# Patient Record
Sex: FEMALE | Race: BLACK OR AFRICAN AMERICAN | NOT HISPANIC OR LATINO | Employment: UNEMPLOYED | URBAN - METROPOLITAN AREA
[De-identification: names, ages, dates, MRNs, and addresses within clinical notes are randomized per-mention and may not be internally consistent; named-entity substitution may affect disease eponyms.]

---

## 2017-01-12 ENCOUNTER — TELEPHONE (OUTPATIENT)
Dept: ORTHOPEDICS | Facility: CLINIC | Age: 62
End: 2017-01-12

## 2017-01-12 NOTE — TELEPHONE ENCOUNTER
Phoned patient, moved appointment from [facility] to [facility] on 11/13/17 due to schedule change by provider. Patient vocalized understanding.

## 2017-01-13 ENCOUNTER — OFFICE VISIT (OUTPATIENT)
Dept: ORTHOPEDICS | Facility: CLINIC | Age: 62
End: 2017-01-13
Payer: COMMERCIAL

## 2017-01-13 ENCOUNTER — HOSPITAL ENCOUNTER (OUTPATIENT)
Dept: RADIOLOGY | Facility: HOSPITAL | Age: 62
Discharge: HOME | End: 2017-01-13
Attending: ORTHOPAEDIC SURGERY
Payer: COMMERCIAL

## 2017-01-13 VITALS
SYSTOLIC BLOOD PRESSURE: 142 MMHG | HEIGHT: 64 IN | HEART RATE: 97 BPM | WEIGHT: 271.19 LBS | DIASTOLIC BLOOD PRESSURE: 87 MMHG | BODY MASS INDEX: 46.3 KG/M2

## 2017-01-13 DIAGNOSIS — M79.605 LEG PAIN, LEFT: ICD-10-CM

## 2017-01-13 DIAGNOSIS — M25.512 ACUTE PAIN OF LEFT SHOULDER: ICD-10-CM

## 2017-01-13 DIAGNOSIS — M79.662 PAIN OF LEFT CALF: ICD-10-CM

## 2017-01-13 DIAGNOSIS — M25.512 ACUTE PAIN OF LEFT SHOULDER: Primary | ICD-10-CM

## 2017-01-13 DIAGNOSIS — M79.662 PAIN OF LEFT CALF: Primary | ICD-10-CM

## 2017-01-13 DIAGNOSIS — M79.605 LEG PAIN, LEFT: Primary | ICD-10-CM

## 2017-01-13 PROCEDURE — 93971 EXTREMITY STUDY: CPT | Mod: TC

## 2017-01-13 PROCEDURE — 99999 PR PBB SHADOW E&M-EST. PATIENT-LVL III: CPT | Mod: PBBFAC,,, | Performed by: ORTHOPAEDIC SURGERY

## 2017-01-13 PROCEDURE — 73590 X-RAY EXAM OF LOWER LEG: CPT | Mod: TC,PO,LT

## 2017-01-13 PROCEDURE — 73590 X-RAY EXAM OF LOWER LEG: CPT | Mod: 26,LT,, | Performed by: RADIOLOGY

## 2017-01-13 PROCEDURE — 99244 OFF/OP CNSLTJ NEW/EST MOD 40: CPT | Mod: S$GLB,,, | Performed by: ORTHOPAEDIC SURGERY

## 2017-01-13 RX ORDER — CYCLOBENZAPRINE HCL 10 MG
TABLET ORAL
Refills: 5 | COMMUNITY
Start: 2017-01-04 | End: 2018-11-15

## 2017-01-13 RX ORDER — DICLOFENAC SODIUM 20 MG/G
SOLUTION TOPICAL
COMMUNITY
Start: 2016-11-28 | End: 2018-11-15

## 2017-01-13 RX ORDER — DIGOXIN 250 MCG
TABLET ORAL
COMMUNITY
Start: 2016-12-10 | End: 2018-08-20

## 2017-01-13 RX ORDER — MULTIVIT WITH MINERALS/HERBS
1 TABLET ORAL DAILY
COMMUNITY
End: 2018-09-14

## 2017-01-13 RX ORDER — OMEPRAZOLE 20 MG/1
20 CAPSULE, DELAYED RELEASE ORAL DAILY
COMMUNITY
End: 2018-08-20

## 2017-01-13 RX ORDER — BLACK COHOSH ROOT 200 MG
500 CAPSULE ORAL DAILY
COMMUNITY
End: 2018-09-14

## 2017-01-13 RX ORDER — AMLODIPINE BESYLATE 10 MG/1
TABLET ORAL
COMMUNITY
Start: 2016-11-19 | End: 2018-11-26 | Stop reason: SDUPTHER

## 2017-01-13 RX ORDER — TRAMADOL HYDROCHLORIDE 50 MG/1
50 TABLET ORAL 2 TIMES DAILY PRN
Refills: 5 | COMMUNITY
Start: 2016-12-30 | End: 2019-05-01 | Stop reason: SDUPTHER

## 2017-01-13 RX ORDER — NAPROXEN SODIUM 220 MG
220 TABLET ORAL
COMMUNITY
End: 2018-09-11

## 2017-01-13 NOTE — PROGRESS NOTES
This consultation has been requested my Dr. Sada Aj.  Please make certain that  she gets a copy of this consultation report.      CC:This is a 61-year-old female that complains of left leg pain as well sweating.     HPI:The patient states that she does not have any shortness of breath nor chest pain.  She rates the Pain at a 7 out of 10.    PMH:  History reviewed. No pertinent past medical history.    PSH:    Past Surgical History   Procedure Laterality Date    Hysterectomy      Bladder suspension         Family Hx:    Family History   Problem Relation Age of Onset    Heart attack Father        Allergy:    Review of patient's allergies indicates:   Allergen Reactions    Penicillins Rash       Medication:    Current Outpatient Prescriptions:     amlodipine (NORVASC) 10 MG tablet, , Disp: , Rfl:     ascorbic acid, vitamin C, (VITAMIN C) 500 MG tablet, Take 500 mg by mouth once daily., Disp: , Rfl:     aspirin-sod bicarb-citric acid (LEAH-SELTZER) 324 mg TbEF, Take 325 mg by mouth nightly., Disp: , Rfl:     b complex vitamins tablet, Take 1 tablet by mouth once daily., Disp: , Rfl:     cyclobenzaprine (FLEXERIL) 10 MG tablet, , Disp: , Rfl: 5    digoxin (LANOXIN) 250 mcg tablet, , Disp: , Rfl:     naproxen sodium (ANAPROX) 220 MG tablet, Take 220 mg by mouth every 12 (twelve) hours., Disp: , Rfl:     omeprazole (PRILOSEC) 20 MG capsule, Take 20 mg by mouth once daily., Disp: , Rfl:     PENNSAID 20 mg/gram /actuation(2 %) sopm, , Disp: , Rfl:     tramadol (ULTRAM) 50 mg tablet, , Disp: , Rfl: 5    Social History:    Social History     Social History    Marital status:      Spouse name: N/A    Number of children: N/A    Years of education: N/A     Occupational History    Not on file.     Social History Main Topics    Smoking status: Never Smoker    Smokeless tobacco: Never Used    Alcohol use No    Drug use: No    Sexual activity: Yes     Partners: Male     Other Topics Concern  "   Not on file     Social History Narrative    No narrative on file       Vitals:     Visit Vitals    BP (!) 142/87    Pulse 97    Ht 5' 3.5" (1.613 m)    Wt 123 kg (271 lb 2.7 oz)    BMI 47.28 kg/m2        ROS:  GENERAL: No fever, chills, fatigability or weight loss.  SKIN: No rashes, itching or changes in color or texture of skin.  HEAD: No headaches or recent head trauma.  EYES: Visual acuity fine. No photophobia, ocular pain or diplopia.  EARS: Denies ear pain, discharge or vertigo.  NOSE: No loss of smell, no epistaxis or postnasal drip.  MOUTH & THROAT: No hoarseness or change in voice. No excessive gum bleeding.  NODES: Denies swollen glands.  CHEST: Denies AREVALO, cyanosis, wheezing, cough and sputum production.  CARDIOVASCULAR: Denies chest pain, PND, orthopnea or reduced exercise tolerance.  ABDOMEN: Appetite fine. No weight loss. Denies diarrhea, abdominal pain, hematemesis or blood in stool.  URINARY: No flank pain, dysuria or hematuria.  PERIPHERAL VASCULAR: No claudication or cyanosis.  NEUROLOGIC: No history of seizures, paralysis, alteration of gait or coordination.  MUSCULOSKELETAL: See HPI    PE:  APPEARANCE: Well nourished, well developed, in no acute distress.   HEAD: Normocephalic, atraumatic.  EYES: PERRL. EOMI.   EARS: TM's intact. Light reflex normal. No retraction or perforation.   NOSE: Mucosa pink. Airway clear.  MOUTH & THROAT: No tonsillar enlargement. No pharyngeal erythema or exudate. No stridor.  NECK: Supple.   NODES: No cervical, axillary or inguinal lymph node enlargement.  CHEST: Lungs clear to auscultation.  CARDIOVASCULAR: Normal S1, S2. No rubs, murmurs or gallops.  ABDOMEN: Bowel sounds normal. Not distended. Soft. No tenderness or masses.  NEUROLOGIC: Cranial Nerves: II-XII grossly intact, also see MUSCULOSKELETAL  MUSCULOSKELETAL:           Left Calf -  2 plus dorsalis pedis and posterior tibial artery pulses, light touch intact Left lower extremity.  All digits are " warm. No erythema, no warmth, no drainage, no swelling, mild tenderness  Over the medial calf and the achilles tendon.  Less than 2 seconds capillary refill all digits.      Assessment:  The patient is to undergo an ultrasound of the left calf.  She needs to be evaluated by her PCP ASAP.  The patient is to go to the emergency room immediately if she develops shortness of breath, lightheadedness, or dizziness.           Diagnosis:              1.Left calf pain                   Diagnostic Studies  MRI-No  X-Ray-yes  EMG/NCV-no  Arthrogram-No  Bone Scan-No  CT Scan-No  Doppler-yes, Left calf.  The patient is to remain in the clinic until she gets the result of her ultrasound.  ESR-No  CRP-No  CBC with Diff-No   Rheumatoid/Arthritis Panel-No      Plan:                                                 1. PT-no                                                 2.OT-no                                          3.NSAID-no                                        4. Narcotics-no                                     5. Wound care-No                                 6. Rest-yes                                           7. Surgery-no                                         8. BERYL Hose-no                                    9. Anticoagulation therapy-no               10. Elevation-no                                     11. Crutches-no                                    12. Walker-no             13. Cane no                        14. Referral-yes , Please see the PCP today.                                    15.Injection-no                            16. Splint   /    Cast   /   Cast Shoe-No              17. RICE-none            18. Follow up- 3 weeks

## 2017-01-13 NOTE — PATIENT INSTRUCTIONS
Deep Vein Thrombosis (DVT)    Deep vein thrombosis (DVT) occurs when a blood clot (thrombus) forms in a deep vein. This happens most often in the leg. It can also happen in the arms or other parts of the body. A part of the clot called an embolus can break off and travel to the lungs. When this happens, its called a pulmonary embolism (PE). PE is a medical emergency. It can cut off blood flow and lead to death. Both DVT and PE are closely related. Together, they are often referred to by the term venous thromboembolism (VTE).   Risk Factors for DVT  Anything that slows blood flow, injures the lining of a vein, or increases blood clotting can make you more prone to having DVT. This includes the following:  · Long periods without movement (such as when sitting for many hours at a time or when recovering from major surgery or illness)  · Estrogen (female hormone) therapy, such as hormone replacement therapy (HRT) or oral contraceptives  · Fractured hip or leg  · Major surgery or joint replacement  · Major trauma or spinal cord injury  · Cancer  · Family history  · Excess weight or obesity  · Smoking  · Older age  Symptoms  DVT does not always cause symptoms. When symptoms do occur, they may appear around the site of the DVT, such as in the leg. Possible symptoms include:  · Swelling  · Pain  · Warmth  · Redness  · Tenderness  Home care  · You were likely prescribed medicines called anticoagulants (also called blood thinners.) They may be given as pills (oral) or shots (injections). Follow all instructions when using these medicines. Note: Do not take blood thinners with other medicines, herbal remedies, or supplements without talking to your provider first. Certain medicines or products can affect how blood thinners work.  · Follow your providers instructions about activity and rest.  · If support or compression stockings are prescribed, wear them as directed. These may help improve blood flow in the legs.  · When  sitting or lying down, move your ankles, toes and knees often. This may also help improve blood flow in the legs.  Follow-up care  Follow up with your healthcare provider, or as advised. If imaging tests were done, they may need further review by a doctor. You will be told of any new findings that may affect your care.  When to seek medical advice  Call your healthcare provider right away if any of these occur:  · New or increased swelling, pain, tenderness, warmth, or redness, in the leg, arm, or other area  · Blood in the urine  · Bleeding with bowel movements  Call 911  Call 911 right away if any of these occur:  · Bleeding from the nose, gums, a cut, or vagina  · Heavy or uncontrolled bleeding  · Trouble breathing  · Chest pain or discomfort that worsens with deep breathing or coughing  · Coughing (may cough up blood)  · Fast heartbeat  · Sweating  · Anxiety  · Lightheadedness, dizziness, or fainting  © 7932-3184 [employer]. [address] [place] [address], [place] [address]. All rights reserved. This information is not intended as a substitute for professional medical care. Always follow your healthcare professional's instructions.

## 2017-01-16 ENCOUNTER — TELEPHONE (OUTPATIENT)
Dept: ORTHOPEDICS | Facility: CLINIC | Age: 62
End: 2017-01-16

## 2017-01-16 NOTE — TELEPHONE ENCOUNTER
----- Message from Flores Plaza sent at 1/16/2017  9:51 AM CST -----  Contact: Patient  Patient is requesting her test results, please call her back at [phone]. Thank you

## 2017-01-17 ENCOUNTER — TELEPHONE (OUTPATIENT)
Dept: ORTHOPEDICS | Facility: CLINIC | Age: 62
End: 2017-01-17

## 2017-01-17 NOTE — TELEPHONE ENCOUNTER
----- Message from Gustavo Treviño PA-C sent at 1/16/2017  4:28 PM CST -----  Discuss with patient, to follow up with PCP. US negative for DVT

## 2018-08-17 DIAGNOSIS — M25.562 LEFT KNEE PAIN, UNSPECIFIED CHRONICITY: Primary | ICD-10-CM

## 2018-08-20 ENCOUNTER — OFFICE VISIT (OUTPATIENT)
Dept: ORTHOPEDICS | Facility: CLINIC | Age: 63
End: 2018-08-20
Payer: COMMERCIAL

## 2018-08-20 ENCOUNTER — HOSPITAL ENCOUNTER (OUTPATIENT)
Dept: RADIOLOGY | Facility: HOSPITAL | Age: 63
Discharge: HOME | End: 2018-08-20
Attending: ORTHOPAEDIC SURGERY
Payer: COMMERCIAL

## 2018-08-20 ENCOUNTER — OFFICE VISIT (OUTPATIENT)
Dept: CARDIOLOGY | Facility: CLINIC | Age: 63
End: 2018-08-20
Payer: COMMERCIAL

## 2018-08-20 VITALS
DIASTOLIC BLOOD PRESSURE: 70 MMHG | WEIGHT: 257.94 LBS | BODY MASS INDEX: 45.7 KG/M2 | SYSTOLIC BLOOD PRESSURE: 129 MMHG | HEIGHT: 63 IN | HEART RATE: 106 BPM

## 2018-08-20 VITALS
SYSTOLIC BLOOD PRESSURE: 132 MMHG | HEART RATE: 92 BPM | BODY MASS INDEX: 46.21 KG/M2 | WEIGHT: 260.81 LBS | DIASTOLIC BLOOD PRESSURE: 80 MMHG | HEIGHT: 63 IN

## 2018-08-20 DIAGNOSIS — R07.89 CHEST PRESSURE: Primary | ICD-10-CM

## 2018-08-20 DIAGNOSIS — G47.33 OSA (OBSTRUCTIVE SLEEP APNEA): ICD-10-CM

## 2018-08-20 DIAGNOSIS — M21.162 GENU VARUM OF LEFT LOWER EXTREMITY: ICD-10-CM

## 2018-08-20 DIAGNOSIS — R06.09 DYSPNEA ON EXERTION: ICD-10-CM

## 2018-08-20 DIAGNOSIS — R07.89 OTHER CHEST PAIN: ICD-10-CM

## 2018-08-20 DIAGNOSIS — E66.9 OBESITY, UNSPECIFIED CLASSIFICATION, UNSPECIFIED OBESITY TYPE, UNSPECIFIED WHETHER SERIOUS COMORBIDITY PRESENT: ICD-10-CM

## 2018-08-20 DIAGNOSIS — M25.562 LEFT KNEE PAIN, UNSPECIFIED CHRONICITY: ICD-10-CM

## 2018-08-20 DIAGNOSIS — R06.09 DOE (DYSPNEA ON EXERTION): ICD-10-CM

## 2018-08-20 DIAGNOSIS — I10 ESSENTIAL HYPERTENSION: ICD-10-CM

## 2018-08-20 DIAGNOSIS — M17.0 PRIMARY OSTEOARTHRITIS OF BOTH KNEES: Primary | ICD-10-CM

## 2018-08-20 PROCEDURE — 99999 PR PBB SHADOW E&M-EST. PATIENT-LVL IV: CPT | Mod: PBBFAC,,, | Performed by: INTERNAL MEDICINE

## 2018-08-20 PROCEDURE — 3008F BODY MASS INDEX DOCD: CPT | Mod: CPTII,S$GLB,, | Performed by: ORTHOPAEDIC SURGERY

## 2018-08-20 PROCEDURE — 73562 X-RAY EXAM OF KNEE 3: CPT | Mod: 26,XS,RT, | Performed by: RADIOLOGY

## 2018-08-20 PROCEDURE — 99214 OFFICE O/P EST MOD 30 MIN: CPT | Mod: S$GLB,,, | Performed by: ORTHOPAEDIC SURGERY

## 2018-08-20 PROCEDURE — 73562 X-RAY EXAM OF KNEE 3: CPT | Mod: TC,FY,PO,RT

## 2018-08-20 PROCEDURE — 93000 ELECTROCARDIOGRAM COMPLETE: CPT | Mod: S$GLB,,, | Performed by: INTERNAL MEDICINE

## 2018-08-20 PROCEDURE — 73564 X-RAY EXAM KNEE 4 OR MORE: CPT | Mod: 26,LT,, | Performed by: RADIOLOGY

## 2018-08-20 PROCEDURE — 99999 PR PBB SHADOW E&M-EST. PATIENT-LVL IV: CPT | Mod: PBBFAC,,, | Performed by: ORTHOPAEDIC SURGERY

## 2018-08-20 PROCEDURE — 99205 OFFICE O/P NEW HI 60 MIN: CPT | Mod: S$GLB,,, | Performed by: INTERNAL MEDICINE

## 2018-08-20 PROCEDURE — 3008F BODY MASS INDEX DOCD: CPT | Mod: CPTII,S$GLB,, | Performed by: INTERNAL MEDICINE

## 2018-08-20 RX ORDER — HYDROCODONE BITARTRATE AND ACETAMINOPHEN 7.5; 325 MG/1; MG/1
1 TABLET ORAL EVERY 6 HOURS PRN
COMMUNITY
Start: 2018-08-07 | End: 2018-11-15

## 2018-08-20 RX ORDER — MELOXICAM 7.5 MG/1
7.5 TABLET ORAL
COMMUNITY
Start: 2018-08-14 | End: 2018-09-14

## 2018-08-20 RX ORDER — FUROSEMIDE 20 MG/1
20 TABLET ORAL DAILY PRN
Qty: 90 TABLET | Refills: 3 | Status: SHIPPED | OUTPATIENT
Start: 2018-08-20 | End: 2018-09-27 | Stop reason: SDUPTHER

## 2018-08-20 RX ORDER — TOPIRAMATE 50 MG/1
50 TABLET, FILM COATED ORAL 2 TIMES DAILY
COMMUNITY
Start: 2018-03-07 | End: 2018-09-14 | Stop reason: SDUPTHER

## 2018-08-20 RX ORDER — METHOCARBAMOL 500 MG/1
3 TABLET, FILM COATED ORAL DAILY PRN
COMMUNITY
Start: 2018-08-05 | End: 2018-11-08 | Stop reason: SDUPTHER

## 2018-08-20 RX ORDER — TOPIRAMATE 50 MG/1
1 TABLET, FILM COATED ORAL DAILY
COMMUNITY
Start: 2018-06-18 | End: 2018-11-26 | Stop reason: SDUPTHER

## 2018-08-20 RX ORDER — OMEPRAZOLE 40 MG/1
1 CAPSULE, DELAYED RELEASE ORAL DAILY
COMMUNITY
Start: 2018-06-27 | End: 2018-11-26 | Stop reason: SDUPTHER

## 2018-08-20 NOTE — PROGRESS NOTES
Subjective:     Patient ID: Mariel Becerril is a 62 y.o. female.    Chief Complaint: Pain of the Left Knee    Patient is here for left knee pain. Reports it has been bothering her for 1 year. Reports no LUCA. Reports no previous injury. Ambulating on a crutch today.      Knee Pain    The pain is present in the left knee. This is a chronic problem. The current episode started more than 1 year ago. The problem occurs intermittently. The problem has been gradually improving. The quality of the pain is described as aching. The pain is at a severity of 5/10. Associated symptoms include joint swelling. Pertinent negatives include no fever or numbness. The symptoms are aggravated by lying down, walking, standing and sitting. She has tried NSAIDs, oral narcotics, rest and OTC ointments for the symptoms. The treatment provided moderate relief. Physical therapy was not tried.      Past Medical History:   Diagnosis Date    Hypertension      Past Surgical History:   Procedure Laterality Date    BLADDER SUSPENSION      HYSTERECTOMY      tonsilectomy       Family History   Problem Relation Age of Onset    Heart attack Father      Social History     Socioeconomic History    Marital status:      Spouse name: Not on file    Number of children: Not on file    Years of education: Not on file    Highest education level: Not on file   Social Needs    Financial resource strain: Not on file    Food insecurity - worry: Not on file    Food insecurity - inability: Not on file    Transportation needs - medical: Not on file    Transportation needs - non-medical: Not on file   Occupational History    Not on file   Tobacco Use    Smoking status: Never Smoker    Smokeless tobacco: Never Used   Substance and Sexual Activity    Alcohol use: No    Drug use: No    Sexual activity: Yes     Partners: Male   Other Topics Concern    Not on file   Social History Narrative    Not on file     Medication List with Changes/Refills    Current Medications    AMLODIPINE (NORVASC) 10 MG TABLET        ASCORBIC ACID, VITAMIN C, (VITAMIN C) 500 MG TABLET    Take 500 mg by mouth once daily.    ASPIRIN-SOD BICARB-CITRIC ACID (LEAH-SELTZER) 324 MG TBEF    Take 325 mg by mouth nightly.    B COMPLEX VITAMINS TABLET    Take 1 tablet by mouth once daily.    CYCLOBENZAPRINE (FLEXERIL) 10 MG TABLET        DIGOXIN (LANOXIN) 250 MCG TABLET        HYDROCODONE-ACETAMINOPHEN (NORCO) 7.5-325 MG PER TABLET    Take 1 tablet by mouth every 6 (six) hours as needed.    MELOXICAM (MOBIC) 7.5 MG TABLET    Take 1 tablet by mouth as needed.    METHOCARBAMOL (ROBAXIN) 500 MG TAB    Take 3 tablets by mouth daily as needed.    NAPROXEN SODIUM (ANAPROX) 220 MG TABLET    Take 220 mg by mouth every 12 (twelve) hours.    OMEPRAZOLE (PRILOSEC) 20 MG CAPSULE    Take 20 mg by mouth once daily.    OMEPRAZOLE (PRILOSEC) 40 MG CAPSULE    Take 1 capsule by mouth once daily.    PENNSAID 20 MG/GRAM /ACTUATION(2 %) SOPM        TOPIRAMATE (TOPAMAX) 50 MG TABLET    Take 1 tablet by mouth once daily.    TOPIRAMATE (TOPAMAX) 50 MG TABLET    Take 50 mg by mouth 2 (two) times daily.    TRAMADOL (ULTRAM) 50 MG TABLET         Review of patient's allergies indicates:   Allergen Reactions    Penicillins Rash     Review of Systems   Constitution: Negative for fever.   HENT: Negative for hearing loss.    Eyes: Negative for blurred vision and visual disturbance.   Cardiovascular: Positive for leg swelling. Negative for chest pain.   Respiratory: Positive for shortness of breath.    Endocrine: Negative for polyuria.   Hematologic/Lymphatic: Negative for bleeding problem.   Skin: Negative for rash.   Musculoskeletal: Positive for back pain, joint pain, joint swelling and muscle cramps. Negative for muscle weakness.   Gastrointestinal: Negative for melena.   Genitourinary: Negative for hematuria.   Neurological: Negative for loss of balance, numbness and paresthesias.   Psychiatric/Behavioral: Negative  for altered mental status.       Objective:   Body mass index is 45.69 kg/m².  Vitals:    08/20/18 0933   BP: 129/70   Pulse: 106       General: Mariel is well-developed, well-nourished, appears stated age, in no acute distress, alert and oriented to time, place and person.       General    Vitals reviewed.  Constitutional: She is oriented to person, place, and time. She appears well-developed and well-nourished. No distress.   HENT:   Mouth/Throat: No oropharyngeal exudate.   Eyes: Right eye exhibits no discharge. Left eye exhibits no discharge.   Neck: Normal range of motion.   Pulmonary/Chest: Effort normal. No respiratory distress.   Neurological: She is alert and oriented to person, place, and time. She has normal reflexes. No cranial nerve deficit. Coordination normal.   Psychiatric: She has a normal mood and affect. Her behavior is normal. Judgment and thought content normal.     General Musculoskeletal Exam   Gait: normal       Right Knee Exam     Inspection   Erythema: absent  Scars: absent  Swelling: absent  Effusion: effusion  Deformity: deformity  Bruising: absent    Tenderness   The patient is tender to palpation of the medial joint line.    Crepitus   The patient has crepitus of the patella.    Range of Motion   Extension: 0   Flexion: 120     Tests   Meniscus   Celio:  Medial - negative Lateral - negative  Ligament Examination Lachman: normal (-1 to 2mm) PCL-Posterior Drawer: normal (0 to 2mm)     MCL - Valgus: normal (0 to 2mm)  LCL - Varus: normal  Patella   Patellar apprehension: negative  Passive Patellar Tilt: neutral  Patellar Tracking: normal  Patellar Glide (quadrants): Lateral - 1   Medial - 2  Q-Angle at 90 degrees: normal  Patellar Grind: negative    Other   Meniscal Cyst: absent  Popliteal (Baker's) Cyst: absent  Sensation: normal    Left Knee Exam     Inspection   Erythema: absent  Scars: absent  Swelling: absent  Effusion: absent  Deformity: deformity  Bruising: absent    Tenderness    The patient tender to palpation of the medial joint line.    Crepitus   The patient has crepitus of the patella.    Range of Motion   Extension: 0   Flexion: 120     Tests   Meniscus   Celio:  Medial - negative Lateral - negative  Stability Lachman: normal (-1 to 2mm) PCL-Posterior Drawer: normal (0 to 2mm)  MCL - Valgus: normal (0 to 2mm)  LCL - Varus: normal (0 to 2mm)  Patella   Patellar apprehension: negative  Passive Patellar Tilt: neutral  Patellar Tracking: normal  Patellar Glide (Quadrants): Lateral - 1 Medial - 2  Q-Angle at 90 degrees: normal  Patellar Grind: negative    Other   Meniscal Cyst: absent  Popliteal (Baker's) Cyst: absent  Sensation: normal    Right Hip Exam     Tests   Timoteo: negative  Left Hip Exam     Tests   Timoteo: negative          Muscle Strength   Right Lower Extremity   Hip Abduction: 5/5   Quadriceps:  5/5   Hamstrin/5   Left Lower Extremity   Hip Abduction: 5/5   Quadriceps:  4/5   Hamstrin/5     Reflexes     Left Side  Quadriceps:  2+  Achilles:  2+    Right Side   Quadriceps:  2+  Achilles:  2+    Vascular Exam     Right Pulses  Dorsalis Pedis:      2+  Posterior Tibial:      2+        Left Pulses  Dorsalis Pedis:      2+  Posterior Tibial:      2+        X-rays including standing, weight bearing AP and flexion bilateral knees, lateral and merchant views ordered and images reviewed by me show:    No fracture, dislocation or other pathology   Medial compartment: moderate degenerative changes   Lateral compartment: mild degenerative changes   Patellofemoral compartment: mild degenerative changes      Assessment:     Encounter Diagnoses   Name Primary?    Primary osteoarthritis of both knees Yes    Obesity, unspecified classification, unspecified obesity type, unspecified whether serious comorbidity present     Genu varum of left lower extremity         Plan:     1. PT for quad strengthening/Home exercise program    2.  Medial  Brace -left    3.  Continue pain  management    4. Synvisc authorization    5. Weight loss-bariatric referral    6. Dr. Mi referral for genicular nerve block, lumbar injections-current pain mgmt has not offered any interventions

## 2018-08-20 NOTE — PROGRESS NOTES
Subjective:   Patient ID:  Mariel Becerril is a 62 y.o. female who presents for cardiac consult of Shortness of Breath and Chest Pain      HPI  The patient came in today for cardiac consult of Shortness of Breath and Chest Pain    18  This is a 62 year old female pt with current medical conditions HTN, obesity, GERD, OA, CPAP of knees presents for initial CV evaluation of SOB/AREVALO.     Pt has been having SOB, AREVALO. Is always in pain, is exhausted on pain meds. She feels like her heart if affected and is tired after walking a few steps.  Pt also feels chest pressure. Pt feels chest pressure daily, just tries to rest and goes away. Chest pain is substernal without radiation. 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 Uses CPAP every night, but last eval was over 5 years.   Feels palpitations, rarely, was on digoxin then.    ECG - NSR, poor RWP    Patient feels no PND, no dizziness, no syncope, no CNS symptoms.    Patient is compliant with medications.    Family history includes Arthritis in her mother; Depression in her sister; Heart disease in her father -  at age 81; Hypertension in her mother and sister.      Past Medical History:   Diagnosis Date    Hypertension        Past Surgical History:   Procedure Laterality Date    BLADDER SUSPENSION      HYSTERECTOMY      tonsilectomy         Social History     Tobacco Use    Smoking status: Never Smoker    Smokeless tobacco: Never Used   Substance Use Topics    Alcohol use: No    Drug use: No       Family History   Problem Relation Age of Onset    Heart attack Father        Patient's Medications   New Prescriptions    FUROSEMIDE (LASIX) 20 MG TABLET    Take 1 tablet (20 mg total) by mouth daily as needed.   Previous Medications    AMLODIPINE (NORVASC) 10 MG TABLET        ASCORBIC ACID, VITAMIN C, (VITAMIN C) 500 MG TABLET    Take 500 mg by mouth once daily.    ASPIRIN-SOD BICARB-CITRIC ACID (LEAH-SELTZER) 324 MG TBEF    Take 325 mg by mouth nightly.    B COMPLEX  "VITAMINS TABLET    Take 1 tablet by mouth once daily.    CYCLOBENZAPRINE (FLEXERIL) 10 MG TABLET        HYDROCODONE-ACETAMINOPHEN (NORCO) 7.5-325 MG PER TABLET    Take 1 tablet by mouth every 6 (six) hours as needed.    MELOXICAM (MOBIC) 7.5 MG TABLET    Take 1 tablet by mouth as needed.    METHOCARBAMOL (ROBAXIN) 500 MG TAB    Take 3 tablets by mouth daily as needed.    NAPROXEN SODIUM (ANAPROX) 220 MG TABLET    Take 220 mg by mouth every 12 (twelve) hours.    OMEPRAZOLE (PRILOSEC) 40 MG CAPSULE    Take 1 capsule by mouth once daily.    PENNSAID 20 MG/GRAM /ACTUATION(2 %) SOPM        TOPIRAMATE (TOPAMAX) 50 MG TABLET    Take 1 tablet by mouth once daily.    TOPIRAMATE (TOPAMAX) 50 MG TABLET    Take 50 mg by mouth 2 (two) times daily.    TRAMADOL (ULTRAM) 50 MG TABLET       Modified Medications    No medications on file   Discontinued Medications    DIGOXIN (LANOXIN) 250 MCG TABLET        OMEPRAZOLE (PRILOSEC) 20 MG CAPSULE    Take 20 mg by mouth once daily.       Review of Systems   Constitutional: Negative.    HENT: Negative.    Eyes: Negative.    Respiratory: Positive for shortness of breath.    Cardiovascular: Positive for chest pain, palpitations and leg swelling.   Gastrointestinal: Positive for heartburn.   Genitourinary: Negative.    Musculoskeletal: Negative.    Skin: Negative.    Neurological: Negative.    Endo/Heme/Allergies: Negative.    Psychiatric/Behavioral: Negative.    All 12 systems otherwise negative.      Wt Readings from Last 3 Encounters:   08/20/18 118.3 kg (260 lb 12.9 oz)   08/20/18 117 kg (257 lb 15 oz)   01/13/17 123 kg (271 lb 2.7 oz)     Temp Readings from Last 3 Encounters:   No data found for Temp     BP Readings from Last 3 Encounters:   08/20/18 132/80   08/20/18 129/70   01/13/17 (!) 142/87     Pulse Readings from Last 3 Encounters:   08/20/18 92   08/20/18 106   01/13/17 97       /80 (BP Method: Large (Manual))   Pulse 92   Ht 5' 3" (1.6 m)   Wt 118.3 kg (260 lb 12.9 oz)  "  BMI 46.20 kg/m²     Objective:   Physical Exam   Constitutional: She is oriented to person, place, and time. She appears well-developed and well-nourished. No distress.   HENT:   Head: Normocephalic and atraumatic.   Nose: Nose normal.   Mouth/Throat: Oropharynx is clear and moist.   Eyes: Conjunctivae and EOM are normal. No scleral icterus.   Neck: Normal range of motion. Neck supple. No JVD present. No thyromegaly present.   Cardiovascular: Normal rate, regular rhythm, S1 normal and S2 normal. Exam reveals no gallop, no S3, no S4 and no friction rub.   Murmur heard.  Pulmonary/Chest: Effort normal and breath sounds normal. No stridor. No respiratory distress. She has no wheezes. She has no rales. She exhibits no tenderness.   Abdominal: Soft. Bowel sounds are normal. She exhibits no distension and no mass. There is no tenderness. There is no rebound.   Genitourinary:   Genitourinary Comments: Deferred   Musculoskeletal: Normal range of motion. She exhibits no edema, tenderness or deformity.   Lymphadenopathy:     She has no cervical adenopathy.   Neurological: She is alert and oriented to person, place, and time. She exhibits normal muscle tone. Coordination normal.   Skin: Skin is warm and dry. No rash noted. She is not diaphoretic. No erythema. No pallor.   Psychiatric: She has a normal mood and affect. Her behavior is normal. Judgment and thought content normal.   Nursing note and vitals reviewed.      No results found for: NA, K, CL, CO2, BUN, CREATININE, GLU, HGBA1C, MG, AST, ALT, ALBUMIN, PROT, BILITOT, WBC, HGB, HCT, MCV, PLT, INR, TSH, CHOL, HDL, LDLCALC, TRIG, BNP  Assessment:      1. Chest pressure    2. Essential hypertension    3. GIL (obstructive sleep apnea)    4. AREVALO (dyspnea on exertion)    5. Other chest pain    6. Dyspnea on exertion        Plan:   1. Chest pressure/AREVALO  - pharm nuclear stress test to r/o ischemia - pt cannot walk due to leg pain  - 2D ECHO, CXR, BNP  - discussed non-cardiac  causes of CP as well  - lasix 20mg PRN daily    2. AREVALO  - as above and pulm referral    3. HTN  - cont meds    4. Obesity  - rec weight loss with diet/exercise    5. GIL  - cont CPAP  - referral to pulm for AREVALO and may need titration of CPAP    Thank you for allowing me to participate in this patient's care. Please do not hesitate to contact me with any questions or concerns. Consult note has been forwarded to the referral physician.

## 2018-08-27 ENCOUNTER — TELEPHONE (OUTPATIENT)
Dept: PULMONOLOGY | Facility: CLINIC | Age: 63
End: 2018-08-27

## 2018-08-27 NOTE — TELEPHONE ENCOUNTER
----- Message from Julia Grider sent at 8/27/2018 11:34 AM CDT -----  Contact: pt  She's returning a call in regards to appointment time, she stated that she can come for 1pm, please advise [phone] (home)

## 2018-08-29 ENCOUNTER — OFFICE VISIT (OUTPATIENT)
Dept: PULMONOLOGY | Facility: CLINIC | Age: 63
End: 2018-08-29
Payer: COMMERCIAL

## 2018-08-29 VITALS
WEIGHT: 265.88 LBS | SYSTOLIC BLOOD PRESSURE: 120 MMHG | BODY MASS INDEX: 47.11 KG/M2 | RESPIRATION RATE: 18 BRPM | DIASTOLIC BLOOD PRESSURE: 80 MMHG | OXYGEN SATURATION: 99 % | HEIGHT: 63 IN | HEART RATE: 94 BPM

## 2018-08-29 DIAGNOSIS — G47.33 OSA ON CPAP: Primary | ICD-10-CM

## 2018-08-29 DIAGNOSIS — R06.02 SOBOE (SHORTNESS OF BREATH ON EXERTION): ICD-10-CM

## 2018-08-29 DIAGNOSIS — R09.02 HYPOXEMIA: ICD-10-CM

## 2018-08-29 PROCEDURE — 3008F BODY MASS INDEX DOCD: CPT | Mod: CPTII,S$GLB,, | Performed by: INTERNAL MEDICINE

## 2018-08-29 PROCEDURE — 99999 PR PBB SHADOW E&M-EST. PATIENT-LVL III: CPT | Mod: PBBFAC,,, | Performed by: INTERNAL MEDICINE

## 2018-08-29 PROCEDURE — 99204 OFFICE O/P NEW MOD 45 MIN: CPT | Mod: S$GLB,,, | Performed by: INTERNAL MEDICINE

## 2018-08-29 NOTE — PROGRESS NOTES
Initial Outpatient Pulmonary Evaluation       SUBJECTIVE:     History of Present Illness:  Patient is a 62 y.o. female presenting for evaluation and treatment of obstructive sleep apnea.  Patient known with obstructive sleep apnea, has had a sleep study done more than 5 years ago.  She has recently complained of excessive fatigue, shortness of breath at rest and on exertion, chest discomfort.    Compliant with her CPAP.    Patient recently node with the physical therapy, and supposed to see a nutritionist in order to try to lose weight.  She complains of bilateral knee pain.  Uses a cane for ambulation.    Never smoker.    Works was [employer].    Chief Complaint   Patient presents with    Sleep Apnea    Shortness of Breath       Review of patient's allergies indicates:   Allergen Reactions    Penicillins Rash       Current Outpatient Medications   Medication Sig Dispense Refill    amlodipine (NORVASC) 10 MG tablet       furosemide (LASIX) 20 MG tablet Take 1 tablet (20 mg total) by mouth daily as needed. 90 tablet 3    HYDROcodone-acetaminophen (NORCO) 7.5-325 mg per tablet Take 1 tablet by mouth every 6 (six) hours as needed.      meloxicam (MOBIC) 7.5 MG tablet Take 1 tablet by mouth as needed.      methocarbamol (ROBAXIN) 500 MG Tab Take 3 tablets by mouth daily as needed.      naproxen sodium (ANAPROX) 220 MG tablet Take 220 mg by mouth every 12 (twelve) hours.      omeprazole (PRILOSEC) 40 MG capsule Take 1 capsule by mouth once daily.      topiramate (TOPAMAX) 50 MG tablet Take 50 mg by mouth 2 (two) times daily.      tramadol (ULTRAM) 50 mg tablet   5    ascorbic acid, vitamin C, (VITAMIN C) 500 MG tablet Take 500 mg by mouth once daily.      aspirin-sod bicarb-citric acid (LEAH-SELTZER) 324 mg TbEF Take 325 mg by mouth nightly.      b complex vitamins tablet Take 1 tablet by mouth once daily.      cyclobenzaprine (FLEXERIL) 10 MG  "tablet   5    PENNSAID 20 mg/gram /actuation(2 %) sopm       topiramate (TOPAMAX) 50 MG tablet Take 1 tablet by mouth once daily.       No current facility-administered medications for this visit.        Past Medical History:   Diagnosis Date    Hypertension      Past Surgical History:   Procedure Laterality Date    BLADDER SUSPENSION      HYSTERECTOMY      tonsilectomy       Family History   Problem Relation Age of Onset    Heart attack Father      Social History     Tobacco Use    Smoking status: Never Smoker    Smokeless tobacco: Never Used   Substance Use Topics    Alcohol use: No    Drug use: No        Review of Systems:  Review of Systems   Constitutional: Positive for fatigue.   HENT: Negative for hearing loss and nosebleeds.         Snoring   Eyes: Negative for redness.   Respiratory: Positive for apnea and shortness of breath.    Cardiovascular: Positive for leg swelling.   Gastrointestinal: Negative for abdominal pain.   Endocrine: Negative for cold intolerance.   Genitourinary: Negative for hematuria.   Musculoskeletal: Positive for arthralgias and gait problem.   Skin: Negative for wound.   Allergic/Immunologic: Negative for immunocompromised state.   Neurological: Negative for seizures.   Hematological: Negative for adenopathy.   Psychiatric/Behavioral: Positive for sleep disturbance. Negative for behavioral problems.       OBJECTIVE:     Vital Signs (Most Recent)  Pulse: 94 (08/29/18 1314)  Resp: 18 (08/29/18 1314)  BP: 120/80 (08/29/18 1314)  SpO2: 99 % (08/29/18 1314)  5' 3" (1.6 m)  120.6 kg (265 lb 14 oz)     Physical Exam:  Physical Exam   Constitutional: She is oriented to person, place, and time. She appears well-developed and well-nourished.   HENT:   Head: Normocephalic and atraumatic.   Mallampati 4    Eyes: EOM are normal.   Neck: Neck supple.   Neck circumference 16 inches      Cardiovascular: Normal rate and regular rhythm.   Pulmonary/Chest: Effort normal and breath sounds " normal.   Abdominal: Soft. There is no tenderness.   Musculoskeletal: She exhibits edema.   Neurological: She is alert and oriented to person, place, and time.   Skin: Skin is warm.   Psychiatric: She has a normal mood and affect.       Laboratory  BNP August 20, 2018 less than 10    [facility] a bicarb level of 30    Diagnostic Results:  EKG 08/20/2018 sinus rhythm with nonspecific ST-T changes.  Patient supposed to have with the echo and stress test.      ASSESSMENT/PLAN:     1. GIL on CPAP    2. SOBOE (shortness of breath on exertion)    3. Hypoxemia    4. BMI 45.0-49.9, adult      Will do in-lab polysomnography split night with titration likely.    Chest x-ray (ordered by Cardiology) and PFT.    Check patient ABG.  She might have obesity hypoventilation.    Patient will be seeing physical therapy and nutritionist .    General weight loss/lifestyle modification strategies discussed (elicit support from others; identify saboteurs; non-food rewards).  Diet interventions: low calorie (1000 kCal/d) deficit diet        Follow-up in about 2 months (around 10/29/2018).    This note was prepared using voice recognition system and is likely to have sound alike errors that may have been overlooked even after proof reading.  Please call me with any questions    Discussed diagnosis, its evaluation, treatment and usual course. All questions answered.    Thank you for the courtesy of participating in the care of this patient    Ann Woods MD

## 2018-08-29 NOTE — LETTER
August 29, 2018      Juan Alberto Luis MD  [address] [facility] [address]  [place] [address]           [facility]  [address] [place] [address]  Phone: [phone]  Fax: [phone]          Patient: Mariel Becerril   MR Number: [id number]   YOB: 1955   Date of Visit: 8/29/2018       Dear Dr. Juan Alberto Luis:    Thank you for referring Mariel Becerril to me for evaluation. Attached you will find relevant portions of my assessment and plan of care.    If you have questions, please do not hesitate to call me. I look forward to following Mariel Becerril along with you.    Sincerely,    Ann Woods MD    Enclosure  CC:  No Recipients    If you would like to receive this communication electronically, please contact [email or web address] or [phone] to request more information on [employer] Link access.    For providers and/or their staff who would like to refer a patient to [facility], please contact us through our one-stop-shop provider referral line, [facility], at [phone].    If you feel you have received this communication in error or would no longer like to receive these types of communications, please e-mail [email or web address]

## 2018-09-05 ENCOUNTER — OFFICE VISIT (OUTPATIENT)
Dept: SURGERY | Facility: CLINIC | Age: 63
End: 2018-09-05
Payer: COMMERCIAL

## 2018-09-05 ENCOUNTER — HOSPITAL ENCOUNTER (OUTPATIENT)
Dept: RADIOLOGY | Facility: HOSPITAL | Age: 63
Discharge: HOME | End: 2018-09-05
Attending: INTERNAL MEDICINE
Payer: COMMERCIAL

## 2018-09-05 ENCOUNTER — CLINICAL SUPPORT (OUTPATIENT)
Dept: CARDIOLOGY | Facility: CLINIC | Age: 63
End: 2018-09-05
Attending: INTERNAL MEDICINE
Payer: COMMERCIAL

## 2018-09-05 VITALS
SYSTOLIC BLOOD PRESSURE: 120 MMHG | HEART RATE: 89 BPM | HEIGHT: 63 IN | TEMPERATURE: 98 F | DIASTOLIC BLOOD PRESSURE: 77 MMHG | WEIGHT: 259.69 LBS | BODY MASS INDEX: 46.01 KG/M2

## 2018-09-05 DIAGNOSIS — R06.09 DYSPNEA ON EXERTION: ICD-10-CM

## 2018-09-05 DIAGNOSIS — R07.89 OTHER CHEST PAIN: ICD-10-CM

## 2018-09-05 DIAGNOSIS — G47.33 OSA (OBSTRUCTIVE SLEEP APNEA): ICD-10-CM

## 2018-09-05 DIAGNOSIS — M17.0 PRIMARY OSTEOARTHRITIS OF BOTH KNEES: ICD-10-CM

## 2018-09-05 DIAGNOSIS — I10 ESSENTIAL HYPERTENSION: Primary | ICD-10-CM

## 2018-09-05 LAB
DIASTOLIC DYSFUNCTION: NO
DIASTOLIC DYSFUNCTION: NO
ESTIMATED PA SYSTOLIC PRESSURE: 27.21
RETIRED EF AND QEF - SEE NOTES: 60 (ref 55–65)

## 2018-09-05 PROCEDURE — 71046 X-RAY EXAM CHEST 2 VIEWS: CPT | Mod: TC,FY,PO

## 2018-09-05 PROCEDURE — 99204 OFFICE O/P NEW MOD 45 MIN: CPT | Mod: S$GLB,,, | Performed by: SURGERY

## 2018-09-05 PROCEDURE — 99999 PR PBB SHADOW E&M-EST. PATIENT-LVL III: CPT | Mod: PBBFAC,,, | Performed by: SURGERY

## 2018-09-05 PROCEDURE — 93306 TTE W/DOPPLER COMPLETE: CPT | Mod: S$GLB,,, | Performed by: INTERNAL MEDICINE

## 2018-09-05 PROCEDURE — 71046 X-RAY EXAM CHEST 2 VIEWS: CPT | Mod: 26,,, | Performed by: RADIOLOGY

## 2018-09-05 PROCEDURE — 3008F BODY MASS INDEX DOCD: CPT | Mod: CPTII,S$GLB,, | Performed by: SURGERY

## 2018-09-05 PROCEDURE — 78452 HT MUSCLE IMAGE SPECT MULT: CPT | Mod: TC,PO

## 2018-09-05 PROCEDURE — 93015 CV STRESS TEST SUPVJ I&R: CPT | Mod: S$GLB,,, | Performed by: INTERNAL MEDICINE

## 2018-09-05 PROCEDURE — 78452 HT MUSCLE IMAGE SPECT MULT: CPT | Mod: 26,,, | Performed by: INTERNAL MEDICINE

## 2018-09-05 NOTE — LETTER
September 5, 2018      Angel Mancilla MD  [address] [place] [address]           [facility]  [address] [place] [address]  Phone: [phone]  Fax: [phone]          Patient: Mariel Becerril   MR Number: [id number]   YOB: 1955   Date of Visit: 9/5/2018       Dear Dr. Angel Mancilla:    Thank you for referring Mariel Becerril to me for evaluation. Attached you will find relevant portions of my assessment and plan of care.    If you have questions, please do not hesitate to call me. I look forward to following Mariel Becerril along with you.    Sincerely,    Anthony Rodríguez MD    Enclosure  CC:  No Recipients    If you would like to receive this communication electronically, please contact [email or web address] or [phone] to request more information on [employer] Link access.    For providers and/or their staff who would like to refer a patient to [facility], please contact us through our one-stop-shop provider referral line, [facility], at [phone].    If you feel you have received this communication in error or would no longer like to receive these types of communications, please e-mail [email or web address]

## 2018-09-05 NOTE — PROGRESS NOTES
BARIATRIC NEW PATIENT EVALUATION    CHIEF COMPLAINT:     Morbid obesity with a BMI of  45.45 and inability to lose weight.    HISTORY OF PRESENT ILLNESS:  Mariel Becerril is a 62 y.o.-year-old female presenting for  morbid obesity with a BMI of  45.45 and inability to lose weight. The patient has tried  Diet and previously exercise well however due to pain in her left knee she is no longer able to exercise as her mobility is limited.  She was previously able to lose about 40 lb dropping from 297 down to current weight of 259.    HPI    CO-MORBIDITIES:  hypertension, obstructive sleep apnea and osteoarthritis    PAST MEDICAL HISTORY:  Past Medical History:   Diagnosis Date    Hypertension         PAST SURGICAL HISTORY:  Past Surgical History:   Procedure Laterality Date    BLADDER SUSPENSION      HYSTERECTOMY      tonsilectomy         FAMILY HISTORY:  Family History   Problem Relation Age of Onset    Heart attack Father         SOCIAL HISTORY:   reports that  has never smoked. she has never used smokeless tobacco. She reports that she does not drink alcohol or use drugs.     MEDICATIONS:  Current Outpatient Medications on File Prior to Visit   Medication Sig Dispense Refill    amlodipine (NORVASC) 10 MG tablet       ascorbic acid, vitamin C, (VITAMIN C) 500 MG tablet Take 500 mg by mouth once daily.      aspirin-sod bicarb-citric acid (LEAH-SELTZER) 324 mg TbEF Take 325 mg by mouth nightly.      b complex vitamins tablet Take 1 tablet by mouth once daily.      cyclobenzaprine (FLEXERIL) 10 MG tablet   5    furosemide (LASIX) 20 MG tablet Take 1 tablet (20 mg total) by mouth daily as needed. 90 tablet 3    HYDROcodone-acetaminophen (NORCO) 7.5-325 mg per tablet Take 1 tablet by mouth every 6 (six) hours as needed.      meloxicam (MOBIC) 7.5 MG tablet Take 1 tablet by mouth as needed.      methocarbamol (ROBAXIN) 500 MG Tab Take 3 tablets by mouth daily as needed.      naproxen sodium (ANAPROX) 220 MG  tablet Take 220 mg by mouth every 12 (twelve) hours.      omeprazole (PRILOSEC) 40 MG capsule Take 1 capsule by mouth once daily.      PENNSAID 20 mg/gram /actuation(2 %) sopm       topiramate (TOPAMAX) 50 MG tablet Take 1 tablet by mouth once daily.      topiramate (TOPAMAX) 50 MG tablet Take 50 mg by mouth 2 (two) times daily.      tramadol (ULTRAM) 50 mg tablet   5     No current facility-administered medications on file prior to visit.      Medications have been reviewed.    ALLERGIES:  Review of patient's allergies indicates:   Allergen Reactions    Penicillins Rash     Allergies have been reviewed.    ROS:  Review of Systems   Constitutional: Negative for activity change, appetite change, chills, diaphoresis, fatigue, fever and unexpected weight change.   HENT: Negative for congestion, dental problem, rhinorrhea and sore throat.    Eyes: Negative for visual disturbance.   Respiratory: Negative for cough, chest tightness, shortness of breath and wheezing.    Cardiovascular: Negative for chest pain, palpitations and leg swelling.   Gastrointestinal: Negative for abdominal distention, abdominal pain, constipation, diarrhea, nausea and vomiting.   Endocrine: Negative for cold intolerance, heat intolerance, polydipsia, polyphagia and polyuria.   Genitourinary: Negative for difficulty urinating, dysuria, frequency, hematuria and urgency.   Musculoskeletal: Positive for arthralgias, back pain and gait problem. Negative for myalgias and neck pain.   Skin: Negative for color change, pallor, rash and wound.   Neurological: Negative for dizziness, syncope, weakness, light-headedness, numbness and headaches.   Hematological: Negative for adenopathy. Does not bruise/bleed easily.   Psychiatric/Behavioral: Negative for confusion, decreased concentration and sleep disturbance. The patient is not nervous/anxious.        PE:  Physical Exam   Constitutional: She is oriented to person, place, and time. She appears  well-developed and well-nourished. No distress.   HENT:   Head: Normocephalic and atraumatic.   Right Ear: External ear normal.   Left Ear: External ear normal.   Eyes: Conjunctivae and EOM are normal. Pupils are equal, round, and reactive to light. No scleral icterus.   Neck: Normal range of motion. Neck supple. No tracheal deviation present. No thyromegaly present.   Cardiovascular: Normal rate, regular rhythm, normal heart sounds and intact distal pulses. Exam reveals no gallop and no friction rub.   No murmur heard.  Pulmonary/Chest: Effort normal and breath sounds normal. No respiratory distress. She has no wheezes. She has no rales. She exhibits no tenderness.   Abdominal: Soft. Bowel sounds are normal. She exhibits no distension. There is no tenderness. No hernia.   Musculoskeletal: Normal range of motion. She exhibits no edema, tenderness or deformity.   Lymphadenopathy:     She has no cervical adenopathy.   Neurological: She is alert and oriented to person, place, and time.   Skin: Skin is warm and dry. No rash noted. She is not diaphoretic. No erythema. No pallor.   Psychiatric: She has a normal mood and affect. Her behavior is normal. Judgment and thought content normal.   Vitals reviewed.      DIAGNOSIS:  1.  Morbid obesity with a BMI of  45.45 and inability to lose weight.  2. Co-morbidities: hypertension, obstructive sleep apnea and osteoarthritis    PLAN:  The patient is a potential candidate for Bariatric Surgery.  We discussed the different types of surgery, risks and benefits each.  Additional information was provided for the patient.  She will read or on weight loss surgery and call if she would like to pursue this option to assist with her weight loss.    Referring Physician: Angel Mancilla, *  RTC: As scheduled.

## 2018-09-05 NOTE — PATIENT INSTRUCTIONS
Choosing a Bariatric Surgery Procedure  Bariatric surgery is a type of surgery to help you lose weight when diet and exercise alone has not been successful. It is a choice for some people who are obese and have health problems, such as diabetes, heart disease, arthritis, and sleep apnea. Diabetes and some other health problems may get better with weight loss.  Comparing surgery with medical treatment  People who have bariatric surgery tend to lose much more weight than people who get medical treatment for their weight loss. This means that surgery is more likely to help with health conditions linked to obesity. These may include diabetes, heart disease, arthritis, or sleep apnea. But the results vary. Some people can have large weight loss with medical treatment alone. And some people dont lose as much weight as they want after surgery.  Types of bariatric surgery  Surgeons do bariatric surgery using a number of different methods. The type of bariatric surgery that works best for you will depend on several factors. These include your general health, your medical needs, and your own preference. The types of surgery include:  Lap banding. This surgery is also known as laparoscopic adjustable gastric banding or LAGB. During lap banding your surgeon places an adjustable band around the top of your stomach. Your surgeon also places a small device called a port under the skin of your stomach. A thin tube leads from the band to the port. Fluid is injected into the port and flows to the band to make it squeeze tighter around the top of the stomach. Or fluid can be removed through the port to loosen the band. The band around your stomach reduces the amount of food that you can eat at one time.  Gastric bypass. This is also called a Xuan-en-Y gastric bypass. This surgery also reduces the amount of food you can eat at one time. And it reduces the number of calories and nutrients you can absorb from the foods you eat. During  gastric bypass, your surgeon separates part of the stomach to create a small pouch. The pouch is then attached to a part of your small intestine. This small pouch holds less food, making you feel full faster. As food bypasses the rest of the stomach and upper part of your small intestine, you absorb fewer calories and nutrients.  Sleeve gastrectomy. This is a type of surgery that removes up to 85% of the stomach. Its also known as a gastric sleeve. The surgery turns the stomach into a narrow tube that looks like a sleeve. The sleeve holds much less food, and you feel full faster. Your stomach also makes less of the main hormone that causes hunger.  Biliopancreatic diversion with duodenal switch (BPD/DS). This is a less common type of weight-loss surgery. In this procedure, your surgeon removes part of the stomach to create a gastric sleeve, as with the sleeve gastrectomy. The sleeve is then attached to a part of the lower small intestine. The sleeve holds much less food, and your body absorbs far fewer calories and nutrients from food.  Advantages and disadvantages of each type of surgery  Type of surgery Advantages Disadvantages   Lap banding Lap banding is a simpler surgery  After lap band surgery, it is fairly easy to loosen or tighten the band  A tighter band might help you feel jeong sooner and help you lose weight  Your risk for serious complications right after your surgery is low  It can lead to loss of about half of a persons excess body weight after 2 years You may be more likely to need a follow-up surgery  Not right for you if you think youll have a hard time following a nutritional program  You may need to see your healthcare provider more often after this surgery  You may need to have band removed or replaced because of leaking or other problems  Band may slip out of position  May cause nausea, vomiting, acid reflux, and trouble swallowing   Gastric bypass Tends to be a very successful surgery  Can  lead to loss of about two-thirds of a persons excess body weight after 2 years Increased risk for complications  You are more likely to have nutritional problems with vitamin B12, folate, and iron  Can cause dumping syndrome   Sleeve gastrectomy Less complex than a gastric bypass  Fewer complications than gastric bypass  Better at controlling hunger than lap banding  Can lead to loss of about two-thirds of a persons excess weight You may have difficulty absorbing certain nutrients  You may develop narrowing (strictures) in your intestines  Increased risk for abdominal hernias   BPD/DS Most helpful for a person who is extremely obese  A choice for people who havent had much success with other weight-loss surgery  Leads to the highest amount of weight loss Has a higher risk for complications than other weight-loss surgeries  Reduces the absorption of essential vitamins and minerals  High risk for deficiencies of calcium, iron, and fat soluble vitamin (A, D, E, and K)  High risk for developing protein-energy malnutrition      General risks of bariatric surgery  All surgery has risks. Your risks may vary according to your general health, your age, the type of surgery you choose, and the amount of weight you need to lose. Talk with your healthcare provider about the risks that most apply to you. Risks of bariatric surgery include:  Bleeding  Infection  Blockage of your bowels (intestinal blockage)  Blood clots in your legs that may travel to your lungs or heart   Heart attack  Need for follow-up surgery  Gallstones (a later complication)  Nutritional problems (a later complication)  Mental health problems after the procedure  Weight regain  The post-surgery diet  You will get instructions about how to adapt to your new diet after your surgery. You will likely be on liquid nutrition for a few weeks after surgery. Over time, youll start to eat soft foods and then solid foods. If you eat too much or too fast, you will  likely have stomach pain or vomiting. Youll learn how to know when your new stomach is full.  Your healthcare provider or nutritionist will give you more instructions about your diet. These may vary depending on the type of surgery you had. Youll need to learn good habits like choosing healthy foods and not skipping meals. Your healthcare provider or nutritionist may also need to screen you for low levels of certain nutrients. This is more of a problem with gastrectomy, gastric bypass, and BPD/DS surgery.  Managing your health after surgery  You may need to work with your healthcare providers ongoing to stay healthy. This depends on what type of surgery you have. Your medical team will keep track of your health, especially as you lose weight quickly in the first 6 months or so after your surgery. Weight loss tends to be at its peak around a year after surgery.  Talk with your healthcare provider about your goals  Work with your healthcare provider to see which surgery may work for you. Its important to have sensible goals about what bariatric surgery might achieve for you. Some people may still be somewhat overweight a year or two after their surgery. Even if you dont lose all of your excess weight, health issues such as high blood pressure should get better. You may be able to reduce the amount of medicines that you need to take.  Talk with your healthcare provider. Ask questions and express your concerns. Together you can decide the right treatment for your needs.  Date Last Reviewed: 3/24/2016  © 5879-9205 [employer]. [address] [place] [address]. All rights reserved. This information is not intended as a substitute for professional medical care. Always follow your healthcare professional's instructions.        Deciding on Bariatric Surgery    Is excess weight affecting your life and your health? Bariatric surgery (also called obesity surgery) may help you reach a healthier weight.  This surgery alters your digestive system. For the surgery to work, you must change your diet and lifestyle. In most cases, the surgery is not reversible. So if youre considering surgery, learn all you can about it before you decide. Bariatric surgery also has a number of potential risks and complications that you need to discuss with your surgeon.  Qualifying for surgery  Surgery is not for everyone. To qualify:  · You must have a BMI of 40 or more, or a BMI of 35 or more (see box below) plus a serious obesity-related health problem, such as type 2 diabetes, high blood pressure, or sleep apnea.  · You must be healthy enough to have surgery.  · You may be required to have a psychological evaluation.  · You must have tried to lose weight by other means, such as dieting.  Setting realistic expectations  The goal of bariatric surgery is to help you lose over half of your excess weight. This can improve or prevent health problems. This surgery is not done for cosmetic reasons. Keep in mind that:  · Other weight-loss methods should be tried first. Lifestyle changes, behavioral modifications, and prescription medicines are initial choices. Surgery is only a choice if other methods dont work.  · Surgery is meant to be permanent. You will need to change how you eat for the rest of your life.  · You must commit to eating less and being more active after surgery. If you dont, you will not lose or keep off the weight.  · You wont reach a healthy weight right away. Most weight is lost steadily during the first year or two after surgery.  · Most likely, you wont lose all your excess weight. But you can reach a much healthier weight.  Obesity is measured by a formula called body mass index (BMI), which is based on your height and weight. A healthy BMI is about 18 to 25. A BMI of 30 or more signals obesity. A BMI of 40 or more reflects severe (morbid) obesity.   Resources  · American Society for Metabolic and Bariatric  Surgerywww.asmbs.org  · National Heart, Lung, and Blood [place] Obesity Education Initiativewww.nhlbi.nih.gov/health/public/heart/obesity/lose_wt  Date Last Reviewed: 2/17/2016  © 7913-3734 [employer]. [address] [place] [id number]. All rights reserved. This information is not intended as a substitute for professional medical care. Always follow your healthcare professional's instructions.

## 2018-09-05 NOTE — PROGRESS NOTES
PHYSICAL THERAPY INITIAL OUTPATIENT EVALUATION    Referring Provider:  Dr. Angel Mancilla    Diagnosis:       ICD-10-CM ICD-9-CM    1. Pain in lateral portion of left knee M25.562 719.46      Orders:  Evaluate and Treat    Date of Initial Evaluation: 9/6/18 (Re-eval needed for 10/6/18)    Visit # 1 of 6    SUBJECTIVE:  Patient reports that she has had B knee pain since 2017, but in the last several months her L knee pain has gotten much worse and she has to use a crutch for ambulation. Reports decreased stability to her L knee and increased pain with straightening it while ambulating. Patient says she has medial knee pain where it started first, but the excruciating pain is in the back of her knee. Reports this pain has really limited her function and has a lot of difficulty walking and rarely walks without using the crutch. Reports that sometimes the pain will go all the way down her L leg and her foot sometimes goes numb.     Main complaint: pain and difficulty with walking  Pain: 7 /10, located posterior and medial L knee, described as sharp    Goal: be able to walk without the crutch    OBJECTIVE:          AT EVAL       TODAY  Gait: very antalgic with use of crutch needed on right side, without the crutch very little weight through her L side and decreased knee flexion noted    Sensation: impaired to light touch     Knee AROM:  Flexion      80%      Extension    95%  Knee PROM  Flexion      90%      Extension    WNL        Strength:  Quadriceps    3+/5      Hamstrings    4/5     Gluteus Medius   3-/5      Gluteus Babatunde   3-/5     Hip Flexors    3-/5            Function:  LEFS      25% disability on initial evaluation .    Tenderness to palpation:  Along L medial knee joint line and along L tibial nerve- some distal sciatic nerve just proximal to the crease of her knee- tenderness goes all the way down to her L foot    Other: unable to stand SL Stance on her L leg without UE support    Special Test:  positive on L side for tibial nerve irritation    ASSESSMENT:  The patient is a 62 y.o. year old female who presents to physical therapy with complaints of increased knee pain and great difficulty with ambulation.  Patient's impairments include decreased knee ROM, very decreased knee/hip strength, tibial nerve irritation/entrapment, L knee OA, and abnormality of gait. The posterior knee pain patient is experiencing is consistent with tibial nerve pain, while the front medial knee pain seems to be more knee OA related. These impairments are limiting patient's ability to perform work, ADLs, and wanted activities without severe increase in pain or risk of falling.  Patient's prognosis is good.  Patient will benefit from skilled physical therapy intervention to improve knee ROM, improve hip/knee strength, decrease pain, improve neural mobility, improve quality of gait without AD, and improve tolerance to all activity in order to improve quality of life.    Co-morbidities which may impact the plan of care and potentially impede the patient's progress in therapy include: HTN, GIL    Patients CLINICAL PRESENTATION is STABLE.     Short Term Goals:  1-4 weeks  1. I with HEP  2. Increase A/PROM to 100%  3. Increase hip/knee strength 1/2 grade  4. Pt demo improved neural mobility by 25%    Long Term Goals: 5-12 weeks  1. Ambulate with normalized gait pattern without AD  2. Hip/knee strength 4/5 to 4+/5  3. Knee ROM WNL painfree  4. Decrease Pain to mild with all activities    TREATMENT PROVIDED:  -Manual Therapy:  5 min  Tibial nerve mobilizations  Knee FL/EX mobs  -Therapeutic Exercise:  5 min  Sciatic nerve glides  LAQ  Isometric hamstring  -Modalities: none provided today  -Evaluation  -Education: 5 min:  on condition and HEP    PLAN:  Patient will benefit from physical therapy (2) x/week for (6-8) weeks including manual therapy, therapeutic exercise, functional activities, modalities, and patient education.    Thank you  for this referral.    These services are reasonable and necessary for the conditions set forth above while under my care.    De David, PT, DPT

## 2018-09-06 ENCOUNTER — CLINICAL SUPPORT (OUTPATIENT)
Dept: REHABILITATION | Facility: HOSPITAL | Age: 63
End: 2018-09-06
Attending: ORTHOPAEDIC SURGERY
Payer: COMMERCIAL

## 2018-09-06 DIAGNOSIS — G89.29 CHRONIC PAIN OF LEFT KNEE: Primary | ICD-10-CM

## 2018-09-06 DIAGNOSIS — M25.562 CHRONIC PAIN OF LEFT KNEE: Primary | ICD-10-CM

## 2018-09-06 PROCEDURE — 97161 PT EVAL LOW COMPLEX 20 MIN: CPT

## 2018-09-10 NOTE — PROGRESS NOTES
PHYSICAL THERAPY DAILY NOTE    Referring Provider:  Dr. Angel Mancilla    Diagnosis:       ICD-10-CM ICD-9-CM    1. Chronic pain of left knee M25.562 719.46     G89.29 338.29      Orders:  Evaluate and Treat    Date of Initial Evaluation: 9/6/18 (Re-eval needed for 10/6/18)    Visit # 2 of 6    BACKGROUND:  Patient reports that she has had B knee pain since 2017, but in the last several months her L knee pain has gotten much worse and she has to use a crutch for ambulation. Reports decreased stability to her L knee and increased pain with straightening it while ambulating. Patient says she has medial knee pain where it started first, but the excruciating pain is in the back of her knee. Reports this pain has really limited her function and has a lot of difficulty walking and rarely walks without using the crutch. Reports that sometimes the pain will go all the way down her L leg and her foot sometimes goes numb.     SUBJECTIVE: Patient reports that her posterior knee pain has improved and is less excruciating and reports she is able to even walk a little better.     Goal: be able to walk without the crutch    OBJECTIVE:          AT EVAL       TODAY  Gait: very antalgic with use of crutch needed on right side, without the crutch very little weight through her L side and decreased knee flexion noted    Sensation: impaired to light touch     Knee AROM:  Flexion      80%      Extension    95%  Knee PROM  Flexion      90%      Extension    WNL     Ankle ROM  Dorsiflexion    100%     Plantarflexion    100%     Eversion    100%     Inversion    100%       Strength:  Quadriceps    3+/5      Hamstrings    4/5     Gluteus Medius   3-/5      Gluteus Babatunde   3-/5     Hip Flexors    3-/5     Dorsiflexion    4/5 (R) and 3+/5 (L)     Plantarflexion    3-/5     Eversion    4+/5 (R) and 3+/5 (L)     Inversion    4+/5 (R) and 3+/5 (L)    SL stance: unable to perform without UE support, unable to perform full heel raise on  either side            Function:  LEFS      25% disability on initial evaluation .    Tenderness to palpation:  Along L medial knee joint line and along L tibial nerve- some distal sciatic nerve just proximal to the crease of her knee- tenderness goes all the way down to her L foot    Other: unable to stand SL Stance on her L leg without UE support    Special Test: positive on L side for tibial nerve irritation    ASSESSMENT:  Patient demonstrated good tolerance to all there ex this session without any increase in pain and noted improvement in quality of gait since last session. Patient recently went to the podiatrist and was given a referral for her feet as well, so this was evaluated today and will be incorporated into her POC and will also assist with her L knee pain.    TREATMENT PROVIDED:  -Manual Therapy:  25 min  Tibial nerve mobilizations  Knee FL/EX mobs   Assessment of B feet/ankles  -Therapeutic Exercise:  25 min  Total gym 5 band squats  Total gym 3 band SL squat  Nustep x 8 min  Gastroc dynamic stretch on wedge  Standing TKE against TB  Supine B ER against TB  Supine DKTC  Resisted ankle EV/INV  SL stance  -Modalities: 15 min moist heat to L posterior knee  -Education: 5 min:  on condition and HEP    PLAN:  Patient will benefit from physical therapy (2) x/week for (6-8) weeks including manual therapy, therapeutic exercise, functional activities, modalities, and patient education.    Thank you for this referral.    These services are reasonable and necessary for the conditions set forth above while under my care.    Manny-Sofia David, PT, DPT

## 2018-09-11 ENCOUNTER — HOSPITAL ENCOUNTER (OUTPATIENT)
Dept: RADIOLOGY | Facility: HOSPITAL | Age: 63
Discharge: HOME | End: 2018-09-11
Attending: PODIATRIST
Payer: COMMERCIAL

## 2018-09-11 ENCOUNTER — OFFICE VISIT (OUTPATIENT)
Dept: PODIATRY | Facility: CLINIC | Age: 63
End: 2018-09-11
Payer: COMMERCIAL

## 2018-09-11 ENCOUNTER — CLINICAL SUPPORT (OUTPATIENT)
Dept: REHABILITATION | Facility: HOSPITAL | Age: 63
End: 2018-09-11
Attending: ORTHOPAEDIC SURGERY
Payer: COMMERCIAL

## 2018-09-11 VITALS
SYSTOLIC BLOOD PRESSURE: 126 MMHG | WEIGHT: 261.94 LBS | DIASTOLIC BLOOD PRESSURE: 82 MMHG | BODY MASS INDEX: 46.41 KG/M2 | HEART RATE: 92 BPM | HEIGHT: 63 IN | RESPIRATION RATE: 16 BRPM

## 2018-09-11 DIAGNOSIS — M25.571 CHRONIC PAIN OF BOTH ANKLES: ICD-10-CM

## 2018-09-11 DIAGNOSIS — M21.42 PES PLANUS OF BOTH FEET: ICD-10-CM

## 2018-09-11 DIAGNOSIS — M21.41 PES PLANUS OF BOTH FEET: Primary | ICD-10-CM

## 2018-09-11 DIAGNOSIS — M25.562 CHRONIC PAIN OF LEFT KNEE: Primary | ICD-10-CM

## 2018-09-11 DIAGNOSIS — M76.822 POSTERIOR TIBIAL TENDON DYSFUNCTION, LEFT: ICD-10-CM

## 2018-09-11 DIAGNOSIS — M21.42 PES PLANUS OF BOTH FEET: Primary | ICD-10-CM

## 2018-09-11 DIAGNOSIS — M24.572 CONTRACTURE, LEFT ANKLE: ICD-10-CM

## 2018-09-11 DIAGNOSIS — M72.2 PLANTAR FASCIITIS: ICD-10-CM

## 2018-09-11 DIAGNOSIS — M76.821 POSTERIOR TIBIAL TENDON DYSFUNCTION, RIGHT: ICD-10-CM

## 2018-09-11 DIAGNOSIS — M24.571 CONTRACTURE, RIGHT ANKLE: ICD-10-CM

## 2018-09-11 DIAGNOSIS — G89.29 CHRONIC PAIN OF LEFT KNEE: Primary | ICD-10-CM

## 2018-09-11 DIAGNOSIS — G89.29 CHRONIC PAIN OF BOTH ANKLES: ICD-10-CM

## 2018-09-11 DIAGNOSIS — M21.41 PES PLANUS OF BOTH FEET: ICD-10-CM

## 2018-09-11 DIAGNOSIS — M25.572 CHRONIC PAIN OF BOTH ANKLES: ICD-10-CM

## 2018-09-11 PROCEDURE — 97140 MANUAL THERAPY 1/> REGIONS: CPT

## 2018-09-11 PROCEDURE — 99203 OFFICE O/P NEW LOW 30 MIN: CPT | Mod: S$GLB,,, | Performed by: PODIATRIST

## 2018-09-11 PROCEDURE — 3008F BODY MASS INDEX DOCD: CPT | Mod: CPTII,S$GLB,, | Performed by: PODIATRIST

## 2018-09-11 PROCEDURE — 73630 X-RAY EXAM OF FOOT: CPT | Mod: 50,TC

## 2018-09-11 PROCEDURE — 99999 PR PBB SHADOW E&M-EST. PATIENT-LVL III: CPT | Mod: PBBFAC,,, | Performed by: PODIATRIST

## 2018-09-11 PROCEDURE — 73630 X-RAY EXAM OF FOOT: CPT | Mod: 26,50,, | Performed by: RADIOLOGY

## 2018-09-11 PROCEDURE — 97110 THERAPEUTIC EXERCISES: CPT

## 2018-09-11 RX ORDER — CELECOXIB 200 MG/1
200 CAPSULE ORAL DAILY
Qty: 30 CAPSULE | Refills: 1 | Status: SHIPPED | OUTPATIENT
Start: 2018-09-11 | End: 2018-09-14

## 2018-09-11 NOTE — PROGRESS NOTES
Subjective:       Patient ID: Mariel Becerril is a 62 y.o. female.    Chief Complaint: Ankle Pain (c/o foot and ankle pain, pain level 8/10, wears casual shoes w/ stockings, non-diabetic pt, PCP Dr. Hood.)      HPI: Mariel Becerril presents to the office with the chief complaint of pains to the left and right foot and ankle at the medial aspect. The pains are rated as 8/10 and are described as sharp in nature. The pains have been on going now for the past several weeks to a month or so, and are worsening. The patient denies trauma. The patient states NSAIDs (Moibic) for management.  She takes the Meloxicam for her lower back pathology, but states the medication is not too helpful to her foot. No PCP evaluation of this condition as of yet. The patient states that prolonged walking and standing exacerbates and causes the symptoms. The patient does state mild associated swelling as well. Patient's Primary Care Provider is Sada Hood MD.  No x-rays were taken. She does state bilateral symptoms.  States severe left knee arthropathy.  Does have bilateral knee arthropathy however.    Review of patient's allergies indicates:   Allergen Reactions    Penicillins Rash       Past Medical History:   Diagnosis Date    Hypertension        Family History   Problem Relation Age of Onset    Heart attack Father        Social History     Socioeconomic History    Marital status:      Spouse name: Not on file    Number of children: Not on file    Years of education: Not on file    Highest education level: Not on file   Social Needs    Financial resource strain: Not on file    Food insecurity - worry: Not on file    Food insecurity - inability: Not on file    Transportation needs - medical: Not on file    Transportation needs - non-medical: Not on file   Occupational History    Not on file   Tobacco Use    Smoking status: Never Smoker    Smokeless tobacco: Never Used   Substance and Sexual Activity     "Alcohol use: No    Drug use: No    Sexual activity: Yes     Partners: Male   Other Topics Concern    Not on file   Social History Narrative    Not on file       Past Surgical History:   Procedure Laterality Date    BLADDER SUSPENSION      HYSTERECTOMY      tonsilectomy         Review of Systems   Constitutional: Negative for chills, fatigue and fever.   HENT: Negative for hearing loss.    Eyes: Negative for photophobia and visual disturbance.   Respiratory: Negative for cough, chest tightness, shortness of breath and wheezing.    Cardiovascular: Negative for chest pain and palpitations.   Gastrointestinal: Negative for constipation, diarrhea, nausea and vomiting.   Endocrine: Negative for cold intolerance and heat intolerance.   Genitourinary: Negative for flank pain.   Musculoskeletal: Positive for arthralgias, back pain, gait problem, joint swelling and myalgias. Negative for neck pain and neck stiffness.        Knee pain   Skin: Negative for wound.   Neurological: Negative for light-headedness, numbness and headaches.   Psychiatric/Behavioral: Negative for sleep disturbance.         Objective:   /82 (BP Location: Left arm, Patient Position: Sitting, BP Method: Large (Automatic))   Pulse 92   Resp 16   Ht 5' 3" (1.6 m)   Wt 118.8 kg (261 lb 14.5 oz)   BMI 46.39 kg/m²       LOWER EXTREMITY PHYSICAL EXAMINATION    VASCULAR: Hair growth is sparse on the left and right dorsal foot and at the digits. The left dorsalis pedis pulse is 1/4 and on the right is 1/4, and the left posterior tibial pulse is  1/4 on the left and is  1/4 on the right. Varicosities noted. Spider veins are noted to the bilateral lower extremity. Warm to warm, proximal to distal. Capillary refill time is within normal limits and less  than 3 seconds.     DERMATOLOGY: Skin is supple, dry and intact. No ecchymosis is noted.  Hyperkeratosis noted. No calluses.  No erythema or cellulitis is noted. Substantial xerosis " noted.    ORTHOPEDIC: There is severe collapsing pes planovalgus on the left and right foot. There is moderate tenderness to palpation of the navicular tuberosity on the left and right foot. There is mild edema noted along the course of the PT tendon on the left and right foot. There is no retro-malleolar edema (medial malleolus). There is mild pain to palpation at the spring ligament and the deltoid ligaments. There is no apparent pains to palpation of the medial malleolus.  Gastroc soleal contracture is noted.  No pain with palpation and/or range of motion of the ankle joint.  There is no crepitus noted with range of motion of the ankle joint. The ankle is not in valgus.  There is slightly limited ROM of the STJ and the MTJ. The hindfoot is in valgus. Hypermobility and/or forefoot supinatus is noted. Upon standing, there is moderate zach-talar subluxation noted on the left and right foot. There is mild to moderate forefoot/midfoot abduction on the hindfoot.  RCSP is slightly valgus. The deformity is mostly flexible and not rigid. The patient is able to double heel rise, but has difficulties with single heel rise on the left and right foot. Mild pain to palpation of plantar fascia at the instep.  Moderate discomfort palpation of plantar fascia at its origin.  Gait pattern is antalgic at this time.     NEUROLOGY: Protective sensation is intact via 5.07 [place] monofilament. Proprioception is intact. Sensation to light touch is intact. Upon palpation of the interspaces, there are no neurological sensations stated that radiate proximal or distal. Upon compression of the metatarsal heads from medial to lateral, no neurological sensations or symptoms are stated.    Physical Exam    Assessment:     1. Pes planus of both feet    2. Contracture, left ankle    3. Contracture, right ankle    4. Posterior tibial tendon dysfunction, left    5. Posterior tibial tendon dysfunction, right    6. Plantar fasciitis         Plan:     Pes planus of both feet  -     X-Ray Foot AP Bilateral; Future; Expected date: 09/11/2018    Contracture, left ankle  -     X-Ray Foot AP Bilateral; Future; Expected date: 09/11/2018    Contracture, right ankle  -     X-Ray Foot AP Bilateral; Future; Expected date: 09/11/2018    Posterior tibial tendon dysfunction, left  -     X-Ray Foot AP Bilateral; Future; Expected date: 09/11/2018    Posterior tibial tendon dysfunction, right  -     X-Ray Foot AP Bilateral; Future; Expected date: 09/11/2018    Plantar fasciitis  -     X-Ray Foot AP Bilateral; Future; Expected date: 09/11/2018        Bilateral pes planus foot type with concomitant plantar fasciitis and posterior tibial tendon dysfunction.  Thorough discussion is had with the patient today, concerning the diagnosis, its etiology, and the treatment algorithm at present.  XRAYS are reviewed in detail with the patient. All questions and concerns regarding findings and its/their implications are outlined and discussed.  Prescriptions written for custom-molded orthotics.  Please discontinue the Meloxicam and start Celebrex.  Patient will follow up in approximately 1 month.        Future Appointments   Date Time Provider Department Center   9/11/2018 10:30 AM ALCIRA Lowery OP [facility]   9/14/2018  8:00 [facility]   9/18/2018 10:30 AM ALCIRA Lowery [facility]   9/24/2018  7:30 PM SLEEP STUDY, [facility] SLEEPO [place]   9/27/2018  1:00 PM Juan Alberto Luis MD [facility] CARDIO [place]   10/22/2018 10:20 AM Angel Mancilla MD [facility] ORTHO [place]   10/24/2018 10:00 AM PULMONARY LAB, [facility] PULMLAB [place]   10/24/2018 10:40 AM PULMONARY LAB, [facility] PULMLAB [place]   10/24/2018 11:20 AM Ann Woods MD [facility] PULMSVC [place]   11/20/2018  8:00 AM Galen Mi MD [facility] INT KARTIK [place]

## 2018-09-14 ENCOUNTER — OFFICE VISIT (OUTPATIENT)
Dept: INTERNAL MEDICINE | Facility: CLINIC | Age: 63
End: 2018-09-14
Payer: COMMERCIAL

## 2018-09-14 ENCOUNTER — TELEPHONE (OUTPATIENT)
Dept: PODIATRY | Facility: CLINIC | Age: 63
End: 2018-09-14

## 2018-09-14 ENCOUNTER — LAB VISIT (OUTPATIENT)
Dept: LAB | Facility: HOSPITAL | Age: 63
End: 2018-09-14
Attending: FAMILY MEDICINE
Payer: COMMERCIAL

## 2018-09-14 VITALS
OXYGEN SATURATION: 99 % | BODY MASS INDEX: 46.6 KG/M2 | WEIGHT: 263 LBS | SYSTOLIC BLOOD PRESSURE: 110 MMHG | HEIGHT: 63 IN | TEMPERATURE: 98 F | DIASTOLIC BLOOD PRESSURE: 62 MMHG | HEART RATE: 88 BPM

## 2018-09-14 DIAGNOSIS — Z12.11 ENCOUNTER FOR SCREENING COLONOSCOPY: ICD-10-CM

## 2018-09-14 DIAGNOSIS — R05.9 COUGH: ICD-10-CM

## 2018-09-14 DIAGNOSIS — I10 ESSENTIAL HYPERTENSION: ICD-10-CM

## 2018-09-14 DIAGNOSIS — Z12.31 SCREENING MAMMOGRAM, ENCOUNTER FOR: ICD-10-CM

## 2018-09-14 DIAGNOSIS — G44.211 INTRACTABLE EPISODIC TENSION-TYPE HEADACHE: ICD-10-CM

## 2018-09-14 DIAGNOSIS — Z11.59 ENCOUNTER FOR HEPATITIS C SCREENING TEST FOR LOW RISK PATIENT: ICD-10-CM

## 2018-09-14 DIAGNOSIS — R60.9 MUCOSAL EDEMA: ICD-10-CM

## 2018-09-14 DIAGNOSIS — Z76.89 ESTABLISHING CARE WITH NEW DOCTOR, ENCOUNTER FOR: ICD-10-CM

## 2018-09-14 DIAGNOSIS — I10 ESSENTIAL HYPERTENSION: Primary | ICD-10-CM

## 2018-09-14 DIAGNOSIS — J06.9 URI, ACUTE: ICD-10-CM

## 2018-09-14 DIAGNOSIS — E66.01 CLASS 3 SEVERE OBESITY DUE TO EXCESS CALORIES WITHOUT SERIOUS COMORBIDITY WITH BODY MASS INDEX (BMI) OF 45.0 TO 49.9 IN ADULT: ICD-10-CM

## 2018-09-14 PROBLEM — M47.26 OSTEOARTHRITIS OF SPINE WITH RADICULOPATHY, LUMBAR REGION: Status: ACTIVE | Noted: 2017-07-18

## 2018-09-14 PROBLEM — M25.561 CHRONIC PAIN OF BOTH KNEES: Status: ACTIVE | Noted: 2017-10-24

## 2018-09-14 PROBLEM — R00.2 PALPITATIONS: Status: ACTIVE | Noted: 2018-09-14

## 2018-09-14 PROBLEM — G89.29 CHRONIC PAIN OF BOTH KNEES: Status: ACTIVE | Noted: 2017-10-24

## 2018-09-14 PROBLEM — M25.562 CHRONIC PAIN OF BOTH KNEES: Status: ACTIVE | Noted: 2017-10-24

## 2018-09-14 PROBLEM — M51.26 HNP (HERNIATED NUCLEUS PULPOSUS), LUMBAR: Status: ACTIVE | Noted: 2017-12-08

## 2018-09-14 LAB
ALBUMIN SERPL BCP-MCNC: 3.3 G/DL
ALP SERPL-CCNC: 126 U/L
ALT SERPL W/O P-5'-P-CCNC: 30 U/L
ANION GAP SERPL CALC-SCNC: 8 MMOL/L
AST SERPL-CCNC: 29 U/L
BILIRUB SERPL-MCNC: 0.3 MG/DL
BUN SERPL-MCNC: 10 MG/DL
CALCIUM SERPL-MCNC: 9.3 MG/DL
CHLORIDE SERPL-SCNC: 108 MMOL/L
CHOLEST SERPL-MCNC: 124 MG/DL
CHOLEST/HDLC SERPL: 2.6 {RATIO}
CO2 SERPL-SCNC: 25 MMOL/L
CREAT SERPL-MCNC: 0.8 MG/DL
EST. GFR  (AFRICAN AMERICAN): >60 ML/MIN/1.73 M^2
EST. GFR  (NON AFRICAN AMERICAN): >60 ML/MIN/1.73 M^2
GLUCOSE SERPL-MCNC: 96 MG/DL
HDLC SERPL-MCNC: 48 MG/DL
HDLC SERPL: 38.7 %
LDLC SERPL CALC-MCNC: 64 MG/DL
NONHDLC SERPL-MCNC: 76 MG/DL
POTASSIUM SERPL-SCNC: 3.5 MMOL/L
PROT SERPL-MCNC: 6.7 G/DL
SODIUM SERPL-SCNC: 141 MMOL/L
TRIGL SERPL-MCNC: 60 MG/DL

## 2018-09-14 PROCEDURE — 99204 OFFICE O/P NEW MOD 45 MIN: CPT | Mod: 25,S$GLB,, | Performed by: FAMILY MEDICINE

## 2018-09-14 PROCEDURE — 80061 LIPID PANEL: CPT

## 2018-09-14 PROCEDURE — 80053 COMPREHEN METABOLIC PANEL: CPT

## 2018-09-14 PROCEDURE — 3008F BODY MASS INDEX DOCD: CPT | Mod: CPTII,S$GLB,, | Performed by: FAMILY MEDICINE

## 2018-09-14 PROCEDURE — 36415 COLL VENOUS BLD VENIPUNCTURE: CPT

## 2018-09-14 PROCEDURE — 96372 THER/PROPH/DIAG INJ SC/IM: CPT | Mod: S$GLB,,, | Performed by: FAMILY MEDICINE

## 2018-09-14 PROCEDURE — 99999 PR PBB SHADOW E&M-EST. PATIENT-LVL V: CPT | Mod: PBBFAC,,, | Performed by: FAMILY MEDICINE

## 2018-09-14 PROCEDURE — 86803 HEPATITIS C AB TEST: CPT

## 2018-09-14 RX ORDER — PROMETHAZINE HYDROCHLORIDE AND DEXTROMETHORPHAN HYDROBROMIDE 6.25; 15 MG/5ML; MG/5ML
5 SYRUP ORAL NIGHTLY PRN
Qty: 118 ML | Refills: 0 | Status: SHIPPED | OUTPATIENT
Start: 2018-09-14 | End: 2018-10-07

## 2018-09-14 RX ORDER — BENZONATATE 200 MG/1
200 CAPSULE ORAL 3 TIMES DAILY PRN
Qty: 30 CAPSULE | Refills: 0 | Status: SHIPPED | OUTPATIENT
Start: 2018-09-14 | End: 2018-09-24

## 2018-09-14 RX ORDER — MELOXICAM 15 MG/1
15 TABLET ORAL DAILY
Qty: 30 TABLET | Refills: 2 | Status: SHIPPED | OUTPATIENT
Start: 2018-09-14 | End: 2019-05-01 | Stop reason: SDUPTHER

## 2018-09-14 RX ORDER — SODIUM, POTASSIUM,MAG SULFATES 17.5-3.13G
SOLUTION, RECONSTITUTED, ORAL ORAL
Qty: 354 ML | Refills: 0 | Status: ON HOLD | OUTPATIENT
Start: 2018-09-14 | End: 2018-12-03 | Stop reason: ALTCHOICE

## 2018-09-14 RX ORDER — DICLOFENAC SODIUM 30 MG/G
GEL TOPICAL
Refills: 11 | COMMUNITY
Start: 2018-08-31 | End: 2018-12-05 | Stop reason: SDUPTHER

## 2018-09-14 RX ORDER — METHYLPREDNISOLONE ACETATE 80 MG/ML
80 INJECTION, SUSPENSION INTRA-ARTICULAR; INTRALESIONAL; INTRAMUSCULAR; SOFT TISSUE ONCE
Status: COMPLETED | OUTPATIENT
Start: 2018-09-14 | End: 2018-09-14

## 2018-09-14 RX ORDER — KETOROLAC TROMETHAMINE 30 MG/ML
60 INJECTION, SOLUTION INTRAMUSCULAR; INTRAVENOUS
Status: COMPLETED | OUTPATIENT
Start: 2018-09-14 | End: 2018-09-14

## 2018-09-14 RX ADMIN — KETOROLAC TROMETHAMINE 60 MG: 30 INJECTION, SOLUTION INTRAMUSCULAR; INTRAVENOUS at 08:09

## 2018-09-14 RX ADMIN — METHYLPREDNISOLONE ACETATE 80 MG: 80 INJECTION, SUSPENSION INTRA-ARTICULAR; INTRALESIONAL; INTRAMUSCULAR; SOFT TISSUE at 08:09

## 2018-09-14 NOTE — PROGRESS NOTES
Subjective:       Patient ID: Mariel Becerril is a 62 y.o. female.    Chief Complaint: Establish Care (pt is fasting today) and URI (headache/sore throat)    HPI Ms. Becerril presents to establish care and URI   Grandkids are at her house.   4-5 days symptoms started. OTC sinus medication has not been helping.   Headache, sinus pressure, runny nose, coughing, sore throat and sneezing.       Review of Systems   Constitutional: Negative for activity change, appetite change, fatigue and fever.   HENT: Positive for congestion, rhinorrhea and sinus pressure. Negative for ear pain.    Eyes: Negative for pain and visual disturbance.   Respiratory: Positive for cough. Negative for chest tightness and wheezing.    Cardiovascular: Negative for chest pain, palpitations and leg swelling.   Gastrointestinal: Negative for abdominal distention, abdominal pain, constipation, diarrhea, nausea and vomiting.   Endocrine: Negative for polydipsia and polyuria.   Genitourinary: Negative for difficulty urinating, dyspareunia, dysuria, flank pain and hematuria.   Musculoskeletal: Negative for arthralgias and back pain.   Skin: Negative for rash.   Neurological: Negative for dizziness, tremors, syncope, weakness, numbness and headaches.   Psychiatric/Behavioral: Negative for agitation and confusion.           Past Medical History:   Diagnosis Date    Hypertension      Past Surgical History:   Procedure Laterality Date    BLADDER SUSPENSION      HYSTERECTOMY      tonsilectomy       Family History   Problem Relation Age of Onset    Heart attack Father      Social History     Socioeconomic History    Marital status:      Spouse name: None    Number of children: None    Years of education: None    Highest education level: None   Social Needs    Financial resource strain: None    Food insecurity - worry: None    Food insecurity - inability: None    Transportation needs - medical: None    Transportation needs - non-medical: None    Occupational History    None   Tobacco Use    Smoking status: Never Smoker    Smokeless tobacco: Never Used   Substance and Sexual Activity    Alcohol use: No    Drug use: No    Sexual activity: No     Partners: Male     Birth control/protection: See Surgical Hx   Other Topics Concern    None   Social History Narrative    None       Objective:        Physical Exam   Constitutional: She is oriented to person, place, and time. She appears well-nourished. No distress.   HENT:   Head: Normocephalic and atraumatic.   Right Ear: External ear normal.   Left Ear: External ear normal.   Nose: Mucosal edema present. Right sinus exhibits maxillary sinus tenderness. Left sinus exhibits maxillary sinus tenderness.   Mouth/Throat: Posterior oropharyngeal erythema present.   Postnasal drip   Eyes: EOM are normal. Pupils are equal, round, and reactive to light. Right eye exhibits no discharge. Left eye exhibits no discharge.   Neck: Normal range of motion. Neck supple. No thyromegaly present.   Cardiovascular: Normal rate, regular rhythm and normal heart sounds.   No murmur heard.  Pulmonary/Chest: Effort normal and breath sounds normal. No respiratory distress. She has no wheezes.   Abdominal: Soft. Bowel sounds are normal. She exhibits no distension. There is no tenderness.   Musculoskeletal: Normal range of motion. She exhibits no edema.   Neurological: She is alert and oriented to person, place, and time. Coordination normal.   Skin: Skin is warm and dry. No rash noted.   Psychiatric: She has a normal mood and affect. Her behavior is normal.   Nursing note and vitals reviewed.        Assessment/Plan:     Essential hypertension-controlled  -     Lipid panel; Future; Expected date: 09/14/2018  -     Comprehensive metabolic panel; Future; Expected date: 09/14/2018  -     Ambulatory Referral to Nutrition Services    Encounter for hepatitis C screening test for low risk patient  -     Hepatitis C antibody; Future; Expected  date: 09/14/2018    Screening mammogram, encounter for  -     Mammo Digital Screening Bilateral With CAD; Future; Expected date: 09/14/2018    Encounter for screening colonoscopy  -     sodium,potassium,mag sulfates (SUPREP BOWEL PREP KIT) 17.5-3.13-1.6 gram SolR; Take as directed  Dispense: 354 mL; Refill: 0  -     Case request GI: COLONOSCOPY    Intractable episodic tension-type headache  -     ketorolac injection 60 mg; Inject 2 mLs (60 mg total) into the muscle one time.    Cough  -     benzonatate (TESSALON) 200 MG capsule; Take 1 capsule (200 mg total) by mouth 3 (three) times daily as needed for Cough.  Dispense: 30 capsule; Refill: 0  -     promethazine-dextromethorphan (PROMETHAZINE-DM) 6.25-15 mg/5 mL Syrp; Take 5 mLs by mouth nightly as needed (cough).  Dispense: 118 mL; Refill: 0    Mucosal edema  -     methylPREDNISolone acetate injection 80 mg; Inject 1 mL (80 mg total) into the muscle once.    URI, acute  -     methylPREDNISolone acetate injection 80 mg; Inject 1 mL (80 mg total) into the muscle once.    Class 3 severe obesity due to excess calories without serious comorbidity with body mass index (BMI) of 45.0 to 49.9 in adult  -     Ambulatory Referral to Nutrition Services    Establishing care with new doctor, encounter for  Reviewed medical, social, surgical and family history. Reviewed health maintenance.         Follow-up in about 3 months (around 12/14/2018).    Jaclyn Becerril MD  [facility]   Family Medicine

## 2018-09-17 LAB — HCV AB SERPL QL IA: NEGATIVE

## 2018-09-20 NOTE — PROGRESS NOTES
PHYSICAL THERAPY DAILY NOTE    Referring Provider:  Dr. Marcus Narvaez    Diagnosis:       ICD-10-CM ICD-9-CM    1. Chronic pain of left knee M25.562 719.46     G89.29 338.29    2. Chronic pain of both ankles M25.571 719.47     G89.29 338.29     M25.572       Orders:  Evaluate and Treat    Date of Initial Evaluation: 9/6/18 (Re-eval needed for 10/6/18)    Visit # 3 of 6    BACKGROUND:  Patient reports that she has had B knee pain since 2017, but in the last several months her L knee pain has gotten much worse and she has to use a crutch for ambulation. Reports decreased stability to her L knee and increased pain with straightening it while ambulating. Patient says she has medial knee pain where it started first, but the excruciating pain is in the back of her knee. Reports this pain has really limited her function and has a lot of difficulty walking and rarely walks without using the crutch. Reports that sometimes the pain will go all the way down her L leg and her foot sometimes goes numb.     SUBJECTIVE: Patient reports that the back of her knee has been feeling, but is feeling more of it on her anterior knee. Reports feeling a little more stable with her walking.    Goal: be able to walk without the crutch    OBJECTIVE:          AT EVAL       TODAY  Gait: very antalgic with use of crutch needed on right side, without the crutch very little weight through her L side and decreased knee flexion noted    Sensation: impaired to light touch     Knee AROM:  Flexion      80%      Extension    95%  Knee PROM  Flexion      90%      Extension    WNL     Ankle ROM  Dorsiflexion    100%     Plantarflexion    100%     Eversion    100%     Inversion    100%       Strength:  Quadriceps    3+/5      Hamstrings    4/5     Gluteus Medius   3-/5      Gluteus Babatunde   3-/5     Hip Flexors    3-/5     Dorsiflexion    4/5 (R) and 3+/5 (L)     Plantarflexion    3-/5     Eversion    4+/5 (R) and 3+/5 (L)     Inversion    4+/5 (R) and  3+/5 (L)    SL stance: unable to perform without UE support, unable to perform full heel raise on either side            Function:  LEFS      25% disability on initial evaluation .    Tenderness to palpation:  Along L medial knee joint line and along L tibial nerve- some distal sciatic nerve just proximal to the crease of her knee- tenderness goes all the way down to her L foot    Other: unable to stand SL Stance on her L leg without UE support    Special Test: positive on L side for tibial nerve irritation    ASSESSMENT:  Patient was running late for her appointment, so not able to go through her full exercise program. Did demonstrate good tolerance to all there ex without any increase in adverse symptoms.    TREATMENT PROVIDED:  -Manual Therapy:  None provided this session  Tibial nerve mobilizations  Knee FL/EX mobs   -Therapeutic Exercise:  30 min  Total gym 5 band squats  Total gym 3 band SL squat  Nustep x 8 min  Gastroc dynamic stretch on wedge  Standing TKE against TB  Supine B ER against TB  Supine DKTC  Resisted ankle EV/INV  SL stance  -Modalities: 15 min moist heat to L posterior knee  -Education: 5 min:  on condition and HEP    PLAN:  Patient will benefit from physical therapy (2) x/week for (6-8) weeks including manual therapy, therapeutic exercise, functional activities, modalities, and patient education.    Thank you for this referral.    These services are reasonable and necessary for the conditions set forth above while under my care.    De David, PT, DPT

## 2018-09-21 ENCOUNTER — CLINICAL SUPPORT (OUTPATIENT)
Dept: REHABILITATION | Facility: HOSPITAL | Age: 63
End: 2018-09-21
Attending: PODIATRIST
Payer: COMMERCIAL

## 2018-09-21 DIAGNOSIS — G89.29 CHRONIC PAIN OF LEFT KNEE: Primary | ICD-10-CM

## 2018-09-21 DIAGNOSIS — M25.562 CHRONIC PAIN OF LEFT KNEE: Primary | ICD-10-CM

## 2018-09-21 DIAGNOSIS — G89.29 CHRONIC PAIN OF BOTH ANKLES: ICD-10-CM

## 2018-09-21 DIAGNOSIS — M25.571 CHRONIC PAIN OF BOTH ANKLES: ICD-10-CM

## 2018-09-21 DIAGNOSIS — M25.572 CHRONIC PAIN OF BOTH ANKLES: ICD-10-CM

## 2018-09-21 PROCEDURE — 97110 THERAPEUTIC EXERCISES: CPT

## 2018-09-24 ENCOUNTER — HOSPITAL ENCOUNTER (OUTPATIENT)
Dept: SLEEP MEDICINE | Facility: HOSPITAL | Age: 63
Discharge: HOME | End: 2018-09-24
Attending: INTERNAL MEDICINE
Payer: COMMERCIAL

## 2018-09-24 ENCOUNTER — OFFICE VISIT (OUTPATIENT)
Dept: OTOLARYNGOLOGY | Facility: CLINIC | Age: 63
End: 2018-09-24
Payer: COMMERCIAL

## 2018-09-24 VITALS
DIASTOLIC BLOOD PRESSURE: 85 MMHG | BODY MASS INDEX: 47.07 KG/M2 | SYSTOLIC BLOOD PRESSURE: 135 MMHG | TEMPERATURE: 98 F | WEIGHT: 265.63 LBS | HEIGHT: 63 IN | HEART RATE: 98 BPM

## 2018-09-24 DIAGNOSIS — J32.9 SINUSITIS, UNSPECIFIED CHRONICITY, UNSPECIFIED LOCATION: Primary | ICD-10-CM

## 2018-09-24 DIAGNOSIS — F51.12 BEHAVIORALLY INDUCED INSUFFICIENT SLEEP SYNDROME: ICD-10-CM

## 2018-09-24 DIAGNOSIS — Z72.821 INADEQUATE SLEEP HYGIENE: ICD-10-CM

## 2018-09-24 DIAGNOSIS — G47.33 OSA ON CPAP: Primary | ICD-10-CM

## 2018-09-24 DIAGNOSIS — R44.8 FACIAL PRESSURE: ICD-10-CM

## 2018-09-24 PROCEDURE — 99203 OFFICE O/P NEW LOW 30 MIN: CPT | Mod: S$GLB,,, | Performed by: PHYSICIAN ASSISTANT

## 2018-09-24 PROCEDURE — 95810 POLYSOM 6/> YRS 4/> PARAM: CPT | Mod: 26,,, | Performed by: PSYCHOLOGIST

## 2018-09-24 PROCEDURE — 99999 PR PBB SHADOW E&M-EST. PATIENT-LVL IV: CPT | Mod: PBBFAC,,, | Performed by: PHYSICIAN ASSISTANT

## 2018-09-24 PROCEDURE — 3008F BODY MASS INDEX DOCD: CPT | Mod: CPTII,S$GLB,, | Performed by: PHYSICIAN ASSISTANT

## 2018-09-24 PROCEDURE — 95810 POLYSOM 6/> YRS 4/> PARAM: CPT

## 2018-09-24 RX ORDER — PREDNISONE 20 MG/1
TABLET ORAL
Qty: 21 TABLET | Refills: 0 | Status: SHIPPED | OUTPATIENT
Start: 2018-09-24 | End: 2018-11-15

## 2018-09-24 RX ORDER — LEVOFLOXACIN 500 MG/1
500 TABLET, FILM COATED ORAL DAILY
Qty: 14 TABLET | Refills: 0 | Status: SHIPPED | OUTPATIENT
Start: 2018-09-24 | End: 2018-10-08

## 2018-09-24 NOTE — PROGRESS NOTES
Subjective:       Patient ID: Mariel Becerril is a 62 y.o. female.    Chief Complaint: Sinus Problem    Ms. Becerril is a pleasant 61 yo female here to see me today for the first time with complaints of dizziness, sore throat, productive cough, runny nose, bilateral ear pressure, chills but denies any fever. She saw pcp last Friday and got ketorolac injection, steroid injection, tessalon perls and cough syrup. She continues to be symptomatic.  She also has h/o sleep apnea and uses CPAP at night.     MAJOR SYMPTOMS:  No  Purulent anterior nasal discharge  Yes  Purulent or discolored posterior nasal discharge  Yes  Nasal congestion or obstruction  Yes  Facial Congestion or fullness  Yes  Hyposmia or anosmia  No  Fever    MINOR SYMPTOMS:  Yes  Headache  Yes  Ear pain, pressure, or fullness  No  Halitosis  No  Dental pain  Yes  Fatigue    The diagnosis of acute sinusitis is based on the presence of at least 2 major or 1 major and at least 2 minor symptoms.  Mariel does meet this criteria.  Clinical Practice Guideline for Acute Bacterial Rhinosinusitis in Children and Adults. Clin Infect Dis 2012; 54:e72    Onset:  3 weeks ago  Exacerbating factors: No  Relieving factors: No  Timing:  unchanged      Review of Systems   Constitutional: Positive for activity change and fatigue. Negative for chills, fever and unexpected weight change.   HENT: Positive for congestion, ear pain, postnasal drip, rhinorrhea, sinus pressure, sinus pain and sore throat. Negative for dental problem, ear discharge, facial swelling, hearing loss, nosebleeds, sneezing, tinnitus, trouble swallowing and voice change.    Eyes: Negative for redness, itching and visual disturbance.   Respiratory: Positive for cough. Negative for choking, shortness of breath and wheezing.    Cardiovascular: Negative for chest pain and palpitations.   Gastrointestinal: Negative for abdominal pain.        No reflux.   Musculoskeletal: Negative for gait problem.   Skin: Negative  for rash.   Neurological: Negative for dizziness, light-headedness and headaches.       Objective:      Physical Exam   Constitutional: She is oriented to person, place, and time. She appears well-developed. No distress.   Ill appearing   HENT:   Head: Normocephalic and atraumatic.   Right Ear: External ear and ear canal normal. Tympanic membrane is retracted. No middle ear effusion.   Left Ear: External ear and ear canal normal. Tympanic membrane is retracted.  No middle ear effusion.   Nose: Mucosal edema and rhinorrhea present. No nasal deformity or septal deviation. No epistaxis. Right sinus exhibits frontal sinus tenderness. Right sinus exhibits no maxillary sinus tenderness. Left sinus exhibits frontal sinus tenderness. Left sinus exhibits no maxillary sinus tenderness.   Mouth/Throat: Uvula is midline and mucous membranes are normal. Mucous membranes are not pale and not dry. No dental caries. Posterior oropharyngeal erythema present. No oropharyngeal exudate.   Eyes: Conjunctivae, EOM and lids are normal. Pupils are equal, round, and reactive to light. Right eye exhibits no chemosis. Left eye exhibits no chemosis. Right conjunctiva is not injected. Left conjunctiva is not injected. No scleral icterus. Right eye exhibits normal extraocular motion and no nystagmus. Left eye exhibits normal extraocular motion and no nystagmus.   Neck: Trachea normal and phonation normal. No JVD present. No tracheal tenderness present. No tracheal deviation present. No thyroid mass and no thyromegaly present.   Cardiovascular: Intact distal pulses.   Pulmonary/Chest: Effort normal. No stridor. No respiratory distress.   Abdominal: She exhibits no distension.   Lymphadenopathy:        Head (right side): No submental, no submandibular, no preauricular, no posterior auricular and no occipital adenopathy present.        Head (left side): No submental, no submandibular, no preauricular, no posterior auricular and no occipital  adenopathy present.     She has no cervical adenopathy.   Neurological: She is alert and oriented to person, place, and time. No cranial nerve deficit.   Skin: Skin is warm and dry. No rash noted. No erythema.   Psychiatric: She has a normal mood and affect. Her behavior is normal.       Assessment:       1. Sinusitis, unspecified chronicity, unspecified location    2. Facial pressure        Plan:       Sinusitis: will plan to treat with course of antibiotics and oral steroids. I would like her to RTC in 2 weeks for recheck or sooner if needed.  If no improvement, will plan on obtaining CT sinus.

## 2018-09-27 ENCOUNTER — HOSPITAL ENCOUNTER (OUTPATIENT)
Dept: RADIOLOGY | Facility: HOSPITAL | Age: 63
Discharge: HOME | End: 2018-09-27
Attending: FAMILY MEDICINE
Payer: COMMERCIAL

## 2018-09-27 ENCOUNTER — OFFICE VISIT (OUTPATIENT)
Dept: CARDIOLOGY | Facility: CLINIC | Age: 63
End: 2018-09-27
Payer: COMMERCIAL

## 2018-09-27 VITALS — WEIGHT: 255 LBS | BODY MASS INDEX: 45.18 KG/M2 | HEIGHT: 63 IN

## 2018-09-27 VITALS
SYSTOLIC BLOOD PRESSURE: 124 MMHG | BODY MASS INDEX: 45.19 KG/M2 | HEIGHT: 63 IN | WEIGHT: 255.06 LBS | HEART RATE: 92 BPM | DIASTOLIC BLOOD PRESSURE: 64 MMHG

## 2018-09-27 DIAGNOSIS — R06.09 DOE (DYSPNEA ON EXERTION): ICD-10-CM

## 2018-09-27 DIAGNOSIS — R07.89 CHEST PRESSURE: ICD-10-CM

## 2018-09-27 DIAGNOSIS — G47.33 OSA ON CPAP: ICD-10-CM

## 2018-09-27 DIAGNOSIS — E66.01 MORBID OBESITY DUE TO EXCESS CALORIES: ICD-10-CM

## 2018-09-27 DIAGNOSIS — Z12.31 SCREENING MAMMOGRAM, ENCOUNTER FOR: ICD-10-CM

## 2018-09-27 DIAGNOSIS — R07.89 OTHER CHEST PAIN: ICD-10-CM

## 2018-09-27 DIAGNOSIS — R00.2 PALPITATIONS: ICD-10-CM

## 2018-09-27 DIAGNOSIS — I10 ESSENTIAL HYPERTENSION: Primary | ICD-10-CM

## 2018-09-27 DIAGNOSIS — R06.09 DYSPNEA ON EXERTION: ICD-10-CM

## 2018-09-27 PROCEDURE — 77067 SCR MAMMO BI INCL CAD: CPT | Mod: 26,,, | Performed by: RADIOLOGY

## 2018-09-27 PROCEDURE — 77067 SCR MAMMO BI INCL CAD: CPT | Mod: TC,PO

## 2018-09-27 PROCEDURE — 99999 PR PBB SHADOW E&M-EST. PATIENT-LVL III: CPT | Mod: PBBFAC,,, | Performed by: INTERNAL MEDICINE

## 2018-09-27 PROCEDURE — 3008F BODY MASS INDEX DOCD: CPT | Mod: CPTII,S$GLB,, | Performed by: INTERNAL MEDICINE

## 2018-09-27 PROCEDURE — 77063 BREAST TOMOSYNTHESIS BI: CPT | Mod: 26,,, | Performed by: RADIOLOGY

## 2018-09-27 PROCEDURE — 99214 OFFICE O/P EST MOD 30 MIN: CPT | Mod: S$GLB,,, | Performed by: INTERNAL MEDICINE

## 2018-09-27 RX ORDER — FUROSEMIDE 20 MG/1
20 TABLET ORAL DAILY PRN
Qty: 90 TABLET | Refills: 3 | Status: SHIPPED | OUTPATIENT
Start: 2018-09-27 | End: 2018-11-26 | Stop reason: SDUPTHER

## 2018-09-27 NOTE — PROGRESS NOTES
Subjective:   Patient ID:  Mariel Becerril is a 62 y.o. female who presents for cardiac consult of Hypertension and Palpitations      Hypertension   Associated symptoms include shortness of breath (improved). Pertinent negatives include no chest pain or palpitations.   Palpitations    Associated symptoms include shortness of breath (improved). Pertinent negatives include no chest pain.     The patient came in today for cardiac consult of Hypertension and Palpitations    This is a 62 year old female pt with current medical conditions HTN, obesity, GERD, OA, CPAP of knees presents for follow CV evaluation.      18  Pt has been having SOB, AREVALO. Is always in pain, is exhausted on pain meds. She feels like her heart if affected and is tired after walking a few steps.  Pt also feels chest pressure. Pt feels chest pressure daily, just tries to rest and goes away. Chest pain is substernal without radiation. 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 Uses CPAP every night, but last eval was over 5 years.   Feels palpitations, rarely, was on digoxin then.    ECG - NSR, poor RWP    18  Pt had negative nuclear stress and 2D ECHO with normal Bi V function. GIL workup underway, recent visit with pulmonary. Has bad sinus infection, has been using Tessalon perles, saw ENT received steroid dose pack which has been improved. Still coughing a lot, using cough syrup and on Levaquin x 14 days. No chest pain but more congestion. Has been doing well with Lasix - taking it almost daily.     Patient feels no PND, no dizziness, no syncope, no CNS symptoms.    Patient is compliant with medications.    Family history includes Arthritis in her mother; Depression in her sister; Heart disease in her father -  at age 81; Hypertension in her mother and sister.    Nuclear Stress 18  Nuclear Quantitative Functional Analysis:   LVEF: >= 70 %    Impression: NORMAL MYOCARDIAL PERFUSION  1. The perfusion scan is free of evidence for myocardial  ischemia or injury.   2. Resting wall motion is physiologic.   3. Resting LV function is normal.   4. The ventricular volumes are normal at rest and stress.   5. The extracardiac distribution of radioactivity is normal.     2D ECHO 9/5/18  CONCLUSIONS     1 - Concentric hypertrophy.     2 - No wall motion abnormalities.     3 - Normal left ventricular systolic function (EF 60-65%).     4 - Normal left ventricular diastolic function.     5 - Normal right ventricular systolic function .     6 - The estimated PA systolic pressure is 27 mmHg.     Past Medical History:   Diagnosis Date    Hypertension     Sleep apnea        Past Surgical History:   Procedure Laterality Date    BLADDER SUSPENSION      HYSTERECTOMY      tonsilectomy         Social History     Tobacco Use    Smoking status: Never Smoker    Smokeless tobacco: Never Used   Substance Use Topics    Alcohol use: No    Drug use: No       Family History   Problem Relation Age of Onset    Heart attack Father           Medication List           Accurate as of 9/27/18  1:26 PM. If you have any questions, ask your nurse or doctor.               CONTINUE taking these medications    amLODIPine 10 MG tablet  Commonly known as:  NORVASC     cyclobenzaprine 10 MG tablet  Commonly known as:  FLEXERIL     furosemide 20 MG tablet  Commonly known as:  LASIX  Take 1 tablet (20 mg total) by mouth daily as needed.     HYDROcodone-acetaminophen 7.5-325 mg per tablet  Commonly known as:  NORCO     levoFLOXacin 500 MG tablet  Commonly known as:  LEVAQUIN  Take 1 tablet (500 mg total) by mouth once daily. for 14 days     meloxicam 15 MG tablet  Commonly known as:  MOBIC  Take 1 tablet (15 mg total) by mouth once daily.     methocarbamol 500 MG Tab  Commonly known as:  ROBAXIN     omeprazole 40 MG capsule  Commonly known as:  PRILOSEC     * PENNSAID 20 mg/gram /actuation(2 %) Sopm  Generic drug:  diclofenac sodium     * diclofenac sodium 3 % gel  Commonly known as:   "SOLARAZE     predniSONE 20 MG tablet  Commonly known as:  DELTASONE  Take 3 tab in am x 3 days , then 2 tab in am x 3 days , then 1 tab in am x 3 days, then 1/2 tab in am x 3 days     promethazine-dextromethorphan 6.25-15 mg/5 mL Syrp  Commonly known as:  PROMETHAZINE-DM  Take 5 mLs by mouth nightly as needed (cough).     SUPREP BOWEL PREP KIT 17.5-3.13-1.6 gram Solr  Generic drug:  sodium,potassium,mag sulfates  Take as directed     topiramate 50 MG tablet  Commonly known as:  TOPAMAX     traMADol 50 mg tablet  Commonly known as:  ULTRAM         * This list has 2 medication(s) that are the same as other medications prescribed for you. Read the directions carefully, and ask your doctor or other care provider to review them with you.                Review of Systems   Constitutional: Negative.    HENT: Negative.    Eyes: Negative.    Respiratory: Positive for shortness of breath (improved).    Cardiovascular: Negative for chest pain, palpitations and leg swelling.   Gastrointestinal: Positive for heartburn.   Genitourinary: Negative.    Musculoskeletal: Negative.    Skin: Negative.    Neurological: Negative.    Endo/Heme/Allergies: Negative.    Psychiatric/Behavioral: Negative.    All 12 systems otherwise negative.      Wt Readings from Last 3 Encounters:   09/27/18 115.7 kg (255 lb 1.2 oz)   09/24/18 120.5 kg (265 lb 10.5 oz)   09/14/18 119.3 kg (263 lb 0.1 oz)     Temp Readings from Last 3 Encounters:   09/24/18 98.2 °F (36.8 °C) (Tympanic)   09/14/18 97.5 °F (36.4 °C) (Tympanic)   09/05/18 98.4 °F (36.9 °C) (Oral)     BP Readings from Last 3 Encounters:   09/27/18 124/64   09/24/18 135/85   09/14/18 110/62     Pulse Readings from Last 3 Encounters:   09/27/18 92   09/24/18 98   09/14/18 88       /64 (BP Location: Left arm, Patient Position: Sitting, BP Method: Large (Manual))   Pulse 92   Ht 5' 3" (1.6 m)   Wt 115.7 kg (255 lb 1.2 oz)   BMI 45.18 kg/m²     Objective:   Physical Exam   Constitutional: She " is oriented to person, place, and time. She appears well-developed and well-nourished. No distress.   HENT:   Head: Normocephalic and atraumatic.   Nose: Nose normal.   Mouth/Throat: Oropharynx is clear and moist.   Eyes: Conjunctivae and EOM are normal. No scleral icterus.   Neck: Normal range of motion. Neck supple. No JVD present. No thyromegaly present.   Cardiovascular: Normal rate, regular rhythm, S1 normal and S2 normal. Exam reveals no gallop, no S3, no S4 and no friction rub.   Murmur heard.  Pulmonary/Chest: Effort normal and breath sounds normal. No stridor. No respiratory distress. She has no wheezes. She has no rales. She exhibits no tenderness.   Abdominal: Soft. Bowel sounds are normal. She exhibits no distension and no mass. There is no tenderness. There is no rebound.   Genitourinary:   Genitourinary Comments: Deferred   Musculoskeletal: Normal range of motion. She exhibits no edema, tenderness or deformity.   Lymphadenopathy:     She has no cervical adenopathy.   Neurological: She is alert and oriented to person, place, and time. She exhibits normal muscle tone. Coordination normal.   Skin: Skin is warm and dry. No rash noted. She is not diaphoretic. No erythema. No pallor.   Psychiatric: She has a normal mood and affect. Her behavior is normal. Judgment and thought content normal.   Nursing note and vitals reviewed.      Lab Results   Component Value Date     09/14/2018    K 3.5 09/14/2018     09/14/2018    CO2 25 09/14/2018    BUN 10 09/14/2018    CREATININE 0.8 09/14/2018    GLU 96 09/14/2018    AST 29 09/14/2018    ALT 30 09/14/2018    ALBUMIN 3.3 (L) 09/14/2018    PROT 6.7 09/14/2018    BILITOT 0.3 09/14/2018    CHOL 124 09/14/2018    HDL 48 09/14/2018    LDLCALC 64.0 09/14/2018    TRIG 60 09/14/2018    BNP <10 08/20/2018     Assessment:      1. Essential hypertension    2. Palpitations    3. Morbid obesity due to excess calories    4. GIL on CPAP    5. AREVALO (dyspnea on exertion)     6. Chest pressure        Plan:   1. Chest pressure/AREVALO - improved/resolved  - pharm nuclear stress test - neg for ischemia   - 2D ECHO - normal Bi V function  - Continue lasix 20mg PRN daily    2. AREVALO  - improved with lasix    3. HTN  - cont meds  - low salt diet    4. Obesity  - rec weight loss with diet/exercise    5. GIL  - cont CPAP  - appreciate pulm recs    Thank you for allowing me to participate in this patient's care. Please do not hesitate to contact me with any questions or concerns. Consult note has been forwarded to the referral physician.

## 2018-09-27 NOTE — PATIENT INSTRUCTIONS

## 2018-09-28 DIAGNOSIS — G47.33 OSA ON CPAP: Primary | ICD-10-CM

## 2018-09-28 NOTE — PROCEDURES
Patient Name: Mariel Becerril  Date of Report: 18  Date of PS2018   [facility] No.: [id number]  : 1955                  Time of PS:33:08 PM - 5:29:33 AM  Sex:  Female   Age:  62   Weight:  265.0 lbs Height:  5  3          Type of PSG:  Dx re-evaluation, re-titration     REASONS FOR REFERRAL: Ms Becerril is a 62 year old female referred to Dr. Ann Woods and the SD by Dr. Juan Alberto Luis for diagnostic re-evaluation / re-titration polysomnography.  Dr. Woods ordered PSG, based on the patients reported excessive fatigue (sleepiness ?) even while using CPAP reliably, as she has for more than 5 years.  Dr. Fabienne Becerril is the patients primary care physician.     STUDY PARAMETERS: This diagnostic study involved analysis of the patient's sleep pattern while breathing unassisted. The study was performed with a sleep technologist in attendance for the entire test period, with video monitoring throughout the study, and routine laboratory clinical parameters recorded:  NOTE: The polysomnography electrophysiological record for the patient has been reviewed in its entirety by Dr. Diaz.    SUMMARY STATEMENTS  DIAGNOSTIC IMPRESSIONS  G47.33  /  327.23  Mild Obstructive Sleep Apnea, Adult (OSAHS)  Z72.821 /  V69.4  Inadequate Sleep Hygiene  F51.12   /  307.44  Insufficient Sleep Syndrome     PRIMARY TREATMENT RECOMMENDATIONS  Treat, or refer to Sleep Disorders Center.  1. The diagnostic polysomnography revealed a mild obstructive sleep apnea / hypopnea syndrome (A + H Index = 12.8 events / hr asleep with 4.9 respiratory event - related arousals / hr asleep for the study, plus an additional 1.0  RERAs / hr asleep (respiratory effort -  related arousals).  The mean SpO2 value was 96.0 %, mild, minimum oxygen saturation during sleep was 88.0 %, and waking baseline SpO2 was 98 %.  Sporadic, loud snoring was noted.  A  CPAP titration polysomnography is recommended.      2. Weight loss to the normal range  is recommended as it can decrease respiratory events and snoring in overweight patients.  3. The following changes in sleep hygiene / sleep - related behavior are recommended after medical treatments are successful   Set time for sleep to number of hours of sleep needed for adequate daytime functioning (7.5 to 9.0 hrs / night).   Regular bedtimes and wake times, including weekends: Total sleep time / night should not be more than one hour more             than usual, and bedtime or wake time should not be more than one hour earlier or later than usual.     Do not attempt to make up lost sleep by extending sleep periods.     Avoid naps; none longer than 20 min or later than mid - afternoon.    SECONDARY TREATMENT RECOMMENDATIONS  Treat, or refer to SD[facility] if problems are not satisfactorily resolved by the above.  1. Follow - up inquiry regarding frequency, duration and nature of reported sleep loss (voluntary sleep restriction / insufficient sleep syndrome).  Please see SDI.    See below for a complete interpretation of data from the polysomnography and Sleep Disorders Inventory.     Thank you for referring this patient to the [facility].      Sukumar Diaz, Ph.D., ABPP; Diplomate, American Board of Sleep Medicine

## 2018-10-01 ENCOUNTER — TELEPHONE (OUTPATIENT)
Dept: ORTHOPEDICS | Facility: CLINIC | Age: 63
End: 2018-10-01

## 2018-10-01 ENCOUNTER — TELEPHONE (OUTPATIENT)
Dept: PULMONOLOGY | Facility: CLINIC | Age: 63
End: 2018-10-01

## 2018-10-01 ENCOUNTER — NUTRITION (OUTPATIENT)
Dept: SURGERY | Facility: CLINIC | Age: 63
End: 2018-10-01
Payer: COMMERCIAL

## 2018-10-01 VITALS — HEIGHT: 63 IN | WEIGHT: 261.94 LBS | BODY MASS INDEX: 46.41 KG/M2

## 2018-10-01 DIAGNOSIS — E66.01 OBESITY, CLASS III, BMI 40-49.9 (MORBID OBESITY): ICD-10-CM

## 2018-10-01 DIAGNOSIS — I10 BENIGN ESSENTIAL HTN: Primary | ICD-10-CM

## 2018-10-01 PROCEDURE — 97802 MEDICAL NUTRITION INDIV IN: CPT | Mod: S$GLB,,, | Performed by: DIETITIAN, REGISTERED

## 2018-10-01 PROCEDURE — 99999 PR PBB SHADOW E&M-EST. PATIENT-LVL III: CPT | Mod: PBBFAC,,, | Performed by: DIETITIAN, REGISTERED

## 2018-10-01 NOTE — PROGRESS NOTES
NUTRITIONAL CONSULT    Referring Physician: {Physician:[id number]}  Reason for MNT Referral: Initial assessment for {Surgery:40605} work-up    PAST MEDICAL HISTORY:   62 y.o. female  There is no height or weight on file to calculate BMI..  Weight history includes ***.  Dieting attempts include ***.    Past Medical History:   Diagnosis Date    Hypertension     Sleep apnea        CLINICAL DATA:  62 y.o.-year-old Black or  female.  Height: ***  Weight: *** lbs  IBW: *** lbs  BMI: ***  The patient's goal weight (*** % EBW): *** lbs  Personal goal weight: *** lbs    Goal for Bariatric Surgery: {BAR DIETITIAN GOALS:70809}    NUTRITIONAL NEEDS:  *** Calories  *** Protein    NUTRITION & HEALTH HISTORY:  Greatest challenge: {Greatest challenge:82148}    {Current/Past:06050} diet recall: ***    Current Diet:  Meal pattern: ***  Protein supplements: ***  Snacking: *** / day  Vegetables: Likes {Variety / Few / None:27873}. Eats {Frequency:91310}.  Fruits: Likes {Variety / Few / None:19640}. Eats {Frequency:04139}.  Beverages: {Beverages:52462}  Dining out: {Frequency:66760} Mostly {Dining out:51407}.  Cooking at home: {Frequency:71645} Mostly {Cookin} {Foods:59760}.    Exercise:  Past exercise: {Adequate / Fair / None:83579}    Current exercise: {Adequate / Fair / None:17446}  Restrictions to exercise: ***    Vitamins / Minerals / Herbs:   ***      Food Allergies:   ***    Social:  {Work :34959}  Lives with ***.  Grocery shopping and food prep ***.  Patient believes the household {will/will not:55513} be supportive after surgery.  Alcohol: {Frequency:00220}.  Smoking: {Smokin}    ASSESSMENT:  · Patient reports attempts at weight loss, only to regain lost weight.  · Patient demonstrated knowledge of healthy eating behaviors and exercise patterns; admits to not eating healthy and not exercising at this point.  · Patient {Willingness:88655} to change lifestyle and make behavior modifications as evidenced  by {Evidence of change:85929}.    Insurance requires medically supervised diet prior to consideration for bariatric surgery.    Barriers to Education: {BarriersToEducation:70327}    Stage of change: {StageOfChange:62326}    NUTRITION DIAGNOSIS:    Obesity related to {NutritionDiagnosis:29234} as evidence by BMI.    BARIATRIC DIET DISCUSSION/PLAN:  Discussed diet after surgery and related to patient's food record.  Reviewed nutrition guidelines for before and after surgery.  Answered all questions.  {Diet recommendations:18621}    RECOMMENDATIONS:  Patient is {Good, Not a Good, Potential:26100} candidate for bariatric surgery.    Needs additional visit(s) with RD.    {PersonUnderstandin} verbalized understanding.    Expect {Desc; good/fair/poor:21559} compliance after surgery at this time.    Communicated nutrition plan with bariatric team.    SESSION TIME:  {BAR DIETITIAN TIME:81370} minutes;t

## 2018-10-01 NOTE — TELEPHONE ENCOUNTER
Called patient and informed her HSAT results. Informed her a order is in for a CPAP machine. Patient had no further questions.   ----- Message from Ann Woods MD sent at 9/28/2018 10:29 PM CDT -----  Please inform patient that she does quit breathung 13 times an hour   A new automatic CPAP order is is   . Need to wear CPAP continuously during sleep with a minimum of 4 hours per night , 7 nights out of 10 .   Thank you

## 2018-10-01 NOTE — TELEPHONE ENCOUNTER
Called patient to move up her appointment for this week on wednesday because her Synvisc injection has been approved through her insurance. Patient verbalized understanding her new appointment date and time. Patient was very happy about this matter.. SJ          ----- Message from Jeff Mendoza sent at 10/1/2018 10:30 AM CDT -----  Contact: pt  She's calling in regards to a missed call, [phone] (home)

## 2018-10-01 NOTE — PROGRESS NOTES
PCP: Jaclyn Becerril MD  REFERRING PROVIDER:  Jaclyn Becerril MD      HISTORY OF PRESENT ILLNESS:  62 y.o. female patient is in clinic today for Optifast Patient is planning to have gastric sleeve procedure.    Weight difficulties for 1 year.  Weight loss strategies include decreased portions, water, walking with most lost 36 lbs.   There were no encounter diagnoses.    VITAL SIGNS:  There were no vitals filed for this visit.  IBW: *** lbs +/-10%, AdjIBW: *** lbs/***kg and ***    ALLERGIES & MEDICATIONS: Reviewed and Reconciled  MEDICAL/SURGICAL & FAMILY HISTORY: Reviewed and Reconciled    LABORATORY:  A1C: No results found for: HGBA1C  Chol:   Cholesterol   Date Value Ref Range Status   09/14/2018 124 120 - 199 mg/dL Final     Comment:     The National Cholesterol Education Program (NCEP) has set the  following guidelines (reference ranges) for Cholesterol:  Optimal.....................<200 mg/dL  Borderline High.............200-239 mg/dL  High........................> or = 240 mg/dL       Trig:   Triglycerides   Date Value Ref Range Status   09/14/2018 60 30 - 150 mg/dL Final     Comment:     The National Cholesterol Education Program (NCEP) has set the  following guidelines (reference values) for triglycerides:  Normal......................<150 mg/dL  Borderline High.............150-199 mg/dL  High........................200-499 mg/dL       HDL:   HDL   Date Value Ref Range Status   09/14/2018 48 40 - 75 mg/dL Final     Comment:     The National Cholesterol Education Program (NCEP) has set the  following guidelines (reference values) for HDL Cholesterol:  Low...............<40 mg/dL  Optimal...........>60 mg/dL       LDL: No components found for: LDL   BUN:      BUN, Bld   Date Value Ref Range Status   09/14/2018 10 8 - 23 mg/dL Final     AST:    AST   Date Value Ref Range Status   09/14/2018 29 10 - 40 U/L Final         ALT:    ALT   Date Value Ref Range Status   09/14/2018 30 10 - 44 U/L Final     Micro/Cr  Ratio:    Creatinine   Date Value Ref Range Status   09/14/2018 0.8 0.5 - 1.4 mg/dL Final       SELF-MONITORING: Glucometer - ***; Food - {SELF-MONITORING DIABETES:13405} are avail    ACTIVITY LEVEL: Aerobic *** min *** d/wk. Resistance {ACTIVITY LEVEL MNT:40596}.    NUTRITION INTAKE: Meal patterns include 3 meals, 1-2 snacks daily with intake *** cals/d. Patient is not limiting carbohydrates, saturated fat or sodium. Inadequate intakes of fruits, vegetables, whole grains.  B - fruit, school breakfast  S - ***  L - fish and shrimp- fried and slice of bread and water  S - na  D - cereal or sandwich   S - na  Beverages - water, sweet tea  Dining out - wendys- 4 pc nugget, or 2 pc fried chicken 1/wk or mcdonalds burger and sweet tea    PSYCHOSOCIAL: Stage of change - {STAGE OF CHANGE:66024}  Barriers to change - {BARRIERS TO CHANGE:51399}  Motivation to change (10high): {Numbers; 1-10:88516}  Predicted compliance (10high): {Numbers; 1-10:71143}  Realistic expectation for wt loss (10high): {Numbers; 1-10:29575}  Verbalizes understanding of dietary changes post procedure and willingness to participate in physical activity (10high): {Numbers; 1-10:40452}    Nutrition Diagnosis     Nutrition Diagnosis Related to (Etiology) As Evidenced By (Signs/Symptoms)   {AMB ONC NUTRITION DIAGNOSIS STATEMENTS:54138} *** ***   {AMB ONC NUTRITION DIAGNOSIS STATEMENTS:83178} *** ***   {AMB ONC NUTRITION DIAGNOSIS STATEMENTS:74637} *** ***       Nutritional Intervention and Prescription        Nutrition Intervention {AMB ONC NUTRITION INTERVENTIONS:47673}   Goals/Expected Outcomes ***   Progress {AMB ONC NUTRITION INTERVENTION PROGRESS:29024}     Nutrition Intervention {AMB ONC NUTRITION INTERVENTIONS:17694}   Goals/Expected Outcomes ***   Progress {AMB ONC NUTRITION INTERVENTION PROGRESS:30030}     Nutrition Intervention {AMB ONC NUTRITION INTERVENTIONS:07885}   Goals/Expected Outcomes ***   Progress {AMB ONC NUTRITION INTERVENTION  PROGRESS:25313}   MNT ASSESSMENT:   *** calories, *** {OUNCE GRAM:73902} protein daily  *** grams carb/meal, *** grams carb/snack  increase fruit 2 serv/d, vegetables 2+ cups/d, whole grains 3+serv/d, lowfat dairy 3+serv/d  low-fat, low-sodium  150 min physical activity per week, moderate intensity, as tolerated    PLAN:    Reviewed MNT guidelines for obesity and weight management.   Encouraged daily self-monitoring of food & activity patterns.    Provided pre & post surgery meal-planning instruction via bariatric diet manual, foodlists, plate method, food models, food labels and/or ADA booklet. Reviewed micro/macronutrient effect on weight management. Discussed carbohydrate counting and spacing techniques with emphasis on supplementation necessary for altered metabolism. Reviewed principles of energy metabolism to assist weight and health management.                                                          Discussed physical activity with review of benefits, methods, precautions.    Discussed bx strategies for improving, strategies for improving social & environmental support of lifestyle changes.     GOALS: Self monitoring: daily food & activity journal. Meal plan-90% accuracy, Physical activity-150 minutes per week. 1. Protein shake in place of breakfast daily, 2. Meal plan guide as provided, 3. Review manual and return with questions/concerns.  FU: No Follow-up on file.    Visit Time Spent:  {[facility] VISIT MINUTES:67612}    Thank you for the opportunity to work with your patient.

## 2018-10-01 NOTE — PROGRESS NOTES
"NUTRITIONAL CONSULT    Referring Physician: Dr. Rodríguez  Reason for MNT Referral: Initial assessment for wt mgt work-up. Obesity    PAST MEDICAL HISTORY:   62 y.o. female  Body mass index is 46.39 kg/m²..  Weight history includes obese for years but needs to have knee surgery so wants to lose wt.  Dieting attempts include decreasing portions, wt loss medications, walking.    Past Medical History:   Diagnosis Date    Hypertension     Sleep apnea        CLINICAL DATA:  62 y.o.-year-old Black or  female.  Height: 63"  Weight: 261 lbs  IBW: 135 lbs  BMI: 46.39  Personal goal weight: 180 lbs    Goal for Bariatric Surgery: to improve health, to improve quality of life and to lose weight    NUTRITIONAL NEEDS:  1800 Calories  80g Protein (1g/kg ABW)    NUTRITION & HEALTH HISTORY:  Greatest challenge: snacking at night and irregular meal patterns    Current diet recall:    B: fruit and school breakfast  L: fried fish and shrimp with a slice of bread  D: cereal or sandwich  Beverages: water and sweet tea  Dining out: eats out a few times a week and get chicken strips     Current Diet:  Meal pattern: 3 meals/day  Protein supplements: na  Snacking: NA / day  Vegetables: Likes a variety. Eats almost daily.  Fruits: Likes a variety. Eats daily.  Beverages: water and sweet tea  Dining out: Weekly. Mostly fast food.  Cooking at home: Weekly. Mostly baked and grilled meat, fish, starchy CHO and vegetables.    Exercise:  Past exercise: None    Current exercise: None  Restrictions to exercise: knee pain    Vitamins / Minerals / Herbs:   NA      Food Allergies:   NA    Social:  Works regular daytime shifts.  Lives with self.  Grocery shopping and food prep self.  Patient believes the household will be supportive of wt loss plan.  Alcohol: None.  Smoking: None.    ASSESSMENT:  · Patient reports attempts at weight loss, only to regain lost weight.  · Patient demonstrated knowledge of healthy eating behaviors and exercise " patterns; admits to not eating healthy and not exercising at this point.  Patient states willingness to change lifestyle and make behavior modifications as evidenced by dietary changes, increased vegetables, better choices when dining out and more food preparation at home.      Barriers to Education: none    Stage of change: determination    NUTRITION DIAGNOSIS:    Obesity related to Food and nutrition related knowledge deficit and Excessive calorie intake as evidence by BMI.    BARIATRIC DIET DISCUSSION/PLAN:  Discussed diet after surgery and related to patient's food record.  Reviewed nutrition guidelines for before and after surgery.  Answered all questions.  Work on expanding variety of vegetables.  Work on gradually cutting back on starchy CHO in the diet.  Reduce frequency of dining out.  More grocery shopping and meal preparation at home.  Increase exercise.    RECOMMENDATIONS:  Needs additional visit(s) with RD.    Patient verbalized understanding.    Expect good  compliance at this time.    Communicated nutrition plan with bariatric team.    SESSION TIME:  60 minutes

## 2018-10-01 NOTE — TELEPHONE ENCOUNTER
Left a message for the patient to give the office a call back.FP    ----- Message from Renetta Fabian sent at 10/1/2018  9:11 AM CDT -----  Contact: pt  The pt wants to come in today to get a Gel Injection, the pt request a call at [phone]///[id number]

## 2018-10-02 ENCOUNTER — TELEPHONE (OUTPATIENT)
Dept: INTERNAL MEDICINE | Facility: CLINIC | Age: 63
End: 2018-10-02

## 2018-10-02 ENCOUNTER — PATIENT MESSAGE (OUTPATIENT)
Dept: DIABETES | Facility: CLINIC | Age: 63
End: 2018-10-02

## 2018-10-02 NOTE — TELEPHONE ENCOUNTER
----- Message from Jaclyn Becerril MD sent at 10/1/2018  2:38 PM CDT -----  Mammogram negative repeat in 1 year

## 2018-10-03 ENCOUNTER — TELEPHONE (OUTPATIENT)
Dept: OTOLARYNGOLOGY | Facility: CLINIC | Age: 63
End: 2018-10-03

## 2018-10-03 ENCOUNTER — OFFICE VISIT (OUTPATIENT)
Dept: ORTHOPEDICS | Facility: CLINIC | Age: 63
End: 2018-10-03
Payer: COMMERCIAL

## 2018-10-03 VITALS
WEIGHT: 261 LBS | SYSTOLIC BLOOD PRESSURE: 132 MMHG | BODY MASS INDEX: 46.25 KG/M2 | HEIGHT: 63 IN | HEART RATE: 100 BPM | DIASTOLIC BLOOD PRESSURE: 86 MMHG

## 2018-10-03 DIAGNOSIS — M17.0 PRIMARY OSTEOARTHRITIS OF BOTH KNEES: Primary | ICD-10-CM

## 2018-10-03 PROCEDURE — 20610 DRAIN/INJ JOINT/BURSA W/O US: CPT | Mod: LT,S$GLB,, | Performed by: ORTHOPAEDIC SURGERY

## 2018-10-03 PROCEDURE — 99999 PR PBB SHADOW E&M-EST. PATIENT-LVL III: CPT | Mod: PBBFAC,,, | Performed by: ORTHOPAEDIC SURGERY

## 2018-10-03 PROCEDURE — 99499 UNLISTED E&M SERVICE: CPT | Mod: S$GLB,,, | Performed by: ORTHOPAEDIC SURGERY

## 2018-10-03 NOTE — PROGRESS NOTES
Subjective:     Patient ID: Mariel Becerril is a 62 y.o. female.    Chief Complaint: No chief complaint on file.    Patient is here for left knee pain. Reports it has been bothering her for 1 year. Reports no LUCA. Reports no previous injury. Ambulating on a crutch today.      Knee Pain    The pain is present in the left knee. This is a chronic problem. The current episode started more than 1 year ago. The problem occurs intermittently. The problem has been gradually improving. The quality of the pain is described as aching. The pain is at a severity of 0/10. Associated symptoms include joint swelling. Pertinent negatives include no fever or numbness. The symptoms are aggravated by lying down, walking, standing and sitting. She has tried NSAIDs, oral narcotics, rest and OTC ointments for the symptoms. The treatment provided moderate relief. Physical therapy was not tried.      Past Medical History:   Diagnosis Date    Hypertension     Sleep apnea      Past Surgical History:   Procedure Laterality Date    BLADDER SUSPENSION      HYSTERECTOMY      partial 2000    tonsilectomy       Family History   Problem Relation Age of Onset    Heart attack Father      Social History     Socioeconomic History    Marital status:      Spouse name: Not on file    Number of children: Not on file    Years of education: Not on file    Highest education level: Not on file   Social Needs    Financial resource strain: Not on file    Food insecurity - worry: Not on file    Food insecurity - inability: Not on file    Transportation needs - medical: Not on file    Transportation needs - non-medical: Not on file   Occupational History    Not on file   Tobacco Use    Smoking status: Never Smoker    Smokeless tobacco: Never Used   Substance and Sexual Activity    Alcohol use: No    Drug use: No    Sexual activity: No     Partners: Male     Birth control/protection: See Surgical Hx   Other Topics Concern    Not on file    Social History Narrative    Not on file        Medication List           Accurate as of 10/3/18  7:27 AM. If you have any questions, ask your nurse or doctor.               CONTINUE taking these medications    amLODIPine 10 MG tablet  Commonly known as:  NORVASC     cyclobenzaprine 10 MG tablet  Commonly known as:  FLEXERIL     furosemide 20 MG tablet  Commonly known as:  LASIX  Take 1 tablet (20 mg total) by mouth daily as needed.     HYDROcodone-acetaminophen 7.5-325 mg per tablet  Commonly known as:  NORCO     levoFLOXacin 500 MG tablet  Commonly known as:  LEVAQUIN  Take 1 tablet (500 mg total) by mouth once daily. for 14 days     meloxicam 15 MG tablet  Commonly known as:  MOBIC  Take 1 tablet (15 mg total) by mouth once daily.     methocarbamol 500 MG Tab  Commonly known as:  ROBAXIN     omeprazole 40 MG capsule  Commonly known as:  PRILOSEC     * PENNSAID 20 mg/gram /actuation(2 %) Sopm  Generic drug:  diclofenac sodium     * diclofenac sodium 3 % gel  Commonly known as:  SOLARAZE     predniSONE 20 MG tablet  Commonly known as:  DELTASONE  Take 3 tab in am x 3 days , then 2 tab in am x 3 days , then 1 tab in am x 3 days, then 1/2 tab in am x 3 days     promethazine-dextromethorphan 6.25-15 mg/5 mL Syrp  Commonly known as:  PROMETHAZINE-DM  Take 5 mLs by mouth nightly as needed (cough).     SUPREP BOWEL PREP KIT 17.5-3.13-1.6 gram Solr  Generic drug:  sodium,potassium,mag sulfates  Take as directed     topiramate 50 MG tablet  Commonly known as:  TOPAMAX     traMADol 50 mg tablet  Commonly known as:  ULTRAM         * This list has 2 medication(s) that are the same as other medications prescribed for you. Read the directions carefully, and ask your doctor or other care provider to review them with you.              Review of patient's allergies indicates:   Allergen Reactions    Penicillins Rash     Review of Systems   Constitution: Negative for fever.   HENT: Negative for hearing loss.    Eyes: Negative  for blurred vision and visual disturbance.   Cardiovascular: Positive for leg swelling. Negative for chest pain.   Respiratory: Positive for shortness of breath.    Endocrine: Negative for polyuria.   Hematologic/Lymphatic: Negative for bleeding problem.   Skin: Negative for rash.   Musculoskeletal: Positive for back pain, joint pain, joint swelling and muscle cramps. Negative for muscle weakness.   Gastrointestinal: Negative for melena.   Genitourinary: Negative for hematuria.   Neurological: Negative for loss of balance, numbness and paresthesias.   Psychiatric/Behavioral: Negative for altered mental status.       Objective:   There is no height or weight on file to calculate BMI.  There were no vitals filed for this visit.    General: Mariel is well-developed, well-nourished, appears stated age, in no acute distress, alert and oriented to time, place and person.       General    Vitals reviewed.  Constitutional: She is oriented to person, place, and time. She appears well-developed and well-nourished. No distress.   HENT:   Mouth/Throat: No oropharyngeal exudate.   Eyes: Right eye exhibits no discharge. Left eye exhibits no discharge.   Neck: Normal range of motion.   Pulmonary/Chest: Effort normal. No respiratory distress.   Neurological: She is alert and oriented to person, place, and time. She has normal reflexes. No cranial nerve deficit. Coordination normal.   Psychiatric: She has a normal mood and affect. Her behavior is normal. Judgment and thought content normal.     General Musculoskeletal Exam   Gait: normal       Right Knee Exam     Inspection   Erythema: absent  Scars: absent  Swelling: absent  Effusion: absent  Deformity: absent  Bruising: absent    Tenderness   The patient is tender to palpation of the medial joint line.    Crepitus   The patient has crepitus of the patella.    Range of Motion   Extension: 0   Flexion: 120     Tests   Meniscus   Celio:  Medial - negative Lateral -  negative  Ligament Examination Lachman: normal (-1 to 2mm) PCL-Posterior Drawer: normal (0 to 2mm)     MCL - Valgus: normal (0 to 2mm)  LCL - Varus: normal  Patella   Patellar apprehension: negative  Passive Patellar Tilt: neutral  Patellar Tracking: normal  Patellar Glide (quadrants): Lateral - 1   Medial - 2  Q-Angle at 90 degrees: normal  Patellar Grind: negative    Other   Meniscal Cyst: absent  Popliteal (Baker's) Cyst: absent  Sensation: normal    Left Knee Exam     Inspection   Erythema: absent  Scars: absent  Swelling: absent  Effusion: absent  Deformity: absent  Bruising: absent    Tenderness   The patient tender to palpation of the medial joint line.    Crepitus   The patient has crepitus of the patella.    Range of Motion   Extension: 0   Flexion: 120     Tests   Meniscus   Celio:  Medial - negative Lateral - negative  Stability Lachman: normal (-1 to 2mm) PCL-Posterior Drawer: normal (0 to 2mm)  MCL - Valgus: normal (0 to 2mm)  LCL - Varus: normal (0 to 2mm)  Patella   Patellar apprehension: negative  Passive Patellar Tilt: neutral  Patellar Tracking: normal  Patellar Glide (Quadrants): Lateral - 1 Medial - 2  Q-Angle at 90 degrees: normal  Patellar Grind: negative    Other   Meniscal Cyst: absent  Popliteal (Baker's) Cyst: absent  Sensation: normal    Right Hip Exam     Tests   Timoteo: negative  Left Hip Exam     Tests   Timoteo: negative          Muscle Strength   Right Lower Extremity   Hip Abduction: 5/5   Quadriceps:  5/5   Hamstrin/5   Left Lower Extremity   Hip Abduction: 5/5   Quadriceps:  4/5   Hamstrin/5     Reflexes     Left Side  Quadriceps:  2+  Achilles:  2+    Right Side   Quadriceps:  2+  Achilles:  2+    Vascular Exam     Right Pulses  Dorsalis Pedis:      2+  Posterior Tibial:      2+        Left Pulses  Dorsalis Pedis:      2+  Posterior Tibial:      2+        X-rays including standing, weight bearing AP and flexion bilateral knees, lateral and merchant views ordered and images  reviewed by me show:    No fracture, dislocation or other pathology   Medial compartment: moderate degenerative changes   Lateral compartment: mild degenerative changes   Patellofemoral compartment: mild degenerative changes      Assessment:     No diagnosis found.     Plan:     1. PT for quad strengthening/Home exercise program    2.  Medial  Brace -left    3.  Continue pain management    4. Synvisc authorization    5. Weight loss-bariatric referral    6. Dr. iM referral for genicular nerve block, lumbar injections-current pain mgmt has not offered any interventions

## 2018-10-03 NOTE — TELEPHONE ENCOUNTER
I left a message letting the patient know that her appointment with ELFEGO Dee on Monday, 10/8/18, needs to be rescheduled.  The provider will not be in the clinic this day.  I asked that she please call us back to reschedule.

## 2018-10-03 NOTE — PROGRESS NOTES
Patient is here for follow up of her knee arthritis. She  is requesting synvisc injection #1.  [facility] reviewed per encounter record. She has failed other conservative modalities including NSAIDS, activity modification, weight loss        After viscosupplementation info and post-injection info was given, the left knee was prepped with betadine and alcohol and injected with 16 mg synvisc from a inferolateral approach. Patient tolerated the injection well.

## 2018-10-08 ENCOUNTER — TELEPHONE (OUTPATIENT)
Dept: ORTHOPEDICS | Facility: CLINIC | Age: 63
End: 2018-10-08

## 2018-10-08 NOTE — TELEPHONE ENCOUNTER
Contacted patient in regards to her follow up appointments for the synvisc injections. Informed the patient that we had to push her appointment for the second synvisc injection to Friday, 10/12/18 at 7:20am due to Dr. Mancilla being out of the office Wednesday, 10/10/18. Informed her the third injection would be Wednesday, 10/17/18 at 7:20am. Patient verbalized her understanding and was thankful for the return call.    ----- Message from Sheila Cross sent at 10/8/2018  7:09 AM CDT -----  Contact: self   Requesting a call back regarding why injection was canceled. Pt states she will need 2nd infection before 3rd on 10/12.Please call back at [phone] and [phone].        Thanks,  Sheila Cross

## 2018-10-12 ENCOUNTER — TELEPHONE (OUTPATIENT)
Dept: ORTHOPEDICS | Facility: CLINIC | Age: 63
End: 2018-10-12

## 2018-10-12 ENCOUNTER — OFFICE VISIT (OUTPATIENT)
Dept: ORTHOPEDICS | Facility: CLINIC | Age: 63
End: 2018-10-12
Payer: COMMERCIAL

## 2018-10-12 VITALS
BODY MASS INDEX: 45.18 KG/M2 | HEIGHT: 63 IN | DIASTOLIC BLOOD PRESSURE: 88 MMHG | WEIGHT: 255 LBS | SYSTOLIC BLOOD PRESSURE: 140 MMHG | HEART RATE: 88 BPM

## 2018-10-12 DIAGNOSIS — M17.0 PRIMARY OSTEOARTHRITIS OF BOTH KNEES: Primary | ICD-10-CM

## 2018-10-12 PROCEDURE — 99499 UNLISTED E&M SERVICE: CPT | Mod: S$GLB,,, | Performed by: ORTHOPAEDIC SURGERY

## 2018-10-12 PROCEDURE — 99999 PR PBB SHADOW E&M-EST. PATIENT-LVL III: CPT | Mod: PBBFAC,,, | Performed by: ORTHOPAEDIC SURGERY

## 2018-10-12 PROCEDURE — 20610 DRAIN/INJ JOINT/BURSA W/O US: CPT | Mod: LT,S$GLB,, | Performed by: ORTHOPAEDIC SURGERY

## 2018-10-12 NOTE — TELEPHONE ENCOUNTER
Spoke with patient states they canceled her appointment on 10/17 for her third Synvisc injection and wanted it rescheduled. Rescheduled for 10/19 at 7:20am. Patient verbalized her understanding and agreed.    ----- Message from Sheila Cross sent at 10/11/2018  3:48 PM CDT -----  Contact: self   Pt made a mistake and canceled appointment on 10/17.cancling was an error.Please call back at [phone].    Thanks,  Sheila Cross

## 2018-10-12 NOTE — PROGRESS NOTES
Patient is here for follow up of her knee arthritis. She  is requesting synvisc injection #2.  [facility] reviewed per encounter record. She has failed other conservative modalities including NSAIDS, activity modification, weight loss        After viscosupplementation info and post-injection info was given, the left knee was prepped with betadine and alcohol and injected with 16 mg synvisc from a inferolateral approach. Patient tolerated the injection well.

## 2018-10-19 ENCOUNTER — OFFICE VISIT (OUTPATIENT)
Dept: ORTHOPEDICS | Facility: CLINIC | Age: 63
End: 2018-10-19
Payer: COMMERCIAL

## 2018-10-19 VITALS
WEIGHT: 255 LBS | BODY MASS INDEX: 45.18 KG/M2 | HEART RATE: 102 BPM | DIASTOLIC BLOOD PRESSURE: 75 MMHG | SYSTOLIC BLOOD PRESSURE: 136 MMHG | HEIGHT: 63 IN

## 2018-10-19 DIAGNOSIS — Z53.20 PROCEDURE NOT CARRIED OUT BECAUSE OF PATIENT'S DECISION: Primary | ICD-10-CM

## 2018-10-19 DIAGNOSIS — M17.0 PRIMARY OSTEOARTHRITIS OF BOTH KNEES: Primary | ICD-10-CM

## 2018-10-19 PROCEDURE — 99999 PR PBB SHADOW E&M-EST. PATIENT-LVL II: CPT | Mod: PBBFAC,,, | Performed by: ORTHOPAEDIC SURGERY

## 2018-10-19 PROCEDURE — 99499 UNLISTED E&M SERVICE: CPT | Mod: S$GLB,,, | Performed by: ORTHOPAEDIC SURGERY

## 2018-10-19 RX ORDER — METHOCARBAMOL 500 MG/1
500 TABLET, FILM COATED ORAL 3 TIMES DAILY
Qty: 21 TABLET | Refills: 0 | Status: SHIPPED | OUTPATIENT
Start: 2018-10-19 | End: 2018-10-24 | Stop reason: SDUPTHER

## 2018-10-19 RX ORDER — MELOXICAM 15 MG/1
15 TABLET ORAL DAILY
Qty: 7 TABLET | Refills: 0 | Status: SHIPPED | OUTPATIENT
Start: 2018-10-19 | End: 2018-10-19 | Stop reason: CLARIF

## 2018-10-19 NOTE — PROGRESS NOTES
Subjective:     Patient ID: Mariel Becerril is a 62 y.o. female.    Chief Complaint: No chief complaint on file.    HPI    Past Medical History:   Diagnosis Date    Hypertension     Sleep apnea      Past Surgical History:   Procedure Laterality Date    BLADDER SUSPENSION      HYSTERECTOMY      partial 2000    tonsilectomy       Family History   Problem Relation Age of Onset    Heart attack Father      Social History     Socioeconomic History    Marital status:      Spouse name: Not on file    Number of children: Not on file    Years of education: Not on file    Highest education level: Not on file   Social Needs    Financial resource strain: Not on file    Food insecurity - worry: Not on file    Food insecurity - inability: Not on file    Transportation needs - medical: Not on file    Transportation needs - non-medical: Not on file   Occupational History    Not on file   Tobacco Use    Smoking status: Never Smoker    Smokeless tobacco: Never Used   Substance and Sexual Activity    Alcohol use: No    Drug use: No    Sexual activity: No     Partners: Male     Birth control/protection: See Surgical Hx   Other Topics Concern    Not on file   Social History Narrative    Not on file        Medication List           Accurate as of 10/19/18  7:42 AM. If you have any questions, ask your nurse or doctor.               CONTINUE taking these medications    amLODIPine 10 MG tablet  Commonly known as:  NORVASC     cyclobenzaprine 10 MG tablet  Commonly known as:  FLEXERIL     furosemide 20 MG tablet  Commonly known as:  LASIX  Take 1 tablet (20 mg total) by mouth daily as needed.     HYDROcodone-acetaminophen 7.5-325 mg per tablet  Commonly known as:  NORCO     meloxicam 15 MG tablet  Commonly known as:  MOBIC  Take 1 tablet (15 mg total) by mouth once daily.     methocarbamol 500 MG Tab  Commonly known as:  ROBAXIN     omeprazole 40 MG capsule  Commonly known as:  PRILOSEC     * PENNSAID 20 mg/gram  /actuation(2 %) Sopm  Generic drug:  diclofenac sodium     * diclofenac sodium 3 % gel  Commonly known as:  SOLARAZE     predniSONE 20 MG tablet  Commonly known as:  DELTASONE  Take 3 tab in am x 3 days , then 2 tab in am x 3 days , then 1 tab in am x 3 days, then 1/2 tab in am x 3 days     SUPREP BOWEL PREP KIT 17.5-3.13-1.6 gram Solr  Generic drug:  sodium,potassium,mag sulfates  Take as directed     topiramate 50 MG tablet  Commonly known as:  TOPAMAX     traMADol 50 mg tablet  Commonly known as:  ULTRAM         * This list has 2 medication(s) that are the same as other medications prescribed for you. Read the directions carefully, and ask your doctor or other care provider to review them with you.              Review of patient's allergies indicates:   Allergen Reactions    Penicillins Rash     ROS    Objective:   There is no height or weight on file to calculate BMI.  There were no vitals filed for this visit.    General: Mariel is well-developed, well-nourished, appears stated age, in no acute distress, alert and oriented to time, place and person.       Ortho/SPM Exam    Assessment:     No diagnosis found.     Plan:

## 2018-10-24 ENCOUNTER — TELEPHONE (OUTPATIENT)
Dept: ORTHOPEDICS | Facility: CLINIC | Age: 63
End: 2018-10-24

## 2018-10-24 NOTE — TELEPHONE ENCOUNTER
Spoke with patient. States she would like to reschedule her appointment for Friday because her knee is still sore from the previous injection. Rescheduled for Friday 11/2/18 at 8:20am. Patient verbalized her understanding and agreed.    ----- Message from Suzan Lobato sent at 10/24/2018 11:01 AM CDT -----  Contact: [phone]   Pt cannot take third injection , pt state that her leg is still in pain from second injection.  Please call back to consult with at [phone].  [facility]-

## 2018-10-31 ENCOUNTER — PATIENT MESSAGE (OUTPATIENT)
Dept: ORTHOPEDICS | Facility: CLINIC | Age: 63
End: 2018-10-31

## 2018-11-07 ENCOUNTER — NUTRITION (OUTPATIENT)
Dept: SURGERY | Facility: CLINIC | Age: 63
End: 2018-11-07
Payer: COMMERCIAL

## 2018-11-07 VITALS — BODY MASS INDEX: 44.84 KG/M2 | WEIGHT: 253.06 LBS | HEIGHT: 63 IN

## 2018-11-07 DIAGNOSIS — I10 BENIGN ESSENTIAL HTN: Primary | ICD-10-CM

## 2018-11-07 DIAGNOSIS — E66.01 OBESITY, CLASS III, BMI 40-49.9 (MORBID OBESITY): ICD-10-CM

## 2018-11-07 PROCEDURE — 99999 PR PBB SHADOW E&M-EST. PATIENT-LVL III: CPT | Mod: PBBFAC,,, | Performed by: DIETITIAN, REGISTERED

## 2018-11-07 PROCEDURE — 97803 MED NUTRITION INDIV SUBSEQ: CPT | Mod: S$GLB,,, | Performed by: DIETITIAN, REGISTERED

## 2018-11-07 NOTE — PROGRESS NOTES
NUTRITION NOTE    Referring Physician: Dr Rodríguez  Reason for MNT Referral: wt mgt    Patient presents for 2nd visit for MSD with weight loss over the past month; total weight loss by making numerous dietary and lifestyle changes.    CLINICAL DATA:  63 y.o. female.    Current Weight: 253 lbs  Weight Change Since Initial Visit: 9 lbs  Ideal Body Weight: 135 lbs  Body mass index is 44.83 kg/m².    NUTRITIONAL NEEDS:  1800Calories (using [place] [address]. Jeor Equation)  80 Grams Protein    CURRENT DIET:  Reduced-calorie diet.  Diet Recall: Food records are not present.    Diet Includes:   B: [employer] brand protein shake/ cereal (honey nut or bran)   L: grilled chicken salad  D: grilled shrimp  S: mixed nuts with dried fruits/ whole grain crackers/ fresh fruit  Beverages: 1 sweet tea, water  Fast food: plain hamburger, 4 pc chicken nuggets    Meal Pattern: Improved.  Protein Supplements: 1 per day.  Snacking: Adequate. Snacks include healthy choices.  Vegetables: Likes a variety. Eats daily.  Fruits: Likes a variety. Eats daily.  Beverages: water  Dining Out:  Weekly. Mostly fast food.  Cooking at home: Daily. Mostly baked and grilled meat, fish, starchy CHO and vegetables.    CURRENT EXERCISE:  None    Vitamins / Minerals / Herbs:   NA    Food Allergies:   NKFA    Social:  Works regular daytime shifts.  Alcohol: None.  Smoking: None.    ASSESSMENT:  Patient demonstrates some willingness to change lifestyle habits as evidenced by weight loss, dietary changes, increased vegetables, better choices when dining out, more food preparation at [employer] and healthier cooking at home.    Doing well with working on greatest challenges (none).    Barriers to Education:  none  Stage of Change:  action    NUTRITION DIAGNOSIS:  Obesity related to Food and nutrition related knowledge deficit and Excessive calorie intake as evidence by BMI.  Status: Improved    PLAN:  Pt is good candidate for surgery.    Diet: Maintain diet plan.  Work on expanding  variety of vegetables.  Work on gradually cutting back on starchy CHO in the diet.  5-6 meals per day.    Exercise: Increase.    Behavior Modification: Begin to document food & activity logs daily.      Return to clinic in 2 months.  Needs additional visit(s) with RD.    Communicated nutrition plan with bariatric team.    SESSION TIME:  15 minutes

## 2018-11-09 ENCOUNTER — OFFICE VISIT (OUTPATIENT)
Dept: PULMONOLOGY | Facility: CLINIC | Age: 63
End: 2018-11-09
Payer: COMMERCIAL

## 2018-11-09 ENCOUNTER — TELEPHONE (OUTPATIENT)
Dept: ORTHOPEDICS | Facility: CLINIC | Age: 63
End: 2018-11-09

## 2018-11-09 ENCOUNTER — CLINICAL SUPPORT (OUTPATIENT)
Dept: PULMONOLOGY | Facility: CLINIC | Age: 63
End: 2018-11-09
Payer: COMMERCIAL

## 2018-11-09 VITALS
DIASTOLIC BLOOD PRESSURE: 78 MMHG | SYSTOLIC BLOOD PRESSURE: 126 MMHG | HEART RATE: 103 BPM | WEIGHT: 265 LBS | OXYGEN SATURATION: 98 % | RESPIRATION RATE: 17 BRPM | HEIGHT: 63 IN | BODY MASS INDEX: 46.95 KG/M2

## 2018-11-09 DIAGNOSIS — R09.02 HYPOXEMIA: ICD-10-CM

## 2018-11-09 DIAGNOSIS — G47.33 OSA ON CPAP: Primary | ICD-10-CM

## 2018-11-09 DIAGNOSIS — K11.7 DROOLING: ICD-10-CM

## 2018-11-09 DIAGNOSIS — R06.02 SOBOE (SHORTNESS OF BREATH ON EXERTION): ICD-10-CM

## 2018-11-09 DIAGNOSIS — G47.19 EXCESSIVE DAYTIME SLEEPINESS: ICD-10-CM

## 2018-11-09 LAB
ALLENS TEST: ABNORMAL
BRPFT: ABNORMAL
DELSYS: ABNORMAL
DLCO ADJ PRE: 16.46 ML/(MIN*MMHG) (ref 16.09–27.56)
DLCO SINGLE BREATH LLN: 16.09
DLCO SINGLE BREATH PRE REF: 75.4 %
DLCO SINGLE BREATH REF: 21.83
DLCOC SBVA LLN: 3.04
DLCOC SBVA PRE REF: 112.7 %
DLCOC SBVA REF: 4.58
DLCOC SINGLE BREATH LLN: 16.09
DLCOC SINGLE BREATH PRE REF: 75.4 %
DLCOC SINGLE BREATH REF: 21.83
DLCOVA LLN: 3.04
DLCOVA PRE REF: 112.7 %
DLCOVA PRE: 5.16 ML/(MIN*MMHG*L) (ref 3.04–6.11)
DLCOVA REF: 4.58
DLVAADJ PRE: 5.16 ML/(MIN*MMHG*L) (ref 3.04–6.11)
ERV LLN: 0.76
ERV PRE REF: 32.1 %
ERV REF: 0.76
ERVN2 LLN: 0.76
ERVN2 REF: 0.76
FEF 25 75 CHG: 32.3 %
FEF 25 75 LLN: 0.8
FEF 25 75 POST REF: 175.5 %
FEF 25 75 PRE REF: 132.7 %
FEF 25 75 REF: 1.89
FET100 CHG: -3.4 %
FEV1 CHG: 6.8 %
FEV1 FVC CHG: 5 %
FEV1 FVC LLN: 68
FEV1 FVC POST REF: 109.3 %
FEV1 FVC PRE REF: 104.1 %
FEV1 FVC REF: 80
FEV1 LLN: 1.47
FEV1 POST REF: 102.1 %
FEV1 PRE REF: 95.6 %
FEV1 REF: 2.03
FEV6 CHG: 2.7 %
FEV6 LLN: 1.72
FEV6 POST REF: 99.2 %
FEV6 POST: 2.38 L (ref 1.72–3.08)
FEV6 PRE REF: 96.7 %
FEV6 PRE: 2.32 L (ref 1.72–3.08)
FEV6 REF: 2.4
FIO2: 21
FRCN2 LLN: 1.82
FRCN2 REF: 2.65
FRCPLETH LLN: 1.82
FRCPLETH PREREF: 61.6 %
FRCPLETH REF: 2.65
FVC CHG: 1.7 %
FVC LLN: 1.88
FVC POST REF: 93 %
FVC PRE REF: 91.4 %
FVC REF: 2.56
HCO3 UR-SCNC: 25.4 MMOL/L (ref 24–28)
IVC PRE: 2.13 L (ref 1.88–3.25)
IVC SINGLE BREATH LLN: 1.88
IVC SINGLE BREATH PRE REF: 82.9 %
IVC SINGLE BREATH REF: 2.56
MODE: ABNORMAL
MVV LLN: 74
MVV PRE REF: 91 %
MVV REF: 87
PCO2 BLDA: 43.9 MMHG (ref 35–45)
PEF CHG: 2.9 %
PEF LLN: 3.36
PEF POST REF: 110.3 %
PEF PRE REF: 107.1 %
PEF REF: 5.3
PH SMN: 7.37 [PH] (ref 7.35–7.45)
PO2 BLDA: 78 MMHG (ref 80–100)
POC BE: 0 MMOL/L
POC SATURATED O2: 95 % (ref 95–100)
POST FEF 25 75: 3.31 L/S (ref 0.8–2.98)
POST FET 100: 7.14 SEC
POST FEV1 FVC: 87.12 % (ref 67.75–91.61)
POST FEV1: 2.08 L (ref 1.47–2.6)
POST FVC: 2.38 L (ref 1.88–3.25)
POST PEF: 5.85 L/S (ref 3.36–7.24)
PRE DLCO: 16.46 ML/(MIN*MMHG) (ref 16.09–27.56)
PRE ERV: 0.24 L (ref 0.76–0.76)
PRE FEF 25 75: 2.5 L/S (ref 0.8–2.98)
PRE FET 100: 7.38 SEC
PRE FEV1 FVC: 82.94 % (ref 67.75–91.61)
PRE FEV1: 1.94 L (ref 1.47–2.6)
PRE FRC PL: 1.63 L
PRE FVC: 2.34 L (ref 1.88–3.25)
PRE MVV: 79 L/MIN (ref 73.78–99.82)
PRE PEF: 5.68 L/S (ref 3.36–7.24)
PRE RV: 1.36 L (ref 1.31–2.46)
PRE TLC: 3.77 L (ref 3.78–5.76)
RAW LLN: 3.06
RAW PRE REF: 159.7 %
RAW PRE: 4.89 CMH2O*S/L (ref 3.06–3.06)
RAW REF: 3.06
RV LLN: 1.31
RV PRE REF: 72.1 %
RV REF: 1.89
RVN2 LLN: 1.31
RVN2 REF: 1.89
RVN2TLCN2 LLN: 30
RVN2TLCN2 REF: 40
RVTLC LLN: 30
RVTLC PRE REF: 90.2 %
RVTLC PRE: 36.11 % (ref 30.45–49.63)
RVTLC REF: 40
SAMPLE: ABNORMAL
SITE: ABNORMAL
TLC LLN: 3.78
TLC PRE REF: 79 %
TLC REF: 4.77
TLCN2 LLN: 3.78
TLCN2 REF: 4.77
VA PRE: 3.19 L (ref 4.62–4.62)
VA SINGLE BREATH LLN: 4.62
VA SINGLE BREATH PRE REF: 69.1 %
VA SINGLE BREATH REF: 4.62
VC LLN: 1.88
VC PRE REF: 93.9 %
VC PRE: 2.41 L (ref 1.88–3.25)
VC REF: 2.56
VCMAXN2 LLN: 1.88
VCMAXN2 REF: 2.56
VTGRAWPRE: 1.83 L

## 2018-11-09 PROCEDURE — 94060 EVALUATION OF WHEEZING: CPT | Mod: S$GLB,,, | Performed by: INTERNAL MEDICINE

## 2018-11-09 PROCEDURE — 82803 BLOOD GASES ANY COMBINATION: CPT | Mod: S$GLB,,, | Performed by: INTERNAL MEDICINE

## 2018-11-09 PROCEDURE — 94729 DIFFUSING CAPACITY: CPT | Mod: S$GLB,,, | Performed by: INTERNAL MEDICINE

## 2018-11-09 PROCEDURE — 99214 OFFICE O/P EST MOD 30 MIN: CPT | Mod: 25,S$GLB,, | Performed by: INTERNAL MEDICINE

## 2018-11-09 PROCEDURE — 36600 WITHDRAWAL OF ARTERIAL BLOOD: CPT | Mod: S$GLB,,, | Performed by: INTERNAL MEDICINE

## 2018-11-09 PROCEDURE — 94726 PLETHYSMOGRAPHY LUNG VOLUMES: CPT | Mod: S$GLB,,, | Performed by: INTERNAL MEDICINE

## 2018-11-09 PROCEDURE — 99999 PR PBB SHADOW E&M-EST. PATIENT-LVL III: CPT | Mod: PBBFAC,,, | Performed by: INTERNAL MEDICINE

## 2018-11-09 PROCEDURE — 3008F BODY MASS INDEX DOCD: CPT | Mod: CPTII,S$GLB,, | Performed by: INTERNAL MEDICINE

## 2018-11-09 NOTE — PROGRESS NOTES
Pulmonary Outpatient Follow Up Visit     Subjective:       Patient ID: Mariel Becerril is a 63 y.o. female.    Chief Complaint: Sleep Apnea      HPI    63-year-old female patient presenting for follow-up.  Patient was initially evaluated for obstructive sleep apnea, in-lab polysomnography repeated showed AHI of 12.8 per hour.  Patient prescribed auto CPAP.  Compliant with the machine.  Still complaining of sedate daytime sleepiness she did make progress with weight loss and she is following with nutritionist, she has lost 8 lb over the last month.  Complains of drooling while on CPAP.  The new machine has more noise but she is trying to adapt to the new machine.    She complains of bilateral knee pain.  Uses a cane for ambulation.     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    Review of Systems   Constitutional: Negative for fever and chills.   HENT: Negative for hearing loss.    Eyes: Negative for redness.   Respiratory: Positive for apnea and snoring.    Cardiovascular: Negative for chest pain.   Genitourinary: Negative for hematuria.   Endocrine: Negative for cold intolerance.    Musculoskeletal: Positive for arthralgias and back pain.   Skin: Negative for rash.   Gastrointestinal: Negative for abdominal pain.   Neurological: Positive for headaches. Negative for syncope.   Hematological: Negative for adenopathy. Does not bruise/bleed easily.   Psychiatric/Behavioral: Positive for sleep disturbance.         Outpatient Encounter Medications as of 11/9/2018   Medication Sig Dispense Refill    amlodipine (NORVASC) 10 MG tablet       cyclobenzaprine (FLEXERIL) 10 MG tablet   5    diclofenac sodium (SOLARAZE) 3 % gel APPLY 2 GRAMS TO AFFECTED AREA 2 TIMES DAILY  11    furosemide (LASIX) 20 MG tablet Take 1 tablet (20 mg total) by mouth daily as needed. 90 tablet 3    HYDROcodone-acetaminophen (NORCO) 7.5-325 mg per tablet Take 1 tablet by mouth every 6 (six) hours as needed.    "   meloxicam (MOBIC) 15 MG tablet Take 1 tablet (15 mg total) by mouth once daily. 30 tablet 2    methocarbamol (ROBAXIN) 500 MG Tab Take 1 tablet by mouth twice daily as needed 60 tablet 2    omeprazole (PRILOSEC) 40 MG capsule Take 1 capsule by mouth once daily.      PENNSAID 20 mg/gram /actuation(2 %) sopm       predniSONE (DELTASONE) 20 MG tablet Take 3 tab in am x 3 days , then 2 tab in am x 3 days , then 1 tab in am x 3 days, then 1/2 tab in am x 3 days 21 tablet 0    sodium,potassium,mag sulfates (SUPREP BOWEL PREP KIT) 17.5-3.13-1.6 gram SolR Take as directed 354 mL 0    topiramate (TOPAMAX) 50 MG tablet Take 1 tablet by mouth once daily.      tramadol (ULTRAM) 50 mg tablet Take 50 mg by mouth 2 (two) times daily as needed.   5     No facility-administered encounter medications on file as of 11/9/2018.        Objective:     Vital Signs (Most Recent)  Vital Signs  Pulse: 103  Resp: 17  SpO2: 98 %  BP: 126/78  Height and Weight  Height: 5' 3" (160 cm)  Weight: 120.2 kg (265 lb)  BSA (Calculated - sq m): 2.31 sq meters  BMI (Calculated): 47  Weight in (lb) to have BMI = 25: 140.8]  Wt Readings from Last 3 Encounters:   11/09/18 120.2 kg (265 lb)   11/07/18 114.8 kg (253 lb 1.4 oz)   10/19/18 115.7 kg (255 lb)     Temp Readings from Last 3 Encounters:   09/24/18 98.2 °F (36.8 °C) (Tympanic)   09/14/18 97.5 °F (36.4 °C) (Tympanic)   09/05/18 98.4 °F (36.9 °C) (Oral)     Height: 5' 3" (160 cm)          Physical Exam   Constitutional: She is oriented to person, place, and time. She appears well-developed and well-nourished.   HENT:   Head: Normocephalic.   Mouth/Throat: Mallampati Score: IV.   Neck: Neck supple.   Cardiovascular: Normal rate and regular rhythm.   Pulmonary/Chest: Normal expansion and effort normal. She has decreased breath sounds.   Abdominal: Soft.   Lymphadenopathy:     She has no cervical adenopathy.   Neurological: She is alert and oriented to person, place, and time.   Skin: Skin is " warm.   Psychiatric: She has a normal mood and affect. Her behavior is normal. Judgment and thought content normal.       Laboratory  BMP  Lab Results   Component Value Date     09/14/2018    K 3.5 09/14/2018     09/14/2018    CO2 25 09/14/2018    BUN 10 09/14/2018    CREATININE 0.8 09/14/2018    CALCIUM 9.3 09/14/2018    ANIONGAP 8 09/14/2018    ESTGFRAFRICA >60.0 09/14/2018    EGFRNONAA >60.0 09/14/2018     Results for FLY SMITH (MRN [id number]) as of 11/9/2018 10:24   Ref. Range 11/9/2018 10:05   POC PH Latest Ref Range: 7.35 - 7.45  7.370   POC PCO2 Latest Ref Range: 35 - 45 mmHg 43.9   POC PO2 Latest Ref Range: 80 - 100 mmHg 78 (L)   POC BE Latest Ref Range: -2 to 2 mmol/L 0   POC HCO3 Latest Ref Range: 24 - 28 mmol/L 25.4   POC SATURATED O2 Latest Ref Range: 95 - 100 % 95   FiO2 Unknown 21   Sample Unknown ARTERIAL   DelSys Unknown Room Air   Allens Test Unknown Pass   Site Unknown LR   Mode Unknown SPONT     Normal pH no respiratory acidosis.  Mild hypoxia PaO2 78 mm Hg.      Diagnostic Results:  Chest x-ray September 5, 2018    Heart size upper limits normal without failure.  Aorta is tortuous.  Atherosclerotic calcification noted.  There is calcified mediastinal and hilar nodes suggesting prior granulomatous disease.  No focal consolidation or effusion.  Degenerative change throughout the spine with multilevel bridging syndesmophytes.    Compliance Summary  10/9/2018 - 11/7/2018 (30 days)  Days with Device Usage 27 days  Days without Device Usage 3 days  Percent Days with Device Usage 90.0%  Cumulative Usage 7 days 23 hrs. 17 mins. 55 secs.  Maximum Usage (1 Day) 10 hrs. 56 mins. 28 secs.  Average Usage (All Days) 6 hrs. 22 mins. 35 secs.  Average Usage (Days Used) 7 hrs. 5 mins. 6 secs.  Minimum Usage (1 Day) 1 mins. 22 secs.  Percent of Days with Usage >= 4 Hours 83.3%  Percent of Days with Usage < 4 Hours 16.7%  Date Range  Total Blower Time 7 days 23 hrs. 18 mins. 42 secs.  Average AHI  1.6  Auto-CPAP Summary  Auto-CPAP Mean Pressure 9.6 cmH2O  Auto-CPAP Peak Average Pressure 11.0 cmH2O  Average Device Pressure <= 90% of Time 11.9 cmH2O  Average Time in Large Leak Per Day 0 secs.      Echo 9/18:      1 - Concentric hypertrophy.     2 - No wall motion abnormalities.     3 - Normal left ventricular systolic function (EF 60-65%).     4 - Normal left ventricular diastolic function.     5 - Normal right ventricular systolic function .     6 - The estimated PA systolic pressure is 27 mmHg.       PFT showed mild DLCO reduction but no significant restriction or obstruction noted.    Assessment/Plan:   GIL on CPAP    Excessive daytime sleepiness    Drooling    BMI 45.0-49.9, adult     Continue auto CPAP.    Encouraged patient to continue compliance.    Might need to re-evaluate her with a MSLT following a CPAP titration.    Advised the patient to sleep on her back, use a chinstrap, I also advised her to use contour pillow to decrease drooling.    Continue effort to lose weight.        Follow-up in about 3 months (around 2/9/2019).    This note was prepared using voice recognition system and is likely to have sound alike errors that may have been overlooked even after proof reading.  Please call me with any questions    Discussed diagnosis, its evaluation, treatment and usual course. All questions answered.    Thank you for the courtesy of participating in the care of this patient    Ann Woods MD

## 2018-11-09 NOTE — TELEPHONE ENCOUNTER
Called the patient to get her scheduled to see Dr. Cano for a surgery date. I couldn't leave a message due to the fact that her voice mail box was not set up. Will try to call again.FP        ----- Message from Fady London sent at 11/9/2018  3:49 PM CST -----  Contact: pt   Pt would like cb to setup date and time for surgery            ..[phone] (home)

## 2018-11-13 ENCOUNTER — TELEPHONE (OUTPATIENT)
Dept: ORTHOPEDICS | Facility: CLINIC | Age: 63
End: 2018-11-13

## 2018-11-13 NOTE — TELEPHONE ENCOUNTER
Spoke with the patient about getting a appointment with Dr. Cano to discuss surgery. I got the patient scheduled with Dr. Cano next available date 12/4/18 at 9:00. Patient verbalized understanding. Patient was thankful for the call. FP        ----- Message from Adina Vasquez sent at 11/13/2018  8:12 AM CST -----  Contact: pt  Pt stated she called over a week ago and no one has returned her call, she can be reached at [phone] Thanks

## 2018-11-15 ENCOUNTER — LAB VISIT (OUTPATIENT)
Dept: LAB | Facility: HOSPITAL | Age: 63
End: 2018-11-15
Attending: FAMILY MEDICINE
Payer: COMMERCIAL

## 2018-11-15 ENCOUNTER — TELEPHONE (OUTPATIENT)
Dept: PHYSICAL MEDICINE AND REHAB | Facility: CLINIC | Age: 63
End: 2018-11-15

## 2018-11-15 ENCOUNTER — OFFICE VISIT (OUTPATIENT)
Dept: INTERNAL MEDICINE | Facility: CLINIC | Age: 63
End: 2018-11-15
Payer: COMMERCIAL

## 2018-11-15 VITALS
DIASTOLIC BLOOD PRESSURE: 66 MMHG | RESPIRATION RATE: 18 BRPM | HEIGHT: 63 IN | BODY MASS INDEX: 46.14 KG/M2 | WEIGHT: 260.38 LBS | HEART RATE: 90 BPM | SYSTOLIC BLOOD PRESSURE: 118 MMHG | TEMPERATURE: 98 F | OXYGEN SATURATION: 98 %

## 2018-11-15 DIAGNOSIS — R20.0 NUMBNESS IN BOTH HANDS: ICD-10-CM

## 2018-11-15 DIAGNOSIS — R05.9 COUGH: ICD-10-CM

## 2018-11-15 DIAGNOSIS — R20.0 NUMBNESS IN BOTH HANDS: Primary | ICD-10-CM

## 2018-11-15 LAB
BASOPHILS # BLD AUTO: 0.08 K/UL
BASOPHILS NFR BLD: 0.9 %
DIFFERENTIAL METHOD: ABNORMAL
EOSINOPHIL # BLD AUTO: 0.1 K/UL
EOSINOPHIL NFR BLD: 1 %
ERYTHROCYTE [DISTWIDTH] IN BLOOD BY AUTOMATED COUNT: 13.9 %
HCT VFR BLD AUTO: 40.3 %
HGB BLD-MCNC: 11.6 G/DL
IMM GRANULOCYTES # BLD AUTO: 0.03 K/UL
IMM GRANULOCYTES NFR BLD AUTO: 0.3 %
LYMPHOCYTES # BLD AUTO: 2.5 K/UL
LYMPHOCYTES NFR BLD: 28.3 %
MCH RBC QN AUTO: 27 PG
MCHC RBC AUTO-ENTMCNC: 28.8 G/DL
MCV RBC AUTO: 94 FL
MONOCYTES # BLD AUTO: 0.5 K/UL
MONOCYTES NFR BLD: 5.8 %
NEUTROPHILS # BLD AUTO: 5.5 K/UL
NEUTROPHILS NFR BLD: 63.7 %
NRBC BLD-RTO: 0 /100 WBC
PLATELET # BLD AUTO: 350 K/UL
PMV BLD AUTO: 9.5 FL
RBC # BLD AUTO: 4.3 M/UL
VIT B12 SERPL-MCNC: 757 PG/ML
WBC # BLD AUTO: 8.72 K/UL

## 2018-11-15 PROCEDURE — 3008F BODY MASS INDEX DOCD: CPT | Mod: CPTII,S$GLB,, | Performed by: FAMILY MEDICINE

## 2018-11-15 PROCEDURE — 90471 IMMUNIZATION ADMIN: CPT | Mod: S$GLB,,, | Performed by: FAMILY MEDICINE

## 2018-11-15 PROCEDURE — 36415 COLL VENOUS BLD VENIPUNCTURE: CPT

## 2018-11-15 PROCEDURE — 99999 PR PBB SHADOW E&M-EST. PATIENT-LVL III: CPT | Mod: PBBFAC,,, | Performed by: FAMILY MEDICINE

## 2018-11-15 PROCEDURE — 90686 IIV4 VACC NO PRSV 0.5 ML IM: CPT | Mod: S$GLB,,, | Performed by: FAMILY MEDICINE

## 2018-11-15 PROCEDURE — 82607 VITAMIN B-12: CPT

## 2018-11-15 PROCEDURE — 99214 OFFICE O/P EST MOD 30 MIN: CPT | Mod: 25,S$GLB,, | Performed by: FAMILY MEDICINE

## 2018-11-15 PROCEDURE — 85025 COMPLETE CBC W/AUTO DIFF WBC: CPT

## 2018-11-15 RX ORDER — PROMETHAZINE HYDROCHLORIDE AND DEXTROMETHORPHAN HYDROBROMIDE 6.25; 15 MG/5ML; MG/5ML
5 SYRUP ORAL NIGHTLY PRN
Qty: 118 ML | Refills: 0 | Status: SHIPPED | OUTPATIENT
Start: 2018-11-15 | End: 2018-12-08

## 2018-11-15 NOTE — PROGRESS NOTES
Subjective:       Patient ID: Mariel Becerril is a 63 y.o. female.    Chief Complaint: Numbness    HPI Ms Becerril presents today with numbness in both hands. Every night while laying down both hands become numb. She has to shake hands to get resolution and is fine through the day.   She notes when she is still they go numb.   She has had this problem for many months.     Review of Systems   Constitutional: Negative.    Eyes: Negative.    Respiratory: Negative.    Cardiovascular: Negative.    Neurological: Positive for numbness. Negative for dizziness and light-headedness.         Objective:        Physical Exam   Constitutional: She is oriented to person, place, and time. She appears well-nourished. No distress.   HENT:   Head: Normocephalic and atraumatic.   Right Ear: External ear normal.   Left Ear: External ear normal.   Nose: Nose normal.   Mouth/Throat: Oropharynx is clear and moist.   Eyes: EOM are normal. Pupils are equal, round, and reactive to light. Right eye exhibits no discharge. Left eye exhibits no discharge.   Neck: Normal range of motion. Neck supple. No thyromegaly present.   Cardiovascular: Normal rate, regular rhythm and normal heart sounds.   No murmur heard.  Pulmonary/Chest: Effort normal and breath sounds normal. No respiratory distress. She has no wheezes.   cough   Abdominal: Soft. Bowel sounds are normal. She exhibits no distension. There is no tenderness.   Musculoskeletal: Normal range of motion. She exhibits no edema.   Neurological: She is alert and oriented to person, place, and time. Coordination normal.   Psychiatric: She has a normal mood and affect. Her behavior is normal.   Nursing note and vitals reviewed.      Assessment/Plan:     Numbness in both hands  -     Vitamin B12; Future; Expected date: 11/15/2018  -     CBC auto differential; Future; Expected date: 11/15/2018  -     EMG W/ ULTRASOUND AND NERVE CONDUCTION TEST 2 Extremities; Future; Expected date: 11/16/2018    Cough  -      promethazine-dextromethorphan (PROMETHAZINE-DM) 6.25-15 mg/5 mL Syrp; Take 5 mLs by mouth nightly as needed (cough).  Dispense: 118 mL; Refill: 0    Other orders  -     Influenza - Quadrivalent (3 years & older) (PF)      Sent message to Dr. Faustin in physiatry agrees to schedule EMG.   Phalen's was negative.   Will follow up after studies    Follow-up if symptoms worsen or fail to improve.    Jaclyn Becerril MD  ON   Family Medicine

## 2018-11-19 ENCOUNTER — OFFICE VISIT (OUTPATIENT)
Dept: ORTHOPEDICS | Facility: CLINIC | Age: 63
End: 2018-11-19
Payer: COMMERCIAL

## 2018-11-19 VITALS
SYSTOLIC BLOOD PRESSURE: 133 MMHG | HEART RATE: 108 BPM | WEIGHT: 260 LBS | HEIGHT: 63 IN | BODY MASS INDEX: 46.07 KG/M2 | RESPIRATION RATE: 19 BRPM | DIASTOLIC BLOOD PRESSURE: 73 MMHG

## 2018-11-19 DIAGNOSIS — M25.562 CHRONIC PAIN OF BOTH KNEES: Primary | ICD-10-CM

## 2018-11-19 DIAGNOSIS — G89.29 CHRONIC PAIN OF BOTH KNEES: Primary | ICD-10-CM

## 2018-11-19 DIAGNOSIS — M25.561 CHRONIC PAIN OF BOTH KNEES: Primary | ICD-10-CM

## 2018-11-19 DIAGNOSIS — M17.0 PRIMARY OSTEOARTHRITIS OF BOTH KNEES: ICD-10-CM

## 2018-11-19 PROCEDURE — 99214 OFFICE O/P EST MOD 30 MIN: CPT | Mod: S$GLB,,, | Performed by: ORTHOPAEDIC SURGERY

## 2018-11-19 PROCEDURE — 3008F BODY MASS INDEX DOCD: CPT | Mod: CPTII,S$GLB,, | Performed by: ORTHOPAEDIC SURGERY

## 2018-11-19 PROCEDURE — 99999 PR PBB SHADOW E&M-EST. PATIENT-LVL III: CPT | Mod: PBBFAC,,, | Performed by: ORTHOPAEDIC SURGERY

## 2018-11-19 NOTE — PROGRESS NOTES
Patient ID: Mariel Becerril is a 63 y.o. female.    Chief Complaint: Pain of the Left Knee and Pain of the Right Knee    F/up for b/l knee pain. Had injections. Here today for f/up, has appt with Dr. Shmuel deal for genicular.      Knee Pain    The pain is present in the right knee and left knee. The pain radiates to the right foot and left foot. This is a recurrent problem. The current episode started more than 1 month ago. The problem occurs constantly. The problem has been rapidly worsening. The quality of the pain is described as aching, burning, dull, generalized, sharp and shooting. The pain is at a severity of 9/10. Associated symptoms include joint locking, joint swelling, a limited range of motion and stiffness. Pertinent negatives include no fever or numbness. The symptoms are aggravated by activity, bearing weight, bending, cold, contact, touching, twisting, walking, lifting, rotation and standing. She has tried injection treatment for the symptoms. The treatment provided no relief. Physical therapy was not tried.      Past Medical History:   Diagnosis Date    Frequent headaches     High blood pressure     Hypertension     Sleep apnea      Past Surgical History:   Procedure Laterality Date    BLADDER SUSPENSION      HYSTERECTOMY      partial 2000    tonsilectomy      tonsils       Family History   Problem Relation Age of Onset    Heart attack Father      Social History     Socioeconomic History    Marital status:      Spouse name: Not on file    Number of children: Not on file    Years of education: Not on file    Highest education level: Not on file   Social Needs    Financial resource strain: Not on file    Food insecurity - worry: Not on file    Food insecurity - inability: Not on file    Transportation needs - medical: Not on file    Transportation needs - non-medical: Not on file   Occupational History    Not on file   Tobacco Use    Smoking status: Never Smoker    Smokeless  tobacco: Never Used   Substance and Sexual Activity    Alcohol use: No    Drug use: No    Sexual activity: No     Partners: Male     Birth control/protection: See Surgical Hx   Other Topics Concern    Not on file   Social History Narrative    Not on file        Medication List           Accurate as of 11/19/18  3:56 PM. If you have any questions, ask your nurse or doctor.               CONTINUE taking these medications    amLODIPine 10 MG tablet  Commonly known as:  NORVASC     diclofenac sodium 3 % gel  Commonly known as:  SOLARAZE     furosemide 20 MG tablet  Commonly known as:  LASIX  Take 1 tablet (20 mg total) by mouth daily as needed.     meloxicam 15 MG tablet  Commonly known as:  MOBIC  Take 1 tablet (15 mg total) by mouth once daily.     methocarbamol 500 MG Tab  Commonly known as:  ROBAXIN  Take 1 tablet by mouth twice daily as needed     omeprazole 40 MG capsule  Commonly known as:  PRILOSEC     promethazine-dextromethorphan 6.25-15 mg/5 mL Syrp  Commonly known as:  PROMETHAZINE-DM  Take 5 mLs by mouth nightly as needed (cough).     SUPREP BOWEL PREP KIT 17.5-3.13-1.6 gram Solr  Generic drug:  sodium,potassium,mag sulfates  Take as directed     topiramate 50 MG tablet  Commonly known as:  TOPAMAX     traMADol 50 mg tablet  Commonly known as:  ULTRAM          Review of patient's allergies indicates:   Allergen Reactions    Penicillins Rash     Review of Systems   Constitution: Negative for fever and night sweats.   HENT: Negative for hearing loss.    Eyes: Negative for blurred vision and visual disturbance.   Cardiovascular: Negative for chest pain and leg swelling.   Respiratory: Negative for shortness of breath.    Endocrine: Negative for polyuria.   Hematologic/Lymphatic: Negative for bleeding problem.   Skin: Negative for rash.   Musculoskeletal: Positive for joint pain, joint swelling, muscle cramps, muscle weakness and stiffness. Negative for back pain.   Gastrointestinal: Negative for melena.    Genitourinary: Negative for hematuria.   Neurological: Negative for loss of balance, numbness and paresthesias.   Psychiatric/Behavioral: Negative for altered mental status.       Objective:   Body mass index is 46.06 kg/m².  Vitals:    11/19/18 1401   BP: 133/73   Pulse: 108   Resp: 19       General: Mariel is well-developed, well-nourished, appears stated age, in no acute distress, alert and oriented to time, place and person.       General    Vitals reviewed.  Constitutional: She is oriented to person, place, and time. She appears well-developed and well-nourished. No distress.   HENT:   Mouth/Throat: No oropharyngeal exudate.   Eyes: Right eye exhibits no discharge. Left eye exhibits no discharge.   Neck: Normal range of motion.   Pulmonary/Chest: Effort normal. No respiratory distress.   Neurological: She is alert and oriented to person, place, and time. She has normal reflexes. No cranial nerve deficit. Coordination normal.   Psychiatric: She has a normal mood and affect. Her behavior is normal. Judgment and thought content normal.     General Musculoskeletal Exam   Gait: normal       Right Knee Exam     Inspection   Erythema: absent  Scars: absent  Swelling: absent  Effusion: absent  Deformity: absent  Bruising: absent    Tenderness   The patient is tender to palpation of the medial joint line.    Crepitus   The patient has crepitus of the patella.    Range of Motion   Extension: 0   Flexion: 120     Tests   Meniscus   Celio:  Medial - negative Lateral - negative  Ligament Examination Lachman: normal (-1 to 2mm) PCL-Posterior Drawer: normal (0 to 2mm)     MCL - Valgus: normal (0 to 2mm)  LCL - Varus: normal  Patella   Patellar apprehension: negative  Passive Patellar Tilt: neutral  Patellar Tracking: normal  Patellar Glide (quadrants): Lateral - 1   Medial - 2  Q-Angle at 90 degrees: normal  Patellar Grind: negative    Other   Meniscal Cyst: absent  Popliteal (Baker's) Cyst: absent  Sensation:  normal    Left Knee Exam     Inspection   Erythema: absent  Scars: absent  Swelling: absent  Effusion: absent  Deformity: absent  Bruising: absent    Tenderness   The patient tender to palpation of the medial joint line.    Crepitus   The patient has crepitus of the patella.    Range of Motion   Extension: 0   Flexion: 120     Tests   Meniscus   Celio:  Medial - negative Lateral - negative  Stability Lachman: normal (-1 to 2mm) PCL-Posterior Drawer: normal (0 to 2mm)  MCL - Valgus: normal (0 to 2mm)  LCL - Varus: normal (0 to 2mm)  Patella   Patellar apprehension: negative  Passive Patellar Tilt: neutral  Patellar Tracking: normal  Patellar Glide (Quadrants): Lateral - 1 Medial - 2  Q-Angle at 90 degrees: normal  Patellar Grind: negative    Other   Meniscal Cyst: absent  Popliteal (Baker's) Cyst: absent  Sensation: normal    Right Hip Exam     Tests   Timoteo: negative  Left Hip Exam     Tests   Timoteo: negative          Muscle Strength   Right Lower Extremity   Hip Abduction: 5/5   Quadriceps:  4/5   Hamstrin/5   Left Lower Extremity   Hip Abduction: 5/5   Quadriceps:  4/5   Hamstrin/5     Reflexes     Left Side  Quadriceps:  2+  Achilles:  2+    Right Side   Quadriceps:  2+  Achilles:  2+    Vascular Exam     Right Pulses  Dorsalis Pedis:      2+  Posterior Tibial:      2+        Left Pulses  Dorsalis Pedis:      2+  Posterior Tibial:      2+          Foreign Carey MD 2018       Narrative     EXAMINATION:  XR KNEE ORTHO LEFT WITH FLEXION    CLINICAL HISTORY:  . Pain in left knee    TECHNIQUE:  AP standing of both knees, PA flexion standing views of both knees, and Merchant views of both knees were performed. A lateral of the left knee was also performed.    COMPARISON:  None    FINDINGS:  There is a small suprapatellar joint effusion present on the left.  No acute fractures or dislocations visualized.  Tricompartmental osteoarthritis noted on the left with moderate to severe joint space loss in  the medial tibiofemoral compartment.  Similar but less severe degenerative findings noted involving the right knee.      Impression       As above.      Electronically signed by: Foreign Carey MD  Date: 08/20/2018  Time: 09:43         Assessment:     Encounter Diagnoses   Name Primary?    Chronic pain of both knees Yes    Primary osteoarthritis of both knees         Plan:     1. PT for quad strengthening/Home exercise program      2.  Continue voltaren gel    3. Weight loss-BMI too whitley for TKA. Goal BMI <40    4. Dr. Mi referral for genicular nerve block and/or RFA in the future    5. F/up 6mo

## 2018-11-20 ENCOUNTER — OFFICE VISIT (OUTPATIENT)
Dept: PAIN MEDICINE | Facility: CLINIC | Age: 63
End: 2018-11-20
Payer: COMMERCIAL

## 2018-11-20 VITALS
WEIGHT: 257.94 LBS | HEART RATE: 77 BPM | SYSTOLIC BLOOD PRESSURE: 141 MMHG | HEIGHT: 63 IN | DIASTOLIC BLOOD PRESSURE: 75 MMHG | BODY MASS INDEX: 45.7 KG/M2

## 2018-11-20 DIAGNOSIS — M25.562 CHRONIC PAIN OF BOTH KNEES: Primary | ICD-10-CM

## 2018-11-20 DIAGNOSIS — M25.561 CHRONIC PAIN OF BOTH KNEES: Primary | ICD-10-CM

## 2018-11-20 DIAGNOSIS — M17.0 PRIMARY OSTEOARTHRITIS OF BOTH KNEES: ICD-10-CM

## 2018-11-20 DIAGNOSIS — G89.29 CHRONIC PAIN OF BOTH KNEES: Primary | ICD-10-CM

## 2018-11-20 PROCEDURE — 99204 OFFICE O/P NEW MOD 45 MIN: CPT | Mod: S$GLB,,, | Performed by: ANESTHESIOLOGY

## 2018-11-20 PROCEDURE — 99999 PR PBB SHADOW E&M-EST. PATIENT-LVL III: CPT | Mod: PBBFAC,,, | Performed by: ANESTHESIOLOGY

## 2018-11-20 PROCEDURE — 3008F BODY MASS INDEX DOCD: CPT | Mod: CPTII,S$GLB,, | Performed by: ANESTHESIOLOGY

## 2018-11-20 NOTE — LETTER
November 20, 2018      Angel Mancilla MD  [address] [place] [address]           [facility]  [address] [place] [address]  Phone: [phone]  Fax: [phone]          Patient: Mariel Becerril   MR Number: [id number]   YOB: 1955   Date of Visit: 11/20/2018       Dear Dr. Angel Mancilla:    Thank you for referring Mariel Becerril to me for evaluation. Attached you will find relevant portions of my assessment and plan of care.    If you have questions, please do not hesitate to call me. I look forward to following Mariel Becerril along with you.    Sincerely,    Galen Mi MD    Enclosure  CC:  No Recipients    If you would like to receive this communication electronically, please contact [email or web address] or [phone] to request more information on [employer] Link access.    For providers and/or their staff who would like to refer a patient to [facility], please contact us through our one-stop-shop provider referral line, [facility], at [phone].    If you feel you have received this communication in error or would no longer like to receive these types of communications, please e-mail [email or web address]

## 2018-11-20 NOTE — H&P (VIEW-ONLY)
Chief Pain Complaint:  Knee Pain        History of Present Illness:   Mariel Becerril is a 63 y.o. female  who is presenting with a chief complaint of Knee Pain  . The patient began experiencing this problem insidiously, and the pain has been gradually worsening over the past 12 month(s). The pain is described as throbbing, cramping, aching and heavy and is located in the left knee. Pain is intermittent and lasts hours. The pain radiates to pain is nonradiating. The patient rates her pain a 8 out of ten and interferes with activities of daily living a 8 out of ten. Pain is exacerbated by prolonged standing, ambulation, and is improved by rest. Patient reports no prior trauma, no prior spinal surgery     - pertinent negatives: No fever, No chills, No weight loss, No bladder dysfunction, No bowel dysfunction, No extremity weakness, No saddle anesthesia  - pertinent positives: generalized nonspecific Lower Extremity weakness bilaterally    - medications, other therapies tried (physical therapy, injections):     >> NSAIDs, Tylenol, Tramadol, Norco, Percocet, gabapentin and robaxin    >> Has previously undergone Physical Therapy    >> Has NOT previously undergone spinal injection/s   Multiple IA injections     Imaging / Labs / Studies (reviewed on 11/20/2018):        Review of Systems:  CONSTITUTIONAL: patient denies any fever, chills, or weight loss  SKIN: patient denies any rash or itching  RESPIRATORY: patient denies having any shortness of breath  GASTROINTESTINAL: patient denies having any diarrhea, constipation, or bowel incontinence  GENITOURINARY: patient denies having any abnormal bladder function    MUSCULOSKELETAL:  - patient complains of the above noted pain/s (see chief pain complaint)    NEUROLOGICAL:   - pain as above  - strength in Lower extremities is intact, BILATERALLY  - sensation in Lower extremities is intact, BILATERALLY  - patient denies any loss of bowel or bladder control      PSYCHIATRIC: patient  "denies any change in mood    Other:  All other systems reviewed and are negative    TTP over left medial knee joint  No effusion   ROM preserved       Physical Exam:  BP (!) 141/75 (BP Location: Right arm, Patient Position: Sitting)   Pulse 77   Ht 5' 3" (1.6 m)   Wt 117 kg (257 lb 15 oz)   BMI 45.69 kg/m²  (reviewed on 11/20/2018)  General: Alert and oriented, in no apparent distress.  Gait: normal gait.  Skin: No rashes, No discoloration, No obvious lesions  HEENT: Normocephalic, atraumatic. Pupils equal and round.  Cardiovascular: Regular rate and rhythm , no significant peripheral edema present  Respiratory: Without audible wheezing, without use of accessory muscles of respiration.    Musculoskeletal:  Lumbar Spine    - Pain on flexion of lumbar spine Absent  - Straight Leg Raise:  Absent    - Pain on extension of lumbar spine Absent  - TTP over the lumbar facet joints Absent  - Lumbar facet loading Absent    -Pain on palpation over the SI joint  Absent  - JEFF: Absent      Neuro:    Strength:  LE R/L: HF: 5/5, HE: 5/5, KF: 5/5; KE: 5/5; FE: 5/5; FF: 5/5    Extremity Reflexes: Brisk and symmetric throughout.      Extremity Sensory: Sensation to pinprick and temperature symmetric. Proprioception intact.      Psych:  Mood and affect is appropriate      Assessment:    Mariel Becerril is a 63 y.o. year old female who is presenting with     Encounter Diagnoses   Name Primary?    Primary osteoarthritis of both knees     Chronic pain of both knees Yes       Plan:    1. Interventional: Schedule patient for left genicular nerve block.     2. Pharmacologic: Tylenol, NSAID's PRN. Voltaren gel.     3. Rehabilitative: PT post injection.    4. Diagnostic: None for now.    5. Follow up: Follow-up for After Injection.      20 minutes were spent in this encounter with more than 50% of the time used for counseling and review of the plan.  Imaging / studies reviewed, detailed above.  I discussed in detail the risks, benefits, " and alternatives to any and all potential treatment options.  All questions and concerns were fully addressed today in clinic. Medical decision making moderate.    Thank you for the opportunity to assist in the care of this patient.    Best wishes,    Signed:    Galen Mi MD          Disclaimer:  This note may have been prepared using voice recognition software, it may have not been extensively proofed, as such there could be errors within the text such as sound alike errors.

## 2018-11-21 ENCOUNTER — PATIENT MESSAGE (OUTPATIENT)
Dept: INTERNAL MEDICINE | Facility: CLINIC | Age: 63
End: 2018-11-21

## 2018-11-21 ENCOUNTER — TELEPHONE (OUTPATIENT)
Dept: INTERNAL MEDICINE | Facility: CLINIC | Age: 63
End: 2018-11-21

## 2018-11-21 NOTE — TELEPHONE ENCOUNTER
----- Message from Jaclyn Becerril MD sent at 11/20/2018  7:50 AM CST -----  B12 normal  Borderline anemia/low hemoglobin.   Will follow up after EMG evaluation

## 2018-11-26 ENCOUNTER — PATIENT MESSAGE (OUTPATIENT)
Dept: INTERNAL MEDICINE | Facility: CLINIC | Age: 63
End: 2018-11-26

## 2018-11-26 ENCOUNTER — PATIENT MESSAGE (OUTPATIENT)
Dept: CARDIOLOGY | Facility: HOSPITAL | Age: 63
End: 2018-11-26

## 2018-11-26 DIAGNOSIS — R06.09 DYSPNEA ON EXERTION: ICD-10-CM

## 2018-11-26 DIAGNOSIS — R07.89 OTHER CHEST PAIN: ICD-10-CM

## 2018-11-26 DIAGNOSIS — R06.09 DOE (DYSPNEA ON EXERTION): ICD-10-CM

## 2018-11-28 ENCOUNTER — PATIENT MESSAGE (OUTPATIENT)
Dept: ORTHOPEDICS | Facility: CLINIC | Age: 63
End: 2018-11-28

## 2018-11-28 RX ORDER — AMLODIPINE BESYLATE 10 MG/1
10 TABLET ORAL DAILY
Qty: 90 TABLET | Refills: 3 | Status: SHIPPED | OUTPATIENT
Start: 2018-11-28 | End: 2019-12-12 | Stop reason: SDUPTHER

## 2018-11-28 RX ORDER — FUROSEMIDE 20 MG/1
20 TABLET ORAL DAILY PRN
Qty: 90 TABLET | Refills: 3 | Status: SHIPPED | OUTPATIENT
Start: 2018-11-28 | End: 2019-05-16 | Stop reason: SDUPTHER

## 2018-11-28 RX ORDER — TOPIRAMATE 50 MG/1
50 TABLET, FILM COATED ORAL DAILY
Qty: 30 TABLET | Refills: 3 | Status: SHIPPED | OUTPATIENT
Start: 2018-11-28 | End: 2019-02-01 | Stop reason: SDUPTHER

## 2018-11-28 RX ORDER — OMEPRAZOLE 40 MG/1
40 CAPSULE, DELAYED RELEASE ORAL DAILY
Qty: 30 CAPSULE | Refills: 3 | Status: SHIPPED | OUTPATIENT
Start: 2018-11-28 | End: 2019-05-01 | Stop reason: SDUPTHER

## 2018-11-28 RX ORDER — DICLOFENAC SODIUM 10 MG/G
4 GEL TOPICAL 3 TIMES DAILY PRN
Qty: 2 TUBE | Refills: 0 | Status: SHIPPED | OUTPATIENT
Start: 2018-11-28 | End: 2019-12-05 | Stop reason: SDUPTHER

## 2018-12-02 ENCOUNTER — PATIENT MESSAGE (OUTPATIENT)
Dept: CARDIOLOGY | Facility: HOSPITAL | Age: 63
End: 2018-12-02

## 2018-12-03 ENCOUNTER — HOSPITAL ENCOUNTER (OUTPATIENT)
Facility: HOSPITAL | Age: 63
Discharge: HOME | End: 2018-12-03
Attending: INTERNAL MEDICINE | Admitting: INTERNAL MEDICINE
Payer: COMMERCIAL

## 2018-12-03 ENCOUNTER — ANESTHESIA EVENT (OUTPATIENT)
Dept: ENDOSCOPY | Facility: HOSPITAL | Age: 63
End: 2018-12-03
Payer: COMMERCIAL

## 2018-12-03 ENCOUNTER — ANESTHESIA (OUTPATIENT)
Dept: ENDOSCOPY | Facility: HOSPITAL | Age: 63
End: 2018-12-03
Payer: COMMERCIAL

## 2018-12-03 DIAGNOSIS — Z12.11 COLON CANCER SCREENING: Primary | ICD-10-CM

## 2018-12-03 PROCEDURE — 27201012 HC FORCEPS, HOT/COLD, DISP: Performed by: INTERNAL MEDICINE

## 2018-12-03 PROCEDURE — 88305 TISSUE EXAM BY PATHOLOGIST: CPT | Mod: 26,,, | Performed by: PATHOLOGY

## 2018-12-03 PROCEDURE — 37000008 HC ANESTHESIA 1ST 15 MINUTES: Performed by: INTERNAL MEDICINE

## 2018-12-03 PROCEDURE — 25000003 PHARM REV CODE 250: Performed by: NURSE ANESTHETIST, CERTIFIED REGISTERED

## 2018-12-03 PROCEDURE — 45380 COLONOSCOPY AND BIOPSY: CPT | Performed by: INTERNAL MEDICINE

## 2018-12-03 PROCEDURE — 88305 TISSUE EXAM BY PATHOLOGIST: CPT | Performed by: PATHOLOGY

## 2018-12-03 PROCEDURE — 37000009 HC ANESTHESIA EA ADD 15 MINS: Performed by: INTERNAL MEDICINE

## 2018-12-03 PROCEDURE — 45380 COLONOSCOPY AND BIOPSY: CPT | Mod: 33,,, | Performed by: INTERNAL MEDICINE

## 2018-12-03 PROCEDURE — 25000003 PHARM REV CODE 250: Performed by: INTERNAL MEDICINE

## 2018-12-03 PROCEDURE — 63600175 PHARM REV CODE 636 W HCPCS: Performed by: NURSE ANESTHETIST, CERTIFIED REGISTERED

## 2018-12-03 RX ORDER — SODIUM CHLORIDE 0.9 % (FLUSH) 0.9 %
3 SYRINGE (ML) INJECTION
Status: DISCONTINUED | OUTPATIENT
Start: 2018-12-03 | End: 2018-12-03 | Stop reason: HOSPADM

## 2018-12-03 RX ORDER — PROPOFOL 10 MG/ML
INJECTION, EMULSION INTRAVENOUS
Status: DISCONTINUED | OUTPATIENT
Start: 2018-12-03 | End: 2018-12-03

## 2018-12-03 RX ORDER — SODIUM CHLORIDE, SODIUM LACTATE, POTASSIUM CHLORIDE, CALCIUM CHLORIDE 600; 310; 30; 20 MG/100ML; MG/100ML; MG/100ML; MG/100ML
INJECTION, SOLUTION INTRAVENOUS CONTINUOUS
Status: DISCONTINUED | OUTPATIENT
Start: 2018-12-03 | End: 2018-12-03 | Stop reason: HOSPADM

## 2018-12-03 RX ORDER — SODIUM CHLORIDE, SODIUM LACTATE, POTASSIUM CHLORIDE, CALCIUM CHLORIDE 600; 310; 30; 20 MG/100ML; MG/100ML; MG/100ML; MG/100ML
INJECTION, SOLUTION INTRAVENOUS CONTINUOUS PRN
Status: DISCONTINUED | OUTPATIENT
Start: 2018-12-03 | End: 2018-12-03

## 2018-12-03 RX ORDER — LIDOCAINE HCL/PF 100 MG/5ML
SYRINGE (ML) INTRAVENOUS
Status: DISCONTINUED | OUTPATIENT
Start: 2018-12-03 | End: 2018-12-03

## 2018-12-03 RX ADMIN — PROPOFOL 30 MG: 10 INJECTION, EMULSION INTRAVENOUS at 09:12

## 2018-12-03 RX ADMIN — LIDOCAINE HYDROCHLORIDE 50 MG: 20 INJECTION, SOLUTION INTRAVENOUS at 09:12

## 2018-12-03 RX ADMIN — PROPOFOL 80 MG: 10 INJECTION, EMULSION INTRAVENOUS at 09:12

## 2018-12-03 RX ADMIN — PROPOFOL 30 MG: 10 INJECTION, EMULSION INTRAVENOUS at 10:12

## 2018-12-03 RX ADMIN — SODIUM CHLORIDE, SODIUM LACTATE, POTASSIUM CHLORIDE, AND CALCIUM CHLORIDE: .6; .31; .03; .02 INJECTION, SOLUTION INTRAVENOUS at 09:12

## 2018-12-03 RX ADMIN — SODIUM CHLORIDE, SODIUM LACTATE, POTASSIUM CHLORIDE, AND CALCIUM CHLORIDE: 600; 310; 30; 20 INJECTION, SOLUTION INTRAVENOUS at 09:12

## 2018-12-03 NOTE — [employer] PATIENT INSTRUCTIONS
Discharge Summary/Instructions after an Endoscopic Procedure  Patient Name: Mariel Becerril  Patient MRN: [id number]  Patient YOB: 1955 Monday, December 03, 2018 Mari Rader MD  RESTRICTIONS:  During your procedure today, you received medications for sedation.  These   medications may affect your judgment, balance and coordination.  Therefore,   for 24 hours, you have the following restrictions:   - DO NOT drive a car, operate machinery, make legal/financial decisions,   sign important papers or drink alcohol.    ACTIVITY:  Today: no heavy lifting, straining or running due to procedural   sedation/anesthesia.  The following day: return to full activity including work.  DIET:  Eat and drink normally unless instructed otherwise.     TREATMENT FOR COMMON SIDE EFFECTS:  - Mild abdominal pain, nausea, belching, bloating or excessive gas:  rest,   eat lightly and use a heating pad.  - Sore Throat: treat with throat lozenges and/or gargle with warm salt   water.  - Because air was used during the procedure, expelling large amounts of air   from your rectum or belching is normal.  - If a bowel prep was taken, you may not have a bowel movement for 1-3 days.    This is normal.  SYMPTOMS TO WATCH FOR AND REPORT TO YOUR PHYSICIAN:  1. Abdominal pain or bloating, other than gas cramps.  2. Chest pain.  3. Back pain.  4. Signs of infection such as: chills or fever occurring within 24 hours   after the procedure.  5. Rectal bleeding, which would show as bright red, maroon, or black stools.   (A tablespoon of blood from the rectum is not serious, especially if   hemorrhoids are present.)  6. Vomiting.  7. Weakness or dizziness.  GO DIRECTLY TO THE NEAREST EMERGENCY ROOM IF YOU HAVE ANY OF THE FOLLOWING:      Difficulty breathing              Chills and/or fever over 101 F   Persistent vomiting and/or vomiting blood   Severe abdominal pain   Severe chest pain   Black, tarry stools   Bleeding- more than one tablespoon   Any  other symptom or condition that you feel may need urgent attention  Your doctor recommends these additional instructions:  If any biopsies were taken, your doctors clinic will contact you in 1 to 2   weeks with any results.  - Patient has a contact number available for emergencies.  The signs and   symptoms of potential delayed complications were discussed with the   patient.  Return to normal activities tomorrow.  Written discharge   instructions were provided to the patient.   - Resume previous diet.   - Continue present medications.   - Await pathology results.   - Repeat colonoscopy in 5 years for surveillance.   - Return to referring physician PRN.   - No aspirin, ibuprofen, naproxen, or other non-steroidal anti-inflammatory   drugs for 5 days after biopsy.   - Discharge patient to home.  For questions, problems or results please call your physician Mari Rader MD at Work:  [phone]  If you have any questions about the above instructions, call the GI   department at [phone] or call the endoscopy unit at [phone]   from 7am until 3 pm.  [facility] [place], EMERGENCY ROOM PHONE NUMBER:   [phone]  IF A COMPLICATION OR EMERGENCY SITUATION ARISES AND YOU ARE UNABLE TO REACH   YOUR PHYSICIAN - GO DIRECTLY TO THE EMERGENCY ROOM.  I have read or have had read to me these discharge instructions for my   procedure and have received a written copy.  I understand these   instructions and will follow-up with my physician if I have any questions.     __________________________________       _____________________________________  Nurse Signature                                          Patient/Designated   Responsible Party Signature  Mari Rader MD  12/3/2018 10:19:22 AM  This report has been verified and signed electronically.  [employer]

## 2018-12-03 NOTE — H&P
Short Stay Endoscopy History and Physical    PCP - Jaclyn Becerril MD    Procedure -Colonoscopy    Patient cannot remember her last colonoscopy, but says it was a long time tony. She denies a FH of colon cancer. No acute issues.    ROS:  Constitutional: No fevers, chills, No weight loss  ENT: No allergies  CV: No chest pain  Pulm: No cough, No shortness of breath  Ophtho: No vision changes  GI: see HPI  Derm: No rash  Heme: No lymphadenopathy, No bruising  MSK: No arthritis  : No dysuria, No hematuria  Endo: No hot or cold intolerance  Neuro: No syncope, No seizure  Psych: No anxiety, No depression    Medical History:  has a past medical history of Frequent headaches, High blood pressure, Hypertension, and Sleep apnea.    Surgical History:  has a past surgical history that includes Bladder suspension; tonsilectomy; Hysterectomy; and tonsils.    Family History: family history includes Heart attack in her father.. Otherwise no colon cancer, inflammatory bowel disease, or GI malignancies.    Social History:  reports that  has never smoked. she has never used smokeless tobacco. She reports that she does not drink alcohol or use drugs.    Review of patient's allergies indicates:   Allergen Reactions    Penicillins Rash       Medications:   Medications Prior to Admission   Medication Sig Dispense Refill Last Dose    amLODIPine (NORVASC) 10 MG tablet Take 1 tablet (10 mg total) by mouth once daily. 90 tablet 3 12/2/2018 at Unknown time    diclofenac sodium (SOLARAZE) 3 % gel APPLY 2 GRAMS TO AFFECTED AREA 2 TIMES DAILY  11 12/3/2018 at Unknown time    diclofenac sodium (VOLTAREN) 1 % Gel Apply 4 grams topically 3 (three) times daily as needed. 2 Tube 0 Past Week at Unknown time    furosemide (LASIX) 20 MG tablet Take 1 tablet (20 mg total) by mouth daily as needed. 90 tablet 3 12/2/2018 at Unknown time    meloxicam (MOBIC) 15 MG tablet Take 1 tablet (15 mg total) by mouth once daily. 30 tablet 2 12/2/2018 at Unknown  time    methocarbamol (ROBAXIN) 500 MG Tab Take 1 tablet by mouth twice daily as needed 60 tablet 2 12/2/2018 at Unknown time    omeprazole (PRILOSEC) 40 MG capsule Take 1 capsule (40 mg total) by mouth once daily. 30 capsule 3 12/2/2018 at Unknown time    promethazine-dextromethorphan (PROMETHAZINE-DM) 6.25-15 mg/5 mL Syrp Take 5 mLs by mouth nightly as needed (cough). 118 mL 0 Past Month at Unknown time    topiramate (TOPAMAX) 50 MG tablet Take 1 tablet (50 mg total) by mouth once daily. 30 tablet 3 12/2/2018 at Unknown time    tramadol (ULTRAM) 50 mg tablet Take 50 mg by mouth 2 (two) times daily as needed.   5 12/2/2018 at Unknown time       Objective Findings:    Vital Signs: There were no vitals filed for this visit.      Physical Exam:  General Appearance: Well appearing in no acute distress  Eyes:    No scleral icterus  ENT: Neck supple, Lips, mucosa, and tongue normal; teeth and gums normal  Lungs: CTA bilaterally in anterior and posterior fields, no wheezes, no crackles.  Heart:  Regular rate, S1, S2 normal, no murmurs heard.  Abdomen: Soft, non tender, non distended with normal bowel sounds. No hepatosplenomegaly, ascites, or mass.  Extremities: No clubbing, cyanosis or edema  Skin: No rash    Labs:  Lab Results   Component Value Date    WBC 8.72 11/15/2018    HGB 11.6 (L) 11/15/2018    HCT 40.3 11/15/2018     11/15/2018    CHOL 124 09/14/2018    TRIG 60 09/14/2018    HDL 48 09/14/2018    ALT 30 09/14/2018    AST 29 09/14/2018     09/14/2018    K 3.5 09/14/2018     09/14/2018    CREATININE 0.8 09/14/2018    BUN 10 09/14/2018    CO2 25 09/14/2018       I have explained the risks and benefits of endoscopy procedures to the patient including but not limited to bleeding, perforation, infection, and death.      Proceed with colon cancer screening.

## 2018-12-03 NOTE — PLAN OF CARE
Dr Rader came to bedside and discussed findings. NO N/V,  no abdominal pain, no GI bleeding, and vitals stable.  Pt discharged from unit.

## 2018-12-03 NOTE — ANESTHESIA POSTPROCEDURE EVALUATION
"Anesthesia Post Evaluation    Patient: Mariel Becerril    Procedure(s) Performed: Procedure(s) (LRB):  COLONOSCOPY (N/A)    Final Anesthesia Type: MAC  Patient location during evaluation: PACU  Patient participation: Yes- Able to Participate  Level of consciousness: awake, awake and alert and oriented  Post-procedure vital signs: reviewed and stable  Pain management: adequate  Airway patency: patent  PONV status at discharge: No PONV  Anesthetic complications: no      Cardiovascular status: blood pressure returned to baseline  Respiratory status: spontaneous ventilation, unassisted and room air  Hydration status: euvolemic  Follow-up not needed.        Visit Vitals  /87 (BP Location: Left arm, Patient Position: Lying)   Pulse 95   Temp 37.3 °C (99.1 °F) (Oral)   Resp 18   Ht 5' 3" (1.6 m)   Wt 114.3 kg (252 lb)   SpO2 100%   Breastfeeding? No   BMI 44.64 kg/m²       Pain/Loren Score: Pain Assessment Performed: Yes (12/3/2018  8:45 AM)  Presence of Pain: complains of pain/discomfort (12/3/2018  8:45 AM)        "

## 2018-12-03 NOTE — OR NURSING
Pt adequately sedated.  Final time out done and agreed by all staff.  See ANESTHESIA records for all medications and vital signs.

## 2018-12-03 NOTE — ANESTHESIA PREPROCEDURE EVALUATION
12/03/2018  Mariel Becerril is a 63 y.o., female.    Pre-op Assessment    I have reviewed the Patient Summary Reports.     I have reviewed the Nursing Notes.   I have reviewed the Medications.     Review of Systems  Anesthesia Hx:  Denies Family Hx of Anesthesia complications.   Denies Personal Hx of Anesthesia complications.   Cardiovascular:   Hypertension AREVALO   CONCLUSIONS     1 - Concentric hypertrophy.     2 - No wall motion abnormalities.     3 - Normal left ventricular systolic function (EF 60-65%).     4 - Normal left ventricular diastolic function.     5 - Normal right ventricular systolic function .     6 - The estimated PA systolic pressure is 27 mmHg.             This document has been electronically    SIGNED BY: Adriana Matson MD On: 09/05/2018 09:29     Ref Range      Pulmonary:   Shortness of breath Sleep Apnea    Musculoskeletal:   Arthritis     Neurological:   Neuromuscular Disease, Headaches           Anesthesia Plan  Type of Anesthesia, risks & benefits discussed:  Anesthesia Type:  MAC  Patient's Preference:   Intra-op Monitoring Plan:   Intra-op Monitoring Plan Comments:   Post Op Pain Control Plan:   Post Op Pain Control Plan Comments:   Induction:   IV  Beta Blocker:  Patient is not currently on a Beta-Blocker (No further documentation required).       Informed Consent: Patient understands risks and agrees with Anesthesia plan.  Questions answered.   ASA Score: 2     Day of Surgery Review of History & Physical: I have interviewed and examined the patient. I have reviewed the patient's H&P dated:  There are no significant changes.

## 2018-12-03 NOTE — TRANSFER OF CARE
"Anesthesia Transfer of Care Note    Patient: Mariel Becerril    Procedure(s) Performed: Procedure(s) (LRB):  COLONOSCOPY (N/A)    Patient location: PACU    Anesthesia Type: MAC    Transport from OR: Transported from OR on room air with adequate spontaneous ventilation    Post pain: adequate analgesia    Post assessment: no apparent anesthetic complications    Post vital signs: stable    Level of consciousness: responds to stimulation    Nausea/Vomiting: no nausea/vomiting    Complications: none    Transfer of care protocol was followed      Last vitals:   Visit Vitals  /87 (BP Location: Left arm, Patient Position: Lying)   Pulse 95   Temp 37.3 °C (99.1 °F) (Oral)   Resp 18   Ht 5' 3" (1.6 m)   Wt 114.3 kg (252 lb)   SpO2 100%   Breastfeeding? No   BMI 44.64 kg/m²     "

## 2018-12-03 NOTE — DISCHARGE INSTRUCTIONS

## 2018-12-03 NOTE — ANESTHESIA RELEASE NOTE
"Anesthesia Release from PACU Note    Patient: Mariel Becerril    Procedure(s) Performed: Procedure(s) (LRB):  COLONOSCOPY (N/A)    Anesthesia type: MAC    Post pain: Adequate analgesia    Post assessment: no apparent anesthetic complications, tolerated procedure well and no evidence of recall    Last Vitals:   Visit Vitals  /87 (BP Location: Left arm, Patient Position: Lying)   Pulse 95   Temp 37.3 °C (99.1 °F) (Oral)   Resp 18   Ht 5' 3" (1.6 m)   Wt 114.3 kg (252 lb)   SpO2 100%   Breastfeeding? No   BMI 44.64 kg/m²       Post vital signs: stable    Level of consciousness: responds to stimulation    Nausea/Vomiting: no nausea/no vomiting    Complications: none    Airway Patency: patent    Respiratory: unassisted    Cardiovascular: stable and blood pressure at baseline    Hydration: euvolemic       "

## 2018-12-04 ENCOUNTER — PATIENT MESSAGE (OUTPATIENT)
Dept: PAIN MEDICINE | Facility: CLINIC | Age: 63
End: 2018-12-04

## 2018-12-04 VITALS
WEIGHT: 252 LBS | TEMPERATURE: 99 F | HEIGHT: 63 IN | RESPIRATION RATE: 17 BRPM | SYSTOLIC BLOOD PRESSURE: 117 MMHG | HEART RATE: 76 BPM | DIASTOLIC BLOOD PRESSURE: 75 MMHG | BODY MASS INDEX: 44.65 KG/M2 | OXYGEN SATURATION: 99 %

## 2018-12-05 ENCOUNTER — LAB VISIT (OUTPATIENT)
Dept: LAB | Facility: HOSPITAL | Age: 63
End: 2018-12-05
Attending: NURSE PRACTITIONER
Payer: COMMERCIAL

## 2018-12-05 ENCOUNTER — OFFICE VISIT (OUTPATIENT)
Dept: INTERNAL MEDICINE | Facility: CLINIC | Age: 63
End: 2018-12-05
Payer: COMMERCIAL

## 2018-12-05 VITALS
DIASTOLIC BLOOD PRESSURE: 80 MMHG | WEIGHT: 255.5 LBS | HEIGHT: 63 IN | SYSTOLIC BLOOD PRESSURE: 150 MMHG | HEART RATE: 94 BPM | OXYGEN SATURATION: 98 % | TEMPERATURE: 98 F | BODY MASS INDEX: 45.27 KG/M2

## 2018-12-05 DIAGNOSIS — B37.9 YEAST INFECTION: Primary | ICD-10-CM

## 2018-12-05 DIAGNOSIS — J06.9 UPPER RESPIRATORY TRACT INFECTION, UNSPECIFIED TYPE: ICD-10-CM

## 2018-12-05 DIAGNOSIS — R53.83 FATIGUE, UNSPECIFIED TYPE: ICD-10-CM

## 2018-12-05 DIAGNOSIS — R11.0 NAUSEA: ICD-10-CM

## 2018-12-05 LAB — 25(OH)D3+25(OH)D2 SERPL-MCNC: 23 NG/ML

## 2018-12-05 PROCEDURE — 99999 PR PBB SHADOW E&M-EST. PATIENT-LVL III: CPT | Mod: PBBFAC,,, | Performed by: NURSE PRACTITIONER

## 2018-12-05 PROCEDURE — 3008F BODY MASS INDEX DOCD: CPT | Mod: CPTII,S$GLB,, | Performed by: NURSE PRACTITIONER

## 2018-12-05 PROCEDURE — 82306 VITAMIN D 25 HYDROXY: CPT

## 2018-12-05 PROCEDURE — 36415 COLL VENOUS BLD VENIPUNCTURE: CPT

## 2018-12-05 PROCEDURE — 99213 OFFICE O/P EST LOW 20 MIN: CPT | Mod: S$GLB,,, | Performed by: NURSE PRACTITIONER

## 2018-12-05 RX ORDER — FLUCONAZOLE 150 MG/1
150 TABLET ORAL DAILY
Qty: 1 TABLET | Refills: 0 | Status: SHIPPED | OUTPATIENT
Start: 2018-12-05 | End: 2018-12-06

## 2018-12-05 RX ORDER — ONDANSETRON 4 MG/1
4 TABLET, ORALLY DISINTEGRATING ORAL EVERY 8 HOURS PRN
Qty: 10 TABLET | Refills: 0 | Status: SHIPPED | OUTPATIENT
Start: 2018-12-05 | End: 2021-10-27

## 2018-12-05 NOTE — PROGRESS NOTES
Subjective:       Patient ID: Mariel Becerril is a 63 y.o. female.    Chief Complaint: possible yeast infection; Generalized Body Aches; and Fatigue    Patient presents with several concerns. She suspects she has a yeast infection. She has had white vaginal d/c x1 day with some itching. Denies recent abx use.  She also reports some fatigue with URI symptoms.       Review of Systems   Constitutional: Positive for fatigue. Negative for chills, diaphoresis, fever and unexpected weight change.   HENT: Positive for rhinorrhea. Negative for congestion, ear pain, postnasal drip, sinus pressure, sinus pain, sneezing and sore throat.    Eyes: Negative for discharge and itching.   Respiratory: Positive for shortness of breath (occasional). Negative for cough.    Cardiovascular: Negative for chest pain, palpitations and leg swelling.   Gastrointestinal: Positive for nausea. Negative for diarrhea and vomiting.   Musculoskeletal: Positive for arthralgias, gait problem (uses crutch due to leg pain, followed by Dr. Mi) and myalgias.   Skin: Negative for wound.       Objective:      Physical Exam   Constitutional: She appears well-developed and well-nourished. No distress.   HENT:   Head: Normocephalic and atraumatic.   Eyes: Conjunctivae are normal. Pupils are equal, round, and reactive to light.   Cardiovascular: Normal rate and regular rhythm.   Pulmonary/Chest: Effort normal and breath sounds normal. No stridor. No respiratory distress. She has no wheezes. She has no rales. She exhibits no tenderness.   Neurological: She is alert.   Skin: She is not diaphoretic.   Psychiatric: She exhibits a depressed mood.   Vitals reviewed.      Assessment:       1. Yeast infection    2. Nausea    3. Upper respiratory tract infection, unspecified type    4. Fatigue, unspecified type        Plan:   Yeast infection  -     fluconazole (DIFLUCAN) 150 MG Tab; Take 1 tablet (150 mg total) by mouth once daily. for 1 day  Dispense: 1 tablet;  Refill: 0    Nausea  -     ondansetron (ZOFRAN-ODT) 4 MG TbDL; Take 1 tablet (4 mg total) by mouth every 8 (eight) hours as needed.  Dispense: 10 tablet; Refill: 0    Upper respiratory tract infection, unspecified type    Fatigue, unspecified type  -     Vitamin D; Future; Expected date: 12/05/2018      Symptom management for URI symptoms.   Follow-up if symptoms worsen or fail to improve, for keep routine follow up.

## 2018-12-06 ENCOUNTER — TELEPHONE (OUTPATIENT)
Dept: INTERNAL MEDICINE | Facility: CLINIC | Age: 63
End: 2018-12-06

## 2018-12-06 NOTE — TELEPHONE ENCOUNTER
----- Message from Jaclyn Becerril MD sent at 12/5/2018  9:45 PM CST -----  Vitamin D is still low at 23 and low normal is 30.   OTC 2000 IU should be fine

## 2018-12-07 ENCOUNTER — OFFICE VISIT (OUTPATIENT)
Dept: PHYSICAL MEDICINE AND REHAB | Facility: CLINIC | Age: 63
End: 2018-12-07
Payer: COMMERCIAL

## 2018-12-07 VITALS
SYSTOLIC BLOOD PRESSURE: 134 MMHG | BODY MASS INDEX: 45.18 KG/M2 | HEIGHT: 63 IN | HEART RATE: 108 BPM | RESPIRATION RATE: 12 BRPM | DIASTOLIC BLOOD PRESSURE: 76 MMHG | WEIGHT: 255 LBS

## 2018-12-07 DIAGNOSIS — G56.03 BILATERAL CARPAL TUNNEL SYNDROME: ICD-10-CM

## 2018-12-07 PROCEDURE — 95911 NRV CNDJ TEST 9-10 STUDIES: CPT | Mod: S$GLB,,, | Performed by: PHYSICAL MEDICINE & REHABILITATION

## 2018-12-07 PROCEDURE — 3008F BODY MASS INDEX DOCD: CPT | Mod: CPTII,S$GLB,, | Performed by: PHYSICAL MEDICINE & REHABILITATION

## 2018-12-07 PROCEDURE — 99204 OFFICE O/P NEW MOD 45 MIN: CPT | Mod: 25,S$GLB,, | Performed by: PHYSICAL MEDICINE & REHABILITATION

## 2018-12-07 PROCEDURE — 99999 PR PBB SHADOW E&M-EST. PATIENT-LVL III: CPT | Mod: PBBFAC,,, | Performed by: PHYSICAL MEDICINE & REHABILITATION

## 2018-12-07 NOTE — LETTER
December 7, 2018      Jaclyn Becerril MD  [address] [place] [address] [address] [place] [address]           [facility] - Physiatry  [address] [facility] [place] [address]  Phone: [phone]  Fax: [phone]          Patient: Mariel Becerril   MR Number: [id number]   YOB: 1955   Date of Visit: 12/7/2018       Dear Dr. Jaclyn Becerril:    Thank you for referring Mariel Becerril to me for evaluation. Attached you will find relevant portions of my assessment and plan of care.    If you have questions, please do not hesitate to call me. I look forward to following Mariel Becerril along with you.    Sincerely,    Kerry Faustin MD    Enclosure  CC:  No Recipients    If you would like to receive this communication electronically, please contact [email or web address] or [phone] to request more information on [employer] access.    For providers and/or their staff who would like to refer a patient to [facility], please contact us through our one-stop-shop provider referral line, [facility], at [phone].    If you feel you have received this communication in error or would no longer like to receive these types of communications, please e-mail [email or web address]

## 2018-12-07 NOTE — PATIENT INSTRUCTIONS
Carpal Tunnel Syndrome    Carpal tunnel syndrome is a painful condition of the wrist and arm. It is caused by pressure on the median nerve.  The median nerve is one of the nerves that give feeling and movement to the hand. It passes through a tunnel in the wrist called the carpal tunnel. This tunnel is made up of bones and ligaments. Narrowing of this tunnel or swelling of the tissues inside the tunnel puts pressure on the median nerve. This causes numbness, pins and needles, or electric shooting pains in your hand and forearm. Often the pain is worse at night and may wake you when you are asleep.  Carpal tunnel syndrome may occur during pregnancy and with use of birth control pills. It is more common in workers who must often bend their wrists. It is also common in people who work with power tools that cause strong vibrations.  Home care  · Rest the painful wrist. Avoid repeated bending of the wrist back and forth. This puts pressure on the median nerve. Avoid using power tools with strong vibrations.  · If you were given a splint, wear it at night while you sleep. You may also wear it during the day for comfort.  · Move your fingers and wrists often to avoid stiffness.  · Elevate your arms on pillows when you lie down.  · Try using the unaffected hand more.  · Try not to hold your wrists in a bent, downward position.  · Sometimes changes in the work place may ease symptoms. If you type most of the day, it may help to change the position of your keyboard or add a wrist support. Your wrist should be in a neutral position and not bent back when typing.  · You may use over-the-counter pain medicine to treat pain and inflammation, unless another medicine was prescribed. Anti-inflammatory pain medicines, such as ibuprofen or naproxen may be more effective than acetaminophen, which treats pain, but not inflammation. If you have chronic liver or kidney disease or ever had a stomach ulcer or GI bleeding, talk with your  doctor before using these medicines.  · Opioid pain medicine will only give temporary relief and does not treat the problem. If pain continues, you may need a shot of a steroid drug into your wrist.  · If the above methods fail, you may need surgery. This will open the carpal tunnel and release the pressure on the trapped nerve.  Follow-up care  Follow up with your healthcare provider, or as advised, if the pain doesnt begin to improve within the next week.  If X-rays were taken, you will be notified of any new findings that may affect your care.  When to seek medical advice  Call your healthcare provider right away if any of these occur:  · Pain not improving with the above treatment  · Fingers or hand become cold, blue, numb, or tingly  · Your whole arm becomes swollen or weak  Date Last Reviewed: 11/23/2015  © 7614-4976 [employer]. [address] [place] [address]. All rights reserved. This information is not intended as a substitute for professional medical care. Always follow your healthcare professional's instructions.        Carpal Tunnel Syndrome Prevention Tips  Some repetitive hand activities put you at higher risk for carpal tunnel syndrome (CTS). But you can reduce your risk. Learn how to change the way you use your hands. Below are tips for at home and on the job. Be sure to also follow the hand and wrist safety policies at your workplace.      Keep your wrist in a neutral (straight) position when exercising.      Keep your wrist in neutral  Keep a neutral (straight) wrist position as often as you can. Dont use your wrist in a bent (flexed) position for long periods of time. This includes extended or twisted positions.  Watch your   Dont just use your thumb and index finger to grasp or lift. This can put stress on your wrist. When you can, use your whole hand and all its fingers to grasp an object.  Minimize repetition  Dont move your arms or hands or hold an object in  the same way for long periods of time. Even simple, light tasks can cause injury this way. Instead, alternate tasks or switch hands.  Rest your hands  Give your hands a break from time to time with a rest. Even a few minutes once an hour can help.  Reduce speed and force  Slow down the speed in which you do a forceful, repetitive motion. This gives your wrist time to recover from the effort. Use power tools to help reduce the force.  Strengthen the muscles  Weak muscles may lead to a poor wrist or arm position. Exercises will make your hand and arm muscles stronger. This can help you keep a better position.  Date Last Reviewed: 9/11/2015 © 2000-2017 [employer]. [address] [place] [address], [place] [address]. All rights reserved. This information is not intended as a substitute for professional medical care. Always follow your healthcare professional's instructions.        Understanding Carpal Tunnel Syndrome    The carpal tunnel is a narrow space inside the wrist. It is ringed by bone and a band of tough tissue called the transverse carpal ligament. A major nerve called the median nerve runs from the forearm into the hand through the carpal tunnel. Tendons also run through the carpal tunnel.  With carpal tunnel syndrome, the tendons or nearby tissues within the carpal tunnel may swell or thicken. Or the transverse carpal ligament may harden and shorten. This narrows the space in the carpal tunnel and puts pressure on the median nerve. This pressure leads to tingling and numbness of the hand and wrist. In time, the condition can make even simple tasks hard to do.  What causes carpal tunnel syndrome?  Doctors arent entirely clear why the condition occurs. Certain things may make a person more likely to have it. These include:  · Being female  · Being pregnant  · Being overweight  · Having diabetes or rheumatoid arthritis  Symptoms of carpal tunnel syndrome  Symptoms often come and go. At first, symptoms  may occur mainly at night. Later, they may be noticed during the day as well. They may get worse with activities such as driving, reading, typing, or holding a phone. Symptoms can include:  · Tingling and numbness in the hand or wrist  · Sharp pain that shoots up the arm or down to the fingers  · Hand stiffness or cramping, especially in the morning  · Trouble making a fist  · Hand weakness and clumsiness  Treatment for carpal tunnel syndrome  Certain treatments help reduce the pressure on the median nerve and relieve symptoms. Choices for treatment may include one or more of the following:  · Wrist splint. This involves wearing a special brace on the wrist and hand. The splint holds the wrist straight, in a neutral position. This helps keep the carpal tunnel as open as possible.  · Cortisone shots. Cortisone is a medicine that helps reduce swelling. It is injected directly into the wrist. It helps shrink tissues inside the carpal tunnel. This relieves symptoms for a time.  · Pain medicines. You may take over-the-counter or prescription medicines to help reduce swelling and relieve symptoms.  · Surgery. If the condition doesnt respond to other treatments and doesnt go away on its own, you may need surgery. During surgery, the surgeon cuts the transverse carpal ligament to relieve pressure on the median nerve.     When to call your healthcare provider  Call your healthcare provider right away if you have any of these:  · Fever of 100.4°F (38°C) or higher, or as directed  · Symptoms that dont get better, or get worse  · New symptoms   Date Last Reviewed: 3/10/2016  © 0441-3326 [employer]. [address] [place] [address], [place] [address]. All rights reserved. This information is not intended as a substitute for professional medical care. Always follow your healthcare professional's instructions.        Carpal Tunnel Release Surgery  Surgery may be done if your carpal tunnel syndrome (CTS) symptoms become  severe. Or, you may have surgery if no other treatment brings relief. There are 2 types of CTS procedures. You will be told about the one you will have. Youll also be instructed how to prepare for it.      The goals of surgery  Two types of surgery--open and endoscopic--are used to treat CTS.  · With open surgery, your surgeon makes one incision in your palm. Standard surgical tools are used.  · With endoscopic surgery, one or two small incisions may be made in your hand. A scope (with a very small camera attached) and tools are inserted under the carpal ligament. The surgeon then operates while watching images on a video screen. No matter which one you have, the goal remains the same: Your surgeon will relieve pressure on the median nerve. To do this, the transverse carpal ligament is cut (released).  After surgery  If youve had carpal tunnel surgery, you will spend a few hours resting before you go home. The nerve sensation and circulation in your hand will be checked at this time. For the safest healing, keep the following in mind.  · Keep your hand raised above heart level. This will help reduce swelling.  · Limit hand and wrist use as instructed. A wrist brace may be required.  · Take any pain medication as directed.  · Do hand exercises as directed by your surgeon or therapist.  When to call the surgeon  Call your surgeon if you notice any of the following:  · White or pale-blue hand or nails (If you pinch your skin or nail and the color doesnt return)  · Pain that is not relieved by prescribed medicine  · Loss of sensation or excess swelling in hand or fingers  · Fever over 100.4°F (38°C)   Date Last Reviewed: 9/11/2015  © 2800-7594 [facility]. [address] [place] [address]. All rights reserved. This information is not intended as a substitute for professional medical care. Always follow your healthcare professional's instructions.

## 2018-12-07 NOTE — PROGRESS NOTES
[facility] [address] [place] [address]  Phone: [phone]          Full Name: Mariel Becerril YOB: 1955  Patient ID: [id number]      Visit Date: 12/7/2018 12:49  Age: 63 Years 1 Months Old  Examining Physician: Kerry Faustin M.D.  Referring Physician:   Reason for Referral: ue pain        Chief Complaint   Patient presents with    Numbness     hands     HPI: This is a 63 y.o.  female being seen in clinic today for evaluation of chronic numbness/tingling in her hands at night or during the day with inactivity. Shaking and rubbing her hands provide some relief. She feels some weakness of  and near-dropping of items. Her right hand is worse than the left.     History obtained from patient    Past family, medical, social, and surgical history reviewed in chart    Review of Systems:     General- denies lethargy, weight change, fever, chills  Head/neck- denies swallowing difficulties  ENT- denies hearing changes  Cardiovascular-denies chest pain  Pulmonary- denies shortness of breath  GI- denies constipation or bowel incontinence  - denies bladder incontinence  Skin- denies wounds or rashes  Musculoskeletal- +weakness, +pain  Neurologic- +numbness and tingling  Psychiatric- denies depressive or psychotic features, denies anxiety  Lymphatic-denies swelling  Endocrine- denies hypoglycemic symptoms/DM history  All other pertinent systems negative     Physical Examination:  General: Well developed, well nourished female, NAD  HEENT:NCAT EOMI bilaterally   Pulmonary:Normal respirations    Spinal Examination: CERVICAL  Active ROM is within normal limits.  Inspection: No deformity of spinal alignment.    Spinal Examination: LUMBAR or THORACIC  Active ROM is within normal limits.  Inspection: No deformity of spinal alignment.      Musculoskeletal Tests:  Phalen:   Elbow compression (ulnar):   Tinels at wrist: + on right    Bilateral Upper and Lower Extremities:  Pulses are  2+ at radial bilaterally.  Shoulder/Elbow/Wrist/Hand ROM wnl  Hip/Knee/Ankle ROM   Bilateral Extremities show normal capillary refill.  No signs of cyanosis, rubor, edema, skin changes, or dysvascular changes of appendages.  Nails appear intact.    Neurological Exam:  Cranial Nerves:  II-XII grossly intact    Manual Muscle Testing: (Motor 5=normal)  4+/5 strength bilateral upper extremities    No focal atrophy is noted of either upper extremity.    Bilateral Reflexes:hypo at bic tric br  Horton's response is absent bilaterally.    Sensation: tested to light touch  - intact in arms    Gait: Narrow base and good arm swing.      Entire procedure explained to patient prior to proceeding.  Verbal consent obtained      SNC      Nerve / Sites Rec. Site Onset Lat Peak Lat Amp Segments Distance Velocity     ms ms µV  mm m/s   R Median - Digit II (Antidromic)      Wrist Dig II 3.9 4.7 13.6 Wrist - Dig  36   L Median - Digit II (Antidromic)      Wrist Dig II 3.5 4.4 18.3 Wrist - Dig  40   R Ulnar - Digit V (Antidromic)      Wrist Dig V 2.8 3.6 20.6 Wrist - Dig V 140 51   L Ulnar - Digit V (Antidromic)      Wrist Dig V 3.0 3.8 21.6 Wrist - Dig V 140 47   R Radial - Anatomical snuff box (Forearm)      Forearm Wrist 2.1 2.7 10.0 Forearm - Wrist 100 47   L Radial - Anatomical snuff box (Forearm)      Forearm Wrist 1.9 2.6 11.8 Forearm - Wrist 100 52       MNC      Nerve / Sites Muscle Latency Amplitude Duration Rel Amp Segments Distance Lat Diff Velocity     ms mV ms %  mm ms m/s   R Median - APB      Wrist APB 4.5 9.9 8.4 100 Wrist - APB 80        Elbow APB 8.9 7.9 8.9 80.3 Elbow - Wrist 220 4.4 50   L Median - APB      Wrist APB 3.6 9.0 6.7 100 Wrist - APB 80        Elbow APB 8.3 6.5 7.0 72.6 Elbow - Wrist 225 4.7 47   R Ulnar - ADM      Wrist ADM 3.0 10.0 7.1 100 Wrist - ADM 80        B.Elbow ADM 6.8 9.9 7.5 98.7 B.Elbow - Wrist 240 3.8 63      A.Elbow ADM 8.5 9.5 7.7 96.9 A.Elbow - B.Elbow 120 1.8 68          A.Elbow - Wrist  5.6    L Ulnar - ADM      Wrist ADM 3.1 11.5 8.1 100 Wrist - ADM 80        B.Elbow ADM 6.8 10.4 8.5 90.5 B.Elbow - Wrist 220 3.8 59      A.Elbow ADM 9.0 7.5 8.3 72.3 A.Elbow - B.Elbow 120 2.1 56         A.Elbow - Wrist  5.9                                   INTERPRETATION  -Bilateral median motor nerve conduction study showed prolonged latency on the right , normal amplitude, and dec conduction velocity on the left  -Bilateral median sensory nerve conduction study showed prolonged peak latency and normal amplitude  -Bilateral ulnar motor nerve conduction study showed normal latency, amplitude, and conduction velocity  -Bilateral ulnar sensory nerve conduction study showed normal peak latency and amplitude  -Bilateral radial sensory nerve conduction study showed normal peak latency and amplitude      IMPRESSION  1. ABNORMAL study  2. There is electrodiagnostic evidence of a MODERATE demyelinating median neuropathy (Carpal tunnel syndrome) across BILATERAL wrists-worse on the RIGHT    PLAN  1. Follow up with referring provider: Dr. Jaclyn Becerril  2. Handouts on CTS provided. Rec orthopedic eval for CTS release  3. This study is good for one year. If symptoms worsen or do not improve, please re-consult.    Kerry Faustin M.D.  Physical Medicine and Rehab

## 2018-12-11 ENCOUNTER — OFFICE VISIT (OUTPATIENT)
Dept: INTERNAL MEDICINE | Facility: CLINIC | Age: 63
End: 2018-12-11
Payer: COMMERCIAL

## 2018-12-11 VITALS
BODY MASS INDEX: 46.29 KG/M2 | SYSTOLIC BLOOD PRESSURE: 130 MMHG | WEIGHT: 261.25 LBS | HEART RATE: 86 BPM | OXYGEN SATURATION: 99 % | DIASTOLIC BLOOD PRESSURE: 74 MMHG | HEIGHT: 63 IN | TEMPERATURE: 98 F

## 2018-12-11 DIAGNOSIS — J02.9 SORE THROAT: Primary | ICD-10-CM

## 2018-12-11 DIAGNOSIS — J30.9 ALLERGIC RHINITIS, UNSPECIFIED SEASONALITY, UNSPECIFIED TRIGGER: ICD-10-CM

## 2018-12-11 PROCEDURE — 99214 OFFICE O/P EST MOD 30 MIN: CPT | Mod: S$GLB,,, | Performed by: NURSE PRACTITIONER

## 2018-12-11 PROCEDURE — 3008F BODY MASS INDEX DOCD: CPT | Mod: CPTII,S$GLB,, | Performed by: NURSE PRACTITIONER

## 2018-12-11 PROCEDURE — 99999 PR PBB SHADOW E&M-EST. PATIENT-LVL III: CPT | Mod: PBBFAC,,, | Performed by: NURSE PRACTITIONER

## 2018-12-11 RX ORDER — MONTELUKAST SODIUM 10 MG/1
10 TABLET ORAL NIGHTLY
Qty: 30 TABLET | Refills: 0 | Status: SHIPPED | OUTPATIENT
Start: 2018-12-11 | End: 2019-01-08 | Stop reason: SDUPTHER

## 2018-12-11 RX ORDER — FLUTICASONE PROPIONATE 50 MCG
1 SPRAY, SUSPENSION (ML) NASAL DAILY
Qty: 16 G | Refills: 0 | Status: SHIPPED | OUTPATIENT
Start: 2018-12-11 | End: 2019-02-01 | Stop reason: SDUPTHER

## 2018-12-11 NOTE — PATIENT INSTRUCTIONS

## 2018-12-11 NOTE — PROGRESS NOTES
"Subjective:       Patient ID: Mariel Becerril is a 63 y.o. female.    Chief Complaint: Sore Throat    Patient presents for sore throat.       Sore Throat    This is a new problem. The current episode started in the past 7 days. The problem has been unchanged. Neither side of throat is experiencing more pain than the other. There has been no fever ("at night"). The pain is at a severity of 8/10. Associated symptoms include congestion, headaches and shortness of breath (chronic, recurrent). Pertinent negatives include no abdominal pain, coughing, diarrhea, ear pain or vomiting. Treatments tried: nasal spray. The treatment provided no relief.     Review of Systems   Constitutional: Negative for chills and fever.   HENT: Positive for congestion, postnasal drip, rhinorrhea, sneezing and sore throat. Negative for ear pain and hearing loss.    Respiratory: Positive for shortness of breath (chronic, recurrent). Negative for cough.    Gastrointestinal: Negative for abdominal pain, diarrhea and vomiting.   Neurological: Positive for headaches.       Objective:      Physical Exam   Constitutional: She is oriented to person, place, and time. She appears well-developed and well-nourished.   HENT:   Head: Normocephalic and atraumatic.   Right Ear: Tympanic membrane and ear canal normal.   Left Ear: Tympanic membrane and ear canal normal.   Nose: Mucosal edema and rhinorrhea present. Right sinus exhibits no maxillary sinus tenderness and no frontal sinus tenderness. Left sinus exhibits no maxillary sinus tenderness and no frontal sinus tenderness.   Mouth/Throat: Uvula is midline and mucous membranes are normal. Posterior oropharyngeal erythema (mild) present.   Pale boggy nasal turbinates with clear mucosa.   Eyes: Conjunctivae and EOM are normal. Right eye exhibits no discharge. Left eye exhibits no discharge.   Neck: Normal range of motion. Neck supple.   Cardiovascular: Normal rate and regular rhythm. Exam reveals no " friction rub.   No murmur heard.  Pulmonary/Chest: Effort normal and breath sounds normal. No respiratory distress. She has no wheezes.   Neurological: She is alert and oriented to person, place, and time.   Skin: Skin is warm and dry. No rash noted.   Vitals reviewed.      Assessment:       1. Sore throat    2. Allergic rhinitis, unspecified seasonality, unspecified trigger        Plan:   Sore throat    Allergic rhinitis, unspecified seasonality, unspecified trigger  -     fluticasone (FLONASE) 50 mcg/actuation nasal spray; 1 spray (50 mcg total) by Each Nare route once daily.  Dispense: 16 g; Refill: 0  -     montelukast (SINGULAIR) 10 mg tablet; Take 1 tablet (10 mg total) by mouth every evening.  Dispense: 30 tablet; Refill: 0      H/o tonsillectomy.   Symptom management discussed.  Follow-up if symptoms worsen or fail to improve, for keep routine follow up.

## 2018-12-12 ENCOUNTER — PATIENT MESSAGE (OUTPATIENT)
Dept: INTERNAL MEDICINE | Facility: CLINIC | Age: 63
End: 2018-12-12

## 2018-12-13 ENCOUNTER — PATIENT MESSAGE (OUTPATIENT)
Dept: GASTROENTEROLOGY | Facility: CLINIC | Age: 63
End: 2018-12-13

## 2018-12-13 ENCOUNTER — TELEPHONE (OUTPATIENT)
Dept: INTERNAL MEDICINE | Facility: CLINIC | Age: 63
End: 2018-12-13

## 2018-12-13 NOTE — TELEPHONE ENCOUNTER
----- Message from Yolanda Marks sent at 12/13/2018 11:58 AM CST -----  Contact: self  Pt is calling to speak with nurse in regards to picking up certificate for illness. Please call pt back at [phone].      Thanks,   Yolanda Marks

## 2018-12-19 ENCOUNTER — TELEPHONE (OUTPATIENT)
Dept: PAIN MEDICINE | Facility: CLINIC | Age: 63
End: 2018-12-19

## 2018-12-19 NOTE — TELEPHONE ENCOUNTER
----- Message from Julia Grider sent at 12/19/2018  9:42 AM CST -----  Contact: pt  She's calling to get directions on procedure scheduled for tomorrow, please advise [phone]

## 2018-12-20 ENCOUNTER — HOSPITAL ENCOUNTER (OUTPATIENT)
Facility: HOSPITAL | Age: 63
Discharge: HOME | End: 2018-12-20
Attending: ANESTHESIOLOGY | Admitting: ANESTHESIOLOGY
Payer: COMMERCIAL

## 2018-12-20 VITALS
OXYGEN SATURATION: 100 % | RESPIRATION RATE: 18 BRPM | HEART RATE: 75 BPM | SYSTOLIC BLOOD PRESSURE: 139 MMHG | DIASTOLIC BLOOD PRESSURE: 93 MMHG

## 2018-12-20 DIAGNOSIS — M25.561 CHRONIC PAIN OF BOTH KNEES: ICD-10-CM

## 2018-12-20 DIAGNOSIS — M17.0 PRIMARY OSTEOARTHRITIS OF BOTH KNEES: ICD-10-CM

## 2018-12-20 DIAGNOSIS — G89.29 CHRONIC PAIN OF BOTH KNEES: ICD-10-CM

## 2018-12-20 DIAGNOSIS — M25.562 CHRONIC PAIN OF BOTH KNEES: ICD-10-CM

## 2018-12-20 PROCEDURE — 25000003 PHARM REV CODE 250

## 2018-12-20 PROCEDURE — 25000003 PHARM REV CODE 250: Performed by: ANESTHESIOLOGY

## 2018-12-20 PROCEDURE — 64450 NJX AA&/STRD OTHER PN/BRANCH: CPT | Mod: LT,,, | Performed by: ANESTHESIOLOGY

## 2018-12-20 PROCEDURE — 63600175 PHARM REV CODE 636 W HCPCS: Performed by: ANESTHESIOLOGY

## 2018-12-20 PROCEDURE — 63600175 PHARM REV CODE 636 W HCPCS

## 2018-12-20 RX ORDER — METHYLPREDNISOLONE ACETATE 40 MG/ML
INJECTION, SUSPENSION INTRA-ARTICULAR; INTRALESIONAL; INTRAMUSCULAR; SOFT TISSUE
Status: DISCONTINUED | OUTPATIENT
Start: 2018-12-20 | End: 2018-12-20 | Stop reason: HOSPADM

## 2018-12-20 RX ORDER — LIDOCAINE HYDROCHLORIDE 20 MG/ML
INJECTION, SOLUTION EPIDURAL; INFILTRATION; INTRACAUDAL; PERINEURAL
Status: DISCONTINUED | OUTPATIENT
Start: 2018-12-20 | End: 2018-12-20 | Stop reason: HOSPADM

## 2018-12-20 NOTE — OP NOTE
PROCEDURE: Genicular Nerve Block (Superior Lateral, Superior Medial, and Inferior Medial Genicular Nerve Block) under fluoroscopic guidance    SIDE: left     PROCEDURE DATE: 12/20/2018    PREOPERATIVE DIAGNOSIS: Mononeuropathies of Lower Limb, Chronic Knee Pain  POSTOPERATIVE DIAGNOSIS: Mononeuropathies of Lower Limb, Chronic Knee Pain    PROVIDER: Galen Mi MD  Assistant(s): None    ANESTHESIA: Local, No Sedation    >> 0 mg of VERSED    >> 0 mcg of FENTANYL     INDICATION: The patient has knee pain unresponsive to conservative treatments. Fluoroscopy was used to optimize visualization of needle placement and to maximize safety.        PROCEDURE DESCRIPTION / TECHNIQUE:   The patient was seen and identified in the preoperative area. Risks, benefits, complications, and alternatives were discussed with the patient. The patient agreed to proceed with the procedure and signed the consent. The site and side of the procedure was identified and marked. No iv was started.    The patient was taken to the procedural suite. The patient was positioned in supine orientation on procedure table.  The procedural knee/s was/were exposed. A pillow was placed under the procedural knee to offset lateral alignment in order to optimize lateral fluoroscopic visualization. A time out was performed prior to any intervention. The procedure, site, side, and allergies were stated and agreed to by all present. The anterior, lateral, and medial aspects of the procedural knee/s was/were widely prepped with ChloraPrep. The procedural site/s was/were draped in usual sterile fashion. Vital signs were closely monitored throughout this procedure. Conscious sedation was NOT used for this procedure.    AP fluoroscopy was used to identify the interface of the femoral shaft and the superior medial and superior lateral femoral epicondyle, as well as the interface of the tibial shaft and the medial tibial epicondyle. These targeted sites were marked and  localized with 1% Lidocaine. The target sites were approached under fluoroscopic guidance using 3 seperate 25 gauge 3.5 inch spinal needles. The needles were advanced until osseus contact was made. The fluoroscope was moved into a lateral orientation and the needles were advanced further until each needle tip reached the median aspect of the femoral or tibial shaft. Osseus contact was maintained. After negative aspiration, 1.5 to 2 mL of an intectate solution comprised of 4 mL of 1% PF Lidocaine and 2 mL of Methylprednisolone (40 mg/mL) was injected at each targeted site. After injection, the stylets were replaced and all needles were removed intact. This injection technique was performed for all of the above noted sites. Following injection, the injection sites were cleaned and bandages were applied. The patient tolerated the procedure well.      Description of Findings: Not applicable    Prosthetic devices, grafts, tissues, or devices implanted: None    Specimen Removed: No    Estimated Blood Loss: minimal    COMPLICATIONS: None    DISPOSITION / PLANS: The patient was transferred to the recovery area in a stable condition for observation. The patient was reexamined prior to discharge. There was no evidence of acute neurologic injury following the procedure.  Patient was discharged from the recovery room after meeting discharge criteria. Home discharge instructions were given to the patient by the staff.

## 2018-12-20 NOTE — DISCHARGE SUMMARY
[facility]  Discharge Note       Description of Procedure: Left Genicular Nerve Block (Superior Lateral, Superior Medial, Inferior Medial Genicular Nerves) under Fluoroscopic Guidance    Procedure Date: 12/20/2018    Admit Date: 12/20/2018  Discharge Date: 12/20/2018     Attending Physician: Shmuel Aaron   Discharge Provider: Shmuel Aaron    Preoperative Diagnosis: Left Knee Pain (chronic) and Left Knee Osteoarthritis     Postoperative Diagnosis: as above, same as preoperative diagnosis    Discharged Condition: Stable    Hospital Course: Patient was admitted for an outpatient procedure. The procedure was tolerated well with no complications.    Final Diagnoses: Same as principal problem.    Disposition: Home, self-care.    Follow up/Patient Instructions:  Follow-up in clinic in 2-3 weeks.    Medications: No medications were prescribed today. The patient was advised to resume normal medication regimen without change.  Specific information was provided regarding restarting any anticoagulant/s.    Discharge Procedure Orders (must include Diet, Follow-up, Activity):  Light activity for the remainder of the day, resume normal activity tomorrow. Resume normal diet. Follow-up in clinic in 2-3 weeks.

## 2018-12-20 NOTE — PLAN OF CARE
Problem: Adult Inpatient Plan of Care  Goal: Plan of Care Review  Outcome: Outcome(s) achieved Date Met: 12/20/18  Patient d/c home in stable condition via wheelchair with ride. Verbalized understanding of d/c instructions. Patient voiced no complaints at this time. Patient stood at side of bed, walked steps with no new motor deficits. Neurologically intact.

## 2019-01-08 ENCOUNTER — OFFICE VISIT (OUTPATIENT)
Dept: INTERNAL MEDICINE | Facility: CLINIC | Age: 64
End: 2019-01-08
Payer: COMMERCIAL

## 2019-01-08 VITALS
TEMPERATURE: 98 F | SYSTOLIC BLOOD PRESSURE: 132 MMHG | OXYGEN SATURATION: 97 % | DIASTOLIC BLOOD PRESSURE: 76 MMHG | HEART RATE: 100 BPM | WEIGHT: 252 LBS | BODY MASS INDEX: 43.02 KG/M2 | HEIGHT: 64 IN

## 2019-01-08 DIAGNOSIS — R53.83 FATIGUE, UNSPECIFIED TYPE: Primary | ICD-10-CM

## 2019-01-08 DIAGNOSIS — J30.9 ALLERGIC RHINITIS, UNSPECIFIED SEASONALITY, UNSPECIFIED TRIGGER: ICD-10-CM

## 2019-01-08 DIAGNOSIS — E66.01 MORBID OBESITY DUE TO EXCESS CALORIES: ICD-10-CM

## 2019-01-08 DIAGNOSIS — R06.09 DOE (DYSPNEA ON EXERTION): ICD-10-CM

## 2019-01-08 PROCEDURE — 99213 OFFICE O/P EST LOW 20 MIN: CPT | Mod: S$GLB,,, | Performed by: NURSE PRACTITIONER

## 2019-01-08 PROCEDURE — 3008F PR BODY MASS INDEX (BMI) DOCUMENTED: ICD-10-PCS | Mod: CPTII,S$GLB,, | Performed by: NURSE PRACTITIONER

## 2019-01-08 PROCEDURE — 3078F DIAST BP <80 MM HG: CPT | Mod: CPTII,S$GLB,, | Performed by: NURSE PRACTITIONER

## 2019-01-08 PROCEDURE — 99999 PR PBB SHADOW E&M-EST. PATIENT-LVL III: ICD-10-PCS | Mod: PBBFAC,,, | Performed by: NURSE PRACTITIONER

## 2019-01-08 PROCEDURE — 99213 PR OFFICE/OUTPT VISIT, EST, LEVL III, 20-29 MIN: ICD-10-PCS | Mod: S$GLB,,, | Performed by: NURSE PRACTITIONER

## 2019-01-08 PROCEDURE — 3078F PR MOST RECENT DIASTOLIC BLOOD PRESSURE < 80 MM HG: ICD-10-PCS | Mod: CPTII,S$GLB,, | Performed by: NURSE PRACTITIONER

## 2019-01-08 PROCEDURE — 99999 PR PBB SHADOW E&M-EST. PATIENT-LVL III: CPT | Mod: PBBFAC,,, | Performed by: NURSE PRACTITIONER

## 2019-01-08 PROCEDURE — 3075F SYST BP GE 130 - 139MM HG: CPT | Mod: CPTII,S$GLB,, | Performed by: NURSE PRACTITIONER

## 2019-01-08 PROCEDURE — 3008F BODY MASS INDEX DOCD: CPT | Mod: CPTII,S$GLB,, | Performed by: NURSE PRACTITIONER

## 2019-01-08 PROCEDURE — 3075F PR MOST RECENT SYSTOLIC BLOOD PRESS GE 130-139MM HG: ICD-10-PCS | Mod: CPTII,S$GLB,, | Performed by: NURSE PRACTITIONER

## 2019-01-08 RX ORDER — FLUCONAZOLE 150 MG/1
150 TABLET ORAL DAILY
Qty: 1 TABLET | Refills: 0 | Status: SHIPPED | OUTPATIENT
Start: 2019-01-08 | End: 2019-01-09

## 2019-01-08 RX ORDER — MONTELUKAST SODIUM 10 MG/1
10 TABLET ORAL NIGHTLY
Qty: 30 TABLET | Refills: 0 | Status: SHIPPED | OUTPATIENT
Start: 2019-01-08 | End: 2019-02-01 | Stop reason: SDUPTHER

## 2019-01-08 RX ORDER — HYDROCODONE BITARTRATE AND ACETAMINOPHEN 7.5; 325 MG/1; MG/1
TABLET ORAL
Status: ON HOLD | COMMUNITY
Start: 2018-12-13 | End: 2020-03-06 | Stop reason: HOSPADM

## 2019-01-08 NOTE — PROGRESS NOTES
Subjective:       Patient ID: Mariel Becerril is a 63 y.o. female.    Chief Complaint: Fever; Cough; and Fatigue    Patient presents for fever, fatigue, cough x1 week  She also reports AREVALO.  She states she is compliant with CPAP.        Fever    This is a new problem. The current episode started in the past 7 days. The problem occurs daily. The problem has been unchanged. Maximum temperature: 98.4-98.6. The temperature was taken using an oral thermometer. Associated symptoms include coughing, headaches and muscle aches. Pertinent negatives include no chest pain, congestion, diarrhea, nausea, rash, sore throat or vomiting. She has tried NSAIDs for the symptoms. The treatment provided moderate relief.     Review of Systems   Constitutional: Positive for fever. Negative for activity change, appetite change, chills and fatigue.   HENT: Negative for congestion and sore throat.    Respiratory: Positive for cough and shortness of breath.    Cardiovascular: Negative for chest pain, palpitations and leg swelling.   Gastrointestinal: Negative for diarrhea, nausea and vomiting.   Musculoskeletal: Positive for arthralgias.   Skin: Negative for rash.   Neurological: Positive for headaches.       Objective:      Physical Exam   Constitutional: She is oriented to person, place, and time. She appears well-developed and well-nourished. No distress.   HENT:   Head: Normocephalic and atraumatic.   Right Ear: Tympanic membrane and ear canal normal.   Left Ear: Tympanic membrane and ear canal normal.   Nose: Nose normal.   Mouth/Throat: Uvula is midline, oropharynx is clear and moist and mucous membranes are normal.   Eyes: Conjunctivae are normal.   Cardiovascular: Normal rate and regular rhythm.   Pulmonary/Chest: Effort normal and breath sounds normal.   Neurological: She is alert and oriented to person, place, and time.   Skin: Skin is warm and dry.   Psychiatric: She has a normal mood and affect.   Vitals reviewed.     "  Assessment:       1. Fatigue, unspecified type    2. Allergic rhinitis, unspecified seasonality, unspecified trigger    3. AREVALO (dyspnea on exertion)    4. Morbid obesity due to excess calories        Plan:   Fatigue, unspecified type    Allergic rhinitis, unspecified seasonality, unspecified trigger  -     montelukast (SINGULAIR) 10 mg tablet; Take 1 tablet (10 mg total) by mouth every evening.  Dispense: 30 tablet; Refill: 0    AREVALO (dyspnea on exertion)    Morbid obesity due to excess calories    Other orders  -     fluconazole (DIFLUCAN) 150 MG Tab; Take 1 tablet (150 mg total) by mouth once daily. for 1 day  Dispense: 1 tablet; Refill: 0      Chart review indicates fatigue is not an acute issue. Vitamin d was low less than a month ago and she reports she did begin taking OTC supplement.  AREVALO is also not a new finding and she is followed by pulm with upcoming appt.  Discussed her weight as a factor and she is working to lose weight. Chart shows she has lost 10 pounds in past month.  She requested a refill on promethazine DM cough syrup stating she "takes it at night to help sleep". Informed her I would not refill this and she can take OTC cough medication and singualair.       Follow-up in about 2 weeks (around 1/22/2019), or if symptoms worsen or fail to improve, for fatigue with PCP.      "

## 2019-01-09 ENCOUNTER — NURSE TRIAGE (OUTPATIENT)
Dept: ADMINISTRATIVE | Facility: CLINIC | Age: 64
End: 2019-01-09

## 2019-01-10 NOTE — TELEPHONE ENCOUNTER
"    Reason for Disposition   MILD-MODERATE diarrhea (e.g., 1-6 times / day more than normal) (all triage questions negative)    Answer Assessment - Initial Assessment Questions  1. DIARRHEA SEVERITY: "How bad is the diarrhea?" "How many extra stools have you had in the past 24 hours than normal?"     - MILD: Few loose or mushy BMs; increase of 1-3 stools over normal daily number of stools; mild increase in ostomy output.    - MODERATE: Increase of 4-6 stools daily over normal; moderate increase in ostomy output.    - SEVERE (or Worst Possible): Increase of 7 or more stools daily over normal; moderate increase in ostomy output; incontinence.      3 to 4 times   2. ONSET: "When did the diarrhea begin?"       Today   3. BM CONSISTENCY: "How loose or watery is the diarrhea?"       watery  4. VOMITING: "Are you also vomiting?" If so, ask: "How many times in the past 24 hours?"       no  5. ABDOMINAL PAIN: "Are you having any abdominal pain?" If yes: "What does it feel like?" (e.g., crampy, dull, intermittent, constant)       no  6. ABDOMINAL PAIN SEVERITY: If present, ask: "How bad is the pain?"  (e.g., Scale 1-10; mild, moderate, or severe)     - MILD (1-3): doesn't interfere with normal activities, abdomen soft and not tender to touch      - MODERATE (4-7): interferes with normal activities or awakens from sleep, tender to touch      - SEVERE (8-10): excruciating pain, doubled over, unable to do any normal activities        n/a  7. ORAL INTAKE: If vomiting, "Have you been able to drink liquids?" "How much fluids have you had in the past 24 hours?"        8. HYDRATION: "Any signs of dehydration?" (e.g., dry mouth [not just dry lips], too weak to stand, dizziness, new weight loss) "When did you last urinate?"      Drinking water- cranberry juice  Wants to know what she can take otc for diarrhea  9. EXPOSURE: "Have you traveled to a foreign country recently?" "Have you been exposed to anyone with diarrhea?" "Could you " "have eaten any food that was spoiled?"      n/a  10. OTHER SYMPTOMS: "Do you have any other symptoms?" (e.g., fever, blood in stool)        Seen yesterday for fatigue/cough  11. PREGNANCY: "Is there any chance you are pregnant?" "When was your last menstrual period?"        n/a    Protocols used: ST DIARRHEA-A-      "

## 2019-01-14 ENCOUNTER — TELEPHONE (OUTPATIENT)
Dept: INTERNAL MEDICINE | Facility: CLINIC | Age: 64
End: 2019-01-14

## 2019-01-14 ENCOUNTER — OFFICE VISIT (OUTPATIENT)
Dept: INTERNAL MEDICINE | Facility: CLINIC | Age: 64
End: 2019-01-14
Payer: COMMERCIAL

## 2019-01-14 VITALS
HEART RATE: 100 BPM | WEIGHT: 250.69 LBS | TEMPERATURE: 97 F | HEIGHT: 64 IN | OXYGEN SATURATION: 95 % | DIASTOLIC BLOOD PRESSURE: 86 MMHG | BODY MASS INDEX: 42.8 KG/M2 | SYSTOLIC BLOOD PRESSURE: 138 MMHG

## 2019-01-14 DIAGNOSIS — G56.03 CARPAL TUNNEL SYNDROME, BILATERAL: Primary | ICD-10-CM

## 2019-01-14 DIAGNOSIS — M17.12 PRIMARY OSTEOARTHRITIS OF LEFT KNEE: ICD-10-CM

## 2019-01-14 PROCEDURE — 3075F SYST BP GE 130 - 139MM HG: CPT | Mod: CPTII,S$GLB,, | Performed by: FAMILY MEDICINE

## 2019-01-14 PROCEDURE — 3079F DIAST BP 80-89 MM HG: CPT | Mod: CPTII,S$GLB,, | Performed by: FAMILY MEDICINE

## 2019-01-14 PROCEDURE — 3008F BODY MASS INDEX DOCD: CPT | Mod: CPTII,S$GLB,, | Performed by: FAMILY MEDICINE

## 2019-01-14 PROCEDURE — 99214 OFFICE O/P EST MOD 30 MIN: CPT | Mod: S$GLB,,, | Performed by: FAMILY MEDICINE

## 2019-01-14 PROCEDURE — 99999 PR PBB SHADOW E&M-EST. PATIENT-LVL III: CPT | Mod: PBBFAC,,, | Performed by: FAMILY MEDICINE

## 2019-01-14 PROCEDURE — 99214 PR OFFICE/OUTPT VISIT, EST, LEVL IV, 30-39 MIN: ICD-10-PCS | Mod: S$GLB,,, | Performed by: FAMILY MEDICINE

## 2019-01-14 PROCEDURE — 99999 PR PBB SHADOW E&M-EST. PATIENT-LVL III: ICD-10-PCS | Mod: PBBFAC,,, | Performed by: FAMILY MEDICINE

## 2019-01-14 PROCEDURE — 3075F PR MOST RECENT SYSTOLIC BLOOD PRESS GE 130-139MM HG: ICD-10-PCS | Mod: CPTII,S$GLB,, | Performed by: FAMILY MEDICINE

## 2019-01-14 PROCEDURE — 3008F PR BODY MASS INDEX (BMI) DOCUMENTED: ICD-10-PCS | Mod: CPTII,S$GLB,, | Performed by: FAMILY MEDICINE

## 2019-01-14 PROCEDURE — 3079F PR MOST RECENT DIASTOLIC BLOOD PRESSURE 80-89 MM HG: ICD-10-PCS | Mod: CPTII,S$GLB,, | Performed by: FAMILY MEDICINE

## 2019-01-14 NOTE — MEDICAL/APP STUDENT
[facility]  Medical [facility] Progress Note    Patient Name: Mariel Becerril  MRN: [id number]   Attending Physician: Fabienne Becerril MD      Subjective:     Principal Problem: Knee pain    HPI: Patient states she has had severe right knee pain for 1 year. Pain is bilateral but most severe in the left knee. She is unable to stand or walk without pain and has limited range in her knee. 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 She is tearful during the visit. Patient has had several cortisone injections in both knees and believes exacerbation of left knee pain is related to the injections. Patient believes there may be a segment of the needle which remains in her knee. She states that physical therapy has not been beneficial. Patient is currently taking hydrocodone, tramadol, meloxicam, and diclofenac gel without relief of pain.     Patient continues to have numbness and tingling in both hands bilaterally. Dr. Faustin's notes consistent with carpal tunnel syndrome.    Review of Systems   Constitutional: Positive for activity change and fatigue. Negative for fever.   HENT: Negative.    Eyes: Negative.    Respiratory: Positive for shortness of breath. Negative for apnea, cough and chest tightness.    Cardiovascular: Positive for leg swelling. Negative for chest pain and palpitations.   Gastrointestinal: Negative.    Endocrine: Negative.    Genitourinary: Negative for dysuria, flank pain, frequency and urgency.   Musculoskeletal: Positive for arthralgias, gait problem and joint swelling. Negative for back pain, myalgias, neck pain and neck stiffness.   Skin: Negative.    Allergic/Immunologic: Negative.    Neurological: Positive for light-headedness and numbness. Negative for syncope and weakness.   Hematological: Negative.    Psychiatric/Behavioral: Negative.           Objective:     Vitals:   Temperature: 97.6   Blood pressure: 138/86   Heart rate:  100   Oxygen saturation: 95%     Physical Exam   Constitutional: She appears well-developed. She appears distressed.   Patient was tearful throughout office visit   Cardiovascular: Normal rate, regular rhythm, normal heart sounds and normal pulses. Exam reveals no gallop and no friction rub.   No murmur heard.  Pulmonary/Chest: Effort normal and breath sounds normal. No stridor. No respiratory distress. She has no wheezes. She has no rales. She exhibits no tenderness.   Abdominal: Soft. Bowel sounds are normal.   Musculoskeletal: She exhibits edema and tenderness.        Left knee: She exhibits decreased range of motion and swelling. Tenderness found. Medial joint line tenderness noted.   Neurological: She is alert.   Skin: Skin is warm and dry. No rash noted. She is not diaphoretic. No erythema. No pallor.       Significant Labs:   Nil new labs    Significant Imaging:  Nil new imaging, reviewed previous radiograph of knee.    Assessment/Plan:   Mariel Becerril is a 63 y.o. female presenting to clinic for knee pain.    Left knee osteoarthritis  1. Review previous imaging from OSH  2. Patient is amenable to repeat MRI  3. Continue current pain medications  4. Recommend physical therapy    To be discussed with team and staff, Fabienne Becerril MD.  Zoran Art, MS4

## 2019-01-14 NOTE — TELEPHONE ENCOUNTER
----- Message from Mignon Mace sent at 1/14/2019 12:13 PM CST -----  Contact: self [phone]  States that the medical records to be requested is for 2016 to present. States that she gave the wrong date this morning. Please call back at [phone]//thank you acc

## 2019-01-14 NOTE — TELEPHONE ENCOUNTER
Mrs. Mariel Becerril came into clinic today for an appt.   Below is a transcribed letter that she typed and could not figure out how to send over the portal.     To [facility] [place]    Suffering with chronic severe sharp aching pains in my left knee since September 2017 (from another clinic)    I came to [facility] for help and was told I needed a knee replacement; but the object in my knee was totally ignored.    Believe the tip of the needle from a cortisone injection from 9/17 is still lodged within my knee bone and must be removed ASAP.    Unable to bend my left knee and walk normally.     Can not stand on my left leg alone    Very chronic painful swelling and stiffness in my left knee Daily on a 8-9 scale.     From all of this over a year I have symptoms:   Daily Severe Sharp Left knee pain  Daily Sever Muscle pain  Left leg pain  Lightheaded  Low Vitamin D  Tingling in the left foot  Feeling tired easily  Shortness of breath at times/heart problems  Low pulse rate at times  Weakness  Feeling of discomfort  Fatigue  Chills  Sometimes abdominal pain  Some diarrhea at times  Indigestion  Headache  Cough  Rash  Difficulty getting up out chairs  Difficulty getting into vehicle (has to lift up left leg in order to get it into the car)  Restlessness/Sleeplessness nights due to pain from my left knee    ALVIN Becerril is tired of taking pain pills/pain cream/gel and do not want a knee replacement. The injection from Dr. Ellison did wonderful.   I am off the crutch since March 2018 although yet walking with a limp. But I can not get full results until they fix what is wrong from September 2017.    This has taken a tremendous toll on my total health. On January 8, 2019 I resigned from my job at [employer] because of Medical reasons. I just couldn't do  It anymore due to quickly tiring out and daily working in sever pain.     Therefore, I would like to know what is the plan to removing this object from my left  knee or fixing the problems that is present and stop ignoring it as if it will go away and stop saying a knee replacement to avoid fixing it. Or, do I need to seek help elsewhere.     Mariel Becerril  [id number] [id number]    I have retyped this from her phone with her permission to place it into her chart.

## 2019-01-15 NOTE — PROGRESS NOTES
Subjective:      Principal Problem: Knee pain     HPI: Patient states she has had severe right knee pain for 1 year. Pain is bilateral but most severe in the left knee. She is unable to stand or walk without pain and has limited range in her knee. 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 She is tearful during the visit because she feels no one is helping her with her problem. Patient has had several cortisone injections in both knees and believes exacerbation of left knee pain is related to the injections. Patient believes there may be a segment of the needle which remains in her knee from a previous injection at another clinic. She reports she had more pain in the right knee and after injections in both she had worse pain in the left. She reports she continued to complain of pain and the other provider gave her a crutch which she walked on for 6 months before she decided to seek care elsewhere. She states that physical therapy has not been beneficial. Patient is currently taking hydrocodone, tramadol, meloxicam, and diclofenac gel without relief of pain. She had an appointment with orthopedics who said she needed a knee replacement but recommended nerve block to push this option back. She had this completed by pain management and got relieve and no longer needs her crutch. She still has a problem with bending her knee. She is very frustrated because she does not want a knee replacement and feels that the previous provider made her knee worse.     Patient continues to have numbness and tingling in both hands bilaterally. Dr. Faustin's notes consistent with carpal tunnel syndrome. She would like to deal with the knee first but is willing to do surgery.   Answers for HPI/ROS submitted by the patient on 1/12/2019   activity change: Yes  unexpected weight change: Yes  neck pain: No  hearing loss: No  rhinorrhea: No  trouble swallowing: No  eye discharge: No  visual  disturbance: Yes  chest tightness: Yes  wheezing: No  chest pain: Yes  palpitations: Yes  blood in stool: No  constipation: No  vomiting: No  diarrhea: Yes  polydipsia: No  polyuria: Yes  difficulty urinating: No  hematuria: No  menstrual problem: No  dysuria: No  joint swelling: Yes  arthralgias: Yes  headaches: Yes  weakness: Yes  confusion: Yes  dysphoric mood: No       Review of Systems   Constitutional: Positive for activity change and fatigue. Negative for fever.   HENT: Negative.    Eyes: Negative.    Respiratory: Positive for shortness of breath. Negative for apnea, cough and chest tightness.    Cardiovascular: Positive for leg swelling. Negative for chest pain and palpitations.   Gastrointestinal: Negative.    Endocrine: Negative.    Genitourinary: Negative for dysuria, flank pain, frequency and urgency.   Musculoskeletal: Positive for arthralgias, gait problem and joint swelling. Negative for back pain, myalgias, neck pain and neck stiffness.   Skin: Negative.    Allergic/Immunologic: Negative.    Neurological: Positive for light-headedness and numbness. Negative for syncope and weakness.   Hematological: Negative.    Psychiatric/Behavioral: Negative.                        Objective:      Vitals:              Temperature: 97.6              Blood pressure: 138/86              Heart rate: 100              Oxygen saturation: 95%                Physical Exam   Constitutional: She appears well-developed. She appears distressed.   Patient was tearful throughout office visit   Cardiovascular: Normal rate, regular rhythm, normal heart sounds and normal pulses. Exam reveals no gallop and no friction rub.   No murmur heard.  Pulmonary/Chest: Effort normal and breath sounds normal. No stridor. No respiratory distress. She has no wheezes. She has no rales. She exhibits no tenderness.   Abdominal: Soft. Bowel sounds are normal.   Musculoskeletal: She exhibits edema and tenderness.        Left knee: She exhibits  decreased range of motion and swelling. Tenderness found. Medial joint line tenderness noted.   Neurological: She is alert.   Skin: Skin is warm and dry. No rash noted. She is not diaphoretic. No erythema. No pallor.           Assessment/Plan:     Carpal tunnel syndrome, bilateral    Primary osteoarthritis of left knee    Discussed previous imaging (all we have in the system is Xrays) with patient. Showed her a normal knee xray and what hers looks like. Explained if there was a needle tip after a year she would have likely had infection and or this would have been noted with MRI she had previously.     She finally got the previous provider to get an MRI. We discussed repeating this but would like to have copies of the first one as well as other imaging to look compare and further discuss.     She does appear to have osteoarthritis but feels that the time she spent walking with the crutch made her symptoms much worse.     The problem she has is feeling as if she has been ignored and no one is addressing the problem with the knee.   Explained that she may ultimately need a knee replacement to fix her problem but this was not previously explained to the patient after her MRI. Why she needs a replacement had not been explained and made the patient uncomfortable.         Will continue current regimen of pain management and get previous imaging.     Will follow up with carpal tunnel syndrome      Follow up in 2 weeks

## 2019-01-16 ENCOUNTER — PATIENT MESSAGE (OUTPATIENT)
Dept: INTERNAL MEDICINE | Facility: CLINIC | Age: 64
End: 2019-01-16

## 2019-01-23 ENCOUNTER — PATIENT MESSAGE (OUTPATIENT)
Dept: INTERNAL MEDICINE | Facility: CLINIC | Age: 64
End: 2019-01-23

## 2019-01-28 ENCOUNTER — PATIENT MESSAGE (OUTPATIENT)
Dept: INTERNAL MEDICINE | Facility: CLINIC | Age: 64
End: 2019-01-28

## 2019-02-01 ENCOUNTER — OFFICE VISIT (OUTPATIENT)
Dept: INTERNAL MEDICINE | Facility: CLINIC | Age: 64
End: 2019-02-01
Payer: COMMERCIAL

## 2019-02-01 VITALS
WEIGHT: 255.06 LBS | HEIGHT: 64 IN | HEART RATE: 103 BPM | SYSTOLIC BLOOD PRESSURE: 110 MMHG | DIASTOLIC BLOOD PRESSURE: 72 MMHG | TEMPERATURE: 99 F | BODY MASS INDEX: 43.54 KG/M2 | OXYGEN SATURATION: 98 %

## 2019-02-01 DIAGNOSIS — J32.9 SINUSITIS, UNSPECIFIED CHRONICITY, UNSPECIFIED LOCATION: ICD-10-CM

## 2019-02-01 DIAGNOSIS — J30.9 ALLERGIC RHINITIS, UNSPECIFIED SEASONALITY, UNSPECIFIED TRIGGER: ICD-10-CM

## 2019-02-01 DIAGNOSIS — J40 BRONCHITIS: Primary | ICD-10-CM

## 2019-02-01 PROCEDURE — 3008F BODY MASS INDEX DOCD: CPT | Mod: CPTII,S$GLB,, | Performed by: PHYSICIAN ASSISTANT

## 2019-02-01 PROCEDURE — 99214 PR OFFICE/OUTPT VISIT, EST, LEVL IV, 30-39 MIN: ICD-10-PCS | Mod: S$GLB,,, | Performed by: PHYSICIAN ASSISTANT

## 2019-02-01 PROCEDURE — 3078F PR MOST RECENT DIASTOLIC BLOOD PRESSURE < 80 MM HG: ICD-10-PCS | Mod: CPTII,S$GLB,, | Performed by: PHYSICIAN ASSISTANT

## 2019-02-01 PROCEDURE — 3008F PR BODY MASS INDEX (BMI) DOCUMENTED: ICD-10-PCS | Mod: CPTII,S$GLB,, | Performed by: PHYSICIAN ASSISTANT

## 2019-02-01 PROCEDURE — 3074F PR MOST RECENT SYSTOLIC BLOOD PRESSURE < 130 MM HG: ICD-10-PCS | Mod: CPTII,S$GLB,, | Performed by: PHYSICIAN ASSISTANT

## 2019-02-01 PROCEDURE — 3078F DIAST BP <80 MM HG: CPT | Mod: CPTII,S$GLB,, | Performed by: PHYSICIAN ASSISTANT

## 2019-02-01 PROCEDURE — 99999 PR PBB SHADOW E&M-EST. PATIENT-LVL IV: ICD-10-PCS | Mod: PBBFAC,,, | Performed by: PHYSICIAN ASSISTANT

## 2019-02-01 PROCEDURE — 3074F SYST BP LT 130 MM HG: CPT | Mod: CPTII,S$GLB,, | Performed by: PHYSICIAN ASSISTANT

## 2019-02-01 PROCEDURE — 99214 OFFICE O/P EST MOD 30 MIN: CPT | Mod: S$GLB,,, | Performed by: PHYSICIAN ASSISTANT

## 2019-02-01 PROCEDURE — 99999 PR PBB SHADOW E&M-EST. PATIENT-LVL IV: CPT | Mod: PBBFAC,,, | Performed by: PHYSICIAN ASSISTANT

## 2019-02-01 RX ORDER — PREDNISONE 10 MG/1
TABLET ORAL
Qty: 18 TABLET | Refills: 0 | Status: SHIPPED | OUTPATIENT
Start: 2019-02-01 | End: 2019-05-01

## 2019-02-01 RX ORDER — MONTELUKAST SODIUM 10 MG/1
10 TABLET ORAL NIGHTLY
Qty: 90 TABLET | Refills: 1 | Status: SHIPPED | OUTPATIENT
Start: 2019-02-01 | End: 2019-03-15 | Stop reason: SDUPTHER

## 2019-02-01 RX ORDER — DOXYCYCLINE HYCLATE 100 MG
TABLET ORAL
COMMUNITY
Start: 2019-01-27 | End: 2019-05-01

## 2019-02-01 RX ORDER — ALBUTEROL SULFATE 90 UG/1
2 AEROSOL, METERED RESPIRATORY (INHALATION) EVERY 6 HOURS PRN
Qty: 18 G | Refills: 0 | Status: SHIPPED | OUTPATIENT
Start: 2019-02-01 | End: 2020-12-07 | Stop reason: SDUPTHER

## 2019-02-01 RX ORDER — TOPIRAMATE 50 MG/1
50 TABLET, FILM COATED ORAL DAILY
Qty: 90 TABLET | Refills: 1 | Status: SHIPPED | OUTPATIENT
Start: 2019-02-01 | End: 2019-12-30

## 2019-02-01 RX ORDER — LEVOFLOXACIN 500 MG/1
500 TABLET, FILM COATED ORAL DAILY
Qty: 10 TABLET | Refills: 0 | Status: SHIPPED | OUTPATIENT
Start: 2019-02-01 | End: 2019-02-11

## 2019-02-01 RX ORDER — FLUTICASONE PROPIONATE 50 MCG
1 SPRAY, SUSPENSION (ML) NASAL DAILY
Qty: 16 G | Refills: 0 | Status: SHIPPED | OUTPATIENT
Start: 2019-02-01 | End: 2019-03-15 | Stop reason: SDUPTHER

## 2019-02-01 RX ORDER — PROMETHAZINE HYDROCHLORIDE AND DEXTROMETHORPHAN HYDROBROMIDE 6.25; 15 MG/5ML; MG/5ML
5 SYRUP ORAL 3 TIMES DAILY PRN
Qty: 240 ML | Refills: 0 | Status: SHIPPED | OUTPATIENT
Start: 2019-02-01 | End: 2019-02-17

## 2019-02-01 RX ORDER — BENZONATATE 100 MG/1
CAPSULE ORAL
COMMUNITY
Start: 2019-01-27 | End: 2019-04-12 | Stop reason: SDUPTHER

## 2019-02-01 NOTE — PROGRESS NOTES
Subjective:       Patient ID: Mariel Becerril is a 63 y.o. female.    Chief Complaint: URI ( PCP - Becerril    after hrs Sunday steroid inj doxy and tessalon perles)    Cough   This is a new problem. The current episode started 1 to 4 weeks ago. The problem has been gradually worsening. The problem occurs every few minutes. The cough is productive of sputum. Associated symptoms include chills, nasal congestion, postnasal drip, rhinorrhea, a sore throat, shortness of breath and wheezing. Pertinent negatives include no chest pain or ear pain. Nothing aggravates the symptoms. Treatments tried: was seen at an after hours clinic and given doxycycline, benzoate,  The treatment provided no relief.     Past Medical History:   Diagnosis Date    Frequent headaches     High blood pressure     Hypertension     Sleep apnea        Review of Systems   Constitutional: Positive for activity change and chills.   HENT: Positive for congestion, postnasal drip, rhinorrhea and sore throat. Negative for ear discharge, ear pain and sinus pressure.    Respiratory: Positive for cough, shortness of breath and wheezing. Negative for chest tightness.    Cardiovascular: Negative for chest pain.   Gastrointestinal: Negative for abdominal pain.       Objective:      Physical Exam   Constitutional: She appears well-developed and well-nourished. No distress.   HENT:   Head: Normocephalic and atraumatic.   Right Ear: Tympanic membrane and ear canal normal.   Left Ear: Tympanic membrane and ear canal normal.   Nose: Mucosal edema and rhinorrhea present. Right sinus exhibits maxillary sinus tenderness. Left sinus exhibits maxillary sinus tenderness.   Mouth/Throat: Uvula is midline and mucous membranes are normal. Posterior oropharyngeal edema and posterior oropharyngeal erythema present. No tonsillar exudate.   Neck: Neck supple.   Cardiovascular: Normal rate and regular rhythm. Exam reveals no gallop and no friction rub.   No murmur  heard.  Pulmonary/Chest: Effort normal. No stridor. No respiratory distress. She has wheezes. She has no rales. She exhibits no tenderness.   Abdominal: Soft. There is no tenderness.   Lymphadenopathy:     She has no cervical adenopathy.   Skin: She is not diaphoretic.   Nursing note and vitals reviewed.      Assessment:       1. Bronchitis    2. Sinusitis, unspecified chronicity, unspecified location    3. Allergic rhinitis, unspecified seasonality, unspecified trigger        Plan:       Bronchitis  -     promethazine-dextromethorphan (PROMETHAZINE-DM) 6.25-15 mg/5 mL Syrp; Take 5 mLs by mouth 3 (three) times daily as needed.  Dispense: 240 mL; Refill: 0  -     predniSONE (DELTASONE) 10 MG tablet; Take 3 daily for 3 days, then 2 daily for three days, then 1 daily for three days.  Dispense: 18 tablet; Refill: 0  -     albuterol (PROAIR HFA) 90 mcg/actuation inhaler; Inhale 2 puffs into the lungs every 6 (six) hours as needed for Wheezing. Rescue  Dispense: 18 g; Refill: 0    Sinusitis, unspecified chronicity, unspecified location  -     predniSONE (DELTASONE) 10 MG tablet; Take 3 daily for 3 days, then 2 daily for three days, then 1 daily for three days.  Dispense: 18 tablet; Refill: 0  -     levoFLOXacin (LEVAQUIN) 500 MG tablet; Take 1 tablet (500 mg total) by mouth once daily. for 10 days  Dispense: 10 tablet; Refill: 0    Allergic rhinitis, unspecified seasonality, unspecified trigger  -     fluticasone (FLONASE) 50 mcg/actuation nasal spray; 1 spray (50 mcg total) by Each Nare route once daily.  Dispense: 16 g; Refill: 0  -     montelukast (SINGULAIR) 10 mg tablet; Take 1 tablet (10 mg total) by mouth every evening.  Dispense: 90 tablet; Refill: 1    Other orders  -     topiramate (TOPAMAX) 50 MG tablet; Take 1 tablet (50 mg total) by mouth once daily.  Dispense: 90 tablet; Refill: 1

## 2019-02-08 ENCOUNTER — PATIENT MESSAGE (OUTPATIENT)
Dept: DIABETES | Facility: CLINIC | Age: 64
End: 2019-02-08

## 2019-02-12 ENCOUNTER — PATIENT MESSAGE (OUTPATIENT)
Dept: INTERNAL MEDICINE | Facility: CLINIC | Age: 64
End: 2019-02-12

## 2019-03-05 ENCOUNTER — PATIENT MESSAGE (OUTPATIENT)
Dept: INTERNAL MEDICINE | Facility: CLINIC | Age: 64
End: 2019-03-05

## 2019-03-05 RX ORDER — METHOCARBAMOL 500 MG/1
TABLET, FILM COATED ORAL
Qty: 60 TABLET | Refills: 2 | Status: SHIPPED | OUTPATIENT
Start: 2019-03-05 | End: 2019-06-06 | Stop reason: SDUPTHER

## 2019-03-06 ENCOUNTER — PATIENT MESSAGE (OUTPATIENT)
Dept: INTERNAL MEDICINE | Facility: CLINIC | Age: 64
End: 2019-03-06

## 2019-03-07 ENCOUNTER — PATIENT MESSAGE (OUTPATIENT)
Dept: INTERNAL MEDICINE | Facility: CLINIC | Age: 64
End: 2019-03-07

## 2019-03-07 ENCOUNTER — LAB VISIT (OUTPATIENT)
Dept: LAB | Facility: HOSPITAL | Age: 64
End: 2019-03-07
Attending: FAMILY MEDICINE
Payer: COMMERCIAL

## 2019-03-07 DIAGNOSIS — R30.0 BURNING WITH URINATION: Primary | ICD-10-CM

## 2019-03-07 DIAGNOSIS — R30.0 BURNING WITH URINATION: ICD-10-CM

## 2019-03-07 LAB
BILIRUB UR QL STRIP: NEGATIVE
CLARITY UR: CLEAR
COLOR UR: YELLOW
GLUCOSE UR QL STRIP: NEGATIVE
HGB UR QL STRIP: NEGATIVE
KETONES UR QL STRIP: NEGATIVE
LEUKOCYTE ESTERASE UR QL STRIP: NEGATIVE
NITRITE UR QL STRIP: NEGATIVE
PH UR STRIP: 7.5 [PH] (ref 5–8)
PROT UR QL STRIP: NEGATIVE
SP GR UR STRIP: 1.01 (ref 1–1.03)
URN SPEC COLLECT METH UR: NORMAL

## 2019-03-07 PROCEDURE — 87088 URINE BACTERIA CULTURE: CPT

## 2019-03-07 PROCEDURE — 87086 URINE CULTURE/COLONY COUNT: CPT

## 2019-03-07 PROCEDURE — 87147 CULTURE TYPE IMMUNOLOGIC: CPT

## 2019-03-07 PROCEDURE — 81002 URINALYSIS NONAUTO W/O SCOPE: CPT

## 2019-03-08 LAB — BACTERIA UR CULT: NORMAL

## 2019-03-09 ENCOUNTER — PATIENT MESSAGE (OUTPATIENT)
Dept: INTERNAL MEDICINE | Facility: CLINIC | Age: 64
End: 2019-03-09

## 2019-03-09 DIAGNOSIS — R82.71 GBS BACTERIURIA: Primary | ICD-10-CM

## 2019-03-09 DIAGNOSIS — Z88.0 PENICILLIN ALLERGY: ICD-10-CM

## 2019-03-09 RX ORDER — CLINDAMYCIN HYDROCHLORIDE 300 MG/1
600 CAPSULE ORAL 2 TIMES DAILY
Qty: 28 CAPSULE | Refills: 0 | Status: SHIPPED | OUTPATIENT
Start: 2019-03-09 | End: 2019-05-01

## 2019-03-12 ENCOUNTER — TELEPHONE (OUTPATIENT)
Dept: INTERNAL MEDICINE | Facility: CLINIC | Age: 64
End: 2019-03-12

## 2019-03-12 NOTE — TELEPHONE ENCOUNTER
Notified pt of results and recommendations. She verbalized understanding. She reports she is feeling better.

## 2019-03-12 NOTE — TELEPHONE ENCOUNTER
----- Message from Jaclyn Becerril MD sent at 3/7/2019 11:12 AM CST -----  Urine was negative. Will follow up on Culture  What were the symptoms again? Burning on urination or low abdominal pain?

## 2019-03-12 NOTE — TELEPHONE ENCOUNTER
----- Message from Jaclyn Becerril MD sent at 3/9/2019  7:24 AM CST -----  Check to see if patient picked up antibiotics over the weekend for UTI. Culture grew Group b strep

## 2019-03-15 ENCOUNTER — OFFICE VISIT (OUTPATIENT)
Dept: INTERNAL MEDICINE | Facility: CLINIC | Age: 64
End: 2019-03-15
Payer: COMMERCIAL

## 2019-03-15 VITALS
HEART RATE: 94 BPM | OXYGEN SATURATION: 98 % | TEMPERATURE: 97 F | BODY MASS INDEX: 44.97 KG/M2 | WEIGHT: 263.44 LBS | SYSTOLIC BLOOD PRESSURE: 124 MMHG | HEIGHT: 64 IN | DIASTOLIC BLOOD PRESSURE: 80 MMHG

## 2019-03-15 DIAGNOSIS — R42 DIZZINESS: ICD-10-CM

## 2019-03-15 DIAGNOSIS — I10 ESSENTIAL HYPERTENSION: ICD-10-CM

## 2019-03-15 DIAGNOSIS — J31.0 RHINITIS, UNSPECIFIED TYPE: ICD-10-CM

## 2019-03-15 DIAGNOSIS — M17.0 PRIMARY OSTEOARTHRITIS OF BOTH KNEES: Primary | ICD-10-CM

## 2019-03-15 DIAGNOSIS — J30.9 ALLERGIC RHINITIS, UNSPECIFIED SEASONALITY, UNSPECIFIED TRIGGER: ICD-10-CM

## 2019-03-15 PROCEDURE — 3074F SYST BP LT 130 MM HG: CPT | Mod: CPTII,S$GLB,, | Performed by: FAMILY MEDICINE

## 2019-03-15 PROCEDURE — 3008F PR BODY MASS INDEX (BMI) DOCUMENTED: ICD-10-PCS | Mod: CPTII,S$GLB,, | Performed by: FAMILY MEDICINE

## 2019-03-15 PROCEDURE — 3079F DIAST BP 80-89 MM HG: CPT | Mod: CPTII,S$GLB,, | Performed by: FAMILY MEDICINE

## 2019-03-15 PROCEDURE — 3079F PR MOST RECENT DIASTOLIC BLOOD PRESSURE 80-89 MM HG: ICD-10-PCS | Mod: CPTII,S$GLB,, | Performed by: FAMILY MEDICINE

## 2019-03-15 PROCEDURE — 3074F PR MOST RECENT SYSTOLIC BLOOD PRESSURE < 130 MM HG: ICD-10-PCS | Mod: CPTII,S$GLB,, | Performed by: FAMILY MEDICINE

## 2019-03-15 PROCEDURE — 99999 PR PBB SHADOW E&M-EST. PATIENT-LVL III: CPT | Mod: PBBFAC,,, | Performed by: FAMILY MEDICINE

## 2019-03-15 PROCEDURE — 99214 OFFICE O/P EST MOD 30 MIN: CPT | Mod: S$GLB,,, | Performed by: FAMILY MEDICINE

## 2019-03-15 PROCEDURE — 3008F BODY MASS INDEX DOCD: CPT | Mod: CPTII,S$GLB,, | Performed by: FAMILY MEDICINE

## 2019-03-15 PROCEDURE — 99214 PR OFFICE/OUTPT VISIT, EST, LEVL IV, 30-39 MIN: ICD-10-PCS | Mod: S$GLB,,, | Performed by: FAMILY MEDICINE

## 2019-03-15 PROCEDURE — 99999 PR PBB SHADOW E&M-EST. PATIENT-LVL III: ICD-10-PCS | Mod: PBBFAC,,, | Performed by: FAMILY MEDICINE

## 2019-03-15 RX ORDER — MONTELUKAST SODIUM 10 MG/1
10 TABLET ORAL NIGHTLY
Qty: 90 TABLET | Refills: 1 | Status: SHIPPED | OUTPATIENT
Start: 2019-03-15 | End: 2019-10-05 | Stop reason: SDUPTHER

## 2019-03-15 RX ORDER — PROMETHAZINE HYDROCHLORIDE AND DEXTROMETHORPHAN HYDROBROMIDE 6.25; 15 MG/5ML; MG/5ML
5 SYRUP ORAL NIGHTLY PRN
Qty: 118 ML | Refills: 0 | Status: SHIPPED | OUTPATIENT
Start: 2019-03-15 | End: 2019-03-25

## 2019-03-15 RX ORDER — FLUTICASONE PROPIONATE 50 MCG
1 SPRAY, SUSPENSION (ML) NASAL DAILY
Qty: 16 G | Refills: 0 | Status: SHIPPED | OUTPATIENT
Start: 2019-03-15 | End: 2019-05-22 | Stop reason: SDUPTHER

## 2019-03-15 NOTE — PROGRESS NOTES
Mariel Barajas DANILO Becerril  03/15/2019  [id number]    Jaclyn Becerril MD  Patient Care Team:  Jaclyn Becerril MD as PCP - General (Internal Medicine)  Nataly Palm LPN as Care Coordinator (Internal Medicine)  Has the patient seen any provider outside of the [facility] network since the last visit? (no). If yes, HIPPA forms completed and records requested.      Chief Complaint:  Chief Complaint   Patient presents with    Knee Pain    Dizziness       History of Present Illness:  Patient is a 63-year-old female presents clinic today complaining of left knee pain and dizziness.      Knee pain:  Patient states that her knee pain started on 09/31/2016.  States that she had received a cortisone injection for her knees, but states that her knees never heard prior to the cortisone injection.  Patient states that after the cortisone injection in her knees or painful and swollen.  States that she has had multiple x-rays.  Patient states that on some of the x-rays they noted a effusion of her knee.  Patient states that she continued to have pain so was given viscosupplementation by Dr. Mancilla.  States she was not getting any improvement in her pain so he recommended total knee replacement, however, due to her weight she was a high risk candidate for surgery so he referred her to Pain Management, Dr. Mi, for geniculate nerve block.  Patient states that she was previously managed by [facility] (Dr. Hendrickson).  States that she you requested multiple times to have an MRI done of her knees and once it was done showed knee arthritis with some possible chronic ligament damage.  Patient states that she would have liked to have done physical therapy (on review of her notes by her PCP, it indicated that she had done physical therapy and that she said it was not beneficial) to prevent her knee arthritis.  Patient states that she feels like no one has listened to her or helped her with her knee pain.  States that nothing has been done for  her pain. Patient states that she can barely walk and is required to use a crutch to ambulate.    Dizziness:  Patient states is been going on for the past couple days.  Patient endorses a history of allergies.  States she has been out of her allergy medications.  States she needs a refill on her Flonase, Singulair, and cough syrup.              History:  Past Medical History:   Diagnosis Date    Frequent headaches     High blood pressure     Hypertension     Sleep apnea      Past Surgical History:   Procedure Laterality Date    BLADDER SUSPENSION      COLONOSCOPY N/A 12/3/2018    [facility]O    HYSTERECTOMY      partial 2000    tonsilectomy      tonsils       Family History   Problem Relation Age of Onset    Heart attack Father      Social History     Socioeconomic History    Marital status:      Spouse name: Not on file    Number of children: Not on file    Years of education: Not on file    Highest education level: Not on file   Social Needs    Financial resource strain: Not on file    Food insecurity - worry: Not on file    Food insecurity - inability: Not on file    Transportation needs - medical: Not on file    Transportation needs - non-medical: Not on file   Occupational History    Not on file   Tobacco Use    Smoking status: Never Smoker    Smokeless tobacco: Never Used   Substance and Sexual Activity    Alcohol use: No    Drug use: No    Sexual activity: No     Partners: Male     Birth control/protection: See Surgical Hx   Other Topics Concern    Not on file   Social History Narrative    Not on file     Patient Active Problem List   Diagnosis    Pain of left calf    Primary osteoarthritis of both knees    Genu varum of left lower extremity    Morbid obesity due to excess calories    Essential hypertension    GIL on CPAP    AREVALO (dyspnea on exertion)    Chest pressure    Chronic pain of both knees    History of migraine headaches     HNP (herniated nucleus pulposus), lumbar    Osteoarthritis of spine with radiculopathy, lumbar region    Osteoarthritis of thumb    Palpitations    Primary osteoarthritis of both feet    Radicular low back pain    Rhinitis    Behaviorally induced insufficient sleep syndrome    Inadequate sleep hygiene    Colon cancer screening    Bilateral carpal tunnel syndrome     Review of patient's allergies indicates:   Allergen Reactions    Penicillins Rash       The following were reviewed at this visit: active problem list, medication list, allergies, family history, social history, and health maintenance.    Medications:  Current Outpatient Medications on File Prior to Visit   Medication Sig Dispense Refill    albuterol (PROAIR HFA) 90 mcg/actuation inhaler Inhale 2 puffs into the lungs every 6 (six) hours as needed for Wheezing. Rescue 18 g 0    amLODIPine (NORVASC) 10 MG tablet Take 1 tablet (10 mg total) by mouth once daily. 90 tablet 3    benzonatate (TESSALON) 100 MG capsule       clindamycin (CLEOCIN) 300 MG capsule Take 2 capsules (600 mg total) by mouth 2 (two) times daily. 28 capsule 0    ergocalciferol, vitamin D2, (VITAMIN D ORAL) Take 2,000 Units by mouth once daily.      furosemide (LASIX) 20 MG tablet Take 1 tablet (20 mg total) by mouth daily as needed. 90 tablet 3    meloxicam (MOBIC) 15 MG tablet Take 1 tablet (15 mg total) by mouth once daily. 30 tablet 2    methocarbamol (ROBAXIN) 500 MG Tab Take 1 tablet by mouth twice daily as needed 60 tablet 2    omeprazole (PRILOSEC) 40 MG capsule Take 1 capsule (40 mg total) by mouth once daily. 30 capsule 3    topiramate (TOPAMAX) 50 MG tablet Take 1 tablet (50 mg total) by mouth once daily. 90 tablet 1    [DISCONTINUED] fluticasone (FLONASE) 50 mcg/actuation nasal spray 1 spray (50 mcg total) by Each Nare route once daily. 16 g 0    [DISCONTINUED] montelukast (SINGULAIR) 10 mg tablet Take 1 tablet (10 mg total) by mouth every evening. 90  tablet 1    diclofenac sodium (VOLTAREN) 1 % Gel Apply 4 grams topically 3 (three) times daily as needed. 2 Tube 0    doxycycline (VIBRA-TABS) 100 MG tablet       HYDROcodone-acetaminophen (NORCO) 7.5-325 mg per tablet       ondansetron (ZOFRAN-ODT) 4 MG TbDL Take 1 tablet (4 mg total) by mouth every 8 (eight) hours as needed. 10 tablet 0    predniSONE (DELTASONE) 10 MG tablet Take 3 daily for 3 days, then 2 daily for three days, then 1 daily for three days. 18 tablet 0    tramadol (ULTRAM) 50 mg tablet Take 50 mg by mouth 2 (two) times daily as needed.   5     No current facility-administered medications on file prior to visit.        Medications have been reviewed and reconciled with patient at this visit.  Barriers to medications present (no)    Adverse reactions to current medications (no)    Over the counter medications reviewed (Yes ), and if needed added to active Medication list at this visit.     Exam:  Wt Readings from Last 3 Encounters:   03/15/19 119.5 kg (263 lb 7.2 oz)   02/01/19 115.7 kg (255 lb 1.2 oz)   01/14/19 113.7 kg (250 lb 10.6 oz)     Temp Readings from Last 3 Encounters:   03/15/19 96.6 °F (35.9 °C) (Tympanic)   02/01/19 98.7 °F (37.1 °C) (Tympanic)   01/14/19 97.4 °F (36.3 °C) (Tympanic)     BP Readings from Last 3 Encounters:   03/15/19 124/80   02/01/19 110/72   01/14/19 138/86     Pulse Readings from Last 3 Encounters:   03/15/19 94   02/01/19 103   01/14/19 100     Body mass index is 45.94 kg/m².      Review of Systems   Constitutional: Negative for chills and fever.   HENT: Positive for congestion. Negative for hearing loss.    Respiratory: Positive for cough.    Cardiovascular: Negative for chest pain.   Gastrointestinal: Negative for nausea and vomiting.   Musculoskeletal: Positive for joint pain. Negative for back pain, falls and myalgias.   Skin: Negative for rash.   Neurological: Positive for dizziness. Negative for tingling, sensory change, focal weakness and weakness.      Physical Exam   Constitutional: She is oriented to person, place, and time. She appears well-developed and well-nourished. No distress.   HENT:   Head: Normocephalic and atraumatic.   Eyes: Conjunctivae are normal. No scleral icterus.   Cardiovascular: Normal rate, regular rhythm and normal heart sounds. Exam reveals no gallop and no friction rub.   No murmur heard.  Pulmonary/Chest: Effort normal and breath sounds normal. No respiratory distress. She has no wheezes. She has no rales.   Musculoskeletal:        Left knee: She exhibits decreased range of motion. Tenderness found. Medial joint line and lateral joint line tenderness noted.   Neurological: She is alert and oriented to person, place, and time.   Skin: Skin is warm and dry. She is not diaphoretic.   Nursing note and vitals reviewed.      Laboratory Reviewed ({Yes)  Lab Results   Component Value Date    WBC 8.72 11/15/2018    HGB 11.6 (L) 11/15/2018    HCT 40.3 11/15/2018     11/15/2018    CHOL 124 09/14/2018    TRIG 60 09/14/2018    HDL 48 09/14/2018    ALT 30 09/14/2018    AST 29 09/14/2018     09/14/2018    K 3.5 09/14/2018     09/14/2018    CREATININE 0.8 09/14/2018    BUN 10 09/14/2018    CO2 25 09/14/2018     EXAMINATION:  XR KNEE ORTHO LEFT WITH FLEXION    CLINICAL HISTORY:  . Pain in left knee    TECHNIQUE:  AP standing of both knees, PA flexion standing views of both knees, and Merchant views of both knees were performed. A lateral of the left knee was also performed.    COMPARISON:  None    FINDINGS:  There is a small suprapatellar joint effusion present on the left.  No acute fractures or dislocations visualized.  Tricompartmental osteoarthritis noted on the left with moderate to severe joint space loss in the medial tibiofemoral compartment.  Similar but less severe degenerative findings noted involving the right knee.      Impression       As above.      Electronically signed by: Foreign Carey MD  Date:  08/20/2018  Time: 09:43       Mariel was seen today for knee pain and dizziness.    Diagnoses and all orders for this visit:    Primary osteoarthritis of both knees    Essential hypertension    Palpitations    Rhinitis, unspecified type  -     promethazine-dextromethorphan (PROMETHAZINE-DM) 6.25-15 mg/5 mL Syrp; Take 5 mLs by mouth nightly as needed.    Allergic rhinitis, unspecified seasonality, unspecified trigger  -     montelukast (SINGULAIR) 10 mg tablet; Take 1 tablet (10 mg total) by mouth every evening.  -     fluticasone (FLONASE) 50 mcg/actuation nasal spray; 1 spray (50 mcg total) by Each Nare route once daily.    Left knee pain and bilateral osteoarthritis:  -Discussed at length the clinical course and nature of osteoarthritis with patient.  While trying to explain to the patient the nature of osteoarthritis, she became very upset and teary eyed during the exam.  Advised the patient that while I cannot definitively say if anything was done wrong, from her history, imaging, and treatment regiment, I advised her that her management for knee osteoarthritis appears to be appropriate.  Advised her that as far as I know, there was no breach of standard of care.  Discussed treatment options with the patient and offered repeat cortisone injection, viscosupplementation, topical compounding creams, or referral to pain management.  At this time, patient deferred any further injections and would like to try the prescription topical compounding creams.  Will fax an order today.  Discussed with patient that the definitive treatment for knee osteoarthritis is total knee replacement.  Will have the patient follow up in 2 weeks with her PCP, Dr. Becerril.     Dizziness:  -likely suspect the patient's dizziness is due to seasonal allergies  -refill patient's medications today  -follow-up with her PCP in 2 weeks    -Chronic hypertension.  Managed by patient's PCP.    -Chronic allergic rhinitis.  Sent in refills for  medications.  Usually managed by patient's PCP.    -Patient's lab results were reviewed and discussed with patient   -Treatment options and alternatives were discussed with the patient. Patient expressed understanding. Patient was given the opportunity to ask questions and be an active participant in their medical care. Patient had no further questions or concerns at this time.   -Patient is an overall moderate risk for health complications from their medical conditions.     Follow up: Follow-up if symptoms worsen or fail to improve.    After visit summary was printed and given to patient upon discharge today.  Patient goals and care plan are included in After Visit Summary.

## 2019-03-18 ENCOUNTER — PATIENT MESSAGE (OUTPATIENT)
Dept: INTERNAL MEDICINE | Facility: CLINIC | Age: 64
End: 2019-03-18

## 2019-04-02 ENCOUNTER — OFFICE VISIT (OUTPATIENT)
Dept: INTERNAL MEDICINE | Facility: CLINIC | Age: 64
End: 2019-04-02
Payer: COMMERCIAL

## 2019-04-02 ENCOUNTER — APPOINTMENT (OUTPATIENT)
Dept: RADIOLOGY | Facility: HOSPITAL | Age: 64
End: 2019-04-02
Attending: INTERNAL MEDICINE
Payer: COMMERCIAL

## 2019-04-02 VITALS
HEART RATE: 76 BPM | HEIGHT: 64 IN | DIASTOLIC BLOOD PRESSURE: 82 MMHG | OXYGEN SATURATION: 98 % | RESPIRATION RATE: 18 BRPM | BODY MASS INDEX: 43.4 KG/M2 | SYSTOLIC BLOOD PRESSURE: 128 MMHG | WEIGHT: 254.19 LBS | TEMPERATURE: 99 F
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
## 2019-04-02 DIAGNOSIS — E66.01 SEVERE OBESITY (BMI >= 40): ICD-10-CM
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
## 2019-04-02 PROCEDURE — 73562 X-RAY EXAM OF KNEE 3: CPT | Mod: 26,,, | Performed by: RADIOLOGY

## 2019-04-02 PROCEDURE — 3079F PR MOST RECENT DIASTOLIC BLOOD PRESSURE 80-89 MM HG: ICD-10-PCS | Mod: CPTII,S$GLB,, | Performed by: INTERNAL MEDICINE

## 2019-04-02 PROCEDURE — 3074F PR MOST RECENT SYSTOLIC BLOOD PRESSURE < 130 MM HG: ICD-10-PCS | Mod: CPTII,S$GLB,, | Performed by: INTERNAL MEDICINE

## 2019-04-02 PROCEDURE — 73610 X-RAY EXAM OF ANKLE: CPT | Mod: 50,TC,PO

## 2019-04-02 PROCEDURE — 99999 PR PBB SHADOW E&M-EST. PATIENT-LVL IV: CPT | Mod: PBBFAC,,, | Performed by: INTERNAL MEDICINE

## 2019-04-02 PROCEDURE — 73610 XR ANKLE COMPLETE 3 VIEW BILATERAL: ICD-10-PCS | Mod: 26,,, | Performed by: RADIOLOGY

## 2019-04-02 PROCEDURE — 73562 XR KNEE ORTHO BILAT: ICD-10-PCS | Mod: 26,,, | Performed by: RADIOLOGY

## 2019-04-02 PROCEDURE — 3008F BODY MASS INDEX DOCD: CPT | Mod: CPTII,S$GLB,, | Performed by: INTERNAL MEDICINE

## 2019-04-02 PROCEDURE — 73610 X-RAY EXAM OF ANKLE: CPT | Mod: 26,,, | Performed by: RADIOLOGY

## 2019-04-02 PROCEDURE — 99214 OFFICE O/P EST MOD 30 MIN: CPT | Mod: S$GLB,,, | Performed by: INTERNAL MEDICINE

## 2019-04-02 PROCEDURE — 99999 PR PBB SHADOW E&M-EST. PATIENT-LVL IV: ICD-10-PCS | Mod: PBBFAC,,, | Performed by: INTERNAL MEDICINE

## 2019-04-02 PROCEDURE — 3008F PR BODY MASS INDEX (BMI) DOCUMENTED: ICD-10-PCS | Mod: CPTII,S$GLB,, | Performed by: INTERNAL MEDICINE

## 2019-04-02 PROCEDURE — 3074F SYST BP LT 130 MM HG: CPT | Mod: CPTII,S$GLB,, | Performed by: INTERNAL MEDICINE

## 2019-04-02 PROCEDURE — 3079F DIAST BP 80-89 MM HG: CPT | Mod: CPTII,S$GLB,, | Performed by: INTERNAL MEDICINE

## 2019-04-02 PROCEDURE — 73562 X-RAY EXAM OF KNEE 3: CPT | Mod: TC,50,PO

## 2019-04-02 PROCEDURE — 99214 PR OFFICE/OUTPT VISIT, EST, LEVL IV, 30-39 MIN: ICD-10-PCS | Mod: S$GLB,,, | Performed by: INTERNAL MEDICINE

## 2019-04-02 RX ORDER — CYCLOBENZAPRINE HCL 10 MG
10 TABLET ORAL NIGHTLY PRN
Qty: 10 TABLET | Refills: 0 | Status: SHIPPED | OUTPATIENT
Start: 2019-04-02 | End: 2019-04-12
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
## 2019-04-12 RX ORDER — BENZONATATE 100 MG/1
100 CAPSULE ORAL 3 TIMES DAILY PRN
Qty: 30 CAPSULE | Refills: 0 | Status: SHIPPED | OUTPATIENT
Start: 2019-04-12 | End: 2019-10-10 | Stop reason: SDUPTHER

## 2019-05-01 ENCOUNTER — OFFICE VISIT (OUTPATIENT)
Dept: INTERNAL MEDICINE | Facility: CLINIC | Age: 64
End: 2019-05-01
Payer: COMMERCIAL

## 2019-05-01 VITALS
DIASTOLIC BLOOD PRESSURE: 78 MMHG | SYSTOLIC BLOOD PRESSURE: 112 MMHG | OXYGEN SATURATION: 98 % | RESPIRATION RATE: 18 BRPM | WEIGHT: 263 LBS | BODY MASS INDEX: 44.9 KG/M2 | HEIGHT: 64 IN | HEART RATE: 107 BPM | TEMPERATURE: 97 F

## 2019-05-01 DIAGNOSIS — M25.562 PAIN IN BOTH KNEES, UNSPECIFIED CHRONICITY: ICD-10-CM

## 2019-05-01 DIAGNOSIS — M25.561 PAIN IN BOTH KNEES, UNSPECIFIED CHRONICITY: ICD-10-CM

## 2019-05-01 DIAGNOSIS — M54.2 NECK PAIN: Primary | ICD-10-CM
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
## 2019-05-01 DIAGNOSIS — J30.2 SEASONAL ALLERGIES: ICD-10-CM

## 2019-05-01 PROCEDURE — 3074F PR MOST RECENT SYSTOLIC BLOOD PRESSURE < 130 MM HG: ICD-10-PCS | Mod: CPTII,S$GLB,, | Performed by: FAMILY MEDICINE

## 2019-05-01 PROCEDURE — 3008F BODY MASS INDEX DOCD: CPT | Mod: CPTII,S$GLB,, | Performed by: FAMILY MEDICINE

## 2019-05-01 PROCEDURE — 99214 PR OFFICE/OUTPT VISIT, EST, LEVL IV, 30-39 MIN: ICD-10-PCS | Mod: S$GLB,,, | Performed by: FAMILY MEDICINE

## 2019-05-01 PROCEDURE — 3074F SYST BP LT 130 MM HG: CPT | Mod: CPTII,S$GLB,, | Performed by: FAMILY MEDICINE

## 2019-05-01 PROCEDURE — 3008F PR BODY MASS INDEX (BMI) DOCUMENTED: ICD-10-PCS | Mod: CPTII,S$GLB,, | Performed by: FAMILY MEDICINE

## 2019-05-01 PROCEDURE — 3078F DIAST BP <80 MM HG: CPT | Mod: CPTII,S$GLB,, | Performed by: FAMILY MEDICINE

## 2019-05-01 PROCEDURE — 99214 OFFICE O/P EST MOD 30 MIN: CPT | Mod: S$GLB,,, | Performed by: FAMILY MEDICINE

## 2019-05-01 PROCEDURE — 99999 PR PBB SHADOW E&M-EST. PATIENT-LVL III: ICD-10-PCS | Mod: PBBFAC,,, | Performed by: FAMILY MEDICINE

## 2019-05-01 PROCEDURE — 3078F PR MOST RECENT DIASTOLIC BLOOD PRESSURE < 80 MM HG: ICD-10-PCS | Mod: CPTII,S$GLB,, | Performed by: FAMILY MEDICINE

## 2019-05-01 PROCEDURE — 99999 PR PBB SHADOW E&M-EST. PATIENT-LVL III: CPT | Mod: PBBFAC,,, | Performed by: FAMILY MEDICINE

## 2019-05-01 RX ORDER — MELOXICAM 15 MG/1
15 TABLET ORAL DAILY
Qty: 90 TABLET | Refills: 0 | Status: SHIPPED | OUTPATIENT
Start: 2019-05-01 | End: 2019-05-22

## 2019-05-01 RX ORDER — LEVOCETIRIZINE DIHYDROCHLORIDE 5 MG/1
5 TABLET, FILM COATED ORAL NIGHTLY
Qty: 30 TABLET | Refills: 1 | Status: SHIPPED | OUTPATIENT
Start: 2019-05-01 | End: 2020-12-07 | Stop reason: SDUPTHER

## 2019-05-01 RX ORDER — OMEPRAZOLE 40 MG/1
40 CAPSULE, DELAYED RELEASE ORAL DAILY
Qty: 30 CAPSULE | Refills: 3 | Status: SHIPPED | OUTPATIENT
Start: 2019-05-01 | End: 2019-08-27 | Stop reason: SDUPTHER

## 2019-05-01 RX ORDER — TRAMADOL HYDROCHLORIDE 50 MG/1
50 TABLET ORAL 2 TIMES DAILY PRN
Qty: 60 TABLET | Refills: 0 | Status: ON HOLD | OUTPATIENT
Start: 2019-05-01 | End: 2020-03-06 | Stop reason: HOSPADM
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
## 2019-05-09 ENCOUNTER — TELEPHONE (OUTPATIENT)
Dept: ORTHOPEDICS | Facility: CLINIC | Age: 64
End: 2019-05-09

## 2019-05-09 NOTE — TELEPHONE ENCOUNTER
1st attempt to reschedule appointment on 5/20/19. Called the patient about their appointment on 5/20/19 with Dr. Mancilla. Informed the patient that we have to reschedule their appointment for another day due to the fact that Dr. Mancilla will be at the [place] location on that day. Offered the patient to reschedule for another day or to go to the [place] location. Left a message for the patient to give the office a call back to reschedule. FP

## 2019-05-09 NOTE — TELEPHONE ENCOUNTER
Called pt to reschedule her apt to our [facility] location or to a different day. Due to dr. Mancilla being at the [facility] location and not at [facility].pt would benefit going to the [facility] location due to where she lives. Pt states she will call us back but she wants us to cancel her apt in the mean time. Pt understood.

## 2019-05-16 ENCOUNTER — OFFICE VISIT (OUTPATIENT)
Dept: INTERNAL MEDICINE | Facility: CLINIC | Age: 64
End: 2019-05-16
Payer: COMMERCIAL

## 2019-05-16 VITALS
BODY MASS INDEX: 43.47 KG/M2 | TEMPERATURE: 98 F | HEART RATE: 90 BPM | DIASTOLIC BLOOD PRESSURE: 74 MMHG | SYSTOLIC BLOOD PRESSURE: 126 MMHG | WEIGHT: 254.63 LBS | RESPIRATION RATE: 18 BRPM | HEIGHT: 64 IN | OXYGEN SATURATION: 98 %

## 2019-05-16 DIAGNOSIS — F07.81 POSTCONCUSSIVE SYNDROME: Primary | ICD-10-CM

## 2019-05-16 DIAGNOSIS — R06.09 DOE (DYSPNEA ON EXERTION): ICD-10-CM

## 2019-05-16 DIAGNOSIS — R07.89 OTHER CHEST PAIN: ICD-10-CM

## 2019-05-16 DIAGNOSIS — M25.562 PAIN IN BOTH KNEES, UNSPECIFIED CHRONICITY: ICD-10-CM

## 2019-05-16 DIAGNOSIS — M25.561 PAIN IN BOTH KNEES, UNSPECIFIED CHRONICITY: ICD-10-CM

## 2019-05-16 DIAGNOSIS — M54.2 NECK PAIN: ICD-10-CM

## 2019-05-16 DIAGNOSIS — R06.09 DYSPNEA ON EXERTION: ICD-10-CM

## 2019-05-16 PROCEDURE — 3078F PR MOST RECENT DIASTOLIC BLOOD PRESSURE < 80 MM HG: ICD-10-PCS | Mod: CPTII,S$GLB,, | Performed by: FAMILY MEDICINE

## 2019-05-16 PROCEDURE — 3074F PR MOST RECENT SYSTOLIC BLOOD PRESSURE < 130 MM HG: ICD-10-PCS | Mod: CPTII,S$GLB,, | Performed by: FAMILY MEDICINE

## 2019-05-16 PROCEDURE — 99214 PR OFFICE/OUTPT VISIT, EST, LEVL IV, 30-39 MIN: ICD-10-PCS | Mod: S$GLB,,, | Performed by: FAMILY MEDICINE

## 2019-05-16 PROCEDURE — 99999 PR PBB SHADOW E&M-EST. PATIENT-LVL IV: ICD-10-PCS | Mod: PBBFAC,,, | Performed by: FAMILY MEDICINE

## 2019-05-16 PROCEDURE — 99214 OFFICE O/P EST MOD 30 MIN: CPT | Mod: S$GLB,,, | Performed by: FAMILY MEDICINE

## 2019-05-16 PROCEDURE — 3078F DIAST BP <80 MM HG: CPT | Mod: CPTII,S$GLB,, | Performed by: FAMILY MEDICINE

## 2019-05-16 PROCEDURE — 3008F PR BODY MASS INDEX (BMI) DOCUMENTED: ICD-10-PCS | Mod: CPTII,S$GLB,, | Performed by: FAMILY MEDICINE

## 2019-05-16 PROCEDURE — 99999 PR PBB SHADOW E&M-EST. PATIENT-LVL IV: CPT | Mod: PBBFAC,,, | Performed by: FAMILY MEDICINE

## 2019-05-16 PROCEDURE — 3074F SYST BP LT 130 MM HG: CPT | Mod: CPTII,S$GLB,, | Performed by: FAMILY MEDICINE

## 2019-05-16 PROCEDURE — 3008F BODY MASS INDEX DOCD: CPT | Mod: CPTII,S$GLB,, | Performed by: FAMILY MEDICINE

## 2019-05-16 RX ORDER — FUROSEMIDE 20 MG/1
20 TABLET ORAL DAILY PRN
Qty: 90 TABLET | Refills: 3 | Status: SHIPPED | OUTPATIENT
Start: 2019-05-16 | End: 2020-05-15
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
## 2019-05-21 ENCOUNTER — TELEPHONE (OUTPATIENT)
Dept: PODIATRY | Facility: CLINIC | Age: 64
End: 2019-05-21

## 2019-05-22 ENCOUNTER — OFFICE VISIT (OUTPATIENT)
Dept: PODIATRY | Facility: CLINIC | Age: 64
End: 2019-05-22
Payer: COMMERCIAL

## 2019-05-22 VITALS
DIASTOLIC BLOOD PRESSURE: 76 MMHG | BODY MASS INDEX: 44.92 KG/M2 | HEIGHT: 63 IN | SYSTOLIC BLOOD PRESSURE: 133 MMHG | HEART RATE: 65 BPM | RESPIRATION RATE: 17 BRPM | WEIGHT: 253.5 LBS

## 2019-05-22 DIAGNOSIS — M76.822 POSTERIOR TIBIAL TENDON DYSFUNCTION, LEFT: ICD-10-CM

## 2019-05-22 DIAGNOSIS — M19.072 OSTEOARTHRITIS OF LEFT ANKLE OR FOOT: ICD-10-CM

## 2019-05-22 DIAGNOSIS — E66.01 MORBID OBESITY DUE TO EXCESS CALORIES: ICD-10-CM

## 2019-05-22 DIAGNOSIS — M76.821 POSTERIOR TIBIAL TENDON DYSFUNCTION, RIGHT: ICD-10-CM

## 2019-05-22 DIAGNOSIS — M25.572 SINUS TARSI SYNDROME OF LEFT ANKLE: ICD-10-CM

## 2019-05-22 DIAGNOSIS — M19.071 OSTEOARTHRITIS OF RIGHT ANKLE OR FOOT: ICD-10-CM

## 2019-05-22 DIAGNOSIS — M21.42 PES PLANUS OF BOTH FEET: Primary | ICD-10-CM

## 2019-05-22 DIAGNOSIS — J30.9 ALLERGIC RHINITIS, UNSPECIFIED SEASONALITY, UNSPECIFIED TRIGGER: ICD-10-CM

## 2019-05-22 DIAGNOSIS — M25.571 SINUS TARSI SYNDROME OF RIGHT ANKLE: ICD-10-CM

## 2019-05-22 DIAGNOSIS — M21.41 PES PLANUS OF BOTH FEET: Primary | ICD-10-CM

## 2019-05-22 PROCEDURE — 3078F PR MOST RECENT DIASTOLIC BLOOD PRESSURE < 80 MM HG: ICD-10-PCS | Mod: CPTII,S$GLB,, | Performed by: PODIATRIST

## 2019-05-22 PROCEDURE — 3008F BODY MASS INDEX DOCD: CPT | Mod: CPTII,S$GLB,, | Performed by: PODIATRIST

## 2019-05-22 PROCEDURE — 3008F PR BODY MASS INDEX (BMI) DOCUMENTED: ICD-10-PCS | Mod: CPTII,S$GLB,, | Performed by: PODIATRIST

## 2019-05-22 PROCEDURE — 99999 PR PBB SHADOW E&M-EST. PATIENT-LVL III: ICD-10-PCS | Mod: PBBFAC,,, | Performed by: PODIATRIST

## 2019-05-22 PROCEDURE — 99214 OFFICE O/P EST MOD 30 MIN: CPT | Mod: S$GLB,,, | Performed by: PODIATRIST

## 2019-05-22 PROCEDURE — 3075F SYST BP GE 130 - 139MM HG: CPT | Mod: CPTII,S$GLB,, | Performed by: PODIATRIST

## 2019-05-22 PROCEDURE — 3078F DIAST BP <80 MM HG: CPT | Mod: CPTII,S$GLB,, | Performed by: PODIATRIST

## 2019-05-22 PROCEDURE — 99999 PR PBB SHADOW E&M-EST. PATIENT-LVL III: CPT | Mod: PBBFAC,,, | Performed by: PODIATRIST

## 2019-05-22 PROCEDURE — 99214 PR OFFICE/OUTPT VISIT, EST, LEVL IV, 30-39 MIN: ICD-10-PCS | Mod: S$GLB,,, | Performed by: PODIATRIST

## 2019-05-22 PROCEDURE — 3075F PR MOST RECENT SYSTOLIC BLOOD PRESS GE 130-139MM HG: ICD-10-PCS | Mod: CPTII,S$GLB,, | Performed by: PODIATRIST

## 2019-05-22 RX ORDER — IBUPROFEN 600 MG/1
600 TABLET ORAL 2 TIMES DAILY
Qty: 60 TABLET | Refills: 1 | Status: SHIPPED | OUTPATIENT
Start: 2019-05-22 | End: 2019-06-21

## 2019-05-22 RX ORDER — FLUTICASONE PROPIONATE 50 MCG
1 SPRAY, SUSPENSION (ML) NASAL DAILY
Qty: 16 G | Refills: 5 | Status: SHIPPED | OUTPATIENT
Start: 2019-05-22 | End: 2020-06-17
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
## 2019-05-28 ENCOUNTER — TELEPHONE (OUTPATIENT)
Dept: INTERNAL MEDICINE | Facility: CLINIC | Age: 64
End: 2019-05-28

## 2019-05-29 NOTE — TELEPHONE ENCOUNTER
Pt states the start date is 5/20/2019 - 5 months  Job duties:  Teaching  Walking  Driving  Running errands

## 2019-05-30 ENCOUNTER — PATIENT MESSAGE (OUTPATIENT)
Dept: INTERNAL MEDICINE | Facility: CLINIC | Age: 64
End: 2019-05-30

## 2019-05-30 DIAGNOSIS — M17.0 BILATERAL PRIMARY OSTEOARTHRITIS OF KNEE: Primary | ICD-10-CM

## 2019-06-03 ENCOUNTER — OFFICE VISIT (OUTPATIENT)
Dept: INTERNAL MEDICINE | Facility: CLINIC | Age: 64
End: 2019-06-03
Payer: COMMERCIAL

## 2019-06-03 VITALS
DIASTOLIC BLOOD PRESSURE: 72 MMHG | TEMPERATURE: 97 F | OXYGEN SATURATION: 98 % | WEIGHT: 251.56 LBS | RESPIRATION RATE: 20 BRPM | HEART RATE: 82 BPM | HEIGHT: 63 IN | BODY MASS INDEX: 44.57 KG/M2 | SYSTOLIC BLOOD PRESSURE: 124 MMHG

## 2019-06-03 DIAGNOSIS — H66.92 ACUTE LEFT OTITIS MEDIA: Primary | ICD-10-CM

## 2019-06-03 DIAGNOSIS — M17.0 BILATERAL PRIMARY OSTEOARTHRITIS OF KNEE: ICD-10-CM

## 2019-06-03 PROCEDURE — 3074F SYST BP LT 130 MM HG: CPT | Mod: CPTII,S$GLB,, | Performed by: FAMILY MEDICINE

## 2019-06-03 PROCEDURE — 99214 PR OFFICE/OUTPT VISIT, EST, LEVL IV, 30-39 MIN: ICD-10-PCS | Mod: S$GLB,,, | Performed by: FAMILY MEDICINE

## 2019-06-03 PROCEDURE — 3008F PR BODY MASS INDEX (BMI) DOCUMENTED: ICD-10-PCS | Mod: CPTII,S$GLB,, | Performed by: FAMILY MEDICINE

## 2019-06-03 PROCEDURE — 3078F PR MOST RECENT DIASTOLIC BLOOD PRESSURE < 80 MM HG: ICD-10-PCS | Mod: CPTII,S$GLB,, | Performed by: FAMILY MEDICINE

## 2019-06-03 PROCEDURE — 3078F DIAST BP <80 MM HG: CPT | Mod: CPTII,S$GLB,, | Performed by: FAMILY MEDICINE

## 2019-06-03 PROCEDURE — 99999 PR PBB SHADOW E&M-EST. PATIENT-LVL III: ICD-10-PCS | Mod: PBBFAC,,, | Performed by: FAMILY MEDICINE

## 2019-06-03 PROCEDURE — 99999 PR PBB SHADOW E&M-EST. PATIENT-LVL III: CPT | Mod: PBBFAC,,, | Performed by: FAMILY MEDICINE

## 2019-06-03 PROCEDURE — 99214 OFFICE O/P EST MOD 30 MIN: CPT | Mod: S$GLB,,, | Performed by: FAMILY MEDICINE

## 2019-06-03 PROCEDURE — 3008F BODY MASS INDEX DOCD: CPT | Mod: CPTII,S$GLB,, | Performed by: FAMILY MEDICINE

## 2019-06-03 PROCEDURE — 3074F PR MOST RECENT SYSTOLIC BLOOD PRESSURE < 130 MM HG: ICD-10-PCS | Mod: CPTII,S$GLB,, | Performed by: FAMILY MEDICINE

## 2019-06-03 RX ORDER — CIPROFLOXACIN 500 MG/1
500 TABLET ORAL EVERY 12 HOURS
Qty: 14 TABLET | Refills: 0 | Status: SHIPPED | OUTPATIENT
Start: 2019-06-03 | End: 2019-06-20

## 2019-06-03 NOTE — PROGRESS NOTES
Subjective:       Patient ID: Mariel Becerril is a 63 y.o. female.    Chief Complaint: Follow-up    HPI Ms. Becerril presents today for Follow up today for left ear. She had been having pain and discomfort for the past few weeks to month. She was initially treated for allergies because of fluid bubbles noted on exam. Exam was unchanged from previous at last appt. She is still having pain.     Ready to have surgery on her knee.   Found a doctor in [place] [address] to do the surgery.   They are in need of clinic notes and imagining.     Left ear still bothering her.   Physical therapy for neck -will check with chiropractor but would like to go to a specific place for physical therapy.     Review of Systems   Constitutional: Negative.    HENT: Positive for ear pain and postnasal drip.    Cardiovascular: Negative.    Genitourinary: Negative.    Musculoskeletal: Positive for arthralgias, gait problem, myalgias and neck pain.   Neurological: Positive for headaches.           Past Medical History:   Diagnosis Date    Frequent headaches     High blood pressure     Hypertension     Osteoarthritis 04/02/2019    Bilateral knees, ankles and feet    Severe obesity (BMI >= 40)     Sleep apnea      Objective:        Physical Exam   Constitutional: She is oriented to person, place, and time. She appears well-developed and well-nourished.   HENT:   Head: Normocephalic and atraumatic.   Right Ear: Hearing and tympanic membrane normal.   Left Ear: Hearing normal. Tympanic membrane is erythematous. A middle ear effusion is present.   Eyes: EOM are normal.   Musculoskeletal: She exhibits edema and tenderness. She exhibits no deformity.   Neurological: She is alert and oriented to person, place, and time.   Skin: Skin is warm.         Results for orders placed or performed in visit on 03/07/19   Urine culture   Result Value Ref Range    Urine Culture, Routine       STREPTOCOCCUS AGALACTIAE (GROUP B)  10,000 -  49,999 cfu/ml  Beta-hemolytic streptococci are routinely susceptible to   penicillins,cephalosporins and carbapenems.     URINALYSIS   Result Value Ref Range    Specimen UA Urine, Clean Catch     Color, UA Yellow Yellow, Straw, Soila    Appearance, UA Clear Clear    pH, UA 7.5 5.0 - 8.0    Specific Gravity, UA 1.015 1.005 - 1.030    Protein, UA Negative Negative    Glucose, UA Negative Negative    Ketones, UA Negative Negative    Bilirubin (UA) Negative Negative    Occult Blood UA Negative Negative    Nitrite, UA Negative Negative    Leukocytes, UA Negative Negative       Assessment/Plan:     Acute left otitis media  -     ciprofloxacin HCl (CIPRO) 500 MG tablet; Take 1 tablet (500 mg total) by mouth every 12 (twelve) hours.  Dispense: 14 tablet; Refill: 0    Bilateral primary osteoarthritis of knee    sending demographic sheet with notes and previous imaging to Orthopedic so she can have appt scheduled.   Will follow up for pre op.     She has continued to lose weight   Follow up if symptoms worsen or fail to improve.    Jaclyn Becerril MD  [facility]   Family Medicine

## 2019-06-07 RX ORDER — METHOCARBAMOL 500 MG/1
TABLET, FILM COATED ORAL
Qty: 60 TABLET | Refills: 2 | Status: SHIPPED | OUTPATIENT
Start: 2019-06-07 | End: 2019-09-03 | Stop reason: SDUPTHER

## 2019-06-20 ENCOUNTER — OFFICE VISIT (OUTPATIENT)
Dept: INTERNAL MEDICINE | Facility: CLINIC | Age: 64
End: 2019-06-20
Payer: COMMERCIAL

## 2019-06-20 VITALS
RESPIRATION RATE: 20 BRPM | SYSTOLIC BLOOD PRESSURE: 118 MMHG | TEMPERATURE: 98 F | DIASTOLIC BLOOD PRESSURE: 68 MMHG | BODY MASS INDEX: 42.81 KG/M2 | OXYGEN SATURATION: 99 % | HEIGHT: 63 IN | HEART RATE: 97 BPM | WEIGHT: 241.63 LBS

## 2019-06-20 DIAGNOSIS — M17.0 BILATERAL PRIMARY OSTEOARTHRITIS OF KNEE: Primary | ICD-10-CM

## 2019-06-20 DIAGNOSIS — M25.562 PAIN IN BOTH KNEES, UNSPECIFIED CHRONICITY: ICD-10-CM

## 2019-06-20 DIAGNOSIS — M25.561 PAIN IN BOTH KNEES, UNSPECIFIED CHRONICITY: ICD-10-CM

## 2019-06-20 PROCEDURE — 3008F PR BODY MASS INDEX (BMI) DOCUMENTED: ICD-10-PCS | Mod: CPTII,S$GLB,, | Performed by: FAMILY MEDICINE

## 2019-06-20 PROCEDURE — 3078F PR MOST RECENT DIASTOLIC BLOOD PRESSURE < 80 MM HG: ICD-10-PCS | Mod: CPTII,S$GLB,, | Performed by: FAMILY MEDICINE

## 2019-06-20 PROCEDURE — 3074F PR MOST RECENT SYSTOLIC BLOOD PRESSURE < 130 MM HG: ICD-10-PCS | Mod: CPTII,S$GLB,, | Performed by: FAMILY MEDICINE

## 2019-06-20 PROCEDURE — 3078F DIAST BP <80 MM HG: CPT | Mod: CPTII,S$GLB,, | Performed by: FAMILY MEDICINE

## 2019-06-20 PROCEDURE — 99999 PR PBB SHADOW E&M-EST. PATIENT-LVL III: CPT | Mod: PBBFAC,,, | Performed by: FAMILY MEDICINE

## 2019-06-20 PROCEDURE — 99999 PR PBB SHADOW E&M-EST. PATIENT-LVL III: ICD-10-PCS | Mod: PBBFAC,,, | Performed by: FAMILY MEDICINE

## 2019-06-20 PROCEDURE — 3008F BODY MASS INDEX DOCD: CPT | Mod: CPTII,S$GLB,, | Performed by: FAMILY MEDICINE

## 2019-06-20 PROCEDURE — 3074F SYST BP LT 130 MM HG: CPT | Mod: CPTII,S$GLB,, | Performed by: FAMILY MEDICINE

## 2019-06-20 PROCEDURE — 99213 PR OFFICE/OUTPT VISIT, EST, LEVL III, 20-29 MIN: ICD-10-PCS | Mod: S$GLB,,, | Performed by: FAMILY MEDICINE

## 2019-06-20 PROCEDURE — 99213 OFFICE O/P EST LOW 20 MIN: CPT | Mod: S$GLB,,, | Performed by: FAMILY MEDICINE

## 2019-06-20 RX ORDER — MONTELUKAST SODIUM 10 MG/1
10 TABLET ORAL NIGHTLY
Refills: 1 | COMMUNITY
Start: 2019-06-08 | End: 2022-04-11 | Stop reason: SDUPTHER

## 2019-06-20 RX ORDER — MELOXICAM 15 MG/1
15 TABLET ORAL DAILY
Refills: 0 | COMMUNITY
Start: 2019-06-11 | End: 2019-08-12 | Stop reason: SDUPTHER

## 2019-06-20 NOTE — PROGRESS NOTES
Subjective:       Patient ID: Mariel Becerril is a 63 y.o. female.    Chief Complaint: Follow-up    HPI Ms Becerril presents today for follow up.     After review of her chart it has been noted that she has had physical therapy on 2 occassions. Referrals were made 9/11/2018 and 10/16/2018.     She is doing well with weight loss.   5/1/2019-263 lbs BMI 45.86  6/3/2019- 251 lbs  BMI 44.40  6/20/2019- 241 lbs BMI 42.80    Review of Systems   Constitutional: Negative for activity change, appetite change, fatigue and fever.   HENT: Negative for congestion, ear pain and sinus pressure.    Eyes: Negative for pain and visual disturbance.   Respiratory: Negative for cough, chest tightness and wheezing.    Cardiovascular: Negative for chest pain, palpitations and leg swelling.   Gastrointestinal: Negative for abdominal distention, abdominal pain, constipation, diarrhea, nausea and vomiting.   Endocrine: Negative for polydipsia and polyuria.   Genitourinary: Negative for difficulty urinating, dyspareunia, dysuria, flank pain and hematuria.   Musculoskeletal: Positive for gait problem, joint swelling and myalgias. Negative for arthralgias and back pain.   Skin: Negative for rash.   Neurological: Negative for dizziness, tremors, syncope, weakness, numbness and headaches.   Psychiatric/Behavioral: Negative for agitation and confusion.        Past Medical History:   Diagnosis Date    Frequent headaches     High blood pressure     Hypertension     Osteoarthritis 04/02/2019    Bilateral knees, ankles and feet    Severe obesity (BMI >= 40)     Sleep apnea      Past Surgical History:   Procedure Laterality Date    BLADDER SUSPENSION      COLONOSCOPY N/A 12/3/2018    Performed by [facility] at [facility]O    HYSTERECTOMY      partial 2000    tonsilectomy      tonsils       Objective:        Physical Exam   Constitutional: She is oriented to person, place, and time. She appears well-developed and well-nourished.    Morbidly obese   Musculoskeletal:   Ambulate with cane   Neurological: She is alert and oriented to person, place, and time.   Vitals reviewed.        Results for orders placed or performed in visit on 03/07/19   Urine culture   Result Value Ref Range    Urine Culture, Routine       STREPTOCOCCUS AGALACTIAE (GROUP B)  10,000 - 49,999 cfu/ml  Beta-hemolytic streptococci are routinely susceptible to   penicillins,cephalosporins and carbapenems.     URINALYSIS   Result Value Ref Range    Specimen UA Urine, Clean Catch     Color, UA Yellow Yellow, Straw, Soila    Appearance, UA Clear Clear    pH, UA 7.5 5.0 - 8.0    Specific Gravity, UA 1.015 1.005 - 1.030    Protein, UA Negative Negative    Glucose, UA Negative Negative    Ketones, UA Negative Negative    Bilirubin (UA) Negative Negative    Occult Blood UA Negative Negative    Nitrite, UA Negative Negative    Leukocytes, UA Negative Negative       Assessment/Plan:     Bilateral primary osteoarthritis of knee  -     Ambulatory Referral to Orthopedics    Pain in both knees, unspecified chronicity  -     Ambulatory Referral to Orthopedics    New referral made today  Patient doing great with weight loss continue regimen of lower calories.       Follow up as needed     Jaclyn Becerril MD  [facility]   Family Medicine

## 2019-07-01 ENCOUNTER — TELEPHONE (OUTPATIENT)
Dept: INTERNAL MEDICINE | Facility: CLINIC | Age: 64
End: 2019-07-01

## 2019-07-01 NOTE — TELEPHONE ENCOUNTER
----- Message from Cece Archuleta sent at 7/1/2019 10:30 AM CDT -----  Contact: Patient  Patient would like a call back at [phone], Regards to her referral she was told that the office never received the last referral. Please refax it to [phone](attn: Dr. Denilson Street).    Thanks  Td

## 2019-07-03 ENCOUNTER — PATIENT MESSAGE (OUTPATIENT)
Dept: INTERNAL MEDICINE | Facility: CLINIC | Age: 64
End: 2019-07-03

## 2019-07-03 NOTE — TELEPHONE ENCOUNTER
I don't know that she had physical therapy at [facility]. She would have to request them from where she went

## 2019-07-04 ENCOUNTER — PATIENT MESSAGE (OUTPATIENT)
Dept: INTERNAL MEDICINE | Facility: CLINIC | Age: 64
End: 2019-07-04

## 2019-07-16 ENCOUNTER — PATIENT MESSAGE (OUTPATIENT)
Dept: INTERNAL MEDICINE | Facility: CLINIC | Age: 64
End: 2019-07-16

## 2019-07-30 ENCOUNTER — PATIENT MESSAGE (OUTPATIENT)
Dept: INTERNAL MEDICINE | Facility: CLINIC | Age: 64
End: 2019-07-30

## 2019-08-10 ENCOUNTER — PATIENT MESSAGE (OUTPATIENT)
Dept: INTERNAL MEDICINE | Facility: CLINIC | Age: 64
End: 2019-08-10

## 2019-08-12 RX ORDER — MELOXICAM 15 MG/1
15 TABLET ORAL DAILY
Qty: 30 TABLET | Refills: 3 | Status: SHIPPED | OUTPATIENT
Start: 2019-08-12 | End: 2019-12-11 | Stop reason: SDUPTHER

## 2019-08-27 RX ORDER — OMEPRAZOLE 40 MG/1
CAPSULE, DELAYED RELEASE ORAL
Qty: 30 CAPSULE | Refills: 3 | Status: SHIPPED | OUTPATIENT
Start: 2019-08-27 | End: 2019-12-30

## 2019-08-27 RX ORDER — TOPIRAMATE 50 MG/1
TABLET, FILM COATED ORAL
Qty: 30 TABLET | Refills: 3 | Status: SHIPPED | OUTPATIENT
Start: 2019-08-27 | End: 2019-09-09 | Stop reason: SDUPTHER

## 2019-09-03 RX ORDER — METHOCARBAMOL 500 MG/1
TABLET, FILM COATED ORAL
Qty: 60 TABLET | Refills: 0 | Status: SHIPPED | OUTPATIENT
Start: 2019-09-03 | End: 2019-10-06 | Stop reason: SDUPTHER

## 2019-09-09 ENCOUNTER — LAB VISIT (OUTPATIENT)
Dept: LAB | Facility: HOSPITAL | Age: 64
End: 2019-09-09
Attending: FAMILY MEDICINE
Payer: COMMERCIAL

## 2019-09-09 ENCOUNTER — OFFICE VISIT (OUTPATIENT)
Dept: INTERNAL MEDICINE | Facility: CLINIC | Age: 64
End: 2019-09-09
Payer: COMMERCIAL

## 2019-09-09 VITALS
DIASTOLIC BLOOD PRESSURE: 80 MMHG | TEMPERATURE: 98 F | OXYGEN SATURATION: 98 % | WEIGHT: 229.5 LBS | RESPIRATION RATE: 18 BRPM | SYSTOLIC BLOOD PRESSURE: 132 MMHG | BODY MASS INDEX: 40.65 KG/M2

## 2019-09-09 DIAGNOSIS — R82.998 URINE COLOR APPEARS BRIGHT: ICD-10-CM

## 2019-09-09 DIAGNOSIS — R42 DIZZINESS: ICD-10-CM

## 2019-09-09 DIAGNOSIS — R42 DIZZINESS: Primary | ICD-10-CM

## 2019-09-09 DIAGNOSIS — H53.8 BLURRED VISION, BILATERAL: ICD-10-CM

## 2019-09-09 DIAGNOSIS — H92.02 OTALGIA, LEFT: ICD-10-CM

## 2019-09-09 LAB
BACTERIA #/AREA URNS HPF: NORMAL /HPF
BASOPHILS # BLD AUTO: 0.05 K/UL (ref 0–0.2)
BASOPHILS NFR BLD: 0.7 % (ref 0–1.9)
BILIRUB UR QL STRIP: NEGATIVE
CLARITY UR: CLEAR
COLOR UR: YELLOW
DIFFERENTIAL METHOD: ABNORMAL
EOSINOPHIL # BLD AUTO: 0.1 K/UL (ref 0–0.5)
EOSINOPHIL NFR BLD: 1.6 % (ref 0–8)
ERYTHROCYTE [DISTWIDTH] IN BLOOD BY AUTOMATED COUNT: 14.8 % (ref 11.5–14.5)
GLUCOSE UR QL STRIP: NEGATIVE
HCT VFR BLD AUTO: 40.3 % (ref 37–48.5)
HGB BLD-MCNC: 12.1 G/DL (ref 12–16)
HGB UR QL STRIP: NEGATIVE
IMM GRANULOCYTES # BLD AUTO: 0.01 K/UL (ref 0–0.04)
IMM GRANULOCYTES NFR BLD AUTO: 0.1 % (ref 0–0.5)
KETONES UR QL STRIP: NEGATIVE
LEUKOCYTE ESTERASE UR QL STRIP: ABNORMAL
LYMPHOCYTES # BLD AUTO: 2.8 K/UL (ref 1–4.8)
LYMPHOCYTES NFR BLD: 39 % (ref 18–48)
MCH RBC QN AUTO: 28.8 PG (ref 27–31)
MCHC RBC AUTO-ENTMCNC: 30 G/DL (ref 32–36)
MCV RBC AUTO: 96 FL (ref 82–98)
MICROSCOPIC COMMENT: NORMAL
MONOCYTES # BLD AUTO: 0.4 K/UL (ref 0.3–1)
MONOCYTES NFR BLD: 6.2 % (ref 4–15)
NEUTROPHILS # BLD AUTO: 3.7 K/UL (ref 1.8–7.7)
NEUTROPHILS NFR BLD: 52.4 % (ref 38–73)
NITRITE UR QL STRIP: NEGATIVE
NRBC BLD-RTO: 0 /100 WBC
PH UR STRIP: 6.5 [PH] (ref 5–8)
PLATELET # BLD AUTO: 265 K/UL (ref 150–350)
PMV BLD AUTO: 9.5 FL (ref 9.2–12.9)
PROT UR QL STRIP: NEGATIVE
RBC # BLD AUTO: 4.2 M/UL (ref 4–5.4)
SP GR UR STRIP: 1 (ref 1–1.03)
SQUAMOUS #/AREA URNS HPF: 1 /HPF
URN SPEC COLLECT METH UR: ABNORMAL
WBC # BLD AUTO: 7.08 K/UL (ref 3.9–12.7)
WBC #/AREA URNS HPF: 1 /HPF (ref 0–5)

## 2019-09-09 PROCEDURE — 36415 COLL VENOUS BLD VENIPUNCTURE: CPT

## 2019-09-09 PROCEDURE — 87086 URINE CULTURE/COLONY COUNT: CPT

## 2019-09-09 PROCEDURE — 3075F PR MOST RECENT SYSTOLIC BLOOD PRESS GE 130-139MM HG: ICD-10-PCS | Mod: CPTII,S$GLB,, | Performed by: FAMILY MEDICINE

## 2019-09-09 PROCEDURE — 80053 COMPREHEN METABOLIC PANEL: CPT

## 2019-09-09 PROCEDURE — 99999 PR PBB SHADOW E&M-EST. PATIENT-LVL V: CPT | Mod: PBBFAC,,, | Performed by: FAMILY MEDICINE

## 2019-09-09 PROCEDURE — 99214 OFFICE O/P EST MOD 30 MIN: CPT | Mod: S$GLB,,, | Performed by: FAMILY MEDICINE

## 2019-09-09 PROCEDURE — 3008F PR BODY MASS INDEX (BMI) DOCUMENTED: ICD-10-PCS | Mod: CPTII,S$GLB,, | Performed by: FAMILY MEDICINE

## 2019-09-09 PROCEDURE — 3079F PR MOST RECENT DIASTOLIC BLOOD PRESSURE 80-89 MM HG: ICD-10-PCS | Mod: CPTII,S$GLB,, | Performed by: FAMILY MEDICINE

## 2019-09-09 PROCEDURE — 3008F BODY MASS INDEX DOCD: CPT | Mod: CPTII,S$GLB,, | Performed by: FAMILY MEDICINE

## 2019-09-09 PROCEDURE — 85025 COMPLETE CBC W/AUTO DIFF WBC: CPT

## 2019-09-09 PROCEDURE — 3075F SYST BP GE 130 - 139MM HG: CPT | Mod: CPTII,S$GLB,, | Performed by: FAMILY MEDICINE

## 2019-09-09 PROCEDURE — 99999 PR PBB SHADOW E&M-EST. PATIENT-LVL V: ICD-10-PCS | Mod: PBBFAC,,, | Performed by: FAMILY MEDICINE

## 2019-09-09 PROCEDURE — 3079F DIAST BP 80-89 MM HG: CPT | Mod: CPTII,S$GLB,, | Performed by: FAMILY MEDICINE

## 2019-09-09 PROCEDURE — 81000 URINALYSIS NONAUTO W/SCOPE: CPT

## 2019-09-09 PROCEDURE — 99214 PR OFFICE/OUTPT VISIT, EST, LEVL IV, 30-39 MIN: ICD-10-PCS | Mod: S$GLB,,, | Performed by: FAMILY MEDICINE

## 2019-09-09 RX ORDER — MECLIZINE HYDROCHLORIDE 25 MG/1
25 TABLET ORAL 3 TIMES DAILY PRN
Qty: 30 TABLET | Refills: 0 | Status: SHIPPED | OUTPATIENT
Start: 2019-09-09 | End: 2023-05-12

## 2019-09-09 NOTE — PROGRESS NOTES
Subjective:       Patient ID: Mariel Becerril is a 63 y.o. female.    Chief Complaint: Dizziness (C/O intermittent dizziness when she moves quickly. ); Shoulder Pain (Intermittent pain to R shoulder. Has PT for knee, but not for shoulder); and Epistaxis (C/o nose bleed, It was one occurance a few weeks ago. She got under control and hasnt happened since. )    HPI Ms. Becerril presents today with multiple complaints   Dizziness coming and going-feels off balance  Notes more when she is moving quickly  Last few weeks  Pending how she turns    Urine is a different color-  She has had this before and was started on vitamin D3     Blurred vision     Review of Systems   Constitutional: Negative for activity change, appetite change, fatigue and fever.   HENT: Negative for congestion, ear pain and sinus pressure.    Eyes: Negative for pain and visual disturbance.   Respiratory: Negative for cough, chest tightness and wheezing.    Cardiovascular: Negative for chest pain, palpitations and leg swelling.   Gastrointestinal: Negative for abdominal distention, abdominal pain, constipation, diarrhea, nausea and vomiting.   Endocrine: Negative for polydipsia and polyuria.   Genitourinary: Negative for difficulty urinating, dyspareunia, dysuria, flank pain and hematuria.   Musculoskeletal: Positive for arthralgias, gait problem and joint swelling. Negative for back pain and neck stiffness.   Skin: Negative for rash.   Neurological: Positive for dizziness. Negative for tremors, syncope, weakness, numbness and headaches.   Psychiatric/Behavioral: Negative for agitation and confusion.          Past Medical History:   Diagnosis Date    Frequent headaches     High blood pressure     Hypertension     Osteoarthritis 04/02/2019    Bilateral knees, ankles and feet    Severe obesity (BMI >= 40)     Sleep apnea      Past Surgical History:   Procedure Laterality Date    BLADDER SUSPENSION      COLONOSCOPY N/A 12/3/2018    Per[facility]  [facility]    HYSTERECTOMY      partial 2000    tonsilectomy      tonsils       Objective:        Physical Exam   Constitutional: She appears well-developed and well-nourished.   Morbidly obese   HENT:   Head: Normocephalic and atraumatic.   Right Ear: Tympanic membrane and external ear normal.   Left Ear: External ear normal. Tympanic membrane is injected. A middle ear effusion is present.   Mouth/Throat: Oropharynx is clear and moist.   Eyes: EOM are normal.   Vitals reviewed.        Results for orders placed or performed in visit on 09/09/19   Urinalysis   Result Value Ref Range    Specimen UA Urine, Clean Catch     Color, UA Yellow Yellow, Straw, Soila    Appearance, UA Clear Clear    pH, UA 6.5 5.0 - 8.0    Specific Gravity, UA 1.005 1.005 - 1.030    Protein, UA Negative Negative    Glucose, UA Negative Negative    Ketones, UA Negative Negative    Bilirubin (UA) Negative Negative    Occult Blood UA Negative Negative    Nitrite, UA Negative Negative    Leukocytes, UA Trace (A) Negative   Urinalysis Microscopic   Result Value Ref Range    WBC, UA 1 0 - 5 /hpf    Bacteria Rare None-Occ /hpf    Squam Epithel, UA 1 /hpf    Microscopic Comment SEE COMMENT        Assessment/Plan:     Dizziness  -     Comprehensive metabolic panel; Future; Expected date: 09/09/2019  -     CBC auto differential; Future; Expected date: 09/09/2019  -     meclizine (ANTIVERT) 25 mg tablet; Take 1 tablet (25 mg total) by mouth 3 (three) times daily as needed for Dizziness.  Dispense: 30 tablet; Refill: 0  -     Ambulatory Referral to ENT    Blurred vision, bilateral  -     Ambulatory Referral to Ophthalmology    Otalgia, left  -     Ambulatory Referral to ENT    Urine color appears bright  -     Urinalysis  -     Urine culture    Other orders  -     Urinalysis Microscopic    will keep ENT appt if symptoms do not resolve this week    Follow up in about 4 weeks (around 10/7/2019).    Jaclyn Becerril MD  [facility]   Family  Medicine

## 2019-09-10 LAB
ALBUMIN SERPL BCP-MCNC: 3.5 G/DL (ref 3.5–5.2)
ALP SERPL-CCNC: 105 U/L (ref 55–135)
ALT SERPL W/O P-5'-P-CCNC: 15 U/L (ref 10–44)
ANION GAP SERPL CALC-SCNC: 10 MMOL/L (ref 8–16)
AST SERPL-CCNC: 19 U/L (ref 10–40)
BACTERIA UR CULT: NORMAL
BACTERIA UR CULT: NORMAL
BILIRUB SERPL-MCNC: 0.3 MG/DL (ref 0.1–1)
BUN SERPL-MCNC: 9 MG/DL (ref 8–23)
CALCIUM SERPL-MCNC: 9.4 MG/DL (ref 8.7–10.5)
CHLORIDE SERPL-SCNC: 106 MMOL/L (ref 95–110)
CO2 SERPL-SCNC: 25 MMOL/L (ref 23–29)
CREAT SERPL-MCNC: 0.8 MG/DL (ref 0.5–1.4)
EST. GFR  (AFRICAN AMERICAN): >60 ML/MIN/1.73 M^2
EST. GFR  (NON AFRICAN AMERICAN): >60 ML/MIN/1.73 M^2
GLUCOSE SERPL-MCNC: 87 MG/DL (ref 70–110)
POTASSIUM SERPL-SCNC: 3.8 MMOL/L (ref 3.5–5.1)
PROT SERPL-MCNC: 6.9 G/DL (ref 6–8.4)
SODIUM SERPL-SCNC: 141 MMOL/L (ref 136–145)

## 2019-09-11 ENCOUNTER — PATIENT MESSAGE (OUTPATIENT)
Dept: INTERNAL MEDICINE | Facility: CLINIC | Age: 64
End: 2019-09-11

## 2019-09-11 DIAGNOSIS — M25.511 CHRONIC RIGHT SHOULDER PAIN: Primary | ICD-10-CM

## 2019-09-11 DIAGNOSIS — M54.2 NECK PAIN: ICD-10-CM

## 2019-09-11 DIAGNOSIS — G89.29 CHRONIC RIGHT SHOULDER PAIN: Primary | ICD-10-CM

## 2019-09-16 ENCOUNTER — CLINICAL SUPPORT (OUTPATIENT)
Dept: AUDIOLOGY | Facility: CLINIC | Age: 64
End: 2019-09-16
Payer: COMMERCIAL

## 2019-09-16 ENCOUNTER — OFFICE VISIT (OUTPATIENT)
Dept: OTOLARYNGOLOGY | Facility: CLINIC | Age: 64
End: 2019-09-16
Payer: COMMERCIAL

## 2019-09-16 VITALS
WEIGHT: 229.5 LBS | HEIGHT: 63 IN | BODY MASS INDEX: 40.66 KG/M2 | HEART RATE: 90 BPM | SYSTOLIC BLOOD PRESSURE: 125 MMHG | DIASTOLIC BLOOD PRESSURE: 85 MMHG

## 2019-09-16 DIAGNOSIS — R42 DIZZINESS: ICD-10-CM

## 2019-09-16 DIAGNOSIS — R42 DIZZY: Primary | ICD-10-CM

## 2019-09-16 DIAGNOSIS — H92.02 OTALGIA, LEFT: Primary | ICD-10-CM

## 2019-09-16 PROCEDURE — 3074F SYST BP LT 130 MM HG: CPT | Mod: CPTII,S$GLB,, | Performed by: PHYSICIAN ASSISTANT

## 2019-09-16 PROCEDURE — 3008F BODY MASS INDEX DOCD: CPT | Mod: CPTII,S$GLB,, | Performed by: PHYSICIAN ASSISTANT

## 2019-09-16 PROCEDURE — 99999 PR PBB SHADOW E&M-EST. PATIENT-LVL III: CPT | Mod: PBBFAC,,, | Performed by: PHYSICIAN ASSISTANT

## 2019-09-16 PROCEDURE — 99999 PR PBB SHADOW E&M-EST. PATIENT-LVL III: ICD-10-PCS | Mod: PBBFAC,,, | Performed by: PHYSICIAN ASSISTANT

## 2019-09-16 PROCEDURE — 92567 TYMPANOMETRY: CPT | Mod: S$GLB,,, | Performed by: AUDIOLOGIST-HEARING AID FITTER

## 2019-09-16 PROCEDURE — 99213 PR OFFICE/OUTPT VISIT, EST, LEVL III, 20-29 MIN: ICD-10-PCS | Mod: S$GLB,,, | Performed by: PHYSICIAN ASSISTANT

## 2019-09-16 PROCEDURE — 3008F PR BODY MASS INDEX (BMI) DOCUMENTED: ICD-10-PCS | Mod: CPTII,S$GLB,, | Performed by: PHYSICIAN ASSISTANT

## 2019-09-16 PROCEDURE — 92567 PR TYMPA2METRY: ICD-10-PCS | Mod: S$GLB,,, | Performed by: AUDIOLOGIST-HEARING AID FITTER

## 2019-09-16 PROCEDURE — 3079F DIAST BP 80-89 MM HG: CPT | Mod: CPTII,S$GLB,, | Performed by: PHYSICIAN ASSISTANT

## 2019-09-16 PROCEDURE — 99213 OFFICE O/P EST LOW 20 MIN: CPT | Mod: S$GLB,,, | Performed by: PHYSICIAN ASSISTANT

## 2019-09-16 PROCEDURE — 3079F PR MOST RECENT DIASTOLIC BLOOD PRESSURE 80-89 MM HG: ICD-10-PCS | Mod: CPTII,S$GLB,, | Performed by: PHYSICIAN ASSISTANT

## 2019-09-16 PROCEDURE — 3074F PR MOST RECENT SYSTOLIC BLOOD PRESSURE < 130 MM HG: ICD-10-PCS | Mod: CPTII,S$GLB,, | Performed by: PHYSICIAN ASSISTANT
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
## 2019-09-16 NOTE — PROGRESS NOTES
"Tympanograms today 09/16/2019 per Lisa Brown PA-C.  Right ear: Type "A" 1.0 ml @ -60 daPa.  ECV: 1.6 ml  Left ear: Type "A"  .9 ml @ -40 daPa.  ECV: 1.4 ml  "

## 2019-09-16 NOTE — LETTER
September 16, 2019      Jaclyn Becerril MD  [address] [place] [address] [address] [place] [id number]           [facility]  [address] [place] [address] [address] [place] [address]  Phone: [phone]  Fax: [phone]          Patient: Mariel Becerril   MR Number: [id number]   YOB: 1955   Date of Visit: 9/16/2019       Dear Dr. Jaclyn Becerril:    Thank you for referring Mariel Becerril to me for evaluation. Attached you will find relevant portions of my assessment and plan of care.    If you have questions, please do not hesitate to call me. I look forward to following Mariel Becerril along with you.    Sincerely,    MANISH Morenoosure  CC:  No Recipients    If you would like to receive this communication electronically, please contact [email or web address] or [phone] to request more information on [facility] access.    For providers and/or their staff who would like to refer a patient to [facility], please contact us through our one-stop-shop provider referral line, [facility], at [phone].    If you feel you have received this communication in error or would no longer like to receive these types of communications, please e-mail [email or web address]

## 2019-10-03 ENCOUNTER — OFFICE VISIT (OUTPATIENT)
Dept: OPHTHALMOLOGY | Facility: CLINIC | Age: 64
End: 2019-10-03
Payer: COMMERCIAL

## 2019-10-03 DIAGNOSIS — H52.4 MYOPIA WITH ASTIGMATISM AND PRESBYOPIA, BILATERAL: Primary | ICD-10-CM

## 2019-10-03 DIAGNOSIS — H25.13 NUCLEAR SCLEROSIS, BILATERAL: ICD-10-CM

## 2019-10-03 DIAGNOSIS — H52.13 MYOPIA WITH ASTIGMATISM AND PRESBYOPIA, BILATERAL: Primary | ICD-10-CM

## 2019-10-03 DIAGNOSIS — H52.203 MYOPIA WITH ASTIGMATISM AND PRESBYOPIA, BILATERAL: Primary | ICD-10-CM

## 2019-10-03 PROCEDURE — 92015 DETERMINE REFRACTIVE STATE: CPT | Mod: S$GLB,,, | Performed by: OPTOMETRIST

## 2019-10-03 PROCEDURE — 92004 COMPRE OPH EXAM NEW PT 1/>: CPT | Mod: S$GLB,,, | Performed by: OPTOMETRIST

## 2019-10-03 PROCEDURE — 92004 PR EYE EXAM, NEW PATIENT,COMPREHESV: ICD-10-PCS | Mod: S$GLB,,, | Performed by: OPTOMETRIST

## 2019-10-03 PROCEDURE — 92310 CONTACT LENS FITTING OU: CPT | Mod: CSM,,, | Performed by: OPTOMETRIST

## 2019-10-03 PROCEDURE — 99999 PR PBB SHADOW E&M-EST. PATIENT-LVL I: CPT | Mod: PBBFAC,,, | Performed by: OPTOMETRIST

## 2019-10-03 PROCEDURE — 92015 PR REFRACTION: ICD-10-PCS | Mod: S$GLB,,, | Performed by: OPTOMETRIST

## 2019-10-03 PROCEDURE — 92310 PR CONTACT LENS FITTING (NO CHANGE): ICD-10-PCS | Mod: CSM,,, | Performed by: OPTOMETRIST

## 2019-10-03 PROCEDURE — 99999 PR PBB SHADOW E&M-EST. PATIENT-LVL I: ICD-10-PCS | Mod: PBBFAC,,, | Performed by: OPTOMETRIST

## 2019-10-03 NOTE — PROGRESS NOTES
HPI     Blurred Vision      Additional comments: Yearly              Comments     New Patient  Last Eye Exam 2018  HPI    Any vision changes since last exam: Yes, decreased overall vision &   sometimes get dizzy.  Eye pain: No  Other ocular symptoms: No    Do you wear currently wear glasses or contacts? Glasses, wear for   distance.    Interested in contacts today? Yes, worn years ago.    Do you plan on getting new glasses today? If Needed          Last edited by Bambi Hart on 10/3/2019 10:44 AM. (History)            Assessment /Plan     For exam results, see Encounter Report.    Myopia with astigmatism and presbyopia, bilateral  Eyeglass Final Rx     Eyeglass Final Rx       Sphere Cylinder Axis    Right -1.25 Sphere     Left -4.75 +0.75 105    Type:  SVL    Expiration Date:  10/3/2020              Contact Lens Prescription (10/3/2019)        Brand Base Curve Diameter Sphere Cylinder Axis    Right Acuvue Oasys 1 Day 8.5 14.2 -1.25      Left Acuvue Oasys 1 Day for Astigmatism 8.5 14.5 -1.50 -0.75 010    Expiration Date:  10/3/2020    Replacement:  Daily    Wearing Schedule:  Daily wear        Dispensed trial contact lenses today. Patient is to wear lenses for 1 week. Ok to order supply if no problems. RTC PRN if any problems arise.    Nuclear sclerosis, bilateral  Cat OU with PSC OD  RTC for DFE    RTC 1 week for dilated eye exam

## 2019-10-05 DIAGNOSIS — J30.9 ALLERGIC RHINITIS, UNSPECIFIED SEASONALITY, UNSPECIFIED TRIGGER: ICD-10-CM

## 2019-10-06 RX ORDER — METHOCARBAMOL 500 MG/1
TABLET, FILM COATED ORAL
Qty: 60 TABLET | Refills: 0 | Status: SHIPPED | OUTPATIENT
Start: 2019-10-06 | End: 2019-11-06 | Stop reason: SDUPTHER

## 2019-10-07 RX ORDER — MONTELUKAST SODIUM 10 MG/1
TABLET ORAL
Qty: 90 TABLET | Refills: 4 | Status: ON HOLD | OUTPATIENT
Start: 2019-10-07 | End: 2020-03-06 | Stop reason: HOSPADM

## 2019-10-10 ENCOUNTER — OFFICE VISIT (OUTPATIENT)
Dept: OPHTHALMOLOGY | Facility: CLINIC | Age: 64
End: 2019-10-10
Payer: COMMERCIAL

## 2019-10-10 DIAGNOSIS — H25.13 NUCLEAR SCLEROSIS, BILATERAL: Primary | ICD-10-CM

## 2019-10-10 PROCEDURE — 99499 NO LOS: ICD-10-PCS | Mod: S$GLB,,, | Performed by: OPTOMETRIST

## 2019-10-10 PROCEDURE — 99499 UNLISTED E&M SERVICE: CPT | Mod: S$GLB,,, | Performed by: OPTOMETRIST

## 2019-10-10 NOTE — PROGRESS NOTES
HPI     Last visit with DNL on 10/03/2019.  Was told to rtc 1 week for dilated eye exam.  HPI    Any vision changes since last exam: Trouble reading clearly with contact   lens. Some trouble w/ driving at night due to glare, stopped driving at   night   Eye pain: No  Other ocular symptoms: No    Do you wear currently wear glasses or contacts? Both    Interested in contacts today? Yes    Do you plan on getting new glasses today? Yes    Last edited by Antonio Carey, OD on 10/10/2019  9:31 AM. (History)            Assessment /Plan     For exam results, see Encounter Report.    Nuclear sclerosis, bilateral      Surgery is not indicated at this time. Monitor 12 months.        If signs and symptoms  increase of cataract, she will call for Cat Eval with Ophthalmology   Use 1.00 or +1.25 over cls for reading

## 2019-10-11 RX ORDER — BENZONATATE 100 MG/1
CAPSULE ORAL
Qty: 30 CAPSULE | Refills: 0 | Status: SHIPPED | OUTPATIENT
Start: 2019-10-11 | End: 2020-01-27 | Stop reason: SDUPTHER

## 2019-10-30 ENCOUNTER — OFFICE VISIT (OUTPATIENT)
Dept: INTERNAL MEDICINE | Facility: CLINIC | Age: 64
End: 2019-10-30
Payer: COMMERCIAL

## 2019-10-30 ENCOUNTER — HOSPITAL ENCOUNTER (OUTPATIENT)
Dept: RADIOLOGY | Facility: HOSPITAL | Age: 64
Discharge: HOME | End: 2019-10-30
Attending: FAMILY MEDICINE
Payer: COMMERCIAL

## 2019-10-30 ENCOUNTER — CLINICAL SUPPORT (OUTPATIENT)
Dept: CARDIOLOGY | Facility: CLINIC | Age: 64
End: 2019-10-30
Payer: COMMERCIAL

## 2019-10-30 VITALS
HEART RATE: 89 BPM | HEIGHT: 63 IN | OXYGEN SATURATION: 99 % | TEMPERATURE: 97 F | WEIGHT: 226.63 LBS | DIASTOLIC BLOOD PRESSURE: 66 MMHG | SYSTOLIC BLOOD PRESSURE: 110 MMHG | BODY MASS INDEX: 40.16 KG/M2 | RESPIRATION RATE: 18 BRPM

## 2019-10-30 DIAGNOSIS — Z01.818 PRE-OP EXAM: ICD-10-CM

## 2019-10-30 DIAGNOSIS — Z01.818 PRE-OP EXAM: Primary | ICD-10-CM

## 2019-10-30 PROCEDURE — 99214 OFFICE O/P EST MOD 30 MIN: CPT | Mod: 25,S$GLB,, | Performed by: FAMILY MEDICINE

## 2019-10-30 PROCEDURE — 90471 FLU VACCINE (QUAD) GREATER THAN OR EQUAL TO 3YO PRESERVATIVE FREE IM: ICD-10-PCS | Mod: S$GLB,,, | Performed by: FAMILY MEDICINE

## 2019-10-30 PROCEDURE — 93005 EKG 12-LEAD: ICD-10-PCS | Mod: S$GLB,,, | Performed by: FAMILY MEDICINE

## 2019-10-30 PROCEDURE — 99214 PR OFFICE/OUTPT VISIT, EST, LEVL IV, 30-39 MIN: ICD-10-PCS | Mod: 25,S$GLB,, | Performed by: FAMILY MEDICINE

## 2019-10-30 PROCEDURE — 3008F PR BODY MASS INDEX (BMI) DOCUMENTED: ICD-10-PCS | Mod: CPTII,S$GLB,, | Performed by: FAMILY MEDICINE

## 2019-10-30 PROCEDURE — 99999 PR PBB SHADOW E&M-EST. PATIENT-LVL V: CPT | Mod: PBBFAC,,, | Performed by: FAMILY MEDICINE

## 2019-10-30 PROCEDURE — 71046 XR CHEST PA AND LATERAL: ICD-10-PCS | Mod: 26,,, | Performed by: RADIOLOGY

## 2019-10-30 PROCEDURE — 93010 ELECTROCARDIOGRAM REPORT: CPT | Mod: S$GLB,,, | Performed by: INTERNAL MEDICINE

## 2019-10-30 PROCEDURE — 3074F PR MOST RECENT SYSTOLIC BLOOD PRESSURE < 130 MM HG: ICD-10-PCS | Mod: CPTII,S$GLB,, | Performed by: FAMILY MEDICINE

## 2019-10-30 PROCEDURE — 90471 IMMUNIZATION ADMIN: CPT | Mod: S$GLB,,, | Performed by: FAMILY MEDICINE

## 2019-10-30 PROCEDURE — 87081 CULTURE SCREEN ONLY: CPT

## 2019-10-30 PROCEDURE — 90686 IIV4 VACC NO PRSV 0.5 ML IM: CPT | Mod: S$GLB,,, | Performed by: FAMILY MEDICINE

## 2019-10-30 PROCEDURE — 93010 EKG 12-LEAD: ICD-10-PCS | Mod: S$GLB,,, | Performed by: INTERNAL MEDICINE

## 2019-10-30 PROCEDURE — 3074F SYST BP LT 130 MM HG: CPT | Mod: CPTII,S$GLB,, | Performed by: FAMILY MEDICINE

## 2019-10-30 PROCEDURE — 3078F DIAST BP <80 MM HG: CPT | Mod: CPTII,S$GLB,, | Performed by: FAMILY MEDICINE

## 2019-10-30 PROCEDURE — 93005 ELECTROCARDIOGRAM TRACING: CPT | Mod: S$GLB,,, | Performed by: FAMILY MEDICINE

## 2019-10-30 PROCEDURE — 71046 X-RAY EXAM CHEST 2 VIEWS: CPT | Mod: 26,,, | Performed by: RADIOLOGY

## 2019-10-30 PROCEDURE — 99999 PR PBB SHADOW E&M-EST. PATIENT-LVL V: ICD-10-PCS | Mod: PBBFAC,,, | Performed by: FAMILY MEDICINE

## 2019-10-30 PROCEDURE — 71046 X-RAY EXAM CHEST 2 VIEWS: CPT | Mod: TC

## 2019-10-30 PROCEDURE — 3008F BODY MASS INDEX DOCD: CPT | Mod: CPTII,S$GLB,, | Performed by: FAMILY MEDICINE

## 2019-10-30 PROCEDURE — 3078F PR MOST RECENT DIASTOLIC BLOOD PRESSURE < 80 MM HG: ICD-10-PCS | Mod: CPTII,S$GLB,, | Performed by: FAMILY MEDICINE

## 2019-10-30 PROCEDURE — 90686 FLU VACCINE (QUAD) GREATER THAN OR EQUAL TO 3YO PRESERVATIVE FREE IM: ICD-10-PCS | Mod: S$GLB,,, | Performed by: FAMILY MEDICINE

## 2019-10-30 RX ORDER — IBUPROFEN 600 MG/1
TABLET ORAL
Refills: 0 | COMMUNITY
Start: 2019-09-25 | End: 2019-12-12

## 2019-10-30 RX ORDER — CHLORHEXIDINE GLUCONATE ORAL RINSE 1.2 MG/ML
SOLUTION DENTAL
Refills: 7 | Status: ON HOLD | COMMUNITY
Start: 2019-10-06 | End: 2020-03-06 | Stop reason: HOSPADM

## 2019-10-30 NOTE — PROGRESS NOTES
Subjective:       Patient ID: Mariel Becerril is a 63 y.o. female.    Chief Complaint: Pre-op Exam    HPI Ms. Becerril presents today for pre op exam. She is scheduled for a left knee replacement on 11/19/2019.  She has a medical history listed below.     She has no complaint today    Review of Systems   Constitutional: Negative for activity change, appetite change, fatigue and fever.   HENT: Negative for congestion, ear pain and sinus pressure.    Eyes: Negative for pain and visual disturbance.   Respiratory: Negative for cough, chest tightness and wheezing.    Cardiovascular: Negative for chest pain, palpitations and leg swelling.   Gastrointestinal: Negative for abdominal distention, abdominal pain, constipation, diarrhea, nausea and vomiting.   Endocrine: Negative for polydipsia and polyuria.   Genitourinary: Negative for difficulty urinating, dyspareunia, dysuria, flank pain and hematuria.   Musculoskeletal: Positive for arthralgias, gait problem and joint swelling. Negative for back pain.   Skin: Negative for rash.   Neurological: Negative for dizziness, tremors, syncope, weakness, numbness and headaches.   Psychiatric/Behavioral: Negative for agitation and confusion.         Past Medical History:   Diagnosis Date    Frequent headaches     High blood pressure     Hypertension     Osteoarthritis 04/02/2019    Bilateral knees, ankles and feet    Severe obesity (BMI >= 40)     Sleep apnea      Past Surgical History:   Procedure Laterality Date    BLADDER SUSPENSION      COLONOSCOPY N/A 12/3/2018    Procedure: COLONOSCOPY;  Surgeon: Mari Rader MD;  [facility]: [facility];  Service: Endoscopy;  Laterality: N/A;    HYSTERECTOMY      partial 2000    tonsilectomy      tonsils       Family History   Problem Relation Age of Onset    Heart attack Father      Social History     Socioeconomic History    Marital status:      Spouse name: Not on file    Number of children: Not on file    Years of  education: Not on file    Highest education level: Not on file   Occupational History    Not on file   Social Needs    Financial resource strain: Not on file    Food insecurity:     Worry: Not on file     Inability: Not on file    Transportation needs:     Medical: Not on file     Non-medical: Not on file   Tobacco Use    Smoking status: Never Smoker    Smokeless tobacco: Never Used   Substance and Sexual Activity    Alcohol use: No    Drug use: No    Sexual activity: Never     Partners: Male     Birth control/protection: See Surgical Hx   Lifestyle    Physical activity:     Days per week: Not on file     Minutes per session: Not on file    Stress: Not on file   Relationships    Social connections:     Talks on phone: Not on file     Gets together: Not on file     Attends Voodoo service: Not on file     Active member of club or organization: Not on file     Attends meetings of clubs or organizations: Not on file     Relationship status: Not on file   Other Topics Concern    Not on file   Social History Narrative    Not on file       Objective:        Physical Exam      Results for orders placed or performed in visit on 09/09/19   Comprehensive metabolic panel   Result Value Ref Range    Sodium 141 136 - 145 mmol/L    Potassium 3.8 3.5 - 5.1 mmol/L    Chloride 106 95 - 110 mmol/L    CO2 25 23 - 29 mmol/L    Glucose 87 70 - 110 mg/dL    BUN, Bld 9 8 - 23 mg/dL    Creatinine 0.8 0.5 - 1.4 mg/dL    Calcium 9.4 8.7 - 10.5 mg/dL    Total Protein 6.9 6.0 - 8.4 g/dL    Albumin 3.5 3.5 - 5.2 g/dL    Total Bilirubin 0.3 0.1 - 1.0 mg/dL    Alkaline Phosphatase 105 55 - 135 U/L    AST 19 10 - 40 U/L    ALT 15 10 - 44 U/L    Anion Gap 10 8 - 16 mmol/L    eGFR if African American >60.0 >60 mL/min/1.73 m^2    eGFR if non African American >60.0 >60 mL/min/1.73 m^2   CBC auto differential   Result Value Ref Range    WBC 7.08 3.90 - 12.70 K/uL    RBC 4.20 4.00 - 5.40 M/uL    Hemoglobin 12.1 12.0 - 16.0 g/dL     Hematocrit 40.3 37.0 - 48.5 %    Mean Corpuscular Volume 96 82 - 98 fL    Mean Corpuscular Hemoglobin 28.8 27.0 - 31.0 pg    Mean Corpuscular Hemoglobin Conc 30.0 (L) 32.0 - 36.0 g/dL    RDW 14.8 (H) 11.5 - 14.5 %    Platelets 265 150 - 350 K/uL    MPV 9.5 9.2 - 12.9 fL    Immature Granulocytes 0.1 0.0 - 0.5 %    Gran # (ANC) 3.7 1.8 - 7.7 K/uL    Immature Grans (Abs) 0.01 0.00 - 0.04 K/uL    Lymph # 2.8 1.0 - 4.8 K/uL    Mono # 0.4 0.3 - 1.0 K/uL    Eos # 0.1 0.0 - 0.5 K/uL    Baso # 0.05 0.00 - 0.20 K/uL    nRBC 0 0 /100 WBC    Gran% 52.4 38.0 - 73.0 %    Lymph% 39.0 18.0 - 48.0 %    Mono% 6.2 4.0 - 15.0 %    Eosinophil% 1.6 0.0 - 8.0 %    Basophil% 0.7 0.0 - 1.9 %    Differential Method Automated        Assessment/Plan:     Pre-op exam  -     X-Ray Chest PA And Lateral; Future; Expected date: 10/30/2019  -     EKG 12-lead  -     CBC auto differential; Future; Expected date: 10/30/2019  -     Comprehensive metabolic panel; Future; Expected date: 10/30/2019  -     APTT; Future; Expected date: 10/30/2019  -     Protime-INR; Future; Expected date: 10/30/2019  -     GROUP & RH; Future; Expected date: 10/30/2019  -     Culture, MRSA    Other orders  -     Influenza - Quadrivalent (PF)      Will follow up on lab results and imaging  Will make an addendum to clear for surgery pending results    Patient as never had any complication with anesthesia    Follow up if symptoms worsen or fail to improve.    Jaclyn Becerril MD  [facility]

## 2019-11-02 LAB — MRSA SPEC QL CULT: NORMAL

## 2019-11-06 ENCOUNTER — PATIENT MESSAGE (OUTPATIENT)
Dept: INTERNAL MEDICINE | Facility: CLINIC | Age: 64
End: 2019-11-06

## 2019-11-06 RX ORDER — METHOCARBAMOL 500 MG/1
TABLET, FILM COATED ORAL
Qty: 60 TABLET | Refills: 0 | Status: SHIPPED | OUTPATIENT
Start: 2019-11-06 | End: 2019-12-11 | Stop reason: SDUPTHER

## 2019-11-12 ENCOUNTER — OFFICE VISIT (OUTPATIENT)
Dept: PULMONOLOGY | Facility: CLINIC | Age: 64
End: 2019-11-12
Payer: COMMERCIAL

## 2019-11-12 ENCOUNTER — OFFICE VISIT (OUTPATIENT)
Dept: CARDIOLOGY | Facility: CLINIC | Age: 64
End: 2019-11-12
Payer: COMMERCIAL

## 2019-11-12 VITALS
BODY MASS INDEX: 40.61 KG/M2 | WEIGHT: 229.25 LBS | DIASTOLIC BLOOD PRESSURE: 62 MMHG | SYSTOLIC BLOOD PRESSURE: 106 MMHG | HEART RATE: 98 BPM

## 2019-11-12 VITALS
DIASTOLIC BLOOD PRESSURE: 78 MMHG | OXYGEN SATURATION: 95 % | HEART RATE: 99 BPM | SYSTOLIC BLOOD PRESSURE: 124 MMHG | BODY MASS INDEX: 40.62 KG/M2 | RESPIRATION RATE: 18 BRPM | WEIGHT: 229.25 LBS | HEIGHT: 63 IN

## 2019-11-12 DIAGNOSIS — G47.19 EXCESSIVE DAYTIME SLEEPINESS: ICD-10-CM

## 2019-11-12 DIAGNOSIS — G47.33 OSA ON CPAP: ICD-10-CM

## 2019-11-12 DIAGNOSIS — R07.89 CHEST PRESSURE: ICD-10-CM

## 2019-11-12 DIAGNOSIS — G89.29 CHRONIC PAIN OF LEFT KNEE: ICD-10-CM

## 2019-11-12 DIAGNOSIS — M25.562 CHRONIC PAIN OF LEFT KNEE: ICD-10-CM

## 2019-11-12 DIAGNOSIS — E66.01 MORBID OBESITY DUE TO EXCESS CALORIES: ICD-10-CM

## 2019-11-12 DIAGNOSIS — R06.09 DOE (DYSPNEA ON EXERTION): ICD-10-CM

## 2019-11-12 DIAGNOSIS — R00.2 PALPITATIONS: Primary | ICD-10-CM

## 2019-11-12 DIAGNOSIS — I10 ESSENTIAL HYPERTENSION: ICD-10-CM

## 2019-11-12 DIAGNOSIS — G47.33 OSA ON CPAP: Primary | ICD-10-CM

## 2019-11-12 PROCEDURE — 99999 PR PBB SHADOW E&M-EST. PATIENT-LVL III: CPT | Mod: PBBFAC,,, | Performed by: INTERNAL MEDICINE

## 2019-11-12 PROCEDURE — 3074F PR MOST RECENT SYSTOLIC BLOOD PRESSURE < 130 MM HG: ICD-10-PCS | Mod: CPTII,S$GLB,, | Performed by: INTERNAL MEDICINE

## 2019-11-12 PROCEDURE — 3078F PR MOST RECENT DIASTOLIC BLOOD PRESSURE < 80 MM HG: ICD-10-PCS | Mod: CPTII,S$GLB,, | Performed by: INTERNAL MEDICINE

## 2019-11-12 PROCEDURE — 3074F SYST BP LT 130 MM HG: CPT | Mod: CPTII,S$GLB,, | Performed by: INTERNAL MEDICINE

## 2019-11-12 PROCEDURE — 3008F BODY MASS INDEX DOCD: CPT | Mod: CPTII,S$GLB,, | Performed by: INTERNAL MEDICINE

## 2019-11-12 PROCEDURE — 99214 PR OFFICE/OUTPT VISIT, EST, LEVL IV, 30-39 MIN: ICD-10-PCS | Mod: S$GLB,,, | Performed by: INTERNAL MEDICINE

## 2019-11-12 PROCEDURE — 3008F PR BODY MASS INDEX (BMI) DOCUMENTED: ICD-10-PCS | Mod: CPTII,S$GLB,, | Performed by: INTERNAL MEDICINE

## 2019-11-12 PROCEDURE — 3078F DIAST BP <80 MM HG: CPT | Mod: CPTII,S$GLB,, | Performed by: INTERNAL MEDICINE

## 2019-11-12 PROCEDURE — 99214 OFFICE O/P EST MOD 30 MIN: CPT | Mod: S$GLB,,, | Performed by: INTERNAL MEDICINE

## 2019-11-12 PROCEDURE — 99999 PR PBB SHADOW E&M-EST. PATIENT-LVL III: ICD-10-PCS | Mod: PBBFAC,,, | Performed by: INTERNAL MEDICINE

## 2019-11-12 NOTE — PROGRESS NOTES
Pulmonary Outpatient Follow Up Visit     Subjective:       Patient ID: Mariel Becerril is a 64 y.o. female.    Chief Complaint: GIL on CPAP and Shortness of Breath      HPI      64-year-old female patient known with mild GIL presenting for preop evaluation.    Scheduled for left knee surgery next week.    Compliant with CPAP.    Denies coughing wheezing sputum production.    No chest pain.    Review of Systems   Constitutional: Negative for fever and chills.   HENT: Negative for hearing loss.    Eyes: Negative for redness.   Respiratory: Positive for apnea, snoring and somnolence.    Cardiovascular: Negative for chest pain.   Genitourinary: Negative for hematuria.   Endocrine: Negative for cold intolerance.    Musculoskeletal: Positive for arthralgias and back pain.   Skin: Negative for rash.   Gastrointestinal: Negative for abdominal pain.   Neurological: Negative for syncope and headaches.   Hematological: Negative for adenopathy. Does not bruise/bleed easily.   Psychiatric/Behavioral: Positive for sleep disturbance.       Outpatient Encounter Medications as of 11/12/2019   Medication Sig Dispense Refill    albuterol (PROAIR HFA) 90 mcg/actuation inhaler Inhale 2 puffs into the lungs every 6 (six) hours as needed for Wheezing. Rescue 18 g 0    amLODIPine (NORVASC) 10 MG tablet Take 1 tablet (10 mg total) by mouth once daily. 90 tablet 3    benzonatate (TESSALON) 100 MG capsule TAKE 1 CAPSULE BY MOUTH THREE TIMES DAILY AS  NEEDED  FOR  COUGH 30 capsule 0    chlorhexidine (PERIDEX) 0.12 % solution SWISH AND SPIT 15 MLS BY MOUTH TWICE A DAY FOR 7 DAYS  7    ergocalciferol, vitamin D2, (VITAMIN D ORAL) Take 2,000 Units by mouth once daily.      fluticasone propionate (FLONASE) 50 mcg/actuation nasal spray 1 spray (50 mcg total) by Each Nare route once daily. 16 g 5    furosemide (LASIX) 20 MG tablet Take 1 tablet (20 mg total) by mouth daily as needed. 90 tablet 3  "   HYDROcodone-acetaminophen (NORCO) 7.5-325 mg per tablet       ibuprofen (ADVIL,MOTRIN) 600 MG tablet TAKE 1 TABLET BY MOUTH EVERY 6 HOURS FOR 2 DAYS THEN AS NEEDED FOR PAIN  0    levocetirizine (XYZAL) 5 MG tablet Take 1 tablet (5 mg total) by mouth every evening. 30 tablet 1    meclizine (ANTIVERT) 25 mg tablet Take 1 tablet (25 mg total) by mouth 3 (three) times daily as needed for Dizziness. 30 tablet 0    meloxicam (MOBIC) 15 MG tablet Take 1 tablet (15 mg total) by mouth once daily. 30 tablet 3    methocarbamol (ROBAXIN) 500 MG Tab TAKE 1 TABLET BY MOUTH TWICE DAILY AS NEEDED 60 tablet 0    montelukast (SINGULAIR) 10 mg tablet Take 10 mg by mouth every evening.  1    montelukast (SINGULAIR) 10 mg tablet TAKE 1 TABLET BY MOUTH ONCE DAILY IN THE EVENING 90 tablet 4    omeprazole (PRILOSEC) 40 MG capsule TAKE 1 CAPSULE BY MOUTH ONCE DAILY 30 capsule 3    ondansetron (ZOFRAN-ODT) 4 MG TbDL Take 1 tablet (4 mg total) by mouth every 8 (eight) hours as needed. 10 tablet 0    topiramate (TOPAMAX) 50 MG tablet Take 1 tablet (50 mg total) by mouth once daily. 90 tablet 1    traMADol (ULTRAM) 50 mg tablet Take 1 tablet (50 mg total) by mouth 2 (two) times daily as needed. 60 tablet 0    diclofenac sodium (VOLTAREN) 1 % Gel Apply 4 grams topically 3 (three) times daily as needed. 2 Tube 0     No facility-administered encounter medications on file as of 11/12/2019.        Objective:     Vital Signs (Most Recent)  Vital Signs  Pulse: 99  Resp: 18  SpO2: 95 %  BP: 124/78  Height and Weight  Height: 5' 3" (160 cm)  Weight: 104 kg (229 lb 4.5 oz)  BSA (Calculated - sq m): 2.15 sq meters  BMI (Calculated): 40.6  Weight in (lb) to have BMI = 25: 140.8]  Wt Readings from Last 2 Encounters:   11/12/19 104 kg (229 lb 4.5 oz)   10/30/19 102.8 kg (226 lb 10.1 oz)       Physical Exam   Constitutional: She is oriented to person, place, and time. She appears well-developed and well-nourished.   HENT:   Head: " Normocephalic.   Mouth/Throat: Mallampati Score: IV.   Neck: Neck supple.   Cardiovascular: Normal rate and regular rhythm.   Pulmonary/Chest: Normal expansion and effort normal. She has decreased breath sounds.   Abdominal: Soft.   Musculoskeletal: She exhibits tenderness.   Left knee pain   Lymphadenopathy:     She has no cervical adenopathy.   Neurological: She is alert and oriented to person, place, and time.   Skin: Skin is warm.   Psychiatric: She has a normal mood and affect. Her behavior is normal. Judgment and thought content normal.       Laboratory  Lab Results   Component Value Date    WBC 6.18 10/30/2019    RBC 4.05 10/30/2019    HGB 11.7 (L) 10/30/2019    HCT 37.7 10/30/2019    MCV 93 10/30/2019    MCH 28.9 10/30/2019    MCHC 31.0 (L) 10/30/2019    RDW 13.8 10/30/2019     10/30/2019    MPV 9.7 10/30/2019    GRAN 3.3 10/30/2019    GRAN 53.4 10/30/2019    LYMPH 2.4 10/30/2019    LYMPH 38.0 10/30/2019    MONO 0.4 10/30/2019    MONO 6.0 10/30/2019    EOS 0.1 10/30/2019    BASO 0.05 10/30/2019    EOSINOPHIL 1.6 10/30/2019    BASOPHIL 0.8 10/30/2019       BMP  Lab Results   Component Value Date     10/30/2019    K 3.3 (L) 10/30/2019     10/30/2019    CO2 25 10/30/2019    BUN 14 10/30/2019    CREATININE 0.8 10/30/2019    CALCIUM 9.2 10/30/2019    ANIONGAP 9 10/30/2019    ESTGFRAFRICA >60.0 10/30/2019    EGFRNONAA >60.0 10/30/2019    AST 21 10/30/2019    ALT 15 10/30/2019    PROT 6.5 10/30/2019       Lab Results   Component Value Date    BNP <10 08/20/2018       No results found for: TSH    No results found for: SEDRATE    No results found for: CRP      Diagnostic Results:    I have personally reviewed today the following studies:    CXR 10/2019 Stable       EKG 10/30/2019 borderline.      Compliance Summary  10/12/2019 - 11/10/2019 (30 days)  Days with Device Usage 30 days  Days without Device Usage 0 days  Percent Days with Device Usage 100.0%  Cumulative Usage 7 days 2 hrs. 58 mins. 6  secs.  Maximum Usage (1 Day) 9 hrs. 32 mins. 25 secs.  Average Usage (All Days) 5 hrs. 41 mins. 56 secs.  Average Usage (Days Used) 5 hrs. 41 mins. 56 secs.  Minimum Usage (1 Day) 3 hrs. 45 mins.  Percent of Days with Usage >= 4 Hours 93.3%  Percent of Days with Usage < 4 Hours 6.7%  Date Range  Total Blower Time 7 days 3 hrs. 2 mins. 21 secs.  Average AHI 3.1  Auto-CPAP Summary  Auto-CPAP Mean Pressure 8.7 cmH2O  Auto-CPAP Peak Average Pressure 15.7 cmH2O  Average Device Pressure <= 90% of Time 11.2 cmH2O  Average Time in Large Leak Per Day 6 secs.  Printed By:  2018 in-lab polysomnography repeated showed AHI of 12.8 per hour    Chest x-ray September 5, 2018     Heart size upper limits normal without failure.  Aorta is tortuous.  Atherosclerotic calcification noted.  There is calcified mediastinal and hilar nodes suggesting prior granulomatous disease.  No focal consolidation or effusion.  Degenerative change throughout the spine with multilevel bridging syndesmophytes.       Echo 9/18:       1 - Concentric hypertrophy.     2 - No wall motion abnormalities.     3 - Normal left ventricular systolic function (EF 60-65%).     4 - Normal left ventricular diastolic function.     5 - Normal right ventricular systolic function .     6 - The estimated PA systolic pressure is 27 mmHg.         PFT showed mild DLCO reduction but no significant restriction or obstruction noted.    Assessment/Plan:   GIL on CPAP    Excessive daytime sleepiness    BMI 45.0-49.9, adult    Chronic pain of left knee      Continue auto CPAP 5-20 cm water.    Will consider MSLT in the future.    From pulmonary standpoint patient stable to proceed with left knee surgery.  Patient having surgery at [facility] with Dr. Moy Erwin.    I would advice postop pain control incentive spirometry and DVT prophylaxis.  Postop CPAP use.    General weight loss/lifestyle modification strategies  (elicit support from others; identify saboteurs; non-food rewards).  Diet  interventions: low calorie (1000 kCal/d) deficit diet    Up-to-date on influenza vaccination.      Follow up in about 6 months (around 5/12/2020).    This note was prepared using voice recognition system and is likely to have sound alike errors that may have been overlooked even after proof reading.  Please call me with any questions    Discussed diagnosis, its evaluation, treatment and usual course. All questions answered.      Ann Woods MD

## 2019-11-12 NOTE — PROGRESS NOTES
Subjective:   Patient ID:  Mariel Becerril is a 64 y.o. female who presents for cardiac consult of Palpitations      Hypertension   Associated symptoms include palpitations and shortness of breath (improved). Pertinent negatives include no chest pain.   Palpitations    Associated symptoms include shortness of breath (improved). Pertinent negatives include no chest pain.     The patient came in today for cardiac consult of Palpitations    Mariel Becerril is a 64 y.o. female with current medical conditions HTN, obesity, GERD, OA, CPAP of knees presents for follow CV evaluation.      18  Pt has been having SOB, AREVALO. Is always in pain, is exhausted on pain meds. She feels like her heart if affected and is tired after walking a few steps.  Pt also feels chest pressure. Pt feels chest pressure daily, just tries to rest and goes away. Chest pain is substernal without radiation. 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 Uses CPAP every night, but last eval was over 5 years.   Feels palpitations, rarely, was on digoxin then.    ECG - NSR, poor RWP    18  Pt had negative nuclear stress and 2D ECHO with normal Bi V function. GIL workup underway, recent visit with pulmonary. Has bad sinus infection, has been using Tessalon perles, saw ENT received steroid dose pack which has been improved. Still coughing a lot, using cough syrup and on Levaquin x 14 days. No chest pain but more congestion. Has been doing well with Lasix - taking it almost daily.     19  She will have knee surgery at [facility] with Dr. Moy Rmairez. She has been getting PT/OT and brace but no improvement in knee pain. She does have chronic fatigue/dyspnea but no CP. She does have palpitations feels fluttering feeling.     Patient feels no PND, no dizziness, no syncope, no CNS symptoms.    Patient is compliant with medications.    Family history includes Arthritis in her mother; Depression in her sister; Heart disease in her father -  at age 81;  Hypertension in her mother and sister.    ECG 10/30/19  Normal sinus rhythm  Possible Left atrial enlargement  Borderline Abnormal ECG    Nuclear Stress 9/5/18  Nuclear Quantitative Functional Analysis:   LVEF: >= 70 %    Impression: NORMAL MYOCARDIAL PERFUSION  1. The perfusion scan is free of evidence for myocardial ischemia or injury.   2. Resting wall motion is physiologic.   3. Resting LV function is normal.   4. The ventricular volumes are normal at rest and stress.   5. The extracardiac distribution of radioactivity is normal.     2D ECHO 9/5/18  CONCLUSIONS     1 - Concentric hypertrophy.     2 - No wall motion abnormalities.     3 - Normal left ventricular systolic function (EF 60-65%).     4 - Normal left ventricular diastolic function.     5 - Normal right ventricular systolic function .     6 - The estimated PA systolic pressure is 27 mmHg.     Past Medical History:   Diagnosis Date    Frequent headaches     High blood pressure     Hypertension     Osteoarthritis 04/02/2019    Bilateral knees, ankles and feet    Severe obesity (BMI >= 40)     Sleep apnea        Past Surgical History:   Procedure Laterality Date    BLADDER SUSPENSION      COLONOSCOPY N/A 12/3/2018    Procedure: COLONOSCOPY;  Surgeon: Mari Rader MD;  [facility];  Service: Endoscopy;  Laterality: N/A;    HYSTERECTOMY      partial 2000    tonsilectomy      tonsils         Social History     Tobacco Use    Smoking status: Never Smoker    Smokeless tobacco: Never Used   Substance Use Topics    Alcohol use: No    Drug use: No       Family History   Problem Relation Age of Onset    Heart attack Father        Patient's Medications   New Prescriptions    No medications on file   Previous Medications    ALBUTEROL (PROAIR HFA) 90 MCG/ACTUATION INHALER    Inhale 2 puffs into the lungs every 6 (six) hours as needed for Wheezing. Rescue    AMLODIPINE (NORVASC) 10 MG TABLET    Take 1 tablet (10 mg total) by mouth once daily.     BENZONATATE (TESSALON) 100 MG CAPSULE    TAKE 1 CAPSULE BY MOUTH THREE TIMES DAILY AS  NEEDED  FOR  COUGH    CHLORHEXIDINE (PERIDEX) 0.12 % SOLUTION    SWISH AND SPIT 15 MLS BY MOUTH TWICE A DAY FOR 7 DAYS    DICLOFENAC SODIUM (VOLTAREN) 1 % GEL    Apply 4 grams topically 3 (three) times daily as needed.    ERGOCALCIFEROL, VITAMIN D2, (VITAMIN D ORAL)    Take 2,000 Units by mouth once daily.    FLUTICASONE PROPIONATE (FLONASE) 50 MCG/ACTUATION NASAL SPRAY    1 spray (50 mcg total) by Each Nare route once daily.    FUROSEMIDE (LASIX) 20 MG TABLET    Take 1 tablet (20 mg total) by mouth daily as needed.    HYDROCODONE-ACETAMINOPHEN (NORCO) 7.5-325 MG PER TABLET        IBUPROFEN (ADVIL,MOTRIN) 600 MG TABLET    TAKE 1 TABLET BY MOUTH EVERY 6 HOURS FOR 2 DAYS THEN AS NEEDED FOR PAIN    LEVOCETIRIZINE (XYZAL) 5 MG TABLET    Take 1 tablet (5 mg total) by mouth every evening.    MECLIZINE (ANTIVERT) 25 MG TABLET    Take 1 tablet (25 mg total) by mouth 3 (three) times daily as needed for Dizziness.    MELOXICAM (MOBIC) 15 MG TABLET    Take 1 tablet (15 mg total) by mouth once daily.    METHOCARBAMOL (ROBAXIN) 500 MG TAB    TAKE 1 TABLET BY MOUTH TWICE DAILY AS NEEDED    MONTELUKAST (SINGULAIR) 10 MG TABLET    Take 10 mg by mouth every evening.    MONTELUKAST (SINGULAIR) 10 MG TABLET    TAKE 1 TABLET BY MOUTH ONCE DAILY IN THE EVENING    OMEPRAZOLE (PRILOSEC) 40 MG CAPSULE    TAKE 1 CAPSULE BY MOUTH ONCE DAILY    ONDANSETRON (ZOFRAN-ODT) 4 MG TBDL    Take 1 tablet (4 mg total) by mouth every 8 (eight) hours as needed.    TOPIRAMATE (TOPAMAX) 50 MG TABLET    Take 1 tablet (50 mg total) by mouth once daily.    TRAMADOL (ULTRAM) 50 MG TABLET    Take 1 tablet (50 mg total) by mouth 2 (two) times daily as needed.   Modified Medications    No medications on file   Discontinued Medications    No medications on file       Review of Systems   Constitutional: Negative.    HENT: Negative.    Eyes: Negative.    Respiratory: Positive  for shortness of breath (improved).    Cardiovascular: Positive for palpitations. Negative for chest pain and leg swelling.   Gastrointestinal: Positive for heartburn.   Genitourinary: Negative.    Musculoskeletal: Negative.    Skin: Negative.    Neurological: Negative.    Endo/Heme/Allergies: Negative.    Psychiatric/Behavioral: Negative.    All 12 systems otherwise negative.      Wt Readings from Last 3 Encounters:   11/12/19 104 kg (229 lb 4.5 oz)   11/12/19 104 kg (229 lb 4.5 oz)   10/30/19 102.8 kg (226 lb 10.1 oz)     Temp Readings from Last 3 Encounters:   10/30/19 97.2 °F (36.2 °C) (Tympanic)   09/09/19 98.4 °F (36.9 °C) (Tympanic)   06/20/19 98.1 °F (36.7 °C) (Tympanic)     BP Readings from Last 3 Encounters:   11/12/19 106/62   11/12/19 124/78   10/30/19 110/66     Pulse Readings from Last 3 Encounters:   11/12/19 98   11/12/19 99   10/30/19 89       /62 (BP Location: Right arm, Patient Position: Sitting, BP Method: Medium (Manual))   Pulse 98   Wt 104 kg (229 lb 4.5 oz)   BMI 40.61 kg/m²     Objective:   Physical Exam   Constitutional: She is oriented to person, place, and time. She appears well-developed and well-nourished. No distress.   HENT:   Head: Normocephalic and atraumatic.   Nose: Nose normal.   Mouth/Throat: Oropharynx is clear and moist.   Eyes: Conjunctivae and EOM are normal. No scleral icterus.   Neck: Normal range of motion. Neck supple. No JVD present. No thyromegaly present.   Cardiovascular: Normal rate, regular rhythm, S1 normal and S2 normal. Exam reveals no gallop, no S3, no S4 and no friction rub.   Murmur heard.  Pulmonary/Chest: Effort normal and breath sounds normal. No stridor. No respiratory distress. She has no wheezes. She has no rales. She exhibits no tenderness.   Abdominal: Soft. Bowel sounds are normal. She exhibits no distension and no mass. There is no tenderness. There is no rebound.   Genitourinary:   Genitourinary Comments: Deferred   Musculoskeletal: Normal  range of motion. She exhibits no edema, tenderness or deformity.   Lymphadenopathy:     She has no cervical adenopathy.   Neurological: She is alert and oriented to person, place, and time. She exhibits normal muscle tone. Coordination normal.   Skin: Skin is warm and dry. No rash noted. She is not diaphoretic. No erythema. No pallor.   Psychiatric: She has a normal mood and affect. Her behavior is normal. Judgment and thought content normal.   Nursing note and vitals reviewed.      Lab Results   Component Value Date     10/30/2019    K 3.3 (L) 10/30/2019     10/30/2019    CO2 25 10/30/2019    BUN 14 10/30/2019    CREATININE 0.8 10/30/2019    GLU 83 10/30/2019    AST 21 10/30/2019    ALT 15 10/30/2019    ALBUMIN 3.5 10/30/2019    PROT 6.5 10/30/2019    BILITOT 0.4 10/30/2019    WBC 6.18 10/30/2019    HGB 11.7 (L) 10/30/2019    HCT 37.7 10/30/2019    MCV 93 10/30/2019     10/30/2019    INR 1.0 10/30/2019    CHOL 124 09/14/2018    HDL 48 09/14/2018    LDLCALC 64.0 09/14/2018    TRIG 60 09/14/2018    BNP <10 08/20/2018     Assessment:      1. Palpitations    2. Essential hypertension    3. Morbid obesity due to excess calories    4. GIL on CPAP    5. AREVALO (dyspnea on exertion)    6. Chest pressure        Plan:   1. Chest pressure/AREVALO - improved/resolved  - pharm nuclear stress test - neg for ischemia   - 2D ECHO - normal Bi V function  - Continue lasix 20mg PRN daily    2. AREVALO  - improved with lasix    3. HTN  - cont meds  - low salt diet    4. Obesity  - rec weight loss with diet/exercise    5. GIL  - cont CPAP  - appreciate pulm recs    6. Palpitations  - check Holter  - recent ECG negative    7. Pre-OP CV evaluation prior to knee surgery at [facility] with Dr. Moy Ramirez.  Low periop risk of CV events for moderate risk procedure.  No chest pain, active arrhythmia and CHF symptoms.  Ok to proceed to the scheduled surgery without further cardiac study.  OK to hold Aspirin 7 days before the procedure and  resume ASAP postop.    Thank you for allowing me to participate in this patient's care. Please do not hesitate to contact me with any questions or concerns. Consult note has been forwarded to the referral physician.     Juan Alberto Luis MD, [facility]  Cardiovascular Disease  [facility], [place]  [phone] (P)

## 2019-11-14 ENCOUNTER — PATIENT MESSAGE (OUTPATIENT)
Dept: CARDIOLOGY | Facility: CLINIC | Age: 64
End: 2019-11-14

## 2019-11-19 ENCOUNTER — PATIENT MESSAGE (OUTPATIENT)
Dept: INTERNAL MEDICINE | Facility: CLINIC | Age: 64
End: 2019-11-19

## 2019-11-21 ENCOUNTER — PATIENT MESSAGE (OUTPATIENT)
Dept: INTERNAL MEDICINE | Facility: CLINIC | Age: 64
End: 2019-11-21

## 2019-11-25 ENCOUNTER — CLINICAL SUPPORT (OUTPATIENT)
Dept: CARDIOLOGY | Facility: CLINIC | Age: 64
End: 2019-11-25
Attending: INTERNAL MEDICINE
Payer: COMMERCIAL

## 2019-11-25 DIAGNOSIS — R00.2 PALPITATIONS: ICD-10-CM

## 2019-11-25 DIAGNOSIS — R06.09 DOE (DYSPNEA ON EXERTION): ICD-10-CM

## 2019-11-25 DIAGNOSIS — R07.89 CHEST PRESSURE: ICD-10-CM

## 2019-11-25 PROCEDURE — 93224 XTRNL ECG REC UP TO 48 HRS: CPT | Mod: S$GLB,,, | Performed by: INTERNAL MEDICINE

## 2019-11-25 PROCEDURE — 93224 HOLTER MONITOR - 48 HOUR: ICD-10-PCS | Mod: S$GLB,,, | Performed by: INTERNAL MEDICINE

## 2019-11-26 ENCOUNTER — HOSPITAL ENCOUNTER (OUTPATIENT)
Dept: RADIOLOGY | Facility: HOSPITAL | Age: 64
Discharge: HOME | End: 2019-11-26
Attending: PHYSICIAN ASSISTANT
Payer: COMMERCIAL

## 2019-11-26 ENCOUNTER — TELEPHONE (OUTPATIENT)
Dept: ORTHOPEDICS | Facility: CLINIC | Age: 64
End: 2019-11-26

## 2019-11-26 ENCOUNTER — OFFICE VISIT (OUTPATIENT)
Dept: SPORTS MEDICINE | Facility: CLINIC | Age: 64
End: 2019-11-26
Payer: COMMERCIAL

## 2019-11-26 VITALS
SYSTOLIC BLOOD PRESSURE: 128 MMHG | WEIGHT: 229 LBS | HEIGHT: 63 IN | DIASTOLIC BLOOD PRESSURE: 87 MMHG | HEART RATE: 81 BPM | BODY MASS INDEX: 40.57 KG/M2

## 2019-11-26 DIAGNOSIS — M25.562 CHRONIC PAIN OF BOTH KNEES: ICD-10-CM

## 2019-11-26 DIAGNOSIS — M25.562 PAIN IN BOTH KNEES, UNSPECIFIED CHRONICITY: ICD-10-CM

## 2019-11-26 DIAGNOSIS — M25.562 CHRONIC PAIN OF LEFT KNEE: ICD-10-CM

## 2019-11-26 DIAGNOSIS — M25.562 PAIN IN BOTH KNEES, UNSPECIFIED CHRONICITY: Primary | ICD-10-CM

## 2019-11-26 DIAGNOSIS — G89.29 CHRONIC PAIN OF BOTH KNEES: ICD-10-CM

## 2019-11-26 DIAGNOSIS — E66.01 MORBID OBESITY DUE TO EXCESS CALORIES: ICD-10-CM

## 2019-11-26 DIAGNOSIS — M25.561 CHRONIC PAIN OF BOTH KNEES: ICD-10-CM

## 2019-11-26 DIAGNOSIS — M25.561 PAIN IN BOTH KNEES, UNSPECIFIED CHRONICITY: Primary | ICD-10-CM

## 2019-11-26 DIAGNOSIS — M25.561 PAIN IN BOTH KNEES, UNSPECIFIED CHRONICITY: ICD-10-CM

## 2019-11-26 DIAGNOSIS — M17.12 PRIMARY OSTEOARTHRITIS OF LEFT KNEE: Primary | ICD-10-CM

## 2019-11-26 DIAGNOSIS — G89.29 CHRONIC PAIN OF LEFT KNEE: ICD-10-CM

## 2019-11-26 PROCEDURE — 99999 PR PBB SHADOW E&M-EST. PATIENT-LVL IV: ICD-10-PCS | Mod: PBBFAC,,, | Performed by: PHYSICIAN ASSISTANT

## 2019-11-26 PROCEDURE — 73564 X-RAY EXAM KNEE 4 OR MORE: CPT | Mod: TC,50

## 2019-11-26 PROCEDURE — 3008F PR BODY MASS INDEX (BMI) DOCUMENTED: ICD-10-PCS | Mod: CPTII,S$GLB,, | Performed by: PHYSICIAN ASSISTANT

## 2019-11-26 PROCEDURE — 99214 OFFICE O/P EST MOD 30 MIN: CPT | Mod: 25,S$GLB,, | Performed by: PHYSICIAN ASSISTANT

## 2019-11-26 PROCEDURE — 99999 PR PBB SHADOW E&M-EST. PATIENT-LVL IV: CPT | Mod: PBBFAC,,, | Performed by: PHYSICIAN ASSISTANT

## 2019-11-26 PROCEDURE — 3074F PR MOST RECENT SYSTOLIC BLOOD PRESSURE < 130 MM HG: ICD-10-PCS | Mod: CPTII,S$GLB,, | Performed by: PHYSICIAN ASSISTANT

## 2019-11-26 PROCEDURE — 3079F PR MOST RECENT DIASTOLIC BLOOD PRESSURE 80-89 MM HG: ICD-10-PCS | Mod: CPTII,S$GLB,, | Performed by: PHYSICIAN ASSISTANT

## 2019-11-26 PROCEDURE — 20610 DRAIN/INJ JOINT/BURSA W/O US: CPT | Mod: LT,S$GLB,, | Performed by: PHYSICIAN ASSISTANT

## 2019-11-26 PROCEDURE — 99214 PR OFFICE/OUTPT VISIT, EST, LEVL IV, 30-39 MIN: ICD-10-PCS | Mod: 25,S$GLB,, | Performed by: PHYSICIAN ASSISTANT

## 2019-11-26 PROCEDURE — 73564 XR KNEE ORTHO BILAT WITH FLEXION: ICD-10-PCS | Mod: 26,,, | Performed by: RADIOLOGY

## 2019-11-26 PROCEDURE — 3074F SYST BP LT 130 MM HG: CPT | Mod: CPTII,S$GLB,, | Performed by: PHYSICIAN ASSISTANT

## 2019-11-26 PROCEDURE — 20610 PR DRAIN/INJECT LARGE JOINT/BURSA: ICD-10-PCS | Mod: LT,S$GLB,, | Performed by: PHYSICIAN ASSISTANT

## 2019-11-26 PROCEDURE — 3008F BODY MASS INDEX DOCD: CPT | Mod: CPTII,S$GLB,, | Performed by: PHYSICIAN ASSISTANT

## 2019-11-26 PROCEDURE — 3079F DIAST BP 80-89 MM HG: CPT | Mod: CPTII,S$GLB,, | Performed by: PHYSICIAN ASSISTANT

## 2019-11-26 PROCEDURE — 73564 X-RAY EXAM KNEE 4 OR MORE: CPT | Mod: 26,,, | Performed by: RADIOLOGY

## 2019-11-26 RX ORDER — METHYLPREDNISOLONE ACETATE 80 MG/ML
80 INJECTION, SUSPENSION INTRA-ARTICULAR; INTRALESIONAL; INTRAMUSCULAR; SOFT TISSUE ONCE
Status: COMPLETED | OUTPATIENT
Start: 2019-11-26 | End: 2019-11-26

## 2019-11-26 RX ADMIN — METHYLPREDNISOLONE ACETATE 80 MG: 80 INJECTION, SUSPENSION INTRA-ARTICULAR; INTRALESIONAL; INTRAMUSCULAR; SOFT TISSUE at 04:11

## 2019-11-26 NOTE — PROGRESS NOTES
Patient ID: Mariel Becerril is a 64 y.o. female.    Chief Complaint: Pain and Swelling of the Left Knee      HPI: Mariel Becerril  is a 64 y.o. female who c/o Pain and Swelling of the Left Knee   for duration of more than 3 years.  She has known osteoarthritis in the left knee. She has been treated by Dr. GELACIO meraz at [facility].  He had done an aspiration and injection on her more than 3 months ago.  He had gotten her set up to move forward with a left total knee arthroplasty at the [facility].  She ended up having to cancel surgery because she could not afford to make any pain minutes towards her balance due.  She comes back to [facility] because she was able to qualify for financial assistance.  Pain level is 9/10 in severity.  Quality aching and throbbing.  It is constant.  It is worsened with prolonged weight-bearing, deep flexion, everyday activities.  Improved with steroid injections in the past as well as Voltaren gel.  She was using a crutch for ambulation for approximately 2 and half years.  She was recently able to come off the crutch about 3 months ago after she had a steroid injection.  She has been working with physical therapy at the [facility] in [place].    Past Medical History:   Diagnosis Date    Frequent headaches     High blood pressure     Hypertension     Osteoarthritis 04/02/2019    Bilateral knees, ankles and feet    Severe obesity (BMI >= 40)     Sleep apnea      Past Surgical History:   Procedure Laterality Date    BLADDER SUSPENSION      COLONOSCOPY N/A 12/3/2018    Procedure: COLONOSCOPY;  Surgeon: Mari Rader MD;  Location: [facility];  Service: Endoscopy;  Laterality: N/A;    HYSTERECTOMY      partial 2000    tonsilectomy      tonsils       Family History   Problem Relation Age of Onset    Heart attack Father      Social History     Socioeconomic History    Marital status:      Spouse name: Not on file    Number of  children: Not on file    Years of education: Not on file    Highest education level: Not on file   Occupational History    Not on file   Social Needs    Financial resource strain: Not on file    Food insecurity:     Worry: Not on file     Inability: Not on file    Transportation needs:     Medical: Not on file     Non-medical: Not on file   Tobacco Use    Smoking status: Never Smoker    Smokeless tobacco: Never Used   Substance and Sexual Activity    Alcohol use: No    Drug use: No    Sexual activity: Never     Partners: Male     Birth control/protection: See Surgical Hx   Lifestyle    Physical activity:     Days per week: Not on file     Minutes per session: Not on file    Stress: Not on file   Relationships    Social connections:     Talks on phone: Not on file     Gets together: Not on file     Attends [employer]ism service: Not on file     Active member of club or organization: Not on file     Attends meetings of clubs or organizations: Not on file     Relationship status: Not on file   Other Topics Concern    Not on file   Social History Narrative    Not on file     Medication List with Changes/Refills   Current Medications    ALBUTEROL (PROAIR HFA) 90 MCG/ACTUATION INHALER    Inhale 2 puffs into the lungs every 6 (six) hours as needed for Wheezing. Rescue    AMLODIPINE (NORVASC) 10 MG TABLET    Take 1 tablet (10 mg total) by mouth once daily.    BENZONATATE (TESSALON) 100 MG CAPSULE    TAKE 1 CAPSULE BY MOUTH THREE TIMES DAILY AS  NEEDED  FOR  COUGH    CHLORHEXIDINE (PERIDEX) 0.12 % SOLUTION    SWISH AND SPIT 15 MLS BY MOUTH TWICE A DAY FOR 7 DAYS    DICLOFENAC SODIUM (VOLTAREN) 1 % GEL    Apply 4 grams topically 3 (three) times daily as needed.    ERGOCALCIFEROL, VITAMIN D2, (VITAMIN D ORAL)    Take 2,000 Units by mouth once daily.    FLUTICASONE PROPIONATE (FLONASE) 50 MCG/ACTUATION NASAL SPRAY    1 spray (50 mcg total) by Each Nare route once daily.    FUROSEMIDE (LASIX) 20 MG TABLET    Take 1  tablet (20 mg total) by mouth daily as needed.    HYDROCODONE-ACETAMINOPHEN (NORCO) 7.5-325 MG PER TABLET        IBUPROFEN (ADVIL,MOTRIN) 600 MG TABLET    TAKE 1 TABLET BY MOUTH EVERY 6 HOURS FOR 2 DAYS THEN AS NEEDED FOR PAIN    LEVOCETIRIZINE (XYZAL) 5 MG TABLET    Take 1 tablet (5 mg total) by mouth every evening.    MECLIZINE (ANTIVERT) 25 MG TABLET    Take 1 tablet (25 mg total) by mouth 3 (three) times daily as needed for Dizziness.    MELOXICAM (MOBIC) 15 MG TABLET    Take 1 tablet (15 mg total) by mouth once daily.    METHOCARBAMOL (ROBAXIN) 500 MG TAB    TAKE 1 TABLET BY MOUTH TWICE DAILY AS NEEDED    MONTELUKAST (SINGULAIR) 10 MG TABLET    Take 10 mg by mouth every evening.    MONTELUKAST (SINGULAIR) 10 MG TABLET    TAKE 1 TABLET BY MOUTH ONCE DAILY IN THE EVENING    OMEPRAZOLE (PRILOSEC) 40 MG CAPSULE    TAKE 1 CAPSULE BY MOUTH ONCE DAILY    ONDANSETRON (ZOFRAN-ODT) 4 MG TBDL    Take 1 tablet (4 mg total) by mouth every 8 (eight) hours as needed.    TOPIRAMATE (TOPAMAX) 50 MG TABLET    Take 1 tablet (50 mg total) by mouth once daily.    TRAMADOL (ULTRAM) 50 MG TABLET    Take 1 tablet (50 mg total) by mouth 2 (two) times daily as needed.     Review of patient's allergies indicates:   Allergen Reactions    Penicillins Rash           Objective:        General    Nursing note and vitals reviewed.  Constitutional: She is oriented to person, place, and time. She appears well-developed and well-nourished.   HENT:   Head: Normocephalic and atraumatic.   Eyes: EOM are normal.   Cardiovascular: Normal rate and regular rhythm.    Pulmonary/Chest: Effort normal.   Abdominal: Soft.   Neurological: She is alert and oriented to person, place, and time.   Psychiatric: She has a normal mood and affect. Her behavior is normal.           Right Knee Exam     Range of Motion   Extension: normal   Flexion: normal     Tests   Ligament Examination Lachman: normal (-1 to 2mm) PCL-Posterior Drawer: normal (0 to 2mm)     MCL -  Valgus: normal (0 to 2mm)  LCL - Varus: normal    Other   Sensation: normal    Left Knee Exam     Inspection   Erythema: absent  Swelling: absent  Effusion: absent  Deformity: present (genu varum)  Bruising: absent    Tenderness   The patient tender to palpation of the medial joint line, condyle and lateral joint line.    Crepitus   The patient has crepitus of the patella.    Range of Motion   Extension: normal   Flexion:  110 abnormal     Tests   Meniscus   Celio:  Medial - negative Lateral - negative  Stability Lachman: normal (-1 to 2mm) PCL-Posterior Drawer: normal (0 to 2mm)  MCL - Valgus: normal (0 to 2mm)  LCL - Varus: normal (0 to 2mm)  Patella   Patellar apprehension: negative  Patellar Tracking: normal  Patellar Grind: positive    Other   Meniscal Cyst: absent  Popliteal (Baker's) Cyst: absent  Sensation: normal    Comments:  Comp soft, cap refill < 2 sec.    Muscle Strength   Right Lower Extremity   Quadriceps:  5/5   Hamstrin/5   Left Lower Extremity   Quadriceps:  4/5   Hamstrin/5     Vascular Exam       Edema  Right Lower Leg: absent  Left Lower Leg: absent            Xray images and report were reviewed today.  I agree with the radiologist's interpretation.    X-ray Knee Ortho Bilateral with Flexion  Narrative: EXAMINATION:  XR KNEE ORTHO BILAT WITH FLEXION    CLINICAL HISTORY:  Pain in right knee    TECHNIQUE:  AP standing of both knees, PA flexion standing views of both knees, and Merchant views of both knees were performed.  Lateral views of both knees were also performed.    COMPARISON:  2019    FINDINGS:  Juxta-articular osteopenia.  Tricompartment degenerative changes bilaterally, greater on the left.  Most prominent degenerative change left medial compartment.  No fracture or dislocation.  Asymmetric increased prominence degenerative change in the left patellofemoral joint.  Lateral tilting of the patellae bilaterally.  Impression: As above.    Electronically signed  by: Palomo Nelson MD  Date:    11/26/2019  Time:    14:19        Assessment:       Encounter Diagnoses   Name Primary?    Primary osteoarthritis of left knee Yes    Chronic pain of left knee     Morbid obesity due to excess calories     Chronic pain of both knees           Plan:       Mariel was seen today for pain and swelling.    Diagnoses and all orders for this visit:    Primary osteoarthritis of left knee  -     methylPREDNISolone acetate injection 80 mg    Chronic pain of left knee  -     methylPREDNISolone acetate injection 80 mg    Morbid obesity due to excess calories    Chronic pain of both knees        Mariel Becerril comes in today with the above problems as above.  We had a long discussion today regarding degenerative arthritis in the knees. The patient understands that arthritis is chronic and will worsen over time.  The patient also understands that arthritis may cause episodic flare-ups in pain. Management or if arthritis is achieved through a multi-modal approach including weight loss in obese individuals, activity modification, NSAIDs (topical vs oral) where appropriate, periodic intra-articular steroid injections, viscosupplementation, physical therapy, knee bracing, ambulatory aids, as well as geniculate nerve blocks.      After discussion of risks and benefits of all of the above were discussed, the decision was made to move forward with CSI today.  I will refer her to Dr. Connolly in about 6-8 weeks to discuss Left TKA. I have given a HEP to work on QS and SLR.  She verbalizes understanding and agrees with the above plan.    Follow up in about 6 weeks (around 1/7/2020) for consult with Dr. Connolly.    Left Knee Injection Report:  After verbal consent was obtained for left knee injection, patient ID, site, and side were verified.  The  left  knee was sterilly prepped in the standard fashion.  A 22-gauge needle was introduced into left knee joint from an nancy-lateral site without  complication. The left knee was then injected with 20 mg lidocaine plain and 80 mg depomedrol.  A sterile bandaid was applied.  The patient was informed to apply an ice pack approximately 10min once arriving home and not to do anything strenuous for 24hours.  She was instructed to call if there were any problems. The patient was discharged in stable condition.    The patient understands, chooses and consents to this plan and accepts all   the risks which include but are not limited to the risks mentioned above.     Disclaimer: This note was prepared using a voice recognition system and is likely to have sound alike errors within the text.

## 2019-11-26 NOTE — TELEPHONE ENCOUNTER
Spoke w/ patient. Informed them that they need to arrive approx. 30min prior to appt to have xrays done. Patient verbalized understanding and was grateful for the call-DD

## 2019-11-26 NOTE — PATIENT INSTRUCTIONS
Pre-Knee Replacement: Ankle Pumps, Quad Sets, Leg Raises  Doing these exercises BEFORE your knee replacement can help speed your recovery. Ask your physical therapist (PT) whether you should exercise one or both legs. Unless youre told otherwise, start by doing each exercise five to 10 times (5 to 10 reps) twice a day (2 sets). As you get stronger, slowly increase the number of reps and sets.  Stop any exercise that causes sharp or increased knee pain, dizziness, shortness of breath, or chest pain.   Ankle pumps    Ankle pumps can help prevent circulation problems, such as blood clots. Do ankle pumps by pointing and flexing your feet.  Quadriceps sets    · Lie on your back in bed, legs straight.  · Tighten the muscle at the front of the thigh as you press the back of your knee down toward the bed. Hold for a few seconds. Then relax the leg.  Straight leg raises    · Lie in bed. Bend one leg. Keep your other leg straight on the bed.  · Lift your straight leg as high as you comfortably can, but not higher than 12 inches. Hold for a few seconds. Then slowly lower the leg.  Date Last Reviewed: 10/1/2015  © 8377-6362 [employer]. [address] [place] [address], [place] [address]. All rights reserved. This information is not intended as a substitute for professional medical care. Always follow your healthcare professional's instructions.

## 2019-11-29 RX ORDER — METOPROLOL SUCCINATE 25 MG/1
25 TABLET, EXTENDED RELEASE ORAL DAILY
Qty: 30 TABLET | Refills: 1 | Status: SHIPPED | OUTPATIENT
Start: 2019-11-29 | End: 2019-12-09

## 2019-12-03 ENCOUNTER — TELEPHONE (OUTPATIENT)
Dept: CARDIOLOGY | Facility: CLINIC | Age: 64
End: 2019-12-03

## 2019-12-03 NOTE — TELEPHONE ENCOUNTER
Spoke with patient regarding her abnormal result. Frequent extra beats - PVCS - start metoprolol 25mg daily and monitor BP and HR. Patient states understanding

## 2019-12-03 NOTE — TELEPHONE ENCOUNTER
----- Message from Juan Alberto Luis MD sent at 11/29/2019 10:32 PM CST -----  Please call the patient regarding her abnormal result. Frequent extra beats - PVCS - start metoprolol 25mg daily and monitor BP and HR, follow up soon to discuss symptoms.

## 2019-12-06 ENCOUNTER — PATIENT MESSAGE (OUTPATIENT)
Dept: INTERNAL MEDICINE | Facility: CLINIC | Age: 64
End: 2019-12-06

## 2019-12-06 DIAGNOSIS — R07.89 OTHER CHEST PAIN: ICD-10-CM

## 2019-12-06 DIAGNOSIS — R06.09 DYSPNEA ON EXERTION: ICD-10-CM

## 2019-12-06 DIAGNOSIS — R06.09 DOE (DYSPNEA ON EXERTION): ICD-10-CM

## 2019-12-06 RX ORDER — FUROSEMIDE 20 MG/1
20 TABLET ORAL DAILY PRN
Qty: 90 TABLET | Refills: 3 | Status: CANCELLED | OUTPATIENT
Start: 2019-12-06 | End: 2020-12-05

## 2019-12-06 RX ORDER — DICLOFENAC SODIUM 10 MG/G
4 GEL TOPICAL 3 TIMES DAILY PRN
Qty: 2 TUBE | Refills: 0 | Status: SHIPPED | OUTPATIENT
Start: 2019-12-06 | End: 2020-01-02 | Stop reason: SDUPTHER

## 2019-12-09 ENCOUNTER — PATIENT MESSAGE (OUTPATIENT)
Dept: INTERNAL MEDICINE | Facility: CLINIC | Age: 64
End: 2019-12-09

## 2019-12-09 ENCOUNTER — PATIENT MESSAGE (OUTPATIENT)
Dept: CARDIOLOGY | Facility: CLINIC | Age: 64
End: 2019-12-09

## 2019-12-09 RX ORDER — METOPROLOL SUCCINATE 25 MG/1
25 TABLET, EXTENDED RELEASE ORAL 2 TIMES DAILY
Qty: 60 TABLET | Refills: 1 | Status: SHIPPED | OUTPATIENT
Start: 2019-12-09 | End: 2020-02-18 | Stop reason: SDUPTHER

## 2019-12-10 ENCOUNTER — TELEPHONE (OUTPATIENT)
Dept: AUDIOLOGY | Facility: CLINIC | Age: 64
End: 2019-12-10

## 2019-12-10 NOTE — TELEPHONE ENCOUNTER
Left VM w/ Saba audio phone # /rt/  ----- Message from Jolene Shrestha sent at 12/9/2019 12:50 PM CST -----  Contact: pt  Pt needs to schedule appt for VNG test and audiogram. Or [phone]

## 2019-12-11 RX ORDER — METHOCARBAMOL 500 MG/1
TABLET, FILM COATED ORAL
Qty: 60 TABLET | Refills: 0 | Status: SHIPPED | OUTPATIENT
Start: 2019-12-11 | End: 2020-01-16

## 2019-12-12 RX ORDER — MELOXICAM 15 MG/1
TABLET ORAL
Qty: 30 TABLET | Refills: 3 | Status: SHIPPED | OUTPATIENT
Start: 2019-12-12 | End: 2020-04-28

## 2019-12-13 RX ORDER — AMLODIPINE BESYLATE 10 MG/1
TABLET ORAL
Qty: 90 TABLET | Refills: 0 | Status: SHIPPED | OUTPATIENT
Start: 2019-12-13 | End: 2020-02-06

## 2019-12-17 ENCOUNTER — CLINICAL SUPPORT (OUTPATIENT)
Dept: AUDIOLOGY | Facility: CLINIC | Age: 64
End: 2019-12-17
Payer: COMMERCIAL

## 2019-12-17 ENCOUNTER — PATIENT MESSAGE (OUTPATIENT)
Dept: INTERNAL MEDICINE | Facility: CLINIC | Age: 64
End: 2019-12-17

## 2019-12-17 DIAGNOSIS — H90.3 SENSORINEURAL HEARING LOSS, BILATERAL: Primary | ICD-10-CM

## 2019-12-17 DIAGNOSIS — H81.8X9 OTHER DISORDERS OF VESTIBULAR FUNCTION, UNSPECIFIED EAR: ICD-10-CM

## 2019-12-17 PROCEDURE — 92537 CALORIC VSTBLR TEST W/REC: CPT | Mod: S$GLB,,, | Performed by: AUDIOLOGIST-HEARING AID FITTER

## 2019-12-17 PROCEDURE — 92567 PR TYMPA2METRY: ICD-10-PCS | Mod: S$GLB,,, | Performed by: AUDIOLOGIST-HEARING AID FITTER

## 2019-12-17 PROCEDURE — 92557 PR COMPREHENSIVE HEARING TEST: ICD-10-PCS | Mod: S$GLB,,, | Performed by: AUDIOLOGIST-HEARING AID FITTER

## 2019-12-17 PROCEDURE — 92557 COMPREHENSIVE HEARING TEST: CPT | Mod: S$GLB,,, | Performed by: AUDIOLOGIST-HEARING AID FITTER

## 2019-12-17 PROCEDURE — 92567 TYMPANOMETRY: CPT | Mod: S$GLB,,, | Performed by: AUDIOLOGIST-HEARING AID FITTER

## 2019-12-17 PROCEDURE — 92540 BASIC VESTIBULAR EVALUATION: CPT | Mod: S$GLB,,, | Performed by: AUDIOLOGIST-HEARING AID FITTER

## 2019-12-17 PROCEDURE — 92540 PR VESTIBULAR EVAL NYSTAG FOVL&PERPH STIM OSCIL TRACKING: ICD-10-PCS | Mod: S$GLB,,, | Performed by: AUDIOLOGIST-HEARING AID FITTER

## 2019-12-17 PROCEDURE — 92537 PR CALORIC VSTBLR TEST W/REC BITHERMAL: ICD-10-PCS | Mod: S$GLB,,, | Performed by: AUDIOLOGIST-HEARING AID FITTER
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
## 2019-12-23 ENCOUNTER — TELEPHONE (OUTPATIENT)
Dept: NEUROLOGY | Facility: CLINIC | Age: 64
End: 2019-12-23

## 2019-12-30 RX ORDER — OMEPRAZOLE 40 MG/1
CAPSULE, DELAYED RELEASE ORAL
Qty: 90 CAPSULE | Refills: 0 | Status: SHIPPED | OUTPATIENT
Start: 2019-12-30 | End: 2020-01-02 | Stop reason: SDUPTHER

## 2019-12-30 RX ORDER — TOPIRAMATE 50 MG/1
TABLET, FILM COATED ORAL
Qty: 90 TABLET | Refills: 0 | Status: SHIPPED | OUTPATIENT
Start: 2019-12-30 | End: 2020-01-02 | Stop reason: SDUPTHER

## 2020-01-02 ENCOUNTER — OFFICE VISIT (OUTPATIENT)
Dept: INTERNAL MEDICINE | Facility: CLINIC | Age: 65
End: 2020-01-02
Payer: COMMERCIAL

## 2020-01-02 VITALS
BODY MASS INDEX: 41.21 KG/M2 | HEART RATE: 72 BPM | DIASTOLIC BLOOD PRESSURE: 54 MMHG | OXYGEN SATURATION: 98 % | HEIGHT: 63 IN | WEIGHT: 232.56 LBS | SYSTOLIC BLOOD PRESSURE: 98 MMHG | TEMPERATURE: 98 F | RESPIRATION RATE: 18 BRPM

## 2020-01-02 DIAGNOSIS — M25.561 CHRONIC PAIN OF BOTH KNEES: ICD-10-CM

## 2020-01-02 DIAGNOSIS — K21.9 GASTROESOPHAGEAL REFLUX DISEASE WITHOUT ESOPHAGITIS: Primary | ICD-10-CM

## 2020-01-02 DIAGNOSIS — S31.801A TEAR OF SKIN OF BUTTOCK, INITIAL ENCOUNTER: ICD-10-CM

## 2020-01-02 DIAGNOSIS — L67.8 ABNORMAL FACIAL HAIR: ICD-10-CM

## 2020-01-02 DIAGNOSIS — M25.562 CHRONIC PAIN OF BOTH KNEES: ICD-10-CM

## 2020-01-02 DIAGNOSIS — B37.2 YEAST DERMATITIS: ICD-10-CM

## 2020-01-02 DIAGNOSIS — G89.29 CHRONIC PAIN OF BOTH KNEES: ICD-10-CM

## 2020-01-02 PROCEDURE — 3078F PR MOST RECENT DIASTOLIC BLOOD PRESSURE < 80 MM HG: ICD-10-PCS | Mod: CPTII,S$GLB,, | Performed by: FAMILY MEDICINE

## 2020-01-02 PROCEDURE — 3074F PR MOST RECENT SYSTOLIC BLOOD PRESSURE < 130 MM HG: ICD-10-PCS | Mod: CPTII,S$GLB,, | Performed by: FAMILY MEDICINE

## 2020-01-02 PROCEDURE — 3078F DIAST BP <80 MM HG: CPT | Mod: CPTII,S$GLB,, | Performed by: FAMILY MEDICINE

## 2020-01-02 PROCEDURE — 99999 PR PBB SHADOW E&M-EST. PATIENT-LVL V: ICD-10-PCS | Mod: PBBFAC,,, | Performed by: FAMILY MEDICINE

## 2020-01-02 PROCEDURE — 99999 PR PBB SHADOW E&M-EST. PATIENT-LVL V: CPT | Mod: PBBFAC,,, | Performed by: FAMILY MEDICINE

## 2020-01-02 PROCEDURE — 3008F BODY MASS INDEX DOCD: CPT | Mod: CPTII,S$GLB,, | Performed by: FAMILY MEDICINE

## 2020-01-02 PROCEDURE — 99214 OFFICE O/P EST MOD 30 MIN: CPT | Mod: S$GLB,,, | Performed by: FAMILY MEDICINE

## 2020-01-02 PROCEDURE — 99214 PR OFFICE/OUTPT VISIT, EST, LEVL IV, 30-39 MIN: ICD-10-PCS | Mod: S$GLB,,, | Performed by: FAMILY MEDICINE

## 2020-01-02 PROCEDURE — 3008F PR BODY MASS INDEX (BMI) DOCUMENTED: ICD-10-PCS | Mod: CPTII,S$GLB,, | Performed by: FAMILY MEDICINE

## 2020-01-02 PROCEDURE — 3074F SYST BP LT 130 MM HG: CPT | Mod: CPTII,S$GLB,, | Performed by: FAMILY MEDICINE

## 2020-01-02 RX ORDER — TOPIRAMATE 50 MG/1
50 TABLET, FILM COATED ORAL DAILY
Qty: 90 TABLET | Refills: 0 | Status: SHIPPED | OUTPATIENT
Start: 2020-01-02 | End: 2020-08-12

## 2020-01-02 RX ORDER — DICLOFENAC SODIUM 10 MG/G
4 GEL TOPICAL 3 TIMES DAILY PRN
Qty: 2 TUBE | Refills: 0 | Status: SHIPPED | OUTPATIENT
Start: 2020-01-02 | End: 2020-04-01

## 2020-01-02 RX ORDER — NYSTATIN 100000 U/G
CREAM TOPICAL 2 TIMES DAILY
Qty: 30 G | Refills: 1 | Status: SHIPPED | OUTPATIENT
Start: 2020-01-02 | End: 2020-03-25

## 2020-01-02 RX ORDER — OMEPRAZOLE 40 MG/1
40 CAPSULE, DELAYED RELEASE ORAL DAILY
Qty: 90 CAPSULE | Refills: 0 | Status: SHIPPED | OUTPATIENT
Start: 2020-01-02 | End: 2020-06-10

## 2020-01-02 RX ORDER — NYSTATIN 100000 [USP'U]/G
POWDER TOPICAL 2 TIMES DAILY
Qty: 120 G | Refills: 1 | Status: SHIPPED | OUTPATIENT
Start: 2020-01-02 | End: 2023-08-07

## 2020-01-02 NOTE — PROGRESS NOTES
Subjective:       Patient ID: Mariel Becerril is a 64 y.o. female.    Chief Complaint: Pain    HPI Ms. Becerril presents today with pain in the upper part of the buttock.     Has been using cream for hemorrhoids but this hasn't been helping. She has never had hemorrhoids and does not know if this is what she is experiencing    May be associated with low back pain.   No straining with BM  Takes a stool softener     Light headed started today.  Increased metoprolol recently for palpitations.     Facial hair -would like to know if she can get a dermatology referral      Review of Systems   Constitutional: Negative.    HENT: Negative.    Genitourinary: Negative.    Neurological: Positive for dizziness. Negative for headaches.        Past Medical History:   Diagnosis Date    Frequent headaches     High blood pressure     Hypertension     Osteoarthritis 04/02/2019    Bilateral knees, ankles and feet    Severe obesity (BMI >= 40)     Sleep apnea      Past Surgical History:   Procedure Laterality Date    BLADDER SUSPENSION      COLONOSCOPY N/A 12/3/2018    Procedure: COLONOSCOPY;  Surgeon: Mari Rader MD;  Location: [facility];  Service: Endoscopy;  Laterality: N/A;    HYSTERECTOMY      partial 2000    tonsilectomy      tonsils         Objective:        Physical Exam   Constitutional: She is oriented to person, place, and time. She appears well-developed and well-nourished.   HENT:   Head: Normocephalic and atraumatic.   Right Ear: External ear normal.   Left Ear: External ear normal.   Genitourinary:   Genitourinary Comments: Area noted above anus where there appears to be an excoriation or scrap.   Top layer of skin gone   May be yeast involved    Neurological: She is alert and oriented to person, place, and time.   Vitals reviewed.        Assessment/Plan:     Gastroesophageal reflux disease without esophagitis  -     omeprazole (PRILOSEC) 40 MG capsule; Take 1 capsule (40 mg total) by mouth once daily.   Dispense: 90 capsule; Refill: 0    Chronic pain of both knees  -     diclofenac sodium (VOLTAREN) 1 % Gel; Apply 4 grams topically 3 (three) times daily as needed.  Dispense: 2 Tube; Refill: 0    Tear of skin of buttock, initial encounter  -     nystatin (MYCOSTATIN) cream; Apply topically 2 (two) times daily.  Dispense: 30 g; Refill: 1  -     nystatin (MYCOSTATIN) powder; Apply topically 2 (two) times daily.  Dispense: 120 g; Refill: 1    Yeast dermatitis  -     nystatin (MYCOSTATIN) cream; Apply topically 2 (two) times daily.  Dispense: 30 g; Refill: 1  -     nystatin (MYCOSTATIN) powder; Apply topically 2 (two) times daily.  Dispense: 120 g; Refill: 1    Abnormal facial hair  -     Ambulatory Referral to Dermatology    Other orders  -     topiramate (TOPAMAX) 50 MG tablet; Take 1 tablet (50 mg total) by mouth once daily.  Dispense: 90 tablet; Refill: 0    NV in 1 week recheck BP   Stopped amlodipine today       Follow up if symptoms worsen or fail to improve.    Jaclyn Becerril MD  [facility]   Family Medicine

## 2020-01-08 ENCOUNTER — INITIAL CONSULT (OUTPATIENT)
Dept: DERMATOLOGY | Facility: CLINIC | Age: 65
End: 2020-01-08
Payer: COMMERCIAL

## 2020-01-08 DIAGNOSIS — L68.0 HIRSUTISM: Primary | ICD-10-CM

## 2020-01-08 DIAGNOSIS — L82.1 DERMATOSIS PAPULOSA NIGRA: ICD-10-CM

## 2020-01-08 PROCEDURE — 99242 OFF/OP CONSLTJ NEW/EST SF 20: CPT | Mod: S$GLB,,, | Performed by: DERMATOLOGY

## 2020-01-08 PROCEDURE — 99999 PR PBB SHADOW E&M-EST. PATIENT-LVL II: ICD-10-PCS | Mod: PBBFAC,,, | Performed by: DERMATOLOGY

## 2020-01-08 PROCEDURE — 99242 PR OFFICE CONSULTATION,LEVEL II: ICD-10-PCS | Mod: S$GLB,,, | Performed by: DERMATOLOGY

## 2020-01-08 PROCEDURE — 99999 PR PBB SHADOW E&M-EST. PATIENT-LVL II: CPT | Mod: PBBFAC,,, | Performed by: DERMATOLOGY

## 2020-01-08 NOTE — LETTER
January 8, 2020      Jaclyn Becerril MD  [id number] [place] [address] [address] [place] [address]           [facility]  [address] [facility]  [place] [address]  Phone: [phone]  Fax: [phone]          Patient: Mariel Becerril   MR Number: [id number]   YOB: 1955   Date of Visit: 1/8/2020       Dear Dr. Jaclyn Becerril:    Thank you for referring Mariel Becerril to me for evaluation. Attached you will find relevant portions of my assessment and plan of care.    If you have questions, please do not hesitate to call me. I look forward to following Mariel Becerril along with you.    Sincerely,    Jared Wagner MD    Enclosure  CC:  No Recipients    If you would like to receive this communication electronically, please contact [email or web address] or [phone] to request more information on [employer] Link access.    For providers and/or their staff who would like to refer a patient to [facility], please contact us through our one-stop-shop provider referral line, [facility], at [phone].    If you feel you have received this communication in error or would no longer like to receive these types of communications, please e-mail [email or web address]

## 2020-01-09 ENCOUNTER — OFFICE VISIT (OUTPATIENT)
Dept: OPHTHALMOLOGY | Facility: CLINIC | Age: 65
End: 2020-01-09
Payer: COMMERCIAL

## 2020-01-09 DIAGNOSIS — H25.13 NUCLEAR SCLEROSIS OF BOTH EYES: Primary | ICD-10-CM

## 2020-01-09 DIAGNOSIS — H25.043 POSTERIOR SUBCAPSULAR AGE-RELATED CATARACT OF BOTH EYES: ICD-10-CM

## 2020-01-09 PROCEDURE — 99999 PR PBB SHADOW E&M-EST. PATIENT-LVL II: ICD-10-PCS | Mod: PBBFAC,,, | Performed by: OPHTHALMOLOGY

## 2020-01-09 PROCEDURE — 92014 PR EYE EXAM, EST PATIENT,COMPREHESV: ICD-10-PCS | Mod: S$GLB,,, | Performed by: OPHTHALMOLOGY

## 2020-01-09 PROCEDURE — 92014 COMPRE OPH EXAM EST PT 1/>: CPT | Mod: S$GLB,,, | Performed by: OPHTHALMOLOGY

## 2020-01-09 PROCEDURE — 92136 OPHTHALMIC BIOMETRY: CPT | Mod: LT,S$GLB,, | Performed by: OPHTHALMOLOGY

## 2020-01-09 PROCEDURE — 99999 PR PBB SHADOW E&M-EST. PATIENT-LVL II: CPT | Mod: PBBFAC,,, | Performed by: OPHTHALMOLOGY

## 2020-01-09 PROCEDURE — 92136 IOL MASTER - OS - LEFT EYE: ICD-10-PCS | Mod: LT,S$GLB,, | Performed by: OPHTHALMOLOGY

## 2020-01-09 RX ORDER — GATIFLOXACIN 5 MG/ML
1 SOLUTION/ DROPS OPHTHALMIC 2 TIMES DAILY
Qty: 1 BOTTLE | Refills: 2 | Status: SHIPPED | OUTPATIENT
Start: 2020-01-09 | End: 2021-02-17

## 2020-01-09 RX ORDER — PREDNISOLONE ACETATE 10 MG/ML
1 SUSPENSION/ DROPS OPHTHALMIC 4 TIMES DAILY
Qty: 1 BOTTLE | Refills: 2 | Status: SHIPPED | OUTPATIENT
Start: 2020-01-09 | End: 2021-02-17

## 2020-01-09 RX ORDER — KETOROLAC TROMETHAMINE 5 MG/ML
1 SOLUTION OPHTHALMIC 4 TIMES DAILY
Qty: 1 BOTTLE | Refills: 2 | Status: SHIPPED | OUTPATIENT
Start: 2020-01-09 | End: 2021-02-17
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
## 2020-01-10 ENCOUNTER — CLINICAL SUPPORT (OUTPATIENT)
Dept: INTERNAL MEDICINE | Facility: CLINIC | Age: 65
End: 2020-01-10
Payer: COMMERCIAL

## 2020-01-10 VITALS — SYSTOLIC BLOOD PRESSURE: 118 MMHG | DIASTOLIC BLOOD PRESSURE: 74 MMHG

## 2020-01-16 RX ORDER — METHOCARBAMOL 500 MG/1
TABLET, FILM COATED ORAL
Qty: 60 TABLET | Refills: 0 | Status: SHIPPED | OUTPATIENT
Start: 2020-01-16 | End: 2020-02-24

## 2020-01-22 ENCOUNTER — OUTSIDE PLACE OF SERVICE (OUTPATIENT)
Dept: ADMINISTRATIVE | Facility: OTHER | Age: 65
End: 2020-01-22
Payer: COMMERCIAL

## 2020-01-22 PROCEDURE — 66984 XCAPSL CTRC RMVL W/O ECP: CPT | Mod: LT,,, | Performed by: OPHTHALMOLOGY

## 2020-01-22 PROCEDURE — 66984 PR REMOVAL, CATARACT, W/INSRT INTRAOC LENS, W/O ENDO CYCLO: ICD-10-PCS | Mod: LT,,, | Performed by: OPHTHALMOLOGY

## 2020-01-23 ENCOUNTER — OFFICE VISIT (OUTPATIENT)
Dept: OPHTHALMOLOGY | Facility: CLINIC | Age: 65
End: 2020-01-23
Payer: COMMERCIAL

## 2020-01-23 DIAGNOSIS — Z98.890 POST-OPERATIVE STATE: Primary | ICD-10-CM

## 2020-01-23 PROCEDURE — 99999 PR PBB SHADOW E&M-EST. PATIENT-LVL II: CPT | Mod: PBBFAC,,, | Performed by: OPHTHALMOLOGY

## 2020-01-23 PROCEDURE — 99024 PR POST-OP FOLLOW-UP VISIT: ICD-10-PCS | Mod: S$GLB,,, | Performed by: OPHTHALMOLOGY

## 2020-01-23 PROCEDURE — 99999 PR PBB SHADOW E&M-EST. PATIENT-LVL II: ICD-10-PCS | Mod: PBBFAC,,, | Performed by: OPHTHALMOLOGY

## 2020-01-23 PROCEDURE — 99024 POSTOP FOLLOW-UP VISIT: CPT | Mod: S$GLB,,, | Performed by: OPHTHALMOLOGY

## 2020-01-23 NOTE — PROGRESS NOTES
SUBJECTIVE  Mariel Becerril is 64 y.o. female  Uncorrected distance visual acuity was not recorded in the right eye and 20/40 in the left eye.   Chief Complaint   Patient presents with    Post-op Evaluation          HPI     1 day phaco OS doing well per pt      1. Cataract OD  PCIOL OS +17.0 SN60WF 1/22/20  2. SCTL wearer/wears OTC readers on top    Patient last wore SCTL on 01/05/20      Pred ket qid gat bid OS    Last edited by Manasa Polanco MA on 1/23/2020  8:00 AM. (History)         Assessment /Plan :  1. Post-operative state Exam:  SLE:  S/C: normal  K : trace k fold  AC: trace cell and flare  Iris: normal  Lens: PCIOL    IMP:  PO Day 1 S/P Phaco/IOL OS : Doing well.    Plan:  Continue gtts to operative eye:  Pred 1% QID  Ketorolac QID  Zymaxid BID  Reinstructed in importance of absolute compliance with Post-OP instructions including medications, shield at bedtime, and limitation of activities. Follow up appointments in approximately one and six weeks or call immediately for increased pain, redness or vision loss.

## 2020-01-27 ENCOUNTER — OFFICE VISIT (OUTPATIENT)
Dept: ORTHOPEDICS | Facility: CLINIC | Age: 65
End: 2020-01-27
Payer: COMMERCIAL

## 2020-01-27 VITALS
HEIGHT: 63 IN | BODY MASS INDEX: 41.11 KG/M2 | WEIGHT: 232 LBS | DIASTOLIC BLOOD PRESSURE: 74 MMHG | SYSTOLIC BLOOD PRESSURE: 123 MMHG | HEART RATE: 63 BPM

## 2020-01-27 DIAGNOSIS — M17.11 ARTHRITIS OF KNEE, RIGHT: ICD-10-CM

## 2020-01-27 DIAGNOSIS — M17.12 ARTHRITIS OF KNEE, LEFT: Primary | ICD-10-CM

## 2020-01-27 PROCEDURE — 3008F BODY MASS INDEX DOCD: CPT | Mod: CPTII,S$GLB,, | Performed by: ORTHOPAEDIC SURGERY

## 2020-01-27 PROCEDURE — 3078F DIAST BP <80 MM HG: CPT | Mod: CPTII,S$GLB,, | Performed by: ORTHOPAEDIC SURGERY

## 2020-01-27 PROCEDURE — 99999 PR PBB SHADOW E&M-EST. PATIENT-LVL III: ICD-10-PCS | Mod: PBBFAC,,, | Performed by: ORTHOPAEDIC SURGERY

## 2020-01-27 PROCEDURE — 3008F PR BODY MASS INDEX (BMI) DOCUMENTED: ICD-10-PCS | Mod: CPTII,S$GLB,, | Performed by: ORTHOPAEDIC SURGERY

## 2020-01-27 PROCEDURE — 99214 OFFICE O/P EST MOD 30 MIN: CPT | Mod: S$GLB,,, | Performed by: ORTHOPAEDIC SURGERY

## 2020-01-27 PROCEDURE — 3074F PR MOST RECENT SYSTOLIC BLOOD PRESSURE < 130 MM HG: ICD-10-PCS | Mod: CPTII,S$GLB,, | Performed by: ORTHOPAEDIC SURGERY

## 2020-01-27 PROCEDURE — 99999 PR PBB SHADOW E&M-EST. PATIENT-LVL III: CPT | Mod: PBBFAC,,, | Performed by: ORTHOPAEDIC SURGERY

## 2020-01-27 PROCEDURE — 3074F SYST BP LT 130 MM HG: CPT | Mod: CPTII,S$GLB,, | Performed by: ORTHOPAEDIC SURGERY

## 2020-01-27 PROCEDURE — 99214 PR OFFICE/OUTPT VISIT, EST, LEVL IV, 30-39 MIN: ICD-10-PCS | Mod: S$GLB,,, | Performed by: ORTHOPAEDIC SURGERY

## 2020-01-27 PROCEDURE — 3078F PR MOST RECENT DIASTOLIC BLOOD PRESSURE < 80 MM HG: ICD-10-PCS | Mod: CPTII,S$GLB,, | Performed by: ORTHOPAEDIC SURGERY

## 2020-01-27 RX ORDER — BENZONATATE 100 MG/1
100 CAPSULE ORAL 3 TIMES DAILY PRN
Qty: 30 CAPSULE | Refills: 0 | Status: SHIPPED | OUTPATIENT
Start: 2020-01-27 | End: 2020-07-20

## 2020-01-27 NOTE — PROGRESS NOTES
Subjective:     Patient ID: Mariel Becerril is a 64 y.o. female.    Chief Complaint: No chief complaint on file.    HPI:  64-year-old been suffering with the left knee for 3 years.  At 1 point she was on crutches for a couple years until she received numerous injections but different providers finally got off in the summer.  She had undergone major physical therapy in [facility] until last month.  Injections include steroids, hyaluronic, NSAIDs including meloxicam, diclofenac gel, Aleve Advil, Robaxin, Norco, tramadol.  She was seen at [facility] on 12/16/2019 but prefers to come here.  Patient had to quit her job because of the pain.  Pain is 9/10.  The left knee is the worst as compared to the right knee. Stairs are difficult kneeling is difficult ambulating more than 100 ft becomes extremely painful.  Pain is constant.  Past Medical History:   Diagnosis Date    Frequent headaches     High blood pressure     Hypertension     Osteoarthritis 04/02/2019    Bilateral knees, ankles and feet    Severe obesity (BMI >= 40)     Sleep apnea      Past Surgical History:   Procedure Laterality Date    BLADDER SUSPENSION      COLONOSCOPY N/A 12/3/2018    Procedure: COLONOSCOPY;  Surgeon: Mari Rader MD;  Location: [facility];  Service: Endoscopy;  Laterality: N/A;    HYSTERECTOMY      partial 2000    tonsilectomy      tonsils       Family History   Problem Relation Age of Onset    Heart attack Father      Social History     Socioeconomic History    Marital status:      Spouse name: Not on file    Number of children: Not on file    Years of education: Not on file    Highest education level: Not on file   Occupational History    Not on file   Social Needs    Financial resource strain: Not on file    Food insecurity:     Worry: Not on file     Inability: Not on file    Transportation needs:     Medical: Not on file     Non-medical: Not on file   Tobacco Use    Smoking status: Never Smoker    Smokeless  tobacco: Never Used   Substance and Sexual Activity    Alcohol use: No    Drug use: No    Sexual activity: Never     Partners: Male     Birth control/protection: See Surgical Hx   Lifestyle    Physical activity:     Days per week: Not on file     Minutes per session: Not on file    Stress: Not on file   Relationships    Social connections:     Talks on phone: Not on file     Gets together: Not on file     Attends Cheondoism service: Not on file     Active member of club or organization: Not on file     Attends meetings of clubs or organizations: Not on file     Relationship status: Not on file   Other Topics Concern    Not on file   Social History Narrative    Not on file     Medication List with Changes/Refills   Current Medications    ALBUTEROL (PROAIR HFA) 90 MCG/ACTUATION INHALER    Inhale 2 puffs into the lungs every 6 (six) hours as needed for Wheezing. Rescue    AMLODIPINE (NORVASC) 10 MG TABLET    TAKE 1 TABLET BY MOUTH ONCE DAILY    BENZONATATE (TESSALON) 100 MG CAPSULE    TAKE 1 CAPSULE BY MOUTH THREE TIMES DAILY AS  NEEDED  FOR  COUGH    CHLORHEXIDINE (PERIDEX) 0.12 % SOLUTION    SWISH AND SPIT 15 MLS BY MOUTH TWICE A DAY FOR 7 DAYS    DICLOFENAC SODIUM (VOLTAREN) 1 % GEL    Apply 4 grams topically 3 (three) times daily as needed.    EFLORNITHINE (VANIQA) 13.9 % CREAM    Apply topically 2 (two) times daily.    ERGOCALCIFEROL, VITAMIN D2, (VITAMIN D ORAL)    Take 2,000 Units by mouth once daily.    FLUTICASONE PROPIONATE (FLONASE) 50 MCG/ACTUATION NASAL SPRAY    1 spray (50 mcg total) by Each Nare route once daily.    FUROSEMIDE (LASIX) 20 MG TABLET    Take 1 tablet (20 mg total) by mouth daily as needed.    GATIFLOXACIN 0.5 % DROP DROPS    Place 1 drop into the left eye 2 (two) times daily. Eyedrops to start one day before surgery    HYDROCODONE-ACETAMINOPHEN (NORCO) 7.5-325 MG PER TABLET        KETOROLAC 0.5% (ACULAR) 0.5 % DROP    Place 1 drop into the left eye 4 (four) times daily. Eyedrops to  start one day before surgery    LEVOCETIRIZINE (XYZAL) 5 MG TABLET    Take 1 tablet (5 mg total) by mouth every evening.    MECLIZINE (ANTIVERT) 25 MG TABLET    Take 1 tablet (25 mg total) by mouth 3 (three) times daily as needed for Dizziness.    MELOXICAM (MOBIC) 15 MG TABLET    TAKE 1 TABLET BY MOUTH ONCE DAILY    METHOCARBAMOL (ROBAXIN) 500 MG TAB    TAKE 1 TABLET BY MOUTH TWICE DAILY AS NEEDED    METOPROLOL SUCCINATE (TOPROL-XL) 25 MG 24 HR TABLET    Take 1 tablet (25 mg total) by mouth 2 (two) times daily.    MONTELUKAST (SINGULAIR) 10 MG TABLET    Take 10 mg by mouth every evening.    MONTELUKAST (SINGULAIR) 10 MG TABLET    TAKE 1 TABLET BY MOUTH ONCE DAILY IN THE EVENING    NYSTATIN (MYCOSTATIN) CREAM    Apply topically 2 (two) times daily.    NYSTATIN (MYCOSTATIN) POWDER    Apply topically 2 (two) times daily.    OMEPRAZOLE (PRILOSEC) 40 MG CAPSULE    Take 1 capsule (40 mg total) by mouth once daily.    ONDANSETRON (ZOFRAN-ODT) 4 MG TBDL    Take 1 tablet (4 mg total) by mouth every 8 (eight) hours as needed.    PREDNISOLONE ACETATE (PRED FORTE) 1 % DRPS    Place 1 drop into the left eye 4 (four) times daily. Start one day before surgery    TOPIRAMATE (TOPAMAX) 50 MG TABLET    Take 1 tablet (50 mg total) by mouth once daily.    TRAMADOL (ULTRAM) 50 MG TABLET    Take 1 tablet (50 mg total) by mouth 2 (two) times daily as needed.     Review of patient's allergies indicates:   Allergen Reactions    Penicillins Rash     Review of Systems   Constitution: Negative for decreased appetite.   HENT: Negative for tinnitus.    Eyes: Negative for double vision.   Cardiovascular: Negative for chest pain.   Respiratory: Negative for wheezing.    Hematologic/Lymphatic: Negative for bleeding problem.   Skin: Negative for dry skin.   Musculoskeletal: Positive for arthritis, back pain, joint pain, joint swelling, myalgias and stiffness. Negative for gout and neck pain.   Gastrointestinal: Negative for abdominal pain.    Genitourinary: Negative for bladder incontinence.   Neurological: Negative for numbness, paresthesias and sensory change.   Psychiatric/Behavioral: Negative for altered mental status.       Objective:   Body mass index is 41.1 kg/m².  Vitals:    01/27/20 1030   BP: 123/74   Pulse: 63          General    Constitutional: She is oriented to person, place, and time. She appears well-developed.   HENT:   Head: Atraumatic.   Eyes: EOM are normal.   Cardiovascular: Normal rate.    Pulmonary/Chest: Effort normal.   Abdominal: Soft.   Neurological: She is alert and oriented to person, place, and time.   Psychiatric: Judgment normal.            Cervical spine range of motion very functional.  Bilateral upper extremity neurovascularly intact. Motor strength is 5/5.  Radial and ulnar pulses 2+ sensory intact to touch  Lumbar with slight discomfort paraspinal.  Pelvis is level. Negative straight leg raising.  Bilateral hips passive motion is intact. Hip flexors, abductors, adductors, quads and hamstrings ankle extensors seen and flexors are 5-/5.  Left knee with severe pain medially and crepitus with motion as well as anteriorly.  Range of motion few degrees of contractures around 2° and flexes 110°.  Crepitus with motion on the patella and medially. Collaterals and cruciates seems to be stable.  Right knee with crepitus under the patella and slight medial joint tenderness.  Range of motion 0-130 degrees. Collaterals and cruciates are stable. Negative Celio's  Calves are soft nontender  Skin is warm to touch no obvious lesions  DP DP 1+      Relevant imaging results reviewed and interpreted by me, discussed with the patient and / or family today   X-ray 11/26/2019 complete loss of left knee medial joint space with osteophytes consistent with end-stage arthrosis/varus deformity  Assessment:     Encounter Diagnoses   Name Primary?    Arthritis of knee, left Yes    Arthritis of knee, right         Plan:   Arthritis of knee,  left    Arthritis of knee, right         Patient Instructions   Bilateral knee arthritis left worse than right  Patient had been on physical therapy until last month, use crutches for couple years, meloxicam, diclofenac gel, Robaxin, Norco, tramadol    Nothing much to offer the patient except total knee replacement  Total knee replacements are 80% successful in decreasing pain and increasing function and they are not 100% the last roughly 10-15 years some patients 20 years or more depends on the patient the hospital stay is roughly 23 hr is considered outpatient surgery .  We will arrange home health for couple weeks and home therapy.  Surgery is painful but we will work with the patient to minimize it until she gets better benefit and complete healing occur.  It will take roughly 3-6 months for healing to finish.  The narcotics we usually wean.  Shins of within 4-6 weeks.  Require a lot of physical therapy to achieve maximum benefit  Brochure for total knee replacement will be given  Will described surgery on a model    Procedure, common risks and benefits,alternatives discussed in details.All questions answered.Patient expressed understanding.Patient instructed to call for any questions that could arise in the future.    Most common Risks:  Infection  Leg-length discrepancy    Neuro-vascular injury ( resulting in loss of motor and sensory functions)  Pain  Blood clot  Fat clot  Loss of motion  Fracture of bone  Failure of procedure to achieve its intended purpose  Failure of hardware  Non-union or mal-union of bone  Malalignment  Death      Patient instructions for joint replacement    Before surgery  1.  Shower with Hibiclens soap/antibacterial for 3 days prior to surgery to decrease risk of infection  2..  Stop all blood thinners/aspirin, Coumadin, warfarin, Plavix, Eliquis, Xarelto etc 5 days prior to surgery  3.  Take Celebrex 400 mg the night prior to surgery after dinner or meloxicam 15 mg  4.  Take Tylenol  650 mg the night prior to surgery    Ask physicians for prescription of Celebrex or Mobic if needed    After surgery at home  1.  Take Tylenol 650 mg 3 times a day for 14 days then as needed for mild pain  2.  Take gabapentin 300 mg nightly for 6 weeks  3.  Take Celebrex 200 mg or meloxicam 15 mg daily for 6 weeks unless having cardiac issues to take for 2 weeks only  4.  Must take aspirin 81 mg twice a day for 6 weeks unless you are on other blood thinners/Plavix, Eliquis, Xarelto, Coumadin etc  5.  Must wear compressive stockings for 6 weeks minimum to decrease the risk of blood clot and swelling  6.  Hydrocodone/Norco or oxycodone/Percocet will be prescribed to take every 6 hr as needed for breakthrough pain  7.  May apply ice on the knee to help with decreasing pain  8.  Keep wound dry for 2 weeks until stitches/staples removed than you will be allowed to shower in 24 hr and get the wound wet.  No soaking of the wound in the tub or swimming for 4 weeks after surgery  9.  No driving for 4 weeks unless specified by physician  10.  Avoid touching the wound or surrounding area /at least 2 inches on each side of the surgical incision until staples are removed/stitches   11.  May change the surgical dressing if extremely bloody or has drainage on it. May clean the wound with peroxide or Betadine and apply sterile dressing and Ace wrap over it  12.  Leave hospital dressing on for 3 days then may change by applying sterile 4 x 4 and Ace wrap after cleaning with Betadine or peroxide.  May leave this dressing change to home health nurses  2    Schedule left total knee arthroplasty after medical clearance and cardiac clearance  Patient definitely denied history of hepatitis or HIV      Disclaimer: This note was prepared using a voice recognition system and is likely to have sound alike errors within the text.

## 2020-01-27 NOTE — PATIENT INSTRUCTIONS
Bilateral knee arthritis left worse than right  Patient had been on physical therapy until last month, use crutches for couple years, meloxicam, diclofenac gel, Robaxin, Norco, tramadol    Nothing much to offer the patient except total knee replacement  Total knee replacements are 80% successful in decreasing pain and increasing function and they are not 100% the last roughly 10-15 years some patients 20 years or more depends on the patient the hospital stay is roughly 23 hr is considered outpatient surgery .  We will arrange home health for couple weeks and home therapy.  Surgery is painful but we will work with the patient to minimize it until she gets better benefit and complete healing occur.  It will take roughly 3-6 months for healing to finish.  The narcotics we usually wean.  Shins of within 4-6 weeks.  Require a lot of physical therapy to achieve maximum benefit  Brochure for total knee replacement will be given  Will described surgery on a model    Procedure, common risks and benefits,alternatives discussed in details.All questions answered.Patient expressed understanding.Patient instructed to call for any questions that could arise in the future.    Most common Risks:  Infection  Leg-length discrepancy    Neuro-vascular injury ( resulting in loss of motor and sensory functions)  Pain  Blood clot  Fat clot  Loss of motion  Fracture of bone  Failure of procedure to achieve its intended purpose  Failure of hardware  Non-union or mal-union of bone  Malalignment  Death      Patient instructions for joint replacement    Before surgery  1.  Shower with Hibiclens soap/antibacterial for 3 days prior to surgery to decrease risk of infection  2..  Stop all blood thinners/aspirin, Coumadin, warfarin, Plavix, Eliquis, Xarelto etc 5 days prior to surgery  3.  Take Celebrex 400 mg the night prior to surgery after dinner or meloxicam 15 mg  4.  Take Tylenol 650 mg the night prior to surgery    Ask physicians for  prescription of Celebrex or Mobic if needed    After surgery at home  1.  Take Tylenol 650 mg 3 times a day for 14 days then as needed for mild pain  2.  Take gabapentin 300 mg nightly for 6 weeks  3.  Take Celebrex 200 mg or meloxicam 15 mg daily for 6 weeks unless having cardiac issues to take for 2 weeks only  4.  Must take aspirin 81 mg twice a day for 6 weeks unless you are on other blood thinners/Plavix, Eliquis, Xarelto, Coumadin etc  5.  Must wear compressive stockings for 6 weeks minimum to decrease the risk of blood clot and swelling  6.  Hydrocodone/Norco or oxycodone/Percocet will be prescribed to take every 6 hr as needed for breakthrough pain  7.  May apply ice on the knee to help with decreasing pain  8.  Keep wound dry for 2 weeks until stitches/staples removed than you will be allowed to shower in 24 hr and get the wound wet.  No soaking of the wound in the tub or swimming for 4 weeks after surgery  9.  No driving for 4 weeks unless specified by physician  10.  Avoid touching the wound or surrounding area /at least 2 inches on each side of the surgical incision until staples are removed/stitches   11.  May change the surgical dressing if extremely bloody or has drainage on it. May clean the wound with peroxide or Betadine and apply sterile dressing and Ace wrap over it  12.  Leave hospital dressing on for 3 days then may change by applying sterile 4 x 4 and Ace wrap after cleaning with Betadine or peroxide.  May leave this dressing change to home health nurses  2

## 2020-01-27 NOTE — LETTER
January 27, 2020      Lisa Obregon PA-C  [address]  [place] [address]           [facility]  [address] [place] [id number]  Phone: [phone]  Fax: [phone]          Patient: Mariel Becerril   MR Number: [id number]   YOB: 1955   Date of Visit: 1/27/2020       Dear Lisa Obregon:    Thank you for referring Mariel Becerril to me for evaluation. Attached you will find relevant portions of my assessment and plan of care.    If you have questions, please do not hesitate to call me. I look forward to following Mariel Becerril along with you.    Sincerely,    Moy Connolly MD    Enclosure  CC:  No Recipients    If you would like to receive this communication electronically, please contact [email or web address] or [phone] to request more information on [facility] [facility] Link access.    For providers and/or their staff who would like to refer a patient to [facility], please contact us through our one-stop-shop provider referral line, [facility], at [phone].    If you feel you have received this communication in error or would no longer like to receive these types of communications, please e-mail [email or web address]

## 2020-01-28 DIAGNOSIS — M17.0 PRIMARY OSTEOARTHRITIS OF BOTH KNEES: ICD-10-CM

## 2020-01-28 DIAGNOSIS — G89.29 CHRONIC PAIN OF BOTH KNEES: ICD-10-CM

## 2020-01-28 DIAGNOSIS — M25.562 CHRONIC PAIN OF BOTH KNEES: ICD-10-CM

## 2020-01-28 DIAGNOSIS — M17.12 ARTHRITIS OF KNEE, LEFT: Primary | ICD-10-CM

## 2020-01-28 DIAGNOSIS — M17.12 ARTHRITIS OF KNEE, LEFT: ICD-10-CM

## 2020-01-28 DIAGNOSIS — M25.561 CHRONIC PAIN OF BOTH KNEES: ICD-10-CM

## 2020-01-28 DIAGNOSIS — Z96.652 S/P TOTAL KNEE ARTHROPLASTY, LEFT: ICD-10-CM

## 2020-01-28 DIAGNOSIS — Z01.818 PREOP TESTING: Primary | ICD-10-CM

## 2020-01-30 ENCOUNTER — OFFICE VISIT (OUTPATIENT)
Dept: OPHTHALMOLOGY | Facility: CLINIC | Age: 65
End: 2020-01-30
Payer: COMMERCIAL

## 2020-01-30 DIAGNOSIS — Z98.890 POST-OPERATIVE STATE: Primary | ICD-10-CM

## 2020-01-30 DIAGNOSIS — H25.11 SENILE NUCLEAR CATARACT, RIGHT: ICD-10-CM

## 2020-01-30 PROCEDURE — 99024 POSTOP FOLLOW-UP VISIT: CPT | Mod: S$GLB,,, | Performed by: OPHTHALMOLOGY

## 2020-01-30 PROCEDURE — 99999 PR PBB SHADOW E&M-EST. PATIENT-LVL II: CPT | Mod: PBBFAC,,, | Performed by: OPHTHALMOLOGY

## 2020-01-30 PROCEDURE — 92136 OPHTHALMIC BIOMETRY: CPT | Mod: RT,S$GLB,, | Performed by: OPHTHALMOLOGY

## 2020-01-30 PROCEDURE — 99024 PR POST-OP FOLLOW-UP VISIT: ICD-10-PCS | Mod: S$GLB,,, | Performed by: OPHTHALMOLOGY

## 2020-01-30 PROCEDURE — 99999 PR PBB SHADOW E&M-EST. PATIENT-LVL II: ICD-10-PCS | Mod: PBBFAC,,, | Performed by: OPHTHALMOLOGY

## 2020-01-30 PROCEDURE — 92136 IOL MASTER - OD - RIGHT EYE: ICD-10-PCS | Mod: RT,S$GLB,, | Performed by: OPHTHALMOLOGY

## 2020-01-30 RX ORDER — KETOROLAC TROMETHAMINE 5 MG/ML
1 SOLUTION OPHTHALMIC 4 TIMES DAILY
Qty: 1 BOTTLE | Refills: 2 | Status: ON HOLD | OUTPATIENT
Start: 2020-01-30 | End: 2020-03-06 | Stop reason: HOSPADM

## 2020-01-30 RX ORDER — GATIFLOXACIN 5 MG/ML
1 SOLUTION/ DROPS OPHTHALMIC 2 TIMES DAILY
Qty: 1 BOTTLE | Refills: 2 | Status: SHIPPED | OUTPATIENT
Start: 2020-01-30 | End: 2021-02-17

## 2020-01-30 RX ORDER — PREDNISOLONE ACETATE 10 MG/ML
1 SUSPENSION/ DROPS OPHTHALMIC 4 TIMES DAILY
Qty: 1 BOTTLE | Refills: 2 | Status: ON HOLD | OUTPATIENT
Start: 2020-01-30 | End: 2020-03-06 | Stop reason: HOSPADM

## 2020-01-30 NOTE — PROGRESS NOTES
SUBJECTIVE  Mariel CARRASCO Becerril is 64 y.o. female  Uncorrected distance visual acuity was 20/100 in the right eye and 20/20 in the left eye.   Chief Complaint   Patient presents with    Post-op Evaluation     S/P Phaco with IOL OS(1/22/20)    Cataract     ready to schedule surgery          HPI     Post-op Evaluation      Additional comments: S/P Phaco with IOL OS(1/22/20)              Cataract      Additional comments: ready to schedule surgery              Comments     Patient feels OS is doing well, using drops as directed and states VA has   improved with surgery.    PT C/O: VA appears blurry  in OD compared to OS, images appear cloudy   along with glare sensitivity when driving at night.    DNL Patient    1. Cataract OD  PCIOL OS +17.0 SN60WF 1/22/20  2. SCTL wearer/wears OTC readers on top    Patient last wore SCTL on 01/05/20      Pred ket qid gat bid OS          Last edited by Adina Badillo, Patient Care Assistant on 1/30/2020  7:53   AM. (History)         Assessment /Plan :  1. Post-operative state Slit lamp exam:  L/L: nl  K: clear, wound sealed  AC: 0 cell and flare  Iris/Lens: IOL centered and stable      IMP:  PO Week 1 S/P Phaco/ IOL OS : Doing well with no evidence of infection or abnormal inflammation.     Plan:  Pt given and instructed in one week PO instructions. D/C zymaxid and start to taper off Ketorlac and Pred Forte 1% weekly. Can resume normal activitites and d/c eye shield. OTC reading glasses can be used until evaluated for final MR. Follow up in one month or PRN pain, redness, vision loss, or other concerns.     2. Senile nuclear cataract, right   Patient reports decreased vision in the fellow eye consistent with the clinical amount of lenticular opacity, which reaches the level of visual significance and affects activities of daily living including reading and glare. Risks, benefits, and alternatives to cataract surgery were discussed and pt desired to schedule cataract surgery. Pt was  consented and the biometry and lens options were reviewed.    Schedule cataract surgery in OD       Pt is interested in traditional monofocal IOL aiming for:    Distance OU and understands that glasses will be generally needed at all times for near vision and often for finer distance correction especially for higher degrees of astigmatism.       Final visual acuity may be limited by astigmatism    Pt wishes to have Phaco/IOL  OD     Requests a Monofocal IOL.    Will aim for distance    Other considerations: None

## 2020-02-06 ENCOUNTER — HOSPITAL ENCOUNTER (OUTPATIENT)
Dept: RADIOLOGY | Facility: HOSPITAL | Age: 65
Discharge: HOME | End: 2020-02-06
Attending: ORTHOPAEDIC SURGERY
Payer: COMMERCIAL

## 2020-02-06 ENCOUNTER — OFFICE VISIT (OUTPATIENT)
Dept: INTERNAL MEDICINE | Facility: CLINIC | Age: 65
End: 2020-02-06
Payer: COMMERCIAL

## 2020-02-06 ENCOUNTER — CLINICAL SUPPORT (OUTPATIENT)
Dept: CARDIOLOGY | Facility: CLINIC | Age: 65
End: 2020-02-06
Payer: COMMERCIAL

## 2020-02-06 VITALS
SYSTOLIC BLOOD PRESSURE: 108 MMHG | TEMPERATURE: 96 F | DIASTOLIC BLOOD PRESSURE: 72 MMHG | WEIGHT: 227.94 LBS | BODY MASS INDEX: 40.39 KG/M2 | HEART RATE: 67 BPM | OXYGEN SATURATION: 99 % | RESPIRATION RATE: 18 BRPM | HEIGHT: 63 IN

## 2020-02-06 DIAGNOSIS — Z01.818 PRE-OP EXAM: Primary | ICD-10-CM

## 2020-02-06 DIAGNOSIS — I10 ESSENTIAL HYPERTENSION: ICD-10-CM

## 2020-02-06 DIAGNOSIS — Z11.3 ROUTINE SCREENING FOR STI (SEXUALLY TRANSMITTED INFECTION): ICD-10-CM

## 2020-02-06 DIAGNOSIS — Z01.818 PREOP TESTING: ICD-10-CM

## 2020-02-06 PROCEDURE — 93005 ELECTROCARDIOGRAM TRACING: CPT | Mod: S$GLB,,, | Performed by: ORTHOPAEDIC SURGERY

## 2020-02-06 PROCEDURE — 3074F SYST BP LT 130 MM HG: CPT | Mod: CPTII,S$GLB,, | Performed by: FAMILY MEDICINE

## 2020-02-06 PROCEDURE — 3078F PR MOST RECENT DIASTOLIC BLOOD PRESSURE < 80 MM HG: ICD-10-PCS | Mod: CPTII,S$GLB,, | Performed by: FAMILY MEDICINE

## 2020-02-06 PROCEDURE — 3008F PR BODY MASS INDEX (BMI) DOCUMENTED: ICD-10-PCS | Mod: CPTII,S$GLB,, | Performed by: FAMILY MEDICINE

## 2020-02-06 PROCEDURE — 99214 PR OFFICE/OUTPT VISIT, EST, LEVL IV, 30-39 MIN: ICD-10-PCS | Mod: S$GLB,,, | Performed by: FAMILY MEDICINE

## 2020-02-06 PROCEDURE — 3074F PR MOST RECENT SYSTOLIC BLOOD PRESSURE < 130 MM HG: ICD-10-PCS | Mod: CPTII,S$GLB,, | Performed by: FAMILY MEDICINE

## 2020-02-06 PROCEDURE — 99214 OFFICE O/P EST MOD 30 MIN: CPT | Mod: S$GLB,,, | Performed by: FAMILY MEDICINE

## 2020-02-06 PROCEDURE — 3008F BODY MASS INDEX DOCD: CPT | Mod: CPTII,S$GLB,, | Performed by: FAMILY MEDICINE

## 2020-02-06 PROCEDURE — 99999 PR PBB SHADOW E&M-EST. PATIENT-LVL III: CPT | Mod: PBBFAC,,, | Performed by: FAMILY MEDICINE

## 2020-02-06 PROCEDURE — 71046 XR CHEST PA AND LATERAL PRE-OP: ICD-10-PCS | Mod: 26,,, | Performed by: RADIOLOGY

## 2020-02-06 PROCEDURE — 93005 EKG 12-LEAD: ICD-10-PCS | Mod: S$GLB,,, | Performed by: ORTHOPAEDIC SURGERY

## 2020-02-06 PROCEDURE — 71046 X-RAY EXAM CHEST 2 VIEWS: CPT | Mod: 26,,, | Performed by: RADIOLOGY

## 2020-02-06 PROCEDURE — 71046 X-RAY EXAM CHEST 2 VIEWS: CPT | Mod: TC

## 2020-02-06 PROCEDURE — 93010 ELECTROCARDIOGRAM REPORT: CPT | Mod: S$GLB,,, | Performed by: INTERNAL MEDICINE

## 2020-02-06 PROCEDURE — 3078F DIAST BP <80 MM HG: CPT | Mod: CPTII,S$GLB,, | Performed by: FAMILY MEDICINE

## 2020-02-06 PROCEDURE — 93010 EKG 12-LEAD: ICD-10-PCS | Mod: S$GLB,,, | Performed by: INTERNAL MEDICINE

## 2020-02-06 PROCEDURE — 99999 PR PBB SHADOW E&M-EST. PATIENT-LVL III: ICD-10-PCS | Mod: PBBFAC,,, | Performed by: FAMILY MEDICINE

## 2020-02-06 NOTE — PROGRESS NOTES
Subjective:       Patient ID: Mariel Becerril is a 64 y.o. female.    Chief Complaint: Hypertension    HPI Ms. Becerril presents today for pre up and follow up hypertension.     She has been seen by orthopedic for her knee.   She received an injection in the knee which helped with pain through the holidays.   Surgery scheduled for March 4th.     She has continued to lose weight cutting back on calories and meal substitutions with slim fast.     Adipex 1/2 tablet she has been using for a couple weeks.     Stopped amlodipine at last appt because of low BPs  She has a normal blood pressure today.     Review of Systems   Constitutional: Negative for activity change, appetite change, fatigue and fever.   HENT: Negative for congestion, ear pain and sinus pressure.    Eyes: Negative for pain and visual disturbance.   Respiratory: Negative for cough, chest tightness and wheezing.    Cardiovascular: Negative for chest pain, palpitations and leg swelling.   Gastrointestinal: Negative for abdominal distention, abdominal pain, constipation, diarrhea, nausea and vomiting.   Endocrine: Negative for polydipsia and polyuria.   Genitourinary: Negative for difficulty urinating, dyspareunia, dysuria, flank pain and hematuria.   Musculoskeletal: Positive for gait problem. Negative for arthralgias and back pain.   Skin: Negative for rash.   Neurological: Negative for dizziness, tremors, syncope, weakness, numbness and headaches.   Psychiatric/Behavioral: Negative for agitation and confusion.         Past Medical History:   Diagnosis Date    Frequent headaches     High blood pressure     Hypertension     Osteoarthritis 04/02/2019    Bilateral knees, ankles and feet    Severe obesity (BMI >= 40)     Sleep apnea      Past Surgical History:   Procedure Laterality Date    BLADDER SUSPENSION      CATARACT EXTRACTION, BILATERAL      COLONOSCOPY N/A 12/3/2018    Procedure: COLONOSCOPY;  Surgeon: Mari Rader MD;  Location: [facility];   Service: Endoscopy;  Laterality: N/A;    HYSTERECTOMY      partial 2000    tonsilectomy      tonsils       Family History   Problem Relation Age of Onset    Heart attack Father      Social History     Socioeconomic History    Marital status:      Spouse name: Not on file    Number of children: Not on file    Years of education: Not on file    Highest education level: Not on file   Occupational History    Not on file   Social Needs    Financial resource strain: Not on file    Food insecurity:     Worry: Not on file     Inability: Not on file    Transportation needs:     Medical: Not on file     Non-medical: Not on file   Tobacco Use    Smoking status: Never Smoker    Smokeless tobacco: Never Used   Substance and Sexual Activity    Alcohol use: No    Drug use: No    Sexual activity: Never     Partners: Male     Birth control/protection: See Surgical Hx   Lifestyle    Physical activity:     Days per week: Not on file     Minutes per session: Not on file    Stress: Not on file   Relationships    Social connections:     Talks on phone: Not on file     Gets together: Not on file     Attends Denominational service: Not on file     Active member of club or organization: Not on file     Attends meetings of clubs or organizations: Not on file     Relationship status: Not on file   Other Topics Concern    Not on file   Social History Narrative    Not on file       Objective:        Physical Exam   Constitutional: She is oriented to person, place, and time. She appears well-nourished. No distress.   obese   HENT:   Head: Normocephalic and atraumatic.   Right Ear: External ear normal.   Left Ear: External ear normal.   Nose: Nose normal.   Mouth/Throat: Oropharynx is clear and moist.   Eyes: Pupils are equal, round, and reactive to light. EOM are normal. Right eye exhibits no discharge. Left eye exhibits no discharge.   Neck: Normal range of motion. Neck supple. No thyromegaly present.   Cardiovascular:  Normal rate, regular rhythm and normal heart sounds.   No murmur heard.  Pulmonary/Chest: Effort normal and breath sounds normal. No respiratory distress. She has no wheezes.   Abdominal: Soft. Bowel sounds are normal. She exhibits no distension. There is no tenderness.   Musculoskeletal: Normal range of motion. She exhibits no edema.   Limping    Neurological: She is alert and oriented to person, place, and time. Coordination normal.   Skin: Skin is warm and dry. No rash noted.   Psychiatric: She has a normal mood and affect. Her behavior is normal.   Nursing note and vitals reviewed.        Results for orders placed or performed in visit on 10/30/19   CBC auto differential   Result Value Ref Range    WBC 6.18 3.90 - 12.70 K/uL    RBC 4.05 4.00 - 5.40 M/uL    Hemoglobin 11.7 (L) 12.0 - 16.0 g/dL    Hematocrit 37.7 37.0 - 48.5 %    Mean Corpuscular Volume 93 82 - 98 fL    Mean Corpuscular Hemoglobin 28.9 27.0 - 31.0 pg    Mean Corpuscular Hemoglobin Conc 31.0 (L) 32.0 - 36.0 g/dL    RDW 13.8 11.5 - 14.5 %    Platelets 262 150 - 350 K/uL    MPV 9.7 9.2 - 12.9 fL    Immature Granulocytes 0.2 0.0 - 0.5 %    Gran # (ANC) 3.3 1.8 - 7.7 K/uL    Immature Grans (Abs) 0.01 0.00 - 0.04 K/uL    Lymph # 2.4 1.0 - 4.8 K/uL    Mono # 0.4 0.3 - 1.0 K/uL    Eos # 0.1 0.0 - 0.5 K/uL    Baso # 0.05 0.00 - 0.20 K/uL    nRBC 0 0 /100 WBC    Gran% 53.4 38.0 - 73.0 %    Lymph% 38.0 18.0 - 48.0 %    Mono% 6.0 4.0 - 15.0 %    Eosinophil% 1.6 0.0 - 8.0 %    Basophil% 0.8 0.0 - 1.9 %    Differential Method Automated    Comprehensive metabolic panel   Result Value Ref Range    Sodium 143 136 - 145 mmol/L    Potassium 3.3 (L) 3.5 - 5.1 mmol/L    Chloride 109 95 - 110 mmol/L    CO2 25 23 - 29 mmol/L    Glucose 83 70 - 110 mg/dL    BUN, Bld 14 8 - 23 mg/dL    Creatinine 0.8 0.5 - 1.4 mg/dL    Calcium 9.2 8.7 - 10.5 mg/dL    Total Protein 6.5 6.0 - 8.4 g/dL    Albumin 3.5 3.5 - 5.2 g/dL    Total Bilirubin 0.4 0.1 - 1.0 mg/dL    Alkaline  Phosphatase 108 55 - 135 U/L    AST 21 10 - 40 U/L    ALT 15 10 - 44 U/L    Anion Gap 9 8 - 16 mmol/L    eGFR if African American >60.0 >60 mL/min/1.73 m^2    eGFR if non African American >60.0 >60 mL/min/1.73 m^2   APTT   Result Value Ref Range    aPTT 26.1 21.0 - 32.0 sec   Protime-INR   Result Value Ref Range    Prothrombin Time 10.2 9.0 - 12.5 sec    INR 1.0 0.8 - 1.2   GROUP & RH   Result Value Ref Range    Group & Rh O POS        Assessment/Plan:     Pre-op exam  Reviewed blood work  Xray and EKG   Xray read below    FINDINGS:  Borderline cardiomegaly again noted with the tortuous aorta and normal pulmonary vasculature.  Calcified mediastinal nodes again identified there is granuloma identified in the left upper lung field.  No acute infiltrate, area of consolidation or effusion.  Lungs symmetrically aerated.  Degenerative change throughout the spine and shoulders.      Impression       Stable exam.  No acute infiltrate or area of consolidation.     Patient cleared for procedure     Essential hypertension-controlled  Discontinued amlodipine  Continue weight loss endeavors  Will consider cutting Metoprolol with more weight loss.     Routine screening for STI (sexually transmitted infection)  -     HIV 1/2 Ag/Ab (4th Gen); Future; Expected date: 02/06/2020        No follow-ups on file.    Jaclyn Becerril MD  ON   Family Medicine

## 2020-02-13 ENCOUNTER — OFFICE VISIT (OUTPATIENT)
Dept: CARDIOLOGY | Facility: CLINIC | Age: 65
End: 2020-02-13
Payer: COMMERCIAL

## 2020-02-13 VITALS
BODY MASS INDEX: 40.61 KG/M2 | SYSTOLIC BLOOD PRESSURE: 112 MMHG | DIASTOLIC BLOOD PRESSURE: 70 MMHG | WEIGHT: 229.25 LBS | OXYGEN SATURATION: 99 % | HEART RATE: 72 BPM

## 2020-02-13 DIAGNOSIS — R00.2 PALPITATIONS: Primary | ICD-10-CM

## 2020-02-13 DIAGNOSIS — I49.3 PVC'S (PREMATURE VENTRICULAR CONTRACTIONS): ICD-10-CM

## 2020-02-13 DIAGNOSIS — I10 ESSENTIAL HYPERTENSION: ICD-10-CM

## 2020-02-13 DIAGNOSIS — G47.33 OSA ON CPAP: ICD-10-CM

## 2020-02-13 PROCEDURE — 3074F SYST BP LT 130 MM HG: CPT | Mod: CPTII,S$GLB,, | Performed by: INTERNAL MEDICINE

## 2020-02-13 PROCEDURE — 99214 PR OFFICE/OUTPT VISIT, EST, LEVL IV, 30-39 MIN: ICD-10-PCS | Mod: S$GLB,,, | Performed by: INTERNAL MEDICINE

## 2020-02-13 PROCEDURE — 3074F PR MOST RECENT SYSTOLIC BLOOD PRESSURE < 130 MM HG: ICD-10-PCS | Mod: CPTII,S$GLB,, | Performed by: INTERNAL MEDICINE

## 2020-02-13 PROCEDURE — 3008F BODY MASS INDEX DOCD: CPT | Mod: CPTII,S$GLB,, | Performed by: INTERNAL MEDICINE

## 2020-02-13 PROCEDURE — 99999 PR PBB SHADOW E&M-EST. PATIENT-LVL III: ICD-10-PCS | Mod: PBBFAC,,, | Performed by: INTERNAL MEDICINE

## 2020-02-13 PROCEDURE — 99214 OFFICE O/P EST MOD 30 MIN: CPT | Mod: S$GLB,,, | Performed by: INTERNAL MEDICINE

## 2020-02-13 PROCEDURE — 3078F PR MOST RECENT DIASTOLIC BLOOD PRESSURE < 80 MM HG: ICD-10-PCS | Mod: CPTII,S$GLB,, | Performed by: INTERNAL MEDICINE

## 2020-02-13 PROCEDURE — 3008F PR BODY MASS INDEX (BMI) DOCUMENTED: ICD-10-PCS | Mod: CPTII,S$GLB,, | Performed by: INTERNAL MEDICINE

## 2020-02-13 PROCEDURE — 99999 PR PBB SHADOW E&M-EST. PATIENT-LVL III: CPT | Mod: PBBFAC,,, | Performed by: INTERNAL MEDICINE

## 2020-02-13 PROCEDURE — 3078F DIAST BP <80 MM HG: CPT | Mod: CPTII,S$GLB,, | Performed by: INTERNAL MEDICINE

## 2020-02-13 NOTE — PROGRESS NOTES
Subjective:   Patient ID:  Mariel Becerril is a 64 y.o. female who presents for cardiac consult of Dizziness      Palpitations    Associated symptoms include dizziness and shortness of breath (improved). Pertinent negatives include no chest pain.   Hypertension   Associated symptoms include shortness of breath (improved). Pertinent negatives include no chest pain or palpitations.   Dizziness: no palpitations and no chest pain.    The patient came in today for cardiac consult of Dizziness    Mariel Becerril is a 64 y.o. female with current medical conditions HTN, PVCs, obesity, GERD, OA, CPAP of knees presents for follow CV evaluation.      8/20/18  Pt has been having SOB, AREVALO. Is always in pain, is exhausted on pain meds. She feels like her heart if affected and is tired after walking a few steps.  Pt also feels chest pressure. Pt feels chest pressure daily, just tries to rest and goes away. Chest pain is substernal without radiation. 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 Uses CPAP every night, but last eval was over 5 years.   Feels palpitations, rarely, was on digoxin then.    ECG - NSR, poor RWP    9/27/18  Pt had negative nuclear stress and 2D ECHO with normal Bi V function. GIL workup underway, recent visit with pulmonary. Has bad sinus infection, has been using Tessalon perles, saw ENT received steroid dose pack which has been improved. Still coughing a lot, using cough syrup and on Levaquin x 14 days. No chest pain but more congestion. Has been doing well with Lasix - taking it almost daily.     11/12/19  She will have knee surgery at [facility] with Dr. Moy Ramirez. She has been getting PT/OT and brace but no improvement in knee pain. She does have chronic fatigue/dyspnea but no CP. She does have palpitations feels fluttering feeling.     2/13/20  Freq PVCs noted on Holter, started metoprolol 25mg daily and monitor BP and HR, increased BB due to palpitations but BP dropped now off norvasc as BP dropped.  She will have knee surgery  by Dr. Connolly. She also takes lasix daily now.     Patient feels no PND, no dizziness, no syncope, no CNS symptoms.    Patient is compliant with medications.    Family history includes Arthritis in her mother; Depression in her sister; Heart disease in her father -  at age 81; Hypertension in her mother and sister.    TEST DESCRIPTION   PREDOMINANT RHYTHM  1. Sinus rhythm with heart rates varying between 56 and 156 bpm with an average of 86 bpm.   VENTRICULAR ARRHYTHMIAS  1. There were frequent PVCs totalling 3780 and averaging 78 per hour.   The ventricular arrhythmias percentage was 1.6.   There were 1174 bigeminal cycles.  There were 9 triplets.   2. There were no episodes of ventricular tachycardia.    SUPRA VENTRICULAR ARRHYTHMIAS  1. There were very rare PACs recorded totalling 18 and averaging less than 1 per hour.   2. There were no episodes of sustained supraventricular tachycardia.    ECG 10/30/19  Normal sinus rhythm  Possible Left atrial enlargement  Borderline Abnormal ECG    Nuclear Stress 18  Nuclear Quantitative Functional Analysis:   LVEF: >= 70 %    Impression: NORMAL MYOCARDIAL PERFUSION  1. The perfusion scan is free of evidence for myocardial ischemia or injury.   2. Resting wall motion is physiologic.   3. Resting LV function is normal.   4. The ventricular volumes are normal at rest and stress.   5. The extracardiac distribution of radioactivity is normal.     2D ECHO 18  CONCLUSIONS     1 - Concentric hypertrophy.     2 - No wall motion abnormalities.     3 - Normal left ventricular systolic function (EF 60-65%).     4 - Normal left ventricular diastolic function.     5 - Normal right ventricular systolic function .     6 - The estimated PA systolic pressure is 27 mmHg.     Past Medical History:   Diagnosis Date    Frequent headaches     High blood pressure     Hypertension     Osteoarthritis 2019    Bilateral knees, ankles and  feet    Severe obesity (BMI >= 40)     Sleep apnea        Past Surgical History:   Procedure Laterality Date    BLADDER SUSPENSION      CATARACT EXTRACTION, BILATERAL      COLONOSCOPY N/A 12/3/2018    Procedure: COLONOSCOPY;  Surgeon: Mari Rader MD;  Location: [facility];  Service: Endoscopy;  Laterality: N/A;    HYSTERECTOMY      partial 2000    tonsilectomy      tonsils         Social History     Tobacco Use    Smoking status: Never Smoker    Smokeless tobacco: Never Used   Substance Use Topics    Alcohol use: No    Drug use: No       Family History   Problem Relation Age of Onset    Heart attack Father        Patient's Medications   New Prescriptions    No medications on file   Previous Medications    ALBUTEROL (PROAIR HFA) 90 MCG/ACTUATION INHALER    Inhale 2 puffs into the lungs every 6 (six) hours as needed for Wheezing. Rescue    BENZONATATE (TESSALON) 100 MG CAPSULE    Take 1 capsule (100 mg total) by mouth 3 (three) times daily as needed.    CHLORHEXIDINE (PERIDEX) 0.12 % SOLUTION    SWISH AND SPIT 15 MLS BY MOUTH TWICE A DAY FOR 7 DAYS    DICLOFENAC SODIUM (VOLTAREN) 1 % GEL    Apply 4 grams topically 3 (three) times daily as needed.    EFLORNITHINE (VANIQA) 13.9 % CREAM    Apply topically 2 (two) times daily.    ERGOCALCIFEROL, VITAMIN D2, (VITAMIN D ORAL)    Take 2,000 Units by mouth once daily.    FLUTICASONE PROPIONATE (FLONASE) 50 MCG/ACTUATION NASAL SPRAY    1 spray (50 mcg total) by Each Nare route once daily.    FUROSEMIDE (LASIX) 20 MG TABLET    Take 1 tablet (20 mg total) by mouth daily as needed.    GATIFLOXACIN 0.5 % DROP DROPS    Place 1 drop into the left eye 2 (two) times daily. Eyedrops to start one day before surgery    GATIFLOXACIN 0.5 % DROP DROPS    Apply 1 drop to eye 2 (two) times daily. Eyedrops to start one day before surgery    HYDROCODONE-ACETAMINOPHEN (NORCO) 7.5-325 MG PER TABLET        KETOROLAC 0.5% (ACULAR) 0.5 % DROP    Place 1 drop into the left eye 4 (four)  times daily. Eyedrops to start one day before surgery    KETOROLAC 0.5% (ACULAR) 0.5 % DROP    Place 1 drop into the right eye 4 (four) times daily. Eyedrops to start one day before surgery    LEVOCETIRIZINE (XYZAL) 5 MG TABLET    Take 1 tablet (5 mg total) by mouth every evening.    MECLIZINE (ANTIVERT) 25 MG TABLET    Take 1 tablet (25 mg total) by mouth 3 (three) times daily as needed for Dizziness.    MELOXICAM (MOBIC) 15 MG TABLET    TAKE 1 TABLET BY MOUTH ONCE DAILY    METHOCARBAMOL (ROBAXIN) 500 MG TAB    TAKE 1 TABLET BY MOUTH TWICE DAILY AS NEEDED    METOPROLOL SUCCINATE (TOPROL-XL) 25 MG 24 HR TABLET    Take 1 tablet (25 mg total) by mouth 2 (two) times daily.    MONTELUKAST (SINGULAIR) 10 MG TABLET    Take 10 mg by mouth every evening.    MONTELUKAST (SINGULAIR) 10 MG TABLET    TAKE 1 TABLET BY MOUTH ONCE DAILY IN THE EVENING    NYSTATIN (MYCOSTATIN) CREAM    Apply topically 2 (two) times daily.    NYSTATIN (MYCOSTATIN) POWDER    Apply topically 2 (two) times daily.    OMEPRAZOLE (PRILOSEC) 40 MG CAPSULE    Take 1 capsule (40 mg total) by mouth once daily.    ONDANSETRON (ZOFRAN-ODT) 4 MG TBDL    Take 1 tablet (4 mg total) by mouth every 8 (eight) hours as needed.    PREDNISOLONE ACETATE (PRED FORTE) 1 % DRPS    Place 1 drop into the left eye 4 (four) times daily. Start one day before surgery    PREDNISOLONE ACETATE (PRED FORTE) 1 % DRPS    Place 1 drop into the right eye 4 (four) times daily. Eyedrops to start one day before surgery    TOPIRAMATE (TOPAMAX) 50 MG TABLET    Take 1 tablet (50 mg total) by mouth once daily.    TRAMADOL (ULTRAM) 50 MG TABLET    Take 1 tablet (50 mg total) by mouth 2 (two) times daily as needed.   Modified Medications    No medications on file   Discontinued Medications    No medications on file       Review of Systems   Constitutional: Negative.    HENT: Negative.    Eyes: Negative.    Respiratory: Positive for shortness of breath (improved).    Cardiovascular: Negative for  chest pain, palpitations and leg swelling.   Gastrointestinal: Positive for heartburn.   Genitourinary: Negative.    Musculoskeletal: Positive for joint pain.   Skin: Negative.    Neurological: Positive for dizziness.   Endo/Heme/Allergies: Negative.    Psychiatric/Behavioral: Negative.    All 12 systems otherwise negative.      Wt Readings from Last 3 Encounters:   02/13/20 104 kg (229 lb 4.5 oz)   02/06/20 103.4 kg (227 lb 15.3 oz)   01/27/20 105.2 kg (232 lb)     Temp Readings from Last 3 Encounters:   02/06/20 96.4 °F (35.8 °C) (Tympanic)   01/02/20 97.5 °F (36.4 °C) (Tympanic)   10/30/19 97.2 °F (36.2 °C) (Tympanic)     BP Readings from Last 3 Encounters:   02/13/20 112/70   02/06/20 108/72   01/27/20 123/74     Pulse Readings from Last 3 Encounters:   02/13/20 72   02/06/20 67   01/27/20 63       /70 (BP Location: Left arm, Patient Position: Sitting, BP Method: Medium (Manual))   Pulse 72   Wt 104 kg (229 lb 4.5 oz)   SpO2 99%   BMI 40.61 kg/m²     Objective:   Physical Exam   Constitutional: She is oriented to person, place, and time. She appears well-developed and well-nourished. No distress.   HENT:   Head: Normocephalic and atraumatic.   Nose: Nose normal.   Mouth/Throat: Oropharynx is clear and moist.   Eyes: Conjunctivae and EOM are normal. No scleral icterus.   Neck: Normal range of motion. Neck supple. No JVD present. No thyromegaly present.   Cardiovascular: Normal rate, regular rhythm, S1 normal and S2 normal. Exam reveals no gallop, no S3, no S4 and no friction rub.   Murmur heard.  Pulmonary/Chest: Effort normal and breath sounds normal. No stridor. No respiratory distress. She has no wheezes. She has no rales. She exhibits no tenderness.   Abdominal: Soft. Bowel sounds are normal. She exhibits no distension and no mass. There is no tenderness. There is no rebound.   Genitourinary:   Genitourinary Comments: Deferred   Musculoskeletal: Normal range of motion. She exhibits no edema,  tenderness or deformity.   Lymphadenopathy:     She has no cervical adenopathy.   Neurological: She is alert and oriented to person, place, and time. She exhibits normal muscle tone. Coordination normal.   Skin: Skin is warm and dry. No rash noted. She is not diaphoretic. No erythema. No pallor.   Psychiatric: She has a normal mood and affect. Her behavior is normal. Judgment and thought content normal.   Nursing note and vitals reviewed.      Lab Results   Component Value Date     02/06/2020    K 4.1 02/06/2020     02/06/2020    CO2 28 02/06/2020    BUN 13 02/06/2020    CREATININE 0.9 02/06/2020    GLU 92 02/06/2020    AST 21 02/06/2020    ALT 14 02/06/2020    ALBUMIN 3.6 02/06/2020    PROT 6.8 02/06/2020    BILITOT 0.4 02/06/2020    WBC 5.51 02/06/2020    HGB 12.1 02/06/2020    HCT 41.4 02/06/2020    MCV 96 02/06/2020     02/06/2020    INR 1.0 10/30/2019    CHOL 124 09/14/2018    HDL 48 09/14/2018    LDLCALC 64.0 09/14/2018    TRIG 60 09/14/2018    BNP <10 08/20/2018     Assessment:      1. Palpitations    2. PVC's (premature ventricular contractions)    3. Essential hypertension    4. GIL on CPAP        Plan:   1. Chest pressure/AREVALO - improved/resolved  - pharm nuclear stress test - neg for ischemia   - 2D ECHO - normal Bi V function  - Continue lasix 20mg     2. AREVALO  - improved with lasix    3. HTN  - cont meds  - low salt diet    4. Obesity  - rec weight loss with diet/exercise    5. GIL  - cont CPAP  - appreciate pulm recs    6. Palpitations sec to PVCS  - cont BB      7. Pre-OP CV evaluation prior to L knee surgery with Dr. Connolly   Low periop risk of CV events for moderate risk procedure.  No chest pain, active arrhythmia and CHF symptoms.  Ok to proceed to the scheduled surgery without further cardiac study.  Cont BB periop.     Thank you for allowing me to participate in this patient's care. Please do not hesitate to contact me with any questions or concerns. Consult note has been  forwarded to the referral physician.     Juan Alberto Luis MD, [facility]  Cardiovascular Disease  [facility], [place]  [phone] (P)

## 2020-02-17 ENCOUNTER — PATIENT MESSAGE (OUTPATIENT)
Dept: INTERNAL MEDICINE | Facility: CLINIC | Age: 65
End: 2020-02-17

## 2020-02-17 NOTE — DISCHARGE INSTRUCTIONS
Patient instructions for joint replacement    After surgery at home  1.  Take Tylenol 650 mg 3 times a day for 14 days then as needed for mild pain  2.  Take gabapentin 300 mg nightly for 6 weeksor may take twice a day  3.  Take Celebrex 200 mg or meloxicam 15 mg daily for 6 weeks unless having cardiac issues to take for 2 weeks only  4.  Must take aspirin 325mg a day for 6 weeks unless you are on other blood thinners/Plavix, Eliquis, Xarelto, Coumadin etc  5.  Must wear compressive stockings for 6 weeks minimum to decrease the risk of blood clot and swelling  6.  Hydrocodone/Norco or oxycodone/Percocet will be prescribed to take every 8 hr as needed for breakthrough pain/ cut pills in half to last you for 2 weeks  7.  May apply ice on the knee to help with decreasing pain  8.  Keep wound dry for 2 weeks until stitches/staples removed than you will be allowed to shower in 24 hr and get the wound wet.  No soaking of the wound in the tub or swimming for 4 weeks after surgery  9.  No driving for 4 weeks unless specified by physician  10.  Avoid touching the wound or surrounding area /at least 2 inches on each side of the surgical incision until staples are removed/stitches   11.  May change the surgical dressing if extremely bloody or has drainage on it. May clean the wound with peroxide or Betadine and apply sterile dressing and Ace wrap over it  12.  Leave hospital dressing on for 3 days then may change by applying sterile 4 x 4 and Ace wrap after cleaning with Betadine or peroxide.  May leave this dressing change to home health nurses

## 2020-02-18 ENCOUNTER — TELEPHONE (OUTPATIENT)
Dept: OPHTHALMOLOGY | Facility: CLINIC | Age: 65
End: 2020-02-18

## 2020-02-18 ENCOUNTER — TELEPHONE (OUTPATIENT)
Dept: INTERNAL MEDICINE | Facility: CLINIC | Age: 65
End: 2020-02-18

## 2020-02-18 ENCOUNTER — OFFICE VISIT (OUTPATIENT)
Dept: ORTHOPEDICS | Facility: HOSPITAL | Age: 65
End: 2020-02-18
Attending: ORTHOPAEDIC SURGERY
Payer: COMMERCIAL

## 2020-02-18 VITALS
BODY MASS INDEX: 39.09 KG/M2 | TEMPERATURE: 98 F | SYSTOLIC BLOOD PRESSURE: 167 MMHG | DIASTOLIC BLOOD PRESSURE: 88 MMHG | WEIGHT: 229 LBS | RESPIRATION RATE: 22 BRPM | HEIGHT: 64 IN | HEART RATE: 56 BPM | OXYGEN SATURATION: 100 %

## 2020-02-18 DIAGNOSIS — I49.3 SYMPTOMATIC PVCS: ICD-10-CM

## 2020-02-18 DIAGNOSIS — I10 ESSENTIAL HYPERTENSION: ICD-10-CM

## 2020-02-18 DIAGNOSIS — K02.9 ACTIVE DENTAL CARIES: ICD-10-CM

## 2020-02-18 DIAGNOSIS — G47.33 OSA ON CPAP: ICD-10-CM

## 2020-02-18 DIAGNOSIS — M17.12 ARTHRITIS OF LEFT KNEE: ICD-10-CM

## 2020-02-18 PROBLEM — M13.862 ALLERGIC ARTHRITIS OF LEFT KNEE: Status: ACTIVE | Noted: 2020-02-18

## 2020-02-18 RX ORDER — AMLODIPINE BESYLATE 5 MG/1
5 TABLET ORAL DAILY
Qty: 90 TABLET | Refills: 1 | Status: SHIPPED | OUTPATIENT
Start: 2020-02-18 | End: 2020-08-25

## 2020-02-18 RX ORDER — METOPROLOL SUCCINATE 25 MG/1
25 TABLET, EXTENDED RELEASE ORAL 2 TIMES DAILY
Qty: 60 TABLET | Refills: 1 | Status: SHIPPED | OUTPATIENT
Start: 2020-02-18 | End: 2020-04-30

## 2020-02-18 NOTE — ASSESSMENT & PLAN NOTE
Dr. Connolly plans on performing left total knee arthroplasty on 3/4/20.   Known risk factors for perioperative complications: None    Difficulty with intubation is not anticipated.    Cardiac Risk Estimation: Patient is moderate risk for a moderate risk procedure.    1.) Preoperative workup as follows: Blood pressure check with PCP prior to surgery as well as management of dental kiara prior to surgery.  2.) Change in medication regimen before surgery: discontinue NSAIDs (Mobic) 14 days before surgery.  3.) Prophylaxis for cardiac events with perioperative beta-blockers: Patient should continue Toprol XL as ordered due to history of symptomatic PVCs.  4.) Invasive hemodynamic monitoring perioperatively: not indicated.  5.) Deep vein thrombosis prophylaxis postoperatively: pharmacologic prophylaxis (with any of the following: Eliquis or equivalent).  6.) Surveillance for postoperative MI with ECG immediately postoperatively and on postoperati ve days 1 and 2 AND troponin levels 24 hours postoperatively and on day 4 or hospital discharge (whichever comes first): not indicated.  7.) Current medications which may produce withdrawal symptoms if withheld perioperatively: Norco    8.) Other measures: Postoperative incentive spirometry to prevent pneumonia.

## 2020-02-18 NOTE — TELEPHONE ENCOUNTER
Pt was informed that [facility] can look at OS before doing OD surgery tomorrow pt deny any pain only states its red

## 2020-02-18 NOTE — Clinical Note
Patient seen in [facility] today with elevated blood pressure and cavity. We recommend that she visit Dr. Becerril prior to surgery for a blood pressure check as her Norvasc was recently discontinued due to low blood pressure and this is the first elevated reading since the change. Additionally, the patient has a cavity and is waiting for her dentist to schedule the filling. She was notified that this needed to be taken care of prior to surgery. The patient said she would make both appointments, but I wanted to make sure you were aware of the concerns. Thank you.

## 2020-02-18 NOTE — TELEPHONE ENCOUNTER
----- Message from Aracelis Fabian sent at 2/18/2020 12:46 PM CST -----  Contact: pt  [phone] c/o red Lt eye would like to be advised on what drops she can use

## 2020-02-18 NOTE — TELEPHONE ENCOUNTER
----- Message from Ashtyn Clniton sent at 2/18/2020 10:56 AM CST -----  Contact: Pt  Pt is requesting call back in regards questions about getting medication refill for medication that is not listed.        Pls call back at [phone]

## 2020-02-18 NOTE — PRE ADMISSION SCREENING
To confirm, Your doctor has instructed you that surgery is scheduled for 03/04/20 at  0700am.       Please report to [facility], [address], 1st floor, main lobby by 0530am.  Pre admit office will call afternoon prior to surgery with final arrival time    INSTRUCTIONS IMPORTANT!!!   Do not eat, drink, or smoke after 12 midnight, prior to surgery, including water. OK to brush teeth, no gum, candy or mints!    Instructed pt to take night prior to surgery: Meloxicam 15mg & Tylenol 650mg per Dr. Connolly.     ¨ Take only these medicines with a small swallow of water-morning of surgery.  Albuterol inhaler, flonase nasal spray, metoprolol, prilosec,     Pre operative instructions:  Please review the Pre-Operative Instruction booklet that you were given.        Bathing Instructions--See page 6 in the Pre-operative booklet.      Prevention of surgical site infections:     -Keep incisions clean and dry.   -Do not soak/submerge incisions in water until completely healed.   -Do not apply lotions, powders, creams, or deodorants to site.   -Always make sure hands are cleaned with antibacterial soap/ alcohol-based                 prior to touching the surgical site.  (This includes doctors,                 nurses, staff, and yourself.)    Signs and symptoms:   -Redness and pain around the area where you had surgery   -Drainage of cloudy fluid from your surgical wound   -Fever over 100.4       I have read or had read and explained to me, and understand the above information.  Additional comments or instructions:  Received a copy of Pre-operative instructions booklet, FAQ surgical site infection sheet, and packets of hibiclens (if indicated).

## 2020-02-18 NOTE — H&P
Preoperative History and Physical                                                             Hospital Medicine      Chief Complaint: Left knee pain    History of Present Illness:      Mariel Becerril is a 64 y.o. female who presents to the office today for a preoperative consultation at the request of Dr. Connolly who plans on performing left total knee arthoplasty on March 4, 2020. Of note, the patient has a cavity in her left upper mouth and is waiting for her dentist to schedule filling.      Functional Status:      The patient is able to climb a flight of stairs. The patient is able to ambulate long distances without difficulty. The patient's functional status is affected by their joint pain. The patient's functional status is not affected by shortness of breath, chest pain, dyspnea on exertion and fatigue.      Past Medical History:      Past Medical History:   Diagnosis Date    Frequent headaches     Hypertension     Osteoarthritis 04/02/2019    Bilateral knees, ankles and feet    Severe obesity (BMI >= 40)     Sleep apnea         Past Surgical History:      Past Surgical History:   Procedure Laterality Date    BLADDER SUSPENSION      CATARACT EXTRACTION, BILATERAL      COLONOSCOPY N/A 12/3/2018    Procedure: COLONOSCOPY;  Surgeon: Mari Rader MD;  Location: [facility];  Service: Endoscopy;  Laterality: N/A;    HYSTERECTOMY      partial 2000    tonsilectomy          Social History:      Social History     Socioeconomic History    Marital status:      Spouse name: Not on file    Number of children: Not on file    Years of education: Not on file    Highest education level: Not on file   Occupational History    Not on file   Social Needs    Financial resource strain: Not on file    Food insecurity:     Worry: Not on file     Inability: Not on file    Transportation needs:     Medical: Not on file     Non-medical: Not on file   Tobacco  Use    Smoking status: Never Smoker    Smokeless tobacco: Never Used   Substance and Sexual Activity    Alcohol use: No    Drug use: No    Sexual activity: Never     Partners: Male     Birth control/protection: See Surgical Hx   Lifestyle    Physical activity:     Days per week: Not on file     Minutes per session: Not on file    Stress: Not on file   Relationships    Social connections:     Talks on phone: Not on file     Gets together: Not on file     Attends Caodaism service: Not on file     Active member of club or organization: Not on file     Attends meetings of clubs or organizations: Not on file     Relationship status: Not on file   Other Topics Concern    Not on file   Social History Narrative    Not on file        Family History:      Family History   Problem Relation Age of Onset    Heart attack Father        Allergies:      Review of patient's allergies indicates:   Allergen Reactions    Penicillins Rash       Medications:      Current Outpatient Medications   Medication Sig    albuterol (PROAIR HFA) 90 mcg/actuation inhaler Inhale 2 puffs into the lungs every 6 (six) hours as needed for Wheezing. Rescue    diclofenac sodium (VOLTAREN) 1 % Gel Apply 4 grams topically 3 (three) times daily as needed.    eflornithine (VANIQA) 13.9 % cream Apply topically 2 (two) times daily.    ergocalciferol, vitamin D2, (VITAMIN D ORAL) Take 2,000 Units by mouth once daily.    fluticasone propionate (FLONASE) 50 mcg/actuation nasal spray 1 spray (50 mcg total) by Each Nare route once daily. (Patient taking differently: 1 spray by Each Nostril route every evening. )    furosemide (LASIX) 20 MG tablet Take 1 tablet (20 mg total) by mouth daily as needed.    gatifloxacin 0.5 % Drop drops Place 1 drop into the left eye 2 (two) times daily. Eyedrops to start one day before surgery    gatifloxacin 0.5 % Drop drops Apply 1 drop to eye 2 (two) times daily. Eyedrops to start one day before surgery     ketorolac 0.5% (ACULAR) 0.5 % Drop Place 1 drop into the left eye 4 (four) times daily. Eyedrops to start one day before surgery    meloxicam (MOBIC) 15 MG tablet TAKE 1 TABLET BY MOUTH ONCE DAILY    methocarbamol (ROBAXIN) 500 MG Tab TAKE 1 TABLET BY MOUTH TWICE DAILY AS NEEDED (Patient taking differently: 2 (two) times daily. )    nystatin (MYCOSTATIN) cream Apply topically 2 (two) times daily.    prednisoLONE acetate (PRED FORTE) 1 % DrpS Place 1 drop into the right eye 4 (four) times daily. Eyedrops to start one day before surgery    benzonatate (TESSALON) 100 MG capsule Take 1 capsule (100 mg total) by mouth 3 (three) times daily as needed.    chlorhexidine (PERIDEX) 0.12 % solution SWISH AND SPIT 15 MLS BY MOUTH TWICE A DAY FOR 7 DAYS    HYDROcodone-acetaminophen (NORCO) 7.5-325 mg per tablet     ketorolac 0.5% (ACULAR) 0.5 % Drop Place 1 drop into the right eye 4 (four) times daily. Eyedrops to start one day before surgery (Patient not taking: Reported on 2/6/2020)    levocetirizine (XYZAL) 5 MG tablet Take 1 tablet (5 mg total) by mouth every evening. (Patient not taking: Reported on 2/6/2020)    meclizine (ANTIVERT) 25 mg tablet Take 1 tablet (25 mg total) by mouth 3 (three) times daily as needed for Dizziness. (Patient not taking: Reported on 2/6/2020)    metoprolol succinate (TOPROL-XL) 25 MG 24 hr tablet Take 1 tablet (25 mg total) by mouth 2 (two) times daily.    montelukast (SINGULAIR) 10 mg tablet Take 10 mg by mouth every evening.    montelukast (SINGULAIR) 10 mg tablet TAKE 1 TABLET BY MOUTH ONCE DAILY IN THE EVENING    nystatin (MYCOSTATIN) powder Apply topically 2 (two) times daily.    omeprazole (PRILOSEC) 40 MG capsule Take 1 capsule (40 mg total) by mouth once daily.    ondansetron (ZOFRAN-ODT) 4 MG TbDL Take 1 tablet (4 mg total) by mouth every 8 (eight) hours as needed. (Patient not taking: Reported on 2/13/2020)    prednisoLONE acetate (PRED FORTE) 1 % DrpS Place 1 drop into  the left eye 4 (four) times daily. Start one day before surgery (Patient not taking: Reported on 2/6/2020)    topiramate (TOPAMAX) 50 MG tablet Take 1 tablet (50 mg total) by mouth once daily. (Patient taking differently: Take 50 mg by mouth daily as needed. )    traMADol (ULTRAM) 50 mg tablet Take 1 tablet (50 mg total) by mouth 2 (two) times daily as needed.     No current facility-administered medications for this visit.        Vitals:      Vitals:    02/18/20 0914   BP: (!) 167/88   Pulse: (!) 56   Resp: (!) 22   Temp: 97.8 °F (36.6 °C)       Review of Systems:        Constitutional: Negative for fever, chills, weight loss, malaise/fatigue and diaphoresis.   HENT: Negative for hearing loss, ear pain, nosebleeds, congestion, sore throat, neck pain, tinnitus and ear discharge.    Eyes: Negative for blurred vision, double vision, photophobia, pain, discharge and redness.   Respiratory: Negative for cough, hemoptysis, sputum production, shortness of breath, wheezing and stridor.    Cardiovascular: Negative for chest pain, palpitations, orthopnea, claudication, leg swelling and PND.   Gastrointestinal: Negative for heartburn, nausea, vomiting, abdominal pain, diarrhea, constipation, blood in stool and melena.   Genitourinary: Negative for dysuria, urgency, frequency, hematuria and flank pain.   Musculoskeletal: Positive for left knee pain. Negative for myalgias, back pain and falls.   Skin: Negative for itching and rash.   Neurological: Negative for dizziness, tingling, tremors, sensory change, speech change, focal weakness, seizures, loss of consciousness, weakness and headaches.   Endo/Heme/Allergies: Negative for environmental allergies and polydipsia. Does not bruise/bleed easily.   Psychiatric/Behavioral: Negative for depression, suicidal ideas, hallucinations, memory loss and substance abuse. The patient is not nervous/anxious and does not have insomnia.    All 14 systems reviewed and negative except as noted  above.    Physical Exam:      Constitutional: Appears well-developed, well-nourished and in no acute distress.  Patient is oriented to person, place, and time.   Head: Normocephalic and atraumatic. Mucous membranes moist.  Neck: Neck supple no mass.   Cardiovascular: Normal rate and regular rhythm.  S1 S2 appreciated by ascultation.  Pulmonary/Chest: Effort normal and clear to auscultation bilaterally. No respiratory distress.   Abdomen: Soft. Non-tender and non-distended. Bowel sounds are normal.   Neurological: Patient is alert and oriented to person, place and time. Moves all extremities.  Skin: Warm and dry. No lesions.  Extremities: No clubbing, cyanosis or edema.    Laboratory data:      Reviewed and noted in plan where applicable. Please see chart for full laboratory data.    Lab Results   Component Value Date    INR 1.0 10/30/2019       Lab Results   Component Value Date    WBC 5.51 02/06/2020    HGB 12.1 02/06/2020    HCT 41.4 02/06/2020    MCV 96 02/06/2020     02/06/2020       Predictors of intubation difficulty:       Morbid obesity? no   Anatomically abnormal facies? no   Prominent incisors? no   Receding mandible? no   Short, thick neck? no   Neck range of motion: normal   Dentition: Patient with cavity in left upper mouth    Cardiographics:      ECG: NSR with possible left atrial enlargement and nonspecific T wave abnormality  Echocardiogram: not done    Imaging:      Chest x-ray: Stable with no acute findings.      Assessment and Plan:      Arthritis of left knee  Dr. Connolly plans on performing left total knee arthroplasty on 3/4/20.   Known risk factors for perioperative complications: None    Difficulty with intubation is not anticipated.    Cardiac Risk Estimation: Patient is moderate risk for a moderate risk procedure.    1.) Preoperative workup as follows: Blood pressure check with PCP prior to surgery as well as management of dental kiara prior to surgery.  2.) Change in medication  regimen before surgery: discontinue NSAIDs (Mobic) 14 days before surgery.  3.) Prophylaxis for cardiac events with perioperative beta-blockers: Patient should continue Toprol XL as ordered due to history of symptomatic PVCs.  4.) Invasive hemodynamic monitoring perioperatively: not indicated.  5.) Deep vein thrombosis prophylaxis postoperatively: pharmacologic prophylaxis (with any of the following: Eliquis or equivalent).  6.) Surveillance for postoperative MI with ECG immediately postoperatively and on postoperati ve days 1 and 2 AND troponin levels 24 hours postoperatively and on day 4 or hospital discharge (whichever comes first): not indicated.  7.) Current medications which may produce withdrawal symptoms if withheld perioperatively: Norco    8.) Other measures: Postoperative incentive spirometry to prevent pneumonia.      Active dental caries  Patient instructed to see dentist with management of cavity prior to surgery.     Essential hypertension  -Elevated blood pressure noted and patient reports recently having Norvasc 10 mg daily discontinued due to hypotension.   -Review of blood pressures shows low to normal measurements since discontinuing medication.   -Instructed patient to continue Toprol XL and follow up with PCP for repeat check prior to surgery.     Symptomatic PVCs  -Stable.   -Continue Toprol XL.     GIL on CPAP  CPAP QHS. Patient reports setting of 5.

## 2020-02-18 NOTE — TELEPHONE ENCOUNTER
Pt [facility] today [facility] her [facility]  States her bp has been running high. States it fluctuates.   States she was advised at her apt today to contact you and see about her restarting bp medication.  Her bp today was 167/88

## 2020-02-18 NOTE — ASSESSMENT & PLAN NOTE
-Elevated blood pressure noted and patient reports recently having Norvasc 10 mg daily discontinued due to hypotension.   -Review of blood pressures shows low to normal measurements since discontinuing medication.   -Instructed patient to continue Toprol XL and follow up with PCP for repeat check prior to surgery.

## 2020-02-19 ENCOUNTER — OFFICE VISIT (OUTPATIENT)
Dept: OPHTHALMOLOGY | Facility: CLINIC | Age: 65
End: 2020-02-19
Payer: COMMERCIAL

## 2020-02-19 ENCOUNTER — OUTSIDE PLACE OF SERVICE (OUTPATIENT)
Dept: ADMINISTRATIVE | Facility: OTHER | Age: 65
End: 2020-02-19

## 2020-02-19 DIAGNOSIS — Z98.890 POST-OPERATIVE STATE: Primary | ICD-10-CM

## 2020-02-19 PROCEDURE — 99024 POSTOP FOLLOW-UP VISIT: CPT | Mod: S$GLB,,, | Performed by: OPHTHALMOLOGY

## 2020-02-19 PROCEDURE — 99999 PR PBB SHADOW E&M-EST. PATIENT-LVL II: ICD-10-PCS | Mod: PBBFAC,,, | Performed by: OPHTHALMOLOGY

## 2020-02-19 PROCEDURE — 99024 PR POST-OP FOLLOW-UP VISIT: ICD-10-PCS | Mod: S$GLB,,, | Performed by: OPHTHALMOLOGY

## 2020-02-19 PROCEDURE — 66984 XCAPSL CTRC RMVL W/O ECP: CPT | Mod: 79,RT,, | Performed by: OPHTHALMOLOGY

## 2020-02-19 PROCEDURE — 99999 PR PBB SHADOW E&M-EST. PATIENT-LVL II: CPT | Mod: PBBFAC,,, | Performed by: OPHTHALMOLOGY

## 2020-02-19 PROCEDURE — 66984 PR REMOVAL, CATARACT, W/INSRT INTRAOC LENS, W/O ENDO CYCLO: ICD-10-PCS | Mod: 79,RT,, | Performed by: OPHTHALMOLOGY

## 2020-02-19 NOTE — PROGRESS NOTES
SUBJECTIVE  Mariel Becerril is 64 y.o. female  Uncorrected distance visual acuity was 20/40 +1 in the right eye and not recorded in the left eye.   Chief Complaint   Patient presents with    Post-op Evaluation     S/P Phaco with IOL OD (02/19/20)          HPI     Post-op Evaluation      Additional comments: S/P Phaco with IOL OD (02/19/20)              Comments     PT C/O: Noticed yesterday morning OS appeared red and still red today,   denies any pain in OD but feels OD feels dry.      DNL Patient    1. Phaco with IOL OD (02/19/20)  PCIOL OS +17.0 SN60WF 1/22/20  2. SCTL wearer/wears OTC readers on top    Patient last wore SCTL on 01/05/20      Pred, ket qid gat bid OD          Last edited by Adina Badillo, Patient Care Assistant on 2/19/2020 12:11   PM. (History)         Assessment /Plan :  1. Post-operative state Exam:  SLE:  S/C: normal  K : trace k fold  AC: 1+ cell and flare  Iris: normal  Lens: PCIOL    IMP:  PO same Day S/P Phaco/IOL OD : Doing well.    Plan:  Continue gtts to operative eye:  Pred 1% QID  Ketorolac QID  Zymaxid BID  Reinstructed in importance of absolute compliance with Post-OP instructions including medications, shield at bedtime, and limitation of activities. Follow up appointments in approximately one and six weeks or call immediately for increased pain, redness or vision loss.

## 2020-02-24 RX ORDER — METHOCARBAMOL 500 MG/1
TABLET, FILM COATED ORAL
Qty: 60 TABLET | Refills: 0 | Status: SHIPPED | OUTPATIENT
Start: 2020-02-24 | End: 2020-04-01

## 2020-02-27 ENCOUNTER — OFFICE VISIT (OUTPATIENT)
Dept: OPHTHALMOLOGY | Facility: CLINIC | Age: 65
End: 2020-02-27
Payer: COMMERCIAL

## 2020-02-27 DIAGNOSIS — Z98.890 POST-OPERATIVE STATE: Primary | ICD-10-CM

## 2020-02-27 PROCEDURE — 99999 PR PBB SHADOW E&M-EST. PATIENT-LVL I: ICD-10-PCS | Mod: PBBFAC,,, | Performed by: OPHTHALMOLOGY

## 2020-02-27 PROCEDURE — 99024 POSTOP FOLLOW-UP VISIT: CPT | Mod: S$GLB,,, | Performed by: OPHTHALMOLOGY

## 2020-02-27 PROCEDURE — 99999 PR PBB SHADOW E&M-EST. PATIENT-LVL I: CPT | Mod: PBBFAC,,, | Performed by: OPHTHALMOLOGY

## 2020-02-27 PROCEDURE — 99024 PR POST-OP FOLLOW-UP VISIT: ICD-10-PCS | Mod: S$GLB,,, | Performed by: OPHTHALMOLOGY

## 2020-02-27 NOTE — PROGRESS NOTES
SUBJECTIVE  Mariel Becerril is 64 y.o. female  Uncorrected distance visual acuity was 20/20 in the right eye and not recorded in the left eye.   Chief Complaint   Patient presents with    Post-op Evaluation     1 Week RTC S/P Phaco/IOL OD          HPI     Post-op Evaluation      Additional comments: 1 Week RTC S/P Phaco/IOL OD              Comments     Patient states right eye vision doing well and no ocular pain/discomfort.   100% drops compliance     DNL Patient    1. PCIOL OD + 17.0 SN60WF 02/19/20  PCIOL OS +17.0 SN60WF 1/22/20  2. SCTL wearer/wears OTC readers on top    Patient last wore SCTL on 01/05/20      Pred, ket qid gat bid OD          Last edited by Bambi Hart on 2/27/2020  9:30 AM. (History)         Assessment /Plan :  1. Post-operative state Slit lamp exam:  L/L: nl  K: clear, wound sealed  AC: 0 cell and flare  Iris/Lens: IOL centered and stable      IMP:  PO Week 1 S/P Phaco/ IOL OD : Doing well with no evidence of infection or abnormal inflammation.     Plan:  Pt given and instructed in one week PO instructions. D/C zymaxid and start to taper off Ketorlac and Pred Forte 1% weekly. Can resume normal activitites and d/c eye shield. OTC reading glasses can be used until evaluated for final MR. Follow up in one month or PRN pain, redness, vision loss, or other concerns.

## 2020-03-03 ENCOUNTER — TELEPHONE (OUTPATIENT)
Dept: ORTHOPEDICS | Facility: CLINIC | Age: 65
End: 2020-03-03

## 2020-03-03 NOTE — TELEPHONE ENCOUNTER
"Attempted to contact pt at [employer]# in chart, no answer, no option for voicemail. Attempted home# and a male answered and stated that pt "could not talk right now". Need to explain to pt, if she returns call, that Pre-admit department will contact her this afternoon with time of arrival for surgery, AL, LPN.   "

## 2020-03-04 ENCOUNTER — ANESTHESIA (OUTPATIENT)
Dept: SURGERY | Facility: HOSPITAL | Age: 65
DRG: 470 | End: 2020-03-04
Payer: COMMERCIAL

## 2020-03-04 ENCOUNTER — HOSPITAL ENCOUNTER (INPATIENT)
Facility: HOSPITAL | Age: 65
LOS: 2 days | Discharge: HOME HEALTH | DRG: 470 | End: 2020-03-06
Attending: ORTHOPAEDIC SURGERY | Admitting: ORTHOPAEDIC SURGERY
Payer: COMMERCIAL

## 2020-03-04 ENCOUNTER — ANESTHESIA EVENT (OUTPATIENT)
Dept: SURGERY | Facility: HOSPITAL | Age: 65
DRG: 470 | End: 2020-03-04
Payer: COMMERCIAL

## 2020-03-04 DIAGNOSIS — M17.12 ARTHRITIS OF LEFT KNEE: Primary | ICD-10-CM

## 2020-03-04 LAB
HCT VFR BLD AUTO: 34.8 % (ref 37–48.5)
HGB BLD-MCNC: 10.6 G/DL (ref 12–16)

## 2020-03-04 PROCEDURE — 94799 UNLISTED PULMONARY SVC/PX: CPT

## 2020-03-04 PROCEDURE — S0077 INJECTION, CLINDAMYCIN PHOSP: HCPCS | Performed by: ORTHOPAEDIC SURGERY

## 2020-03-04 PROCEDURE — 97165 OT EVAL LOW COMPLEX 30 MIN: CPT

## 2020-03-04 PROCEDURE — 63600175 PHARM REV CODE 636 W HCPCS: Performed by: ORTHOPAEDIC SURGERY

## 2020-03-04 PROCEDURE — 63600175 PHARM REV CODE 636 W HCPCS: Performed by: ANESTHESIOLOGY

## 2020-03-04 PROCEDURE — 25000242 PHARM REV CODE 250 ALT 637 W/ HCPCS: Performed by: ORTHOPAEDIC SURGERY

## 2020-03-04 PROCEDURE — 71000039 HC RECOVERY, EACH ADD'L HOUR: Performed by: ORTHOPAEDIC SURGERY

## 2020-03-04 PROCEDURE — 11000001 HC ACUTE MED/SURG PRIVATE ROOM

## 2020-03-04 PROCEDURE — 88311 PR  DECALCIFY TISSUE: ICD-10-PCS | Mod: 26,,, | Performed by: PATHOLOGY

## 2020-03-04 PROCEDURE — 88305 TISSUE EXAM BY PATHOLOGIST: ICD-10-PCS | Mod: 26,,, | Performed by: PATHOLOGY

## 2020-03-04 PROCEDURE — C1713 ANCHOR/SCREW BN/BN,TIS/BN: HCPCS | Performed by: ORTHOPAEDIC SURGERY

## 2020-03-04 PROCEDURE — 97530 THERAPEUTIC ACTIVITIES: CPT

## 2020-03-04 PROCEDURE — 27201423 OPTIME MED/SURG SUP & DEVICES STERILE SUPPLY: Performed by: ORTHOPAEDIC SURGERY

## 2020-03-04 PROCEDURE — 94760 N-INVAS EAR/PLS OXIMETRY 1: CPT

## 2020-03-04 PROCEDURE — 85014 HEMATOCRIT: CPT

## 2020-03-04 PROCEDURE — 85018 HEMOGLOBIN: CPT

## 2020-03-04 PROCEDURE — 25000003 PHARM REV CODE 250: Performed by: NURSE ANESTHETIST, CERTIFIED REGISTERED

## 2020-03-04 PROCEDURE — 36000710: Performed by: ORTHOPAEDIC SURGERY

## 2020-03-04 PROCEDURE — 99900035 HC TECH TIME PER 15 MIN (STAT)

## 2020-03-04 PROCEDURE — 88305 TISSUE EXAM BY PATHOLOGIST: CPT | Mod: 26,,, | Performed by: PATHOLOGY

## 2020-03-04 PROCEDURE — 37000009 HC ANESTHESIA EA ADD 15 MINS: Performed by: ORTHOPAEDIC SURGERY

## 2020-03-04 PROCEDURE — 27447 TOTAL KNEE ARTHROPLASTY: CPT | Mod: LT,,, | Performed by: ORTHOPAEDIC SURGERY

## 2020-03-04 PROCEDURE — 36000711: Performed by: ORTHOPAEDIC SURGERY

## 2020-03-04 PROCEDURE — 63600175 PHARM REV CODE 636 W HCPCS: Performed by: NURSE ANESTHETIST, CERTIFIED REGISTERED

## 2020-03-04 PROCEDURE — 27447 PR TOTAL KNEE ARTHROPLASTY: ICD-10-PCS | Mod: LT,,, | Performed by: ORTHOPAEDIC SURGERY

## 2020-03-04 PROCEDURE — 88311 DECALCIFY TISSUE: CPT | Mod: 26,,, | Performed by: PATHOLOGY

## 2020-03-04 PROCEDURE — C9290 INJ, BUPIVACAINE LIPOSOME: HCPCS | Performed by: ORTHOPAEDIC SURGERY

## 2020-03-04 PROCEDURE — 25000003 PHARM REV CODE 250: Performed by: ORTHOPAEDIC SURGERY

## 2020-03-04 PROCEDURE — 88311 DECALCIFY TISSUE: CPT | Performed by: PATHOLOGY

## 2020-03-04 PROCEDURE — 97161 PT EVAL LOW COMPLEX 20 MIN: CPT

## 2020-03-04 PROCEDURE — 63600175 PHARM REV CODE 636 W HCPCS: Performed by: PHYSICIAN ASSISTANT

## 2020-03-04 PROCEDURE — 71000033 HC RECOVERY, INTIAL HOUR: Performed by: ORTHOPAEDIC SURGERY

## 2020-03-04 PROCEDURE — A4216 STERILE WATER/SALINE, 10 ML: HCPCS | Performed by: ORTHOPAEDIC SURGERY

## 2020-03-04 PROCEDURE — 37000008 HC ANESTHESIA 1ST 15 MINUTES: Performed by: ORTHOPAEDIC SURGERY

## 2020-03-04 PROCEDURE — 88305 TISSUE EXAM BY PATHOLOGIST: CPT | Performed by: PATHOLOGY

## 2020-03-04 PROCEDURE — C1776 JOINT DEVICE (IMPLANTABLE): HCPCS | Performed by: ORTHOPAEDIC SURGERY

## 2020-03-04 DEVICE — IMPLANTABLE DEVICE: Type: IMPLANTABLE DEVICE | Site: KNEE | Status: FUNCTIONAL

## 2020-03-04 DEVICE — PATELLA XPE MEDIUM 32MM: Type: IMPLANTABLE DEVICE | Site: KNEE | Status: FUNCTIONAL

## 2020-03-04 DEVICE — STEM STRAIGHT PSA 14X30MM: Type: IMPLANTABLE DEVICE | Site: KNEE | Status: FUNCTIONAL

## 2020-03-04 DEVICE — INSERT TIBIAL PS #4 XPE 11MM: Type: IMPLANTABLE DEVICE | Site: KNEE | Status: FUNCTIONAL

## 2020-03-04 RX ORDER — SODIUM CHLORIDE 9 MG/ML
INJECTION, SOLUTION INTRAVENOUS CONTINUOUS
Status: DISCONTINUED | OUTPATIENT
Start: 2020-03-04 | End: 2020-03-06 | Stop reason: HOSPADM

## 2020-03-04 RX ORDER — OXYCODONE AND ACETAMINOPHEN 5; 325 MG/1; MG/1
1 TABLET ORAL
Status: DISCONTINUED | OUTPATIENT
Start: 2020-03-04 | End: 2020-03-04 | Stop reason: HOSPADM

## 2020-03-04 RX ORDER — PREDNISOLONE ACETATE 10 MG/ML
1 SUSPENSION/ DROPS OPHTHALMIC 2 TIMES DAILY
Status: DISCONTINUED | OUTPATIENT
Start: 2020-03-05 | End: 2020-03-06 | Stop reason: HOSPADM

## 2020-03-04 RX ORDER — METOPROLOL SUCCINATE 25 MG/1
25 TABLET, EXTENDED RELEASE ORAL 2 TIMES DAILY
Status: DISCONTINUED | OUTPATIENT
Start: 2020-03-04 | End: 2020-03-06 | Stop reason: HOSPADM

## 2020-03-04 RX ORDER — KETOROLAC TROMETHAMINE 30 MG/ML
INJECTION, SOLUTION INTRAMUSCULAR; INTRAVENOUS
Status: DISCONTINUED | OUTPATIENT
Start: 2020-03-04 | End: 2020-03-04

## 2020-03-04 RX ORDER — PROPOFOL 10 MG/ML
VIAL (ML) INTRAVENOUS
Status: DISCONTINUED | OUTPATIENT
Start: 2020-03-04 | End: 2020-03-04

## 2020-03-04 RX ORDER — OXYCODONE HYDROCHLORIDE 5 MG/1
10 TABLET ORAL
Status: DISCONTINUED | OUTPATIENT
Start: 2020-03-04 | End: 2020-03-06 | Stop reason: HOSPADM

## 2020-03-04 RX ORDER — CLINDAMYCIN PHOSPHATE 900 MG/50ML
900 INJECTION, SOLUTION INTRAVENOUS
Status: COMPLETED | OUTPATIENT
Start: 2020-03-04 | End: 2020-03-04

## 2020-03-04 RX ORDER — FLUTICASONE PROPIONATE 50 MCG
1 SPRAY, SUSPENSION (ML) NASAL NIGHTLY
Status: DISCONTINUED | OUTPATIENT
Start: 2020-03-04 | End: 2020-03-06 | Stop reason: HOSPADM

## 2020-03-04 RX ORDER — FENTANYL CITRATE 50 UG/ML
INJECTION, SOLUTION INTRAMUSCULAR; INTRAVENOUS
Status: DISCONTINUED | OUTPATIENT
Start: 2020-03-04 | End: 2020-03-04

## 2020-03-04 RX ORDER — BUPIVACAINE HYDROCHLORIDE AND EPINEPHRINE 2.5; 5 MG/ML; UG/ML
INJECTION, SOLUTION EPIDURAL; INFILTRATION; INTRACAUDAL; PERINEURAL
Status: DISCONTINUED | OUTPATIENT
Start: 2020-03-04 | End: 2020-03-04 | Stop reason: HOSPADM

## 2020-03-04 RX ORDER — ENOXAPARIN SODIUM 100 MG/ML
40 INJECTION SUBCUTANEOUS EVERY 24 HOURS
Status: DISCONTINUED | OUTPATIENT
Start: 2020-03-04 | End: 2020-03-06 | Stop reason: HOSPADM

## 2020-03-04 RX ORDER — ALBUTEROL SULFATE 0.83 MG/ML
2.5 SOLUTION RESPIRATORY (INHALATION) EVERY 6 HOURS PRN
Status: DISCONTINUED | OUTPATIENT
Start: 2020-03-04 | End: 2020-03-06 | Stop reason: HOSPADM

## 2020-03-04 RX ORDER — CHLORHEXIDINE GLUCONATE ORAL RINSE 1.2 MG/ML
10 SOLUTION DENTAL 2 TIMES DAILY
Status: DISCONTINUED | OUTPATIENT
Start: 2020-03-04 | End: 2020-03-06 | Stop reason: HOSPADM

## 2020-03-04 RX ORDER — TRANEXAMIC ACID 100 MG/ML
1000 INJECTION, SOLUTION INTRAVENOUS ONCE
Status: DISCONTINUED | OUTPATIENT
Start: 2020-03-04 | End: 2020-03-04 | Stop reason: HOSPADM

## 2020-03-04 RX ORDER — MELOXICAM 7.5 MG/1
15 TABLET ORAL DAILY
Status: DISCONTINUED | OUTPATIENT
Start: 2020-03-04 | End: 2020-03-06 | Stop reason: HOSPADM

## 2020-03-04 RX ORDER — PANTOPRAZOLE SODIUM 40 MG/1
40 TABLET, DELAYED RELEASE ORAL DAILY
Status: DISCONTINUED | OUTPATIENT
Start: 2020-03-04 | End: 2020-03-06 | Stop reason: HOSPADM

## 2020-03-04 RX ORDER — OFLOXACIN 3 MG/ML
1 SOLUTION/ DROPS OPHTHALMIC 2 TIMES DAILY
Status: DISCONTINUED | OUTPATIENT
Start: 2020-03-04 | End: 2020-03-06 | Stop reason: HOSPADM

## 2020-03-04 RX ORDER — ROCURONIUM BROMIDE 10 MG/ML
INJECTION, SOLUTION INTRAVENOUS
Status: DISCONTINUED | OUTPATIENT
Start: 2020-03-04 | End: 2020-03-04

## 2020-03-04 RX ORDER — GATIFLOXACIN 5 MG/ML
1 SOLUTION/ DROPS OPHTHALMIC 2 TIMES DAILY
Status: DISCONTINUED | OUTPATIENT
Start: 2020-03-04 | End: 2020-03-04

## 2020-03-04 RX ORDER — DEXAMETHASONE SODIUM PHOSPHATE 4 MG/ML
8 INJECTION, SOLUTION INTRA-ARTICULAR; INTRALESIONAL; INTRAMUSCULAR; INTRAVENOUS; SOFT TISSUE EVERY 24 HOURS
Status: COMPLETED | OUTPATIENT
Start: 2020-03-05 | End: 2020-03-06

## 2020-03-04 RX ORDER — TOPIRAMATE 25 MG/1
50 TABLET ORAL DAILY PRN
Status: DISCONTINUED | OUTPATIENT
Start: 2020-03-04 | End: 2020-03-06 | Stop reason: HOSPADM

## 2020-03-04 RX ORDER — AMLODIPINE BESYLATE 5 MG/1
5 TABLET ORAL NIGHTLY
Status: DISCONTINUED | OUTPATIENT
Start: 2020-03-04 | End: 2020-03-05

## 2020-03-04 RX ORDER — MONTELUKAST SODIUM 10 MG/1
10 TABLET ORAL NIGHTLY
Status: DISCONTINUED | OUTPATIENT
Start: 2020-03-04 | End: 2020-03-06 | Stop reason: HOSPADM

## 2020-03-04 RX ORDER — DEXAMETHASONE SODIUM PHOSPHATE 4 MG/ML
8 INJECTION, SOLUTION INTRA-ARTICULAR; INTRALESIONAL; INTRAMUSCULAR; INTRAVENOUS; SOFT TISSUE
Status: DISCONTINUED | OUTPATIENT
Start: 2020-03-04 | End: 2020-03-04 | Stop reason: HOSPADM

## 2020-03-04 RX ORDER — ONDANSETRON 2 MG/ML
INJECTION INTRAMUSCULAR; INTRAVENOUS
Status: DISCONTINUED | OUTPATIENT
Start: 2020-03-04 | End: 2020-03-04

## 2020-03-04 RX ORDER — KETOROLAC TROMETHAMINE 5 MG/ML
1 SOLUTION OPHTHALMIC 4 TIMES DAILY
Status: DISCONTINUED | OUTPATIENT
Start: 2020-03-04 | End: 2020-03-04

## 2020-03-04 RX ORDER — PREDNISOLONE ACETATE 10 MG/ML
1 SUSPENSION/ DROPS OPHTHALMIC 4 TIMES DAILY
Status: DISCONTINUED | OUTPATIENT
Start: 2020-03-04 | End: 2020-03-04

## 2020-03-04 RX ORDER — MORPHINE SULFATE 4 MG/ML
4 INJECTION, SOLUTION INTRAMUSCULAR; INTRAVENOUS ONCE
Status: COMPLETED | OUTPATIENT
Start: 2020-03-04 | End: 2020-03-04

## 2020-03-04 RX ORDER — KETOROLAC TROMETHAMINE 5 MG/ML
1 SOLUTION OPHTHALMIC 2 TIMES DAILY
Status: DISCONTINUED | OUTPATIENT
Start: 2020-03-05 | End: 2020-03-06 | Stop reason: HOSPADM

## 2020-03-04 RX ORDER — TRANEXAMIC ACID 100 MG/ML
1000 INJECTION, SOLUTION INTRAVENOUS
Status: COMPLETED | OUTPATIENT
Start: 2020-03-04 | End: 2020-03-04

## 2020-03-04 RX ORDER — HYDROMORPHONE HYDROCHLORIDE 2 MG/ML
0.2 INJECTION, SOLUTION INTRAMUSCULAR; INTRAVENOUS; SUBCUTANEOUS EVERY 5 MIN PRN
Status: DISCONTINUED | OUTPATIENT
Start: 2020-03-04 | End: 2020-03-04 | Stop reason: HOSPADM

## 2020-03-04 RX ORDER — ALBUTEROL SULFATE 90 UG/1
2 AEROSOL, METERED RESPIRATORY (INHALATION) EVERY 6 HOURS PRN
Status: DISCONTINUED | OUTPATIENT
Start: 2020-03-04 | End: 2020-03-04

## 2020-03-04 RX ORDER — MECLIZINE HYDROCHLORIDE 25 MG/1
25 TABLET ORAL 3 TIMES DAILY PRN
Status: DISCONTINUED | OUTPATIENT
Start: 2020-03-04 | End: 2020-03-06 | Stop reason: HOSPADM

## 2020-03-04 RX ORDER — BISACODYL 10 MG
10 SUPPOSITORY, RECTAL RECTAL DAILY PRN
Status: DISCONTINUED | OUTPATIENT
Start: 2020-03-04 | End: 2020-03-06 | Stop reason: HOSPADM

## 2020-03-04 RX ORDER — SODIUM CHLORIDE, SODIUM LACTATE, POTASSIUM CHLORIDE, CALCIUM CHLORIDE 600; 310; 30; 20 MG/100ML; MG/100ML; MG/100ML; MG/100ML
INJECTION, SOLUTION INTRAVENOUS CONTINUOUS PRN
Status: DISCONTINUED | OUTPATIENT
Start: 2020-03-04 | End: 2020-03-04

## 2020-03-04 RX ORDER — MIDAZOLAM HYDROCHLORIDE 1 MG/ML
INJECTION, SOLUTION INTRAMUSCULAR; INTRAVENOUS
Status: DISCONTINUED | OUTPATIENT
Start: 2020-03-04 | End: 2020-03-04

## 2020-03-04 RX ORDER — BENZONATATE 100 MG/1
100 CAPSULE ORAL 3 TIMES DAILY PRN
Status: DISCONTINUED | OUTPATIENT
Start: 2020-03-04 | End: 2020-03-06 | Stop reason: HOSPADM

## 2020-03-04 RX ORDER — OXYCODONE HYDROCHLORIDE 5 MG/1
5 TABLET ORAL
Status: DISCONTINUED | OUTPATIENT
Start: 2020-03-04 | End: 2020-03-06 | Stop reason: HOSPADM

## 2020-03-04 RX ORDER — LIDOCAINE HYDROCHLORIDE 10 MG/ML
INJECTION, SOLUTION EPIDURAL; INFILTRATION; INTRACAUDAL; PERINEURAL
Status: DISCONTINUED | OUTPATIENT
Start: 2020-03-04 | End: 2020-03-04

## 2020-03-04 RX ORDER — OXYCODONE HYDROCHLORIDE 5 MG/1
15 TABLET ORAL
Status: DISCONTINUED | OUTPATIENT
Start: 2020-03-04 | End: 2020-03-06 | Stop reason: HOSPADM

## 2020-03-04 RX ORDER — VANCOMYCIN HCL IN 5 % DEXTROSE 1G/250ML
1000 PLASTIC BAG, INJECTION (ML) INTRAVENOUS ONCE
Status: COMPLETED | OUTPATIENT
Start: 2020-03-04 | End: 2020-03-04

## 2020-03-04 RX ORDER — CHLORHEXIDINE GLUCONATE ORAL RINSE 1.2 MG/ML
10 SOLUTION DENTAL
Status: DISCONTINUED | OUTPATIENT
Start: 2020-03-04 | End: 2020-03-04 | Stop reason: HOSPADM

## 2020-03-04 RX ORDER — DOCUSATE SODIUM 100 MG/1
100 CAPSULE, LIQUID FILLED ORAL 2 TIMES DAILY
Status: DISCONTINUED | OUTPATIENT
Start: 2020-03-04 | End: 2020-03-06 | Stop reason: HOSPADM

## 2020-03-04 RX ORDER — MORPHINE SULFATE 2 MG/ML
2 INJECTION, SOLUTION INTRAMUSCULAR; INTRAVENOUS EVERY 4 HOURS PRN
Status: DISCONTINUED | OUTPATIENT
Start: 2020-03-04 | End: 2020-03-06 | Stop reason: HOSPADM

## 2020-03-04 RX ORDER — PREGABALIN 25 MG/1
75 CAPSULE ORAL NIGHTLY
Status: DISCONTINUED | OUTPATIENT
Start: 2020-03-04 | End: 2020-03-06

## 2020-03-04 RX ORDER — FUROSEMIDE 20 MG/1
20 TABLET ORAL DAILY
Status: DISCONTINUED | OUTPATIENT
Start: 2020-03-04 | End: 2020-03-04

## 2020-03-04 RX ORDER — ZOLPIDEM TARTRATE 5 MG/1
5 TABLET ORAL NIGHTLY PRN
Status: DISCONTINUED | OUTPATIENT
Start: 2020-03-04 | End: 2020-03-06 | Stop reason: HOSPADM

## 2020-03-04 RX ORDER — METHOCARBAMOL 500 MG/1
500 TABLET, FILM COATED ORAL 3 TIMES DAILY
Status: DISCONTINUED | OUTPATIENT
Start: 2020-03-04 | End: 2020-03-06 | Stop reason: HOSPADM

## 2020-03-04 RX ORDER — DEXAMETHASONE SODIUM PHOSPHATE 4 MG/ML
INJECTION, SOLUTION INTRA-ARTICULAR; INTRALESIONAL; INTRAMUSCULAR; INTRAVENOUS; SOFT TISSUE
Status: DISCONTINUED | OUTPATIENT
Start: 2020-03-04 | End: 2020-03-04

## 2020-03-04 RX ORDER — KETOROLAC TROMETHAMINE 30 MG/ML
INJECTION, SOLUTION INTRAMUSCULAR; INTRAVENOUS
Status: DISCONTINUED | OUTPATIENT
Start: 2020-03-04 | End: 2020-03-04 | Stop reason: HOSPADM

## 2020-03-04 RX ORDER — ONDANSETRON 2 MG/ML
4 INJECTION INTRAMUSCULAR; INTRAVENOUS EVERY 12 HOURS PRN
Status: DISCONTINUED | OUTPATIENT
Start: 2020-03-04 | End: 2020-03-06 | Stop reason: HOSPADM

## 2020-03-04 RX ORDER — SUCCINYLCHOLINE CHLORIDE 20 MG/ML
INJECTION INTRAMUSCULAR; INTRAVENOUS
Status: DISCONTINUED | OUTPATIENT
Start: 2020-03-04 | End: 2020-03-04

## 2020-03-04 RX ORDER — ESMOLOL HYDROCHLORIDE 10 MG/ML
INJECTION INTRAVENOUS
Status: DISCONTINUED | OUTPATIENT
Start: 2020-03-04 | End: 2020-03-04

## 2020-03-04 RX ORDER — ACETAMINOPHEN 325 MG/1
650 TABLET ORAL EVERY 8 HOURS PRN
Status: DISCONTINUED | OUTPATIENT
Start: 2020-03-04 | End: 2020-03-06 | Stop reason: HOSPADM

## 2020-03-04 RX ORDER — CHLORHEXIDINE GLUCONATE ORAL RINSE 1.2 MG/ML
15 SOLUTION DENTAL 2 TIMES DAILY
Status: DISCONTINUED | OUTPATIENT
Start: 2020-03-04 | End: 2020-03-04

## 2020-03-04 RX ORDER — ONDANSETRON 2 MG/ML
4 INJECTION INTRAMUSCULAR; INTRAVENOUS DAILY PRN
Status: DISCONTINUED | OUTPATIENT
Start: 2020-03-04 | End: 2020-03-04 | Stop reason: HOSPADM

## 2020-03-04 RX ORDER — SODIUM CHLORIDE 0.9 % (FLUSH) 0.9 %
10 SYRINGE (ML) INJECTION EVERY 6 HOURS
Status: DISCONTINUED | OUTPATIENT
Start: 2020-03-04 | End: 2020-03-06 | Stop reason: HOSPADM

## 2020-03-04 RX ADMIN — CLINDAMYCIN PHOSPHATE 900 MG: 18 INJECTION, SOLUTION INTRAVENOUS at 07:03

## 2020-03-04 RX ADMIN — HYDROMORPHONE HYDROCHLORIDE 0.2 MG: 2 INJECTION INTRAMUSCULAR; INTRAVENOUS; SUBCUTANEOUS at 09:03

## 2020-03-04 RX ADMIN — HYDROMORPHONE HYDROCHLORIDE 0.2 MG: 2 INJECTION INTRAMUSCULAR; INTRAVENOUS; SUBCUTANEOUS at 10:03

## 2020-03-04 RX ADMIN — Medication 10 ML: at 01:03

## 2020-03-04 RX ADMIN — METOPROLOL SUCCINATE 25 MG: 25 TABLET, EXTENDED RELEASE ORAL at 09:03

## 2020-03-04 RX ADMIN — OFLOXACIN 1 DROP: 3 SOLUTION OPHTHALMIC at 09:03

## 2020-03-04 RX ADMIN — OXYCODONE 15 MG: 5 TABLET ORAL at 06:03

## 2020-03-04 RX ADMIN — FENTANYL CITRATE 100 MCG: 50 INJECTION, SOLUTION INTRAMUSCULAR; INTRAVENOUS at 07:03

## 2020-03-04 RX ADMIN — PANTOPRAZOLE SODIUM 40 MG: 40 TABLET, DELAYED RELEASE ORAL at 01:03

## 2020-03-04 RX ADMIN — METHOCARBAMOL TABLETS 500 MG: 500 TABLET, COATED ORAL at 03:03

## 2020-03-04 RX ADMIN — MELOXICAM 15 MG: 7.5 TABLET ORAL at 01:03

## 2020-03-04 RX ADMIN — KETOROLAC TROMETHAMINE 30 MG: 30 INJECTION, SOLUTION INTRAMUSCULAR; INTRAVENOUS at 09:03

## 2020-03-04 RX ADMIN — AMLODIPINE BESYLATE 5 MG: 5 TABLET ORAL at 09:03

## 2020-03-04 RX ADMIN — KETOROLAC TROMETHAMINE 1 DROP: 5 SOLUTION/ DROPS OPHTHALMIC at 03:03

## 2020-03-04 RX ADMIN — ESMOLOL HYDROCHLORIDE 20 MG: 10 INJECTION, SOLUTION INTRAVENOUS at 08:03

## 2020-03-04 RX ADMIN — PREGABALIN 75 MG: 25 CAPSULE ORAL at 09:03

## 2020-03-04 RX ADMIN — ROCURONIUM BROMIDE 35 MG: 10 INJECTION, SOLUTION INTRAVENOUS at 07:03

## 2020-03-04 RX ADMIN — OXYCODONE 15 MG: 5 TABLET ORAL at 01:03

## 2020-03-04 RX ADMIN — SODIUM CHLORIDE: 0.9 INJECTION, SOLUTION INTRAVENOUS at 01:03

## 2020-03-04 RX ADMIN — DEXAMETHASONE SODIUM PHOSPHATE 8 MG: 4 INJECTION, SOLUTION INTRA-ARTICULAR; INTRALESIONAL; INTRAMUSCULAR; INTRAVENOUS; SOFT TISSUE at 07:03

## 2020-03-04 RX ADMIN — FUROSEMIDE 20 MG: 20 TABLET ORAL at 01:03

## 2020-03-04 RX ADMIN — ENOXAPARIN SODIUM 40 MG: 100 INJECTION SUBCUTANEOUS at 05:03

## 2020-03-04 RX ADMIN — VANCOMYCIN HYDROCHLORIDE 1000 MG: 1 INJECTION, POWDER, LYOPHILIZED, FOR SOLUTION INTRAVENOUS at 01:03

## 2020-03-04 RX ADMIN — CHLORHEXIDINE GLUCONATE 0.12% ORAL RINSE 10 ML: 1.2 LIQUID ORAL at 09:03

## 2020-03-04 RX ADMIN — OXYCODONE 15 MG: 5 TABLET ORAL at 10:03

## 2020-03-04 RX ADMIN — LIDOCAINE HYDROCHLORIDE 50 MG: 10 INJECTION, SOLUTION EPIDURAL; INFILTRATION; INTRACAUDAL; PERINEURAL at 07:03

## 2020-03-04 RX ADMIN — Medication 10 ML: at 05:03

## 2020-03-04 RX ADMIN — MORPHINE SULFATE 4 MG: 4 INJECTION, SOLUTION INTRAMUSCULAR; INTRAVENOUS at 07:03

## 2020-03-04 RX ADMIN — CHLORHEXIDINE GLUCONATE 0.12% ORAL RINSE 10 ML: 1.2 LIQUID ORAL at 01:03

## 2020-03-04 RX ADMIN — OFLOXACIN 1 DROP: 3 SOLUTION OPHTHALMIC at 03:03

## 2020-03-04 RX ADMIN — ROCURONIUM BROMIDE 5 MG: 10 INJECTION, SOLUTION INTRAVENOUS at 07:03

## 2020-03-04 RX ADMIN — SUCCINYLCHOLINE CHLORIDE 100 MG: 20 INJECTION, SOLUTION INTRAMUSCULAR; INTRAVENOUS at 07:03

## 2020-03-04 RX ADMIN — CHLORHEXIDINE GLUCONATE 0.12% ORAL RINSE 10 ML: 1.2 LIQUID ORAL at 06:03

## 2020-03-04 RX ADMIN — TRANEXAMIC ACID 1000 MG: 100 INJECTION, SOLUTION INTRAVENOUS at 07:03

## 2020-03-04 RX ADMIN — MIDAZOLAM 2 MG: 1 INJECTION INTRAMUSCULAR; INTRAVENOUS at 06:03

## 2020-03-04 RX ADMIN — PROPOFOL 170 MG: 10 INJECTION, EMULSION INTRAVENOUS at 07:03

## 2020-03-04 RX ADMIN — DOCUSATE SODIUM 100 MG: 100 CAPSULE, LIQUID FILLED ORAL at 01:03

## 2020-03-04 RX ADMIN — TRANEXAMIC ACID 1000 MG: 100 INJECTION, SOLUTION INTRAVENOUS at 09:03

## 2020-03-04 RX ADMIN — ONDANSETRON 4 MG: 2 INJECTION, SOLUTION INTRAMUSCULAR; INTRAVENOUS at 07:03

## 2020-03-04 RX ADMIN — PREDNISOLONE ACETATE 1 DROP: 10 SUSPENSION/ DROPS OPHTHALMIC at 03:03

## 2020-03-04 RX ADMIN — BUPIVACAINE 266 MG: 13.3 INJECTION, SUSPENSION, LIPOSOMAL INFILTRATION at 07:03

## 2020-03-04 RX ADMIN — MONTELUKAST 10 MG: 10 TABLET, FILM COATED ORAL at 09:03

## 2020-03-04 RX ADMIN — SODIUM CHLORIDE, SODIUM LACTATE, POTASSIUM CHLORIDE, AND CALCIUM CHLORIDE: 600; 310; 30; 20 INJECTION, SOLUTION INTRAVENOUS at 06:03

## 2020-03-04 RX ADMIN — ZOLPIDEM TARTRATE 5 MG: 5 TABLET, COATED ORAL at 10:03

## 2020-03-04 RX ADMIN — DOCUSATE SODIUM 100 MG: 100 CAPSULE, LIQUID FILLED ORAL at 09:03

## 2020-03-04 RX ADMIN — SODIUM CHLORIDE: 0.9 INJECTION, SOLUTION INTRAVENOUS at 10:03

## 2020-03-04 RX ADMIN — FLUTICASONE PROPIONATE 50 MCG: 50 SPRAY, METERED NASAL at 09:03

## 2020-03-04 RX ADMIN — METHOCARBAMOL TABLETS 500 MG: 500 TABLET, COATED ORAL at 09:03

## 2020-03-04 NOTE — ASSESSMENT & PLAN NOTE
-Patient is status post left total knee arthroplasty performed by Dr. Connolly.   -Management per Orthopedics.

## 2020-03-04 NOTE — INTERVAL H&P NOTE
The patient has been examined and the H&P has been reviewed:    I concur with the findings and no changes have occurred since H&P was written.    Anesthesia/Surgery risks, benefits and alternative options discussed and understood by patient/family.

## 2020-03-04 NOTE — ANESTHESIA POSTPROCEDURE EVALUATION
Anesthesia Post Evaluation    Patient: Mariel Becerril    Procedure(s) Performed: Procedure(s) (LRB):  ARTHROPLASTY, KNEE, TOTAL (Left)    Final Anesthesia Type: general    Patient location during evaluation: PACU  Patient participation: Yes- Able to Participate  Level of consciousness: awake and alert  Post-procedure vital signs: reviewed and stable  Pain management: adequate  Airway patency: patent  GIL mitigation strategies: Extubation while patient is awake  PONV status at discharge: No PONV  Anesthetic complications: no      Cardiovascular status: hemodynamically stable  Respiratory status: spontaneous ventilation  Hydration status: euvolemic  Follow-up not needed.          Vitals Value Taken Time   /67 3/4/2020 10:30 AM   Temp 36.5 °C (97.7 °F) 3/4/2020  9:27 AM   Pulse 84 3/4/2020 10:31 AM   Resp 16 3/4/2020 10:30 AM   SpO2 94 % 3/4/2020 10:31 AM   Vitals shown include unvalidated device data.      Event Time     Out of Recovery 10:33:06          Pain/Loren Score: Pain Rating Prior to Med Admin: 7 (3/4/2020 10:20 AM)  Loren Score: 10 (3/4/2020 10:30 AM)

## 2020-03-04 NOTE — OP NOTE
[facility] -   Orthopedic Surgery  Operative Note    SUMMARY     Date of Procedure: 3/4/2020     Procedure: Procedure(s) (LRB):  ARTHROPLASTY, KNEE, TOTAL (Left)       Surgeon(s) and Role:     * Moy Connolly MD - Primary    Assisting Surgeon: None    Pre-Operative Diagnosis: Arthritis of knee, left [M17.12]  Chronic pain of both knees [M25.561, M25.562, G89.29]  Primary osteoarthritis of both knees [M17.0]    Post-Operative Diagnosis: Post-Op Diagnosis Codes:     * Arthritis of knee, left [M17.12]     * Chronic pain of both knees [M25.561, M25.562, G89.29]     * Primary osteoarthritis of both knees [M17.0]    Anesthesia: General    Injections/Meds: 266 mg Exparel, 30 cc 0.25% Marcaine with epinephrine, 30 cc injectable saline, 30 mg Toradol, 4 mg morphine    Tourniquet time:  30 minutes    Significant Surgical Tasks Conducted by the Assistant(s), if Applicable:  No assistant    Complications: none     Estimated Blood Loss (EBL):  200 mL           Implants: [employer] orthopedic primary knee system posterior stabilized femur size 2.5, tibial tray size 4.  With 14 x 30 mm stem, tibial insert 11 mm PS, 32 dome patella, gentamicin cement    Specimens: Bone left knee diagnosis arthritis           Condition: Stable    Disposition: PACU - hemodynamically stable.    Attestation: I performed the procedure.    Description:  Medial parapatellar approach used to perform left TKA.  After patient received antibiotics and preop meds the knee was prepped and draped in usual sterile fashion. Midline incision was made 2 fingers above the superior pole of the patella to 2 fingers below.  Full-thickness skin flap raised medially and partially laterally.  Medial parapatellar incision was made to deliver Zocor arise.  Medial release off the medial tibial flare perform subperiosteal E the posterior medial corner of the tibia.  Patellar fat pad resected partially.  Superior capsulectomy performed.  Osteophytes removed  mediolateral meniscus removed. Patella was resurfaced after measuring and free hand technique used.  Partial lateral facetectomy performed.  Patella button trial was applied and the measured and reconstituted thickness. The patella was moved laterally the knee hyperflexed.  Quarter-inch drill bit used to open the canal into the femur and the canal was suctioned.  Irrigated and suctioned again.  Canal finer inserted an intramedullary alignment bartolo inserted into the femoral canal and pinned our cutting block at 5° of valgus cut. The bartolo was removed and distal femur was cut through the cutting block. We measured the size of the femur and marked our rotation and then 1 cutting block applied and pinned in place and proceeded cutting the anterior condyle posterior condyle and chamfer cuts followed by box cut. The femoral canal was bone grafted.  Trial was applied on the femur and posterior osteophytes removed. Directed attention to the tibia quarter-inch drill bit used to open the canal into the tibia.  Canal Finders inserted. A gated the canal and suctioned it. Fluted intramedullary alignment bartolo applied and using the depth gauge and the cutting block on the tibia.  Proceeded with multiple bent Homans protecting the collateral ligaments and posterior neurovascular structures and using the oscillating saw cut the tibia.  Excess osteophytes removed. Measured the tibia, marked the rotation on the base plate , pinned in place and punched our Francois.  Trials placed into the knee and put the knee through range of motion checking gap in extension , flexion at 45° of flexion.  Come from the sizes.  Esmarch was used and knee flexed ,thigh tourniquet inflated .  The knee was pulse lavaged then was injected in the surrounding soft tissues for postop pain control.  Prosthesis was cemented in place and excess cement was removed. Once cement has hardened the knee was placed through range of motion confirming stability. Once prosthesis  was checked the tourniquet was deflated. Closure of the knee performed with 1.  Vicryl and figure-of-eight and running fashion. Subcutaneous tissues were irrigated and then closed using 1.  Vicryl inverted stitch and skin using staple gun.  Sterile dressing applied.

## 2020-03-04 NOTE — SUBJECTIVE & OBJECTIVE
Past Medical History:   Diagnosis Date    Frequent headaches     Hypertension     Osteoarthritis 04/02/2019    Bilateral knees, ankles and feet    Severe obesity (BMI >= 40)     Sleep apnea        Past Surgical History:   Procedure Laterality Date    BLADDER SUSPENSION      CATARACT EXTRACTION, BILATERAL      COLONOSCOPY N/A 12/3/2018    Procedure: COLONOSCOPY;  Surgeon: Mari Rader MD;  Location: [facility];  Service: Endoscopy;  Laterality: N/A;    HYSTERECTOMY      partial 2000    tonsilectomy         Review of patient's allergies indicates:   Allergen Reactions    Penicillins Rash       No current facility-administered medications on file prior to encounter.      Current Outpatient Medications on File Prior to Encounter   Medication Sig    albuterol (PROAIR HFA) 90 mcg/actuation inhaler Inhale 2 puffs into the lungs every 6 (six) hours as needed for Wheezing. Rescue    benzonatate (TESSALON) 100 MG capsule Take 1 capsule (100 mg total) by mouth 3 (three) times daily as needed.    diclofenac sodium (VOLTAREN) 1 % Gel Apply 4 grams topically 3 (three) times daily as needed.    meloxicam (MOBIC) 15 MG tablet TAKE 1 TABLET BY MOUTH ONCE DAILY    chlorhexidine (PERIDEX) 0.12 % solution SWISH AND SPIT 15 MLS BY MOUTH TWICE A DAY FOR 7 DAYS    eflornithine (VANIQA) 13.9 % cream Apply topically 2 (two) times daily.    ergocalciferol, vitamin D2, (VITAMIN D ORAL) Take 2,000 Units by mouth once daily.    fluticasone propionate (FLONASE) 50 mcg/actuation nasal spray 1 spray (50 mcg total) by Each Nare route once daily. (Patient taking differently: 1 spray by Each Nostril route every evening. )    furosemide (LASIX) 20 MG tablet Take 1 tablet (20 mg total) by mouth daily as needed.    gatifloxacin 0.5 % Drop drops Place 1 drop into the left eye 2 (two) times daily. Eyedrops to start one day before surgery (Patient not taking: Reported on 2/27/2020)    HYDROcodone-acetaminophen (NORCO) 7.5-325 mg per  tablet     ketorolac 0.5% (ACULAR) 0.5 % Drop Place 1 drop into the left eye 4 (four) times daily. Eyedrops to start one day before surgery (Patient not taking: Reported on 2/27/2020)    levocetirizine (XYZAL) 5 MG tablet Take 1 tablet (5 mg total) by mouth every evening.    meclizine (ANTIVERT) 25 mg tablet Take 1 tablet (25 mg total) by mouth 3 (three) times daily as needed for Dizziness.    montelukast (SINGULAIR) 10 mg tablet Take 10 mg by mouth every evening.    montelukast (SINGULAIR) 10 mg tablet TAKE 1 TABLET BY MOUTH ONCE DAILY IN THE EVENING    nystatin (MYCOSTATIN) cream Apply topically 2 (two) times daily.    nystatin (MYCOSTATIN) powder Apply topically 2 (two) times daily.    omeprazole (PRILOSEC) 40 MG capsule Take 1 capsule (40 mg total) by mouth once daily.    ondansetron (ZOFRAN-ODT) 4 MG TbDL Take 1 tablet (4 mg total) by mouth every 8 (eight) hours as needed.    prednisoLONE acetate (PRED FORTE) 1 % DrpS Place 1 drop into the left eye 4 (four) times daily. Start one day before surgery (Patient not taking: Reported on 2/6/2020)    topiramate (TOPAMAX) 50 MG tablet Take 1 tablet (50 mg total) by mouth once daily. (Patient taking differently: Take 50 mg by mouth daily as needed. )    traMADol (ULTRAM) 50 mg tablet Take 1 tablet (50 mg total) by mouth 2 (two) times daily as needed.     Family History     Problem Relation (Age of Onset)    Heart attack Father        Tobacco Use    Smoking status: Never Smoker    Smokeless tobacco: Never Used   Substance and Sexual Activity    Alcohol use: No    Drug use: No    Sexual activity: Never     Partners: Male     Birth control/protection: See Surgical Hx     Review of Systems   Constitutional: Negative for appetite change, chills, diaphoresis, fatigue and fever.   HENT: Negative for congestion, ear pain, mouth sores, sore throat and trouble swallowing.    Eyes: Negative for pain and visual disturbance.   Respiratory: Negative for cough, chest  tightness and shortness of breath.    Cardiovascular: Negative for chest pain, palpitations and leg swelling.   Gastrointestinal: Negative for abdominal pain, constipation and nausea.   Endocrine: Negative for cold intolerance, heat intolerance, polydipsia and polyuria.   Genitourinary: Negative for dysuria, frequency and hematuria.   Musculoskeletal: Positive for arthralgias (left knee). Negative for back pain and neck pain.   Skin: Negative for pallor, rash and wound.   Allergic/Immunologic: Negative for environmental allergies and immunocompromised state.   Neurological: Negative for dizziness, seizures, syncope, weakness, numbness and headaches.   Hematological: Negative for adenopathy. Does not bruise/bleed easily.   Psychiatric/Behavioral: Negative for agitation, confusion and sleep disturbance.     Objective:     Vital Signs (Most Recent):  Temp: 97.4 °F (36.3 °C) (03/04/20 1556)  Pulse: 69 (03/04/20 1556)  Resp: 18 (03/04/20 1556)  BP: 121/69 (03/04/20 1556)  SpO2: 100 % (03/04/20 1556) Vital Signs (24h Range):  Temp:  [97.4 °F (36.3 °C)-98.2 °F (36.8 °C)] 97.4 °F (36.3 °C)  Pulse:  [63-79] 69  Resp:  [16-18] 18  SpO2:  [95 %-100 %] 100 %  BP: (105-130)/(58-78) 121/69     Weight: 100.7 kg (222 lb 0.1 oz)  Body mass index is 39.33 kg/m².    Physical Exam   Constitutional: She is oriented to person, place, and time. She appears well-developed and well-nourished. No distress.   HENT:   Head: Normocephalic and atraumatic.   Eyes: Conjunctivae are normal.   PERRL   Neck: Neck supple. No JVD present.   Cardiovascular: Normal rate, regular rhythm and normal heart sounds.   Pulmonary/Chest: Effort normal and breath sounds normal.   Abdominal: Soft. Bowel sounds are normal. She exhibits no distension. There is no tenderness.   Musculoskeletal:        Left knee: She exhibits decreased range of motion and swelling.   Dressing to left knee.    Neurological: She is alert and oriented to person, place, and time.   Skin:  Skin is warm and dry.   Psychiatric: She has a normal mood and affect. Her behavior is normal. Thought content normal.   Nursing note and vitals reviewed.          Significant Labs:   CBC:   Recent Labs   Lab 03/04/20  0952   HGB 10.6*   HCT 34.8*     All pertinent labs within the past 24 hours have been reviewed.    Significant Imaging: I have reviewed all pertinent imaging results/findings within the past 24 hours.

## 2020-03-04 NOTE — H&P
Subjective:     Patient ID: Mariel Becerril is a 64 y.o. female.    Chief Complaint: No chief complaint on file.    HPI:  64-year-old been suffering with the left knee for 3 years.  At 1 point she was on crutches for a couple years until she received numerous injections but different providers finally got off in the summer.  She had undergone major physical therapy in [facility] until last month.  Injections include steroids, hyaluronic, NSAIDs including meloxicam, diclofenac gel, Aleve Advil, Robaxin, Norco, tramadol.  She was seen at [facility] on 12/16/2019 but prefers to come here.  Patient had to quit her job because of the pain.  Pain is 9/10.  The left knee is the worst as compared to the right knee. Stairs are difficult kneeling is difficult ambulating more than 100 ft becomes extremely painful.  Pain is constant.  Past Medical History:   Diagnosis Date    Frequent headaches     Hypertension     Osteoarthritis 04/02/2019    Bilateral knees, ankles and feet    Severe obesity (BMI >= 40)     Sleep apnea      Past Surgical History:   Procedure Laterality Date    BLADDER SUSPENSION      CATARACT EXTRACTION, BILATERAL      COLONOSCOPY N/A 12/3/2018    Procedure: COLONOSCOPY;  Surgeon: Mari Rader MD;  Location: [facility];  Service: Endoscopy;  Laterality: N/A;    HYSTERECTOMY      partial 2000    tonsilectomy       Family History   Problem Relation Age of Onset    Heart attack Father      Social History     Socioeconomic History    Marital status:      Spouse name: Not on file    Number of children: Not on file    Years of education: Not on file    Highest education level: Not on file   Occupational History    Not on file   Social Needs    Financial resource strain: Not on file    Food insecurity:     Worry: Not on file     Inability: Not on file    Transportation needs:     Medical: Not on file     Non-medical: Not on file   Tobacco Use    Smoking status: Never Smoker    Smokeless  tobacco: Never Used   Substance and Sexual Activity    Alcohol use: No    Drug use: No    Sexual activity: Never     Partners: Male     Birth control/protection: See Surgical Hx   Lifestyle    Physical activity:     Days per week: Not on file     Minutes per session: Not on file    Stress: Not on file   Relationships    Social connections:     Talks on phone: Not on file     Gets together: Not on file     Attends Christianity service: Not on file     Active member of club or organization: Not on file     Attends meetings of clubs or organizations: Not on file     Relationship status: Not on file   Other Topics Concern    Not on file   Social History Narrative    Not on file     Current Discharge Medication List      CONTINUE these medications which have NOT CHANGED    Details   diclofenac sodium (VOLTAREN) 1 % Gel Apply 4 grams topically 3 (three) times daily as needed.  Qty: 2 Tube, Refills: 0    Associated Diagnoses: Chronic pain of both knees      albuterol (PROAIR HFA) 90 mcg/actuation inhaler Inhale 2 puffs into the lungs every 6 (six) hours as needed for Wheezing. Rescue  Qty: 18 g, Refills: 0    Associated Diagnoses: Bronchitis      amLODIPine (NORVASC) 5 MG tablet Take 1 tablet (5 mg total) by mouth once daily.  Qty: 90 tablet, Refills: 1      benzonatate (TESSALON) 100 MG capsule Take 1 capsule (100 mg total) by mouth 3 (three) times daily as needed.  Qty: 30 capsule, Refills: 0    Comments: Please consider 90 day supplies to promote better adherence      chlorhexidine (PERIDEX) 0.12 % solution SWISH AND SPIT 15 MLS BY MOUTH TWICE A DAY FOR 7 DAYS  Refills: 7      eflornithine (VANIQA) 13.9 % cream Apply topically 2 (two) times daily.  Qty: 45 g, Refills: 3    Associated Diagnoses: Hirsutism      ergocalciferol, vitamin D2, (VITAMIN D ORAL) Take 2,000 Units by mouth once daily.      fluticasone propionate (FLONASE) 50 mcg/actuation nasal spray 1 spray (50 mcg total) by Each Nare route once daily.  Qty:  16 g, Refills: 5    Associated Diagnoses: Allergic rhinitis, unspecified seasonality, unspecified trigger      furosemide (LASIX) 20 MG tablet Take 1 tablet (20 mg total) by mouth daily as needed.  Qty: 90 tablet, Refills: 3    Associated Diagnoses: AREVALO (dyspnea on exertion); Other chest pain; Dyspnea on exertion      !! gatifloxacin 0.5 % Drop drops Place 1 drop into the left eye 2 (two) times daily. Eyedrops to start one day before surgery  Qty: 1 Bottle, Refills: 2    Associated Diagnoses: Nuclear sclerosis of both eyes; Posterior subcapsular age-related cataract of both eyes      !! gatifloxacin 0.5 % Drop drops Apply 1 drop to eye 2 (two) times daily. Eyedrops to start one day before surgery  Qty: 1 Bottle, Refills: 2      HYDROcodone-acetaminophen (NORCO) 7.5-325 mg per tablet       !! ketorolac 0.5% (ACULAR) 0.5 % Drop Place 1 drop into the left eye 4 (four) times daily. Eyedrops to start one day before surgery  Qty: 1 Bottle, Refills: 2    Associated Diagnoses: Nuclear sclerosis of both eyes; Posterior subcapsular age-related cataract of both eyes      !! ketorolac 0.5% (ACULAR) 0.5 % Drop Place 1 drop into the right eye 4 (four) times daily. Eyedrops to start one day before surgery  Qty: 1 Bottle, Refills: 2      levocetirizine (XYZAL) 5 MG tablet Take 1 tablet (5 mg total) by mouth every evening.  Qty: 30 tablet, Refills: 1    Associated Diagnoses: Seasonal allergies      meclizine (ANTIVERT) 25 mg tablet Take 1 tablet (25 mg total) by mouth 3 (three) times daily as needed for Dizziness.  Qty: 30 tablet, Refills: 0    Associated Diagnoses: Dizziness      meloxicam (MOBIC) 15 MG tablet TAKE 1 TABLET BY MOUTH ONCE DAILY  Qty: 30 tablet, Refills: 3    Comments: Please consider 90 day supplies to promote better adherence      methocarbamol (ROBAXIN) 500 MG Tab TAKE 1 TABLET BY MOUTH TWICE DAILY AS NEEDED  Qty: 60 tablet, Refills: 0      metoprolol succinate (TOPROL-XL) 25 MG 24 hr tablet Take 1 tablet (25 mg  total) by mouth 2 (two) times daily.  Qty: 60 tablet, Refills: 1      !! montelukast (SINGULAIR) 10 mg tablet Take 10 mg by mouth every evening.  Refills: 1      !! montelukast (SINGULAIR) 10 mg tablet TAKE 1 TABLET BY MOUTH ONCE DAILY IN THE EVENING  Qty: 90 tablet, Refills: 4    Associated Diagnoses: Allergic rhinitis, unspecified seasonality, unspecified trigger      nystatin (MYCOSTATIN) cream Apply topically 2 (two) times daily.  Qty: 30 g, Refills: 1    Associated Diagnoses: Tear of skin of buttock, initial encounter; Yeast dermatitis      nystatin (MYCOSTATIN) powder Apply topically 2 (two) times daily.  Qty: 120 g, Refills: 1    Associated Diagnoses: Tear of skin of buttock, initial encounter; Yeast dermatitis      omeprazole (PRILOSEC) 40 MG capsule Take 1 capsule (40 mg total) by mouth once daily.  Qty: 90 capsule, Refills: 0    Associated Diagnoses: Gastroesophageal reflux disease without esophagitis      ondansetron (ZOFRAN-ODT) 4 MG TbDL Take 1 tablet (4 mg total) by mouth every 8 (eight) hours as needed.  Qty: 10 tablet, Refills: 0    Associated Diagnoses: Nausea      !! prednisoLONE acetate (PRED FORTE) 1 % DrpS Place 1 drop into the left eye 4 (four) times daily. Start one day before surgery  Qty: 1 Bottle, Refills: 2    Associated Diagnoses: Nuclear sclerosis of both eyes; Posterior subcapsular age-related cataract of both eyes      !! prednisoLONE acetate (PRED FORTE) 1 % DrpS Place 1 drop into the right eye 4 (four) times daily. Eyedrops to start one day before surgery  Qty: 1 Bottle, Refills: 2      topiramate (TOPAMAX) 50 MG tablet Take 1 tablet (50 mg total) by mouth once daily.  Qty: 90 tablet, Refills: 0      traMADol (ULTRAM) 50 mg tablet Take 1 tablet (50 mg total) by mouth 2 (two) times daily as needed.  Qty: 60 tablet, Refills: 0    Associated Diagnoses: Neck pain; Pain in both knees, unspecified chronicity       !! - Potential duplicate medications found. Please discuss with provider.         Review of patient's allergies indicates:   Allergen Reactions    Penicillins Rash     Review of Systems   Constitution: Negative for decreased appetite.   HENT: Negative for tinnitus.    Eyes: Negative for double vision.   Cardiovascular: Negative for chest pain.   Respiratory: Negative for wheezing.    Hematologic/Lymphatic: Negative for bleeding problem.   Skin: Negative for dry skin.   Musculoskeletal: Positive for arthritis, back pain, joint pain, joint swelling, myalgias and stiffness. Negative for gout and neck pain.   Gastrointestinal: Negative for abdominal pain.   Genitourinary: Negative for bladder incontinence.   Neurological: Negative for numbness, paresthesias and sensory change.   Psychiatric/Behavioral: Negative for altered mental status.       Objective:   There is no height or weight on file to calculate BMI.  There were no vitals filed for this visit.       General    Constitutional: She is oriented to person, place, and time. She appears well-developed.   HENT:   Head: Atraumatic.   Eyes: EOM are normal.   Cardiovascular: Normal rate.    Pulmonary/Chest: Effort normal.   Abdominal: Soft.   Neurological: She is alert and oriented to person, place, and time.   Psychiatric: Judgment normal.            Cervical spine range of motion very functional.  Bilateral upper extremity neurovascularly intact. Motor strength is 5/5.  Radial and ulnar pulses 2+ sensory intact to touch  Lumbar with slight discomfort paraspinal.  Pelvis is level. Negative straight leg raising.  Bilateral hips passive motion is intact. Hip flexors, abductors, adductors, quads and hamstrings ankle extensors seen and flexors are 5-/5.  Left knee with severe pain medially and crepitus with motion as well as anteriorly.  Range of motion few degrees of contractures around 2° and flexes 110°.  Crepitus with motion on the patella and medially. Collaterals and cruciates seems to be stable.  Right knee with crepitus under the patella  and slight medial joint tenderness.  Range of motion 0-130 degrees. Collaterals and cruciates are stable. Negative Celio's  Calves are soft nontender  Skin is warm to touch no obvious lesions  DP DP 1+      Relevant imaging results reviewed and interpreted by me, discussed with the patient and / or family today   X-ray 11/26/2019 complete loss of left knee medial joint space with osteophytes consistent with end-stage arthrosis/varus deformity  Assessment:     Encounter Diagnoses   Name Primary?    Arthritis of knee, left Yes    Arthritis of knee, right         Plan:   Arthritis of left knee         There are no outpatient Patient Instructions on file for this admission.  Schedule left total knee arthroplasty after medical clearance and cardiac clearance  Patient definitely denied history of hepatitis or HIV  Procedure, common risks and benefits,alternatives discussed in details.All questions answered.Patient expressed understanding.Patient instructed to call for any questions that could arise in the future.    Most common Risks:  Infection  Leg-length discrepancy  Dislocation  Neuro-vascular injury ( resulting in loss of motor and sensory functions)  Pain  Blood clot  Fat clot  Loss of motion  Fracture of bone  Failure of procedure to achieve its intended purpose  Failure of hardware  Non-union or mal-union of bone  Malalignment  Death    Disclaimer: This note was prepared using a voice recognition system and is likely to have sound alike errors within the text.

## 2020-03-04 NOTE — ANESTHESIA RELEASE NOTE
"Anesthesia Release from PACU Note    Patient: Mariel Becerril    Procedure(s) Performed: Procedure(s) (LRB):  ARTHROPLASTY, KNEE, TOTAL (Left)    Anesthesia type: general    Post pain: Adequate analgesia    Post assessment: no apparent anesthetic complications, tolerated procedure well and no evidence of recall    Last Vitals:   Visit Vitals  /78 (Patient Position: Sitting)   Temp 36.8 °C (98.2 °F) (Skin)   Resp 18   Ht 5' 3" (1.6 m)   Wt 100.7 kg (222 lb 0.1 oz)   SpO2 98%   Breastfeeding? No   BMI 39.33 kg/m²       Post vital signs: stable    Level of consciousness: responds to stimulation    Nausea/Vomiting: no nausea/no vomiting    Complications: none    Airway Patency: patent    Respiratory: unassisted    Cardiovascular: stable and blood pressure at baseline    Hydration: euvolemic     "

## 2020-03-04 NOTE — PT/OT/SLP EVAL
Occupational Therapy   Evaluation    Name: Mariel Becerril  MRN: [id number]  Admitting Diagnosis:  Arthritis of left knee Day of Surgery    Recommendations:     Discharge Recommendations: home health OT  Discharge Equipment Recommendations:  none, walker, rolling  Barriers to discharge:       Assessment:     Mariel Becerril is a 64 y.o. female with a medical diagnosis of Arthritis of left knee.  She presents with debility and generalized weakness. Performance deficits affecting function: weakness, impaired self care skills, impaired functional mobilty, impaired endurance, gait instability, decreased coordination, impaired balance, decreased upper extremity function, decreased lower extremity function.      Rehab Prognosis: Good; patient would benefit from acute skilled OT services to address these deficits and reach maximum level of function.       Plan:     Patient to be seen 3 x/week to address the above listed problems via self-care/home management, therapeutic exercises, therapeutic activities  · Plan of Care Expires:    · Plan of Care Reviewed with: patient    Subjective     Chief Complaint: debility and generalized weakness  Patient/Family Comments/goals:    Occupational Profile:  Living Environment: lives with son in 1 story house   Previous level of function: (I) with adl's and functional mobility  Roles and Routines: occupational therapy  Equipment Used at Home:     Assistance upon Discharge:     Pain/Comfort:  · Pain Rating 1: 8/10  · Location - Side 1: Left  · Location 1: knee    Patients cultural, spiritual, Amish conflicts given the current situation:      Objective:     Communicated with: nurse Spencer and epic chart review prior to session.  Patient found HOB elevated with peripheral IV, telemetry upon OT entry to room.    General Precautions: Standard, fall   Orthopedic Precautions:N/A   Braces: N/A     Occupational Performance:    Bed Mobility:    · Patient completed Rolling/Turning to  Right with minimum assistance  · Patient completed Scooting/Bridging with minimum assistance  · Patient completed Supine to Sit with minimum assistance    Functional Mobility/Transfers:  · Patient completed Sit <> Stand Transfer with minimum assistance  with  rolling walker   · Patient completed Bed <> Chair Transfer using Step Transfer technique with minimum assistance with rolling walker  · Functional Mobility: min a x 2 for safety with few feet with rolling walker. Pt very lethargic.    Activities of Daily Living:  · Upper Body Dressing: minimum assistance [facility] robe  · Lower Body Dressing: moderate assistance nixon socks of l foot    Cognitive/Visual Perceptual:  Cognitive/Psychosocial Skills:     -       Oriented to: Person, Place, Time and Situation   -       Follows Commands/attention:Follows multistep  commands  -       Communication: clear/fluent  -       Memory: No Deficits noted  -       Safety awareness/insight to disability: impaired   Visual/Perceptual:      -Intact .    Physical Exam:  Upper Extremity Range of Motion:     -       Right Upper Extremity: WFL  -       Left Upper Extremity: WFL  Upper Extremity Strength:    -       Right Upper Extremity: mmt: 4/5 grossly  -       Left Upper Extremity: mmt: 4/5 grossly   Strength:    -       Right Upper Extremity: WFL  -       Left Upper Extremity: WFL    AMPAC 6 Click ADL:  AMPAC Total Score: 18    Treatment & Education:    Education:    Patient left up in chair with all lines intact, call button in reach, chair alarm on, nurse malika notified and daughter present    GOALS:   Multidisciplinary Problems     Occupational Therapy Goals        Problem: Occupational Therapy Goal    Goal Priority Disciplines Outcome Interventions   Occupational Therapy Goal     OT, PT/OT     Description:  OT goals to be met 3-11-20  s with le dressing  Pt will tolerate 1 set x 15 reps b ue rom with  Min resistance  s with bsc t/f's  s with ue dressing                       History:     Past Medical History:   Diagnosis Date    Frequent headaches     Hypertension     Osteoarthritis 04/02/2019    Bilateral knees, ankles and feet    Severe obesity (BMI >= 40)     Sleep apnea        Past Surgical History:   Procedure Laterality Date    BLADDER SUSPENSION      CATARACT EXTRACTION, BILATERAL      COLONOSCOPY N/A 12/3/2018    Procedure: COLONOSCOPY;  Surgeon: Mari Rader MD;  [facility];  Service: Endoscopy;  Laterality: N/A;    HYSTERECTOMY      partial 2000    tonsilectomy         Time Tracking:     OT Date of Treatment: 03/04/20  OT Start Time: 1145  OT Stop Time: 1210  OT Total Time (min): 25 min    Billable Minutes:Evaluation 10 minutes  Therapeutic Activity 15 minutes    Sully Naqvi OT  3/4/2020

## 2020-03-04 NOTE — ANESTHESIA PREPROCEDURE EVALUATION
"                                                                                                             03/04/2020  Mariel Becerril is a 64 y.o., female.    Anesthesia Evaluation    I have reviewed the Patient Summary Reports.    I have reviewed the Nursing Notes.   I have reviewed the Medications.     Review of Systems  Anesthesia Hx:  No problems with previous Anesthesia    Social:  Non-Smoker    Hematology/Oncology:  Hematology Normal        Cardiovascular:   Hypertension AREVALO Symptomatic PVCs   Pulmonary:   Shortness of breath Sleep Apnea, CPAP Took her albuterol today. Does not feel "tight" this morning.   Renal/:  Renal/ Normal     Hepatic/GI:  Hepatic/GI Normal    Musculoskeletal:   Arthritis  Primary osteoarthritis of both knees   Neurological:   Neuromuscular Disease, Headaches HNP (herniated nucleus pulposus), lumbar  Radicular low back pain   Endocrine:  Endocrine Normal        Physical Exam  General:  Well nourished    Airway/Jaw/Neck:  Airway Findings: Mouth Opening: Normal Tongue: Normal  General Airway Assessment: Adult  Mallampati: II  TM Distance: 4 - 6 cm  Jaw/Neck Findings:  Neck ROM: Normal ROM      Dental:  Dental Findings: In tact   Chest/Lungs:  Chest/Lungs Findings: Clear to auscultation, Normal Respiratory Rate     Heart/Vascular:  Heart Findings: Rate: Normal  Rhythm: Regular Rhythm  Sounds: Normal        Mental Status:  Mental Status Findings:  Cooperative, Alert and Oriented         Anesthesia Plan  Type of Anesthesia, risks & benefits discussed:  Anesthesia Type:  general  Patient's Preference:   Intra-op Monitoring Plan: standard ASA monitors  Intra-op Monitoring Plan Comments:   Post Op Pain Control Plan: multimodal analgesia and per primary service following discharge from PACU  Post Op Pain Control Plan Comments:   Induction:   IV  Beta Blocker:  Patient is on a Beta-Blocker and has received one dose within the past 24 hours (No further documentation required).   "     Informed Consent: Patient understands risks and agrees with Anesthesia plan.  Questions answered. Anesthesia consent signed with patient.  ASA Score: 2     Day of Surgery Review of History & Physical:  There are no significant changes.          Ready For Surgery From Anesthesia Perspective.     Patient Active Problem List   Diagnosis    Pain of left calf    Primary osteoarthritis of both knees    Genu varum of left lower extremity    Morbid obesity due to excess calories    Essential hypertension    GIL on CPAP    AREVALO (dyspnea on exertion)    Chest pressure    Chronic pain of both knees    History of migraine headaches    HNP (herniated nucleus pulposus), lumbar    Osteoarthritis of spine with radiculopathy, lumbar region    Osteoarthritis of thumb    Palpitations    Primary osteoarthritis of both feet    Radicular low back pain    Rhinitis    Behaviorally induced insufficient sleep syndrome    Inadequate sleep hygiene    Colon cancer screening    Bilateral carpal tunnel syndrome    Symptomatic PVCs    Arthritis of left knee    Active dental caries       Chemistry        Component Value Date/Time     02/06/2020 0819    K 4.1 02/06/2020 0819     02/06/2020 0819    CO2 28 02/06/2020 0819    BUN 13 02/06/2020 0819    CREATININE 0.9 02/06/2020 0819    GLU 92 02/06/2020 0819        Component Value Date/Time    CALCIUM 9.5 02/06/2020 0819    ALKPHOS 112 02/06/2020 0819    AST 21 02/06/2020 0819    ALT 14 02/06/2020 0819    BILITOT 0.4 02/06/2020 0819    ESTGFRAFRICA >60.0 02/06/2020 0819    EGFRNONAA >60.0 02/06/2020 0819        Lab Results   Component Value Date    WBC 5.51 02/06/2020    HGB 12.1 02/06/2020    HCT 41.4 02/06/2020    MCV 96 02/06/2020     02/06/2020     Normal sinus rhythm  Possible Left atrial enlargement  T wave abnormality, consider anterior ischemia  Abnormal ECG  When compared with ECG of 30-OCT-2019 15:19,  No significant change was found  Confirmed by  STEPH FERNÁNDEZ, [place] ([id number]) on 2/6/2020 11:28:12 PM      Echo: 9/5/18:  CONCLUSIONS     1 - Concentric hypertrophy.     2 - No wall motion abnormalities.     3 - Normal left ventricular systolic function (EF 60-65%).     4 - Normal left ventricular diastolic function.     5 - Normal right ventricular systolic function .     6 - The estimated PA systolic pressure is 27 mmHg.     Stress: 9/5/18:  Nuclear Quantitative Functional Analysis:   LVEF: >= 70 %    Impression: NORMAL MYOCARDIAL PERFUSION  1. The perfusion scan is free of evidence for myocardial ischemia or injury.   2. Resting wall motion is physiologic.   3. Resting LV function is normal.   4. The ventricular volumes are normal at rest and stress.   5. The extracardiac distribution of radioactivity is normal.

## 2020-03-04 NOTE — CONSULTS
[facility] Hospital Medicine  Consult Note    Patient Name: Mariel Becerril  MRN: [id number]  Admission Date: 3/4/2020  Hospital Length of Stay: 0 days  Attending Physician: Moy Connolly MD   Primary Care Provider: Jaclyn Becerril MD           Patient information was obtained from patient, past medical records and ER records.     Inpatient consult to Hospitalist  Consult performed by: ELFEGO Reynaga  Consult ordered by: Moy Connolly MD        Subjective:     Principal Problem: Arthritis of left knee    Chief Complaint:   Chief Complaint   Patient presents with    Knee Pain     left        HPI: Mariel Beecrril is a 64 year old female with hypertension who presented for elective  Left total knee replacement performed by Dr. Connolly today. The patient denies complaints of fever and chills preoperatively, and reports that her pain is well controlled post-operatively. Code status was discussed with the patient. She is a full code. Her daughter, Nataly Becerril is her surrogate medical decision maker.     Past Medical History:   Diagnosis Date    Frequent headaches     Hypertension     Osteoarthritis 04/02/2019    Bilateral knees, ankles and feet    Severe obesity (BMI >= 40)     Sleep apnea        Past Surgical History:   Procedure Laterality Date    BLADDER SUSPENSION      CATARACT EXTRACTION, BILATERAL      COLONOSCOPY N/A 12/3/2018    Procedure: COLONOSCOPY;  Surgeon: Mari Rader MD;  Location: [facility];  Service: Endoscopy;  Laterality: N/A;    HYSTERECTOMY      partial 2000    tonsilectomy         Review of patient's allergies indicates:   Allergen Reactions    Penicillins Rash       No current facility-administered medications on file prior to encounter.      Current Outpatient Medications on File Prior to Encounter   Medication Sig    albuterol (PROAIR HFA) 90 mcg/actuation inhaler Inhale 2 puffs into the lungs every 6 (six) hours as needed for Wheezing. Rescue     benzonatate (TESSALON) 100 MG capsule Take 1 capsule (100 mg total) by mouth 3 (three) times daily as needed.    diclofenac sodium (VOLTAREN) 1 % Gel Apply 4 grams topically 3 (three) times daily as needed.    meloxicam (MOBIC) 15 MG tablet TAKE 1 TABLET BY MOUTH ONCE DAILY    chlorhexidine (PERIDEX) 0.12 % solution SWISH AND SPIT 15 MLS BY MOUTH TWICE A DAY FOR 7 DAYS    eflornithine (VANIQA) 13.9 % cream Apply topically 2 (two) times daily.    ergocalciferol, vitamin D2, (VITAMIN D ORAL) Take 2,000 Units by mouth once daily.    fluticasone propionate (FLONASE) 50 mcg/actuation nasal spray 1 spray (50 mcg total) by Each Nare route once daily. (Patient taking differently: 1 spray by Each Nostril route every evening. )    furosemide (LASIX) 20 MG tablet Take 1 tablet (20 mg total) by mouth daily as needed.    gatifloxacin 0.5 % Drop drops Place 1 drop into the left eye 2 (two) times daily. Eyedrops to start one day before surgery (Patient not taking: Reported on 2/27/2020)    HYDROcodone-acetaminophen (NORCO) 7.5-325 mg per tablet     ketorolac 0.5% (ACULAR) 0.5 % Drop Place 1 drop into the left eye 4 (four) times daily. Eyedrops to start one day before surgery (Patient not taking: Reported on 2/27/2020)    levocetirizine (XYZAL) 5 MG tablet Take 1 tablet (5 mg total) by mouth every evening.    meclizine (ANTIVERT) 25 mg tablet Take 1 tablet (25 mg total) by mouth 3 (three) times daily as needed for Dizziness.    montelukast (SINGULAIR) 10 mg tablet Take 10 mg by mouth every evening.    montelukast (SINGULAIR) 10 mg tablet TAKE 1 TABLET BY MOUTH ONCE DAILY IN THE EVENING    nystatin (MYCOSTATIN) cream Apply topically 2 (two) times daily.    nystatin (MYCOSTATIN) powder Apply topically 2 (two) times daily.    omeprazole (PRILOSEC) 40 MG capsule Take 1 capsule (40 mg total) by mouth once daily.    ondansetron (ZOFRAN-ODT) 4 MG TbDL Take 1 tablet (4 mg total) by mouth every 8 (eight) hours as  needed.    prednisoLONE acetate (PRED FORTE) 1 % DrpS Place 1 drop into the left eye 4 (four) times daily. Start one day before surgery (Patient not taking: Reported on 2/6/2020)    topiramate (TOPAMAX) 50 MG tablet Take 1 tablet (50 mg total) by mouth once daily. (Patient taking differently: Take 50 mg by mouth daily as needed. )    traMADol (ULTRAM) 50 mg tablet Take 1 tablet (50 mg total) by mouth 2 (two) times daily as needed.     Family History     Problem Relation (Age of Onset)    Heart attack Father        Tobacco Use    Smoking status: Never Smoker    Smokeless tobacco: Never Used   Substance and Sexual Activity    Alcohol use: No    Drug use: No    Sexual activity: Never     Partners: Male     Birth control/protection: See Surgical Hx     Review of Systems   Constitutional: Negative for appetite change, chills, diaphoresis, fatigue and fever.   HENT: Negative for congestion, ear pain, mouth sores, sore throat and trouble swallowing.    Eyes: Negative for pain and visual disturbance.   Respiratory: Negative for cough, chest tightness and shortness of breath.    Cardiovascular: Negative for chest pain, palpitations and leg swelling.   Gastrointestinal: Negative for abdominal pain, constipation and nausea.   Endocrine: Negative for cold intolerance, heat intolerance, polydipsia and polyuria.   Genitourinary: Negative for dysuria, frequency and hematuria.   Musculoskeletal: Positive for arthralgias (left knee). Negative for back pain and neck pain.   Skin: Negative for pallor, rash and wound.   Allergic/Immunologic: Negative for environmental allergies and immunocompromised state.   Neurological: Negative for dizziness, seizures, syncope, weakness, numbness and headaches.   Hematological: Negative for adenopathy. Does not bruise/bleed easily.   Psychiatric/Behavioral: Negative for agitation, confusion and sleep disturbance.     Objective:     Vital Signs (Most Recent):  Temp: 97.4 °F (36.3 °C)  (03/04/20 1556)  Pulse: 69 (03/04/20 1556)  Resp: 18 (03/04/20 1556)  BP: 121/69 (03/04/20 1556)  SpO2: 100 % (03/04/20 1556) Vital Signs (24h Range):  Temp:  [97.4 °F (36.3 °C)-98.2 °F (36.8 °C)] 97.4 °F (36.3 °C)  Pulse:  [63-79] 69  Resp:  [16-18] 18  SpO2:  [95 %-100 %] 100 %  BP: (105-130)/(58-78) 121/69     Weight: 100.7 kg (222 lb 0.1 oz)  Body mass index is 39.33 kg/m².    Physical Exam   Constitutional: She is oriented to person, place, and time. She appears well-developed and well-nourished. No distress.   HENT:   Head: Normocephalic and atraumatic.   Eyes: Conjunctivae are normal.   PERRL   Neck: Neck supple. No JVD present.   Cardiovascular: Normal rate, regular rhythm and normal heart sounds.   Pulmonary/Chest: Effort normal and breath sounds normal.   Abdominal: Soft. Bowel sounds are normal. She exhibits no distension. There is no tenderness.   Musculoskeletal:        Left knee: She exhibits decreased range of motion and swelling.   Dressing to left knee.    Neurological: She is alert and oriented to person, place, and time.   Skin: Skin is warm and dry.   Psychiatric: She has a normal mood and affect. Her behavior is normal. Thought content normal.   Nursing note and vitals reviewed.          Significant Labs:   CBC:   Recent Labs   Lab 03/04/20  0952   HGB 10.6*   HCT 34.8*     All pertinent labs within the past 24 hours have been reviewed.    Significant Imaging: I have reviewed all pertinent imaging results/findings within the past 24 hours.       Assessment/Plan:     * Arthritis of left knee  -Patient is status post left total knee arthroplasty performed by Dr. Connolly.   -Management per Orthopedics.       Symptomatic PVCs  -Stable.   -Continue Toprol XL.       Essential hypertension  -Stable.   -Continue Norvasc and metoprolol.       GIL on CPAP  CPAP QHS. Patient reports setting of 5.       VTE Risk Mitigation (From admission, onward)         Ordered     Bilateral Foot Compression Devices   Until discontinued      03/04/20 1127     IP VTE LOW RISK PATIENT  Once      03/04/20 0633     Place BERYL hose  Until discontinued      03/04/20 0633     Place sequential compression device  Until discontinued      03/04/20 0633                    Thank you for your consult. I will follow-up with patient. Please contact us if you have any additional questions.    ELFEGO Reynaga  Department of Hospital Medicine   [facility]

## 2020-03-04 NOTE — TRANSFER OF CARE
"Anesthesia Transfer of Care Note    Patient: Mariel Becerril    Procedure(s) Performed: Procedure(s) (LRB):  ARTHROPLASTY, KNEE, TOTAL (Left)    Patient location: PACU    Anesthesia Type: general    Transport from OR: Transported from OR on room air with adequate spontaneous ventilation    Post pain: adequate analgesia    Post assessment: no apparent anesthetic complications    Post vital signs: stable    Level of consciousness: responds to stimulation    Nausea/Vomiting: no nausea/vomiting    Complications: none    Transfer of care protocol was followed      Last vitals:   Visit Vitals  /78 (Patient Position: Sitting)   Temp 36.8 °C (98.2 °F) (Skin)   Resp 18   Ht 5' 3" (1.6 m)   Wt 100.7 kg (222 lb 0.1 oz)   SpO2 98%   Breastfeeding? No   BMI 39.33 kg/m²     "

## 2020-03-04 NOTE — PLAN OF CARE
Patient arrived from surgery today. MICKI dressing to the left knee. Patient due to void. Up with physical therapy and use of walker. Patient tolerating well. Pain controlled. Chart check completed.

## 2020-03-04 NOTE — HPI
Mariel Becerril is a 64 year old female with hypertension who presented for elective  Left total knee replacement performed by Dr. Connolly today. The patient denies complaints of fever and chills preoperatively, and reports that her pain is well controlled post-operatively. Code status was discussed with the patient. She is a full code. Her daughter, Nataly Becerril is her surrogate medical decision maker.

## 2020-03-04 NOTE — PT/OT/SLP EVAL
Physical Therapy Evaluation    Patient Name:  Mariel Becerril   MRN:  [id number]    Recommendations:     Discharge Recommendations:  home health PT   Discharge Equipment Recommendations: walker, rolling   Barriers to discharge: None    Assessment:     Mariel Becerril is a 64 y.o. female admitted with a medical diagnosis of <principal problem not specified>.  She presents with the following impairments/functional limitations:  weakness, gait instability, impaired balance, impaired endurance, impaired functional mobilty, decreased lower extremity function, decreased ROM .    Rehab Prognosis: Good; patient would benefit from acute skilled PT services to address these deficits and reach maximum level of function.    Recent Surgery: Procedure(s) (LRB):  ARTHROPLASTY, KNEE, TOTAL (Left) Day of Surgery    Plan:     During this hospitalization, patient to be seen   to address the identified rehab impairments via gait training, therapeutic activities, therapeutic exercises and progress toward the following goals:    · Plan of Care Expires:  03/11/20    Subjective     Chief Complaint: PAIN AND NAUSEA  Patient/Family Comments/goals: INC MOBILITY   Pain/Comfort:  · Pain Rating 1: 8/10  · Location 1: knee  · Pain Rating Post-Intervention 1: 8/10    Patients cultural, spiritual, Worship conflicts given the current situation:      Living Environment:  PT LIVES AT HOME WITH SON AND HAS NO STEPS TO ENTER 2 STORY HOME WITH BED AND BATH DOWNSTAIRS  Prior to admission, patients level of function was IND AND DRIVES.  Equipment used at home: none.  DME owned (not currently used): rolling walker, single point cane, bedside commode and shower chair.  Upon discharge, patient will have assistance from FAMILY.    Objective:     Communicated with NURSE HARMON AND Epic MARJAN RIVERA  prior to session.  Patient found supine with peripheral IV, telemetry  upon PT entry to room.    General Precautions: Standard, fall   Orthopedic  Precautions:N/A   Braces: N/A     Exams:  · RLE ROM: WFL  · RLE Strength: WFL  · LLE ROM: LIMITED  · LLE Strength: LIMITED    Functional Mobility:  PT MET IN RM SUP>SIT EOB WITH MIN A AND SCOOTED TO EOB. ROM ASSESSED AND PT STOOD WITH RW AND MIN A. PT GT TRAINED X 2' WITH INC NAUSEA AND PAIN NOTED. PT T/F TO CHAIR WITH RW AND MIN A. PT SEATED IN CHAIR WITH MIN A. PT LEFT SEATED AND EDUCATED ON ROLE OF P.T. PT LEFT WITH ALL NEEDS MET AND CALL BELL IN REACH.     AM-PAC 6 CLICK MOBILITY  Total Score:16     Patient left up in chair with call button in reach, chair alarm on and DAUGHTER PRESENT present.    GOALS:   Multidisciplinary Problems     Physical Therapy Goals        Problem: Physical Therapy Goal    Goal Priority Disciplines Outcome Goal Variances Interventions   Physical Therapy Goal     PT, PT/OT      Description:  PT WILL BE SEEN FOR P.T. FOR A MIN OF 5 OUT OF 7 DAYS A WEEK  LTG: 3/11/20  1. PT WILL COMPLETE BED MOBILITY WITH SBA  2. PT WILL T/G TO CHAIR WITH RW AND SBA  3. PT WILL GT TRAIN X 150' WITH RW AND SBA.  4. PT WILL COMPLETE B LE TE X 20 REPS                       History:     Past Medical History:   Diagnosis Date    Frequent headaches     Hypertension     Osteoarthritis 04/02/2019    Bilateral knees, ankles and feet    Severe obesity (BMI >= 40)     Sleep apnea        Past Surgical History:   Procedure Laterality Date    BLADDER SUSPENSION      CATARACT EXTRACTION, BILATERAL      COLONOSCOPY N/A 12/3/2018    Procedure: COLONOSCOPY;  Surgeon: Mari Rader MD;  Location: [facility];  Service: Endoscopy;  Laterality: N/A;    HYSTERECTOMY      partial 2000    tonsilectomy         Time Tracking:     PT Received On: 03/04/20  PT Start Time: 1145     PT Stop Time: 1210  PT Total Time (min): 25 min     Billable Minutes: Evaluation 15 and Therapeutic Activity 10      Layla Neal, PT  03/04/2020

## 2020-03-05 LAB
ANION GAP SERPL CALC-SCNC: 7 MMOL/L (ref 8–16)
BUN SERPL-MCNC: 15 MG/DL (ref 8–23)
CALCIUM SERPL-MCNC: 8.3 MG/DL (ref 8.7–10.5)
CHLORIDE SERPL-SCNC: 112 MMOL/L (ref 95–110)
CO2 SERPL-SCNC: 24 MMOL/L (ref 23–29)
CREAT SERPL-MCNC: 0.9 MG/DL (ref 0.5–1.4)
EST. GFR  (AFRICAN AMERICAN): >60 ML/MIN/1.73 M^2
EST. GFR  (NON AFRICAN AMERICAN): >60 ML/MIN/1.73 M^2
GLUCOSE SERPL-MCNC: 127 MG/DL (ref 70–110)
HCT VFR BLD AUTO: 28.6 % (ref 37–48.5)
HCT VFR BLD AUTO: 28.7 % (ref 37–48.5)
HGB BLD-MCNC: 8.5 G/DL (ref 12–16)
HGB BLD-MCNC: 8.6 G/DL (ref 12–16)
POTASSIUM SERPL-SCNC: 3.9 MMOL/L (ref 3.5–5.1)
SODIUM SERPL-SCNC: 143 MMOL/L (ref 136–145)

## 2020-03-05 PROCEDURE — 11000001 HC ACUTE MED/SURG PRIVATE ROOM

## 2020-03-05 PROCEDURE — 85018 HEMOGLOBIN: CPT

## 2020-03-05 PROCEDURE — 25000003 PHARM REV CODE 250: Performed by: ORTHOPAEDIC SURGERY

## 2020-03-05 PROCEDURE — 97530 THERAPEUTIC ACTIVITIES: CPT

## 2020-03-05 PROCEDURE — 63600175 PHARM REV CODE 636 W HCPCS: Performed by: NURSE PRACTITIONER

## 2020-03-05 PROCEDURE — 94799 UNLISTED PULMONARY SVC/PX: CPT

## 2020-03-05 PROCEDURE — 97116 GAIT TRAINING THERAPY: CPT

## 2020-03-05 PROCEDURE — 63600175 PHARM REV CODE 636 W HCPCS: Performed by: ORTHOPAEDIC SURGERY

## 2020-03-05 PROCEDURE — 36415 COLL VENOUS BLD VENIPUNCTURE: CPT

## 2020-03-05 PROCEDURE — 80048 BASIC METABOLIC PNL TOTAL CA: CPT

## 2020-03-05 PROCEDURE — 99900035 HC TECH TIME PER 15 MIN (STAT)

## 2020-03-05 PROCEDURE — 97110 THERAPEUTIC EXERCISES: CPT

## 2020-03-05 PROCEDURE — 94761 N-INVAS EAR/PLS OXIMETRY MLT: CPT

## 2020-03-05 PROCEDURE — 63600175 PHARM REV CODE 636 W HCPCS: Performed by: PHYSICIAN ASSISTANT

## 2020-03-05 PROCEDURE — 85014 HEMATOCRIT: CPT

## 2020-03-05 RX ADMIN — OFLOXACIN 1 DROP: 3 SOLUTION OPHTHALMIC at 09:03

## 2020-03-05 RX ADMIN — METHOCARBAMOL TABLETS 500 MG: 500 TABLET, COATED ORAL at 09:03

## 2020-03-05 RX ADMIN — PREDNISOLONE ACETATE 1 DROP: 10 SUSPENSION/ DROPS OPHTHALMIC at 08:03

## 2020-03-05 RX ADMIN — OXYCODONE 5 MG: 5 TABLET ORAL at 10:03

## 2020-03-05 RX ADMIN — PANTOPRAZOLE SODIUM 40 MG: 40 TABLET, DELAYED RELEASE ORAL at 08:03

## 2020-03-05 RX ADMIN — CHLORHEXIDINE GLUCONATE 0.12% ORAL RINSE 10 ML: 1.2 LIQUID ORAL at 09:03

## 2020-03-05 RX ADMIN — PREDNISOLONE ACETATE 1 DROP: 10 SUSPENSION/ DROPS OPHTHALMIC at 09:03

## 2020-03-05 RX ADMIN — OXYCODONE 5 MG: 5 TABLET ORAL at 07:03

## 2020-03-05 RX ADMIN — OXYCODONE 15 MG: 5 TABLET ORAL at 08:03

## 2020-03-05 RX ADMIN — SODIUM CHLORIDE 500 ML: 0.9 INJECTION, SOLUTION INTRAVENOUS at 08:03

## 2020-03-05 RX ADMIN — OXYCODONE 5 MG: 5 TABLET ORAL at 12:03

## 2020-03-05 RX ADMIN — PREGABALIN 75 MG: 25 CAPSULE ORAL at 09:03

## 2020-03-05 RX ADMIN — DOCUSATE SODIUM 100 MG: 100 CAPSULE, LIQUID FILLED ORAL at 09:03

## 2020-03-05 RX ADMIN — METHOCARBAMOL TABLETS 500 MG: 500 TABLET, COATED ORAL at 08:03

## 2020-03-05 RX ADMIN — KETOROLAC TROMETHAMINE 1 DROP: 5 SOLUTION OPHTHALMIC at 08:03

## 2020-03-05 RX ADMIN — MONTELUKAST 10 MG: 10 TABLET, FILM COATED ORAL at 09:03

## 2020-03-05 RX ADMIN — METHOCARBAMOL TABLETS 500 MG: 500 TABLET, COATED ORAL at 03:03

## 2020-03-05 RX ADMIN — OXYCODONE 15 MG: 5 TABLET ORAL at 04:03

## 2020-03-05 RX ADMIN — OXYCODONE 15 MG: 5 TABLET ORAL at 01:03

## 2020-03-05 RX ADMIN — FLUTICASONE PROPIONATE 50 MCG: 50 SPRAY, METERED NASAL at 09:03

## 2020-03-05 RX ADMIN — OFLOXACIN 1 DROP: 3 SOLUTION OPHTHALMIC at 08:03

## 2020-03-05 RX ADMIN — KETOROLAC TROMETHAMINE 1 DROP: 5 SOLUTION OPHTHALMIC at 09:03

## 2020-03-05 RX ADMIN — METOPROLOL SUCCINATE 25 MG: 25 TABLET, EXTENDED RELEASE ORAL at 08:03

## 2020-03-05 RX ADMIN — DOCUSATE SODIUM 100 MG: 100 CAPSULE, LIQUID FILLED ORAL at 08:03

## 2020-03-05 RX ADMIN — ENOXAPARIN SODIUM 40 MG: 100 INJECTION SUBCUTANEOUS at 05:03

## 2020-03-05 RX ADMIN — CHLORHEXIDINE GLUCONATE 0.12% ORAL RINSE 10 ML: 1.2 LIQUID ORAL at 08:03

## 2020-03-05 RX ADMIN — DEXAMETHASONE SODIUM PHOSPHATE 8 MG: 4 INJECTION, SOLUTION INTRAMUSCULAR; INTRAVENOUS at 09:03

## 2020-03-05 RX ADMIN — MELOXICAM 15 MG: 7.5 TABLET ORAL at 08:03

## 2020-03-05 NOTE — PT/OT/SLP PROGRESS
Physical Therapy  Treatment    Mariel Becerril   MRN: [id number]   Admitting Diagnosis: Arthritis of left knee    PT Received On: 03/05/20  PT Start Time: 1435     PT Stop Time: 1459    PT Total Time (min): 24 min       Billable Minutes:  Gait Training 14 and Therapeutic Exercise 10    Treatment Type: Treatment  PT/PTA: PT     PTA Visit Number: 0       General Precautions: Standard, fall  Orthopedic Precautions: LLE weight bearing as tolerated   Braces: N/A         Subjective:  Communicated with NURSE SWANN AND Epic CHART REVIEW  prior to session.   PT AGREED TO TX.     Pain/Comfort  Pain Rating 1: 7/10  Location - Side 1: Left  Location 1: knee  Pain Addressed 1: Reposition  Pain Rating Post-Intervention 1: 7/10    Objective:   Patient found with: peripheral IV    Functional Mobility:  PT MET IN RM SUP>SIT EOB WITH SBA. PT SEATED FOR B LE TE X 15 REPS OF AP, TKE. PT BP TAKEN SEATED @ 120/63 AND STOOD WITH RW AND TAKEN @ 103/61. PT GT TRAINED WITH RW AND STEP TO GT X 100' WITH SLOW PACE AND CUES FOR INC SWING AND HEEL STRIKE ON L LE. PT RETURNED TO RM T/F TO CHAIR WITH RW AND CGA. PT LEFT SEATED IN CHAIR WITH ALL NEEDS MET AND NURSE AWARE.     AM-PAC 6 CLICK MOBILITY  How much help from another person does this patient currently need?   1 = Unable, Total/Dependent Assistance  2 = A lot, Maximum/Moderate Assistance  3 = A little, Minimum/Contact Guard/Supervision  4 = None, Modified [place]/Independent    Turning over in bed (including adjusting bedclothes, sheets and blankets)?: 4  Sitting down on and standing up from a chair with arms (e.g., wheelchair, bedside commode, etc.): 3  Moving from lying on back to sitting on the side of the bed?: 4  Moving to and from a bed to a chair (including a wheelchair)?: 3  Need to walk in hospital room?: 3  Climbing 3-5 steps with a railing?: 1  Basic Mobility Total Score: 18    AM-PAC Raw Score CMS G-Code Modifier Level of Impairment Assistance   6 % Total /  Unable   7 - 9 CM 80 - 100% Maximal Assist   10 - 14 CL 60 - 80% Moderate Assist   15 - 19 CK 40 - 60% Moderate Assist   20 - 22 CJ 20 - 40% Minimal Assist   23 CI 1-20% SBA / CGA   24 CH 0% Independent/ Mod I     Patient left up in chair with call button in reach and chair alarm on.    Assessment:  PT PROGRESSING WITH GT TRAINING.     Rehab identified problem list/impairments: Rehab identified problem list/impairments: weakness, impaired endurance, gait instability, impaired balance, impaired functional mobilty, pain, decreased ROM, decreased lower extremity function    Rehab potential is good.    Activity tolerance: Good    Discharge recommendations: Discharge Facility/Level of Care Needs: home health PT     Barriers to discharge:      Equipment recommendations: Equipment Needed After Discharge: walker, rolling     GOALS:   Multidisciplinary Problems     Physical Therapy Goals        Problem: Physical Therapy Goal    Goal Priority Disciplines Outcome Goal Variances Interventions   Physical Therapy Goal     PT, PT/OT Ongoing, Progressing     Description:  PT WILL BE SEEN FOR P.T. FOR A MIN OF 5 OUT OF 7 DAYS A WEEK  LTG: 3/11/20  1. PT WILL COMPLETE BED MOBILITY WITH SBA  2. PT WILL T/G TO CHAIR WITH RW AND SBA  3. PT WILL GT TRAIN X 150' WITH RW AND SBA.  4. PT WILL COMPLETE B LE TE X 20 REPS                       PLAN:    Patient to be seen  to address the above listed problems via gait training, therapeutic activities, therapeutic exercises  Plan of Care expires: 03/11/20  Plan of Care reviewed with: patient         Layla Neal, PT  03/05/2020

## 2020-03-05 NOTE — NURSING
PT called and stated patient is in a chair. BP 90/54. States she is taking patient back to room. Patient sitting in chair, falling asleep. Rechecked BP. BP 77/49. Transferred patient back to bed safely. Placed in trendelenburg and MIKE Reynoso made Shelly Betancourt NP aware. Ordered a 500 mL bolus to run at 250/hr and STAT H&H. Began bolus. Follow up blood pressures documented in flow sheets.

## 2020-03-05 NOTE — HOSPITAL COURSE
Pt admitted to Medical Surgical Unit s/p total knee arthroplasty per Orthopedic Surgery.  Pt stable post procedure with decline in H/H noted.  Pt treated with IV hydration, analgesia prn, and antiemetics prn.  Pt had mild episode of hypotension which rebounded with IVF resuscitation.  Norvasc held and parameters placed on Lopressor.  PT/OT evaluation performed.  Pt pain controlled on prescribed narcotic regime.  Discharge to home with home health anticipated.  On 3/6/2020, H/H stable with BP improved.  Pt verbalized symptom improvement.  No signs of acute distress noted.  Pt seen and examined today on the date of discharge and deemed suitable for discharge to home with home health per primary team.

## 2020-03-05 NOTE — PT/OT/SLP PROGRESS
Physical Therapy  Treatment    Mariel Becerril   MRN: [id number]   Admitting Diagnosis: Arthritis of left knee    PT Received On: 03/05/20  PT Start Time: 0825     PT Stop Time: 0855    PT Total Time (min): 30 min       Billable Minutes:  Gait Training 15 and Therapeutic Activity 15    Treatment Type: Treatment  PT/PTA: PT     PTA Visit Number: 0       General Precautions: Standard, fall  Orthopedic Precautions: LLE weight bearing as tolerated   Braces: N/A         Subjective:  Communicated with nurse SWANN AND Epic CHART REVIEW  prior to session.  PT AGREED TO TX     Pain/Comfort  Pain Rating 1: 7/10  Location - Side 1: Left  Location 1: knee  Pain Rating Post-Intervention 1: 7/10    Objective:   Patient found with: peripheral IV, telemetry    Functional Mobility:  PT MET IN RM SUP.SIT EOB WITH R LE TO ASSIST L LE EOB WITH SBA. PT SCOOTED AND STOOD WITH RW AND CGA. PT GT TRAIN X 5' TO COMMODE FOR TOILETING WITH CGA. PT STOOD AND GT TRAINED WITH STEP TO GT ON LEVEL INDOOR SURFACES X 40' WITH MIN A. . PT REPORTED INC DIZZINESS AND T/F TO CHAIR WITH MIN A. PT WHEELED BACK TO ROOM VIA CHAIR AND T/F TO CHAIR WITH RW AND MOD A. PT LEFT SEATED IN CHAIR WITH ALL NEEDS MET AND NURSE PRESENT    AM-PAC 6 CLICK MOBILITY  How much help from another person does this patient currently need?   1 = Unable, Total/Dependent Assistance  2 = A lot, Maximum/Moderate Assistance  3 = A little, Minimum/Contact Guard/Supervision  4 = None, Modified [place]/Independent    Turning over in bed (including adjusting bedclothes, sheets and blankets)?: 4  Sitting down on and standing up from a chair with arms (e.g., wheelchair, bedside commode, etc.): 3  Moving from lying on back to sitting on the side of the bed?: 4  Moving to and from a bed to a chair (including a wheelchair)?: 3  Need to walk in hospital room?: 3  Climbing 3-5 steps with a railing?: 1  Basic Mobility Total Score: 18    AM-PAC Raw Score CMS G-Code Modifier Level of  Impairment Assistance   6 % Total / Unable   7 - 9 CM 80 - 100% Maximal Assist   10 - 14 CL 60 - 80% Moderate Assist   15 - 19 CK 40 - 60% Moderate Assist   20 - 22 CJ 20 - 40% Minimal Assist   23 CI 1-20% SBA / CGA   24 CH 0% Independent/ Mod I     Patient left up in chair with call button in reach and chair alarm on.    Assessment:  PT PROGRESSING WITH GT HOWEVER LIMITED D/T LOW BP.     Rehab identified problem list/impairments: Rehab identified problem list/impairments: weakness, gait instability, impaired balance, impaired endurance, decreased lower extremity function, decreased ROM, pain, impaired self care skills, impaired functional mobilty    Rehab potential is good.    Activity tolerance: Fair    Discharge recommendations: Discharge Facility/Level of Care Needs: home health PT     Barriers to discharge:      Equipment recommendations: Equipment Needed After Discharge: walker, rolling     GOALS:   Multidisciplinary Problems     Physical Therapy Goals        Problem: Physical Therapy Goal    Goal Priority Disciplines Outcome Goal Variances Interventions   Physical Therapy Goal     PT, PT/OT Ongoing, Progressing     Description:  PT WILL BE SEEN FOR P.T. FOR A MIN OF 5 OUT OF 7 DAYS A WEEK  LTG: 3/11/20  1. PT WILL COMPLETE BED MOBILITY WITH SBA  2. PT WILL T/G TO CHAIR WITH RW AND SBA  3. PT WILL GT TRAIN X 150' WITH RW AND SBA.  4. PT WILL COMPLETE B LE TE X 20 REPS                       PLAN:    Patient to be seen to address the above listed problems via gait training, therapeutic activities, therapeutic exercises  Plan of Care expires: 03/11/20  Plan of Care reviewed with: patient         Layla Neal, PT  03/05/2020

## 2020-03-05 NOTE — PLAN OF CARE
Remains free harm, pain controlled via PO pain meds, positions self, co acute distress noted. 24 hour chart check complete.

## 2020-03-05 NOTE — SUBJECTIVE & OBJECTIVE
Interval History: pt stable with dizziness and nausea reported during hypotensive event.  BP improved with IV hydration.  Antihypertensive medications held and parameters placed.  Pain controlled on prescribed narcotic regime.  Orthopedic Surgery- primary team.     Review of Systems   Constitutional: Negative for appetite change, chills, diaphoresis, fatigue and fever.   HENT: Negative for congestion, ear pain, mouth sores, sore throat and trouble swallowing.    Eyes: Negative for pain and visual disturbance.   Respiratory: Negative for cough, chest tightness and shortness of breath.    Cardiovascular: Negative for chest pain, palpitations and leg swelling.   Gastrointestinal: Positive for nausea. Negative for abdominal pain and constipation.   Endocrine: Negative for cold intolerance, heat intolerance, polydipsia and polyuria.   Genitourinary: Negative for dysuria, frequency and hematuria.   Musculoskeletal: Positive for arthralgias (left knee). Negative for back pain and neck pain.   Skin: Negative for pallor, rash and wound.   Allergic/Immunologic: Negative for environmental allergies and immunocompromised state.   Neurological: Positive for dizziness. Negative for seizures, syncope, weakness, numbness and headaches.   Hematological: Negative for adenopathy. Does not bruise/bleed easily.   Psychiatric/Behavioral: Negative for agitation, confusion and sleep disturbance.     Objective:     Vital Signs (Most Recent):  Temp: 98.3 °F (36.8 °C) (03/05/20 1212)  Pulse: 84 (03/05/20 1359)  Resp: 18 (03/05/20 1212)  BP: (!) 96/54 (03/05/20 1359)  SpO2: 97 % (03/05/20 1212) Vital Signs (24h Range):  Temp:  [97.4 °F (36.3 °C)-99 °F (37.2 °C)] 98.3 °F (36.8 °C)  Pulse:  [69-95] 84  Resp:  [14-18] 18  SpO2:  [97 %-100 %] 97 %  BP: ()/(49-70) 96/54     Weight: 100.7 kg (222 lb 0.1 oz)  Body mass index is 39.33 kg/m².    Intake/Output Summary (Last 24 hours) at 3/5/2020 1524  Last data filed at 3/5/2020 1400  Gross per 24  hour   Intake 790 ml   Output 600 ml   Net 190 ml      Physical Exam   Constitutional: She is oriented to person, place, and time. She appears well-developed and well-nourished. No distress.   HENT:   Head: Normocephalic and atraumatic.   Eyes: Conjunctivae are normal.   PERRL   Neck: Neck supple. No JVD present.   Cardiovascular: Normal rate, regular rhythm and normal heart sounds.   Pulmonary/Chest: Effort normal and breath sounds normal.   Abdominal: Soft. Bowel sounds are normal. She exhibits no distension. There is no tenderness.   Genitourinary:   Genitourinary Comments: Deferred   Musculoskeletal: She exhibits edema and tenderness.        Left knee: She exhibits decreased range of motion and swelling.   Dressing to left knee with edema and decreased ROM s/p procedure   Neurological: She is alert and oriented to person, place, and time.   Skin: Skin is warm and dry.   Psychiatric: She has a normal mood and affect. Her behavior is normal. Thought content normal.   Nursing note and vitals reviewed.      Significant Labs:   CBC:   Recent Labs   Lab 03/04/20  0952 03/05/20  0534 03/05/20  0940   HGB 10.6* 8.5* 8.6*   HCT 34.8* 28.7* 28.6*     CMP:   Recent Labs   Lab 03/05/20  0534      K 3.9   *   CO2 24   *   BUN 15   CREATININE 0.9   CALCIUM 8.3*   ANIONGAP 7*   EGFRNONAA >60       Significant Imaging:

## 2020-03-05 NOTE — ASSESSMENT & PLAN NOTE
-Patient is status post left total knee arthroplasty performed by Dr. Connolly.   -Management per Orthopedics.   -PT/OT evaluation completed with discharge to home with home health anticipated   -CM to assist with discharge planning   -analgesia prn   -antiemetics prn  -IV hydration   -Nozin

## 2020-03-05 NOTE — PLAN OF CARE
No adverse events during shift.  Remained free of falls. Call light in reach. Side rails x 2. Pain moderately tolerated w/ prn meds. Pt repositions with assistance. IVF administered as ordered. Incision dressing intact with dried drainage. VSS. Chart reviewed, will continue to monitor.

## 2020-03-05 NOTE — PROGRESS NOTES
Status post total knee arthroplasty  Patient states that pain is controlled but had not walked outside the room yet  EHL 5/5  Dressing on the knee with punctate bleed  Calves are soft    Labs reviewed acute blood loss anemia but stable  X-ray showing TKA no fractures  Patient could be ready for discharge in a.m..  Had not been ambulating

## 2020-03-05 NOTE — PROGRESS NOTES
[facility] Hospital Medicine  Progress Note    Patient Name: Mariel Becerril  MRN: [id number]  Patient Class: IP- Inpatient   Admission Date: 3/4/2020  Length of Stay: 1 days  Attending Physician: Moy Connolly MD  Primary Care Provider: Jaclyn Becerril MD        Subjective:     Principal Problem:Arthritis of left knee        HPI:  Mariel Becerril is a 64 year old female with hypertension who presented for elective  Left total knee replacement performed by Dr. Connolly today. The patient denies complaints of fever and chills preoperatively, and reports that her pain is well controlled post-operatively. Code status was discussed with the patient. She is a full code. Her daughter, Nataly Becerril is her surrogate medical decision maker.     Overview/Hospital Course:  Pt admitted to Medical Surgical Unit s/p total knee arthroplasty per Orthopedic Surgery.  Pt stable post procedure with decline in H/H noted.  Pt treated with IV hydration, analgesia prn, and antiemetics prn.  Pt had mild episode of hypotension which rebounded with IVF resuscitation.  Norvasc held and parameters placed on Lopressor.  PT/OT evaluation performed.  Pt pain controlled on prescribed narcotic regime.  Discharge to home with home health anticipated.      Interval History: pt stable with dizziness and nausea reported during hypotensive event.  BP improved with IV hydration.  Antihypertensive medications held and parameters placed.  Pain controlled on prescribed narcotic regime.  Orthopedic Surgery- primary team.     Review of Systems   Constitutional: Negative for appetite change, chills, diaphoresis, fatigue and fever.   HENT: Negative for congestion, ear pain, mouth sores, sore throat and trouble swallowing.    Eyes: Negative for pain and visual disturbance.   Respiratory: Negative for cough, chest tightness and shortness of breath.    Cardiovascular: Negative for chest pain, palpitations and leg swelling.    Gastrointestinal: Positive for nausea. Negative for abdominal pain and constipation.   Endocrine: Negative for cold intolerance, heat intolerance, polydipsia and polyuria.   Genitourinary: Negative for dysuria, frequency and hematuria.   Musculoskeletal: Positive for arthralgias (left knee). Negative for back pain and neck pain.   Skin: Negative for pallor, rash and wound.   Allergic/Immunologic: Negative for environmental allergies and immunocompromised state.   Neurological: Positive for dizziness. Negative for seizures, syncope, weakness, numbness and headaches.   Hematological: Negative for adenopathy. Does not bruise/bleed easily.   Psychiatric/Behavioral: Negative for agitation, confusion and sleep disturbance.     Objective:     Vital Signs (Most Recent):  Temp: 98.3 °F (36.8 °C) (03/05/20 1212)  Pulse: 84 (03/05/20 1359)  Resp: 18 (03/05/20 1212)  BP: (!) 96/54 (03/05/20 1359)  SpO2: 97 % (03/05/20 1212) Vital Signs (24h Range):  Temp:  [97.4 °F (36.3 °C)-99 °F (37.2 °C)] 98.3 °F (36.8 °C)  Pulse:  [69-95] 84  Resp:  [14-18] 18  SpO2:  [97 %-100 %] 97 %  BP: ()/(49-70) 96/54     Weight: 100.7 kg (222 lb 0.1 oz)  Body mass index is 39.33 kg/m².    Intake/Output Summary (Last 24 hours) at 3/5/2020 1525  Last data filed at 3/5/2020 1400  Gross per 24 hour   Intake 790 ml   Output 600 ml   Net 190 ml      Physical Exam   Constitutional: She is oriented to person, place, and time. She appears well-developed and well-nourished. No distress.   HENT:   Head: Normocephalic and atraumatic.   Eyes: Conjunctivae are normal.   PERRL   Neck: Neck supple. No JVD present.   Cardiovascular: Normal rate, regular rhythm and normal heart sounds.   Pulmonary/Chest: Effort normal and breath sounds normal.   Abdominal: Soft. Bowel sounds are normal. She exhibits no distension. There is no tenderness.   Genitourinary:   Genitourinary Comments: Deferred   Musculoskeletal: She exhibits edema and tenderness.        Left knee:  She exhibits decreased range of motion and swelling.   Dressing to left knee with edema and decreased ROM s/p procedure   Neurological: She is alert and oriented to person, place, and time.   Skin: Skin is warm and dry.   Psychiatric: She has a normal mood and affect. Her behavior is normal. Thought content normal.   Nursing note and vitals reviewed.      Significant Labs:   CBC:   Recent Labs   Lab 03/04/20  0952 03/05/20  0534 03/05/20  0940   HGB 10.6* 8.5* 8.6*   HCT 34.8* 28.7* 28.6*     CMP:   Recent Labs   Lab 03/05/20  0534      K 3.9   *   CO2 24   *   BUN 15   CREATININE 0.9   CALCIUM 8.3*   ANIONGAP 7*   EGFRNONAA >60       Significant Imaging:           Assessment/Plan:      * Arthritis of left knee  -Patient is status post left total knee arthroplasty performed by Dr. Connolly.   -Management per Orthopedics.   -PT/OT evaluation completed with discharge to home with home health anticipated   -CM to assist with discharge planning   -analgesia prn   -antiemetics prn  -IV hydration   -Nozin        Symptomatic PVCs  -Stable.   -Continue Toprol XL.       GIL on CPAP  CPAP QHS. Patient reports setting of 5.       Essential hypertension  -mild episode of hypotension noted   -Norvasc held   -metoprolol continued with parameters         VTE Risk Mitigation (From admission, onward)         Ordered     enoxaparin injection 40 mg  Daily      03/04/20 1633     Bilateral Foot Compression Devices  Until discontinued      03/04/20 1127     IP VTE LOW RISK PATIENT  Once      03/04/20 0633     Place BERYL hose  Until discontinued      03/04/20 0633     Place sequential compression device  Until discontinued      03/04/20 0633                      Shelly Betancourt NP  Department of Hospital Medicine   [facility]

## 2020-03-05 NOTE — PLAN OF CARE
CM met with patient and pt daughter at bedside to assess for discharge needs. Pt states that she lives at home alone and is independent with her needs prior to surgery. She states that she will be at another location upon discharge for her home health. She chose [facility] and choice form was signed and placed in chart. She uses CPAP and states that she has a walker and a bedside commode was delivered to her home for post surgery physical therapy. CM provided a transitional care folder, information on advanced directives, information on pharmacy bedside delivery, and discharge planning begins on admission with contact information for any needs/questions.    D/C Plan: Home Health  PCP: Fabienne Becerril MD  Preferred Pharmacy: [facility]  Discharge transportation: Family  [facility]  Pharmacy Bedside Delivery: Yes       03/05/20 1000   Discharge Assessment   Assessment Type Discharge Planning Assessment   Confirmed/corrected address and phone number on facesheet? Yes   Assessment information obtained from? Patient   Expected Length of Stay (days)   (TBD)   Communicated expected length of stay with patient/caregiver yes   Prior to hospitilization cognitive status: Alert/Oriented   Prior to hospitalization functional status: Independent;Assistive Equipment   Current cognitive status: Alert/Oriented   Current Functional Status: Independent;Assistive Equipment   Facility Arrived From: Home   Lives With alone   Able to Return to Prior Arrangements yes   Is patient able to care for self after discharge? Yes   Who are your caregiver(s) and their phone number(s)? Kolton Jone, Daughter- [phone]; Nick Morin, [phone]   Patient's perception of discharge disposition home health   Readmission Within the Last 30 Days no previous admission in last 30 days   Patient currently being followed by outpatient case management? No   Patient currently receives any other outside agency services? No   Equipment  Currently Used at Home none;other (see comments)  (Had bedside commode delivered and has a walker)   Do you have any problems affording any of your prescribed medications? No   Is the patient taking medications as prescribed? yes   Does the patient have transportation home? Yes   Transportation Anticipated family or friend will provide   Dialysis Name and Scheduled days NA   Does the patient receive services at the [facility]mad[facility]? No   Discharge Plan A Home Health   DME Needed Upon Discharge  none   Patient/Family in Agreement with Plan yes

## 2020-03-05 NOTE — NURSING
Patient BP 96/56 and c/o 8/10 pain to the Left Knee. Discussed with patient that d/t response of pain medication and blood pressure at this time. Shelly Betancourt NP states to hold pain medication. Patient and family verbalized understanding.

## 2020-03-06 VITALS
HEIGHT: 63 IN | OXYGEN SATURATION: 97 % | BODY MASS INDEX: 39.34 KG/M2 | SYSTOLIC BLOOD PRESSURE: 113 MMHG | HEART RATE: 99 BPM | TEMPERATURE: 99 F | RESPIRATION RATE: 15 BRPM | DIASTOLIC BLOOD PRESSURE: 57 MMHG | WEIGHT: 222 LBS

## 2020-03-06 PROBLEM — M17.12 ARTHRITIS OF LEFT KNEE: Status: RESOLVED | Noted: 2020-02-18 | Resolved: 2020-03-06

## 2020-03-06 LAB
ALBUMIN SERPL BCP-MCNC: 2.6 G/DL (ref 3.5–5.2)
ALP SERPL-CCNC: 79 U/L (ref 55–135)
ALT SERPL W/O P-5'-P-CCNC: 47 U/L (ref 10–44)
ANION GAP SERPL CALC-SCNC: 6 MMOL/L (ref 8–16)
AST SERPL-CCNC: 45 U/L (ref 10–40)
BASOPHILS # BLD AUTO: 0.02 K/UL (ref 0–0.2)
BASOPHILS NFR BLD: 0.2 % (ref 0–1.9)
BILIRUB SERPL-MCNC: 0.3 MG/DL (ref 0.1–1)
BUN SERPL-MCNC: 11 MG/DL (ref 8–23)
CALCIUM SERPL-MCNC: 8.3 MG/DL (ref 8.7–10.5)
CHLORIDE SERPL-SCNC: 112 MMOL/L (ref 95–110)
CO2 SERPL-SCNC: 23 MMOL/L (ref 23–29)
CREAT SERPL-MCNC: 0.8 MG/DL (ref 0.5–1.4)
DIFFERENTIAL METHOD: ABNORMAL
EOSINOPHIL # BLD AUTO: 0 K/UL (ref 0–0.5)
EOSINOPHIL NFR BLD: 0.3 % (ref 0–8)
ERYTHROCYTE [DISTWIDTH] IN BLOOD BY AUTOMATED COUNT: 14.1 % (ref 11.5–14.5)
EST. GFR  (AFRICAN AMERICAN): >60 ML/MIN/1.73 M^2
EST. GFR  (NON AFRICAN AMERICAN): >60 ML/MIN/1.73 M^2
GLUCOSE SERPL-MCNC: 109 MG/DL (ref 70–110)
HCT VFR BLD AUTO: 26.1 % (ref 37–48.5)
HGB BLD-MCNC: 8 G/DL (ref 12–16)
IMM GRANULOCYTES # BLD AUTO: 0.05 K/UL (ref 0–0.04)
IMM GRANULOCYTES NFR BLD AUTO: 0.6 % (ref 0–0.5)
LYMPHOCYTES # BLD AUTO: 2.3 K/UL (ref 1–4.8)
LYMPHOCYTES NFR BLD: 25.9 % (ref 18–48)
MCH RBC QN AUTO: 28.4 PG (ref 27–31)
MCHC RBC AUTO-ENTMCNC: 30.7 G/DL (ref 32–36)
MCV RBC AUTO: 93 FL (ref 82–98)
MONOCYTES # BLD AUTO: 0.8 K/UL (ref 0.3–1)
MONOCYTES NFR BLD: 9.2 % (ref 4–15)
NEUTROPHILS # BLD AUTO: 5.7 K/UL (ref 1.8–7.7)
NEUTROPHILS NFR BLD: 63.8 % (ref 38–73)
NRBC BLD-RTO: 0 /100 WBC
PLATELET # BLD AUTO: 161 K/UL (ref 150–350)
PMV BLD AUTO: 9.5 FL (ref 9.2–12.9)
POTASSIUM SERPL-SCNC: 3.8 MMOL/L (ref 3.5–5.1)
PROT SERPL-MCNC: 5.1 G/DL (ref 6–8.4)
RBC # BLD AUTO: 2.82 M/UL (ref 4–5.4)
SODIUM SERPL-SCNC: 141 MMOL/L (ref 136–145)
WBC # BLD AUTO: 8.96 K/UL (ref 3.9–12.7)

## 2020-03-06 PROCEDURE — 25000003 PHARM REV CODE 250: Performed by: ORTHOPAEDIC SURGERY

## 2020-03-06 PROCEDURE — 25000003 PHARM REV CODE 250: Performed by: NURSE PRACTITIONER

## 2020-03-06 PROCEDURE — 36415 COLL VENOUS BLD VENIPUNCTURE: CPT

## 2020-03-06 PROCEDURE — 94799 UNLISTED PULMONARY SVC/PX: CPT

## 2020-03-06 PROCEDURE — 85025 COMPLETE CBC W/AUTO DIFF WBC: CPT

## 2020-03-06 PROCEDURE — 80053 COMPREHEN METABOLIC PANEL: CPT

## 2020-03-06 PROCEDURE — 97530 THERAPEUTIC ACTIVITIES: CPT

## 2020-03-06 PROCEDURE — 97116 GAIT TRAINING THERAPY: CPT

## 2020-03-06 PROCEDURE — 63600175 PHARM REV CODE 636 W HCPCS: Performed by: ORTHOPAEDIC SURGERY

## 2020-03-06 PROCEDURE — 97535 SELF CARE MNGMENT TRAINING: CPT

## 2020-03-06 RX ORDER — ASPIRIN 325 MG
325 TABLET ORAL DAILY
Qty: 60 TABLET | Refills: 0 | Status: SHIPPED | OUTPATIENT
Start: 2020-03-06 | End: 2020-05-04

## 2020-03-06 RX ORDER — GABAPENTIN 300 MG/1
300 CAPSULE ORAL 2 TIMES DAILY
Status: DISCONTINUED | OUTPATIENT
Start: 2020-03-06 | End: 2020-03-06 | Stop reason: HOSPADM

## 2020-03-06 RX ORDER — ASPIRIN 325 MG
325 TABLET ORAL DAILY
Status: DISCONTINUED | OUTPATIENT
Start: 2020-03-06 | End: 2020-03-06 | Stop reason: HOSPADM

## 2020-03-06 RX ORDER — OXYCODONE HYDROCHLORIDE 10 MG/1
10 TABLET ORAL EVERY 8 HOURS PRN
Qty: 21 TABLET | Refills: 0 | Status: SHIPPED | OUTPATIENT
Start: 2020-03-06 | End: 2020-03-18 | Stop reason: SDUPTHER

## 2020-03-06 RX ORDER — ACETAMINOPHEN 325 MG/1
650 TABLET ORAL EVERY 8 HOURS PRN
Qty: 60 TABLET | Refills: 2 | Status: SHIPPED | OUTPATIENT
Start: 2020-03-06

## 2020-03-06 RX ORDER — GABAPENTIN 300 MG/1
300 CAPSULE ORAL 2 TIMES DAILY
Qty: 60 CAPSULE | Refills: 2 | Status: SHIPPED | OUTPATIENT
Start: 2020-03-06 | End: 2020-06-01 | Stop reason: SDUPTHER

## 2020-03-06 RX ADMIN — GABAPENTIN 300 MG: 300 CAPSULE ORAL at 08:03

## 2020-03-06 RX ADMIN — METHOCARBAMOL TABLETS 500 MG: 500 TABLET, COATED ORAL at 08:03

## 2020-03-06 RX ADMIN — OXYCODONE 5 MG: 5 TABLET ORAL at 01:03

## 2020-03-06 RX ADMIN — PANTOPRAZOLE SODIUM 40 MG: 40 TABLET, DELAYED RELEASE ORAL at 08:03

## 2020-03-06 RX ADMIN — DOCUSATE SODIUM 100 MG: 100 CAPSULE, LIQUID FILLED ORAL at 08:03

## 2020-03-06 RX ADMIN — KETOROLAC TROMETHAMINE 1 DROP: 5 SOLUTION OPHTHALMIC at 08:03

## 2020-03-06 RX ADMIN — OXYCODONE 5 MG: 5 TABLET ORAL at 04:03

## 2020-03-06 RX ADMIN — OXYCODONE 10 MG: 5 TABLET ORAL at 08:03

## 2020-03-06 RX ADMIN — MELOXICAM 15 MG: 7.5 TABLET ORAL at 08:03

## 2020-03-06 RX ADMIN — METOPROLOL SUCCINATE 25 MG: 25 TABLET, EXTENDED RELEASE ORAL at 08:03

## 2020-03-06 RX ADMIN — OFLOXACIN 1 DROP: 3 SOLUTION OPHTHALMIC at 08:03

## 2020-03-06 RX ADMIN — CHLORHEXIDINE GLUCONATE 0.12% ORAL RINSE 10 ML: 1.2 LIQUID ORAL at 08:03

## 2020-03-06 RX ADMIN — SODIUM CHLORIDE: 0.9 INJECTION, SOLUTION INTRAVENOUS at 06:03

## 2020-03-06 RX ADMIN — PREDNISOLONE ACETATE 1 DROP: 10 SUSPENSION/ DROPS OPHTHALMIC at 08:03

## 2020-03-06 RX ADMIN — ASPIRIN 325 MG: 325 TABLET ORAL at 08:03

## 2020-03-06 RX ADMIN — DEXAMETHASONE SODIUM PHOSPHATE 8 MG: 4 INJECTION, SOLUTION INTRAMUSCULAR; INTRAVENOUS at 08:03

## 2020-03-06 NOTE — PLAN OF CARE
Patient resting in bed, VSS, no signs of distress, no complaints of discomfort at this time, pain controlled. Will continue to monitor.

## 2020-03-06 NOTE — PT/OT/SLP PROGRESS
Physical Therapy  Treatment    Mariel Becerril   MRN: [id number]   Admitting Diagnosis: Arthritis of left knee    PT Received On: 03/06/20  PT Start Time: 0958     PT Stop Time: 1018    PT Total Time (min): 20 min       Billable Minutes:  Gait Training 20    Treatment Type: Treatment  PT/PTA: PT     PTA Visit Number: 0       General Precautions: Standard, fall  Orthopedic Precautions: LLE weight bearing as tolerated   Braces: N/A         Subjective:  Communicated with NURSE LAYLA AND Epic CHART REVIEW  prior to session.   PT AGREED TO TX     Pain/Comfort  Pain Rating 1: 7/10  Location - Side 1: Left  Location 1: knee  Pain Rating Post-Intervention 1: 7/10    Objective:   Patient found with: peripheral IV    Functional Mobility:  PT MET IN RM SUP>SIT EOB WITH SBA AND SCOOTED TO EOB WITH SBA. PT STOOD WITH RW AND CGA. PT GT TRAINED WITH STEP TO GT PROGRESSING TO STEP THRU GT X 160' WITH CUES FOR HEEL STRIKE OF L LE AND INC STEP LENGTH . PT WITH 2 STAND REST BREAKS DURING GT TRAINING. PT RETURNED TO RM T/F TO CHAIR WITH RW AND SBA. PT LEFT WITH ALL NEEDS MET AND CALL BELL IN REACH.     AM-PAC 6 CLICK MOBILITY  How much help from another person does this patient currently need?   1 = Unable, Total/Dependent Assistance  2 = A lot, Maximum/Moderate Assistance  3 = A little, Minimum/Contact Guard/Supervision  4 = None, Modified [place]/Independent    Turning over in bed (including adjusting bedclothes, sheets and blankets)?: 4  Sitting down on and standing up from a chair with arms (e.g., wheelchair, bedside commode, etc.): 3  Moving from lying on back to sitting on the side of the bed?: 4  Moving to and from a bed to a chair (including a wheelchair)?: 3  Need to walk in hospital room?: 3  Climbing 3-5 steps with a railing?: 1  Basic Mobility Total Score: 18    AM-PAC Raw Score CMS G-Code Modifier Level of Impairment Assistance   6 % Total / Unable   7 - 9 CM 80 - 100% Maximal Assist   10 - 14 CL 60 - 80%  Moderate Assist   15 - 19 CK 40 - 60% Moderate Assist   20 - 22 CJ 20 - 40% Minimal Assist   23 CI 1-20% SBA / CGA   24 CH 0% Independent/ Mod I     Patient left up in chair with call button in reach and chair alarm on.    Assessment:  PT PROGRESSING WITH GT TRAINING      Rehab identified problem list/impairments: Rehab identified problem list/impairments: weakness, gait instability, decreased ROM, impaired balance, decreased lower extremity function, impaired endurance, impaired functional mobilty    Rehab potential is good.    Activity tolerance: Good    Discharge recommendations: Discharge Facility/Level of Care Needs: home health PT     Barriers to discharge:      Equipment recommendations: Equipment Needed After Discharge: walker, rolling     GOALS:   Multidisciplinary Problems     Physical Therapy Goals        Problem: Physical Therapy Goal    Goal Priority Disciplines Outcome Goal Variances Interventions   Physical Therapy Goal     PT, PT/OT Ongoing, Progressing     Description:  PT WILL BE SEEN FOR P.T. FOR A MIN OF 5 OUT OF 7 DAYS A WEEK  LTG: 3/11/20  1. PT WILL COMPLETE BED MOBILITY WITH SBA  2. PT WILL T/G TO CHAIR WITH RW AND SBA  3. PT WILL GT TRAIN X 150' WITH RW AND SBA.  4. PT WILL COMPLETE B LE TE X 20 REPS                        PLAN:    Patient to be seen  to address the above listed problems via gait training, therapeutic activities, therapeutic exercises  Plan of Care expires: 03/11/20  Plan of Care reviewed with: patient         Layla Neal, PT  03/06/2020

## 2020-03-06 NOTE — PROGRESS NOTES
[facility] Hospital Medicine  Progress Note    Patient Name: Mariel Becerril  MRN: [id number]  Patient Class: IP- Inpatient   Admission Date: 3/4/2020  Length of Stay: 2 days  Attending Physician: Dr. Min Connell  Primary Care Provider: Jaclyn Becerril MD        Subjective:     Principal Problem:Arthritis of left knee        HPI:  Mariel Becerril is a 64 year old female with hypertension who presented for elective  Left total knee replacement performed by Dr. Connolly today. The patient denies complaints of fever and chills preoperatively, and reports that her pain is well controlled post-operatively. Code status was discussed with the patient. She is a full code. Her daughter, Nataly Becerril is her surrogate medical decision maker.     Overview/Hospital Course:  Pt admitted to Medical Surgical Unit s/p total knee arthroplasty per Orthopedic Surgery.  Pt stable post procedure with decline in H/H noted.  Pt treated with IV hydration, analgesia prn, and antiemetics prn.  Pt had mild episode of hypotension which rebounded with IVF resuscitation.  Norvasc held and parameters placed on Lopressor.  PT/OT evaluation performed.  Pt pain controlled on prescribed narcotic regime.  Discharge to home with home health anticipated.  On 3/6/2020, H/H stable with BP improved.  Pt verbalized symptom improvement.  No signs of acute distress noted.  Pt seen and examined today on the date of discharge and deemed suitable for discharge to home with home health per primary team.      Interval History: pt H/H stable with BP improved.  Pt verbalized symptom improvement with no signs of acute distress noted.  Pt seen and examined today on the date of discharge and deemed suitable for discharge to home with home health per primary team.        Review of Systems   Constitutional: Negative for appetite change, chills, diaphoresis, fatigue and fever.   HENT: Negative for congestion, ear pain, mouth sores, postnasal drip,  sneezing, sore throat and trouble swallowing.    Eyes: Negative for pain and visual disturbance.   Respiratory: Negative for cough, chest tightness and shortness of breath.    Cardiovascular: Negative for chest pain, palpitations and leg swelling.   Gastrointestinal: Positive for nausea (resolved). Negative for abdominal pain and constipation.   Endocrine: Negative for cold intolerance, heat intolerance, polydipsia and polyuria.   Genitourinary: Negative for dysuria, frequency and hematuria.   Musculoskeletal: Positive for arthralgias (left knee). Negative for back pain and neck pain.   Skin: Negative for pallor, rash and wound.   Allergic/Immunologic: Negative for environmental allergies and immunocompromised state.   Neurological: Positive for dizziness (resolved). Negative for seizures, syncope, weakness, numbness and headaches.   Hematological: Negative for adenopathy. Does not bruise/bleed easily.   Psychiatric/Behavioral: Negative for agitation, confusion and sleep disturbance.     Objective:     Vital Signs (Most Recent):  Temp: 98.5 °F (36.9 °C) (03/06/20 0753)  Pulse: 99 (03/06/20 0753)  Resp: 15 (03/06/20 0753)  BP: (!) 113/57 (03/06/20 0753)  SpO2: 97 % (03/06/20 0753) Vital Signs (24h Range):  Temp:  [97.9 °F (36.6 °C)-98.8 °F (37.1 °C)] 98.5 °F (36.9 °C)  Pulse:  [84-99] 99  Resp:  [15-18] 15  SpO2:  [95 %-98 %] 97 %  BP: (108-125)/(54-64) 113/57     Weight: 100.7 kg (222 lb 0.1 oz)  Body mass index is 39.33 kg/m².    Intake/Output Summary (Last 24 hours) at 3/6/2020 1652  Last data filed at 3/6/2020 0816  Gross per 24 hour   Intake 1370 ml   Output --   Net 1370 ml      Physical Exam   Constitutional: She is oriented to person, place, and time. She appears well-developed and well-nourished. No distress.   HENT:   Head: Normocephalic and atraumatic.   Eyes: Conjunctivae are normal.   PERRL   Neck: Neck supple. No JVD present.   Cardiovascular: Normal rate, regular rhythm and normal heart sounds.    Pulmonary/Chest: Effort normal and breath sounds normal.   Abdominal: Soft. Bowel sounds are normal. She exhibits no distension. There is no tenderness.   Genitourinary:   Genitourinary Comments: Deferred   Musculoskeletal: She exhibits edema and tenderness.        Left knee: She exhibits decreased range of motion and swelling.   Dressing to left knee with edema and decreased ROM s/p procedure   Neurological: She is alert and oriented to person, place, and time.   Skin: Skin is warm and dry.   Psychiatric: She has a normal mood and affect. Her behavior is normal. Thought content normal.   Nursing note and vitals reviewed.      Significant Labs:   CBC:   Recent Labs   Lab 03/05/20  0534 03/05/20  0940 03/06/20  0703   WBC  --   --  8.96   HGB 8.5* 8.6* 8.0*   HCT 28.7* 28.6* 26.1*   PLT  --   --  161     CMP:   Recent Labs   Lab 03/05/20  0534 03/06/20  0703    141   K 3.9 3.8   * 112*   CO2 24 23   * 109   BUN 15 11   CREATININE 0.9 0.8   CALCIUM 8.3* 8.3*   PROT  --  5.1*   ALBUMIN  --  2.6*   BILITOT  --  0.3   ALKPHOS  --  79   AST  --  45*   ALT  --  47*   ANIONGAP 7* 6*   EGFRNONAA >60 >60       Significant Imaging:           Assessment/Plan:      Symptomatic PVCs  -Stable.   -Continue Toprol XL.       GIL on CPAP  CPAP QHS. Patient reports setting of 5.       Essential hypertension  -BP improved today   -Norvasc resumed   -metoprolol continued with parameters       VTE Risk Mitigation (From admission, onward)         Ordered     IP VTE LOW RISK PATIENT  Once      03/04/20 0633                      Shelly Betancourt NP  Department of Hospital Medicine   [facility]

## 2020-03-06 NOTE — NURSING
Received orders to discharge pt, will prepare pt appropriately, informed pt to call for ride now since they are 45min out

## 2020-03-06 NOTE — SUBJECTIVE & OBJECTIVE
Interval History: pt H/H stable with BP improved.  Pt verbalized symptom improvement with no signs of acute distress noted.  Pt seen and examined today on the date of discharge and deemed suitable for discharge to home with home health per primary team.        Review of Systems   Constitutional: Negative for appetite change, chills, diaphoresis, fatigue and fever.   HENT: Negative for congestion, ear pain, mouth sores, postnasal drip, sneezing, sore throat and trouble swallowing.    Eyes: Negative for pain and visual disturbance.   Respiratory: Negative for cough, chest tightness and shortness of breath.    Cardiovascular: Negative for chest pain, palpitations and leg swelling.   Gastrointestinal: Positive for nausea (resolved). Negative for abdominal pain and constipation.   Endocrine: Negative for cold intolerance, heat intolerance, polydipsia and polyuria.   Genitourinary: Negative for dysuria, frequency and hematuria.   Musculoskeletal: Positive for arthralgias (left knee). Negative for back pain and neck pain.   Skin: Negative for pallor, rash and wound.   Allergic/Immunologic: Negative for environmental allergies and immunocompromised state.   Neurological: Positive for dizziness (resolved). Negative for seizures, syncope, weakness, numbness and headaches.   Hematological: Negative for adenopathy. Does not bruise/bleed easily.   Psychiatric/Behavioral: Negative for agitation, confusion and sleep disturbance.     Objective:     Vital Signs (Most Recent):  Temp: 98.5 °F (36.9 °C) (03/06/20 0753)  Pulse: 99 (03/06/20 0753)  Resp: 15 (03/06/20 0753)  BP: (!) 113/57 (03/06/20 0753)  SpO2: 97 % (03/06/20 0753) Vital Signs (24h Range):  Temp:  [97.9 °F (36.6 °C)-98.8 °F (37.1 °C)] 98.5 °F (36.9 °C)  Pulse:  [84-99] 99  Resp:  [15-18] 15  SpO2:  [95 %-98 %] 97 %  BP: (108-125)/(54-64) 113/57     Weight: 100.7 kg (222 lb 0.1 oz)  Body mass index is 39.33 kg/m².    Intake/Output Summary (Last 24 hours) at 3/6/2020  1652  Last data filed at 3/6/2020 0816  Gross per 24 hour   Intake 1370 ml   Output --   Net 1370 ml      Physical Exam   Constitutional: She is oriented to person, place, and time. She appears well-developed and well-nourished. No distress.   HENT:   Head: Normocephalic and atraumatic.   Eyes: Conjunctivae are normal.   PERRL   Neck: Neck supple. No JVD present.   Cardiovascular: Normal rate, regular rhythm and normal heart sounds.   Pulmonary/Chest: Effort normal and breath sounds normal.   Abdominal: Soft. Bowel sounds are normal. She exhibits no distension. There is no tenderness.   Genitourinary:   Genitourinary Comments: Deferred   Musculoskeletal: She exhibits edema and tenderness.        Left knee: She exhibits decreased range of motion and swelling.   Dressing to left knee with edema and decreased ROM s/p procedure   Neurological: She is alert and oriented to person, place, and time.   Skin: Skin is warm and dry.   Psychiatric: She has a normal mood and affect. Her behavior is normal. Thought content normal.   Nursing note and vitals reviewed.      Significant Labs:   CBC:   Recent Labs   Lab 03/05/20  0534 03/05/20  0940 03/06/20  0703   WBC  --   --  8.96   HGB 8.5* 8.6* 8.0*   HCT 28.7* 28.6* 26.1*   PLT  --   --  161     CMP:   Recent Labs   Lab 03/05/20  0534 03/06/20  0703    141   K 3.9 3.8   * 112*   CO2 24 23   * 109   BUN 15 11   CREATININE 0.9 0.8   CALCIUM 8.3* 8.3*   PROT  --  5.1*   ALBUMIN  --  2.6*   BILITOT  --  0.3   ALKPHOS  --  79   AST  --  45*   ALT  --  47*   ANIONGAP 7* 6*   EGFRNONAA >60 >60       Significant Imaging:

## 2020-03-06 NOTE — PT/OT/SLP PROGRESS
"Occupational Therapy  Treatment    Mariel Becerril   MRN: [id number]   Admitting Diagnosis: Arthritis of left knee    OT Date of Treatment: 03/06/20   OT Start Time: 0910  OT Stop Time: 0935  OT Total Time (min): 25 min    Billable Minutes:  Self Care/Home Management 15 minutes and Therapeutic Activity 10 minutes    General Precautions: Standard, fall  Orthopedic Precautions: LLE weight bearing as tolerated  Braces: N/A         Subjective:  Communicated with nurse and epic chart review prior to session.  Pain/Comfort  Pain Rating 1: 0/10    Objective:  Patient found with: peripheral IV     Functional Mobility:    Transfers:   sit<>stand sba with ae  Step transfer with bed > bed side chair with sba    Functional Ambulation: pt ambulated 160 with sba and rolling walker  Activities of Daily Living:   grooming/hygiene:sba with fair+ standing balance  Feeding na  UE  na     LE : sba with nixon/ doff sock on r foot          Bathing na    Balance:   Static Sit: FAIR+: Able to take MINIMAL challenges from all directions  Dynamic Sit: FAIR+: Maintains balance through MINIMAL excursions of active trunk motion  Static Stand: FAIR+: Takes MINIMAL challenges from all directions  Dynamic stand: FAIR+: Needs CLOSE SUPERVISION during gait and is able to right self with minor LOB    AM-PAC 6 CLICK ADL   How much help from another person does this patient currently need?   1 = Unable, Total/Dependent Assistance  2 = A lot, Maximum/Moderate Assistance  3 = A little, Minimum/Contact Guard/Supervision  4 = None, Modified [place]/Independent    Bathing (including washing, rinsing, drying)?: 3  Toileting, which includes using toilet, bedpan, or urinal? : 4(per pt report  via bsc)  Putting on and taking off regular upper body clothing?: 4  Taking care of personal grooming such as brushing teeth?: 4  Eating meals?: 4     AM-PAC Raw Score CMS "G-Code Modifier Level of Impairment Assistance   6 % Total / Unable   7 - 8 CM 80 - " 100% Maximal Assist   9-13 CL 60 - 80% Moderate Assist   14 - 19 CK 40 - 60% Moderate Assist   20 - 22 CJ 20 - 40% Minimal Assist   23 CI 1-20% SBA / CGA   24 CH 0% Independent/ Mod I       Patient left up in chair with all lines intact, call button in reach, chair alarm on and nurse notified    ASSESSMENT:  Mariel Becerril is a 64 y.o. female with a medical diagnosis of Arthritis of left knee and presents with debility and generalized weakness. Pt will continue to benefit from skilled OT.    Rehab identified problem list/impairments: Rehab identified problem list/impairments: weakness, impaired functional mobilty, decreased safety awareness, gait instability, impaired balance, impaired self care skills, decreased coordination    Rehab potential is good.    Activity tolerance: Good    Discharge recommendations: Discharge Facility/Level of Care Needs: home health OT     Barriers to discharge:      Equipment recommendations: walker, rolling     GOALS:   Multidisciplinary Problems     Occupational Therapy Goals        Problem: Occupational Therapy Goal    Goal Priority Disciplines Outcome Interventions   Occupational Therapy Goal     OT, PT/OT Ongoing, Progressing    Description:  OT goals to be met 3-11-20  s with le dressing  Pt will tolerate 1 set x 15 reps b ue rom with  Min resistance  s with bsc t/f's  s with ue dressing                       Plan:  Patient to be seen 3 x/week to address the above listed problems via self-care/home management, therapeutic activities, therapeutic exercises  Plan of Care expires:    Plan of Care reviewed with: patient         Sully Vanessazier, OT  03/06/2020

## 2020-03-06 NOTE — DISCHARGE SUMMARY
[facility]   Orthopedics  Discharge Summary      Patient Name: Mariel Becerril  MRN: [id number]  Admission Date: 3/4/2020  Hospital Length of Stay: 2 days  Discharge Date and Time:  03/06/2020 6:49 AM  Attending Physician: Jase Connolly MD   Discharging Provider: Jase Connolly MD  Primary Care Provider: Jaclyn Becerril MD    HPI:  Patient diagnosed with left knee arthritis failed non operative treatment admitted for left total knee arthroplasty    Procedure(s) (LRB):  ARTHROPLASTY, KNEE, TOTAL (Left)      Hospital Course:  Day of admission underwent left total knee arthroplasty using united orthopedic components posterior stabilized knee system  Postop day 1 had episodes of low blood pressure requiring fluid bolus.  Postop day 2 was doing well pressure is okay pain is under control decision was made stable to be discharged home    Consults (From admission, onward)        Status Ordering Provider     Inpatient consult to Hospitalist  Once     Provider:  ELFEGO Reynaga    Completed JASE CONNOLLY     IP consult case management/social work  Once     Provider:  (Not yet assigned)    JASE Leyva          Significant Diagnostic Studies: Labs:   BMP:   Recent Labs   Lab 03/05/20  0534   *      K 3.9   *   CO2 24   BUN 15   CREATININE 0.9   CALCIUM 8.3*   , CMP   Recent Labs   Lab 03/05/20  0534      K 3.9   *   CO2 24   *   BUN 15   CREATININE 0.9   CALCIUM 8.3*   ANIONGAP 7*   ESTGFRAFRICA >60   EGFRNONAA >60    and CBC   Recent Labs   Lab 03/04/20  0952 03/05/20  0534 03/05/20  0940   HGB 10.6* 8.5* 8.6*   HCT 34.8* 28.7* 28.6*       Pending Diagnostic Studies:     Procedure Component Value Units Date/Time    CBC auto differential [[id number]]     Order Status:  Sent Lab Status:  No result     Specimen:  Blood     Comprehensive metabolic panel [[id number]]     Order Status:  Sent Lab Status:  No result     Specimen:  Blood      Specimen to Pathology, Surgery Orthopedics [[id number]] Collected:  03/04/20 0843    Order Status:  Sent Lab Status:  In process Updated:  03/04/20 0917        Final Active Diagnoses:    Diagnosis Date Noted POA    Symptomatic PVCs [I49.3] 02/13/2020 Yes    Essential hypertension [I10] 08/20/2018 Yes    GIL on CPAP [G47.33, Z99.89] 08/20/2018 Not Applicable      Problems Resolved During this Admission:    Diagnosis Date Noted Date Resolved POA    PRINCIPAL PROBLEM:  Arthritis of left knee [M17.12] 02/18/2020 03/06/2020 Yes      Discharged Condition: stable    Disposition: Home or Self Care    Follow Up:  Follow-up Information     [facility]    Specialty:  Home Health Services  Why:  Home Health  Contact information:  [address] [facility], [address]  [place] [address]  [phone]             Moy Connolly MD In 2 weeks.    Specialty:  Orthopedic Surgery  Contact information:  [id number] [place] [address] [address] [place] [address]  [phone]                 Patient Instructions:   No discharge procedures on file.  Medications:  Reconciled Home Medications:      Medication List      ASK your doctor about these medications    amLODIPine 5 MG tablet  Commonly known as:  NORVASC  Take 1 tablet (5 mg total) by mouth once daily.     benzonatate 100 MG capsule  Commonly known as:  TESSALON  Take 1 capsule (100 mg total) by mouth 3 (three) times daily as needed.     chlorhexidine 0.12 % solution  Commonly known as:  PERIDEX  SWISH AND SPIT 15 MLS BY MOUTH TWICE A DAY FOR 7 DAYS     diclofenac sodium 1 % Gel  Commonly known as:  VOLTAREN  Apply 4 grams topically 3 (three) times daily as needed.     eflornithine 13.9 % cream  Commonly known as:  Vaniqa  Apply topically 2 (two) times daily.     fluticasone propionate 50 mcg/actuation nasal spray  Commonly known as:  FLONASE  1 spray (50 mcg total) by Each Nare route once daily.     furosemide 20 MG tablet  Commonly known as:  LASIX  Take 1  tablet (20 mg total) by mouth daily as needed.     * gatifloxacin 0.5 % Drop drops  Place 1 drop into the left eye 2 (two) times daily. Eyedrops to start one day before surgery     * gatifloxacin 0.5 % Drop drops  Apply 1 drop to eye 2 (two) times daily. Eyedrops to start one day before surgery     HYDROcodone-acetaminophen 7.5-325 mg per tablet  Commonly known as:  NORCO     * ketorolac 0.5% 0.5 % Drop  Commonly known as:  ACULAR  Place 1 drop into the left eye 4 (four) times daily. Eyedrops to start one day before surgery     * ketorolac 0.5% 0.5 % Drop  Commonly known as:  ACULAR  Place 1 drop into the right eye 4 (four) times daily. Eyedrops to start one day before surgery     levocetirizine 5 MG tablet  Commonly known as:  XYZAL  Take 1 tablet (5 mg total) by mouth every evening.     meclizine 25 mg tablet  Commonly known as:  ANTIVERT  Take 1 tablet (25 mg total) by mouth 3 (three) times daily as needed for Dizziness.     meloxicam 15 MG tablet  Commonly known as:  MOBIC  TAKE 1 TABLET BY MOUTH ONCE DAILY     methocarbamol 500 MG Tab  Commonly known as:  ROBAXIN  TAKE 1 TABLET BY MOUTH TWICE DAILY AS NEEDED     metoprolol succinate 25 MG 24 hr tablet  Commonly known as:  TOPROL-XL  Take 1 tablet (25 mg total) by mouth 2 (two) times daily.     * montelukast 10 mg tablet  Commonly known as:  SINGULAIR  Take 10 mg by mouth every evening.     * montelukast 10 mg tablet  Commonly known as:  SINGULAIR  TAKE 1 TABLET BY MOUTH ONCE DAILY IN THE EVENING     * nystatin cream  Commonly known as:  MYCOSTATIN  Apply topically 2 (two) times daily.     * nystatin powder  Commonly known as:  MYCOSTATIN  Apply topically 2 (two) times daily.     omeprazole 40 MG capsule  Commonly known as:  PRILOSEC  Take 1 capsule (40 mg total) by mouth once daily.     ondansetron 4 MG Tbdl  Commonly known as:  ZOFRAN-ODT  Take 1 tablet (4 mg total) by mouth every 8 (eight) hours as needed.     * prednisoLONE acetate 1 % Drps  Commonly known  as:  PRED FORTE  Place 1 drop into the left eye 4 (four) times daily. Start one day before surgery     * prednisoLONE acetate 1 % Drps  Commonly known as:  PRED FORTE  Place 1 drop into the right eye 4 (four) times daily. Eyedrops to start one day before surgery     topiramate 50 MG tablet  Commonly known as:  TOPAMAX  Take 1 tablet (50 mg total) by mouth once daily.     traMADol 50 mg tablet  Commonly known as:  ULTRAM  Take 1 tablet (50 mg total) by mouth 2 (two) times daily as needed.     Ventolin HFA 90 mcg/actuation inhaler  Generic drug:  albuterol  Inhale 2 puffs into the lungs every 6 (six) hours as needed for Wheezing. Rescue     VITAMIN D ORAL  Take 2,000 Units by mouth once daily.         * This list has 10 medication(s) that are the same as other medications prescribed for you. Read the directions carefully, and ask your doctor or other care provider to review them with you.                Moy Connolly MD  Orthopedics  [facility]

## 2020-03-06 NOTE — NURSING
Discharging pt,. Iv d/c'ed per policy am meds given, home rx delivered to bs, michaela dressing change sent home with pt, follow up appts already made in system    Vs wnl, nad, remained injury free fall precautions in place, chart check compelte

## 2020-03-09 LAB
FINAL PATHOLOGIC DIAGNOSIS: NORMAL
GROSS: NORMAL

## 2020-03-09 NOTE — PLAN OF CARE
03/09/20 1651   Final Note   Assessment Type Final Discharge Note   Anticipated Discharge Disposition Home-Health   Right Care Referral Info   Post Acute Recommendation Home-care   Facility Name [facility]

## 2020-03-13 ENCOUNTER — EXTERNAL HOME HEALTH (OUTPATIENT)
Dept: HOME HEALTH SERVICES | Facility: HOSPITAL | Age: 65
End: 2020-03-13

## 2020-03-18 ENCOUNTER — OFFICE VISIT (OUTPATIENT)
Dept: ORTHOPEDICS | Facility: CLINIC | Age: 65
End: 2020-03-18
Payer: COMMERCIAL

## 2020-03-18 VITALS
BODY MASS INDEX: 39.34 KG/M2 | HEART RATE: 84 BPM | SYSTOLIC BLOOD PRESSURE: 135 MMHG | WEIGHT: 222 LBS | HEIGHT: 63 IN | DIASTOLIC BLOOD PRESSURE: 76 MMHG

## 2020-03-18 DIAGNOSIS — M17.12 ARTHRITIS OF KNEE, LEFT: Primary | ICD-10-CM

## 2020-03-18 DIAGNOSIS — M25.562 LEFT KNEE PAIN, UNSPECIFIED CHRONICITY: Primary | ICD-10-CM

## 2020-03-18 DIAGNOSIS — Z96.652 STATUS POST TOTAL LEFT KNEE REPLACEMENT: ICD-10-CM

## 2020-03-18 PROCEDURE — 99999 PR PBB SHADOW E&M-EST. PATIENT-LVL III: ICD-10-PCS | Mod: PBBFAC,,, | Performed by: PHYSICIAN ASSISTANT

## 2020-03-18 PROCEDURE — 99024 POSTOP FOLLOW-UP VISIT: CPT | Mod: S$GLB,,, | Performed by: PHYSICIAN ASSISTANT

## 2020-03-18 PROCEDURE — 99024 PR POST-OP FOLLOW-UP VISIT: ICD-10-PCS | Mod: S$GLB,,, | Performed by: PHYSICIAN ASSISTANT

## 2020-03-18 PROCEDURE — 99999 PR PBB SHADOW E&M-EST. PATIENT-LVL III: CPT | Mod: PBBFAC,,, | Performed by: PHYSICIAN ASSISTANT

## 2020-03-18 RX ORDER — OXYCODONE HYDROCHLORIDE 10 MG/1
10 TABLET ORAL EVERY 8 HOURS PRN
Qty: 21 TABLET | Refills: 0 | Status: ON HOLD | OUTPATIENT
Start: 2020-03-18 | End: 2020-12-31 | Stop reason: HOSPADM

## 2020-03-18 NOTE — PATIENT INSTRUCTIONS
After Surgery at home  1. Take Tylenol 650 mg 3 times a day for 14 days then as needed for mild pain  2. Take gabapentin 300 mg nightly for 6 weeks  3. Takes Celebrex 200 mg (or Meloxicam 15 mg) for 6 weeks unless having cardiac issues, then take it for 2 weeks only.  May take ibuprofen 600mg three times a day OR Aleve 440 (two 220mg tablets) twice a day, instead.  4. Take aspirin 81 mg twice a day for 6 weeks unless you are on a blood thinner such as Plavix, Eliquis, Xarelto, Coumadin, etc  5. Where compressive stockings for 6 weeks minimum to decrease swelling and to decrease the risk of developing a blood clot   6. Narcotic pain medications such as hydrocodone or oxycodone will be prescribed to take every 8 hr as needed for severe breakthrough pain  7. You may apply ice on the knee to help with decreasing pain.  It be sure it does not leak for get the incision wet  8. Keep the wound clean and dry for 2 weeks until stitches/staples are removed.  Once removed, you are allowed to shower 24 hr later and get the wound wet with clean running water and antibacterial soap.  You should not submerge the wound in standing water for a minimum of 4 weeks after surgery.  9. No driving for 4 weeks after surgery unless specified by the physician  10. Avoid touching the wound and surrounding area of at least 2 in on either side of the surgical incision until staples/stitches are removed.  11. You may change the surgical dressing if it is extremely bloody.  Clean the wound with peroxide or Betadine and apply a sterile dressing (such as sterile 4x4 gauze), covering it with a clean Ace bandage.  12. Leave the hospital dressing on for 3 days.  At that time, you may change the dressing by following steps as above in #11.  You may leave this dressing change to the home health nurse.

## 2020-03-18 NOTE — PROGRESS NOTES
Patient ID: Mariel Becerril is a 64 y.o. female.    Chief Complaint: Pain and Post-op Evaluation of the Left Knee      HPI: Mariel Becerril  is a 64 y.o. female who c/o Pain and Post-op Evaluation of the Left Knee   for duration of 2 weeks.  She underwent left total knee arthroplasty with Dr. Connolly on 03/04/20.  She comes in today for routine follow-up.  Home health care as coming out to see her still.  She has not yet been set up with physical therapy outpatient.  Pain is well controlled.  She says she is doing much better than she thought she would be.  It is 3/10 in severity.  Quality is intermittent aching pain.  She is taking about 1/2 a tablet of pain pill at a time.  She is doing that 2 to 3 times a day.  She is still taking Tylenol, gabapentin, aspirin, and meloxicam as instructed prior to surgery.  Aggravating factors include step getting up after sitting down.    Surgery Performed: Left Total knee arthroplasty    Surgery Date:  03/04/2020    Past Medical History:   Diagnosis Date    Frequent headaches     Hypertension     Osteoarthritis 04/02/2019    Bilateral knees, ankles and feet    Severe obesity (BMI >= 40)     Sleep apnea      Past Surgical History:   Procedure Laterality Date    BLADDER SUSPENSION      CATARACT EXTRACTION, BILATERAL      COLONOSCOPY N/A 12/3/2018    Procedure: COLONOSCOPY;  Surgeon: Mari Rader MD;  Location: [facility];  Service: Endoscopy;  Laterality: N/A;    HYSTERECTOMY      partial 2000    tonsilectomy      TOTAL KNEE ARTHROPLASTY Left 3/4/2020    Procedure: ARTHROPLASTY, KNEE, TOTAL;  Surgeon: Moy Connolly MD;  Location: [facility] OR;  Service: Orthopedics;  Laterality: Left;     Family History   Problem Relation Age of Onset    Heart attack Father      Social History     Socioeconomic History    Marital status:      Spouse name: Not on file    Number of children: Not on file    Years of education: Not on file    Highest education  level: Not on file   Occupational History    Not on file   Social Needs    Financial resource strain: Not on file    Food insecurity:     Worry: Not on file     Inability: Not on file    Transportation needs:     Medical: Not on file     Non-medical: Not on file   Tobacco Use    Smoking status: Never Smoker    Smokeless tobacco: Never Used   Substance and Sexual Activity    Alcohol use: No    Drug use: No    Sexual activity: Never     Partners: Male     Birth control/protection: See Surgical Hx   Lifestyle    Physical activity:     Days per week: Not on file     Minutes per session: Not on file    Stress: Not on file   Relationships    Social connections:     Talks on phone: Not on file     Gets together: Not on file     Attends Latter-day service: Not on file     Active member of club or organization: Not on file     Attends meetings of clubs or organizations: Not on file     Relationship status: Not on file   Other Topics Concern    Not on file   Social History Narrative    Not on file     Medication List with Changes/Refills   Current Medications    ACETAMINOPHEN (TYLENOL) 325 MG TABLET    Take 2 tablets (650 mg total) by mouth every 8 (eight) hours as needed.    ALBUTEROL (PROAIR HFA) 90 MCG/ACTUATION INHALER    Inhale 2 puffs into the lungs every 6 (six) hours as needed for Wheezing. Rescue    AMLODIPINE (NORVASC) 5 MG TABLET    Take 1 tablet (5 mg total) by mouth once daily.    ASPIRIN 325 MG TABLET    Take 1 tablet (325 mg total) by mouth once daily.    BENZONATATE (TESSALON) 100 MG CAPSULE    Take 1 capsule (100 mg total) by mouth 3 (three) times daily as needed.    DICLOFENAC SODIUM (VOLTAREN) 1 % GEL    Apply 4 grams topically 3 (three) times daily as needed.    EFLORNITHINE (VANIQA) 13.9 % CREAM    Apply topically 2 (two) times daily.    ERGOCALCIFEROL, VITAMIN D2, (VITAMIN D ORAL)    Take 2,000 Units by mouth once daily.    FLUTICASONE PROPIONATE (FLONASE) 50 MCG/ACTUATION NASAL SPRAY    1  spray (50 mcg total) by Each Nare route once daily.    FUROSEMIDE (LASIX) 20 MG TABLET    Take 1 tablet (20 mg total) by mouth daily as needed.    GABAPENTIN (NEURONTIN) 300 MG CAPSULE    Take 1 capsule (300 mg total) by mouth 2 (two) times daily.    GATIFLOXACIN 0.5 % DROP DROPS    Place 1 drop into the left eye 2 (two) times daily. Eyedrops to start one day before surgery    GATIFLOXACIN 0.5 % DROP DROPS    Apply 1 drop to eye 2 (two) times daily. Eyedrops to start one day before surgery    KETOROLAC 0.5% (ACULAR) 0.5 % DROP    Place 1 drop into the left eye 4 (four) times daily. Eyedrops to start one day before surgery    LEVOCETIRIZINE (XYZAL) 5 MG TABLET    Take 1 tablet (5 mg total) by mouth every evening.    MECLIZINE (ANTIVERT) 25 MG TABLET    Take 1 tablet (25 mg total) by mouth 3 (three) times daily as needed for Dizziness.    MELOXICAM (MOBIC) 15 MG TABLET    TAKE 1 TABLET BY MOUTH ONCE DAILY    METHOCARBAMOL (ROBAXIN) 500 MG TAB    TAKE 1 TABLET BY MOUTH TWICE DAILY AS NEEDED    METOPROLOL SUCCINATE (TOPROL-XL) 25 MG 24 HR TABLET    Take 1 tablet (25 mg total) by mouth 2 (two) times daily.    MONTELUKAST (SINGULAIR) 10 MG TABLET    Take 10 mg by mouth every evening.    NOZASEPTIN (NOZIN) NASAL     1 each by Each Nostril route 2 (two) times daily.    NYSTATIN (MYCOSTATIN) CREAM    Apply topically 2 (two) times daily.    NYSTATIN (MYCOSTATIN) POWDER    Apply topically 2 (two) times daily.    OMEPRAZOLE (PRILOSEC) 40 MG CAPSULE    Take 1 capsule (40 mg total) by mouth once daily.    ONDANSETRON (ZOFRAN-ODT) 4 MG TBDL    Take 1 tablet (4 mg total) by mouth every 8 (eight) hours as needed.    PREDNISOLONE ACETATE (PRED FORTE) 1 % DRPS    Place 1 drop into the left eye 4 (four) times daily. Start one day before surgery    TOPIRAMATE (TOPAMAX) 50 MG TABLET    Take 1 tablet (50 mg total) by mouth once daily.   Changed and/or Refilled Medications    Modified Medication Previous Medication    OXYCODONE  (ROXICODONE) 10 MG TAB IMMEDIATE RELEASE TABLET oxyCODONE (ROXICODONE) 10 mg Tab immediate release tablet       Take 1 tablet (10 mg total) by mouth every 8 (eight) hours as needed (pain 6-7/10 pain scale score).    Take 1 tablet (10 mg total) by mouth every 8 (eight) hours as needed (pain 6-7/10 pain scale score).     Review of patient's allergies indicates:   Allergen Reactions    Penicillins Rash       Objective:     Left Lower Extremity  NVI  WWP foot  Comp soft  Cap refill < 2 sec  Calf NT  (-) Petra sign  CARMENCITA  ROM 0-90  Wiggles toes  DF/PF intact  Sensation intact  Inc C/D/I  No SOI    Assessment:       Encounter Diagnoses   Name Primary?    Arthritis of knee, left Yes    Status post total left knee replacement           Plan:       Mariel was seen today for pain and post-op evaluation.    Diagnoses and all orders for this visit:    Arthritis of knee, left  -     Ambulatory referral/consult to Physical/Occupational Therapy; Future  -     oxyCODONE (ROXICODONE) 10 mg Tab immediate release tablet; Take 1 tablet (10 mg total) by mouth every 8 (eight) hours as needed (pain 6-7/10 pain scale score).    Status post total left knee replacement  -     Ambulatory referral/consult to Physical/Occupational Therapy; Future  -     oxyCODONE (ROXICODONE) 10 mg Tab immediate release tablet; Take 1 tablet (10 mg total) by mouth every 8 (eight) hours as needed (pain 6-7/10 pain scale score).        Marielalfonso Becerril is an established pt here for postop follow-up after left total knee replacement by Dr. Connolly.  Pain medication was refilled appropriately.  The  was reviewed and the addictive potential of narcotic pain medication was discussed.  The incision was cleaned with hydrogen peroxide.  All staples were removed, and suture strips were applied across the incision.  They should remain in place until they fall off in approximately 2 weeks. The patient was instructed not to soak the incision in standing water  but may clean the incision with clean running water and antibacterial soap in 24 hours.  Patient should notify the office of any signs or symptoms of infection including fevers, erythema, purulent drainage, increasing pain.  Patient will continue with aspirin 81 mg bid and compressive stockings for DVT prophylaxis.  Patient will start outpatient PT once DCed from [facility] and follow-up with Dr. Connolly in 2 weeks.  Patient verbalized understanding of all instructions and agreed with the above plan.    Follow up in about 2 weeks (around 4/1/2020).    The patient understands, chooses and consents to this plan and accepts all   the risks which include but are not limited to the risks mentioned above.     Disclaimer: This note was prepared using a voice recognition system and is likely to have sound alike errors within the text.

## 2020-03-23 ENCOUNTER — TELEPHONE (OUTPATIENT)
Dept: HOME HEALTH SERVICES | Facility: HOSPITAL | Age: 65
End: 2020-03-23

## 2020-03-23 DIAGNOSIS — M25.571 SINUS TARSI SYNDROME OF RIGHT ANKLE: ICD-10-CM

## 2020-03-23 DIAGNOSIS — M76.821 POSTERIOR TIBIAL TENDON DYSFUNCTION, RIGHT: ICD-10-CM

## 2020-03-23 DIAGNOSIS — M25.572 SINUS TARSI SYNDROME OF LEFT ANKLE: ICD-10-CM

## 2020-03-23 DIAGNOSIS — M19.071 OSTEOARTHRITIS OF RIGHT ANKLE OR FOOT: ICD-10-CM

## 2020-03-23 DIAGNOSIS — M21.41 PES PLANUS OF BOTH FEET: ICD-10-CM

## 2020-03-23 DIAGNOSIS — M76.822 POSTERIOR TIBIAL TENDON DYSFUNCTION, LEFT: ICD-10-CM

## 2020-03-23 DIAGNOSIS — M19.072 OSTEOARTHRITIS OF LEFT ANKLE OR FOOT: ICD-10-CM

## 2020-03-23 DIAGNOSIS — M21.42 PES PLANUS OF BOTH FEET: ICD-10-CM

## 2020-03-24 RX ORDER — IBUPROFEN 600 MG/1
TABLET ORAL
Refills: 0 | OUTPATIENT
Start: 2020-03-24

## 2020-03-24 NOTE — TELEPHONE ENCOUNTER
Spoke with the patient in regards to their appointment on 04/02/20. Informed the patient that  due to the Covid-19 concerns we are rescheduling patient to a virtual visit with Dr. Connolly. Patient verbalized understanding patient will come in for 8:15 to do x-rays.PURNIMA

## 2020-03-25 DIAGNOSIS — S31.801A TEAR OF SKIN OF BUTTOCK, INITIAL ENCOUNTER: ICD-10-CM

## 2020-03-25 DIAGNOSIS — B37.2 YEAST DERMATITIS: ICD-10-CM

## 2020-03-25 RX ORDER — NYSTATIN 100000 U/G
CREAM TOPICAL
Qty: 30 G | Refills: 0 | Status: SHIPPED | OUTPATIENT
Start: 2020-03-25 | End: 2020-05-04

## 2020-03-27 ENCOUNTER — PATIENT MESSAGE (OUTPATIENT)
Dept: ORTHOPEDICS | Facility: CLINIC | Age: 65
End: 2020-03-27

## 2020-04-01 ENCOUNTER — PATIENT MESSAGE (OUTPATIENT)
Dept: ORTHOPEDICS | Facility: CLINIC | Age: 65
End: 2020-04-01

## 2020-04-01 ENCOUNTER — TELEPHONE (OUTPATIENT)
Dept: ORTHOPEDICS | Facility: CLINIC | Age: 65
End: 2020-04-01

## 2020-04-01 RX ORDER — METHOCARBAMOL 500 MG/1
TABLET, FILM COATED ORAL
Qty: 60 TABLET | Refills: 0 | Status: SHIPPED | OUTPATIENT
Start: 2020-04-01 | End: 2020-05-04

## 2020-04-01 NOTE — TELEPHONE ENCOUNTER
Spoke with the patient about their virtual visit. updated meds and allergies for tomorrows visit. Patient verbalized understanding.FP

## 2020-04-02 ENCOUNTER — TELEPHONE (OUTPATIENT)
Dept: ORTHOPEDICS | Facility: CLINIC | Age: 65
End: 2020-04-02

## 2020-04-02 ENCOUNTER — HOSPITAL ENCOUNTER (OUTPATIENT)
Dept: RADIOLOGY | Facility: HOSPITAL | Age: 65
Discharge: HOME | End: 2020-04-02
Attending: ORTHOPAEDIC SURGERY
Payer: COMMERCIAL

## 2020-04-02 ENCOUNTER — PATIENT OUTREACH (OUTPATIENT)
Dept: ADMINISTRATIVE | Facility: OTHER | Age: 65
End: 2020-04-02

## 2020-04-02 DIAGNOSIS — M25.562 LEFT KNEE PAIN, UNSPECIFIED CHRONICITY: ICD-10-CM

## 2020-04-02 PROCEDURE — 73562 XR KNEE ORTHO LEFT WITH FLEXION: ICD-10-PCS | Mod: 26,RT,, | Performed by: RADIOLOGY

## 2020-04-02 PROCEDURE — 73562 X-RAY EXAM OF KNEE 3: CPT | Mod: 26,RT,, | Performed by: RADIOLOGY

## 2020-04-02 PROCEDURE — 73564 X-RAY EXAM KNEE 4 OR MORE: CPT | Mod: 26,LT,, | Performed by: RADIOLOGY

## 2020-04-02 PROCEDURE — 73562 X-RAY EXAM OF KNEE 3: CPT | Mod: TC,RT

## 2020-04-02 PROCEDURE — 73564 XR KNEE ORTHO LEFT WITH FLEXION: ICD-10-PCS | Mod: 26,LT,, | Performed by: RADIOLOGY

## 2020-04-02 NOTE — TELEPHONE ENCOUNTER
Returned the patient's phone call about their virtual visit today. No answer left a message for the patient to give the office a call back.FP        ----- Message from Flores Owens sent at 4/2/2020  2:24 PM CDT -----  Contact: Patient  Patient states that she has been waiting on a call regarding her xray results, had virtual visit and no one ever came on line, please call her back at [phone]. Thank you

## 2020-04-03 ENCOUNTER — TELEPHONE (OUTPATIENT)
Dept: ORTHOPEDICS | Facility: CLINIC | Age: 65
End: 2020-04-03

## 2020-04-03 NOTE — TELEPHONE ENCOUNTER
Returned Osbaldo virk( [facility]) left a message for them to give the office a call back.FP        ----- Message from Emy Milton sent at 4/3/2020 12:54 PM CDT -----  ..Type:  Patient Returning Call    Who Called:Osbaldo virk( [facility]   Who Left Message for Patient:  Does the patient know what this is regarding?: home health services   Would the patient rather a call back or a response via [facility]? Call back  Best Call Back Number:[phone]  Additional Information: continuing or discharging

## 2020-04-03 NOTE — TELEPHONE ENCOUNTER
Returned the patient's phone call about their virtual visit on 04/02/20. No answer left a message for the patient to give the office a call back.FP           ----- Message from Ashtyn Clinton sent at 4/3/2020  8:11 AM CDT -----  Contact: Pt  Pt is requesting call back in regards to questions about virtual post op appt          Pls call back at [phone]

## 2020-04-03 NOTE — TELEPHONE ENCOUNTER
Returned the patient's phone call. Patient called to reschedule their missed appointment. I got the patient reschedule for a virtual visit on 04/06/20 at 1:40. Patient verbalized understanding. FP        ----- Message from Susana Menezes sent at 4/3/2020  3:36 PM CDT -----  Contact: self  Pt requesting a call back regarding her pt. She states that today was her last day. Please call pt back at [phone]

## 2020-04-06 ENCOUNTER — OFFICE VISIT (OUTPATIENT)
Dept: ORTHOPEDICS | Facility: CLINIC | Age: 65
End: 2020-04-06
Payer: COMMERCIAL

## 2020-04-06 ENCOUNTER — PATIENT MESSAGE (OUTPATIENT)
Dept: ORTHOPEDICS | Facility: CLINIC | Age: 65
End: 2020-04-06

## 2020-04-06 ENCOUNTER — TELEPHONE (OUTPATIENT)
Dept: ORTHOPEDICS | Facility: CLINIC | Age: 65
End: 2020-04-06

## 2020-04-06 DIAGNOSIS — Z96.652 STATUS POST TOTAL KNEE REPLACEMENT USING CEMENT, LEFT: Primary | ICD-10-CM

## 2020-04-06 PROCEDURE — 99024 PR POST-OP FOLLOW-UP VISIT: ICD-10-PCS | Mod: 95,,, | Performed by: ORTHOPAEDIC SURGERY

## 2020-04-06 PROCEDURE — 99024 POSTOP FOLLOW-UP VISIT: CPT | Mod: 95,,, | Performed by: ORTHOPAEDIC SURGERY

## 2020-04-06 NOTE — TELEPHONE ENCOUNTER
Spoke with Physical therapist  ( Charlie Jeffries) about the patient . Informed them that the patient already has a order for outpatient therapy at [facility]. verbalized understanding. Was thankful for the call back.FP                ----- Message from Barbie Paniagua sent at 4/6/2020 11:58 AM CDT -----  Type:  Needs Medical Advice    Who Called:  Physical therapist  ( Charlie Jeffries)  Symptoms (please be specific):  Needing orders for discharge and if pt has been setup for outpt therapy    How long has patient had these symptoms:     Pharmacy name and phone #:     Would the patient rather a call back or a response via [facility]?    Call back  Best Call Back Number:    [phone]  Additional Information:  Please call to discuss//[email or web address]

## 2020-04-06 NOTE — PATIENT INSTRUCTIONS
Told patient that swelling in the left knee might take 3-4 months to go way  The knee is stiff but needs extensive therapy  She lives in [place] will send a referral to PT close to home./jamil  Must undergo outpatient therapy otherwise her results will be suboptimal  Continue with the Tylenol, meloxicam, gabapentin  Follow-up in 6 weeks    Instructed patient to call if any problems

## 2020-04-06 NOTE — PROGRESS NOTES
Subjective:     Patient ID: Mariel Becerril is a 64 y.o. female.    Chief Complaint: No chief complaint on file.   Follow-up left total knee arthroplasty  HPI:  64-year-old  underwent left TKA 03/04/2020 using [facility].  She said she is doing much better but she still having some of the swelling that she had over the last 4 years in the knee.  Her pain is 6/10.  She is taking Tylenol only for pain as well as meloxicam and gabapentin.  She states she cannot fully straighten the leg he still.  She feels stiff.  Had not gone for outpatient physical therapy.  Denies any fever chills or shortness of breath or chest pain.  Maintaining social distancing  Past Medical History:   Diagnosis Date    Frequent headaches     Hypertension     Osteoarthritis 04/02/2019    Bilateral knees, ankles and feet    Severe obesity (BMI >= 40)     Sleep apnea      Past Surgical History:   Procedure Laterality Date    BLADDER SUSPENSION      CATARACT EXTRACTION, BILATERAL      COLONOSCOPY N/A 12/3/2018    Procedure: COLONOSCOPY;  Surgeon: Mari Rader MD;  Location: [facility] ENDO;  Service: Endoscopy;  Laterality: N/A;    HYSTERECTOMY      partial 2000    tonsilectomy      TOTAL KNEE ARTHROPLASTY Left 3/4/2020    Procedure: ARTHROPLASTY, KNEE, TOTAL;  Surgeon: Moy Connolly MD;  Location: [facility] OR;  Service: Orthopedics;  Laterality: Left;     Family History   Problem Relation Age of Onset    Heart attack Father      Social History     Socioeconomic History    Marital status:      Spouse name: Not on file    Number of children: Not on file    Years of education: Not on file    Highest education level: Not on file   Occupational History    Not on file   Social Needs    Financial resource strain: Not on file    Food insecurity:     Worry: Not on file     Inability: Not on file    Transportation needs:     Medical: Not on file     Non-medical: Not on file   Tobacco Use     Smoking status: Never Smoker    Smokeless tobacco: Never Used   Substance and Sexual Activity    Alcohol use: No    Drug use: No    Sexual activity: Never     Partners: Male     Birth control/protection: See Surgical Hx   Lifestyle    Physical activity:     Days per week: Not on file     Minutes per session: Not on file    Stress: Not on file   Relationships    Social connections:     Talks on phone: Not on file     Gets together: Not on file     Attends Alevism service: Not on file     Active member of club or organization: Not on file     Attends meetings of clubs or organizations: Not on file     Relationship status: Not on file   Other Topics Concern    Not on file   Social History Narrative    Not on file     Medication List with Changes/Refills   Current Medications    ACETAMINOPHEN (TYLENOL) 325 MG TABLET    Take 2 tablets (650 mg total) by mouth every 8 (eight) hours as needed.    ALBUTEROL (PROAIR HFA) 90 MCG/ACTUATION INHALER    Inhale 2 puffs into the lungs every 6 (six) hours as needed for Wheezing. Rescue    AMLODIPINE (NORVASC) 5 MG TABLET    Take 1 tablet (5 mg total) by mouth once daily.    ASPIRIN 325 MG TABLET    Take 1 tablet (325 mg total) by mouth once daily.    BENZONATATE (TESSALON) 100 MG CAPSULE    Take 1 capsule (100 mg total) by mouth 3 (three) times daily as needed.    DICLOFENAC SODIUM (VOLTAREN) 1 % GEL    Apply 4 grams topically 3 (three) times daily as needed.    EFLORNITHINE (VANIQA) 13.9 % CREAM    Apply topically 2 (two) times daily.    ERGOCALCIFEROL, VITAMIN D2, (VITAMIN D ORAL)    Take 2,000 Units by mouth once daily.    FLUTICASONE PROPIONATE (FLONASE) 50 MCG/ACTUATION NASAL SPRAY    1 spray (50 mcg total) by Each Nare route once daily.    FUROSEMIDE (LASIX) 20 MG TABLET    Take 1 tablet (20 mg total) by mouth daily as needed.    GABAPENTIN (NEURONTIN) 300 MG CAPSULE    Take 1 capsule (300 mg total) by mouth 2 (two) times daily.    GATIFLOXACIN 0.5 % DROP DROPS     Place 1 drop into the left eye 2 (two) times daily. Eyedrops to start one day before surgery    GATIFLOXACIN 0.5 % DROP DROPS    Apply 1 drop to eye 2 (two) times daily. Eyedrops to start one day before surgery    KETOROLAC 0.5% (ACULAR) 0.5 % DROP    Place 1 drop into the left eye 4 (four) times daily. Eyedrops to start one day before surgery    LEVOCETIRIZINE (XYZAL) 5 MG TABLET    Take 1 tablet (5 mg total) by mouth every evening.    MECLIZINE (ANTIVERT) 25 MG TABLET    Take 1 tablet (25 mg total) by mouth 3 (three) times daily as needed for Dizziness.    MELOXICAM (MOBIC) 15 MG TABLET    TAKE 1 TABLET BY MOUTH ONCE DAILY    METHOCARBAMOL (ROBAXIN) 500 MG TAB    TAKE 1 TABLET BY MOUTH TWICE DAILY AS NEEDED    METOPROLOL SUCCINATE (TOPROL-XL) 25 MG 24 HR TABLET    Take 1 tablet (25 mg total) by mouth 2 (two) times daily.    MONTELUKAST (SINGULAIR) 10 MG TABLET    Take 10 mg by mouth every evening.    NOZASEPTIN (NOZIN) NASAL     1 each by Each Nostril route 2 (two) times daily.    NYSTATIN (MYCOSTATIN) CREAM    Apply to affected area twice a day    NYSTATIN (MYCOSTATIN) POWDER    Apply topically 2 (two) times daily.    OMEPRAZOLE (PRILOSEC) 40 MG CAPSULE    Take 1 capsule (40 mg total) by mouth once daily.    ONDANSETRON (ZOFRAN-ODT) 4 MG TBDL    Take 1 tablet (4 mg total) by mouth every 8 (eight) hours as needed.    OXYCODONE (ROXICODONE) 10 MG TAB IMMEDIATE RELEASE TABLET    Take 1 tablet (10 mg total) by mouth every 8 (eight) hours as needed (pain 6-7/10 pain scale score).    PREDNISOLONE ACETATE (PRED FORTE) 1 % DRPS    Place 1 drop into the left eye 4 (four) times daily. Start one day before surgery    TOPIRAMATE (TOPAMAX) 50 MG TABLET    Take 1 tablet (50 mg total) by mouth once daily.     Review of patient's allergies indicates:   Allergen Reactions    Penicillins Rash     Review of Systems   Constitution: Negative for decreased appetite.   HENT: Negative for tinnitus.    Eyes: Negative for double  vision.   Cardiovascular: Negative for chest pain.   Respiratory: Negative for wheezing.    Hematologic/Lymphatic: Negative for bleeding problem.   Skin: Negative for dry skin.   Musculoskeletal: Positive for arthritis, joint pain and stiffness. Negative for back pain, gout and neck pain.   Gastrointestinal: Negative for abdominal pain.   Genitourinary: Negative for bladder incontinence.   Neurological: Negative for numbness, paresthesias and sensory change.   Psychiatric/Behavioral: Negative for altered mental status.       Objective:   There is no height or weight on file to calculate BMI.  There were no vitals filed for this visit.       General    Constitutional: She is oriented to person, place, and time. She appears well-developed.   HENT:   Head: Atraumatic.   Eyes: EOM are normal.   Pulmonary/Chest: Effort normal.   Neurological: She is alert and oriented to person, place, and time.   Psychiatric: Judgment normal.            Through telemedicine patient was able to put her camera on her knee.  Left total knee Surgical incision healed well.  There is no draining areas.  It looks moderately swollen.  Could not assess range of motion since patient is alone had nobody to hold the camera for her    Relevant imaging results reviewed and interpreted by me, discussed with the patient and / or family today   X-ray and electronic records showing TKA in excellent alignment no evidence of fracture left knee  Assessment:     Encounter Diagnosis   Name Primary?    Status post total knee replacement using cement, left Yes        Plan:   Status post total knee replacement using cement, left         Patient Instructions   Told patient that swelling in the left knee might take 3-4 months to go way  The knee is stiff but needs extensive therapy  She lives in [place] will send a referral to PT close to home./jamil  Must undergo outpatient therapy otherwise her results will be suboptimal  Continue with the Tylenol,  meloxicam, gabapentin  Follow-up in 6 weeks    Instructed patient to call if any problems    The patient location is:  home, [place]  The chief complaint leading to consultation is:  Follow-up left total knee replacement-swelling  Visit type: Virtual visit with synchronous audio and video  Total time spent with patient:  15 min  Each patient to whom he or she provides medical services by telemedicine is:  (1) informed of the relationship between the physician and patient and the respective role of any other health care provider with respect to management of the patient; and (2) notified that he or she may decline to receive medical services by telemedicine and may withdraw from such care at any time.    Notes:  See above note        Disclaimer: This note was prepared using a voice recognition system and is likely to have sound alike errors within the text.

## 2020-04-14 ENCOUNTER — PATIENT MESSAGE (OUTPATIENT)
Dept: INTERNAL MEDICINE | Facility: CLINIC | Age: 65
End: 2020-04-14

## 2020-04-20 ENCOUNTER — PATIENT MESSAGE (OUTPATIENT)
Dept: ORTHOPEDICS | Facility: CLINIC | Age: 65
End: 2020-04-20

## 2020-04-20 DIAGNOSIS — Z96.652 STATUS POST TOTAL KNEE REPLACEMENT USING CEMENT, LEFT: Primary | ICD-10-CM

## 2020-04-28 RX ORDER — MELOXICAM 15 MG/1
TABLET ORAL
Qty: 30 TABLET | Refills: 0 | Status: SHIPPED | OUTPATIENT
Start: 2020-04-28 | End: 2020-06-10

## 2020-04-30 ENCOUNTER — TELEPHONE (OUTPATIENT)
Dept: ORTHOPEDICS | Facility: CLINIC | Age: 65
End: 2020-04-30

## 2020-04-30 ENCOUNTER — OFFICE VISIT (OUTPATIENT)
Dept: ORTHOPEDICS | Facility: CLINIC | Age: 65
End: 2020-04-30
Payer: COMMERCIAL

## 2020-04-30 DIAGNOSIS — M25.562 PAIN AND SWELLING OF LEFT KNEE: ICD-10-CM

## 2020-04-30 DIAGNOSIS — Z96.652 STATUS POST TOTAL KNEE REPLACEMENT USING CEMENT, LEFT: Primary | ICD-10-CM

## 2020-04-30 DIAGNOSIS — M25.462 PAIN AND SWELLING OF LEFT KNEE: ICD-10-CM

## 2020-04-30 DIAGNOSIS — M25.562 POSTERIOR LEFT KNEE PAIN: ICD-10-CM

## 2020-04-30 PROCEDURE — 99024 POSTOP FOLLOW-UP VISIT: CPT | Mod: 95,,, | Performed by: ORTHOPAEDIC SURGERY

## 2020-04-30 PROCEDURE — 99024 PR POST-OP FOLLOW-UP VISIT: ICD-10-PCS | Mod: 95,,, | Performed by: ORTHOPAEDIC SURGERY

## 2020-04-30 RX ORDER — METOPROLOL SUCCINATE 50 MG/1
TABLET, EXTENDED RELEASE ORAL
COMMUNITY
Start: 2020-04-03 | End: 2021-04-09 | Stop reason: SDUPTHER

## 2020-04-30 RX ORDER — CHLORHEXIDINE GLUCONATE ORAL RINSE 1.2 MG/ML
SOLUTION DENTAL
COMMUNITY
Start: 2020-03-25 | End: 2021-02-01

## 2020-04-30 RX ORDER — PHENTERMINE HYDROCHLORIDE 37.5 MG/1
37.5 TABLET ORAL DAILY
COMMUNITY
Start: 2020-04-22 | End: 2020-12-03

## 2020-04-30 RX ORDER — 1.1% SODIUM FLUORIDE PRESCRIPTION DENTAL CREAM 5 MG/G
CREAM DENTAL
COMMUNITY
Start: 2020-03-25 | End: 2023-05-12

## 2020-04-30 RX ORDER — FUROSEMIDE 40 MG/1
40 TABLET ORAL DAILY
Qty: 30 TABLET | Refills: 2 | Status: SHIPPED | OUTPATIENT
Start: 2020-04-30 | End: 2020-07-27

## 2020-04-30 NOTE — PATIENT INSTRUCTIONS
Increases Lasix from 20 mg daily to 40 mg a day  Continue with the medication as is, meloxicam, Tylenol etc  Continue with physical therapy since we seeing improvement  Call in 3-4 days if the pain persists we may need to order ultrasound on the leg  Keep the appointment May 22nd   Return call placed to Bibiana. Pt reports onset of a rash to her right lateral breast noted this am.  Describes this rash as a red, bumpy rash. Denies pain or drainage from the rash.  However, reports a \"funny feeling\" from her breast to her axilla. Denies tingling or burning, stating, \" I can't explain it\".   Occasional mild, shooting pain noted down the right arm.  No right arm pain today.  Denies right arm numbness/tingling. Pt also continues to report itching and discomfort to her upper back.  Reports she has a rash to the upper back that she feels is predominantly on her right side.  Area is so painful she states she can hardly stand for her bra to touch the area.  Pt feels that she has shingles.     Assured pt I will send this message to Dr. Yusuf and will call back with her recommendations.  Informed Dr. Yusuf may want to have her RTC or go to walk in for assessment of this new rash to right lateral breast.  Pt agrees with plan and will await call back.

## 2020-04-30 NOTE — PROGRESS NOTES
Subjective:     Patient ID: Mariel Becerril is a 64 y.o. female.    Chief Complaint: No chief complaint on file.   Follow-up left total knee arthroplasty  HPI:  64-year-old  underwent left TKA 03/04/2020 using [facility].  She said she is doing much better but she still having some of the swelling that she had over the last 4 years in the knee.  Her pain is 6/10.  She is taking Tylenol only for pain as well as meloxicam and gabapentin.  She states she cannot fully straighten the leg he still.  She feels stiff.  Had not gone for outpatient physical therapy.  Denies any fever chills or shortness of breath or chest pain.  Maintaining social distancing    4/30/20  Patient had left TKA 03/04/2020 doing extensive physical therapy.  She said the swelling in the stiffness is markedly improved that last visit at the therapist's no ice was needed.  Would bother her is the tightness and swelling behind her knee in the back.  Her therapist told her it is a Baker cyst.  I did tell her this usually is swelling that goes into the back of the knee and it is filled with fluid and she understood what it was.  Denies any shortness of breath or chest pain or difficulty chewing or swallowing or fever or chills.  Her medications included Lasix 20 mg and she is doing okay with that.  We did discuss briefly it Tylenol how to take it in how to take her medications.  Pain is improving anteriorly in the knee it is completely gone except in the posterior aspect which is around 4/10.  Denies any pain in the calf, shortness of breath or chest pain or swelling in the thigh  Past Medical History:   Diagnosis Date    Frequent headaches     Hypertension     Osteoarthritis 04/02/2019    Bilateral knees, ankles and feet    Severe obesity (BMI >= 40)     Sleep apnea      Past Surgical History:   Procedure Laterality Date    BLADDER SUSPENSION      CATARACT EXTRACTION, BILATERAL      COLONOSCOPY N/A  12/3/2018    Procedure: COLONOSCOPY;  Surgeon: Mari Rader MD;  Location: [facility];  Service: Endoscopy;  Laterality: N/A;    HYSTERECTOMY      partial 2000    tonsilectomy      TOTAL KNEE ARTHROPLASTY Left 3/4/2020    Procedure: ARTHROPLASTY, KNEE, TOTAL;  Surgeon: Moy Connolly MD;  Location: [facility];  Service: Orthopedics;  Laterality: Left;     Family History   Problem Relation Age of Onset    Heart attack Father      Social History     Socioeconomic History    Marital status:      Spouse name: Not on file    Number of children: Not on file    Years of education: Not on file    Highest education level: Not on file   Occupational History    Not on file   Social Needs    Financial resource strain: Not on file    Food insecurity:     Worry: Not on file     Inability: Not on file    Transportation needs:     Medical: Not on file     Non-medical: Not on file   Tobacco Use    Smoking status: Never Smoker    Smokeless tobacco: Never Used   Substance and Sexual Activity    Alcohol use: No    Drug use: No    Sexual activity: Never     Partners: Male     Birth control/protection: See Surgical Hx   Lifestyle    Physical activity:     Days per week: Not on file     Minutes per session: Not on file    Stress: Not on file   Relationships    Social connections:     Talks on phone: Not on file     Gets together: Not on file     Attends Alevism service: Not on file     Active member of club or organization: Not on file     Attends meetings of clubs or organizations: Not on file     Relationship status: Not on file   Other Topics Concern    Not on file   Social History Narrative    Not on file     Medication List with Changes/Refills   New Medications    FUROSEMIDE (LASIX) 40 MG TABLET    Take 1 tablet (40 mg total) by mouth once daily.   Current Medications    ACETAMINOPHEN (TYLENOL) 325 MG TABLET    Take 2 tablets (650 mg total) by mouth every 8 (eight) hours as needed.    ALBUTEROL  (PROAIR HFA) 90 MCG/ACTUATION INHALER    Inhale 2 puffs into the lungs every 6 (six) hours as needed for Wheezing. Rescue    AMLODIPINE (NORVASC) 5 MG TABLET    Take 1 tablet (5 mg total) by mouth once daily.    ASPIRIN 325 MG TABLET    Take 1 tablet (325 mg total) by mouth once daily.    BENZONATATE (TESSALON) 100 MG CAPSULE    Take 1 capsule (100 mg total) by mouth 3 (three) times daily as needed.    CHLORHEXIDINE (PERIDEX) 0.12 % SOLUTION    SWISH AND SPIT 15 MLS BY MOUTH TWICE A DAY FOR 7 DAYS    DICLOFENAC SODIUM (VOLTAREN) 1 % GEL    Apply 4 grams topically 3 (three) times daily as needed.    EFLORNITHINE (VANIQA) 13.9 % CREAM    Apply topically 2 (two) times daily.    ERGOCALCIFEROL, VITAMIN D2, (VITAMIN D ORAL)    Take 2,000 Units by mouth once daily.    FLUTICASONE PROPIONATE (FLONASE) 50 MCG/ACTUATION NASAL SPRAY    1 spray (50 mcg total) by Each Nare route once daily.    FUROSEMIDE (LASIX) 20 MG TABLET    Take 1 tablet (20 mg total) by mouth daily as needed.    GABAPENTIN (NEURONTIN) 300 MG CAPSULE    Take 1 capsule (300 mg total) by mouth 2 (two) times daily.    GATIFLOXACIN 0.5 % DROP DROPS    Place 1 drop into the left eye 2 (two) times daily. Eyedrops to start one day before surgery    GATIFLOXACIN 0.5 % DROP DROPS    Apply 1 drop to eye 2 (two) times daily. Eyedrops to start one day before surgery    KETOROLAC 0.5% (ACULAR) 0.5 % DROP    Place 1 drop into the left eye 4 (four) times daily. Eyedrops to start one day before surgery    LEVOCETIRIZINE (XYZAL) 5 MG TABLET    Take 1 tablet (5 mg total) by mouth every evening.    MECLIZINE (ANTIVERT) 25 MG TABLET    Take 1 tablet (25 mg total) by mouth 3 (three) times daily as needed for Dizziness.    MELOXICAM (MOBIC) 15 MG TABLET    Take 1 tablet by mouth once daily    METHOCARBAMOL (ROBAXIN) 500 MG TAB    TAKE 1 TABLET BY MOUTH TWICE DAILY AS NEEDED    METOPROLOL SUCCINATE (TOPROL-XL) 50 MG 24 HR TABLET        MONTELUKAST (SINGULAIR) 10 MG TABLET    Take  10 mg by mouth every evening.    NOZASEPTIN (NOZIN) NASAL     1 each by Each Nostril route 2 (two) times daily.    NYSTATIN (MYCOSTATIN) CREAM    Apply to affected area twice a day    NYSTATIN (MYCOSTATIN) POWDER    Apply topically 2 (two) times daily.    OMEPRAZOLE (PRILOSEC) 40 MG CAPSULE    Take 1 capsule (40 mg total) by mouth once daily.    ONDANSETRON (ZOFRAN-ODT) 4 MG TBDL    Take 1 tablet (4 mg total) by mouth every 8 (eight) hours as needed.    OXYCODONE (ROXICODONE) 10 MG TAB IMMEDIATE RELEASE TABLET    Take 1 tablet (10 mg total) by mouth every 8 (eight) hours as needed (pain 6-7/10 pain scale score).    PHENTERMINE (ADIPEX-P) 37.5 MG TABLET    Take 37.5 mg by mouth once daily.    PREDNISOLONE ACETATE (PRED FORTE) 1 % DRPS    Place 1 drop into the left eye 4 (four) times daily. Start one day before surgery    SF 5000 PLUS 1.1 % CREA    BRUSH FOR 2 MINUTES AT BEDTIME THEN EXPECTORATE THE EXCESS.(NOTHING TO EAT OR DRINK FOR 30 MINUTES AFTER USE )    TOPIRAMATE (TOPAMAX) 50 MG TABLET    Take 1 tablet (50 mg total) by mouth once daily.     Review of patient's allergies indicates:   Allergen Reactions    Penicillins Rash     Review of Systems   Constitution: Negative for decreased appetite.   HENT: Negative for tinnitus.    Eyes: Negative for double vision.   Cardiovascular: Negative for chest pain.   Respiratory: Negative for wheezing.    Hematologic/Lymphatic: Negative for bleeding problem.   Skin: Negative for dry skin.   Musculoskeletal: Positive for arthritis, joint pain and stiffness. Negative for back pain, gout and neck pain.   Gastrointestinal: Negative for abdominal pain.   Genitourinary: Negative for bladder incontinence.   Neurological: Negative for numbness, paresthesias and sensory change.   Psychiatric/Behavioral: Negative for altered mental status.       Objective:   There is no height or weight on file to calculate BMI.  There were no vitals filed for this visit.        General    Constitutional: She is oriented to person, place, and time. She appears well-developed.   HENT:   Head: Atraumatic.   Eyes: EOM are normal.   Pulmonary/Chest: Effort normal.   Neurological: She is alert and oriented to person, place, and time.   Psychiatric: Judgment normal.            04/30/2020 could not examine today.  The following is last telemedicine visit     Through telemedicine patient was able to put her camera on her knee.  Left total knee Surgical incision healed well.  There is no draining areas.  It looks moderately swollen.  Could not assess range of motion since patient is alone had nobody to hold the camera for her    Relevant imaging results reviewed and interpreted by me, discussed with the patient and / or family today   X-ray and electronic records showing TKA in excellent alignment no evidence of fracture left knee  Assessment:     Encounter Diagnoses   Name Primary?    Status post total knee replacement using cement, left Yes    Posterior left knee pain     Pain and swelling of left knee         Plan:   Status post total knee replacement using cement, left    Posterior left knee pain    Pain and swelling of left knee    Other orders  -     furosemide (LASIX) 40 MG tablet; Take 1 tablet (40 mg total) by mouth once daily.  Dispense: 30 tablet; Refill: 2         Patient Instructions   Increases Lasix from 20 mg daily to 40 mg a day  Continue with the medication as is, meloxicam, Tylenol etc  Continue with physical therapy since we seeing improvement  Call in 3-4 days if the pain persists we may need to order ultrasound on the leg  Keep the appointment May 22nd    The patient location is:  [place], [place]  The chief complaint leading to consultation is:  Follow-up left total knee replacement-swelling  Visit type: Virtual visit with synchronous audio and video  Total time spent with patient:  15 min  Each patient to whom he or she provides medical services by telemedicine is:  (1)  informed of the relationship between the physician and patient and the respective role of any other health care provider with respect to management of the patient; and (2) notified that he or she may decline to receive medical services by telemedicine and may withdraw from such care at any time.    Notes:  See above note        Disclaimer: This note was prepared using a voice recognition system and is likely to have sound alike errors within the text.

## 2020-04-30 NOTE — TELEPHONE ENCOUNTER
Spoke to patient an we got her scheduled for a virtual visit for today with Dr. Connolly at 3PM. Patient verbalized understanding.

## 2020-04-30 NOTE — TELEPHONE ENCOUNTER
----- Message from Liset Hanson sent at 4/30/2020  1:32 PM CDT -----  Pt is requesting a call back regarding scheduling an appt this afternoon regarding L Knee pain. Please call pt back at [phone]

## 2020-05-02 DIAGNOSIS — B37.2 YEAST DERMATITIS: ICD-10-CM

## 2020-05-02 DIAGNOSIS — S31.801A TEAR OF SKIN OF BUTTOCK, INITIAL ENCOUNTER: ICD-10-CM

## 2020-05-04 ENCOUNTER — HOSPITAL ENCOUNTER (OUTPATIENT)
Dept: RADIOLOGY | Facility: HOSPITAL | Age: 65
Discharge: HOME | End: 2020-05-04
Attending: PHYSICIAN ASSISTANT
Payer: COMMERCIAL

## 2020-05-04 ENCOUNTER — PATIENT OUTREACH (OUTPATIENT)
Dept: ADMINISTRATIVE | Facility: OTHER | Age: 65
End: 2020-05-04

## 2020-05-04 ENCOUNTER — OFFICE VISIT (OUTPATIENT)
Dept: ORTHOPEDICS | Facility: CLINIC | Age: 65
End: 2020-05-04
Payer: COMMERCIAL

## 2020-05-04 VITALS
WEIGHT: 222 LBS | DIASTOLIC BLOOD PRESSURE: 80 MMHG | HEART RATE: 73 BPM | HEIGHT: 63 IN | SYSTOLIC BLOOD PRESSURE: 127 MMHG | BODY MASS INDEX: 39.34 KG/M2

## 2020-05-04 DIAGNOSIS — Z96.652 STATUS POST TOTAL KNEE REPLACEMENT USING CEMENT, LEFT: Primary | ICD-10-CM

## 2020-05-04 DIAGNOSIS — M25.462 PAIN AND SWELLING OF LEFT KNEE: Primary | ICD-10-CM

## 2020-05-04 DIAGNOSIS — M25.462 PAIN AND SWELLING OF LEFT KNEE: ICD-10-CM

## 2020-05-04 DIAGNOSIS — M25.562 PAIN IN BOTH KNEES, UNSPECIFIED CHRONICITY: Primary | ICD-10-CM

## 2020-05-04 DIAGNOSIS — M25.562 PAIN AND SWELLING OF LEFT KNEE: Primary | ICD-10-CM

## 2020-05-04 DIAGNOSIS — M25.562 PAIN AND SWELLING OF LEFT KNEE: ICD-10-CM

## 2020-05-04 DIAGNOSIS — M25.561 PAIN IN BOTH KNEES, UNSPECIFIED CHRONICITY: Primary | ICD-10-CM

## 2020-05-04 DIAGNOSIS — M25.562 POSTERIOR LEFT KNEE PAIN: ICD-10-CM

## 2020-05-04 PROCEDURE — 99024 PR POST-OP FOLLOW-UP VISIT: ICD-10-PCS | Mod: S$GLB,,, | Performed by: PHYSICIAN ASSISTANT

## 2020-05-04 PROCEDURE — 99999 PR PBB SHADOW E&M-EST. PATIENT-LVL IV: ICD-10-PCS | Mod: PBBFAC,,, | Performed by: PHYSICIAN ASSISTANT

## 2020-05-04 PROCEDURE — 99999 PR PBB SHADOW E&M-EST. PATIENT-LVL IV: CPT | Mod: PBBFAC,,, | Performed by: PHYSICIAN ASSISTANT

## 2020-05-04 PROCEDURE — 99024 POSTOP FOLLOW-UP VISIT: CPT | Mod: S$GLB,,, | Performed by: PHYSICIAN ASSISTANT

## 2020-05-04 PROCEDURE — 93971 US LOWER EXTREMITY VEINS LEFT: ICD-10-PCS | Mod: 26,LT,, | Performed by: RADIOLOGY

## 2020-05-04 PROCEDURE — 93971 EXTREMITY STUDY: CPT | Mod: 26,LT,, | Performed by: RADIOLOGY

## 2020-05-04 PROCEDURE — 93971 EXTREMITY STUDY: CPT | Mod: TC,LT

## 2020-05-04 RX ORDER — DICLOFENAC SODIUM 10 MG/G
2 GEL TOPICAL 3 TIMES DAILY PRN
Qty: 200 G | Refills: 2 | Status: SHIPPED | OUTPATIENT
Start: 2020-05-04 | End: 2021-09-10 | Stop reason: SDUPTHER

## 2020-05-04 RX ORDER — METHOCARBAMOL 500 MG/1
TABLET, FILM COATED ORAL
Qty: 60 TABLET | Refills: 0 | Status: SHIPPED | OUTPATIENT
Start: 2020-05-04 | End: 2020-05-22 | Stop reason: ALTCHOICE

## 2020-05-04 RX ORDER — OXYCODONE AND ACETAMINOPHEN 5; 325 MG/1; MG/1
1 TABLET ORAL EVERY 8 HOURS PRN
Qty: 21 TABLET | Refills: 0 | Status: SHIPPED | OUTPATIENT
Start: 2020-05-04 | End: 2020-05-14 | Stop reason: SDUPTHER

## 2020-05-04 RX ORDER — ASPIRIN 325 MG
325 TABLET ORAL DAILY
Qty: 60 TABLET | Refills: 0 | Status: CANCELLED | OUTPATIENT
Start: 2020-05-04 | End: 2021-05-04

## 2020-05-04 RX ORDER — NYSTATIN 100000 U/G
CREAM TOPICAL
Qty: 30 G | Refills: 0 | Status: SHIPPED | OUTPATIENT
Start: 2020-05-04 | End: 2020-05-05 | Stop reason: SDUPTHER

## 2020-05-04 RX ORDER — ASPIRIN 325 MG
325 TABLET ORAL DAILY
Qty: 60 TABLET | Refills: 0 | Status: SHIPPED | OUTPATIENT
Start: 2020-05-04 | End: 2020-07-28 | Stop reason: SDUPTHER

## 2020-05-04 NOTE — PROGRESS NOTES
Patient ID: Mariel Becerril is a 64 y.o. female.    Chief Complaint: Pain of the Left Knee      HPI: Mariel Becerril  is a 64 y.o. female who c/o Pain of the Left Knee   for duration of couple of months.  She underwent left total knee arthroplasty by Dr. Connolly on 03/04/2020.  She has had complaints of pain in the posterior aspect of the knee since then.  She did a virtual visit with Dr. Connolly last week.  He had wanted her to follow-up today for reassessment.  According to his notes, if pain continued, he wanted to get an ultrasound of the leg.  Pain level stated 9/10.  Quality is aching and constant.  Alleviating factors include rest.  Aggravating factors include movement, particularly deep flexion.  Also worsened with weight-bearing.  She has been doing physical therapy since surgery.  She is currently taking meloxicam, gabapentin, Voltaren gel, and aspirin.  She is out of pain medication.    Surgery Performed: Left Total knee replacement    Surgery Date:  03/04/2020    Past Medical History:   Diagnosis Date    Frequent headaches     Hypertension     Osteoarthritis 04/02/2019    Bilateral knees, ankles and feet    Severe obesity (BMI >= 40)     Sleep apnea      Past Surgical History:   Procedure Laterality Date    BLADDER SUSPENSION      CATARACT EXTRACTION, BILATERAL      COLONOSCOPY N/A 12/3/2018    Procedure: COLONOSCOPY;  Surgeon: Mari Rader MD;  Location: [facility];  Service: Endoscopy;  Laterality: N/A;    HYSTERECTOMY      partial 2000    tonsilectomy      TOTAL KNEE ARTHROPLASTY Left 3/4/2020    Procedure: ARTHROPLASTY, KNEE, TOTAL;  Surgeon: Moy Connolly MD;  Location: [facility];  Service: Orthopedics;  Laterality: Left;     Family History   Problem Relation Age of Onset    Heart attack Father      Social History     Socioeconomic History    Marital status:      Spouse name: Not on file    Number of children: Not on file    Years of education: Not on file     Highest education level: Not on file   Occupational History    Not on file   Social Needs    Financial resource strain: Not on file    Food insecurity:     Worry: Not on file     Inability: Not on file    Transportation needs:     Medical: Not on file     Non-medical: Not on file   Tobacco Use    Smoking status: Never Smoker    Smokeless tobacco: Never Used   Substance and Sexual Activity    Alcohol use: No    Drug use: No    Sexual activity: Never     Partners: Male     Birth control/protection: See Surgical Hx   Lifestyle    Physical activity:     Days per week: Not on file     Minutes per session: Not on file    Stress: Not on file   Relationships    Social connections:     Talks on phone: Not on file     Gets together: Not on file     Attends Zoroastrian service: Not on file     Active member of club or organization: Not on file     Attends meetings of clubs or organizations: Not on file     Relationship status: Not on file   Other Topics Concern    Not on file   Social History Narrative    Not on file     Medication List with Changes/Refills   New Medications    DICLOFENAC SODIUM (VOLTAREN) 1 % GEL    Apply 2 g topically 3 (three) times daily as needed.    OXYCODONE-ACETAMINOPHEN (PERCOCET) 5-325 MG PER TABLET    Take 1 tablet by mouth every 8 (eight) hours as needed for Pain.   Current Medications    ACETAMINOPHEN (TYLENOL) 325 MG TABLET    Take 2 tablets (650 mg total) by mouth every 8 (eight) hours as needed.    ALBUTEROL (PROAIR HFA) 90 MCG/ACTUATION INHALER    Inhale 2 puffs into the lungs every 6 (six) hours as needed for Wheezing. Rescue    AMLODIPINE (NORVASC) 5 MG TABLET    Take 1 tablet (5 mg total) by mouth once daily.    ASPIRIN 325 MG TABLET    Take 1 tablet (325 mg total) by mouth once daily.    BENZONATATE (TESSALON) 100 MG CAPSULE    Take 1 capsule (100 mg total) by mouth 3 (three) times daily as needed.    CHLORHEXIDINE (PERIDEX) 0.12 % SOLUTION    SWISH AND SPIT 15 MLS BY MOUTH  TWICE A DAY FOR 7 DAYS    DICLOFENAC SODIUM (VOLTAREN) 1 % GEL    Apply 4 grams topically 3 (three) times daily as needed.    EFLORNITHINE (VANIQA) 13.9 % CREAM    Apply topically 2 (two) times daily.    ERGOCALCIFEROL, VITAMIN D2, (VITAMIN D ORAL)    Take 2,000 Units by mouth once daily.    FLUTICASONE PROPIONATE (FLONASE) 50 MCG/ACTUATION NASAL SPRAY    1 spray (50 mcg total) by Each Nare route once daily.    FUROSEMIDE (LASIX) 20 MG TABLET    Take 1 tablet (20 mg total) by mouth daily as needed.    FUROSEMIDE (LASIX) 40 MG TABLET    Take 1 tablet (40 mg total) by mouth once daily.    GABAPENTIN (NEURONTIN) 300 MG CAPSULE    Take 1 capsule (300 mg total) by mouth 2 (two) times daily.    GATIFLOXACIN 0.5 % DROP DROPS    Place 1 drop into the left eye 2 (two) times daily. Eyedrops to start one day before surgery    GATIFLOXACIN 0.5 % DROP DROPS    Apply 1 drop to eye 2 (two) times daily. Eyedrops to start one day before surgery    KETOROLAC 0.5% (ACULAR) 0.5 % DROP    Place 1 drop into the left eye 4 (four) times daily. Eyedrops to start one day before surgery    LEVOCETIRIZINE (XYZAL) 5 MG TABLET    Take 1 tablet (5 mg total) by mouth every evening.    MECLIZINE (ANTIVERT) 25 MG TABLET    Take 1 tablet (25 mg total) by mouth 3 (three) times daily as needed for Dizziness.    MELOXICAM (MOBIC) 15 MG TABLET    Take 1 tablet by mouth once daily    METHOCARBAMOL (ROBAXIN) 500 MG TAB    TAKE 1 TABLET BY MOUTH TWICE DAILY AS NEEDED    METOPROLOL SUCCINATE (TOPROL-XL) 50 MG 24 HR TABLET        MONTELUKAST (SINGULAIR) 10 MG TABLET    Take 10 mg by mouth every evening.    NOZASEPTIN (NOZIN) NASAL     1 each by Each Nostril route 2 (two) times daily.    NYSTATIN (MYCOSTATIN) CREAM    APPLY  CREAM TOPICALLY TO AFFECTED AREA TWICE DAILY    NYSTATIN (MYCOSTATIN) POWDER    Apply topically 2 (two) times daily.    OMEPRAZOLE (PRILOSEC) 40 MG CAPSULE    Take 1 capsule (40 mg total) by mouth once daily.    ONDANSETRON  (ZOFRAN-ODT) 4 MG TBDL    Take 1 tablet (4 mg total) by mouth every 8 (eight) hours as needed.    OXYCODONE (ROXICODONE) 10 MG TAB IMMEDIATE RELEASE TABLET    Take 1 tablet (10 mg total) by mouth every 8 (eight) hours as needed (pain 6-7/10 pain scale score).    PHENTERMINE (ADIPEX-P) 37.5 MG TABLET    Take 37.5 mg by mouth once daily.    PREDNISOLONE ACETATE (PRED FORTE) 1 % DRPS    Place 1 drop into the left eye 4 (four) times daily. Start one day before surgery    SF 5000 PLUS 1.1 % CREA    BRUSH FOR 2 MINUTES AT BEDTIME THEN EXPECTORATE THE EXCESS.(NOTHING TO EAT OR DRINK FOR 30 MINUTES AFTER USE )    TOPIRAMATE (TOPAMAX) 50 MG TABLET    Take 1 tablet (50 mg total) by mouth once daily.     Review of patient's allergies indicates:   Allergen Reactions    Penicillins Rash       Objective:     Left Lower Extremity  NVI  WWP foot  Comp soft  Cap refill < 2 sec  Mild Calf NT  (-) Petra sign  CARMENCITA  ROM 0-120  Wiggles toes  DF/PF intact  Sensation intact  Inc C/D/I - well healed  No SOI    Assessment:       Encounter Diagnoses   Name Primary?    Status post total knee replacement using cement, left Yes    Posterior left knee pain           Plan:       Mariel was seen today for pain.    Diagnoses and all orders for this visit:    Status post total knee replacement using cement, left  -     oxyCODONE-acetaminophen (PERCOCET) 5-325 mg per tablet; Take 1 tablet by mouth every 8 (eight) hours as needed for Pain.  -     diclofenac sodium (VOLTAREN) 1 % Gel; Apply 2 g topically 3 (three) times daily as needed.    Posterior left knee pain  -     oxyCODONE-acetaminophen (PERCOCET) 5-325 mg per tablet; Take 1 tablet by mouth every 8 (eight) hours as needed for Pain.  -     diclofenac sodium (VOLTAREN) 1 % Gel; Apply 2 g topically 3 (three) times daily as needed.        Mariel Becerril is an established pt here for postop follow-up after left total knee replacement by Dr. Connolly.  Unfortunately, pain has continued.   According to his note he wanted an ultrasound if it continued.  I will send her for an ultrasound today to evaluate for DVT.  Additionally, I have given her a refill on Percocet 5.  I have also given her a refill of Voltaren gel.  She will continue with physical therapy.  She will keep her appointment as scheduled with Dr. Connolly on May 25th.  I will certainly notify her of the results of the ultrasound.  She verbalizes understanding and agrees.  Follow up in about 3 weeks (around 5/25/2020) for as scheduled for f/u with Dr. Connolly. with xray left knee    The patient understands, chooses and consents to this plan and accepts all   the risks which include but are not limited to the risks mentioned above.     Disclaimer: This note was prepared using a voice recognition system and is likely to have sound alike errors within the text.

## 2020-05-05 DIAGNOSIS — B37.2 YEAST DERMATITIS: ICD-10-CM

## 2020-05-05 DIAGNOSIS — S31.801A TEAR OF SKIN OF BUTTOCK, INITIAL ENCOUNTER: ICD-10-CM

## 2020-05-05 RX ORDER — NYSTATIN 100000 U/G
CREAM TOPICAL
Qty: 30 G | Refills: 0 | Status: SHIPPED | OUTPATIENT
Start: 2020-05-05 | End: 2021-01-15

## 2020-05-11 ENCOUNTER — PATIENT OUTREACH (OUTPATIENT)
Dept: ADMINISTRATIVE | Facility: OTHER | Age: 65
End: 2020-05-11

## 2020-05-11 ENCOUNTER — PATIENT MESSAGE (OUTPATIENT)
Dept: INTERNAL MEDICINE | Facility: CLINIC | Age: 65
End: 2020-05-11

## 2020-05-12 ENCOUNTER — HOSPITAL ENCOUNTER (OUTPATIENT)
Dept: RADIOLOGY | Facility: HOSPITAL | Age: 65
Discharge: HOME | End: 2020-05-12
Attending: PODIATRIST
Payer: COMMERCIAL

## 2020-05-12 ENCOUNTER — OFFICE VISIT (OUTPATIENT)
Dept: PODIATRY | Facility: CLINIC | Age: 65
End: 2020-05-12
Payer: COMMERCIAL

## 2020-05-12 VITALS — WEIGHT: 232.56 LBS | HEIGHT: 63 IN | BODY MASS INDEX: 41.21 KG/M2 | RESPIRATION RATE: 17 BRPM

## 2020-05-12 DIAGNOSIS — M25.562 POSTERIOR LEFT KNEE PAIN: ICD-10-CM

## 2020-05-12 DIAGNOSIS — E66.01 MORBID OBESITY: ICD-10-CM

## 2020-05-12 DIAGNOSIS — M25.572 PAIN IN JOINT OF LEFT FOOT: ICD-10-CM

## 2020-05-12 DIAGNOSIS — M19.072 OSTEOARTHRITIS OF LEFT ANKLE OR FOOT: ICD-10-CM

## 2020-05-12 DIAGNOSIS — Z96.652 STATUS POST TOTAL KNEE REPLACEMENT USING CEMENT, LEFT: ICD-10-CM

## 2020-05-12 DIAGNOSIS — G89.29 CHRONIC PAIN OF LEFT KNEE: ICD-10-CM

## 2020-05-12 DIAGNOSIS — B35.1 ONYCHOMYCOSIS: Primary | ICD-10-CM

## 2020-05-12 DIAGNOSIS — L85.3 XEROSIS CUTIS: ICD-10-CM

## 2020-05-12 DIAGNOSIS — Z98.890 HISTORY OF LEFT KNEE SURGERY: ICD-10-CM

## 2020-05-12 DIAGNOSIS — M25.562 CHRONIC PAIN OF LEFT KNEE: ICD-10-CM

## 2020-05-12 PROCEDURE — 73630 X-RAY EXAM OF FOOT: CPT | Mod: TC,LT

## 2020-05-12 PROCEDURE — 99999 PR PBB SHADOW E&M-EST. PATIENT-LVL III: CPT | Mod: PBBFAC,,, | Performed by: PODIATRIST

## 2020-05-12 PROCEDURE — 3008F PR BODY MASS INDEX (BMI) DOCUMENTED: ICD-10-PCS | Mod: CPTII,S$GLB,, | Performed by: PODIATRIST

## 2020-05-12 PROCEDURE — 73630 XR FOOT COMPLETE 3 VIEW LEFT: ICD-10-PCS | Mod: 26,LT,, | Performed by: RADIOLOGY

## 2020-05-12 PROCEDURE — 99999 PR PBB SHADOW E&M-EST. PATIENT-LVL III: ICD-10-PCS | Mod: PBBFAC,,, | Performed by: PODIATRIST

## 2020-05-12 PROCEDURE — 99214 PR OFFICE/OUTPT VISIT, EST, LEVL IV, 30-39 MIN: ICD-10-PCS | Mod: S$GLB,,, | Performed by: PODIATRIST

## 2020-05-12 PROCEDURE — 3008F BODY MASS INDEX DOCD: CPT | Mod: CPTII,S$GLB,, | Performed by: PODIATRIST

## 2020-05-12 PROCEDURE — 73630 X-RAY EXAM OF FOOT: CPT | Mod: 26,LT,, | Performed by: RADIOLOGY

## 2020-05-12 PROCEDURE — 99214 OFFICE O/P EST MOD 30 MIN: CPT | Mod: S$GLB,,, | Performed by: PODIATRIST

## 2020-05-12 RX ORDER — OXYCODONE AND ACETAMINOPHEN 5; 325 MG/1; MG/1
1 TABLET ORAL EVERY 8 HOURS PRN
Qty: 21 TABLET | Refills: 0 | Status: CANCELLED | OUTPATIENT
Start: 2020-05-12

## 2020-05-12 RX ORDER — KETOCONAZOLE 20 MG/G
CREAM TOPICAL 2 TIMES DAILY
Qty: 30 G | Refills: 1 | Status: SHIPPED | OUTPATIENT
Start: 2020-05-12 | End: 2021-04-20

## 2020-05-12 NOTE — PROGRESS NOTES
Subjective:       Patient ID: Mariel Becerril is a 64 y.o. female.    Chief Complaint: Routine Foot Care (nail cut down, wears tennis, reports pain in her left foot, non-diabetic,PCP Dr. Becerril        )      HPI:  Patient presents to the office this afternoon, with complaint of elongated and thickened nail plates to the left and right. The patient states slight discomfort due to the nail thickening and/or elongation.  Patient is interested in the definitive medical diagnosis.  Symptoms are exacerbated with tight shoe gear.  Pains are approximately 5/10. This patient's PMD is Jaclyn Becerril MD. The patient states that the the nail plate/nails have appeared such for the past several months to years. Denies recent trauma which could lead to the diagnoses of nail dystrophy.  Patient also states pains atop the left dorsal foot due to osteoarthritis.    Review of patient's allergies indicates:   Allergen Reactions    Penicillins Rash       Past Medical History:   Diagnosis Date    Frequent headaches     Hypertension     Osteoarthritis 04/02/2019    Bilateral knees, ankles and feet    Severe obesity (BMI >= 40)     Sleep apnea        Family History   Problem Relation Age of Onset    Heart attack Father        Social History     Socioeconomic History    Marital status:      Spouse name: Not on file    Number of children: Not on file    Years of education: Not on file    Highest education level: Not on file   Occupational History    Not on file   Social Needs    Financial resource strain: Not on file    Food insecurity:     Worry: Not on file     Inability: Not on file    Transportation needs:     Medical: Not on file     Non-medical: Not on file   Tobacco Use    Smoking status: Never Smoker    Smokeless tobacco: Never Used   Substance and Sexual Activity    Alcohol use: No    Drug use: No    Sexual activity: Never     Partners: Male     Birth control/protection: See Surgical Hx   Lifestyle     "Physical activity:     Days per week: Not on file     Minutes per session: Not on file    Stress: Not on file   Relationships    Social connections:     Talks on phone: Not on file     Gets together: Not on file     Attends Orthodox service: Not on file     Active member of club or organization: Not on file     Attends meetings of clubs or organizations: Not on file     Relationship status: Not on file   Other Topics Concern    Not on file   Social History Narrative    Not on file       Past Surgical History:   Procedure Laterality Date    BLADDER SUSPENSION      CATARACT EXTRACTION, BILATERAL      COLONOSCOPY N/A 12/3/2018    Procedure: COLONOSCOPY;  Surgeon: Mari Rader MD;  Location: [facility] ENDO;  Service: Endoscopy;  Laterality: N/A;    HYSTERECTOMY      partial 2000    tonsilectomy      TOTAL KNEE ARTHROPLASTY Left 3/4/2020    Procedure: ARTHROPLASTY, KNEE, TOTAL;  Surgeon: Moy Connolly MD;  Location: [facility] OR;  Service: Orthopedics;  Laterality: Left;       Review of Systems   Constitutional: Negative for chills, fatigue and fever.   HENT: Negative for hearing loss.    Eyes: Negative for photophobia and visual disturbance.   Respiratory: Negative for cough, chest tightness, shortness of breath and wheezing.    Cardiovascular: Negative for chest pain and palpitations.   Gastrointestinal: Negative for constipation, diarrhea, nausea and vomiting.   Endocrine: Negative for cold intolerance and heat intolerance.   Genitourinary: Negative for flank pain.   Musculoskeletal: Positive for gait problem. Negative for neck pain and neck stiffness.   Skin: Negative for wound.   Neurological: Negative for light-headedness and headaches.   Psychiatric/Behavioral: Negative for sleep disturbance.          Objective:   Resp 17   Ht 5' 3" (1.6 m)   Wt 105.5 kg (232 lb 9.4 oz)   BMI 41.20 kg/m²     X-Ray Foot Complete Left  Narrative: EXAMINATION:  XR FOOT COMPLETE 3 VIEW LEFT    CLINICAL HISTORY:  .  Pain in " left ankle and joints of left foot    TECHNIQUE:  AP, lateral and oblique views of the left foot were performed.    COMPARISON:  04/02/2019 and 09/11/2018 radiographs    FINDINGS:  Prominent both dorsal and plantar calcaneal enthesophytes noted.  Plate is planus deformity present.  Prominent hallux valgus deformity remains with 1st metatarsal median eminence prominence.  Bones are osteopenic.  No acute fracture.  No focal soft tissue abnormality.  Impression: Similar appearance of the foot    Electronically signed by: Jared Zamora MD  Date:    05/12/2020  Time:    11:58       Physical Exam    LOWER EXTREMITY PHYSICAL EXAMINATION  NEUROLOGY: Protective sensation is intact via 5.07 [place] Elana monofilament. Proprioception is intact. Sensation to light touch is intact. Vibratory sensation is WNL.    DERMATOLOGY:  Skin is supple, dry and intact. No ulcerations are noted. No hyperkeratosis or calluses are noted. No ecchymosis appreciated.  There are nail changes which are consistent with onychomycosis on the right and left foot. On the left foot, nail #1 and #2 is/are thickened, is/are dystrophic, with yellow discoloration, and with malodorous subungual debris. On the right foot, nail #1, #2, #4 and #5 is/are thickened, is/are dystrophic, with yellow discoloration, and with malodorous subungual debris.  Moderate xerosis is noted.    ORTHOPEDIC: Manual Muscle Testing is 5/5 in all planes on the left and right, without pains, with and without resistance.  Pes planus foot type is noted.  Discomfort to palpation of the dorsal aspect of the left midfoot at the talonavicular joint and metatarsal cuneiform joints.  Gait pattern is slow, and slightly antalgic.    VASCULAR: Warm to warm, proximal to distal. Capillary refill time is within normal limits and less  than 3 seconds. Hair growth is sparse on the left and right dorsal foot and at the digits. The left dorsalis pedis pulse is 1/4 and on the right is 1/4, and the  left posterior tibial pulse is 1/4 and is 1/4 on the right.     Assessment:     1. Onychomycosis    2. Pain in joint of left foot    3. Xerosis cutis    4. Morbid obesity    5. Chronic pain of left knee    6. History of left knee surgery    7. Osteoarthritis of left ankle or foot        Plan:     Onychomycosis  -     ketoconazole (NIZORAL) 2 % cream; Apply topically 2 (two) times daily.  Dispense: 30 g; Refill: 1    The onychomycotic nail plates, as outlined above, are sharply debrided with double action nail nipper, and/or with the assistance of a mechanical rotary mari, with removal of all offending nail and nail border(s), for reduction of pains. Nails are reduced in terms of length, width and girth with removal of subungual debris to facilitate pain free weight bearing and ambulation. The elongated nails as outlined in the objective portion of this note, were trimmed to appropriate length, with a double action nail nipper, for alleviation/reduction of pains as well. Follow up in approx. 3-4 months.    Patient does not meet the qualifications for, or have the class findings necessary for routine foot care. There is no lack of or diminished sensation and he/she has no significant findings of PVD to the B/L LE. At any follow-up appointment concerning routine foot care, patient will be PROC-B, and will be required to pay for services.  This fact is explained to the patient and/or any family who is present at today's appt.    Pain in joint of left foot  Osteoarthritis of left ankle or foot  -     X-Ray Foot Complete Left; Future; Expected date: 05/12/2020    Thorough discussion is had with the patient today, concerning the diagnosis, its etiology, and the treatment algorithm at present.  XRAYS are reviewed in detail with the patient. All questions and concerns regarding findings and its/their implications are outlined and discussed.    Did discuss proper and supportive shoe gear in detail and at length with the  patient.  These are shoes with firm and robust arch support; medial counter.  Shoes which only bend at the metatarsophalangeal joint and which are rigid in the midfoot and hindfoot. Patient urged to purchase running type or cross training type shoes gear which are designed for pronation control.    Continue Tylenol and Meloxicam as needed.       Xerosis cutis  Recommend twice to 3 times daily topical emollient. Did discuss with the patient concerning dry and thin skin in relation to complications due to comorbid states. Patient will purchase OTC Urea 40% and use BID or TID or Eucerin topical lotion and use BID.    Morbid obesity  Patient is counseled and reminded concerning the importance of good nutrition and healthy eating habits, which may include blood sugar control, to prevent and/or help podiatric foot and ankle complications.    History of left knee surgery  Osteoarthritis of left ankle or foot  Patient advised to follow up with her MD/DO Orthopedic [facility]. for management of comorbid states.        Future Appointments   Date Time Provider Department Center   5/12/2020  1:00 PM ON XR2-CR ON XRAY [facility]   5/22/2020  8:00 AM Moy Connolly MD ON ORTHO [facility]   7/1/2020 12:00 PM Diana Davenport NP ON PULMSVC [facility]   8/11/2020  2:20 PM Juan Alberto Luis MD ON CARDIO [facility]   8/12/2020  9:15 AM Marcus Narvaez DPM ONLC POD [facility]   9/23/2020 10:00 AM Antonio Carey [place] [facility]   11/17/2020  9:40 AM Ann Woods MD [facility]

## 2020-05-13 ENCOUNTER — PATIENT MESSAGE (OUTPATIENT)
Dept: ORTHOPEDICS | Facility: CLINIC | Age: 65
End: 2020-05-13

## 2020-05-14 DIAGNOSIS — Z96.652 STATUS POST TOTAL KNEE REPLACEMENT USING CEMENT, LEFT: ICD-10-CM

## 2020-05-14 DIAGNOSIS — M25.562 POSTERIOR LEFT KNEE PAIN: ICD-10-CM

## 2020-05-14 RX ORDER — OXYCODONE AND ACETAMINOPHEN 5; 325 MG/1; MG/1
1 TABLET ORAL EVERY 8 HOURS PRN
Qty: 21 TABLET | Refills: 0 | Status: ON HOLD | OUTPATIENT
Start: 2020-05-14 | End: 2020-12-31 | Stop reason: HOSPADM

## 2020-05-18 ENCOUNTER — PATIENT MESSAGE (OUTPATIENT)
Dept: ORTHOPEDICS | Facility: CLINIC | Age: 65
End: 2020-05-18

## 2020-05-22 ENCOUNTER — OFFICE VISIT (OUTPATIENT)
Dept: ORTHOPEDICS | Facility: CLINIC | Age: 65
End: 2020-05-22
Payer: COMMERCIAL

## 2020-05-22 ENCOUNTER — PATIENT MESSAGE (OUTPATIENT)
Dept: INTERNAL MEDICINE | Facility: CLINIC | Age: 65
End: 2020-05-22

## 2020-05-22 ENCOUNTER — HOSPITAL ENCOUNTER (OUTPATIENT)
Dept: RADIOLOGY | Facility: HOSPITAL | Age: 65
Discharge: HOME | End: 2020-05-22
Attending: PHYSICIAN ASSISTANT
Payer: COMMERCIAL

## 2020-05-22 VITALS
DIASTOLIC BLOOD PRESSURE: 74 MMHG | HEART RATE: 81 BPM | WEIGHT: 232 LBS | BODY MASS INDEX: 41.11 KG/M2 | SYSTOLIC BLOOD PRESSURE: 135 MMHG | HEIGHT: 63 IN

## 2020-05-22 DIAGNOSIS — Z96.652 STATUS POST TOTAL KNEE REPLACEMENT USING CEMENT, LEFT: Primary | ICD-10-CM

## 2020-05-22 DIAGNOSIS — M25.562 PAIN AND SWELLING OF LEFT KNEE: ICD-10-CM

## 2020-05-22 DIAGNOSIS — M25.562 POSTERIOR LEFT KNEE PAIN: ICD-10-CM

## 2020-05-22 DIAGNOSIS — M25.462 PAIN AND SWELLING OF LEFT KNEE: ICD-10-CM

## 2020-05-22 DIAGNOSIS — M25.562 PAIN IN BOTH KNEES, UNSPECIFIED CHRONICITY: ICD-10-CM

## 2020-05-22 DIAGNOSIS — M25.561 PAIN IN BOTH KNEES, UNSPECIFIED CHRONICITY: ICD-10-CM

## 2020-05-22 PROCEDURE — 99999 PR PBB SHADOW E&M-EST. PATIENT-LVL V: ICD-10-PCS | Mod: PBBFAC,,, | Performed by: ORTHOPAEDIC SURGERY

## 2020-05-22 PROCEDURE — 99024 PR POST-OP FOLLOW-UP VISIT: ICD-10-PCS | Mod: S$GLB,,, | Performed by: ORTHOPAEDIC SURGERY

## 2020-05-22 PROCEDURE — 73564 XR KNEE ORTHO BILAT WITH FLEXION: ICD-10-PCS | Mod: 26,,, | Performed by: RADIOLOGY

## 2020-05-22 PROCEDURE — 20610 LARGE JOINT ASPIRATION/INJECTION: L KNEE: ICD-10-PCS | Mod: 58,LT,S$GLB, | Performed by: ORTHOPAEDIC SURGERY

## 2020-05-22 PROCEDURE — 20610 DRAIN/INJ JOINT/BURSA W/O US: CPT | Mod: 58,LT,S$GLB, | Performed by: ORTHOPAEDIC SURGERY

## 2020-05-22 PROCEDURE — 99024 POSTOP FOLLOW-UP VISIT: CPT | Mod: S$GLB,,, | Performed by: ORTHOPAEDIC SURGERY

## 2020-05-22 PROCEDURE — 99999 PR PBB SHADOW E&M-EST. PATIENT-LVL V: CPT | Mod: PBBFAC,,, | Performed by: ORTHOPAEDIC SURGERY

## 2020-05-22 PROCEDURE — 73564 X-RAY EXAM KNEE 4 OR MORE: CPT | Mod: 26,,, | Performed by: RADIOLOGY

## 2020-05-22 PROCEDURE — 73564 X-RAY EXAM KNEE 4 OR MORE: CPT | Mod: TC,50

## 2020-05-22 RX ORDER — CYCLOBENZAPRINE HCL 10 MG
10 TABLET ORAL 3 TIMES DAILY PRN
Qty: 60 TABLET | Refills: 2 | Status: SHIPPED | OUTPATIENT
Start: 2020-05-22 | End: 2020-06-01

## 2020-05-22 RX ORDER — METHYLPREDNISOLONE ACETATE 40 MG/ML
40 INJECTION, SUSPENSION INTRA-ARTICULAR; INTRALESIONAL; INTRAMUSCULAR; SOFT TISSUE
Status: DISCONTINUED | OUTPATIENT
Start: 2020-05-22 | End: 2020-05-22 | Stop reason: HOSPADM

## 2020-05-22 RX ORDER — PREDNISONE 5 MG/1
5 TABLET ORAL DAILY
Qty: 30 TABLET | Refills: 1 | Status: ON HOLD | OUTPATIENT
Start: 2020-05-22 | End: 2020-12-31 | Stop reason: HOSPADM

## 2020-05-22 RX ADMIN — METHYLPREDNISOLONE ACETATE 40 MG: 40 INJECTION, SUSPENSION INTRA-ARTICULAR; INTRALESIONAL; INTRAMUSCULAR; SOFT TISSUE at 08:05

## 2020-05-22 NOTE — PROGRESS NOTES
Subjective:     Patient ID: Mariel Becerril is a 64 y.o. female.    Chief Complaint: Pain and Post-op Evaluation of the Left Knee   Follow-up left total knee arthroplasty  HPI:  64-year-old  underwent left TKA 03/04/2020 using [employer].  She said she is doing much better but she still having some of the swelling that she had over the last 4 years in the knee.  Her pain is 6/10.  She is taking Tylenol only for pain as well as meloxicam and gabapentin.  She states she cannot fully straighten the leg he still.  She feels stiff.  Had not gone for outpatient physical therapy.  Denies any fever chills or shortness of breath or chest pain.  Maintaining social distancing    4/30/20  Patient had left TKA 03/04/2020 doing extensive physical therapy.  She said the swelling in the stiffness is markedly improved that last visit at the therapist's no ice was needed.  Would bother her is the tightness and swelling behind her knee in the back.  Her therapist told her it is a Baker cyst.  I did tell her this usually is swelling that goes into the back of the knee and it is filled with fluid and she understood what it was.  Denies any shortness of breath or chest pain or difficulty chewing or swallowing or fever or chills.  Her medications included Lasix 20 mg and she is doing okay with that.  We did discuss briefly it Tylenol how to take it in how to take her medications.  Pain is improving anteriorly in the knee it is completely gone except in the posterior aspect which is around 4/10.  Denies any pain in the calf, shortness of breath or chest pain or swelling in the thigh    05/22/2020  Left TKA 03/04/2020.  Patient was on crutches for more than 2 years and now she is ambulating without any assistive devices.  She complains of severe pain in the back of her knee and she is still insisting that she has a cyst because that is with the therapist had told her.  I did tell her we did obtain an  ultrasound is no blood clot no popliteal cyst.  She came along way she was on crutches for 2 years and now she is ambulating without any assistive devices.  She points over the lateral aspect of the popliteal aspect at the insertion of the hamstrings.  Denies any fever or chills or shortness of breath or chest pain  Past Medical History:   Diagnosis Date    Frequent headaches     Hypertension     Osteoarthritis 04/02/2019    Bilateral knees, ankles and feet    Severe obesity (BMI >= 40)     Sleep apnea      Past Surgical History:   Procedure Laterality Date    BLADDER SUSPENSION      CATARACT EXTRACTION, BILATERAL      COLONOSCOPY N/A 12/3/2018    Procedure: COLONOSCOPY;  Surgeon: Mari Rader MD;  Location: [facility] ENDO;  Service: Endoscopy;  Laterality: N/A;    HYSTERECTOMY      partial 2000    tonsilectomy      TOTAL KNEE ARTHROPLASTY Left 3/4/2020    Procedure: ARTHROPLASTY, KNEE, TOTAL;  Surgeon: Moy Connolly MD;  Location: [facility] OR;  Service: Orthopedics;  Laterality: Left;     Family History   Problem Relation Age of Onset    Heart attack Father      Social History     Socioeconomic History    Marital status:      Spouse name: Not on file    Number of children: Not on file    Years of education: Not on file    Highest education level: Not on file   Occupational History    Not on file   Social Needs    Financial resource strain: Not on file    Food insecurity:     Worry: Not on file     Inability: Not on file    Transportation needs:     Medical: Not on file     Non-medical: Not on file   Tobacco Use    Smoking status: Never Smoker    Smokeless tobacco: Never Used   Substance and Sexual Activity    Alcohol use: No    Drug use: No    Sexual activity: Never     Partners: Male     Birth control/protection: See Surgical Hx   Lifestyle    Physical activity:     Days per week: Not on file     Minutes per session: Not on file    Stress: Not on file   Relationships    Social  connections:     Talks on phone: Not on file     Gets together: Not on file     Attends Advent service: Not on file     Active member of club or organization: Not on file     Attends meetings of clubs or organizations: Not on file     Relationship status: Not on file   Other Topics Concern    Not on file   Social History Narrative    Not on file     Medication List with Changes/Refills   New Medications    CYCLOBENZAPRINE (FLEXERIL) 10 MG TABLET    Take 1 tablet (10 mg total) by mouth 3 (three) times daily as needed for Muscle spasms.    PREDNISONE (DELTASONE) 5 MG TABLET    Take 1 tablet (5 mg total) by mouth once daily.   Current Medications    ACETAMINOPHEN (TYLENOL) 325 MG TABLET    Take 2 tablets (650 mg total) by mouth every 8 (eight) hours as needed.    ALBUTEROL (PROAIR HFA) 90 MCG/ACTUATION INHALER    Inhale 2 puffs into the lungs every 6 (six) hours as needed for Wheezing. Rescue    AMLODIPINE (NORVASC) 5 MG TABLET    Take 1 tablet (5 mg total) by mouth once daily.    ASPIRIN 325 MG TABLET    Take 1 tablet (325 mg total) by mouth once daily.    BENZONATATE (TESSALON) 100 MG CAPSULE    Take 1 capsule (100 mg total) by mouth 3 (three) times daily as needed.    CHLORHEXIDINE (PERIDEX) 0.12 % SOLUTION    SWISH AND SPIT 15 MLS BY MOUTH TWICE A DAY FOR 7 DAYS    DICLOFENAC SODIUM (VOLTAREN) 1 % GEL    Apply 2 g topically 3 (three) times daily as needed.    EFLORNITHINE (VANIQA) 13.9 % CREAM    Apply topically 2 (two) times daily.    ERGOCALCIFEROL, VITAMIN D2, (VITAMIN D ORAL)    Take 2,000 Units by mouth once daily.    FLUTICASONE PROPIONATE (FLONASE) 50 MCG/ACTUATION NASAL SPRAY    1 spray (50 mcg total) by Each Nare route once daily.    FUROSEMIDE (LASIX) 40 MG TABLET    Take 1 tablet (40 mg total) by mouth once daily.    GABAPENTIN (NEURONTIN) 300 MG CAPSULE    Take 1 capsule (300 mg total) by mouth 2 (two) times daily.    GATIFLOXACIN 0.5 % DROP DROPS    Place 1 drop into the left eye 2 (two) times  daily. Eyedrops to start one day before surgery    GATIFLOXACIN 0.5 % DROP DROPS    Apply 1 drop to eye 2 (two) times daily. Eyedrops to start one day before surgery    KETOCONAZOLE (NIZORAL) 2 % CREAM    Apply topically 2 (two) times daily.    KETOROLAC 0.5% (ACULAR) 0.5 % DROP    Place 1 drop into the left eye 4 (four) times daily. Eyedrops to start one day before surgery    LEVOCETIRIZINE (XYZAL) 5 MG TABLET    Take 1 tablet (5 mg total) by mouth every evening.    MECLIZINE (ANTIVERT) 25 MG TABLET    Take 1 tablet (25 mg total) by mouth 3 (three) times daily as needed for Dizziness.    MELOXICAM (MOBIC) 15 MG TABLET    Take 1 tablet by mouth once daily    METOPROLOL SUCCINATE (TOPROL-XL) 50 MG 24 HR TABLET        MONTELUKAST (SINGULAIR) 10 MG TABLET    Take 10 mg by mouth every evening.    NOZASEPTIN (NOZIN) NASAL     1 each by Each Nostril route 2 (two) times daily.    NYSTATIN (MYCOSTATIN) CREAM    APPLY  CREAM TOPICALLY TO AFFECTED AREA TWICE DAILY    NYSTATIN (MYCOSTATIN) POWDER    Apply topically 2 (two) times daily.    OMEPRAZOLE (PRILOSEC) 40 MG CAPSULE    Take 1 capsule (40 mg total) by mouth once daily.    ONDANSETRON (ZOFRAN-ODT) 4 MG TBDL    Take 1 tablet (4 mg total) by mouth every 8 (eight) hours as needed.    OXYCODONE (ROXICODONE) 10 MG TAB IMMEDIATE RELEASE TABLET    Take 1 tablet (10 mg total) by mouth every 8 (eight) hours as needed (pain 6-7/10 pain scale score).    OXYCODONE-ACETAMINOPHEN (PERCOCET) 5-325 MG PER TABLET    Take 1 tablet by mouth every 8 (eight) hours as needed for Pain.    PHENTERMINE (ADIPEX-P) 37.5 MG TABLET    Take 37.5 mg by mouth once daily.    PREDNISOLONE ACETATE (PRED FORTE) 1 % DRPS    Place 1 drop into the left eye 4 (four) times daily. Start one day before surgery    SF 5000 PLUS 1.1 % CREA    BRUSH FOR 2 MINUTES AT BEDTIME THEN EXPECTORATE THE EXCESS.(NOTHING TO EAT OR DRINK FOR 30 MINUTES AFTER USE )    TOPIRAMATE (TOPAMAX) 50 MG TABLET    Take 1 tablet  (50 mg total) by mouth once daily.   Discontinued Medications    METHOCARBAMOL (ROBAXIN) 500 MG TAB    TAKE 1 TABLET BY MOUTH TWICE DAILY AS NEEDED     Review of patient's allergies indicates:   Allergen Reactions    Penicillins Rash     Review of Systems   Constitution: Negative for decreased appetite.   HENT: Negative for tinnitus.    Eyes: Negative for double vision.   Cardiovascular: Negative for chest pain.   Respiratory: Negative for wheezing.    Hematologic/Lymphatic: Negative for bleeding problem.   Skin: Negative for dry skin.   Musculoskeletal: Positive for arthritis, joint pain and stiffness. Negative for back pain, gout and neck pain.   Gastrointestinal: Negative for abdominal pain.   Genitourinary: Negative for bladder incontinence.   Neurological: Negative for numbness, paresthesias and sensory change.   Psychiatric/Behavioral: Negative for altered mental status.       Objective:   Body mass index is 41.1 kg/m².  Vitals:    05/22/20 0847   BP: 135/74   Pulse: 81          General    Constitutional: She is oriented to person, place, and time. She appears well-developed.   HENT:   Head: Atraumatic.   Eyes: EOM are normal.   Pulmonary/Chest: Effort normal.   Neurological: She is alert and oriented to person, place, and time.   Psychiatric: Judgment normal.              Cervical spine rotation was very functional  Bilateral upper extremity neurovascularly intact.  Radial and ulnar pulses 2+.  Biceps/triceps/wrist extension flexion shoulder abduction is 5/5  Lumbar with some discomfort and pain slight hyper lordotic curvature around L4-L5 paraspinal.  Pelvis is level  Bilateral hips passive motion is intact.  Hip flexors abduct his adductors are slightly weak.  Bilateral knees examined today with the left TKA range of motion 0-130 degrees.  There is no swelling in the knee.  Surgical scar healed well no signs of infection or bleeding.  Stable in extension and flexion.  She has tenderness to palpation over  the lateral aspect of the insertion of the hamstring.  No central swelling.  Calf is very soft.  Ankle motion is intact  Calves are soft nontender  DP 1+  Skin is warm to touch no obvious lesions    Relevant imaging results reviewed and interpreted by me, discussed with the patient and / or family today   X-ray 05/22/2020 showing left TKA in excellent alignment.  No evidence of fracture  X-ray and electronic records showing TKA in excellent alignment no evidence of fracture left knee  Assessment:     Encounter Diagnoses   Name Primary?    Status post total knee replacement using cement, left Yes    Posterior left knee pain     Pain and swelling of left knee         Plan:   Status post total knee replacement using cement, left    Posterior left knee pain  -     Large Joint Aspiration/Injection: L knee    Pain and swelling of left knee    Other orders  -     cyclobenzaprine (FLEXERIL) 10 MG tablet; Take 1 tablet (10 mg total) by mouth 3 (three) times daily as needed for Muscle spasms.  Dispense: 60 tablet; Refill: 2  -     predniSONE (DELTASONE) 5 MG tablet; Take 1 tablet (5 mg total) by mouth once daily.  Dispense: 30 tablet; Refill: 1         Patient Instructions   Stop taking meloxicam  Start prednisone 5 mg once a day  Ultrasound did not show that she have a popliteal cyst or blood clot  Continue Tylenol 650 mg twice a day  Stop methocarbamol and start cyclobenzaprine/ Flexeril 10 mg twice a day.  It may make you sleepy watch if not needed during the day do not take      Injection given in the posterior lateral aspect of the left knee where she is hurting the most with Depo not a cane and stated 10 min after was that the pain is subsided      Disclaimer: This note was prepared using a voice recognition system and is likely to have sound alike errors within the text.

## 2020-05-22 NOTE — PROCEDURES
Large Joint Aspiration/Injection: L knee  Date/Time: 5/22/2020 8:00 AM  Performed by: Moy Connolly MD  Authorized by: Moy Connolly MD     Consent Done?:  Yes (Verbal)  Site marked: the procedure site was marked    Timeout: prior to procedure the correct patient, procedure, and site was verified      Local anesthesia used?: Yes    Local anesthetic:  Lidocaine 1% without epinephrine    Details:  Needle Size:  22 G and 25 G  Ultrasonic Guidance for needle placement?: No    Location:  Knee  Site:  L knee  Medications:  40 mg methylPREDNISolone acetate 40 mg/mL  Patient tolerance:  Patient tolerated the procedure well with no immediate complications     Posterolateral hamstring insertion injection left knee

## 2020-05-22 NOTE — PATIENT INSTRUCTIONS
Stop taking meloxicam  Start prednisone 5 mg once a day  Ultrasound did not show that she have a popliteal cyst or blood clot  Continue Tylenol 650 mg twice a day  Stop methocarbamol and start cyclobenzaprine/ Flexeril 10 mg twice a day.  It may make you sleepy watch if not needed during the day do not take

## 2020-05-26 RX ORDER — METHOCARBAMOL 500 MG/1
TABLET, FILM COATED ORAL
Qty: 60 TABLET | Refills: 0 | Status: SHIPPED | OUTPATIENT
Start: 2020-05-26 | End: 2020-06-29

## 2020-06-02 RX ORDER — GABAPENTIN 300 MG/1
300 CAPSULE ORAL 2 TIMES DAILY
Qty: 60 CAPSULE | Refills: 2 | Status: SHIPPED | OUTPATIENT
Start: 2020-06-02 | End: 2020-11-09 | Stop reason: SDUPTHER

## 2020-06-09 ENCOUNTER — TELEPHONE (OUTPATIENT)
Dept: ORTHOPEDICS | Facility: CLINIC | Age: 65
End: 2020-06-09
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
## 2020-06-10 ENCOUNTER — HOSPITAL ENCOUNTER (OUTPATIENT)
Dept: RADIOLOGY | Facility: HOSPITAL | Age: 65
Discharge: HOME | End: 2020-06-10
Attending: FAMILY MEDICINE
Payer: COMMERCIAL

## 2020-06-10 ENCOUNTER — OFFICE VISIT (OUTPATIENT)
Dept: INTERNAL MEDICINE | Facility: CLINIC | Age: 65
End: 2020-06-10
Payer: COMMERCIAL

## 2020-06-10 VITALS
HEIGHT: 63 IN | TEMPERATURE: 98 F | BODY MASS INDEX: 41.95 KG/M2 | WEIGHT: 236.75 LBS | HEART RATE: 85 BPM | DIASTOLIC BLOOD PRESSURE: 64 MMHG | OXYGEN SATURATION: 98 % | SYSTOLIC BLOOD PRESSURE: 102 MMHG
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
## 2020-06-10 PROCEDURE — 73562 X-RAY EXAM OF KNEE 3: CPT | Mod: 26,LT,, | Performed by: RADIOLOGY

## 2020-06-10 PROCEDURE — 99999 PR PBB SHADOW E&M-EST. PATIENT-LVL V: ICD-10-PCS | Mod: PBBFAC,,, | Performed by: FAMILY MEDICINE

## 2020-06-10 PROCEDURE — 3078F PR MOST RECENT DIASTOLIC BLOOD PRESSURE < 80 MM HG: ICD-10-PCS | Mod: CPTII,S$GLB,, | Performed by: FAMILY MEDICINE

## 2020-06-10 PROCEDURE — 3074F SYST BP LT 130 MM HG: CPT | Mod: CPTII,S$GLB,, | Performed by: FAMILY MEDICINE

## 2020-06-10 PROCEDURE — 99214 PR OFFICE/OUTPT VISIT, EST, LEVL IV, 30-39 MIN: ICD-10-PCS | Mod: S$GLB,,, | Performed by: FAMILY MEDICINE

## 2020-06-10 PROCEDURE — 73560 XR KNEE ORTHO LEFT: ICD-10-PCS | Mod: 26,RT,, | Performed by: RADIOLOGY

## 2020-06-10 PROCEDURE — 99214 OFFICE O/P EST MOD 30 MIN: CPT | Mod: S$GLB,,, | Performed by: FAMILY MEDICINE

## 2020-06-10 PROCEDURE — 73560 X-RAY EXAM OF KNEE 1 OR 2: CPT | Mod: TC,RT

## 2020-06-10 PROCEDURE — 3008F BODY MASS INDEX DOCD: CPT | Mod: CPTII,S$GLB,, | Performed by: FAMILY MEDICINE

## 2020-06-10 PROCEDURE — 73562 X-RAY EXAM OF KNEE 3: CPT | Mod: TC,LT

## 2020-06-10 PROCEDURE — 3008F PR BODY MASS INDEX (BMI) DOCUMENTED: ICD-10-PCS | Mod: CPTII,S$GLB,, | Performed by: FAMILY MEDICINE

## 2020-06-10 PROCEDURE — 99999 PR PBB SHADOW E&M-EST. PATIENT-LVL V: CPT | Mod: PBBFAC,,, | Performed by: FAMILY MEDICINE

## 2020-06-10 PROCEDURE — 3078F DIAST BP <80 MM HG: CPT | Mod: CPTII,S$GLB,, | Performed by: FAMILY MEDICINE

## 2020-06-10 PROCEDURE — 73560 X-RAY EXAM OF KNEE 1 OR 2: CPT | Mod: 26,RT,, | Performed by: RADIOLOGY

## 2020-06-10 PROCEDURE — 73562 XR KNEE ORTHO LEFT: ICD-10-PCS | Mod: 26,LT,, | Performed by: RADIOLOGY

## 2020-06-10 PROCEDURE — 3074F PR MOST RECENT SYSTOLIC BLOOD PRESSURE < 130 MM HG: ICD-10-PCS | Mod: CPTII,S$GLB,, | Performed by: FAMILY MEDICINE
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
## 2020-06-11 DIAGNOSIS — M25.462 PAIN AND SWELLING OF LEFT KNEE: Primary | ICD-10-CM

## 2020-06-11 DIAGNOSIS — M25.562 PAIN AND SWELLING OF LEFT KNEE: Primary | ICD-10-CM

## 2020-06-24 RX ORDER — MELOXICAM 15 MG/1
TABLET ORAL
Qty: 30 TABLET | Refills: 0 | OUTPATIENT
Start: 2020-06-24

## 2020-06-25 ENCOUNTER — PATIENT OUTREACH (OUTPATIENT)
Dept: ADMINISTRATIVE | Facility: OTHER | Age: 65
End: 2020-06-25

## 2020-06-25 NOTE — PROGRESS NOTES
Chart reviewed.   Immunizations: Triggered Imm Registry     Orders placed: n/a  Upcoming appts to satisfy COOPER topics: n/a

## 2020-06-29 ENCOUNTER — OFFICE VISIT (OUTPATIENT)
Dept: ORTHOPEDICS | Facility: CLINIC | Age: 65
End: 2020-06-29
Payer: COMMERCIAL

## 2020-06-29 ENCOUNTER — HOSPITAL ENCOUNTER (OUTPATIENT)
Dept: RADIOLOGY | Facility: HOSPITAL | Age: 65
Discharge: HOME | End: 2020-06-29
Attending: ORTHOPAEDIC SURGERY
Payer: COMMERCIAL

## 2020-06-29 VITALS
SYSTOLIC BLOOD PRESSURE: 126 MMHG | HEART RATE: 86 BPM | HEIGHT: 63 IN | DIASTOLIC BLOOD PRESSURE: 82 MMHG | BODY MASS INDEX: 41.82 KG/M2 | WEIGHT: 236 LBS

## 2020-06-29 DIAGNOSIS — M47.817 SPONDYLOSIS WITHOUT MYELOPATHY OR RADICULOPATHY, LUMBOSACRAL REGION: ICD-10-CM

## 2020-06-29 DIAGNOSIS — M47.817 SPONDYLOSIS WITHOUT MYELOPATHY OR RADICULOPATHY, LUMBOSACRAL REGION: Primary | ICD-10-CM

## 2020-06-29 DIAGNOSIS — M25.562 PAIN AND SWELLING OF LEFT KNEE: Primary | ICD-10-CM

## 2020-06-29 DIAGNOSIS — M43.16 LUMBAR ADJACENT SEGMENT DISEASE WITH SPONDYLOLISTHESIS: ICD-10-CM

## 2020-06-29 DIAGNOSIS — Z96.652 HISTORY OF TOTAL LEFT KNEE REPLACEMENT: Primary | ICD-10-CM

## 2020-06-29 DIAGNOSIS — M25.462 PAIN AND SWELLING OF LEFT KNEE: ICD-10-CM

## 2020-06-29 DIAGNOSIS — M25.462 PAIN AND SWELLING OF LEFT KNEE: Primary | ICD-10-CM

## 2020-06-29 DIAGNOSIS — M51.37 DISC DISEASE, DEGENERATIVE, LUMBAR OR LUMBOSACRAL: ICD-10-CM

## 2020-06-29 DIAGNOSIS — M25.562 PAIN AND SWELLING OF LEFT KNEE: ICD-10-CM

## 2020-06-29 DIAGNOSIS — M54.16 LUMBAR BACK PAIN WITH RADICULOPATHY AFFECTING LEFT LOWER EXTREMITY: ICD-10-CM

## 2020-06-29 DIAGNOSIS — M51.36 LUMBAR ADJACENT SEGMENT DISEASE WITH SPONDYLOLISTHESIS: ICD-10-CM

## 2020-06-29 PROCEDURE — 3079F PR MOST RECENT DIASTOLIC BLOOD PRESSURE 80-89 MM HG: ICD-10-PCS | Mod: CPTII,S$GLB,, | Performed by: ORTHOPAEDIC SURGERY

## 2020-06-29 PROCEDURE — 3008F BODY MASS INDEX DOCD: CPT | Mod: CPTII,S$GLB,, | Performed by: ORTHOPAEDIC SURGERY

## 2020-06-29 PROCEDURE — 73562 XR KNEE ORTHO LEFT WITH FLEXION: ICD-10-PCS | Mod: 26,59,RT, | Performed by: RADIOLOGY

## 2020-06-29 PROCEDURE — 72110 X-RAY EXAM L-2 SPINE 4/>VWS: CPT | Mod: 26,,, | Performed by: RADIOLOGY

## 2020-06-29 PROCEDURE — 99999 PR PBB SHADOW E&M-EST. PATIENT-LVL V: CPT | Mod: PBBFAC,,, | Performed by: ORTHOPAEDIC SURGERY

## 2020-06-29 PROCEDURE — 3079F DIAST BP 80-89 MM HG: CPT | Mod: CPTII,S$GLB,, | Performed by: ORTHOPAEDIC SURGERY

## 2020-06-29 PROCEDURE — 72110 X-RAY EXAM L-2 SPINE 4/>VWS: CPT | Mod: TC

## 2020-06-29 PROCEDURE — 99999 PR PBB SHADOW E&M-EST. PATIENT-LVL V: ICD-10-PCS | Mod: PBBFAC,,, | Performed by: ORTHOPAEDIC SURGERY

## 2020-06-29 PROCEDURE — 72110 XR LUMBAR SPINE 5 VIEW WITH FLEX AND EXT: ICD-10-PCS | Mod: 26,,, | Performed by: RADIOLOGY

## 2020-06-29 PROCEDURE — 73562 X-RAY EXAM OF KNEE 3: CPT | Mod: 26,59,RT, | Performed by: RADIOLOGY

## 2020-06-29 PROCEDURE — 3074F SYST BP LT 130 MM HG: CPT | Mod: CPTII,S$GLB,, | Performed by: ORTHOPAEDIC SURGERY

## 2020-06-29 PROCEDURE — 99214 OFFICE O/P EST MOD 30 MIN: CPT | Mod: S$GLB,,, | Performed by: ORTHOPAEDIC SURGERY

## 2020-06-29 PROCEDURE — 73564 XR KNEE ORTHO LEFT WITH FLEXION: ICD-10-PCS | Mod: 26,LT,, | Performed by: RADIOLOGY

## 2020-06-29 PROCEDURE — 3074F PR MOST RECENT SYSTOLIC BLOOD PRESSURE < 130 MM HG: ICD-10-PCS | Mod: CPTII,S$GLB,, | Performed by: ORTHOPAEDIC SURGERY

## 2020-06-29 PROCEDURE — 73562 X-RAY EXAM OF KNEE 3: CPT | Mod: TC,RT

## 2020-06-29 PROCEDURE — 3008F PR BODY MASS INDEX (BMI) DOCUMENTED: ICD-10-PCS | Mod: CPTII,S$GLB,, | Performed by: ORTHOPAEDIC SURGERY

## 2020-06-29 PROCEDURE — 73564 X-RAY EXAM KNEE 4 OR MORE: CPT | Mod: 26,LT,, | Performed by: RADIOLOGY

## 2020-06-29 PROCEDURE — 99214 PR OFFICE/OUTPT VISIT, EST, LEVL IV, 30-39 MIN: ICD-10-PCS | Mod: S$GLB,,, | Performed by: ORTHOPAEDIC SURGERY

## 2020-06-29 RX ORDER — MELOXICAM 15 MG/1
15 TABLET ORAL DAILY
Qty: 30 TABLET | Refills: 2 | Status: SHIPPED | OUTPATIENT
Start: 2020-06-29 | End: 2020-09-24

## 2020-06-29 RX ORDER — METHOCARBAMOL 750 MG/1
750 TABLET, FILM COATED ORAL 3 TIMES DAILY
Qty: 90 TABLET | Refills: 2 | Status: SHIPPED | OUTPATIENT
Start: 2020-06-29 | End: 2020-07-09
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
## 2020-06-30 DIAGNOSIS — M47.817 SPONDYLOSIS WITHOUT MYELOPATHY OR RADICULOPATHY, LUMBOSACRAL REGION: Primary | ICD-10-CM

## 2020-07-06 ENCOUNTER — OFFICE VISIT (OUTPATIENT)
Dept: ORTHOPEDICS | Facility: CLINIC | Age: 65
End: 2020-07-06
Payer: COMMERCIAL

## 2020-07-06 ENCOUNTER — PATIENT OUTREACH (OUTPATIENT)
Dept: ADMINISTRATIVE | Facility: OTHER | Age: 65
End: 2020-07-06

## 2020-07-06 VITALS
HEART RATE: 88 BPM | WEIGHT: 236 LBS | HEIGHT: 66 IN | BODY MASS INDEX: 37.93 KG/M2 | DIASTOLIC BLOOD PRESSURE: 77 MMHG | SYSTOLIC BLOOD PRESSURE: 135 MMHG

## 2020-07-06 DIAGNOSIS — M54.16 LUMBAR BACK PAIN WITH RADICULOPATHY AFFECTING LEFT LOWER EXTREMITY: ICD-10-CM

## 2020-07-06 DIAGNOSIS — M51.36 LUMBAR DEGENERATIVE DISC DISEASE: ICD-10-CM

## 2020-07-06 DIAGNOSIS — M25.562 LEFT KNEE PAIN, UNSPECIFIED CHRONICITY: Primary | ICD-10-CM

## 2020-07-06 DIAGNOSIS — M43.16 LUMBAR ADJACENT SEGMENT DISEASE WITH SPONDYLOLISTHESIS: ICD-10-CM

## 2020-07-06 DIAGNOSIS — M51.36 LUMBAR ADJACENT SEGMENT DISEASE WITH SPONDYLOLISTHESIS: ICD-10-CM

## 2020-07-06 DIAGNOSIS — Z96.652 HISTORY OF TOTAL LEFT KNEE REPLACEMENT: Primary | ICD-10-CM

## 2020-07-06 PROCEDURE — 3078F PR MOST RECENT DIASTOLIC BLOOD PRESSURE < 80 MM HG: ICD-10-PCS | Mod: CPTII,S$GLB,, | Performed by: ORTHOPAEDIC SURGERY

## 2020-07-06 PROCEDURE — 99999 PR PBB SHADOW E&M-EST. PATIENT-LVL V: CPT | Mod: PBBFAC,,, | Performed by: ORTHOPAEDIC SURGERY

## 2020-07-06 PROCEDURE — 3008F BODY MASS INDEX DOCD: CPT | Mod: CPTII,S$GLB,, | Performed by: ORTHOPAEDIC SURGERY

## 2020-07-06 PROCEDURE — 99214 OFFICE O/P EST MOD 30 MIN: CPT | Mod: S$GLB,,, | Performed by: ORTHOPAEDIC SURGERY

## 2020-07-06 PROCEDURE — 3075F SYST BP GE 130 - 139MM HG: CPT | Mod: CPTII,S$GLB,, | Performed by: ORTHOPAEDIC SURGERY

## 2020-07-06 PROCEDURE — 3078F DIAST BP <80 MM HG: CPT | Mod: CPTII,S$GLB,, | Performed by: ORTHOPAEDIC SURGERY

## 2020-07-06 PROCEDURE — 99214 PR OFFICE/OUTPT VISIT, EST, LEVL IV, 30-39 MIN: ICD-10-PCS | Mod: S$GLB,,, | Performed by: ORTHOPAEDIC SURGERY

## 2020-07-06 PROCEDURE — 3008F PR BODY MASS INDEX (BMI) DOCUMENTED: ICD-10-PCS | Mod: CPTII,S$GLB,, | Performed by: ORTHOPAEDIC SURGERY

## 2020-07-06 PROCEDURE — 99999 PR PBB SHADOW E&M-EST. PATIENT-LVL V: ICD-10-PCS | Mod: PBBFAC,,, | Performed by: ORTHOPAEDIC SURGERY

## 2020-07-06 PROCEDURE — 3075F PR MOST RECENT SYSTOLIC BLOOD PRESS GE 130-139MM HG: ICD-10-PCS | Mod: CPTII,S$GLB,, | Performed by: ORTHOPAEDIC SURGERY

## 2020-07-06 RX ORDER — TRAMADOL HYDROCHLORIDE 50 MG/1
50 TABLET ORAL EVERY 8 HOURS PRN
Qty: 21 TABLET | Refills: 0 | Status: SHIPPED | OUTPATIENT
Start: 2020-07-06 | End: 2020-07-22

## 2020-07-06 NOTE — PROGRESS NOTES
Requested updates within Care Everywhere.  Patient's chart was reviewed for overdue COOPER topics.  Immunizations reconciled.
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
## 2020-07-08 ENCOUNTER — TELEPHONE (OUTPATIENT)
Dept: PAIN MEDICINE | Facility: CLINIC | Age: 65
End: 2020-07-08

## 2020-07-08 ENCOUNTER — OFFICE VISIT (OUTPATIENT)
Dept: PAIN MEDICINE | Facility: CLINIC | Age: 65
End: 2020-07-08
Payer: COMMERCIAL

## 2020-07-08 VITALS
BODY MASS INDEX: 38.97 KG/M2 | DIASTOLIC BLOOD PRESSURE: 73 MMHG | SYSTOLIC BLOOD PRESSURE: 111 MMHG | HEART RATE: 80 BPM | WEIGHT: 242.5 LBS | HEIGHT: 66 IN

## 2020-07-08 DIAGNOSIS — M25.561 CHRONIC PAIN OF BOTH KNEES: ICD-10-CM

## 2020-07-08 DIAGNOSIS — M54.16 LUMBAR RADICULOPATHY: Primary | ICD-10-CM

## 2020-07-08 DIAGNOSIS — G89.29 CHRONIC PAIN OF BOTH KNEES: ICD-10-CM

## 2020-07-08 DIAGNOSIS — Z96.652 HISTORY OF LEFT KNEE REPLACEMENT: ICD-10-CM

## 2020-07-08 DIAGNOSIS — M25.562 CHRONIC PAIN OF BOTH KNEES: ICD-10-CM

## 2020-07-08 DIAGNOSIS — M17.0 PRIMARY OSTEOARTHRITIS OF BOTH KNEES: ICD-10-CM

## 2020-07-08 PROCEDURE — 99214 OFFICE O/P EST MOD 30 MIN: CPT | Mod: S$GLB,,, | Performed by: PHYSICAL MEDICINE & REHABILITATION

## 2020-07-08 PROCEDURE — 99999 PR PBB SHADOW E&M-EST. PATIENT-LVL V: ICD-10-PCS | Mod: PBBFAC,,, | Performed by: PHYSICAL MEDICINE & REHABILITATION

## 2020-07-08 PROCEDURE — 3074F SYST BP LT 130 MM HG: CPT | Mod: CPTII,S$GLB,, | Performed by: PHYSICAL MEDICINE & REHABILITATION

## 2020-07-08 PROCEDURE — 3008F BODY MASS INDEX DOCD: CPT | Mod: CPTII,S$GLB,, | Performed by: PHYSICAL MEDICINE & REHABILITATION

## 2020-07-08 PROCEDURE — 3078F PR MOST RECENT DIASTOLIC BLOOD PRESSURE < 80 MM HG: ICD-10-PCS | Mod: CPTII,S$GLB,, | Performed by: PHYSICAL MEDICINE & REHABILITATION

## 2020-07-08 PROCEDURE — 3074F PR MOST RECENT SYSTOLIC BLOOD PRESSURE < 130 MM HG: ICD-10-PCS | Mod: CPTII,S$GLB,, | Performed by: PHYSICAL MEDICINE & REHABILITATION

## 2020-07-08 PROCEDURE — 99999 PR PBB SHADOW E&M-EST. PATIENT-LVL V: CPT | Mod: PBBFAC,,, | Performed by: PHYSICAL MEDICINE & REHABILITATION

## 2020-07-08 PROCEDURE — 3078F DIAST BP <80 MM HG: CPT | Mod: CPTII,S$GLB,, | Performed by: PHYSICAL MEDICINE & REHABILITATION

## 2020-07-08 PROCEDURE — 3008F PR BODY MASS INDEX (BMI) DOCUMENTED: ICD-10-PCS | Mod: CPTII,S$GLB,, | Performed by: PHYSICAL MEDICINE & REHABILITATION

## 2020-07-08 PROCEDURE — 99214 PR OFFICE/OUTPT VISIT, EST, LEVL IV, 30-39 MIN: ICD-10-PCS | Mod: S$GLB,,, | Performed by: PHYSICAL MEDICINE & REHABILITATION

## 2020-07-08 NOTE — PROGRESS NOTES
Established Patient Chronic Pain Note (Follow up visit)    Chief Complaint:   Chief Complaint   Patient presents with    Knee Pain       SUBJECTIVE:    Mariel Becerril is a 64 y.o. female who presents to the clinic for a follow-up appointment for left knee pain. Since the last visit, Mariel Becerril states the pain has been persistant. Current pain intensity is 9/10.  She recent underwent a revision of the left knee with Dr. Connolly in March 2020 that unfortunately has not provided significant relief in her knee pain.  She is scheduled for EMG/NCS within the next week or so.  She has not had significant workup for lumbar radiculopathy previously, but does state that she has back pain    Patient denies night fever/night sweats, urinary incontinence, bowel incontinence, significant weight loss, significant motor weakness and loss of sensations.    Pain Disability Index Review:  Last 3 PDI Scores 7/8/2020 11/20/2018   Pain Disability Index (PDI) 43 54     Initial HPI 11/20/2018:  Mariel Becerril is a 63 y.o. female  who is presenting with a chief complaint of Knee Pain. The patient began experiencing this problem insidiously, and the pain has been gradually worsening over the past 12 month(s). The pain is described as throbbing, cramping, aching and heavy and is located in the left knee. Pain is intermittent and lasts hours. The pain radiates to pain is nonradiating. The patient rates her pain a 8 out of ten and interferes with activities of daily living a 8 out of ten. Pain is exacerbated by prolonged standing, ambulation, and is improved by rest. Patient reports no prior trauma, no prior spinal surgery       Non-Pharmacologic Treatments:  Physical Therapy/Home Exercise: yes  Ice/Heat:yes  TENS: no  Acupuncture: no  Massage: no  Chiropractic: no    Other: no    Pain Medications:  - Opioids: Norco, tramadol, Percocet  - Adjuvant Medications: NSAIDs, Tylenol, gabapentin, Robaxin  - Anti-Coagulants:  Aspirin    Opioid Contract: no     report:  Reviewed and consistent with medication use as prescribed.      Pain Procedures:   -multiple intra-articular knee injections  -left genicular nerve block on 12/20/2018    Imaging:   X-ray left knee 06/29/2020:  Stable postsurgical changes left total knee arthroplasty.  Components well seated without fracture, dislocation or loosening.  Cannot exclude tiny suprapatellar effusion.  Faint calcifications again noted projecting over the superior margin of the left medial femoral condyle.     Osteopenia and tricompartment degenerative changes noted on the right.  There is extensive degenerative change involving the patellofemoral joint check Lizeth along the lateral facet with lateral tilting and prominent marginal osteophyte formation.  Narrowing of the medial and lateral compartments and marginal spurring.  No acute fracture or dislocation.      X-ray bilateral knee 05/22/2020:  There is mild-to-moderate joint space narrowing and osteophyte formation seen involving all 3 compartments of the right knee.  The greatest degree of degenerative changes at the right patellofemoral compartment.  A left total knee arthroplasty is in place.  No hardware failure or loosening.  No periprosthetic fracture.  No joint effusions are suggested.  No acute fracture or dislocation.      PMHx,PSHx, Social history, and Family history:  I have reviewed the patient's medical, surgical, social, and family history in detail and updated the computerized patient record.        Review of patient's allergies indicates:   Allergen Reactions    Penicillins Rash       Current Outpatient Medications   Medication Sig    acetaminophen (TYLENOL) 325 MG tablet Take 2 tablets (650 mg total) by mouth every 8 (eight) hours as needed.    albuterol (PROAIR HFA) 90 mcg/actuation inhaler Inhale 2 puffs into the lungs every 6 (six) hours as needed for Wheezing. Rescue    amLODIPine (NORVASC) 5 MG tablet Take 1 tablet  (5 mg total) by mouth once daily. (Patient taking differently: Take 5 mg by mouth every evening. )    aspirin 325 MG tablet Take 1 tablet (325 mg total) by mouth once daily.    benzonatate (TESSALON) 100 MG capsule Take 1 capsule (100 mg total) by mouth 3 (three) times daily as needed.    chlorhexidine (PERIDEX) 0.12 % solution SWISH AND SPIT 15 MLS BY MOUTH TWICE A DAY FOR 7 DAYS    diclofenac sodium (VOLTAREN) 1 % Gel Apply 2 g topically 3 (three) times daily as needed.    eflornithine (VANIQA) 13.9 % cream Apply topically 2 (two) times daily.    ergocalciferol, vitamin D2, (VITAMIN D ORAL) Take 2,000 Units by mouth once daily.    fluticasone propionate (FLONASE) 50 mcg/actuation nasal spray 1 spray (50 mcg total) by Each Nostril route every evening.    furosemide (LASIX) 40 MG tablet Take 1 tablet (40 mg total) by mouth once daily.    gabapentin (NEURONTIN) 300 MG capsule Take 1 capsule (300 mg total) by mouth 2 (two) times daily.    gatifloxacin 0.5 % Drop drops Place 1 drop into the left eye 2 (two) times daily. Eyedrops to start one day before surgery    gatifloxacin 0.5 % Drop drops Apply 1 drop to eye 2 (two) times daily. Eyedrops to start one day before surgery    ketoconazole (NIZORAL) 2 % cream Apply topically 2 (two) times daily.    ketorolac 0.5% (ACULAR) 0.5 % Drop Place 1 drop into the left eye 4 (four) times daily. Eyedrops to start one day before surgery    levocetirizine (XYZAL) 5 MG tablet Take 1 tablet (5 mg total) by mouth every evening.    meclizine (ANTIVERT) 25 mg tablet Take 1 tablet (25 mg total) by mouth 3 (three) times daily as needed for Dizziness.    meloxicam (MOBIC) 15 MG tablet Take 1 tablet (15 mg total) by mouth once daily. Take with food    methocarbamoL (ROBAXIN) 750 MG Tab Take 1 tablet (750 mg total) by mouth 3 (three) times daily. for 10 days    metoprolol succinate (TOPROL-XL) 50 MG 24 hr tablet     montelukast (SINGULAIR) 10 mg tablet Take 10 mg by mouth  every evening.    nozaseptin (NOZIN) nasal  1 each by Each Nostril route 2 (two) times daily.    nystatin (MYCOSTATIN) cream APPLY  CREAM TOPICALLY TO AFFECTED AREA TWICE DAILY    nystatin (MYCOSTATIN) powder Apply topically 2 (two) times daily.    omeprazole (PRILOSEC) 40 MG capsule Take 1 capsule by mouth once daily    ondansetron (ZOFRAN-ODT) 4 MG TbDL Take 1 tablet (4 mg total) by mouth every 8 (eight) hours as needed.    oxyCODONE (ROXICODONE) 10 mg Tab immediate release tablet Take 1 tablet (10 mg total) by mouth every 8 (eight) hours as needed (pain 6-7/10 pain scale score).    oxyCODONE-acetaminophen (PERCOCET) 5-325 mg per tablet Take 1 tablet by mouth every 8 (eight) hours as needed for Pain.    phentermine (ADIPEX-P) 37.5 mg tablet Take 37.5 mg by mouth once daily.    prednisoLONE acetate (PRED FORTE) 1 % DrpS Place 1 drop into the left eye 4 (four) times daily. Start one day before surgery    predniSONE (DELTASONE) 5 MG tablet Take 1 tablet (5 mg total) by mouth once daily.    SF 5000 PLUS 1.1 % Crea BRUSH FOR 2 MINUTES AT BEDTIME THEN EXPECTORATE THE EXCESS.(NOTHING TO EAT OR DRINK FOR 30 MINUTES AFTER USE )    topiramate (TOPAMAX) 50 MG tablet Take 1 tablet (50 mg total) by mouth once daily. (Patient taking differently: Take 50 mg by mouth daily as needed. )    traMADoL (ULTRAM) 50 mg tablet Take 1 tablet (50 mg total) by mouth every 8 (eight) hours as needed for Pain.     No current facility-administered medications for this visit.          REVIEW OF SYSTEMS:    GENERAL:  No weight loss, malaise or fevers.  HEENT:   No recent changes in vision or hearing  NECK:  Negative for lumps, no difficulty with swallowing.  RESPIRATORY:  Negative for cough, wheezing or shortness of breath, patient denies any recent URI.  CARDIOVASCULAR:  Negative for chest pain, leg swelling or palpitations.  GI:  Negative for abdominal discomfort, blood in stools or black stools or change in bowel  "habits.  MUSCULOSKELETAL:  See HPI.  SKIN:  Negative for lesions, rash, and itching.  PSYCH:  No mood disorder or recent psychosocial stressors.  Patients sleep is not disturbed secondary to pain.  HEMATOLOGY/LYMPHOLOGY:  Negative for prolonged bleeding, bruising easily or swollen nodes.  Patient is currently taking anti-coagulants - ASA  NEURO:   No history of headaches, syncope, paralysis, seizures or tremors.  All other reviewed and negative other than HPI.    OBJECTIVE:    /73   Pulse 80   Ht 5' 6" (1.676 m)   Wt 110 kg (242 lb 8.1 oz)   BMI 39.14 kg/m²     PHYSICAL EXAMINATION:    GENERAL: Well appearing, in no acute distress, alert and oriented x3.  Obese  PSYCH:  Mood and affect appropriate.  SKIN: Skin color, texture, turgor normal, no rashes or lesions.  HEAD/FACE:  Normocephalic, atraumatic. Cranial nerves grossly intact.  CV: RRR with palpation of the radial artery.  PULM: No evidence of respiratory difficulty, symmetric chest rise.  GI:  Soft and non-tender.  BACK: Straight leg raising in the sitting and supine positions is equivocal to radicular pain.  Minimal to mild pain to palpation over the facet joints of the lumbar spine and lumbar paraspinals.  Fair range of motion without pain reproduction.  EXTREMITIES: Peripheral joint ROM is full and pain free without obvious instability or laxity in all four extremities with the exception of the left knee having a 5 degree extensor lag and good range of motion with flexion actively and passively. No deformities, edema, or skin discoloration. Good capillary refill.  MUSCULOSKELETAL:There is mild pain with palpation over the sacroiliac joints bilaterally.  FABERs test is equivocal.  FADIRs test is equivocal.   Bilateral upper and lower extremity strength is normal and symmetric.  No atrophy or tone abnormalities are noted.  NEURO: Bilateral upper and lower extremity coordination and muscle stretch reflexes are physiologic and symmetric.  Plantar " response are downgoing. No clonus.  No loss of sensation is noted.  GAIT:  Antalgic, slow.      LABS:  Lab Results   Component Value Date    WBC 8.96 03/06/2020    HGB 8.0 (L) 03/06/2020    HCT 26.1 (L) 03/06/2020    MCV 93 03/06/2020     03/06/2020       CMP  Sodium   Date Value Ref Range Status   03/06/2020 141 136 - 145 mmol/L Final     Potassium   Date Value Ref Range Status   03/06/2020 3.8 3.5 - 5.1 mmol/L Final     Chloride   Date Value Ref Range Status   03/06/2020 112 (H) 95 - 110 mmol/L Final     CO2   Date Value Ref Range Status   03/06/2020 23 23 - 29 mmol/L Final     Glucose   Date Value Ref Range Status   03/06/2020 109 70 - 110 mg/dL Final     BUN, Bld   Date Value Ref Range Status   03/06/2020 11 8 - 23 mg/dL Final     Creatinine   Date Value Ref Range Status   03/06/2020 0.8 0.5 - 1.4 mg/dL Final     Calcium   Date Value Ref Range Status   03/06/2020 8.3 (L) 8.7 - 10.5 mg/dL Final     Total Protein   Date Value Ref Range Status   03/06/2020 5.1 (L) 6.0 - 8.4 g/dL Final     Albumin   Date Value Ref Range Status   03/06/2020 2.6 (L) 3.5 - 5.2 g/dL Final     Total Bilirubin   Date Value Ref Range Status   03/06/2020 0.3 0.1 - 1.0 mg/dL Final     Comment:     For infants and newborns, interpretation of results should be based  on gestational age, weight and in agreement with clinical  observations.  Premature Infant recommended reference ranges:  Up to 24 hours.............<8.0 mg/dL  Up to 48 hours............<12.0 mg/dL  3-5 days..................<15.0 mg/dL  6-29 days.................<15.0 mg/dL       Alkaline Phosphatase   Date Value Ref Range Status   03/06/2020 79 55 - 135 U/L Final     AST   Date Value Ref Range Status   03/06/2020 45 (H) 10 - 40 U/L Final     ALT   Date Value Ref Range Status   03/06/2020 47 (H) 10 - 44 U/L Final     Anion Gap   Date Value Ref Range Status   03/06/2020 6 (L) 8 - 16 mmol/L Final     eGFR if    Date Value Ref Range Status   03/06/2020 >60  >60 mL/min/1.73 m^2 Final     eGFR if non    Date Value Ref Range Status   03/06/2020 >60 >60 mL/min/1.73 m^2 Final     Comment:     Calculation used to obtain the estimated glomerular filtration  rate (eGFR) is the CKD-EPI equation.          No results found for: LABA1C, HGBA1C          ASSESSMENT: 64 y.o. year old female with low back and left knee pain, consistent with     1. Lumbar radiculopathy  MRI Lumbar Spine Without Contrast   2. Primary osteoarthritis of both knees     3. Chronic pain of both knees     4. History of left knee replacement           PLAN:   - Interventions: Consider lumbar GISELA pending EMG/NCS and MRI.  - Anticoagulation: yes, aspirin  - Medications: I have stressed the importance of physical activity and a home exercise plan to help with pain and improve health., Patient can continue with medications for now since they are providing benefits, using them appropriately, and without side effects. and I do not feel this patient is a candidate for long term opioids for this non-cancer pain at this time  - Therapy:  Advised patient continue with activities and home exercises as tolerated  - Psychological:  Discussed coping mechanisms up address chronic pain issues  - Labs:  Reviewed  - Imaging: Order MRI of the Lumbar Spine to help evaluate possible source of pain.  Reviewed imaging available  - Consults/Referrals:  None at this time  - Records:  Reviewed/Obtain old records from outside physicians and imaging  - Follow up visit: return to clinic after MRI to review results  - Counseled patient regarding the importance of activity modification and physical therapy  - This condition does not require this patient to take time off of work, and the primary goal of our Pain Management services is to improve the patient's functional capacity.  - Patient Questions: Answered all of the patient's questions regarding diagnosis, therapy, and treatment    The above plan and management options  were discussed at length with patient. Patient is in agreement with the above and verbalized understanding.      Thanh Diaz MD  Interventional Pain Management  [facility]    Disclaimer:  This note was prepared using voice recognition system and is likely to have sound alike errors that may have been overlooked even after proof reading.  Please call me with any questions

## 2020-07-08 NOTE — TELEPHONE ENCOUNTER
Mrs ovalle is a pt of Dr Uriostegui requesting to get into the clinic right away she requested another provider to be seen right away ,  she is scheduled with Dr Emily finnegan 7/8/2020 1pm

## 2020-07-10 ENCOUNTER — TELEPHONE (OUTPATIENT)
Dept: INTERNAL MEDICINE | Facility: CLINIC | Age: 65
End: 2020-07-10

## 2020-07-10 ENCOUNTER — TELEPHONE (OUTPATIENT)
Dept: ORTHOPEDICS | Facility: CLINIC | Age: 65
End: 2020-07-10

## 2020-07-10 DIAGNOSIS — M25.562 LEFT KNEE PAIN, UNSPECIFIED CHRONICITY: Primary | ICD-10-CM

## 2020-07-10 DIAGNOSIS — Z96.652 HISTORY OF TOTAL LEFT KNEE REPLACEMENT: ICD-10-CM

## 2020-07-10 NOTE — TELEPHONE ENCOUNTER
Pt requesting we send her to a disability doctor here at [facility].  Notified pt we did not have disability doctors here.  Verified with Dr. Dahl and other staff that we dont have disability doctors here.  Pt upset that she was told by phone staff that we had them here at [facility] and that she needed to be referred by her PCP.   Advised pt that she could find a disability doctor and if she needed a referral from us we could refer her.  Advised pt that I would reach out to the supervisor to give her a call to apologize for the confusion

## 2020-07-10 NOTE — TELEPHONE ENCOUNTER
----- Message from Heather Higuera sent at 7/10/2020  3:10 PM CDT -----  Regarding: pt advice  Contact: Mariel Borden called to consult with nurse about getting an exam for disability. Please call pt back [phone]. Thanks tp

## 2020-07-13 ENCOUNTER — TELEPHONE (OUTPATIENT)
Dept: ORTHOPEDICS | Facility: CLINIC | Age: 65
End: 2020-07-13

## 2020-07-13 NOTE — TELEPHONE ENCOUNTER
Called pt to give her the EMG instructions  -that she can all medication   -that we recommend that she does not apply any lotion/oil/creams to her lower  -she will get her results from Dr. Berrios    Pt understood.

## 2020-07-14 ENCOUNTER — OFFICE VISIT (OUTPATIENT)
Dept: ORTHOPEDICS | Facility: CLINIC | Age: 65
End: 2020-07-14
Payer: COMMERCIAL

## 2020-07-14 VITALS — HEIGHT: 66 IN | BODY MASS INDEX: 38.89 KG/M2 | WEIGHT: 242 LBS

## 2020-07-14 DIAGNOSIS — M25.562 CHRONIC PAIN OF LEFT KNEE: Primary | ICD-10-CM

## 2020-07-14 DIAGNOSIS — G89.29 CHRONIC PAIN OF LEFT KNEE: Primary | ICD-10-CM

## 2020-07-14 DIAGNOSIS — M47.817 SPONDYLOSIS WITHOUT MYELOPATHY OR RADICULOPATHY, LUMBOSACRAL REGION: ICD-10-CM

## 2020-07-14 PROCEDURE — 95886 PR EMG COMPLETE, W/ NERVE CONDUCTION STUDIES, 5+ MUSCLES: ICD-10-PCS | Mod: LT,S$GLB,, | Performed by: PHYSICAL MEDICINE & REHABILITATION

## 2020-07-14 PROCEDURE — 95908 PR NERVE CONDUCTION STUDY; 3-4 STUDIES: ICD-10-PCS | Mod: S$GLB,,, | Performed by: PHYSICAL MEDICINE & REHABILITATION

## 2020-07-14 PROCEDURE — 3008F PR BODY MASS INDEX (BMI) DOCUMENTED: ICD-10-PCS | Mod: CPTII,S$GLB,, | Performed by: PHYSICAL MEDICINE & REHABILITATION

## 2020-07-14 PROCEDURE — 95886 MUSC TEST DONE W/N TEST COMP: CPT | Mod: LT,S$GLB,, | Performed by: PHYSICAL MEDICINE & REHABILITATION

## 2020-07-14 PROCEDURE — 99213 OFFICE O/P EST LOW 20 MIN: CPT | Mod: 25,S$GLB,, | Performed by: PHYSICAL MEDICINE & REHABILITATION

## 2020-07-14 PROCEDURE — 3008F BODY MASS INDEX DOCD: CPT | Mod: CPTII,S$GLB,, | Performed by: PHYSICAL MEDICINE & REHABILITATION

## 2020-07-14 PROCEDURE — 99999 PR PBB SHADOW E&M-EST. PATIENT-LVL IV: CPT | Mod: PBBFAC,,, | Performed by: PHYSICAL MEDICINE & REHABILITATION

## 2020-07-14 PROCEDURE — 99213 PR OFFICE/OUTPT VISIT, EST, LEVL III, 20-29 MIN: ICD-10-PCS | Mod: 25,S$GLB,, | Performed by: PHYSICAL MEDICINE & REHABILITATION

## 2020-07-14 PROCEDURE — 99999 PR PBB SHADOW E&M-EST. PATIENT-LVL IV: ICD-10-PCS | Mod: PBBFAC,,, | Performed by: PHYSICAL MEDICINE & REHABILITATION

## 2020-07-14 PROCEDURE — 95908 NRV CNDJ TST 3-4 STUDIES: CPT | Mod: S$GLB,,, | Performed by: PHYSICAL MEDICINE & REHABILITATION

## 2020-07-14 NOTE — LETTER
July 14, 2020      Moy Connolly MD  [address] [place] [address] [address] [place] [address]           [facility]  [address] [facility]  [place] [address]  Phone: [phone]  Fax: [phone]          Patient: Mariel Becerril   MR Number: [id number]   YOB: 1955   Date of Visit: 7/14/2020       Dear Dr. Moy Connolly:    Thank you for referring Mariel Becerril to me for evaluation. Attached you will find relevant portions of my assessment and plan of care.    If you have questions, please do not hesitate to call me. I look forward to following Mariel Becerril along with you.    Sincerely,    Uzma Price MD    Enclosure  CC:  No Recipients    If you would like to receive this communication electronically, please contact [email or web address] or [phone] to request more information on [employer] access.    For providers and/or their staff who would like to refer a patient to [facility], please contact us through our one-stop-shop provider referral line, [facility], at [phone].    If you feel you have received this communication in error or would no longer like to receive these types of communications, please e-mail [email or web address]          Subjective:      Patient ID: Earnestine España is a 73 y.o. female. CARLOS MANUEL Nation is here for a new patient visit. She has uncontrolled type 2 diabetes. Her last hemoglobin A1c was 8.4. She is seeing an endocrinologist and has just started on Victoza. Blood pressure is well-controlled current therapy will be continued. She is overweight and we discussed the value of a low-carb diet. Urged her to increase exercise. She needs a mammogram and a DEXA scan and I ordered both. He refuses a colonoscopy and I gave her a fit test.  I recommended she schedule a Pap smear here. No family history on file. Social History     Social History    Marital status:      Spouse name: N/A    Number of children: N/A    Years of education: N/A     Occupational History    Not on file. Social History Main Topics    Smoking status: Not on file    Smokeless tobacco: Not on file    Alcohol use Not on file    Drug use: Unknown    Sexual activity: Not on file     Other Topics Concern    Not on file     Social History Narrative    No narrative on file     Active Ambulatory Problems     Diagnosis Date Noted    Screening for cancer 12/07/2017    Uncontrolled type 2 diabetes mellitus without complication, without long-term current use of insulin ([facility] [address] 12/07/2017    Overweight 12/07/2017    Essential hypertension 12/07/2017     Resolved Ambulatory Problems     Diagnosis Date Noted    No Resolved Ambulatory Problems     No Additional Past Medical History         Review of Systems   Constitutional: Negative for activity change, appetite change, chills, diaphoresis, fatigue, fever and unexpected weight change. Respiratory: Negative for cough, choking, chest tightness, shortness of breath, wheezing and stridor. Cardiovascular: Negative for chest pain, palpitations and leg swelling. Gastrointestinal: Negative for abdominal pain. Genitourinary: Negative for difficulty urinating.    Musculoskeletal: Negative for arthralgias and back pain. Skin: Negative for rash. Neurological: Negative for dizziness. Objective:   Physical Exam   Constitutional: She is oriented to person, place, and time. She appears well-developed and well-nourished. HENT:   Head: Normocephalic and atraumatic. Right Ear: External ear normal.   Left Ear: External ear normal.   Nose: Nose normal.   Mouth/Throat: Oropharynx is clear and moist.   Eyes: Conjunctivae and EOM are normal. Pupils are equal, round, and reactive to light. Neck: Normal range of motion. Neck supple. No JVD present. No tracheal deviation present. No thyromegaly present. Cardiovascular: Normal rate, regular rhythm and normal heart sounds. Exam reveals no gallop and no friction rub. No murmur heard. Pulmonary/Chest: Effort normal and breath sounds normal. No stridor. No respiratory distress. She has no wheezes. She has no rales. She exhibits no tenderness. Abdominal: Soft. Bowel sounds are normal. She exhibits no distension and no mass. There is no tenderness. There is no rebound and no guarding. Musculoskeletal: Normal range of motion. She exhibits no edema or tenderness. Lymphadenopathy:     She has no cervical adenopathy. Neurological: She is alert and oriented to person, place, and time. She has normal reflexes. No cranial nerve deficit. She exhibits normal muscle tone. Coordination normal.   Skin: Skin is warm and dry. No rash noted. No erythema. No pallor. Psychiatric: She has a normal mood and affect. Nursing note and vitals reviewed. Assessment:      1. Uncontrolled type 2 diabetes mellitus without complication, without long-term current use of insulin ([facility] [address]     2. Screening for cancer  [facility] Dexa Bone Density Scan    POCT Fecal Immunochemical Test (FIT)    [facility] Screening Bilateral   3. Overweight     4.  Essential hypertension            Plan:      Outpatient Encounter Prescriptions as of 12/7/2017   Medication Sig Dispense Refill    lisinopril-hydrochlorothiazide (PRINZIDE;ZESTORETIC) 10-12.5 MG per tablet Take by mouth      magnesium chloride (MAG DELAY) 535 (64 Mg) MG TBCR extended release tablet Take by mouth      metFORMIN (GLUCOPHAGE) 850 MG tablet Take 850 mg by mouth      metoprolol succinate (TOPROL XL) 25 MG extended release tablet Take 25 mg by mouth      rosuvastatin (CRESTOR) 20 MG tablet Take 20 mg by mouth      Krill Oil 1000 MG CAPS Take by mouth three times daily       No facility-administered encounter medications on file as of 12/7/2017.

## 2020-07-14 NOTE — PROGRESS NOTES
SPORTS MEDICINE / PM&R ELECTRODIAGNOSTIC HISTORY & PHYSICAL:    Full Name: Mariel Becerril YOB: 1955  Patient ID: [id number]   Visit Date: 7/14/2020 14:46  Age: 64 Years 8 Months Old  Examining Physician: Uzma Price M.D.  Referring Physician: Dr. Connolly  Height: 5 feet 6 inch  Reason for Referral: BLE    HPI: This is a 64 y.o.  female being seen in clinic today for evaluation of Pain of the Left Knee   She is seen as a consult from Dr. Moy Connolly and they will receive these results electronically. The problem first began 4 years, she had a left TKA on 3/4/2020. She feels sharp, aching, stabbing, numbness, tingling and constant on her left knee . The symptoms are worsening. She has tried pain medication, NSAID, CSI, topical cream without improvement.     History obtained from patient.    Past family, medical, social, surgical history, and vital signs reviewed in chart.    Review of Systems   Constitutional: Negative for chills, fever and weight loss.   HENT: Negative for hearing loss and sore throat.    Eyes: Negative for blurred vision, photophobia and pain.   Respiratory: Negative for shortness of breath.    Cardiovascular: Negative for chest pain.   Gastrointestinal: Negative for abdominal pain.   Genitourinary: Negative for dysuria.   Skin: Negative for rash.   Neurological: Negative for tingling and headaches.   Endo/Heme/Allergies: Does not bruise/bleed easily.   Psychiatric/Behavioral: Negative for depression.       General    Nursing note and vitals reviewed.  Constitutional: She is oriented to person, place, and time. She appears well-developed and well-nourished.   HENT:   Head: Normocephalic and atraumatic.   Eyes: Conjunctivae and EOM are normal. Pupils are equal, round, and reactive to light.   Neck: Neck supple.   Cardiovascular: Intact distal pulses.    Pulmonary/Chest: Effort normal. No respiratory distress.   Abdominal: She exhibits no distension.   Neurological: She is alert  and oriented to person, place, and time. She has normal reflexes.   Psychiatric: She has a normal mood and affect.           Right Knee Exam   Right knee exam is normal.    Other   Sensation: normal    Left Knee Exam     Inspection   Erythema: absent  Scars: present (well healed surgical scar)  Swelling: absent  Effusion: absent    Tenderness   The patient tender to palpation of the medial joint line, medial retinaculum and lateral joint line.    Range of Motion   Extension: normal     Other   Sensation: normal    Muscle Strength   Right Lower Extremity   Hip Abduction: 4/5   Quadriceps:  5/5   Hamstrin/5   Left Lower Extremity   Hip Abduction: 4/5   Quadriceps:  5/5   Hamstrin/5     Vascular Exam       Left Pulses  Dorsalis Pedis:      1+                Findings:    SNC      Nerve / Sites Rec. Site Onset Lat Peak Lat Amp Segments Distance Velocity     ms ms µV  mm m/s   L Sural - Ankle (Calf)      Calf Ankle NR NR NR Calf - Ankle 140 NR   L Superficial peroneal - Ankle      1  2.0 2.6 9.1 1 - G1 100 51       MNC      Nerve / Sites Muscle Latency Amplitude Duration Rel Amp Segments Distance Lat Diff Velocity     ms mV ms %  mm ms m/s   L Peroneal - EDB      Ankle EDB 3.7 2.0 5.9 100 Ankle - EDB 80        Fib head EDB 11.1 0.3 6.4 12.8 Fib head - Ankle 322 7.4 43      Pop fossa EDB 12.2 1.6 6.1 620 Pop fossa - Fib head 70 1.0 67         Pop fossa - Ankle  8.5    L Tibial - AH      Ankle AH 4.3 1.4 4.8 100 Ankle - AH 80        Pop fossa AH 13.0 2.6 5.7 184 Pop fossa - Ankle 390 8.8 45       EMG         EMG Summary Table     Spontaneous MUAP Recruitment   Muscle IA Fib PSW Fasc Other Dur. Dur Amp Dur Polys Pattern Effort   L. Tibialis anterior N None None None . _NFT_ _NFT_ N N N N .   L. Gastrocnemius (Medial head) N None None None . _NFT_ _NFT_ N N N N .   L. Peroneus longus N None None None . _NFT_ _NFT_ N N N N .   L. Vastus medialis N None None None . _NFT_ _NFT_ N N N N .   L. Biceps femoris (short  head) N None None None . _NFT_ _NFT_ N N N N .   L. Lumbar paraspinals (low) N None None None . _NFT_ _NFT_            Results:    - The left peroneal motor and sensory responses were normal.  - The left tibial motor response was normal.   - The left sural sensory response could not be obtained, likely secondary to body habitus.  - Needle EMG examination of the left lower extremity and lumbar paraspinals was normal.     Interpretation:    1. Normal electrodiagnostic examination. No evidence of left peroneal or tibial neuropathy. No evidence of left lumbar radiculopathy or diffuse polyneuropathy.     2. Should symptoms progress or fail to resolve, repeat studies in 6 to 12 months may be warranted.         IMPRESSION/PLAN: Mariel is a 64 y.o.  female with:    1. Chronic pain of left knee    2. Spondylosis without myelopathy or radiculopathy, lumbosacral region  - EMG W/ ULTRASOUND AND NERVE CONDUCTION TEST 4 Extremities       The findings were discussed with Mariel in detail. She has no electrodiagnostic evidence of nerve lesions or radiculopathy. She will follow-up with Dr. Connolly for further evaluation and management. All of her questions were answered.     Uzma Price M.D.  Sports Medicine / PM&R

## 2020-07-21 ENCOUNTER — TELEPHONE (OUTPATIENT)
Dept: ORTHOPEDICS | Facility: CLINIC | Age: 65
End: 2020-07-21

## 2020-07-21 ENCOUNTER — HOSPITAL ENCOUNTER (OUTPATIENT)
Dept: RADIOLOGY | Facility: HOSPITAL | Age: 65
Discharge: HOME | End: 2020-07-21
Attending: PHYSICAL MEDICINE & REHABILITATION
Payer: COMMERCIAL

## 2020-07-21 DIAGNOSIS — M54.16 LUMBAR RADICULOPATHY: ICD-10-CM

## 2020-07-21 PROCEDURE — 72148 MRI LUMBAR SPINE W/O DYE: CPT | Mod: TC

## 2020-07-21 NOTE — TELEPHONE ENCOUNTER
Spoke with the patient in regards to their message below. Informed the patient that their paperwork is completed. Patient also asked about getting an MRI done on their left knee. Informed the patient that I did not see a order for a mri in Dr. Connolly last note and that I will have to ask Dr. Connolly when he returns to the office on 07/23/20. Patient verbalized understanding. FP        ----- Message from Antonette Chambers sent at 7/21/2020  4:05 PM CDT -----  Contact: Pt [phone]  Patient is requesting a call concerning forms that she was suppose has gotten last week, and also an order to have a MRI on the left knee.    Please call and advise.    Thank You

## 2020-07-24 ENCOUNTER — PATIENT OUTREACH (OUTPATIENT)
Dept: ADMINISTRATIVE | Facility: OTHER | Age: 65
End: 2020-07-24

## 2020-07-24 ENCOUNTER — TELEPHONE (OUTPATIENT)
Dept: ORTHOPEDICS | Facility: CLINIC | Age: 65
End: 2020-07-24

## 2020-07-24 NOTE — TELEPHONE ENCOUNTER
Returned the patient's phone call in regards to their message below. Informed the patient that Dr. Connolly states that he will not get a MRI of their knee because they have metal in the knee. Patient then asked if they can reschedule their appointment on the 27th. Informed the patient that his next available date will be in august. Patient states that they will call us back to reschedule.FP        ----- Message from Amelia Perez LPN sent at 7/24/2020  3:36 PM CDT -----    ----- Message -----  From: Parisa Joaquin  Sent: 7/24/2020   8:06 AM CDT  To: [facility] Winter Staff    States she would like the nurse to give her a call. States how many x-rays does she need to have. States she would like to get an MRI done of her knee. States she was schedule on 7/29, but I don't see anything for 7/29. Please call pt [phone]. Thank you

## 2020-07-27 ENCOUNTER — OFFICE VISIT (OUTPATIENT)
Dept: ORTHOPEDICS | Facility: CLINIC | Age: 65
End: 2020-07-27
Payer: COMMERCIAL

## 2020-07-27 ENCOUNTER — HOSPITAL ENCOUNTER (OUTPATIENT)
Dept: RADIOLOGY | Facility: HOSPITAL | Age: 65
Discharge: HOME | End: 2020-07-27
Attending: ORTHOPAEDIC SURGERY
Payer: COMMERCIAL

## 2020-07-27 VITALS
DIASTOLIC BLOOD PRESSURE: 85 MMHG | SYSTOLIC BLOOD PRESSURE: 135 MMHG | WEIGHT: 242 LBS | BODY MASS INDEX: 38.89 KG/M2 | HEART RATE: 94 BPM | HEIGHT: 66 IN

## 2020-07-27 DIAGNOSIS — Z96.652 PAIN DUE TO TOTAL LEFT KNEE REPLACEMENT, SUBSEQUENT ENCOUNTER: ICD-10-CM

## 2020-07-27 DIAGNOSIS — Z96.652 HISTORY OF TOTAL LEFT KNEE REPLACEMENT: Primary | ICD-10-CM

## 2020-07-27 DIAGNOSIS — M43.16 LUMBAR ADJACENT SEGMENT DISEASE WITH SPONDYLOLISTHESIS: ICD-10-CM

## 2020-07-27 DIAGNOSIS — M51.36 LUMBAR ADJACENT SEGMENT DISEASE WITH SPONDYLOLISTHESIS: ICD-10-CM

## 2020-07-27 DIAGNOSIS — M54.16 LUMBAR BACK PAIN WITH RADICULOPATHY AFFECTING LEFT LOWER EXTREMITY: ICD-10-CM

## 2020-07-27 DIAGNOSIS — T84.84XD PAIN DUE TO TOTAL LEFT KNEE REPLACEMENT, SUBSEQUENT ENCOUNTER: ICD-10-CM

## 2020-07-27 DIAGNOSIS — M25.562 LEFT KNEE PAIN, UNSPECIFIED CHRONICITY: ICD-10-CM

## 2020-07-27 PROCEDURE — 99999 PR PBB SHADOW E&M-EST. PATIENT-LVL III: CPT | Mod: PBBFAC,,, | Performed by: ORTHOPAEDIC SURGERY

## 2020-07-27 PROCEDURE — 3079F DIAST BP 80-89 MM HG: CPT | Mod: CPTII,S$GLB,, | Performed by: ORTHOPAEDIC SURGERY

## 2020-07-27 PROCEDURE — 99214 PR OFFICE/OUTPT VISIT, EST, LEVL IV, 30-39 MIN: ICD-10-PCS | Mod: S$GLB,,, | Performed by: ORTHOPAEDIC SURGERY

## 2020-07-27 PROCEDURE — 73562 XR KNEE ORTHO LEFT WITH FLEXION: ICD-10-PCS | Mod: 26,RT,, | Performed by: RADIOLOGY

## 2020-07-27 PROCEDURE — 73562 X-RAY EXAM OF KNEE 3: CPT | Mod: 26,RT,, | Performed by: RADIOLOGY

## 2020-07-27 PROCEDURE — 73562 X-RAY EXAM OF KNEE 3: CPT | Mod: TC,RT,59

## 2020-07-27 PROCEDURE — 3075F PR MOST RECENT SYSTOLIC BLOOD PRESS GE 130-139MM HG: ICD-10-PCS | Mod: CPTII,S$GLB,, | Performed by: ORTHOPAEDIC SURGERY

## 2020-07-27 PROCEDURE — 99999 PR PBB SHADOW E&M-EST. PATIENT-LVL III: ICD-10-PCS | Mod: PBBFAC,,, | Performed by: ORTHOPAEDIC SURGERY

## 2020-07-27 PROCEDURE — 73564 XR KNEE ORTHO LEFT WITH FLEXION: ICD-10-PCS | Mod: 26,LT,, | Performed by: RADIOLOGY

## 2020-07-27 PROCEDURE — 3075F SYST BP GE 130 - 139MM HG: CPT | Mod: CPTII,S$GLB,, | Performed by: ORTHOPAEDIC SURGERY

## 2020-07-27 PROCEDURE — 3079F PR MOST RECENT DIASTOLIC BLOOD PRESSURE 80-89 MM HG: ICD-10-PCS | Mod: CPTII,S$GLB,, | Performed by: ORTHOPAEDIC SURGERY

## 2020-07-27 PROCEDURE — 73564 X-RAY EXAM KNEE 4 OR MORE: CPT | Mod: 26,LT,, | Performed by: RADIOLOGY

## 2020-07-27 PROCEDURE — 3008F PR BODY MASS INDEX (BMI) DOCUMENTED: ICD-10-PCS | Mod: CPTII,S$GLB,, | Performed by: ORTHOPAEDIC SURGERY

## 2020-07-27 PROCEDURE — 3008F BODY MASS INDEX DOCD: CPT | Mod: CPTII,S$GLB,, | Performed by: ORTHOPAEDIC SURGERY

## 2020-07-27 PROCEDURE — 99214 OFFICE O/P EST MOD 30 MIN: CPT | Mod: S$GLB,,, | Performed by: ORTHOPAEDIC SURGERY

## 2020-07-27 RX ORDER — METHYLPREDNISOLONE 4 MG/1
TABLET ORAL
Qty: 21 TABLET | Refills: 0 | Status: SHIPPED | OUTPATIENT
Start: 2020-07-27 | End: 2020-08-17
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
## 2020-07-27 NOTE — PATIENT INSTRUCTIONS
Take Medrol Dosepak as instructed.  It is 1 pill last on a daily basis for almost a week  Continue with tramadol and Tylenol as needed  May call 4 renewal  Follow-up with pain management  May repeat the nerve test in 4 month  May need injections in the spine to see if that helps leg pain  Copy of the MRI given  Return to clinic in 4 weeks

## 2020-07-28 ENCOUNTER — OFFICE VISIT (OUTPATIENT)
Dept: PAIN MEDICINE | Facility: CLINIC | Age: 65
End: 2020-07-28
Payer: COMMERCIAL

## 2020-07-28 VITALS
HEART RATE: 90 BPM | WEIGHT: 242.31 LBS | HEIGHT: 66 IN | SYSTOLIC BLOOD PRESSURE: 139 MMHG | DIASTOLIC BLOOD PRESSURE: 87 MMHG | BODY MASS INDEX: 38.94 KG/M2

## 2020-07-28 DIAGNOSIS — Z96.652 HISTORY OF LEFT KNEE REPLACEMENT: ICD-10-CM

## 2020-07-28 DIAGNOSIS — G89.29 CHRONIC PAIN OF BOTH KNEES: ICD-10-CM

## 2020-07-28 DIAGNOSIS — M25.562 CHRONIC PAIN OF BOTH KNEES: ICD-10-CM

## 2020-07-28 DIAGNOSIS — M17.0 PRIMARY OSTEOARTHRITIS OF BOTH KNEES: ICD-10-CM

## 2020-07-28 DIAGNOSIS — M70.50 PES ANSERINE BURSITIS: Primary | ICD-10-CM

## 2020-07-28 DIAGNOSIS — M25.561 CHRONIC PAIN OF BOTH KNEES: ICD-10-CM

## 2020-07-28 PROCEDURE — 99214 OFFICE O/P EST MOD 30 MIN: CPT | Mod: 25,S$GLB,, | Performed by: PHYSICAL MEDICINE & REHABILITATION

## 2020-07-28 PROCEDURE — 3075F SYST BP GE 130 - 139MM HG: CPT | Mod: CPTII,S$GLB,, | Performed by: PHYSICAL MEDICINE & REHABILITATION

## 2020-07-28 PROCEDURE — 3008F PR BODY MASS INDEX (BMI) DOCUMENTED: ICD-10-PCS | Mod: CPTII,S$GLB,, | Performed by: PHYSICAL MEDICINE & REHABILITATION

## 2020-07-28 PROCEDURE — 3075F PR MOST RECENT SYSTOLIC BLOOD PRESS GE 130-139MM HG: ICD-10-PCS | Mod: CPTII,S$GLB,, | Performed by: PHYSICAL MEDICINE & REHABILITATION

## 2020-07-28 PROCEDURE — 99214 PR OFFICE/OUTPT VISIT, EST, LEVL IV, 30-39 MIN: ICD-10-PCS | Mod: 25,S$GLB,, | Performed by: PHYSICAL MEDICINE & REHABILITATION

## 2020-07-28 PROCEDURE — 20610 DRAIN/INJ JOINT/BURSA W/O US: CPT | Mod: LT,S$GLB,, | Performed by: PHYSICAL MEDICINE & REHABILITATION

## 2020-07-28 PROCEDURE — 99999 PR PBB SHADOW E&M-EST. PATIENT-LVL V: CPT | Mod: PBBFAC,,, | Performed by: PHYSICAL MEDICINE & REHABILITATION

## 2020-07-28 PROCEDURE — 3079F DIAST BP 80-89 MM HG: CPT | Mod: CPTII,S$GLB,, | Performed by: PHYSICAL MEDICINE & REHABILITATION

## 2020-07-28 PROCEDURE — 3008F BODY MASS INDEX DOCD: CPT | Mod: CPTII,S$GLB,, | Performed by: PHYSICAL MEDICINE & REHABILITATION

## 2020-07-28 PROCEDURE — 99999 PR PBB SHADOW E&M-EST. PATIENT-LVL V: ICD-10-PCS | Mod: PBBFAC,,, | Performed by: PHYSICAL MEDICINE & REHABILITATION

## 2020-07-28 PROCEDURE — 3079F PR MOST RECENT DIASTOLIC BLOOD PRESSURE 80-89 MM HG: ICD-10-PCS | Mod: CPTII,S$GLB,, | Performed by: PHYSICAL MEDICINE & REHABILITATION

## 2020-07-28 PROCEDURE — 20610 PR DRAIN/INJECT LARGE JOINT/BURSA: ICD-10-PCS | Mod: LT,S$GLB,, | Performed by: PHYSICAL MEDICINE & REHABILITATION

## 2020-07-28 RX ORDER — METHYLPREDNISOLONE ACETATE 40 MG/ML
40 INJECTION, SUSPENSION INTRA-ARTICULAR; INTRALESIONAL; INTRAMUSCULAR; SOFT TISSUE
Status: COMPLETED | OUTPATIENT
Start: 2020-07-28 | End: 2020-07-28

## 2020-07-28 RX ORDER — ASPIRIN 81 MG/1
81 TABLET ORAL EVERY 12 HOURS
Status: ON HOLD | COMMUNITY
End: 2020-12-31 | Stop reason: HOSPADM

## 2020-07-28 RX ADMIN — METHYLPREDNISOLONE ACETATE 40 MG: 40 INJECTION, SUSPENSION INTRA-ARTICULAR; INTRALESIONAL; INTRAMUSCULAR; SOFT TISSUE at 05:07

## 2020-07-28 NOTE — PROGRESS NOTES
Established Patient Chronic Pain Note (Follow up visit)    Chief Complaint:   Chief Complaint   Patient presents with    Back Pain       SUBJECTIVE:     Mariel Becerril is a 64 y.o. female who presents to the clinic for a follow-up appointment for left knee pain.  She presents today for MRI results review and EMG/NCS follow-up.  Since the last visit, Mariel Becerril states the pain has been persistant. Current pain intensity is 9/10.  She recently underwent a revision of the left knee with Dr. Connolly in March 2020 that unfortunately has not provided significant relief in her knee pain.     Patient denies night fever/night sweats, urinary incontinence, bowel incontinence, significant weight loss, significant motor weakness and loss of sensations.    Pain Disability Index Review:  Last 3 PDI Scores 7/28/2020 7/8/2020 11/20/2018   Pain Disability Index (PDI) 51 43 54     Interval HPI 07/08/2020:  Mariel Becerril is a 64 y.o. female who presents to the clinic for a follow-up appointment for left knee pain. Since the last visit, Mariel Becerril states the pain has been persistant. Current pain intensity is 9/10.  She recent underwent a revision of the left knee with Dr. Connolly in March 2020 that unfortunately has not provided significant relief in her knee pain.  She is scheduled for EMG/NCS within the next week or so.  She has not had significant workup for lumbar radiculopathy previously, but does state that she has back pain.      Initial HPI 11/20/2018:  Mariel Becerrli is a 63 y.o. female  who is presenting with a chief complaint of Knee Pain. The patient began experiencing this problem insidiously, and the pain has been gradually worsening over the past 12 month(s). The pain is described as throbbing, cramping, aching and heavy and is located in the left knee. Pain is intermittent and lasts hours. The pain radiates to pain is nonradiating. The patient rates her pain a 8 out of ten and  interferes with activities of daily living a 8 out of ten. Pain is exacerbated by prolonged standing, ambulation, and is improved by rest. Patient reports no prior trauma, no prior spinal surgery       Non-Pharmacologic Treatments:  Physical Therapy/Home Exercise: yes  Ice/Heat:yes  TENS: no  Acupuncture: no  Massage: no  Chiropractic: no    Other: no    Pain Medications:  - Opioids: Norco, tramadol, Percocet  - Adjuvant Medications: NSAIDs, Tylenol, gabapentin, Robaxin  - Anti-Coagulants: Aspirin    Opioid Contract: no     report:  Reviewed and consistent with medication use as prescribed.      Pain Procedures:   -multiple intra-articular knee injections  -left genicular nerve block on 12/20/2018  -left TKA March 2020    Imaging & EMG/NCS:   EMG/NCS left lower extremity 07/14/2020:  Results:     - The left peroneal motor and sensory responses were normal.  - The left tibial motor response was normal.   - The left sural sensory response could not be obtained, likely secondary to body habitus.  - Needle EMG examination of the left lower extremity and lumbar paraspinals was normal.      Interpretation:     1. Normal electrodiagnostic examination. No evidence of left peroneal or tibial neuropathy. No evidence of left lumbar radiculopathy or diffuse polyneuropathy.      2. Should symptoms progress or fail to resolve, repeat studies in 6 to 12 months may be warranted.       MRI lumbar spine 07/21/2020:  The distal cord and conus appear normal.     The lumbar vertebra reveal grade 1 L4-5 spondylolisthesis.  Small L3 vertebral body hemangioma is present.     No acute or chronic compression fracture.     T12-L1: There is broad-based disc bulge with hypointense T1 and T2 signal material extending both superiorly and inferiorly from the disc margin.  Possible old calcified disc extrusion versus calcification of the posterior longitudinal ligament.     L1-2:     Mild disc bulge.     L2-3:     Mild disc bulge with mild  hypertrophic ligamentum flavum.     L3-4:     Mild disc bulge with mild hypertrophic ligamentum flavum.     L4-5:     Mild disc degeneration with disc narrowing, desiccation and disc bulge.  Moderate to severe facet arthritis with grade 1 spondylolisthesis of approximately 4 mm.  Mild central with moderate foraminal stenosis.     L5-S1:    Mild disc degeneration with generalized disc bulge.  Moderate left and mild right facet arthritis.  Mild lateral recess stenosis with mild bilateral foraminal stenosis.    X-ray left knee 06/29/2020:  Stable postsurgical changes left total knee arthroplasty.  Components well seated without fracture, dislocation or loosening.  Cannot exclude tiny suprapatellar effusion.  Faint calcifications again noted projecting over the superior margin of the left medial femoral condyle.     Osteopenia and tricompartment degenerative changes noted on the right.  There is extensive degenerative change involving the patellofemoral joint check Lizeth along the lateral facet with lateral tilting and prominent marginal osteophyte formation.  Narrowing of the medial and lateral compartments and marginal spurring.  No acute fracture or dislocation.      X-ray bilateral knee 05/22/2020:  There is mild-to-moderate joint space narrowing and osteophyte formation seen involving all 3 compartments of the right knee.  The greatest degree of degenerative changes at the right patellofemoral compartment.  A left total knee arthroplasty is in place.  No hardware failure or loosening.  No periprosthetic fracture.  No joint effusions are suggested.  No acute fracture or dislocation.      PMHx,PSHx, Social history, and Family history:  I have reviewed the patient's medical, surgical, social, and family history in detail and updated the computerized patient record.        Review of patient's allergies indicates:   Allergen Reactions    Penicillins Rash       Current Outpatient Medications   Medication Sig     acetaminophen (TYLENOL) 325 MG tablet Take 2 tablets (650 mg total) by mouth every 8 (eight) hours as needed.    albuterol (PROAIR HFA) 90 mcg/actuation inhaler Inhale 2 puffs into the lungs every 6 (six) hours as needed for Wheezing. Rescue    amLODIPine (NORVASC) 5 MG tablet Take 1 tablet (5 mg total) by mouth once daily. (Patient taking differently: Take 5 mg by mouth every evening. )    aspirin (ECOTRIN) 81 MG EC tablet Take 81 mg by mouth every 12 (twelve) hours.    benzonatate (TESSALON) 100 MG capsule Take 1 capsule by mouth three times daily as needed    diclofenac sodium (VOLTAREN) 1 % Gel Apply 2 g topically 3 (three) times daily as needed.    eflornithine (VANIQA) 13.9 % cream Apply topically 2 (two) times daily.    ergocalciferol, vitamin D2, (VITAMIN D ORAL) Take 2,000 Units by mouth once daily.    fluticasone propionate (FLONASE) 50 mcg/actuation nasal spray 1 spray (50 mcg total) by Each Nostril route every evening.    furosemide (LASIX) 40 MG tablet Take 1 tablet by mouth once daily    gabapentin (NEURONTIN) 300 MG capsule Take 1 capsule (300 mg total) by mouth 2 (two) times daily.    gatifloxacin 0.5 % Drop drops Place 1 drop into the left eye 2 (two) times daily. Eyedrops to start one day before surgery    gatifloxacin 0.5 % Drop drops Apply 1 drop to eye 2 (two) times daily. Eyedrops to start one day before surgery    ketoconazole (NIZORAL) 2 % cream Apply topically 2 (two) times daily.    ketorolac 0.5% (ACULAR) 0.5 % Drop Place 1 drop into the left eye 4 (four) times daily. Eyedrops to start one day before surgery    meclizine (ANTIVERT) 25 mg tablet Take 1 tablet (25 mg total) by mouth 3 (three) times daily as needed for Dizziness.    meloxicam (MOBIC) 15 MG tablet Take 1 tablet (15 mg total) by mouth once daily. Take with food    methylPREDNISolone (MEDROL DOSEPACK) 4 mg tablet use as directed    metoprolol succinate (TOPROL-XL) 50 MG 24 hr tablet     montelukast (SINGULAIR)  10 mg tablet Take 10 mg by mouth every evening.    nozaseptin (NOZIN) nasal  1 each by Each Nostril route 2 (two) times daily.    nystatin (MYCOSTATIN) cream APPLY  CREAM TOPICALLY TO AFFECTED AREA TWICE DAILY    nystatin (MYCOSTATIN) powder Apply topically 2 (two) times daily.    omeprazole (PRILOSEC) 40 MG capsule Take 1 capsule by mouth once daily    ondansetron (ZOFRAN-ODT) 4 MG TbDL Take 1 tablet (4 mg total) by mouth every 8 (eight) hours as needed.    oxyCODONE (ROXICODONE) 10 mg Tab immediate release tablet Take 1 tablet (10 mg total) by mouth every 8 (eight) hours as needed (pain 6-7/10 pain scale score).    oxyCODONE-acetaminophen (PERCOCET) 5-325 mg per tablet Take 1 tablet by mouth every 8 (eight) hours as needed for Pain.    phentermine (ADIPEX-P) 37.5 mg tablet Take 37.5 mg by mouth once daily.    prednisoLONE acetate (PRED FORTE) 1 % DrpS Place 1 drop into the left eye 4 (four) times daily. Start one day before surgery    predniSONE (DELTASONE) 5 MG tablet Take 1 tablet (5 mg total) by mouth once daily.    SF 5000 PLUS 1.1 % Crea BRUSH FOR 2 MINUTES AT BEDTIME THEN EXPECTORATE THE EXCESS.(NOTHING TO EAT OR DRINK FOR 30 MINUTES AFTER USE )    topiramate (TOPAMAX) 50 MG tablet Take 1 tablet (50 mg total) by mouth once daily. (Patient taking differently: Take 50 mg by mouth daily as needed. )    traMADoL (ULTRAM) 50 mg tablet TAKE 1 TABLET BY MOUTH EVERY 8 HOURS AS NEEDED FOR PAIN    chlorhexidine (PERIDEX) 0.12 % solution SWISH AND SPIT 15 MLS BY MOUTH TWICE A DAY FOR 7 DAYS    levocetirizine (XYZAL) 5 MG tablet Take 1 tablet (5 mg total) by mouth every evening.     No current facility-administered medications for this visit.          REVIEW OF SYSTEMS:    GENERAL:  No weight loss, malaise or fevers.  HEENT:   No recent changes in vision or hearing  NECK:  Negative for lumps, no difficulty with swallowing.  RESPIRATORY:  Negative for cough, wheezing or shortness of breath,  "patient denies any recent URI.  CARDIOVASCULAR:  Negative for chest pain, leg swelling or palpitations.  GI:  Negative for abdominal discomfort, blood in stools or black stools or change in bowel habits.  MUSCULOSKELETAL:  See HPI.  SKIN:  Negative for lesions, rash, and itching.  PSYCH:  No mood disorder or recent psychosocial stressors.  Patients sleep is not disturbed secondary to pain.  HEMATOLOGY/LYMPHOLOGY:  Negative for prolonged bleeding, bruising easily or swollen nodes.  Patient is currently taking anti-coagulants - ASA  NEURO:   No history of headaches, syncope, paralysis, seizures or tremors.  All other reviewed and negative other than HPI.    OBJECTIVE:    /87   Pulse 90   Ht 5' 6" (1.676 m)   Wt 109.9 kg (242 lb 4.6 oz)   BMI 39.11 kg/m²     PHYSICAL EXAMINATION:    GENERAL: Well appearing, in no acute distress, alert and oriented x3.  Obese  PSYCH:  Mood and affect appropriate.  SKIN: Skin color, texture, turgor normal, no rashes or lesions.  HEAD/FACE:  Normocephalic, atraumatic. Cranial nerves grossly intact.  CV: RRR with palpation of the radial artery.  PULM: No evidence of respiratory difficulty, symmetric chest rise.  GI:  Soft and non-tender.  BACK: Straight leg raising in the sitting and supine positions is equivocal to radicular pain.  Minimal to mild pain to palpation over the facet joints of the lumbar spine and lumbar paraspinals.  Fair range of motion without pain reproduction.  EXTREMITIES: Peripheral joint ROM is full and pain free without obvious instability or laxity in all four extremities with the exception of the left knee having a 5 degree extensor lag and good range of motion with flexion actively and passively. No deformities, edema, or skin discoloration. Good capillary refill.  MUSCULOSKELETAL:  There is exquisite tenderness over the left pes anserine bursa.  There is mild pain with palpation over the sacroiliac joints bilaterally.  FABERs test is equivocal.  FADIRs " test is equivocal.   Bilateral upper and lower extremity strength is normal and symmetric.  No atrophy or tone abnormalities are noted.  NEURO: Bilateral upper and lower extremity coordination and muscle stretch reflexes are physiologic and symmetric.  Plantar response are downgoing. No clonus.  No loss of sensation is noted.  GAIT:  Antalgic, slow.      LABS:  Lab Results   Component Value Date    WBC 8.96 03/06/2020    HGB 8.0 (L) 03/06/2020    HCT 26.1 (L) 03/06/2020    MCV 93 03/06/2020     03/06/2020       CMP  Sodium   Date Value Ref Range Status   03/06/2020 141 136 - 145 mmol/L Final     Potassium   Date Value Ref Range Status   03/06/2020 3.8 3.5 - 5.1 mmol/L Final     Chloride   Date Value Ref Range Status   03/06/2020 112 (H) 95 - 110 mmol/L Final     CO2   Date Value Ref Range Status   03/06/2020 23 23 - 29 mmol/L Final     Glucose   Date Value Ref Range Status   03/06/2020 109 70 - 110 mg/dL Final     BUN, Bld   Date Value Ref Range Status   03/06/2020 11 8 - 23 mg/dL Final     Creatinine   Date Value Ref Range Status   03/06/2020 0.8 0.5 - 1.4 mg/dL Final     Calcium   Date Value Ref Range Status   03/06/2020 8.3 (L) 8.7 - 10.5 mg/dL Final     Total Protein   Date Value Ref Range Status   03/06/2020 5.1 (L) 6.0 - 8.4 g/dL Final     Albumin   Date Value Ref Range Status   03/06/2020 2.6 (L) 3.5 - 5.2 g/dL Final     Total Bilirubin   Date Value Ref Range Status   03/06/2020 0.3 0.1 - 1.0 mg/dL Final     Comment:     For infants and newborns, interpretation of results should be based  on gestational age, weight and in agreement with clinical  observations.  Premature Infant recommended reference ranges:  Up to 24 hours.............<8.0 mg/dL  Up to 48 hours............<12.0 mg/dL  3-5 days..................<15.0 mg/dL  6-29 days.................<15.0 mg/dL       Alkaline Phosphatase   Date Value Ref Range Status   03/06/2020 79 55 - 135 U/L Final     AST   Date Value Ref Range Status   03/06/2020  45 (H) 10 - 40 U/L Final     ALT   Date Value Ref Range Status   03/06/2020 47 (H) 10 - 44 U/L Final     Anion Gap   Date Value Ref Range Status   03/06/2020 6 (L) 8 - 16 mmol/L Final     eGFR if    Date Value Ref Range Status   03/06/2020 >60 >60 mL/min/1.73 m^2 Final     eGFR if non    Date Value Ref Range Status   03/06/2020 >60 >60 mL/min/1.73 m^2 Final     Comment:     Calculation used to obtain the estimated glomerular filtration  rate (eGFR) is the CKD-EPI equation.          No results found for: LABA1C, HGBA1C          ASSESSMENT: 64 y.o. year old female with low back and left knee pain, consistent with     1. Pes anserine bursitis  methylPREDNISolone acetate injection 40 mg   2. Primary osteoarthritis of both knees     3. History of left knee replacement     4. Chronic pain of both knees           PLAN:   - Interventions:  Performed a left pes anserine bursa injection today in the clinic for diagnostic and therapeutic purposes.  Explained the procedure in detail, risks, benefits, and alternatives with the patient today and she elected to pursue this intervention.  Verbal consent obtained, see procedure note below for more details.  If no significant relief with this procedure, consider genicular nerve blocks/RFA in the future.  I do not believe her knee pain is stemming from the lumbar spine due to negative EMG/NCS and no significant findings on the MRI of the lumbar spine indicating pain that would precipitate left knee pain.  - Anticoagulation: yes, aspirin  - Medications: I have stressed the importance of physical activity and a home exercise plan to help with pain and improve health., Patient can continue with medications for now since they are providing benefits, using them appropriately, and without side effects. and I do not feel this patient is a candidate for long term opioids for this non-cancer pain at this time  - Therapy:  Advised patient continue with activities  and home exercises as tolerated  - Psychological:  Discussed coping mechanisms up address chronic pain issues  - Labs:  Reviewed  - Imaging:  Reviewed imaging available, specifically MRI of the lumbar spine today with the patient and answered all questions she had regarding study.  - Consults/Referrals:  None at this time  - Records:  Reviewed/Obtain old records from outside physicians and imaging  - Follow up visit:  4 weeks or as needed  - Counseled patient regarding the importance of activity modification and physical therapy  - This condition does not require this patient to take time off of work, and the primary goal of our Pain Management services is to improve the patient's functional capacity.  - Patient Questions: Answered all of the patient's questions regarding diagnosis, therapy, and treatment    PROCEDURE NOTE:  Left pes anserine Injection:   The procedure was discussed with the patient including complications of nerve damage,  bleeding, infection, and failure of pain relief.   Bony landmarks and point of maximal tenderness were identified by palpation and marked.  ChloraPrep of the site was done. A mixture of 2mL 1% lidocaine + 40 mg Depo-Medrol  was prepared (3 mL total).   A 25-gauge needle was advanced into the left pes anserine bursa, and the medication mixture  was injected after negative aspiration. Patient tolerated the procedure well and without complications. Patient was monitored for 15 min following the injection before discharged from the clinic.        The above plan and management options were discussed at length with patient. Patient is in agreement with the above and verbalized understanding.      Thanh Diaz MD  Interventional Pain Management  [facility]    Disclaimer:  This note was prepared using voice recognition system and is likely to have sound alike errors that may have been overlooked even after proof reading.  Please call me with any questions

## 2020-08-10 ENCOUNTER — PATIENT OUTREACH (OUTPATIENT)
Dept: ADMINISTRATIVE | Facility: OTHER | Age: 65
End: 2020-08-10

## 2020-08-11 ENCOUNTER — OFFICE VISIT (OUTPATIENT)
Dept: CARDIOLOGY | Facility: CLINIC | Age: 65
End: 2020-08-11
Payer: COMMERCIAL

## 2020-08-11 ENCOUNTER — PATIENT MESSAGE (OUTPATIENT)
Dept: PAIN MEDICINE | Facility: CLINIC | Age: 65
End: 2020-08-11

## 2020-08-11 VITALS
BODY MASS INDEX: 39.32 KG/M2 | SYSTOLIC BLOOD PRESSURE: 120 MMHG | WEIGHT: 244.69 LBS | DIASTOLIC BLOOD PRESSURE: 70 MMHG | HEIGHT: 66 IN | OXYGEN SATURATION: 98 % | HEART RATE: 91 BPM

## 2020-08-11 DIAGNOSIS — R07.89 CHEST PRESSURE: ICD-10-CM

## 2020-08-11 DIAGNOSIS — I10 ESSENTIAL HYPERTENSION: Primary | ICD-10-CM

## 2020-08-11 DIAGNOSIS — R06.09 DOE (DYSPNEA ON EXERTION): ICD-10-CM

## 2020-08-11 DIAGNOSIS — R00.2 PALPITATIONS: ICD-10-CM

## 2020-08-11 DIAGNOSIS — G47.33 OSA ON CPAP: ICD-10-CM

## 2020-08-11 DIAGNOSIS — I49.3 SYMPTOMATIC PVCS: ICD-10-CM

## 2020-08-11 DIAGNOSIS — E66.01 MORBID OBESITY DUE TO EXCESS CALORIES: ICD-10-CM

## 2020-08-11 PROCEDURE — 99214 OFFICE O/P EST MOD 30 MIN: CPT | Mod: S$GLB,,, | Performed by: INTERNAL MEDICINE

## 2020-08-11 PROCEDURE — 3074F PR MOST RECENT SYSTOLIC BLOOD PRESSURE < 130 MM HG: ICD-10-PCS | Mod: CPTII,S$GLB,, | Performed by: INTERNAL MEDICINE

## 2020-08-11 PROCEDURE — 3078F DIAST BP <80 MM HG: CPT | Mod: CPTII,S$GLB,, | Performed by: INTERNAL MEDICINE

## 2020-08-11 PROCEDURE — 99999 PR PBB SHADOW E&M-EST. PATIENT-LVL V: CPT | Mod: PBBFAC,,, | Performed by: INTERNAL MEDICINE

## 2020-08-11 PROCEDURE — 3008F BODY MASS INDEX DOCD: CPT | Mod: CPTII,S$GLB,, | Performed by: INTERNAL MEDICINE

## 2020-08-11 PROCEDURE — 99214 PR OFFICE/OUTPT VISIT, EST, LEVL IV, 30-39 MIN: ICD-10-PCS | Mod: S$GLB,,, | Performed by: INTERNAL MEDICINE

## 2020-08-11 PROCEDURE — 99999 PR PBB SHADOW E&M-EST. PATIENT-LVL V: ICD-10-PCS | Mod: PBBFAC,,, | Performed by: INTERNAL MEDICINE

## 2020-08-11 PROCEDURE — 3078F PR MOST RECENT DIASTOLIC BLOOD PRESSURE < 80 MM HG: ICD-10-PCS | Mod: CPTII,S$GLB,, | Performed by: INTERNAL MEDICINE

## 2020-08-11 PROCEDURE — 3008F PR BODY MASS INDEX (BMI) DOCUMENTED: ICD-10-PCS | Mod: CPTII,S$GLB,, | Performed by: INTERNAL MEDICINE

## 2020-08-11 PROCEDURE — 3074F SYST BP LT 130 MM HG: CPT | Mod: CPTII,S$GLB,, | Performed by: INTERNAL MEDICINE

## 2020-08-11 NOTE — PATIENT INSTRUCTIONS
Understanding Chronic Venous Insufficiency  Problems with the veins in the legs may lead to chronic venous insufficiency (CVI). CVI means that there is a long-term problem with the veins not being able to pump blood back to your heart. When this happens, blood stays in the legs and causes swelling and aching.   Two problems that may lead to chronic venous insufficiency are:  · Damaged valves. Valves keep blood flowing from the legs through the blood vessels and back to the heart. When the valves are damaged, blood does not flow as well.   · Deep vein thrombosis (DVT). Blood clots may form in the deep veins of the legs. This may cause pain, redness, and swelling in the legs. It may also block the flow of blood back to the heart. Seek immediate medical care if you have these symptoms.  · A blood clot in the leg can also break off and travel to the lungs. This is called pulmonary embolism (PE). In the lungs, the clot can cut off the flow of blood. This may cause chest pain, trouble breathing, sweating, a fast heartbeat, coughing (may cough up blood), and fainting. It is a medical emergency and may cause death. Call 911 if you have these symptoms.  · Healthcare providers call the two conditions, DVT and PE, venous thromboembolism (VTE).  CVI cant be cured, but you can control leg swelling to reduce the likelihood of ulcers (sores).  Recognizing the symptoms  Be aware of the following:  · If you stand or sit with your feet down for long periods, your legs may ache or feel heavy.  · Swollen ankles are possibly the most common symptom of CVI.  · As swelling increases, the skin over your ankles may show red spots or a brownish tinge. The skin may feel leathery or scaly, and may start to itch.  · If swelling is not controlled, an ulcer (open wound) may form.  What you can do  Reduce your risk of developing ulcers by doing the following:  · Increase blood flow back to your heart by elevating your legs, exercising daily,  and wearing elastic stockings.  · Boost blood flow in your legs by losing excess weight.  · If you must stand or sit in one place for a period of time, keep your blood moving by wiggling your toes, shifting your body position, and rising up on the balls of your feet.    Date Last Reviewed: 5/1/2016  © 3388-7857 [employer]. [address] [place] [address], [place] [address]. All rights reserved. This information is not intended as a substitute for professional medical care. Always follow your healthcare professional's instructions.

## 2020-08-11 NOTE — PROGRESS NOTES
Subjective:   Patient ID:  Mariel Becerril is a 64 y.o. female who presents for cardiac consult of Palpitations      Dizziness:    Associated symptoms: palpitations.no chest pain.  Palpitations   Associated symptoms include dizziness and shortness of breath (improved). Pertinent negatives include no chest pain.   Hypertension  Associated symptoms include palpitations and shortness of breath (improved). Pertinent negatives include no chest pain.     The patient came in today for cardiac consult of Palpitations    Mariel Becerril is a 64 y.o. female with current medical conditions HTN, PVCs, obesity, GERD, OA, CPAP of knees presents for follow CV evaluation.      8/20/18  Pt has been having SOB, AREVALO. Is always in pain, is exhausted on pain meds. She feels like her heart if affected and is tired after walking a few steps.  Pt also feels chest pressure. Pt feels chest pressure daily, just tries to rest and goes away. Chest pain is substernal without radiation. 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 Uses CPAP every night, but last eval was over 5 years.   Feels palpitations, rarely, was on digoxin then.    ECG - NSR, poor RWP    9/27/18  Pt had negative nuclear stress and 2D ECHO with normal Bi V function. GIL workup underway, recent visit with pulmonary. Has bad sinus infection, has been using Tessalon perles, saw ENT received steroid dose pack which has been improved. Still coughing a lot, using cough syrup and on Levaquin x 14 days. No chest pain but more congestion. Has been doing well with Lasix - taking it almost daily.     11/12/19  She will have knee surgery at [facility] with Dr. Moy Ramirez. She has been getting PT/OT and brace but no improvement in knee pain. She does have chronic fatigue/dyspnea but no CP. She does have palpitations feels fluttering feeling.     2/13/20  Freq PVCs noted on Holter, started metoprolol 25mg daily and monitor BP and HR, increased BB due to palpitations but BP dropped now off  norvasc as BP dropped. She will have knee surgery  by Dr. Connolly. She also takes lasix daily now.     20  She had knee replacement in L knee in March but still has significant pain. She had weight gain since surgery and could not walk much, she weight 222 lbs before surgery. She had been to pain management had injections.     Patient feels no PND, no dizziness, no syncope, no CNS symptoms.    Patient is compliant with medications.    Family history includes Arthritis in her mother; Depression in her sister; Heart disease in her father -  at age 81; Hypertension in her mother and sister.    TEST DESCRIPTION   PREDOMINANT RHYTHM  1. Sinus rhythm with heart rates varying between 56 and 156 bpm with an average of 86 bpm.   VENTRICULAR ARRHYTHMIAS  1. There were frequent PVCs totalling 3780 and averaging 78 per hour.   The ventricular arrhythmias percentage was 1.6.   There were 1174 bigeminal cycles.  There were 9 triplets.   2. There were no episodes of ventricular tachycardia.    SUPRA VENTRICULAR ARRHYTHMIAS  1. There were very rare PACs recorded totalling 18 and averaging less than 1 per hour.   2. There were no episodes of sustained supraventricular tachycardia.    ECG 10/30/19  Normal sinus rhythm  Possible Left atrial enlargement  Borderline Abnormal ECG    Nuclear Stress 18  Nuclear Quantitative Functional Analysis:   LVEF: >= 70 %    Impression: NORMAL MYOCARDIAL PERFUSION  1. The perfusion scan is free of evidence for myocardial ischemia or injury.   2. Resting wall motion is physiologic.   3. Resting LV function is normal.   4. The ventricular volumes are normal at rest and stress.   5. The extracardiac distribution of radioactivity is normal.     2D ECHO 18  CONCLUSIONS     1 - Concentric hypertrophy.     2 - No wall motion abnormalities.     3 - Normal left ventricular systolic function (EF 60-65%).     4 - Normal left ventricular diastolic function.     5 - Normal right  ventricular systolic function .     6 - The estimated PA systolic pressure is 27 mmHg.     Past Medical History:   Diagnosis Date    Frequent headaches     Hypertension     Osteoarthritis 04/02/2019    Bilateral knees, ankles and feet    Severe obesity (BMI >= 40)     Sleep apnea        Past Surgical History:   Procedure Laterality Date    BLADDER SUSPENSION      CATARACT EXTRACTION, BILATERAL      COLONOSCOPY N/A 12/3/2018    Procedure: COLONOSCOPY;  Surgeon: Mari Rader MD;  Location: [facility] ENDO;  Service: Endoscopy;  Laterality: N/A;    HYSTERECTOMY      partial 2000    tonsilectomy      TOTAL KNEE ARTHROPLASTY Left 3/4/2020    Procedure: ARTHROPLASTY, KNEE, TOTAL;  Surgeon: Moy Connolly MD;  Location: [facility] OR;  Service: Orthopedics;  Laterality: Left;       Social History     Tobacco Use    Smoking status: Never Smoker    Smokeless tobacco: Never Used   Substance Use Topics    Alcohol use: No    Drug use: No       Family History   Problem Relation Age of Onset    Heart attack Father        Patient's Medications   New Prescriptions    No medications on file   Previous Medications    ACETAMINOPHEN (TYLENOL) 325 MG TABLET    Take 2 tablets (650 mg total) by mouth every 8 (eight) hours as needed.    ALBUTEROL (PROAIR HFA) 90 MCG/ACTUATION INHALER    Inhale 2 puffs into the lungs every 6 (six) hours as needed for Wheezing. Rescue    AMLODIPINE (NORVASC) 5 MG TABLET    Take 1 tablet (5 mg total) by mouth once daily.    ASPIRIN (ECOTRIN) 81 MG EC TABLET    Take 81 mg by mouth every 12 (twelve) hours.    BENZONATATE (TESSALON) 100 MG CAPSULE    Take 1 capsule by mouth three times daily as needed    CHLORHEXIDINE (PERIDEX) 0.12 % SOLUTION    SWISH AND SPIT 15 MLS BY MOUTH TWICE A DAY FOR 7 DAYS    DICLOFENAC SODIUM (VOLTAREN) 1 % GEL    Apply 2 g topically 3 (three) times daily as needed.    EFLORNITHINE (VANIQA) 13.9 % CREAM    Apply topically 2 (two) times daily.    ERGOCALCIFEROL, VITAMIN D2,  (VITAMIN D ORAL)    Take 2,000 Units by mouth once daily.    FLUTICASONE PROPIONATE (FLONASE) 50 MCG/ACTUATION NASAL SPRAY    1 spray (50 mcg total) by Each Nostril route every evening.    FUROSEMIDE (LASIX) 40 MG TABLET    Take 1 tablet by mouth once daily    GABAPENTIN (NEURONTIN) 300 MG CAPSULE    Take 1 capsule (300 mg total) by mouth 2 (two) times daily.    GATIFLOXACIN 0.5 % DROP DROPS    Place 1 drop into the left eye 2 (two) times daily. Eyedrops to start one day before surgery    GATIFLOXACIN 0.5 % DROP DROPS    Apply 1 drop to eye 2 (two) times daily. Eyedrops to start one day before surgery    KETOCONAZOLE (NIZORAL) 2 % CREAM    Apply topically 2 (two) times daily.    KETOROLAC 0.5% (ACULAR) 0.5 % DROP    Place 1 drop into the left eye 4 (four) times daily. Eyedrops to start one day before surgery    LEVOCETIRIZINE (XYZAL) 5 MG TABLET    Take 1 tablet (5 mg total) by mouth every evening.    MECLIZINE (ANTIVERT) 25 MG TABLET    Take 1 tablet (25 mg total) by mouth 3 (three) times daily as needed for Dizziness.    MELOXICAM (MOBIC) 15 MG TABLET    Take 1 tablet (15 mg total) by mouth once daily. Take with food    METHYLPREDNISOLONE (MEDROL DOSEPACK) 4 MG TABLET    use as directed    METOPROLOL SUCCINATE (TOPROL-XL) 50 MG 24 HR TABLET        MONTELUKAST (SINGULAIR) 10 MG TABLET    Take 10 mg by mouth every evening.    NOZASEPTIN (NOZIN) NASAL     1 each by Each Nostril route 2 (two) times daily.    NYSTATIN (MYCOSTATIN) CREAM    APPLY  CREAM TOPICALLY TO AFFECTED AREA TWICE DAILY    NYSTATIN (MYCOSTATIN) POWDER    Apply topically 2 (two) times daily.    OMEPRAZOLE (PRILOSEC) 40 MG CAPSULE    Take 1 capsule by mouth once daily    ONDANSETRON (ZOFRAN-ODT) 4 MG TBDL    Take 1 tablet (4 mg total) by mouth every 8 (eight) hours as needed.    OXYCODONE (ROXICODONE) 10 MG TAB IMMEDIATE RELEASE TABLET    Take 1 tablet (10 mg total) by mouth every 8 (eight) hours as needed (pain 6-7/10 pain scale score).     OXYCODONE-ACETAMINOPHEN (PERCOCET) 5-325 MG PER TABLET    Take 1 tablet by mouth every 8 (eight) hours as needed for Pain.    PHENTERMINE (ADIPEX-P) 37.5 MG TABLET    Take 37.5 mg by mouth once daily.    PREDNISOLONE ACETATE (PRED FORTE) 1 % DRPS    Place 1 drop into the left eye 4 (four) times daily. Start one day before surgery    PREDNISONE (DELTASONE) 5 MG TABLET    Take 1 tablet (5 mg total) by mouth once daily.    SF 5000 PLUS 1.1 % CREA    BRUSH FOR 2 MINUTES AT BEDTIME THEN EXPECTORATE THE EXCESS.(NOTHING TO EAT OR DRINK FOR 30 MINUTES AFTER USE )    TOPIRAMATE (TOPAMAX) 50 MG TABLET    Take 1 tablet (50 mg total) by mouth once daily.    TRAMADOL (ULTRAM) 50 MG TABLET    TAKE 1 TABLET BY MOUTH EVERY 8 HOURS AS NEEDED FOR PAIN   Modified Medications    No medications on file   Discontinued Medications    No medications on file       Review of Systems   Constitutional: Negative.    HENT: Negative.    Eyes: Negative.    Respiratory: Positive for shortness of breath (improved).    Cardiovascular: Positive for palpitations. Negative for chest pain and leg swelling.   Gastrointestinal: Positive for heartburn.   Genitourinary: Negative.    Musculoskeletal: Positive for joint pain.   Skin: Negative.    Neurological: Positive for dizziness.   Endo/Heme/Allergies: Negative.    Psychiatric/Behavioral: Negative.    All 12 systems otherwise negative.      Wt Readings from Last 3 Encounters:   08/11/20 111 kg (244 lb 11.4 oz)   07/28/20 109.9 kg (242 lb 4.6 oz)   07/27/20 109.8 kg (242 lb)     Temp Readings from Last 3 Encounters:   06/10/20 97.8 °F (36.6 °C) (Tympanic)   03/06/20 98.5 °F (36.9 °C) (Oral)   02/18/20 97.8 °F (36.6 °C) (Oral)     BP Readings from Last 3 Encounters:   08/11/20 120/70   07/28/20 139/87   07/27/20 135/85     Pulse Readings from Last 3 Encounters:   08/11/20 91   07/28/20 90   07/27/20 94       /70 (BP Location: Left arm, Patient Position: Sitting, BP Method: Large (Manual))   Pulse 91   " Ht 5' 6" (1.676 m)   Wt 111 kg (244 lb 11.4 oz)   SpO2 98%   BMI 39.50 kg/m²     Objective:   Physical Exam   Constitutional: She is oriented to person, place, and time. She appears well-developed and well-nourished. No distress.   HENT:   Head: Normocephalic and atraumatic.   Nose: Nose normal.   Mouth/Throat: Oropharynx is clear and moist.   Eyes: Conjunctivae and EOM are normal. No scleral icterus.   Neck: Normal range of motion. Neck supple. No JVD present. No thyromegaly present.   Cardiovascular: Normal rate, regular rhythm, S1 normal and S2 normal. Exam reveals no gallop, no S3, no S4 and no friction rub.   Murmur heard.  Pulmonary/Chest: Effort normal and breath sounds normal. No stridor. No respiratory distress. She has no wheezes. She has no rales. She exhibits no tenderness.   Abdominal: Soft. Bowel sounds are normal. She exhibits no distension and no mass. There is no abdominal tenderness. There is no rebound.   Genitourinary:    Genitourinary Comments: Deferred     Musculoskeletal: Normal range of motion.         General: No tenderness, deformity or edema.   Lymphadenopathy:     She has no cervical adenopathy.   Neurological: She is alert and oriented to person, place, and time. She exhibits normal muscle tone. Coordination normal.   Skin: Skin is warm and dry. No rash noted. She is not diaphoretic. No erythema. No pallor.   Psychiatric: She has a normal mood and affect. Her behavior is normal. Judgment and thought content normal.   Nursing note and vitals reviewed.      Lab Results   Component Value Date     03/06/2020    K 3.8 03/06/2020     (H) 03/06/2020    CO2 23 03/06/2020    BUN 11 03/06/2020    CREATININE 0.8 03/06/2020     03/06/2020    AST 45 (H) 03/06/2020    ALT 47 (H) 03/06/2020    ALBUMIN 2.6 (L) 03/06/2020    PROT 5.1 (L) 03/06/2020    BILITOT 0.3 03/06/2020    WBC 8.96 03/06/2020    HGB 8.0 (L) 03/06/2020    HCT 26.1 (L) 03/06/2020    MCV 93 03/06/2020     " 03/06/2020    INR 1.0 10/30/2019    CHOL 124 09/14/2018    HDL 48 09/14/2018    LDLCALC 64.0 09/14/2018    TRIG 60 09/14/2018    BNP <10 08/20/2018     Assessment:      1. Essential hypertension    2. Palpitations    3. Symptomatic PVCs    4. Morbid obesity due to excess calories    5. GIL on CPAP    6. AREVALO (dyspnea on exertion)    7. Chest pressure        Plan:   1. Chest pressure/AREVALO - improved/resolved  - pharm nuclear stress test - neg for ischemia - 9/2018  - 2D ECHO - normal Bi V function    2. AREVALO with edema sec to CVI  - improved with lasix 40 mg  -rec compression    3. HTN  - cont meds  - low salt diet    4. Obesity  - rec weight loss with diet/exercise    5. GIL  - cont CPAP  - appreciate pulm recs    6. Palpitations sec to PVCS  - cont BB    Thank you for allowing me to participate in this patient's care. Please do not hesitate to contact me with any questions or concerns. Consult note has been forwarded to the referral physician.     Juan Alberto Luis MD, [facility]  Cardiovascular Disease  [facility], [place]  [phone] (P)

## 2020-08-12 ENCOUNTER — TELEPHONE (OUTPATIENT)
Dept: PAIN MEDICINE | Facility: CLINIC | Age: 65
End: 2020-08-12

## 2020-08-12 ENCOUNTER — OFFICE VISIT (OUTPATIENT)
Dept: PODIATRY | Facility: CLINIC | Age: 65
End: 2020-08-12
Payer: COMMERCIAL

## 2020-08-12 VITALS
HEART RATE: 96 BPM | WEIGHT: 244.69 LBS | HEIGHT: 66 IN | SYSTOLIC BLOOD PRESSURE: 123 MMHG | DIASTOLIC BLOOD PRESSURE: 78 MMHG | BODY MASS INDEX: 39.32 KG/M2

## 2020-08-12 DIAGNOSIS — M25.562 CHRONIC PAIN OF LEFT KNEE: ICD-10-CM

## 2020-08-12 DIAGNOSIS — G89.29 CHRONIC PAIN OF LEFT KNEE: ICD-10-CM

## 2020-08-12 DIAGNOSIS — M25.571 PAIN IN RIGHT ANKLE AND JOINTS OF RIGHT FOOT: ICD-10-CM

## 2020-08-12 DIAGNOSIS — M25.572 PAIN IN LEFT ANKLE AND JOINTS OF LEFT FOOT: ICD-10-CM

## 2020-08-12 DIAGNOSIS — B35.1 ONYCHOMYCOSIS: Primary | ICD-10-CM

## 2020-08-12 DIAGNOSIS — R60.0 BILATERAL LEG EDEMA: Primary | ICD-10-CM

## 2020-08-12 DIAGNOSIS — E66.01 MORBID OBESITY: ICD-10-CM

## 2020-08-12 DIAGNOSIS — M25.572 PAIN IN JOINT OF LEFT FOOT: ICD-10-CM

## 2020-08-12 DIAGNOSIS — I87.2 VENOUS INSUFFICIENCY OF BOTH LOWER EXTREMITIES: ICD-10-CM

## 2020-08-12 PROCEDURE — 3074F PR MOST RECENT SYSTOLIC BLOOD PRESSURE < 130 MM HG: ICD-10-PCS | Mod: CPTII,S$GLB,, | Performed by: PODIATRIST

## 2020-08-12 PROCEDURE — 3078F PR MOST RECENT DIASTOLIC BLOOD PRESSURE < 80 MM HG: ICD-10-PCS | Mod: CPTII,S$GLB,, | Performed by: PODIATRIST

## 2020-08-12 PROCEDURE — 99999 PR PBB SHADOW E&M-EST. PATIENT-LVL IV: CPT | Mod: PBBFAC,,, | Performed by: PODIATRIST

## 2020-08-12 PROCEDURE — 3008F PR BODY MASS INDEX (BMI) DOCUMENTED: ICD-10-PCS | Mod: CPTII,S$GLB,, | Performed by: PODIATRIST

## 2020-08-12 PROCEDURE — 3008F BODY MASS INDEX DOCD: CPT | Mod: CPTII,S$GLB,, | Performed by: PODIATRIST

## 2020-08-12 PROCEDURE — 3074F SYST BP LT 130 MM HG: CPT | Mod: CPTII,S$GLB,, | Performed by: PODIATRIST

## 2020-08-12 PROCEDURE — 99999 PR PBB SHADOW E&M-EST. PATIENT-LVL IV: ICD-10-PCS | Mod: PBBFAC,,, | Performed by: PODIATRIST

## 2020-08-12 PROCEDURE — 3078F DIAST BP <80 MM HG: CPT | Mod: CPTII,S$GLB,, | Performed by: PODIATRIST

## 2020-08-12 PROCEDURE — 99214 OFFICE O/P EST MOD 30 MIN: CPT | Mod: S$GLB,,, | Performed by: PODIATRIST

## 2020-08-12 PROCEDURE — 99214 PR OFFICE/OUTPT VISIT, EST, LEVL IV, 30-39 MIN: ICD-10-PCS | Mod: S$GLB,,, | Performed by: PODIATRIST

## 2020-08-12 NOTE — TELEPHONE ENCOUNTER
Pt stated  is putting in order and taking care of it . Pt understood. All questions answered.   Alexis Boyd,MA [facility] Interventional pain medicine

## 2020-08-12 NOTE — TELEPHONE ENCOUNTER
----- Message from Cece Archuleta sent at 8/12/2020  8:47 AM CDT -----  Contact: Mariel Floyd would like tp know if she could get a prescription for compression stockings. Please call her at  [phone].    Thanks,  PB

## 2020-08-12 NOTE — PROGRESS NOTES
Subjective:       Patient ID: Mariel Becerril is a 64 y.o. female.    Chief Complaint: Routine Foot Care (rates pain 0/10 tennis w/socks non diabetic pt pcp Dr. Becerril.)      HPI: Mariel Becerril presents to the office today for non-covered foot care; trimming/debridement of elongated/dystrophic/onychomycotic nails and/or debridement/paring of calluses. Patient not a DMI/DMII w/o peripheral angiopathy or peripheral vascular disease.  Patient does have venous insufficiency. Patient does not have arterial insufficiency of the lower extremity or idiopathic peripheral neuropathy. Jaclyn Becerril MD is this patient's primary care provider.  Patient requests a prescription for compression stockings.    No results found for: HGBA1C.    Review of patient's allergies indicates:   Allergen Reactions    Penicillins Rash       Past Medical History:   Diagnosis Date    Frequent headaches     Hypertension     Osteoarthritis 04/02/2019    Bilateral knees, ankles and feet    Severe obesity (BMI >= 40)     Sleep apnea        Family History   Problem Relation Age of Onset    Heart attack Father        Social History     Socioeconomic History    Marital status:      Spouse name: Not on file    Number of children: Not on file    Years of education: Not on file    Highest education level: Not on file   Occupational History    Not on file   Social Needs    Financial resource strain: Not on file    Food insecurity     Worry: Not on file     Inability: Not on file    Transportation needs     Medical: Not on file     Non-medical: Not on file   Tobacco Use    Smoking status: Never Smoker    Smokeless tobacco: Never Used   Substance and Sexual Activity    Alcohol use: No    Drug use: No    Sexual activity: Never     Partners: Male     Birth control/protection: See Surgical Hx   Lifestyle    Physical activity     Days per week: Not on file     Minutes per session: Not on file    Stress: Not on file  "  Relationships    Social connections     Talks on phone: Not on file     Gets together: Not on file     Attends [employer] service: Not on file     Active member of club or organization: Not on file     Attends meetings of clubs or organizations: Not on file     Relationship status: Not on file   Other Topics Concern    Not on file   Social History Narrative    Not on file       Past Surgical History:   Procedure Laterality Date    BLADDER SUSPENSION      CATARACT EXTRACTION, BILATERAL      COLONOSCOPY N/A 12/3/2018    Procedure: COLONOSCOPY;  Surgeon: Mari Rader MD;  Location: [facility] ENDO;  Service: Endoscopy;  Laterality: N/A;    HYSTERECTOMY      partial 2000    tonsilectomy      TOTAL KNEE ARTHROPLASTY Left 3/4/2020    Procedure: ARTHROPLASTY, KNEE, TOTAL;  Surgeon: Moy Connolly MD;  Location: [facility] OR;  Service: Orthopedics;  Laterality: Left;       Review of Systems   Constitutional: Negative for chills, fatigue and fever.   HENT: Negative for hearing loss.    Eyes: Negative for photophobia and visual disturbance.   Respiratory: Negative for cough, chest tightness, shortness of breath and wheezing.    Cardiovascular: Positive for leg swelling. Negative for chest pain and palpitations.   Gastrointestinal: Negative for constipation, diarrhea, nausea and vomiting.   Endocrine: Negative for cold intolerance and heat intolerance.   Genitourinary: Negative for flank pain.   Musculoskeletal: Positive for arthralgias. Negative for neck pain and neck stiffness.   Skin: Negative for wound.   Neurological: Negative for light-headedness and headaches.   Psychiatric/Behavioral: Negative for sleep disturbance.          Objective:   /78   Pulse 96   Ht 5' 6" (1.676 m)   Wt 111 kg (244 lb 11.4 oz)   BMI 39.50 kg/m²     Physical Exam  LOWER EXTREMITY PHYSICAL EXAMINATION    NEUROLOGY: Sensation to light touch is intact. Proprioception is intact. Sensation to pin prick is intact.     VASCULAR: On the " left and right foot, the dorsalis pedis pulse is 1/4 and the posterior tibial pulse is 1/4. Capillary refill time is less than 3 seconds. Hair growth is noted on the dorsum of the foot and at the digits. No rubor is present. Proximal to distal temperature is warm to warm.  Edema is noted, nonpitting, bilateral lower extremity, moderate.    DERMATOLOGY:  Skin is supple, dry and intact. No ulcerations are noted. No hyperkeratosis or calluses are noted. No ecchymosis appreciated.  There are nail changes which are consistent with onychomycosis on the right and left foot. On the left foot, nail #1 and #2 is/are thickened, is/are dystrophic, with yellow discoloration, and with malodorous subungual debris. On the right foot, nail #1, #2, #4 and #5 is/are thickened, is/are dystrophic, with yellow discoloration, and with malodorous subungual debris. Mild xerosis is noted.      ORTHOPEDIC: Manual Muscle Testing is 5/5 in all planes on the left and right, without pains, with and without resistance.  Pes planus foot type is noted.  Gait pattern is antalgic.  Discomfort to palpation upon range of motion of the knee joint, left.    Assessment:     1. Onychomycosis    2. Morbid obesity    3. Venous insufficiency of both lower extremities    4. Pain in joint of left foot    5. Pain in left ankle and joints of left foot    6. Pain in right ankle and joints of right foot    7. Chronic pain of left knee        Plan:     Onychomycosis  The onychomycotic nail plates, as outlined above, are sharply debrided with double action nail nipper, and/or with the assistance of a mechanical rotary mari, with removal of all offending nail and nail border(s), for reduction of pains. Nails are reduced in terms of length, width and girth with removal of subungual debris to facilitate pain free weight bearing and ambulation. The elongated nails as outlined in the objective portion of this note, were trimmed to appropriate length, with a double action  nail nipper, for alleviation/reduction of pains as well. Follow up in approx. 3-4 months.    Patient does not meet the qualifications for, or have the class findings necessary for routine foot care. There is no lack of or diminished sensation and he/she has no significant findings of PVD to the B/L LE. At any follow-up appointment concerning routine foot care, patient will be PROC-B, and will be required to pay for services.  This fact is explained to the patient and/or any family who is present at today's appt.    Morbid obesity  Patient is counseled and reminded concerning the importance of good nutrition and healthy eating habits, which may include blood sugar control, to prevent and/or help podiatric foot and ankle complications.    Venous insufficiency of both lower extremities  Pain in left ankle and joints of left foot  Pain in right ankle and joints of right foot  -     HME - OTHER    Did discuss possible initiation of lower extremity compression therapy (stockings).  Recommend continue w/ limb elevation.      Chronic pain of left knee  Patient advised to follow up with Primary Care Physician/Pain Management/Orthopedic Sx for management of comorbid states.              Future Appointments   Date Time Provider Department Center   8/25/2020  2:20 PM Thanh Diaz MD [facility] IN [facility]   9/4/2020 11:20 AM Moy Connolly MD [facility] ORTHO [facility]   9/23/2020 10:00 AM Antonio Carey OD [facility] OPHTHAL [facility]   11/16/2020  9:15 AM Marcus Narvaez DPM [facility] POD [facility]   11/17/2020  9:40 AM [facility] Woods [facility]

## 2020-08-25 ENCOUNTER — PATIENT OUTREACH (OUTPATIENT)
Dept: ADMINISTRATIVE | Facility: HOSPITAL | Age: 65
End: 2020-08-25

## 2020-08-27 ENCOUNTER — TELEPHONE (OUTPATIENT)
Dept: PAIN MEDICINE | Facility: CLINIC | Age: 65
End: 2020-08-27

## 2020-08-27 NOTE — TELEPHONE ENCOUNTER
R/s appt for 09/09 with  due to pt no show appt on 08/26. Pt understood. All questions answered.   Marquise Mason MA  [facility]

## 2020-09-02 ENCOUNTER — OFFICE VISIT (OUTPATIENT)
Dept: PAIN MEDICINE | Facility: CLINIC | Age: 65
End: 2020-09-02
Payer: COMMERCIAL

## 2020-09-02 VITALS
HEIGHT: 63 IN | BODY MASS INDEX: 43.94 KG/M2 | HEART RATE: 82 BPM | WEIGHT: 248 LBS | DIASTOLIC BLOOD PRESSURE: 79 MMHG | RESPIRATION RATE: 18 BRPM | SYSTOLIC BLOOD PRESSURE: 128 MMHG

## 2020-09-02 DIAGNOSIS — Z96.652 HISTORY OF LEFT KNEE REPLACEMENT: ICD-10-CM

## 2020-09-02 DIAGNOSIS — M25.562 CHRONIC PAIN OF BOTH KNEES: ICD-10-CM

## 2020-09-02 DIAGNOSIS — M17.0 PRIMARY OSTEOARTHRITIS OF BOTH KNEES: ICD-10-CM

## 2020-09-02 DIAGNOSIS — M25.561 CHRONIC PAIN OF BOTH KNEES: ICD-10-CM

## 2020-09-02 DIAGNOSIS — G89.29 CHRONIC PAIN OF BOTH KNEES: ICD-10-CM

## 2020-09-02 DIAGNOSIS — R60.0 BILATERAL LEG EDEMA: Primary | ICD-10-CM

## 2020-09-02 PROCEDURE — 99999 PR PBB SHADOW E&M-EST. PATIENT-LVL V: ICD-10-PCS | Mod: PBBFAC,,, | Performed by: PHYSICAL MEDICINE & REHABILITATION

## 2020-09-02 PROCEDURE — 3078F PR MOST RECENT DIASTOLIC BLOOD PRESSURE < 80 MM HG: ICD-10-PCS | Mod: CPTII,S$GLB,, | Performed by: PHYSICAL MEDICINE & REHABILITATION

## 2020-09-02 PROCEDURE — 3008F BODY MASS INDEX DOCD: CPT | Mod: CPTII,S$GLB,, | Performed by: PHYSICAL MEDICINE & REHABILITATION

## 2020-09-02 PROCEDURE — 3074F SYST BP LT 130 MM HG: CPT | Mod: CPTII,S$GLB,, | Performed by: PHYSICAL MEDICINE & REHABILITATION

## 2020-09-02 PROCEDURE — 3078F DIAST BP <80 MM HG: CPT | Mod: CPTII,S$GLB,, | Performed by: PHYSICAL MEDICINE & REHABILITATION

## 2020-09-02 PROCEDURE — 99213 OFFICE O/P EST LOW 20 MIN: CPT | Mod: S$GLB,,, | Performed by: PHYSICAL MEDICINE & REHABILITATION

## 2020-09-02 PROCEDURE — 3074F PR MOST RECENT SYSTOLIC BLOOD PRESSURE < 130 MM HG: ICD-10-PCS | Mod: CPTII,S$GLB,, | Performed by: PHYSICAL MEDICINE & REHABILITATION

## 2020-09-02 PROCEDURE — 99999 PR PBB SHADOW E&M-EST. PATIENT-LVL V: CPT | Mod: PBBFAC,,, | Performed by: PHYSICAL MEDICINE & REHABILITATION

## 2020-09-02 PROCEDURE — 99213 PR OFFICE/OUTPT VISIT, EST, LEVL III, 20-29 MIN: ICD-10-PCS | Mod: S$GLB,,, | Performed by: PHYSICAL MEDICINE & REHABILITATION

## 2020-09-02 PROCEDURE — 3008F PR BODY MASS INDEX (BMI) DOCUMENTED: ICD-10-PCS | Mod: CPTII,S$GLB,, | Performed by: PHYSICAL MEDICINE & REHABILITATION

## 2020-09-02 NOTE — PROGRESS NOTES
Established Patient Chronic Pain Note (Follow up visit)    Chief Complaint:   Chief Complaint   Patient presents with    Knee Pain     left       SUBJECTIVE:     Mariel Becerril is a 64 y.o. female who presents to the clinic for a follow-up appointment for left knee pain.  She was last seen 4 weeks ago where she received a left pes anserine bursa injection in the clinic.  She reports that this injection provided limited relief.  Since the last visit, Mariel Becerril states the pain has been persistant. Current pain intensity is 9/10.  She recently underwent a revision of the left knee with Dr. Connolly in March 2020 that unfortunately has not provided significant relief in her knee pain.     Patient denies night fever/night sweats, urinary incontinence, bowel incontinence, significant weight loss, significant motor weakness and loss of sensations.    Pain Disability Index Review:  Last 3 PDI Scores 9/2/2020 7/28/2020 7/8/2020   Pain Disability Index (PDI) 43 51 43     Interval HPI 07/28/2020:  Mariel Becerril is a 64 y.o. female who presents to the clinic for a follow-up appointment for left knee pain.  She presents today for MRI results review and EMG/NCS follow-up.  Since the last visit, Mariel Becerril states the pain has been persistant. Current pain intensity is 9/10.  She recently underwent a revision of the left knee with Dr. Connolly in March 2020 that unfortunately has not provided significant relief in her knee pain.    Interval HPI 07/08/2020:  Mariel Becerril is a 64 y.o. female who presents to the clinic for a follow-up appointment for left knee pain. Since the last visit, Mariel Becerril states the pain has been persistant. Current pain intensity is 9/10.  She recent underwent a revision of the left knee with Dr. Connolly in March 2020 that unfortunately has not provided significant relief in her knee pain.  She is scheduled for EMG/NCS within the next week or so.  She  has not had significant workup for lumbar radiculopathy previously, but does state that she has back pain.      Initial HPI 11/20/2018:  Mariel Becerril is a 63 y.o. female  who is presenting with a chief complaint of Knee Pain. The patient began experiencing this problem insidiously, and the pain has been gradually worsening over the past 12 month(s). The pain is described as throbbing, cramping, aching and heavy and is located in the left knee. Pain is intermittent and lasts hours. The pain radiates to pain is nonradiating. The patient rates her pain a 8 out of ten and interferes with activities of daily living a 8 out of ten. Pain is exacerbated by prolonged standing, ambulation, and is improved by rest. Patient reports no prior trauma, no prior spinal surgery       Non-Pharmacologic Treatments:  Physical Therapy/Home Exercise: yes  Ice/Heat:yes  TENS: no  Acupuncture: no  Massage: no  Chiropractic: no    Other: no    Pain Medications:  - Opioids: Norco, tramadol, Percocet  - Adjuvant Medications: NSAIDs, Tylenol, gabapentin, Robaxin  - Anti-Coagulants: Aspirin    Opioid Contract: no     report:  Reviewed and consistent with medication use as prescribed.      Pain Procedures:   -multiple intra-articular knee injections  -left genicular nerve block on 12/20/2018  -left TKA March 2020  -left pes anserine bursa injection 07/28/2020, limited relief    Imaging & EMG/NCS:   EMG/NCS left lower extremity 07/14/2020:  Results:     - The left peroneal motor and sensory responses were normal.  - The left tibial motor response was normal.   - The left sural sensory response could not be obtained, likely secondary to body habitus.  - Needle EMG examination of the left lower extremity and lumbar paraspinals was normal.      Interpretation:     1. Normal electrodiagnostic examination. No evidence of left peroneal or tibial neuropathy. No evidence of left lumbar radiculopathy or diffuse polyneuropathy.      2. Should symptoms  progress or fail to resolve, repeat studies in 6 to 12 months may be warranted.       MRI lumbar spine 07/21/2020:  The distal cord and conus appear normal.     The lumbar vertebra reveal grade 1 L4-5 spondylolisthesis.  Small L3 vertebral body hemangioma is present.     No acute or chronic compression fracture.     T12-L1: There is broad-based disc bulge with hypointense T1 and T2 signal material extending both superiorly and inferiorly from the disc margin.  Possible old calcified disc extrusion versus calcification of the posterior longitudinal ligament.     L1-2:     Mild disc bulge.     L2-3:     Mild disc bulge with mild hypertrophic ligamentum flavum.     L3-4:     Mild disc bulge with mild hypertrophic ligamentum flavum.     L4-5:     Mild disc degeneration with disc narrowing, desiccation and disc bulge.  Moderate to severe facet arthritis with grade 1 spondylolisthesis of approximately 4 mm.  Mild central with moderate foraminal stenosis.     L5-S1:    Mild disc degeneration with generalized disc bulge.  Moderate left and mild right facet arthritis.  Mild lateral recess stenosis with mild bilateral foraminal stenosis.    X-ray left knee 06/29/2020:  Stable postsurgical changes left total knee arthroplasty.  Components well seated without fracture, dislocation or loosening.  Cannot exclude tiny suprapatellar effusion.  Faint calcifications again noted projecting over the superior margin of the left medial femoral condyle.     Osteopenia and tricompartment degenerative changes noted on the right.  There is extensive degenerative change involving the patellofemoral joint check Lizeth along the lateral facet with lateral tilting and prominent marginal osteophyte formation.  Narrowing of the medial and lateral compartments and marginal spurring.  No acute fracture or dislocation.      X-ray bilateral knee 05/22/2020:  There is mild-to-moderate joint space narrowing and osteophyte formation seen involving all 3  compartments of the right knee.  The greatest degree of degenerative changes at the right patellofemoral compartment.  A left total knee arthroplasty is in place.  No hardware failure or loosening.  No periprosthetic fracture.  No joint effusions are suggested.  No acute fracture or dislocation.      PMHx,PSHx, Social history, and Family history:  I have reviewed the patient's medical, surgical, social, and family history in detail and updated the computerized patient record.        Review of patient's allergies indicates:   Allergen Reactions    Penicillins Rash       Current Outpatient Medications   Medication Sig    acetaminophen (TYLENOL) 325 MG tablet Take 2 tablets (650 mg total) by mouth every 8 (eight) hours as needed.    amLODIPine (NORVASC) 5 MG tablet Take 1 tablet by mouth once daily    aspirin (ECOTRIN) 81 MG EC tablet Take 81 mg by mouth every 12 (twelve) hours.    benzonatate (TESSALON) 100 MG capsule Take 1 capsule by mouth three times daily as needed    chlorhexidine (PERIDEX) 0.12 % solution SWISH AND SPIT 15 MLS BY MOUTH TWICE A DAY FOR 7 DAYS    diclofenac sodium (VOLTAREN) 1 % Gel Apply 2 g topically 3 (three) times daily as needed.    eflornithine (VANIQA) 13.9 % cream Apply topically 2 (two) times daily.    ergocalciferol, vitamin D2, (VITAMIN D ORAL) Take 2,000 Units by mouth once daily.    fluticasone propionate (FLONASE) 50 mcg/actuation nasal spray USE 1 SPRAY(S) IN EACH NOSTRIL IN THE EVENING    furosemide (LASIX) 40 MG tablet Take 1 tablet by mouth once daily    gabapentin (NEURONTIN) 300 MG capsule Take 1 capsule (300 mg total) by mouth 2 (two) times daily.    ketorolac 0.5% (ACULAR) 0.5 % Drop Place 1 drop into the left eye 4 (four) times daily. Eyedrops to start one day before surgery    meclizine (ANTIVERT) 25 mg tablet Take 1 tablet (25 mg total) by mouth 3 (three) times daily as needed for Dizziness.    meloxicam (MOBIC) 15 MG tablet Take 1 tablet (15 mg total) by  mouth once daily. Take with food    metoprolol succinate (TOPROL-XL) 50 MG 24 hr tablet     montelukast (SINGULAIR) 10 mg tablet Take 10 mg by mouth every evening.    nozaseptin (NOZIN) nasal  1 each by Each Nostril route 2 (two) times daily.    nystatin (MYCOSTATIN) cream APPLY  CREAM TOPICALLY TO AFFECTED AREA TWICE DAILY    nystatin (MYCOSTATIN) powder Apply topically 2 (two) times daily.    omeprazole (PRILOSEC) 40 MG capsule Take 1 capsule by mouth once daily    ondansetron (ZOFRAN-ODT) 4 MG TbDL Take 1 tablet (4 mg total) by mouth every 8 (eight) hours as needed.    oxyCODONE-acetaminophen (PERCOCET) 5-325 mg per tablet Take 1 tablet by mouth every 8 (eight) hours as needed for Pain.    phentermine (ADIPEX-P) 37.5 mg tablet Take 37.5 mg by mouth once daily.    prednisoLONE acetate (PRED FORTE) 1 % DrpS Place 1 drop into the left eye 4 (four) times daily. Start one day before surgery    predniSONE (DELTASONE) 5 MG tablet Take 1 tablet (5 mg total) by mouth once daily.    SF 5000 PLUS 1.1 % Crea BRUSH FOR 2 MINUTES AT BEDTIME THEN EXPECTORATE THE EXCESS.(NOTHING TO EAT OR DRINK FOR 30 MINUTES AFTER USE )    topiramate (TOPAMAX) 50 MG tablet Take 1 tablet by mouth once daily    traMADoL (ULTRAM) 50 mg tablet TAKE 1 TABLET BY MOUTH EVERY 8 HOURS AS NEEDED FOR PAIN    albuterol (PROAIR HFA) 90 mcg/actuation inhaler Inhale 2 puffs into the lungs every 6 (six) hours as needed for Wheezing. Rescue    gatifloxacin 0.5 % Drop drops Place 1 drop into the left eye 2 (two) times daily. Eyedrops to start one day before surgery    gatifloxacin 0.5 % Drop drops Apply 1 drop to eye 2 (two) times daily. Eyedrops to start one day before surgery    ketoconazole (NIZORAL) 2 % cream Apply topically 2 (two) times daily.    levocetirizine (XYZAL) 5 MG tablet Take 1 tablet (5 mg total) by mouth every evening.    oxyCODONE (ROXICODONE) 10 mg Tab immediate release tablet Take 1 tablet (10 mg total) by mouth  "every 8 (eight) hours as needed (pain 6-7/10 pain scale score).     No current facility-administered medications for this visit.          REVIEW OF SYSTEMS:    GENERAL:  No weight loss, malaise or fevers.  HEENT:   No recent changes in vision or hearing  NECK:  Negative for lumps, no difficulty with swallowing.  RESPIRATORY:  Negative for cough, wheezing or shortness of breath, patient denies any recent URI.  CARDIOVASCULAR:  Negative for chest pain, leg swelling or palpitations.  GI:  Negative for abdominal discomfort, blood in stools or black stools or change in bowel habits.  MUSCULOSKELETAL:  See HPI.  SKIN:  Negative for lesions, rash, and itching.  PSYCH:  No mood disorder or recent psychosocial stressors.  Patients sleep is not disturbed secondary to pain.  HEMATOLOGY/LYMPHOLOGY:  Negative for prolonged bleeding, bruising easily or swollen nodes.  Patient is currently taking anti-coagulants - ASA  NEURO:   No history of headaches, syncope, paralysis, seizures or tremors.  All other reviewed and negative other than HPI.    OBJECTIVE:    /79 (BP Location: Right arm, Patient Position: Sitting, BP Method: Medium (Automatic))   Pulse 82   Resp 18   Ht 5' 3" (1.6 m)   Wt 112.5 kg (248 lb)   BMI 43.93 kg/m²     PHYSICAL EXAMINATION:    GENERAL: Well appearing, in no acute distress, alert and oriented x3.  Obese  PSYCH:  Mood and affect appropriate.  SKIN: Skin color, texture, turgor normal, no rashes or lesions.  HEAD/FACE:  Normocephalic, atraumatic. Cranial nerves grossly intact.  CV: RRR with palpation of the radial artery.  PULM: No evidence of respiratory difficulty, symmetric chest rise.  GI:  Soft and non-tender.  BACK: Straight leg raising in the sitting and supine positions is equivocal to radicular pain.  Minimal to mild pain to palpation over the facet joints of the lumbar spine and lumbar paraspinals.  Fair range of motion without pain reproduction.  EXTREMITIES: Peripheral joint ROM is full " and pain free without obvious instability or laxity in all four extremities with the exception of the left knee having a 5 degree extensor lag and good range of motion with flexion actively and passively. No deformities, edema, or skin discoloration. Good capillary refill.  MUSCULOSKELETAL:  There is warmth of the left knee and a moderate amount of swelling evident when compared to the right knee.  There is exquisite tenderness over the left pes anserine bursa.  There is mild pain with palpation over the sacroiliac joints bilaterally.  FABERs test is equivocal.  FADIRs test is equivocal.   Bilateral upper and lower extremity strength is normal and symmetric.  No atrophy or tone abnormalities are noted.  NEURO: Bilateral upper and lower extremity coordination and muscle stretch reflexes are physiologic and symmetric.  Plantar response are downgoing. No clonus.  No loss of sensation is noted.  GAIT:  Antalgic, slow.      LABS:  Lab Results   Component Value Date    WBC 8.96 03/06/2020    HGB 8.0 (L) 03/06/2020    HCT 26.1 (L) 03/06/2020    MCV 93 03/06/2020     03/06/2020       CMP  Sodium   Date Value Ref Range Status   03/06/2020 141 136 - 145 mmol/L Final     Potassium   Date Value Ref Range Status   03/06/2020 3.8 3.5 - 5.1 mmol/L Final     Chloride   Date Value Ref Range Status   03/06/2020 112 (H) 95 - 110 mmol/L Final     CO2   Date Value Ref Range Status   03/06/2020 23 23 - 29 mmol/L Final     Glucose   Date Value Ref Range Status   03/06/2020 109 70 - 110 mg/dL Final     BUN, Bld   Date Value Ref Range Status   03/06/2020 11 8 - 23 mg/dL Final     Creatinine   Date Value Ref Range Status   03/06/2020 0.8 0.5 - 1.4 mg/dL Final     Calcium   Date Value Ref Range Status   03/06/2020 8.3 (L) 8.7 - 10.5 mg/dL Final     Total Protein   Date Value Ref Range Status   03/06/2020 5.1 (L) 6.0 - 8.4 g/dL Final     Albumin   Date Value Ref Range Status   03/06/2020 2.6 (L) 3.5 - 5.2 g/dL Final     Total Bilirubin    Date Value Ref Range Status   03/06/2020 0.3 0.1 - 1.0 mg/dL Final     Comment:     For infants and newborns, interpretation of results should be based  on gestational age, weight and in agreement with clinical  observations.  Premature Infant recommended reference ranges:  Up to 24 hours.............<8.0 mg/dL  Up to 48 hours............<12.0 mg/dL  3-5 days..................<15.0 mg/dL  6-29 days.................<15.0 mg/dL       Alkaline Phosphatase   Date Value Ref Range Status   03/06/2020 79 55 - 135 U/L Final     AST   Date Value Ref Range Status   03/06/2020 45 (H) 10 - 40 U/L Final     ALT   Date Value Ref Range Status   03/06/2020 47 (H) 10 - 44 U/L Final     Anion Gap   Date Value Ref Range Status   03/06/2020 6 (L) 8 - 16 mmol/L Final     eGFR if    Date Value Ref Range Status   03/06/2020 >60 >60 mL/min/1.73 m^2 Final     eGFR if non    Date Value Ref Range Status   03/06/2020 >60 >60 mL/min/1.73 m^2 Final     Comment:     Calculation used to obtain the estimated glomerular filtration  rate (eGFR) is the CKD-EPI equation.          No results found for: LABA1C, HGBA1C          ASSESSMENT: 64 y.o. year old female with low back and left knee pain, consistent with     1. Bilateral leg edema  COMPRESSION STOCKINGS   2. Primary osteoarthritis of both knees     3. History of left knee replacement     4. Chronic pain of both knees           PLAN:   - Interventions:  None at this time, may consider genicular nerve blocks/RFA in the future.  I do not believe her knee pain is stemming from the lumbar spine due to negative EMG/NCS and no significant findings on the MRI of the lumbar spine indicating pain that would precipitate left knee pain.    - Anticoagulation: yes, aspirin  - Medications: I have stressed the importance of physical activity and a home exercise plan to help with pain and improve health., Patient can continue with medications for now since they are providing  benefits, using them appropriately, and without side effects. and I do not feel this patient is a candidate for long term opioids for this non-cancer pain at this time  - Therapy:  Advised patient continue with activities and home exercises as tolerated.  Providing compression stockings to help lower extremity edema.  - Psychological:  Discussed coping mechanisms up address chronic pain issues  - Labs:  Reviewed  - Imaging:  Reviewed imaging available, specifically MRI of the lumbar spine today with the patient and answered all questions she had regarding study.  - Consults/Referrals:  None at this time  - Records:  Reviewed/Obtain old records from outside physicians and imaging  - Follow up visit:  As needed  - Counseled patient regarding the importance of activity modification and physical therapy  - This condition does not require this patient to take time off of work, and the primary goal of our Pain Management services is to improve the patient's functional capacity.  - Patient Questions: Answered all of the patient's questions regarding diagnosis, therapy, and treatment         The above plan and management options were discussed at length with patient. Patient is in agreement with the above and verbalized understanding.      Thanh Diaz MD  Interventional Pain Management  [facility]    Disclaimer:  This note was prepared using voice recognition system and is likely to have sound alike errors that may have been overlooked even after proof reading.  Please call me with any questions

## 2020-09-08 ENCOUNTER — TELEPHONE (OUTPATIENT)
Dept: ORTHOPEDICS | Facility: CLINIC | Age: 65
End: 2020-09-08

## 2020-09-08 NOTE — TELEPHONE ENCOUNTER
Called pt to inform her that she would need to be schedule with Dr. Connolly instead of dr. Price due to dr. connolly doing her sx. Pt understood and cancelled her apt with dr. Price and is now rescheduled with dr. connolly on 9/11. Pt understood.

## 2020-09-11 ENCOUNTER — OFFICE VISIT (OUTPATIENT)
Dept: ORTHOPEDICS | Facility: CLINIC | Age: 65
End: 2020-09-11
Payer: COMMERCIAL

## 2020-09-11 VITALS
HEIGHT: 63 IN | DIASTOLIC BLOOD PRESSURE: 75 MMHG | WEIGHT: 248 LBS | SYSTOLIC BLOOD PRESSURE: 122 MMHG | BODY MASS INDEX: 43.94 KG/M2 | HEART RATE: 68 BPM

## 2020-09-11 DIAGNOSIS — Z96.652 HISTORY OF TOTAL LEFT KNEE REPLACEMENT: ICD-10-CM

## 2020-09-11 DIAGNOSIS — Z96.652 PAIN DUE TO TOTAL LEFT KNEE REPLACEMENT, SUBSEQUENT ENCOUNTER: ICD-10-CM

## 2020-09-11 DIAGNOSIS — T84.84XD PAIN DUE TO TOTAL LEFT KNEE REPLACEMENT, SUBSEQUENT ENCOUNTER: ICD-10-CM

## 2020-09-11 DIAGNOSIS — M25.562 PAIN AND SWELLING OF LEFT KNEE: ICD-10-CM

## 2020-09-11 DIAGNOSIS — M25.462 PAIN AND SWELLING OF LEFT KNEE: ICD-10-CM

## 2020-09-11 DIAGNOSIS — M25.462 EFFUSION OF LEFT KNEE JOINT: Primary | ICD-10-CM

## 2020-09-11 DIAGNOSIS — M54.16 LUMBAR BACK PAIN WITH RADICULOPATHY AFFECTING LEFT LOWER EXTREMITY: ICD-10-CM

## 2020-09-11 DIAGNOSIS — M51.36 LUMBAR DEGENERATIVE DISC DISEASE: Primary | ICD-10-CM

## 2020-09-11 LAB
APPEARANCE FLD: NORMAL
BASOPHILS NFR FLD MANUAL: 1 %
BODY FLD TYPE: NORMAL
COLOR FLD: NORMAL
EOSINOPHIL NFR FLD MANUAL: 2 %
LYMPHOCYTES NFR FLD MANUAL: 45 %
MONOS+MACROS NFR FLD MANUAL: 20 %
NEUTROPHILS NFR FLD MANUAL: 32 %
WBC # FLD: 298 /CU MM

## 2020-09-11 PROCEDURE — 3078F DIAST BP <80 MM HG: CPT | Mod: CPTII,S$GLB,, | Performed by: ORTHOPAEDIC SURGERY

## 2020-09-11 PROCEDURE — 99214 OFFICE O/P EST MOD 30 MIN: CPT | Mod: 25,S$GLB,, | Performed by: ORTHOPAEDIC SURGERY

## 2020-09-11 PROCEDURE — 3008F PR BODY MASS INDEX (BMI) DOCUMENTED: ICD-10-PCS | Mod: CPTII,S$GLB,, | Performed by: ORTHOPAEDIC SURGERY

## 2020-09-11 PROCEDURE — 89051 BODY FLUID CELL COUNT: CPT

## 2020-09-11 PROCEDURE — 99214 PR OFFICE/OUTPT VISIT, EST, LEVL IV, 30-39 MIN: ICD-10-PCS | Mod: 25,S$GLB,, | Performed by: ORTHOPAEDIC SURGERY

## 2020-09-11 PROCEDURE — 99999 PR PBB SHADOW E&M-EST. PATIENT-LVL V: CPT | Mod: PBBFAC,,, | Performed by: ORTHOPAEDIC SURGERY

## 2020-09-11 PROCEDURE — 89060 EXAM SYNOVIAL FLUID CRYSTALS: CPT

## 2020-09-11 PROCEDURE — 87070 CULTURE OTHR SPECIMN AEROBIC: CPT

## 2020-09-11 PROCEDURE — 87205 SMEAR GRAM STAIN: CPT

## 2020-09-11 PROCEDURE — 20610 LARGE JOINT ASPIRATION/INJECTION: L KNEE: ICD-10-PCS | Mod: LT,S$GLB,, | Performed by: ORTHOPAEDIC SURGERY

## 2020-09-11 PROCEDURE — 3074F PR MOST RECENT SYSTOLIC BLOOD PRESSURE < 130 MM HG: ICD-10-PCS | Mod: CPTII,S$GLB,, | Performed by: ORTHOPAEDIC SURGERY

## 2020-09-11 PROCEDURE — 20610 DRAIN/INJ JOINT/BURSA W/O US: CPT | Mod: LT,S$GLB,, | Performed by: ORTHOPAEDIC SURGERY

## 2020-09-11 PROCEDURE — 87075 CULTR BACTERIA EXCEPT BLOOD: CPT

## 2020-09-11 PROCEDURE — 3074F SYST BP LT 130 MM HG: CPT | Mod: CPTII,S$GLB,, | Performed by: ORTHOPAEDIC SURGERY

## 2020-09-11 PROCEDURE — 3008F BODY MASS INDEX DOCD: CPT | Mod: CPTII,S$GLB,, | Performed by: ORTHOPAEDIC SURGERY

## 2020-09-11 PROCEDURE — 3078F PR MOST RECENT DIASTOLIC BLOOD PRESSURE < 80 MM HG: ICD-10-PCS | Mod: CPTII,S$GLB,, | Performed by: ORTHOPAEDIC SURGERY

## 2020-09-11 PROCEDURE — 99999 PR PBB SHADOW E&M-EST. PATIENT-LVL V: ICD-10-PCS | Mod: PBBFAC,,, | Performed by: ORTHOPAEDIC SURGERY

## 2020-09-11 NOTE — PROCEDURES
Large Joint Aspiration/Injection: L knee    Date/Time: 9/11/2020 2:00 PM  Performed by: Moy Connolly MD  Authorized by: Moy Connolly MD     Consent Done?:  Yes (Verbal)  Site marked: the procedure site was marked    Timeout: prior to procedure the correct patient, procedure, and site was verified      Local anesthesia used?: Yes      Details:  Needle Size:  18 G  Ultrasonic Guidance for needle placement?: No    Approach:  Anterolateral  Location:  Knee  Site:  L knee  Aspirate amount (mL):  15  Aspirate:  Blood-tinged  Patient tolerance:  Patient tolerated the procedure well with no immediate complications      Fluid sent to the lab for crystals, cell count, cultures

## 2020-09-11 NOTE — PATIENT INSTRUCTIONS
Left knee aspiration performed of 15  ML of bloody tinged fluid  Fluid will be sent to the lab for cell count, cultures, crystals  We will send due to repeat nerve conduction studies to the left leg by DR. Faustin   keep the Ace wrap on until we make it home   Continue with meloxicam  C BD cream is okay to use 3 times a day if needed  Tylenol 650 mg not to exceed 3 times a day  Return to see me  After nerve conduction study
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
## 2020-09-11 NOTE — PROGRESS NOTES
Subjective:       Patient ID:  Mariel Becerril is a 64 y.o. female who presents for   Chief Complaint   Patient presents with    Mole     c/o moles to face x several yrs    Hair/Scalp Problem     c/o facial hair growth x several yrs     History of Present Illness: The patient presents with chief complaint of moles  Location: face  Duration: several yrs  Signs/Symptoms: dry    Prior treatments: none    Patient also c/o unwanted hair growth to face and chin  Present for years  Shaves the face  No other treatments attempted      Review of Systems   Constitutional: Negative.    Skin:        As per hpi        Objective:    Physical Exam   Constitutional: She appears well-developed and well-nourished.   Neurological: She is alert and oriented to person, place, and time.   Psychiatric: She has a normal mood and affect.   Skin:   Areas Examined (abnormalities noted in diagram):   Scalp / Hair Palpated and Inspected  Head / Face Inspection Performed  Neck Inspection Performed  RUE Inspected  LUE Inspection Performed              Diagram Legend     Erythematous scaling macule/papule c/w actinic keratosis       Vascular papule c/w angioma      Pigmented verrucoid papule/plaque c/w seborrheic keratosis      Yellow umbilicated papule c/w sebaceous hyperplasia      Irregularly shaped tan macule c/w lentigo     1-2 mm smooth white papules consistent with Milia      Movable subcutaneous cyst with punctum c/w epidermal inclusion cyst      Subcutaneous movable cyst c/w pilar cyst      Firm pink to brown papule c/w dermatofibroma      Pedunculated fleshy papule(s) c/w skin tag(s)      Evenly pigmented macule c/w junctional nevus     Mildly variegated pigmented, slightly irregular-bordered macule c/w mildly atypical nevus      Flesh colored to evenly pigmented papule c/w intradermal nevus       Pink pearly papule/plaque c/w basal cell carcinoma      Erythematous hyperkeratotic cursted plaque c/w SCC      Surgical scar with no  sign of skin cancer recurrence      Open and closed comedones      Inflammatory papules and pustules      Verrucoid papule consistent consistent with wart     Erythematous eczematous patches and plaques     Dystrophic onycholytic nail with subungual debris c/w onychomycosis     Umbilicated papule    Erythematous-base heme-crusted tan verrucoid plaque consistent with inflamed seborrheic keratosis     Erythematous Silvery Scaling Plaque c/w Psoriasis     See annotation      Assessment / Plan:        Hirsutism  - given patients unstable blood pressure and already on lasix, will avoid spironolactone   - referral to [facility] dermatology for laser eval  - START:  -     eflornithine (VANIQA) 13.9 % cream; Apply topically 2 (two) times daily.  Dispense: 45 g; Refill: 3    Dermatosis papulosa nigra  - reassurance           F/u prn    Jared Wagner MD  Department of Dermatology, Mohs Surgery  9:40 AM  1/8/2020       abdomen

## 2020-09-12 LAB
BODY FLD TYPE: NORMAL
CRYSTALS FLD MICRO: NEGATIVE
GRAM STN SPEC: NORMAL
GRAM STN SPEC: NORMAL
PATH INTERP FLD-IMP: NORMAL

## 2020-09-14 LAB — PATH INTERP FLD-IMP: NORMAL

## 2020-09-15 LAB — BACTERIA SPEC AEROBE CULT: NO GROWTH

## 2020-09-16 ENCOUNTER — TELEPHONE (OUTPATIENT)
Dept: PAIN MEDICINE | Facility: CLINIC | Age: 65
End: 2020-09-16

## 2020-09-16 NOTE — TELEPHONE ENCOUNTER
Pt came to clinic today to drop off disability form for  to fill out. Informed pt that I can have  look at it but  will be out until 9/28/2020. Pt states she needs this form filled out before 9/28/2020. Pt request form given to Dr. Connolly or . Form given to Celena to have Dr. Connolly review tomorrow. All questions answered.//lp

## 2020-09-21 LAB — BACTERIA SPEC ANAEROBE CULT: NORMAL

## 2020-09-23 ENCOUNTER — PATIENT OUTREACH (OUTPATIENT)
Dept: ADMINISTRATIVE | Facility: OTHER | Age: 65
End: 2020-09-23

## 2020-09-23 NOTE — PROGRESS NOTES
Health Maintenance Due   Topic Date Due    TETANUS VACCINE  11/05/1973    Shingles Vaccine (1 of 2) 11/05/2006    Influenza Vaccine (1) 08/01/2020    Mammogram  09/27/2020     Updates were requested from care everywhere.  Chart was reviewed for overdue Proactive [facility] Encoun[facility] ([facility]) topics (CRS, Breast Cancer Screening, Eye exam)  Health Maintenance has been updated.  [facility] immunization registry triggered.  Immunizations were reconciled.

## 2020-09-24 DIAGNOSIS — J30.9 ALLERGIC RHINITIS, UNSPECIFIED SEASONALITY, UNSPECIFIED TRIGGER: ICD-10-CM

## 2020-09-24 NOTE — TELEPHONE ENCOUNTER
Encounter details (provider/department) have been updated by [facility] staff.   Of note, HF details may not display.     As of this time CDM: Does not populate or display     Updated: Provider    Of note, CDM will not display. PCP not live with [facility].    Will resend refill request encounter to  Centralized Refill Staff Pool.     [facility]     Note composed:12:38 PM 09/24/2020

## 2020-09-25 RX ORDER — FLUTICASONE PROPIONATE 50 MCG
SPRAY, SUSPENSION (ML) NASAL
Qty: 16 G | Refills: 0 | Status: SHIPPED | OUTPATIENT
Start: 2020-09-25 | End: 2020-09-28

## 2020-09-25 NOTE — PROGRESS NOTES
Refill Routing Note   Medication(s) are not appropriate for processing by [facility] for the following reason(s):        - Non-participating provider              Medication reconciliation completed: No   Automatic Epic Generated Protocol Data:        Requested Prescriptions   Pending Prescriptions Disp Refills    fluticasone propionate (FLONASE) 50 mcg/actuation nasal spray [Pharmacy Med Name: Fluticasone Propionate 50 MCG/ACT Nasal Suspension] 16 g 0     Sig: USE 1 SPRAY(S) IN EACH NOSTRIL IN THE EVENING       Nasal Steroids Protocol Passed - 9/24/2020 12:42 PM        Passed - Visit with authorizing provider in past 12 months or upcoming 90 days               Appointments     Date Provider   Last Visit   2/6/2020 Jaclyn Becerril MD   Next Visit   Visit date not found Jaclyn Becerril MD   ED visits in past 90 days: 0    Note composed:7:40 PM 09/24/2020

## 2020-09-28 ENCOUNTER — TELEPHONE (OUTPATIENT)
Dept: PAIN MEDICINE | Facility: CLINIC | Age: 65
End: 2020-09-28

## 2020-09-28 DIAGNOSIS — J30.9 ALLERGIC RHINITIS, UNSPECIFIED SEASONALITY, UNSPECIFIED TRIGGER: ICD-10-CM

## 2020-09-28 RX ORDER — FLUTICASONE PROPIONATE 50 MCG
SPRAY, SUSPENSION (ML) NASAL
Qty: 16 G | Refills: 0 | Status: SHIPPED | OUTPATIENT
Start: 2020-09-28 | End: 2020-10-19

## 2020-09-28 NOTE — TELEPHONE ENCOUNTER
Pt stated she have been documenting the start date of her pain symptoms incorrectly . We are putting march 2020, but her symptoms first started 11/20/2018. Informed pt we would change and send OV from 11/20/18. Pt understood. All questions answered.   Alexis Boyd,MA [facility]

## 2020-09-28 NOTE — PROGRESS NOTES
Refill Routing Note   Medication(s) are not appropriate for processing by [facility]:       - Non-participating provider           Medication reconciliation completed: No      Automatic Epic Generated Protocol Data:        Requested Prescriptions   Pending Prescriptions Disp Refills    fluticasone propionate (FLONASE) 50 mcg/actuation nasal spray [Pharmacy Med Name: Fluticasone Propionate 50 MCG/ACT Nasal Suspension] 16 g 0     Sig: USE 1 SPRAY(S) IN EACH NOSTRIL IN THE EVENING       Nasal Steroids Protocol Passed - 9/28/2020  1:16 PM        Passed - Visit with authorizing provider in past 12 months or upcoming 90 days               Appointments  past 12m or future 3m with PCP    Date Provider   Last Visit   2/6/2020 Jaclyn Becerril MD   Next Visit   Visit date not found Jaclyn Becerril MD   ED visits in past 90 days: 0     Note composed:1:16 PM 09/28/2020

## 2020-09-28 NOTE — TELEPHONE ENCOUNTER
----- Message from Efrain Armenta sent at 9/28/2020  2:23 PM CDT -----  Contact: self  Pt would like a call back in regards to her insurance. Please call pt back at [phone].      Thanks  Zs

## 2020-09-28 NOTE — TELEPHONE ENCOUNTER
Encounter details (provider/department) have been updated by OR staff.   Of note, HF details may not display.     As of this time CDM: Does not populate or display     Updated: Provider    Of note, CDM will not display. PCP not live with [facility].    Will resend refill request encounter to  Centralized Refill Staff Pool.     [facility]     Note composed:8:11 AM 09/28/2020

## 2020-10-02 DIAGNOSIS — Z12.31 OTHER SCREENING MAMMOGRAM: ICD-10-CM

## 2020-10-06 ENCOUNTER — PATIENT MESSAGE (OUTPATIENT)
Dept: ADMINISTRATIVE | Facility: HOSPITAL | Age: 65
End: 2020-10-06

## 2020-10-08 ENCOUNTER — TELEPHONE (OUTPATIENT)
Dept: ORTHOPEDICS | Facility: CLINIC | Age: 65
End: 2020-10-08

## 2020-10-08 NOTE — TELEPHONE ENCOUNTER
Called patient and left message in regards to upcoming appt - patient has appt on 10/12 to review NCS/EMG which has been rescheduled to 11/06 - appt next week will need to be moved until after patient has completed the NCS/EMG

## 2020-10-13 DIAGNOSIS — G89.29 CHRONIC RIGHT SHOULDER PAIN: Primary | ICD-10-CM

## 2020-10-13 DIAGNOSIS — M25.511 CHRONIC RIGHT SHOULDER PAIN: Primary | ICD-10-CM

## 2020-10-14 ENCOUNTER — TELEPHONE (OUTPATIENT)
Dept: PHARMACY | Facility: CLINIC | Age: 65
End: 2020-10-14

## 2020-10-14 RX ORDER — TRAMADOL HYDROCHLORIDE 50 MG/1
TABLET ORAL
Qty: 14 EACH | Refills: 0 | Status: SHIPPED | OUTPATIENT
Start: 2020-10-14 | End: 2020-12-17

## 2020-10-14 NOTE — TELEPHONE ENCOUNTER
Good Morning,     The prior authorization for Mariel Becerril's Diclofenac Sodium 1% Gel prescription has been APPROVED FROM 9/13/2020 TO 10/13/2021 with copayment of $10.00.       Patient has been notified of the decision on 10/14/2020 and patient states she will  medication.    If there are any additional questions or concerns, please contact me.    Thank You!   Neda Weston CPhT, B.A  Patient Care Advocate   [facility]  Phone: [phone] Ext [phone]  Fax: [phone]

## 2020-10-16 ENCOUNTER — OFFICE VISIT (OUTPATIENT)
Dept: OPHTHALMOLOGY | Facility: CLINIC | Age: 65
End: 2020-10-16
Payer: COMMERCIAL

## 2020-10-16 DIAGNOSIS — Z96.1 PSEUDOPHAKIA OF BOTH EYES: Primary | ICD-10-CM

## 2020-10-16 DIAGNOSIS — H52.03 HYPEROPIA, BILATERAL: ICD-10-CM

## 2020-10-16 DIAGNOSIS — H26.493 PCO (POSTERIOR CAPSULAR OPACIFICATION), BILATERAL: ICD-10-CM

## 2020-10-16 PROCEDURE — 92310 CONTACT LENS FITTING OU: CPT | Mod: CSM,,, | Performed by: OPTOMETRIST

## 2020-10-16 PROCEDURE — 92310 PR CONTACT LENS FITTING (NO CHANGE): ICD-10-PCS | Mod: CSM,,, | Performed by: OPTOMETRIST

## 2020-10-16 PROCEDURE — 99999 PR PBB SHADOW E&M-EST. PATIENT-LVL III: CPT | Mod: PBBFAC,,, | Performed by: OPTOMETRIST

## 2020-10-16 PROCEDURE — 92014 COMPRE OPH EXAM EST PT 1/>: CPT | Mod: S$GLB,,, | Performed by: OPTOMETRIST

## 2020-10-16 PROCEDURE — 99999 PR PBB SHADOW E&M-EST. PATIENT-LVL III: ICD-10-PCS | Mod: PBBFAC,,, | Performed by: OPTOMETRIST

## 2020-10-16 PROCEDURE — 92015 PR REFRACTION: ICD-10-PCS | Mod: S$GLB,,, | Performed by: OPTOMETRIST

## 2020-10-16 PROCEDURE — 92015 DETERMINE REFRACTIVE STATE: CPT | Mod: S$GLB,,, | Performed by: OPTOMETRIST

## 2020-10-16 PROCEDURE — 92014 PR EYE EXAM, EST PATIENT,COMPREHESV: ICD-10-PCS | Mod: S$GLB,,, | Performed by: OPTOMETRIST

## 2020-10-16 NOTE — PROGRESS NOTES
HPI     Pt was last seen with [facility] for post op PCIOL OU on 2/27/2020  HPI    Any vision changes since last exam: none   Eye pain: no  Other ocular symptoms: no    Do you wear currently wear glasses or contacts? None, Readers when needed    Interested in contacts today? Yes, Interested in contacts for reading.    Do you plan on getting new glasses today? If needed              Last edited by Agnes Manuel MA on 10/16/2020  9:43 AM. (History)            Assessment /Plan     For exam results, see Encounter Report.    Pseudophakia of both eyes    PCO (posterior capsular opacification), right  Doing well OU  yag not indicated at this time  Monitor 12 months    Hyperopia, bilateral  Contact Lens Prescription (10/16/2020)        Brand Base Curve Diameter Sphere    Right No lens       Left Acuvue Oasys 1 Day 9.00 14.3 +2.25    Expiration Date: 10/17/2021    Replacement: Daily    Wearing Schedule: Daily wear            RTC 1 yr for dilated eye exam or PRN if any problems.   Discussed above and answered questions.

## 2020-10-19 DIAGNOSIS — J30.9 ALLERGIC RHINITIS, UNSPECIFIED SEASONALITY, UNSPECIFIED TRIGGER: ICD-10-CM

## 2020-10-19 RX ORDER — FLUTICASONE PROPIONATE 50 MCG
SPRAY, SUSPENSION (ML) NASAL
Qty: 16 G | Refills: 0 | Status: SHIPPED | OUTPATIENT
Start: 2020-10-19 | End: 2020-11-27

## 2020-10-19 NOTE — PROGRESS NOTES
Refill Routing Note   Medication(s) are not appropriate for processing by [facility] for the following reason(s):     - Non-participating provider    [facility] actions taken in this encounter: Route          Medication reconciliation completed: No   Automatic Epic Generated Protocol Data:        Requested Prescriptions   Pending Prescriptions Disp Refills    fluticasone propionate (FLONASE) 50 mcg/actuation nasal spray [Pharmacy Med Name: Fluticasone Propionate 50 MCG/ACT Nasal Suspension] 16 g 0     Sig: USE 1 SPRAY(S) IN EACH NOSTRIL IN THE EVENING       Nasal Steroids Protocol Passed - 10/19/2020  5:31 AM        Passed - Visit with authorizing provider in past 12 months or upcoming 90 days               Appointments  past 12m or future 3m with PCP    Date Provider   Last Visit   2/6/2020 Jaclyn Becerril MD   Next Visit   Visit date not found Jaclyn Becerril MD   ED visits in past 90 days: 0        Note composed:12:36 PM 10/19/2020

## 2020-10-28 ENCOUNTER — TELEPHONE (OUTPATIENT)
Dept: ADMINISTRATIVE | Facility: HOSPITAL | Age: 65
End: 2020-10-28

## 2020-10-28 NOTE — TELEPHONE ENCOUNTER
Contacted patient to schedule overdue mammogram. Patient scheduled appointment for 11/09/2020 @ 12:40pm.

## 2020-11-04 ENCOUNTER — PATIENT OUTREACH (OUTPATIENT)
Dept: ADMINISTRATIVE | Facility: OTHER | Age: 65
End: 2020-11-04

## 2020-11-06 ENCOUNTER — OFFICE VISIT (OUTPATIENT)
Dept: PHYSICAL MEDICINE AND REHAB | Facility: CLINIC | Age: 65
End: 2020-11-06
Payer: COMMERCIAL

## 2020-11-06 VITALS
HEIGHT: 63 IN | RESPIRATION RATE: 16 BRPM | DIASTOLIC BLOOD PRESSURE: 82 MMHG | WEIGHT: 248 LBS | HEART RATE: 76 BPM | SYSTOLIC BLOOD PRESSURE: 136 MMHG | BODY MASS INDEX: 43.94 KG/M2

## 2020-11-06 DIAGNOSIS — M54.16 LUMBAR RADICULOPATHY: ICD-10-CM

## 2020-11-06 PROCEDURE — 95908 NRV CNDJ TST 3-4 STUDIES: CPT | Mod: S$GLB,,, | Performed by: PHYSICAL MEDICINE & REHABILITATION

## 2020-11-06 PROCEDURE — 3075F PR MOST RECENT SYSTOLIC BLOOD PRESS GE 130-139MM HG: ICD-10-PCS | Mod: CPTII,S$GLB,, | Performed by: PHYSICAL MEDICINE & REHABILITATION

## 2020-11-06 PROCEDURE — 95886 MUSC TEST DONE W/N TEST COMP: CPT | Mod: S$GLB,,, | Performed by: PHYSICAL MEDICINE & REHABILITATION

## 2020-11-06 PROCEDURE — 95908 PR NERVE CONDUCTION STUDY; 3-4 STUDIES: ICD-10-PCS | Mod: S$GLB,,, | Performed by: PHYSICAL MEDICINE & REHABILITATION

## 2020-11-06 PROCEDURE — 99999 PR PBB SHADOW E&M-EST. PATIENT-LVL V: CPT | Mod: PBBFAC,,, | Performed by: PHYSICAL MEDICINE & REHABILITATION

## 2020-11-06 PROCEDURE — 99999 PR PBB SHADOW E&M-EST. PATIENT-LVL V: ICD-10-PCS | Mod: PBBFAC,,, | Performed by: PHYSICAL MEDICINE & REHABILITATION

## 2020-11-06 PROCEDURE — 99214 OFFICE O/P EST MOD 30 MIN: CPT | Mod: 25,S$GLB,, | Performed by: PHYSICAL MEDICINE & REHABILITATION

## 2020-11-06 PROCEDURE — 3079F PR MOST RECENT DIASTOLIC BLOOD PRESSURE 80-89 MM HG: ICD-10-PCS | Mod: CPTII,S$GLB,, | Performed by: PHYSICAL MEDICINE & REHABILITATION

## 2020-11-06 PROCEDURE — 3008F PR BODY MASS INDEX (BMI) DOCUMENTED: ICD-10-PCS | Mod: CPTII,S$GLB,, | Performed by: PHYSICAL MEDICINE & REHABILITATION

## 2020-11-06 PROCEDURE — 3008F BODY MASS INDEX DOCD: CPT | Mod: CPTII,S$GLB,, | Performed by: PHYSICAL MEDICINE & REHABILITATION

## 2020-11-06 PROCEDURE — 3079F DIAST BP 80-89 MM HG: CPT | Mod: CPTII,S$GLB,, | Performed by: PHYSICAL MEDICINE & REHABILITATION

## 2020-11-06 PROCEDURE — 95886 PR EMG COMPLETE, W/ NERVE CONDUCTION STUDIES, 5+ MUSCLES: ICD-10-PCS | Mod: S$GLB,,, | Performed by: PHYSICAL MEDICINE & REHABILITATION

## 2020-11-06 PROCEDURE — 99214 PR OFFICE/OUTPT VISIT, EST, LEVL IV, 30-39 MIN: ICD-10-PCS | Mod: 25,S$GLB,, | Performed by: PHYSICAL MEDICINE & REHABILITATION

## 2020-11-06 PROCEDURE — 3075F SYST BP GE 130 - 139MM HG: CPT | Mod: CPTII,S$GLB,, | Performed by: PHYSICAL MEDICINE & REHABILITATION

## 2020-11-06 NOTE — PROGRESS NOTES
[facility]   [address] [place] [address]  Phone: [phone]        Full Name: Mariel Becerril YOB: 1955  Patient ID: [id number]      Visit Date: 11/6/2020 13:21  Age: 65 Years 0 Months Old  Examining Physician: Kerry Faustin M.D.  Referring Physician: Dr Connolly  Reason for Referral: leg pain        Chief Complaint   Patient presents with    Back Pain     lower    Knee Pain     left       HPI: This is a 65 y.o.  female being seen in clinic today for evaluation of chronic low back and knee pain with limited ROM and pain radiating into her gluteus and leg.  With increased activity her symptoms can worsen.  Rest, topical agents, and meds provide some relief.     History obtained from patient    Past family, medical, social, and surgical history reviewed in chart    Review of Systems:     General- denies lethargy, weight change, fever, chills  Head/neck- denies swallowing difficulties  ENT- denies hearing changes  Cardiovascular-denies chest pain  Pulmonary- +/-shortness of breath  GI- denies constipation or bowel incontinence  - denies bladder incontinence  Skin- denies wounds or rashes  Musculoskeletal- +/-weakness, +pain  Neurologic- +numbness and tingling  Psychiatric- denies depressive or psychotic features, denies anxiety  Lymphatic-denies swelling  Endocrine- denies hypoglycemic symptoms/DM history  All other pertinent systems negative     Physical Examination:  General: Well developed, well nourished female, NAD  HEENT:NCAT EOMI bilaterally   Pulmonary:Normal respirations    Spinal Examination: CERVICAL  Active ROM is within normal limits.  Inspection: No deformity of spinal alignment.  Palpation: No vertebral tenderness to percussion.      Spinal Examination: LUMBAR or THORACIC  Active ROM is limited at endranges  Inspection: No deformity of spinal alignment.      Bilateral Upper and Lower Extremities:  Pulses are 2+ at radial  bilaterally.  Shoulder/Elbow/Wrist/Hand ROM   Hip/Knee/Ankle ROM wnl, surgical scar at knee  Bilateral Extremities show normal capillary refill.  No signs of cyanosis, rubor, edema, skin changes, or dysvascular changes of appendages.  Nails appear intact.    Neurological Exam:  Cranial Nerves:  II-XII grossly intact    Manual Muscle Testing: (Motor 5=normal)  4/5 strength bilateral lower extremities    No focal atrophy is noted of either lower extremity.    Bilateral Reflexes:  No clonus at knee or ankle.    Sensation: tested to light touch  - intact in legs  Gait: antalgic favoring left leg    Entire procedure explained to patient prior to proceeding.  Verbal consent obtained      SNC      Nerve / Sites Rec. Site Onset Lat Peak Lat Amp Segments Distance Velocity     ms ms µV  mm m/s   L Sural - Ankle (Calf)      Calf Ankle 2.6 3.5 6.7 Calf - Ankle 140 54       MNC      Nerve / Sites Muscle Latency Amplitude Duration Rel Amp Segments Distance Lat Diff Velocity     ms mV ms %  mm ms m/s   L Peroneal - EDB      Ankle EDB 3.8 3.7 6.4 100 Ankle - EDB 80        Fib head EDB 10.0 2.5 8.5 66.5 Fib head - Ankle 310 6.3 50      Pop fossa EDB 11.6 3.2 6.9 130 Pop fossa - Fib head 80 1.6 51         Pop fossa - Ankle  7.8    L Tibial - AH      Ankle AH 5.9 5.7 4.3 100 Ankle - AH 80        Pop fossa AH 14.8 5.5 4.7 96.3 Pop fossa - Ankle 410 8.9 46       EMG      EMG Summary Table     Spontaneous MUAP Recruitment   Muscle IA Fib PSW Fasc Other Dur. Dur Amp Dur Polys Pattern Effort   L. Rectus femoris N None None None .   N N N N Max   L. Tibialis anterior N None None None .   N N N N Max   L. Gastrocnemius (Medial head) N None None None .   Sl Incr Sl Incr 1+ sl red Max   L. Extensor digitorum brevis Incr None None None .   Sl Incr Sl Incr 1+ sl red Max   L. Abductor hallucis Incr None 1+ None .   Incr Sl Incr 1+ sl red Max   R. Extensor digitorum brevis N None None None .   N Sl Incr N sl red Max   R. Abductor hallucis N None  None None .   Sl Incr Sl Incr N sl red Max                      INTERPRETATION  -Left sural sensory nerve  conduction study showed normal peak latency and amplitude  -Left peroneal motor nerve conduction study showed normal latency, amplitude, and conduction velocity  -Left tibial motor nerve conduction study showed normal latency, amplitude, and conduction velocity  -Needle EMG examination performed to above mentioned muscles     IMPRESSION  1. ABNORMAL Study  2. There is electrodiagnostic evidence of an ACUTE on CHRONIC radiculopathy of the LEFT L5 and S1 nerve roots and a CHRONIC radiculopathy of the RIGHT L5 and S1 nerve roots    PLAN  1. Follow up with referring provider: Dr. Moy Connolly  2. Handouts on lumbar radic, back care, provided  3. This study is good for one year. If symptoms worsen or do not improve, please re-consult.    Kerry Faustin M.D.  Physical Medicine and Rehab

## 2020-11-06 NOTE — LETTER
November 6, 2020      Moy Connolly MD  [address] [place] [address] [address] [place] [address]           [facility]  [address] [facility]  [place] [address]  Phone: [phone]  Fax: [phone]          Patient: Mariel Becerril   MR Number: [id number]   YOB: 1955   Date of Visit: 11/6/2020       Dear Dr. Moy Connolly:    Thank you for referring Mariel Becerril to me for evaluation. Attached you will find relevant portions of my assessment and plan of care.    If you have questions, please do not hesitate to call me. I look forward to following Mariel Becerril along with you.    Sincerely,    Kerry Faustin MD    Enclosure  CC:  No Recipients    If you would like to receive this communication electronically, please contact [email or web address] or [phone] to request more information on [employer] access.    For providers and/or their staff who would like to refer a patient to [facility], please contact us through our one-stop-shop provider referral line, [facility], at [phone].    If you feel you have received this communication in error or would no longer like to receive these types of communications, please e-mail [email or web address]

## 2020-11-06 NOTE — PATIENT INSTRUCTIONS
Exercises to Strengthen Your Lower Back  Strong lower back and abdominal muscles work together to support your spine. The exercises below will help strengthen the lower back. It is important that you begin exercising slowly and increase levels gradually.  Always begin any exercise program with stretching. If you feel pain while doing any of these exercises, stop and talk to your doctor about a more specific exercise program that better suits your condition.   Low back stretch  The point of stretching is to make you more flexible and increase your range of motion. Stretch only as much as you are able. Stretch slowly. Do not push your stretch to the limit. If at any point you feel pain while stretching, this is your (temporary) limit.  · Lie on your back with your knees bent and both feet on the ground.  · Slowly raise your left knee to your chest as you flatten your lower back against the floor. Hold for 5 seconds.  · Relax and repeat the exercise with your right knee.  · Do 10 of these exercises for each leg.  · Repeat hugging both knees to your chest at the same time.  Building lower back strength  Start your exercise routine with 10 to 30 minutes a day, 1 to 3 times a day.  Initial exercises  Lying on your back:  1. Ankle pumps: Move your foot up and down, towards your head, and then away. Repeat 10 times with each foot.  2. Heel slides: Slowly bend your knee, drawing the heel of your foot towards you. Then slide your heel/foot from you, straightening your knee. Do not lift your foot off the floor (this is not a leg lift).  3. Abdominal contraction: Bend your knees and put your hands on your stomach. Tighten your stomach muscles. Hold for 5 seconds, then relax. Repeat 10 times.  4. Straight leg raise: Bend one leg at the knee and keep the other leg straight. Tighten your stomach muscles. Slowly lift your straight leg 6 to 12 inches off the floor and hold for up to 5 seconds. Repeat 10 times on each  side.  Standin. Wall squats: Stand with your back against the wall. Move your feet about 12 inches away from the wall. Tighten your stomach muscles, and slowly bend your knees until they are at about a 45 degree angle. Do not go down too far. Hold about 5 seconds. Then slowly return to your starting position. Repeat 10 times.  2. Heel raises: Stand facing the wall. Slowly raise the heels of your feet up and down, while keeping your toes on the floor. If you have trouble balancing, you can touch the wall with your hands. Repeat 10 times.  More advanced exercises  When you feel comfortable enough, try these exercises.  1. Kneeling lumbar extension: Begin on your hands and knees. At the same time, raise and straighten your right arm and left leg until they are parallel to the ground. Hold for 2 seconds and come back slowly to a starting position. Repeat with left arm and right leg, alternating 10 times.  2. Prone lumbar extension: Lie face down, arms extended overhead, palms on the floor. At the same time, raise your right arm and left leg as high as comfortably possible. Hold for 10 seconds and slowly return to start. Repeat with left arm and right leg, alternating 10 times. Gradually build up to 20 times. (Advanced: Repeat this exercise raising both arms and both legs a few inches off the floor at the same time. Hold for 5 seconds and release.)  3. Pelvic tilt: Lie on the floor on your back with your knees bent at 90 degrees. Your feet should be flat on the floor. Inhale, exhale, then slowly contract your abdominal muscles bringing your navel toward your spine. Let your pelvis rock back until your lower back is flat on the floor. Hold for 10 seconds while breathing smoothly.  4. Abdominal crunch: Perform a pelvic tilt (above) flattening your lower back against the floor. Holding the tension in your abdominal muscles, take another breath and raise your shoulder blades off the ground (this is not a full sit-up).  Keep your head in line with your body (dont bend your neck forward). Hold for 2 seconds, then slowly lower.  Date Last Reviewed: 6/1/2016  © 7328-8287 [employer]. [address] [place] [address]. All rights reserved. This information is not intended as a substitute for professional medical care. Always follow your healthcare professional's instructions.        Possible Causes of Low Back or Leg Pain    The symptoms in your back or leg may be due to pressure on a nerve. This pressure may be caused by a damaged disk or by abnormal bone growth. Either way, you may feel pain, burning, tingling, or numbness. If you have pressure on a nerve that connects to the sciatic nerve, pain may shoot down your leg.    Pressure from the disk  Constant wear and tear can weaken a disk over time and cause back pain. The disk can then be damaged by a sudden movement or injury. If its soft center begins to bulge, the disk may press on a nerve. Or the outside of the disk may tear, and the soft center may squeeze through and pinch a nerve.    Pressure from bone  As a disk wears out, the vertebrae right above and below the disk begin to touch. This can put pressure on a nerve. Often, abnormal bone (called bone spurs) grows where the vertebrae rub against each other. This can cause the foramen or the spinal canal to narrow (called stenosis) and press against a nerve.  Date Last Reviewed: 10/4/2015  © 6209-3182 [employer]. [address] [place] [address]. All rights reserved. This information is not intended as a substitute for professional medical care. Always follow your healthcare professional's instructions.        Causes of Lumbar (Low Back) Pain  Low back pain can be caused by problems with any part of the lumbar spine. A disk can herniate (push out) and press on a nerve. Vertebrae can rub against each other or slip out of place. This can irritate facet joints and nerves. It can also  lead to stenosis, a narrowing of the spinal canal or foramen.  Pressure from a disk  Constant wear and tear on a disk can cause it to weaken and push outward. Part of the disk may then press on nearby nerves. There are two common types of herniated disks:  Contained means the soft nucleus is protruding outward.   Extruded means the firm annulus has torn, letting the soft center squeeze through.     Pressure from bone  An unstable spine   With age, a disk may thin and wear out. Vertebrae above and below the disk may begin to touch. This can put pressure on nerves. It can also cause bone spurs (growths) to form where the bones rub together.    Stenosis results when bone spurs narrow the foramen or spinal canal. This also puts pressure on nerves. Slipping vertebrae can irritate nerves and joints. They can also worsen stenosis.    In some cases, vertebrae become unstable and slip forward. This is called spondylolisthesis.     Date Last Reviewed: 10/12/2015  © 6178-9789 [employer]. [address] [place] [address], [place] [address]. All rights reserved. This information is not intended as a substitute for professional medical care. Always follow your healthcare professional's instructions.        Back Safety: Poor Posture Hurts  An unhealthy spine often starts with bad habits. Poor movement patterns and posture problems are common causes of back pain. Disk, bone, nerve, and soft tissue problems can all be affected by poor posture. They can lead to pain, stiffness, and other symptoms.    Poor posture backfires  Poor posture can cause pain. Too much slouching puts increased pressure on the disks. An excessive lumbar curve can overload and inflame the vertebrae. As a result, the back muscles may tighten or spasm to splint and protect the spine. This adds to the pain you feel.    Proper posture: The key to safe movement  Your spine bears your weight throughout the day. This is true whether youre sleeping, standing,  or bending. Certain positions strain your spine more than others. But by maintaining proper posture in all positions, you can reduce the stress on your spine.       To improve your standing posture, follow these steps:  · Breathe deeply.  · Relax your shoulders, hips, and ankles. · Think of the ears, shoulders, hips, and ankles as a series of dots. Now, adjust your body to connect the dots in a straight line.  · Tuck your buttocks in just a bit if you need to.      Date Last Reviewed: 10/28/2015  © 8162-7764 [employer]. [address] [place] [address]. All rights reserved. This information is not intended as a substitute for professional medical care. Always follow your healthcare professional's instructions.        Understanding Lumbar Radiculopathy    Lumbar radiculopathy is irritation or inflammation of a nerve root in the low back. It causes symptoms that spread out from the back down one or both legs. To understand this condition, it helps to understand the parts of the spine:  · Vertebrae. These are bones that stack to form the spine. The lumbar spine contains the 5 bottom vertebrae.  · Disks. These are soft pads of tissue between the vertebrae. They act as shock absorbers for the spine.  · Spinal canal. This is a tunnel formed within the stacked vertebrae. In the lumbar spine, nerves run through this canal.  · Nerves. These branch off and leave the spinal canal, traveling out to parts of the body. As they leave the spinal canal, nerves pass through openings between the vertebrae. The nerve root is the part of the nerve that is closest to the spinal canal.  · Sciatic nerve. This is a large nerve formed from several nerve roots in the low back. This nerve extends down the back of the leg to the foot.  With lumbar radiculopathy, nerve roots in the low back become irritated. This leads to pain and symptoms. The sciatic nerve is commonly involved, so the condition is often called  sciatica.  What causes lumbar radiculopathy?  Aging, injury, poor posture, extra body weight, and other issues can lead to problems in the low back. These problems may then irritate nerve roots. They include:  · Damage to a disk in the lumbar spine. The damaged disk may then press on nearby nerve roots.  · Degeneration from wear and tear, and aging. This can lead to narrowing (stenosis) of the openings between the vertebrae. The narrowed openings press on nerve roots as they leave the spinal canal.  · Unstable spine. This is when a vertebra slips forward. It can then press on a nerve root.  Other, less common things can put pressure on nerves in the low back. These include diabetes, infection, or a tumor.  Symptoms of lumbar radiculopathy  These include:  · Pain in the low back  · Pain, numbness, tingling, or weakness that travels into the buttocks, hip, groin, or leg  · Muscle spasms  Treatment for lumbar radiculopathy  In most cases, your healthcare provider will first try treatments that help relieve symptoms. These may include:  · Prescription and over-the-counter pain medicines. These help relieve pain, swelling, and irritation.  · Limits on positions and activities that increase pain. But lying in bed or avoiding all movement is only recommended for a short period of time.  · Physical therapy, including exercises and stretches. This helps decrease pain and increase movement and function.  · Steroid shots into the lower back. This may help relieve symptoms for a time.  · Weight-loss program. If you are overweight, losing extra pounds may help relieve symptoms.  In some cases, you may need surgery to fix the underlying problem. This depends on the cause, the symptoms, and how long the pain has lasted.  Possible complications  Over time, an irritated and inflamed nerve may become damaged. This may lead to long-lasting (permanent) numbness or weakness in your legs and feet. If symptoms change suddenly or get  worse, be sure to let your healthcare provider know.  When to call your healthcare provider  Call your healthcare provider right away if you have any of these:  · New pain or pain that gets worse  · New or increasing weakness, tingling, or numbness in your leg or foot  · Problems controlling your bladder or bowel   Date Last Reviewed: 3/10/2016  © 7404-6337 [employer]. [address] [place] [address], [place] [address]. All rights reserved. This information is not intended as a substitute for professional medical care. Always follow your healthcare professional's instructions.

## 2020-11-09 ENCOUNTER — OFFICE VISIT (OUTPATIENT)
Dept: INTERNAL MEDICINE | Facility: CLINIC | Age: 65
End: 2020-11-09
Payer: COMMERCIAL

## 2020-11-09 ENCOUNTER — HOSPITAL ENCOUNTER (OUTPATIENT)
Dept: RADIOLOGY | Facility: HOSPITAL | Age: 65
Discharge: HOME | End: 2020-11-09
Attending: FAMILY MEDICINE
Payer: COMMERCIAL

## 2020-11-09 VITALS
OXYGEN SATURATION: 99 % | BODY MASS INDEX: 45 KG/M2 | DIASTOLIC BLOOD PRESSURE: 78 MMHG | SYSTOLIC BLOOD PRESSURE: 120 MMHG | HEIGHT: 63 IN | WEIGHT: 254 LBS | HEART RATE: 77 BPM | TEMPERATURE: 99 F | RESPIRATION RATE: 18 BRPM

## 2020-11-09 DIAGNOSIS — H92.02 OTALGIA, LEFT: ICD-10-CM

## 2020-11-09 DIAGNOSIS — Z86.69 HISTORY OF MIGRAINE HEADACHES: ICD-10-CM

## 2020-11-09 DIAGNOSIS — K21.9 GASTROESOPHAGEAL REFLUX DISEASE WITHOUT ESOPHAGITIS: ICD-10-CM

## 2020-11-09 DIAGNOSIS — M25.512 ACUTE PAIN OF LEFT SHOULDER: ICD-10-CM

## 2020-11-09 DIAGNOSIS — I10 ESSENTIAL HYPERTENSION: ICD-10-CM

## 2020-11-09 DIAGNOSIS — R20.2 NUMBNESS AND TINGLING: ICD-10-CM

## 2020-11-09 DIAGNOSIS — M25.512 ACUTE PAIN OF LEFT SHOULDER: Primary | ICD-10-CM

## 2020-11-09 DIAGNOSIS — Z12.31 OTHER SCREENING MAMMOGRAM: ICD-10-CM

## 2020-11-09 DIAGNOSIS — R20.0 NUMBNESS AND TINGLING: ICD-10-CM

## 2020-11-09 DIAGNOSIS — H65.92 LEFT NON-SUPPURATIVE OTITIS MEDIA: ICD-10-CM

## 2020-11-09 DIAGNOSIS — F41.9 ANXIETY: ICD-10-CM

## 2020-11-09 PROCEDURE — 3008F BODY MASS INDEX DOCD: CPT | Mod: CPTII,S$GLB,, | Performed by: FAMILY MEDICINE

## 2020-11-09 PROCEDURE — 99999 PR PBB SHADOW E&M-EST. PATIENT-LVL III: CPT | Mod: PBBFAC,,, | Performed by: FAMILY MEDICINE

## 2020-11-09 PROCEDURE — 99999 PR PBB SHADOW E&M-EST. PATIENT-LVL III: ICD-10-PCS | Mod: PBBFAC,,, | Performed by: FAMILY MEDICINE

## 2020-11-09 PROCEDURE — 3074F PR MOST RECENT SYSTOLIC BLOOD PRESSURE < 130 MM HG: ICD-10-PCS | Mod: CPTII,S$GLB,, | Performed by: FAMILY MEDICINE

## 2020-11-09 PROCEDURE — 77067 SCR MAMMO BI INCL CAD: CPT | Mod: TC

## 2020-11-09 PROCEDURE — 3074F SYST BP LT 130 MM HG: CPT | Mod: CPTII,S$GLB,, | Performed by: FAMILY MEDICINE

## 2020-11-09 PROCEDURE — 77063 BREAST TOMOSYNTHESIS BI: CPT | Mod: 26,,, | Performed by: RADIOLOGY

## 2020-11-09 PROCEDURE — 73030 XR SHOULDER COMPLETE 2 OR MORE VIEWS LEFT: ICD-10-PCS | Mod: 26,LT,, | Performed by: RADIOLOGY

## 2020-11-09 PROCEDURE — 90686 IIV4 VACC NO PRSV 0.5 ML IM: CPT | Mod: S$GLB,,, | Performed by: FAMILY MEDICINE

## 2020-11-09 PROCEDURE — 99214 OFFICE O/P EST MOD 30 MIN: CPT | Mod: 25,S$GLB,, | Performed by: FAMILY MEDICINE

## 2020-11-09 PROCEDURE — 3008F PR BODY MASS INDEX (BMI) DOCUMENTED: ICD-10-PCS | Mod: CPTII,S$GLB,, | Performed by: FAMILY MEDICINE

## 2020-11-09 PROCEDURE — 90471 FLU VACCINE (QUAD) GREATER THAN OR EQUAL TO 3YO PRESERVATIVE FREE IM: ICD-10-PCS | Mod: S$GLB,,, | Performed by: FAMILY MEDICINE

## 2020-11-09 PROCEDURE — 77067 MAMMO DIGITAL SCREENING BILAT WITH TOMO: ICD-10-PCS | Mod: 26,,, | Performed by: RADIOLOGY

## 2020-11-09 PROCEDURE — 72050 X-RAY EXAM NECK SPINE 4/5VWS: CPT | Mod: 26,,, | Performed by: RADIOLOGY

## 2020-11-09 PROCEDURE — 90471 IMMUNIZATION ADMIN: CPT | Mod: S$GLB,,, | Performed by: FAMILY MEDICINE

## 2020-11-09 PROCEDURE — 3078F DIAST BP <80 MM HG: CPT | Mod: CPTII,S$GLB,, | Performed by: FAMILY MEDICINE

## 2020-11-09 PROCEDURE — 73030 X-RAY EXAM OF SHOULDER: CPT | Mod: TC,LT

## 2020-11-09 PROCEDURE — 90686 FLU VACCINE (QUAD) GREATER THAN OR EQUAL TO 3YO PRESERVATIVE FREE IM: ICD-10-PCS | Mod: S$GLB,,, | Performed by: FAMILY MEDICINE

## 2020-11-09 PROCEDURE — 99214 PR OFFICE/OUTPT VISIT, EST, LEVL IV, 30-39 MIN: ICD-10-PCS | Mod: 25,S$GLB,, | Performed by: FAMILY MEDICINE

## 2020-11-09 PROCEDURE — 72050 XR CERVICAL SPINE COMPLETE 5 VIEW: ICD-10-PCS | Mod: 26,,, | Performed by: RADIOLOGY

## 2020-11-09 PROCEDURE — 73030 X-RAY EXAM OF SHOULDER: CPT | Mod: 26,LT,, | Performed by: RADIOLOGY

## 2020-11-09 PROCEDURE — 72050 X-RAY EXAM NECK SPINE 4/5VWS: CPT | Mod: TC

## 2020-11-09 PROCEDURE — 77063 MAMMO DIGITAL SCREENING BILAT WITH TOMO: ICD-10-PCS | Mod: 26,,, | Performed by: RADIOLOGY

## 2020-11-09 PROCEDURE — 3078F PR MOST RECENT DIASTOLIC BLOOD PRESSURE < 80 MM HG: ICD-10-PCS | Mod: CPTII,S$GLB,, | Performed by: FAMILY MEDICINE

## 2020-11-09 PROCEDURE — 77067 SCR MAMMO BI INCL CAD: CPT | Mod: 26,,, | Performed by: RADIOLOGY

## 2020-11-09 RX ORDER — FUROSEMIDE 40 MG/1
40 TABLET ORAL DAILY
Qty: 90 TABLET | Refills: 3 | Status: SHIPPED | OUTPATIENT
Start: 2020-11-09 | End: 2021-07-29 | Stop reason: SDUPTHER

## 2020-11-09 RX ORDER — ALPRAZOLAM 0.25 MG/1
0.25 TABLET ORAL 2 TIMES DAILY PRN
Qty: 20 TABLET | Refills: 0 | Status: SHIPPED | OUTPATIENT
Start: 2020-11-09 | End: 2020-12-03

## 2020-11-09 RX ORDER — ALPRAZOLAM 0.25 MG/1
0.25 TABLET ORAL 2 TIMES DAILY PRN
Qty: 20 TABLET | Refills: 0 | Status: SHIPPED | OUTPATIENT
Start: 2020-11-09 | End: 2020-11-09 | Stop reason: SDUPTHER

## 2020-11-09 RX ORDER — METHOCARBAMOL 750 MG/1
750 TABLET, FILM COATED ORAL 3 TIMES DAILY
COMMUNITY
Start: 2020-09-28 | End: 2020-11-09 | Stop reason: SDUPTHER

## 2020-11-09 RX ORDER — AMLODIPINE BESYLATE 5 MG/1
5 TABLET ORAL DAILY
Qty: 90 TABLET | Refills: 3 | Status: SHIPPED | OUTPATIENT
Start: 2020-11-09 | End: 2021-04-27 | Stop reason: SDUPTHER

## 2020-11-09 RX ORDER — GABAPENTIN 300 MG/1
300 CAPSULE ORAL 2 TIMES DAILY
Qty: 60 CAPSULE | Refills: 2 | Status: SHIPPED | OUTPATIENT
Start: 2020-11-09 | End: 2021-02-15

## 2020-11-09 RX ORDER — MELOXICAM 15 MG/1
15 TABLET ORAL DAILY
Qty: 90 TABLET | Refills: 3 | Status: SHIPPED | OUTPATIENT
Start: 2020-11-09 | End: 2021-10-20 | Stop reason: SDUPTHER

## 2020-11-09 RX ORDER — OMEPRAZOLE 40 MG/1
40 CAPSULE, DELAYED RELEASE ORAL DAILY
Qty: 90 CAPSULE | Refills: 3 | Status: SHIPPED | OUTPATIENT
Start: 2020-11-09 | End: 2021-04-20

## 2020-11-09 RX ORDER — TOPIRAMATE 50 MG/1
50 TABLET, FILM COATED ORAL DAILY
Qty: 90 TABLET | Refills: 3 | Status: SHIPPED | OUTPATIENT
Start: 2020-11-09 | End: 2021-08-17 | Stop reason: SDUPTHER

## 2020-11-09 RX ORDER — METHOCARBAMOL 750 MG/1
750 TABLET, FILM COATED ORAL 3 TIMES DAILY
Qty: 90 TABLET | Refills: 1 | Status: SHIPPED | OUTPATIENT
Start: 2020-11-09 | End: 2020-12-24

## 2020-11-09 NOTE — PROGRESS NOTES
Subjective:       Patient ID: Mariel Becerril is a 65 y.o. female.    Chief Complaint: Otalgia (Left) and Shoulder Pain (Left)    HPI Ms. Becerril presents today for left ear and shoulder pain.     She has been having this pain for over a year coming and going.     She got a steroid injection for the left knee and this helped.     Saw physiatry on 11/6/2020   EMG study was abnormal for lower back     Pain comes and goes in shoulder and the arm.     Spoke with a counselor   Needs something for anxiety   Found a xanax from years ago that helped.     Review of Systems   Constitutional: Negative for activity change, appetite change, fatigue and fever.   HENT: Positive for ear pain. Negative for congestion and sinus pressure.    Eyes: Negative for pain and visual disturbance.   Respiratory: Negative for cough, chest tightness and wheezing.    Cardiovascular: Negative for chest pain, palpitations and leg swelling.   Gastrointestinal: Negative for abdominal distention, abdominal pain, constipation, diarrhea, nausea and vomiting.   Endocrine: Negative for polydipsia and polyuria.   Genitourinary: Negative for difficulty urinating, dyspareunia, dysuria, flank pain and hematuria.   Musculoskeletal: Positive for arthralgias and back pain.   Skin: Negative for rash.   Neurological: Positive for numbness. Negative for dizziness, tremors, syncope, weakness and headaches.   Psychiatric/Behavioral: Negative for agitation and confusion.           Past Medical History:   Diagnosis Date    Frequent headaches     Hypertension     Osteoarthritis 04/02/2019    Bilateral knees, ankles and feet    Severe obesity (BMI >= 40)     Sleep apnea      Past Surgical History:   Procedure Laterality Date    BLADDER SUSPENSION      CATARACT EXTRACTION, BILATERAL      COLONOSCOPY N/A 12/3/2018    Procedure: COLONOSCOPY;  Surgeon: Mari Rader MD;  Location: [facility];  Service: Endoscopy;  Laterality: N/A;    HYSTERECTOMY      partial 2000     tonsilectomy      TOTAL KNEE ARTHROPLASTY Left 3/4/2020    Procedure: ARTHROPLASTY, KNEE, TOTAL;  Surgeon: Moy Connolly MD;  Location: [facility];  Service: Orthopedics;  Laterality: Left;     Objective:        Physical Exam  Vitals signs reviewed.   Constitutional:       Appearance: She is obese.   HENT:      Right Ear: Tympanic membrane normal.      Left Ear: Tympanic membrane is injected and erythematous.   Eyes:      Extraocular Movements: Extraocular movements intact.   Skin:     General: Skin is warm.   Neurological:      General: No focal deficit present.      Mental Status: She is alert and oriented to person, place, and time.   Psychiatric:         Mood and Affect: Mood normal.         Behavior: Behavior normal.           Results for orders placed or performed in visit on 09/11/20   CULTURE, ANAEROBE    Specimen: Knee, Left; Joint Fluid   Result Value Ref Range    Anaerobic Culture No anaerobes isolated    Aerobic culture    Specimen: Knee, Left; Joint Fluid   Result Value Ref Range    Aerobic Bacterial Culture No growth    GRAM STAIN    Specimen: Knee, Left; Joint Fluid   Result Value Ref Range    Gram Stain Result No WBC's     Gram Stain Result No organisms seen    Body fluid crystal Joint Fluid, Left Knee   Result Value Ref Range    Body Fluid Source, Crystal Exam Joint Fluid, Left Knee     Body Fluid Crystal Negative Negative   WBC & Diff,Body Fluid Joint Fluid, Left Knee   Result Value Ref Range    Body Fluid Type Joint Fluid, Left Knee     Fluid Appearance Bloody     Fluid Color Red     WBC, Body Fluid 298 /cu mm    Segs, Fluid 32 %    Lymphs, Fluid 45 %    Monocytes/Macrophages, Fluid 20 %    Eos, Fluid 2 %    Baso, Fluid 1 %   Pathologist Review of Laboratory Test   Result Value Ref Range    Pathologist Review, Body Fluid REVIEWED    Pathologist Review of Laboratory Test   Result Value Ref Range    Pathologist Review, Body Fluid REVIEWED        Assessment/Plan:     Acute pain of left  shoulder  -     X-Ray Shoulder 2 or More Views Left; Future; Expected date: 11/09/2020    Anxiety  -     Discontinue: ALPRAZolam (XANAX) 0.25 MG tablet; Take 1 tablet (0.25 mg total) by mouth 2 (two) times daily as needed for Anxiety.  Dispense: 20 tablet; Refill: 0  -     ALPRAZolam (XANAX) 0.25 MG tablet; Take 1 tablet (0.25 mg total) by mouth 2 (two) times daily as needed for Anxiety.  Dispense: 20 tablet; Refill: 0    History of migraine headaches  -     topiramate (TOPAMAX) 50 MG tablet; Take 1 tablet (50 mg total) by mouth once daily.  Dispense: 90 tablet; Refill: 3    Gastroesophageal reflux disease without esophagitis  -     omeprazole (PRILOSEC) 40 MG capsule; Take 1 capsule (40 mg total) by mouth once daily.  Dispense: 90 capsule; Refill: 3    Numbness and tingling  -     X-Ray Cervical Spine Complete 5 view; Future; Expected date: 11/09/2020    Essential hypertension  -     amLODIPine (NORVASC) 5 MG tablet; Take 1 tablet (5 mg total) by mouth once daily.  Dispense: 90 tablet; Refill: 3  -     furosemide (LASIX) 40 MG tablet; Take 1 tablet (40 mg total) by mouth once daily.  Dispense: 90 tablet; Refill: 3    Otalgia, left  -     ciprofloxacin HCl (CIPRO) 500 MG tablet; Take 1 tablet (500 mg total) by mouth every 12 (twelve) hours.  Dispense: 20 tablet; Refill: 0    Left non-suppurative otitis media  -     ciprofloxacin HCl (CIPRO) 500 MG tablet; Take 1 tablet (500 mg total) by mouth every 12 (twelve) hours.  Dispense: 20 tablet; Refill: 0    Other orders  -     meloxicam (MOBIC) 15 MG tablet; Take 1 tablet (15 mg total) by mouth once daily.  Dispense: 90 tablet; Refill: 3  -     gabapentin (NEURONTIN) 300 MG capsule; Take 1 capsule (300 mg total) by mouth 2 (two) times daily.  Dispense: 60 capsule; Refill: 2  -     methocarbamoL (ROBAXIN) 750 MG Tab; Take 1 tablet (750 mg total) by mouth 3 (three) times daily.  Dispense: 90 tablet; Refill: 1  -     Influenza - Quadrivalent (PF)          Follow up as  needed     Jaclyn Becerril MD  [facility]

## 2020-11-10 ENCOUNTER — PATIENT MESSAGE (OUTPATIENT)
Dept: INTERNAL MEDICINE | Facility: CLINIC | Age: 65
End: 2020-11-10

## 2020-11-10 RX ORDER — CIPROFLOXACIN 500 MG/1
500 TABLET ORAL EVERY 12 HOURS
Qty: 20 TABLET | Refills: 0 | Status: SHIPPED | OUTPATIENT
Start: 2020-11-10 | End: 2021-02-17

## 2020-11-16 ENCOUNTER — OFFICE VISIT (OUTPATIENT)
Dept: PODIATRY | Facility: CLINIC | Age: 65
End: 2020-11-16
Payer: COMMERCIAL

## 2020-11-16 ENCOUNTER — OFFICE VISIT (OUTPATIENT)
Dept: ORTHOPEDICS | Facility: CLINIC | Age: 65
End: 2020-11-16
Payer: COMMERCIAL

## 2020-11-16 VITALS
BODY MASS INDEX: 44.55 KG/M2 | SYSTOLIC BLOOD PRESSURE: 135 MMHG | DIASTOLIC BLOOD PRESSURE: 79 MMHG | BODY MASS INDEX: 44.83 KG/M2 | HEIGHT: 63 IN | DIASTOLIC BLOOD PRESSURE: 88 MMHG | WEIGHT: 253 LBS | WEIGHT: 251.44 LBS | HEIGHT: 63 IN | HEART RATE: 79 BPM | HEART RATE: 65 BPM | SYSTOLIC BLOOD PRESSURE: 126 MMHG

## 2020-11-16 DIAGNOSIS — M54.17 LUMBOSACRAL RADICULOPATHY AT L5: Primary | ICD-10-CM

## 2020-11-16 DIAGNOSIS — M54.17 LUMBOSACRAL RADICULOPATHY AT S1: ICD-10-CM

## 2020-11-16 DIAGNOSIS — E66.01 MORBID OBESITY: ICD-10-CM

## 2020-11-16 DIAGNOSIS — Z96.652 HISTORY OF TOTAL LEFT KNEE REPLACEMENT: ICD-10-CM

## 2020-11-16 DIAGNOSIS — L84 CALLUS: ICD-10-CM

## 2020-11-16 DIAGNOSIS — B35.1 ONYCHOMYCOSIS: Primary | ICD-10-CM

## 2020-11-16 DIAGNOSIS — Z96.652 HISTORY OF TOTAL LEFT KNEE REPLACEMENT: Primary | ICD-10-CM

## 2020-11-16 DIAGNOSIS — M51.36 LUMBAR DEGENERATIVE DISC DISEASE: ICD-10-CM

## 2020-11-16 PROCEDURE — 3008F BODY MASS INDEX DOCD: CPT | Mod: CPTII,,, | Performed by: PODIATRIST

## 2020-11-16 PROCEDURE — 99499 UNLISTED E&M SERVICE: CPT | Mod: ,,, | Performed by: PODIATRIST

## 2020-11-16 PROCEDURE — 1125F PR PAIN SEVERITY QUANTIFIED, PAIN PRESENT: ICD-10-PCS | Mod: S$GLB,,, | Performed by: ORTHOPAEDIC SURGERY

## 2020-11-16 PROCEDURE — 3008F PR BODY MASS INDEX (BMI) DOCUMENTED: ICD-10-PCS | Mod: CPTII,,, | Performed by: PODIATRIST

## 2020-11-16 PROCEDURE — 1125F AMNT PAIN NOTED PAIN PRSNT: CPT | Mod: ,,, | Performed by: PODIATRIST

## 2020-11-16 PROCEDURE — 1125F AMNT PAIN NOTED PAIN PRSNT: CPT | Mod: S$GLB,,, | Performed by: ORTHOPAEDIC SURGERY

## 2020-11-16 PROCEDURE — 99999 PR PBB SHADOW E&M-EST. PATIENT-LVL V: ICD-10-PCS | Mod: PBBFAC,,, | Performed by: ORTHOPAEDIC SURGERY

## 2020-11-16 PROCEDURE — 17999 UNLISTD PX SKN MUC MEMB SUBQ: CPT | Mod: CSM,S$GLB,, | Performed by: PODIATRIST

## 2020-11-16 PROCEDURE — 99999 PR PBB SHADOW E&M-EST. PATIENT-LVL V: CPT | Mod: PBBFAC,,, | Performed by: ORTHOPAEDIC SURGERY

## 2020-11-16 PROCEDURE — 3075F PR MOST RECENT SYSTOLIC BLOOD PRESS GE 130-139MM HG: ICD-10-PCS | Mod: CPTII,S$GLB,, | Performed by: ORTHOPAEDIC SURGERY

## 2020-11-16 PROCEDURE — 99213 PR OFFICE/OUTPT VISIT, EST, LEVL III, 20-29 MIN: ICD-10-PCS | Mod: S$GLB,,, | Performed by: ORTHOPAEDIC SURGERY

## 2020-11-16 PROCEDURE — 3079F PR MOST RECENT DIASTOLIC BLOOD PRESSURE 80-89 MM HG: ICD-10-PCS | Mod: CPTII,S$GLB,, | Performed by: ORTHOPAEDIC SURGERY

## 2020-11-16 PROCEDURE — 1125F PR PAIN SEVERITY QUANTIFIED, PAIN PRESENT: ICD-10-PCS | Mod: ,,, | Performed by: PODIATRIST

## 2020-11-16 PROCEDURE — 3079F DIAST BP 80-89 MM HG: CPT | Mod: CPTII,S$GLB,, | Performed by: ORTHOPAEDIC SURGERY

## 2020-11-16 PROCEDURE — 3008F PR BODY MASS INDEX (BMI) DOCUMENTED: ICD-10-PCS | Mod: CPTII,S$GLB,, | Performed by: ORTHOPAEDIC SURGERY

## 2020-11-16 PROCEDURE — 17999 PR NON-COVERED FOOT CARE: ICD-10-PCS | Mod: CSM,S$GLB,, | Performed by: PODIATRIST

## 2020-11-16 PROCEDURE — 99499 NO LOS: ICD-10-PCS | Mod: ,,, | Performed by: PODIATRIST

## 2020-11-16 PROCEDURE — 99999 PR PBB SHADOW E&M-EST. PATIENT-LVL IV: CPT | Mod: PBBFAC,,, | Performed by: PODIATRIST

## 2020-11-16 PROCEDURE — 3008F BODY MASS INDEX DOCD: CPT | Mod: CPTII,S$GLB,, | Performed by: ORTHOPAEDIC SURGERY

## 2020-11-16 PROCEDURE — 3075F SYST BP GE 130 - 139MM HG: CPT | Mod: CPTII,S$GLB,, | Performed by: ORTHOPAEDIC SURGERY

## 2020-11-16 PROCEDURE — 99213 OFFICE O/P EST LOW 20 MIN: CPT | Mod: S$GLB,,, | Performed by: ORTHOPAEDIC SURGERY

## 2020-11-16 PROCEDURE — 99999 PR PBB SHADOW E&M-EST. PATIENT-LVL IV: ICD-10-PCS | Mod: PBBFAC,,, | Performed by: PODIATRIST

## 2020-11-16 NOTE — PROGRESS NOTES
Subjective:       Patient ID: Mariel Becerril is a 65 y.o. female.    Chief Complaint: Routine Foot Care (7/10 casual shoes w/socks non diabetic pt pcp Dr. Becerril.)      HPI: Mariel Becerril presents to the office today for non-covered foot care; trimming/debridement of elongated/dystrophic/onychomycotic nails and/or debridement/paring of calluses.    No results found for: HGBA1C.    Review of patient's allergies indicates:   Allergen Reactions    Penicillins Rash       Past Medical History:   Diagnosis Date    Frequent headaches     Hypertension     Osteoarthritis 04/02/2019    Bilateral knees, ankles and feet    Severe obesity (BMI >= 40)     Sleep apnea        Family History   Problem Relation Age of Onset    Heart attack Father        Social History     Socioeconomic History    Marital status:      Spouse name: Not on file    Number of children: Not on file    Years of education: Not on file    Highest education level: Not on file   Occupational History    Not on file   Social Needs    Financial resource strain: Not on file    Food insecurity     Worry: Not on file     Inability: Not on file    Transportation needs     Medical: Not on file     Non-medical: Not on file   Tobacco Use    Smoking status: Never Smoker    Smokeless tobacco: Never Used   Substance and Sexual Activity    Alcohol use: No    Drug use: No    Sexual activity: Never     Partners: Male     Birth control/protection: See Surgical Hx   Lifestyle    Physical activity     Days per week: Not on file     Minutes per session: Not on file    Stress: Not on file   Relationships    Social connections     Talks on phone: Not on file     Gets together: Not on file     Attends Yarsanism service: Not on file     Active member of club or organization: Not on file     Attends meetings of clubs or organizations: Not on file     Relationship status: Not on file   Other Topics Concern    Not on file   Social History  "Narrative    Not on file       Past Surgical History:   Procedure Laterality Date    BLADDER SUSPENSION      CATARACT EXTRACTION, BILATERAL      COLONOSCOPY N/A 12/3/2018    Procedure: COLONOSCOPY;  Surgeon: Mari Rader MD;  Location: [facility];  Service: Endoscopy;  Laterality: N/A;    HYSTERECTOMY      partial 2000    tonsilectomy      TOTAL KNEE ARTHROPLASTY Left 3/4/2020    Procedure: ARTHROPLASTY, KNEE, TOTAL;  Surgeon: Moy Connolly MD;  Location: [facility] OR;  Service: Orthopedics;  Laterality: Left;       Review of Systems   Constitutional: Negative for chills, fatigue and fever.   HENT: Negative for hearing loss.    Eyes: Negative for photophobia and visual disturbance.   Respiratory: Negative for cough, chest tightness, shortness of breath and wheezing.    Cardiovascular: Positive for leg swelling. Negative for chest pain and palpitations.   Gastrointestinal: Negative for constipation, diarrhea, nausea and vomiting.   Endocrine: Negative for cold intolerance and heat intolerance.   Genitourinary: Negative for flank pain.   Musculoskeletal: Positive for arthralgias. Negative for neck pain and neck stiffness.   Skin: Negative for wound.   Neurological: Negative for light-headedness and headaches.   Psychiatric/Behavioral: Negative for sleep disturbance.          Objective:   /79   Pulse 65   Ht 5' 3" (1.6 m)   Wt 114 kg (251 lb 7 oz)   BMI 44.54 kg/m²     Physical Exam  LOWER EXTREMITY PHYSICAL EXAMINATION    DERMATOLOGY:  Skin is supple, dry and intact. No ulcerations are noted. No hyperkeratosis or calluses are noted. No ecchymosis appreciated.  There are nail changes which are consistent with onychomycosis on the right and left foot. On the left foot, nail #1 and #2 is/are thickened, is/are dystrophic, with yellow discoloration, and with malodorous subungual debris. On the right foot, nail #1, #2, #4 and #5 is/are thickened, is/are dystrophic, with yellow discoloration, and with " malodorous subungual debris.  Callus formation, medial border, right great toe at the MTPJ and IPJ.    VASCULAR: On the left and right foot, the dorsalis pedis pulse is 1/4 and the posterior tibial pulse is 1/4. Capillary refill time is less than 3 seconds. Hair growth is noted on the dorsum of the foot and at the digits. No rubor is present. Proximal to distal temperature is warm to warm.  Edema is noted, nonpitting, bilateral lower extremity, mild.    NEUROLOGY: Sensation to light touch is intact. Proprioception is intact. Sensation to pin prick is intact.     Assessment:     1. Onychomycosis    2. Callus    3. Morbid obesity        Plan:     Onychomycosis  The onychomycotic nail plates, as outlined above, are sharply debrided with double action nail nipper, and/or with the assistance of a mechanical rotary mari, with removal of all offending nail and nail border(s), for reduction of pains. Nails are reduced in terms of length, width and girth with removal of subungual debris to facilitate pain free weight bearing and ambulation. The elongated nails as outlined in the objective portion of this note, were trimmed to appropriate length, with a double action nail nipper, for alleviation/reduction of pains as well. Follow up in approx. 3-4 months.    Patient does not meet the qualifications for, or have the class findings necessary for routine foot care. There is no lack of or diminished sensation and he/she has no significant findings of PVD to the B/L LE. At any follow-up appointment concerning routine foot care, patient will be PROC-B, and will be required to pay for services.  This fact is explained to the patient and/or any family who is present at today's appt.    Morbid obesity  Patient is counseled and reminded concerning the importance of good nutrition and healthy eating habits, which may include blood sugar control, to prevent and/or help podiatric foot and ankle complications.    Callus  Hypertrophic skin  formation, as outlined within the examination portion of this note, is surgically debrided with sharp #10/#15 blade, to alleviate discomfort with weight bearing and ambulation, and to lessen the possibility of skin complications, e.g., ulceration due to pressure. No ulceration(s) is are noted with/post debridement. The lesion is completed healed and resolved. No evidence of infection.     Patient does not meet the qualifications for, or have the class findings necessary for routine foot care. There is no lack of or diminished sensation and he/she has no significant findings of PVD to the B/L LE. At any follow-up appointment concerning routine foot care, patient will be PROC-B, and will be required to pay for services.  This fact is explained to the patient and/or any family who is present at today's appt.          Future Appointments   Date Time Provider Department Center   11/17/2020  9:40 AM Ann Woods MD [facility]   12/3/2020  8:40 AM Juan Alberto Luis MD [facility]   2/16/2021  8:00 AM Marcus Narvaez DPM [facility]
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
## 2020-11-16 NOTE — PATIENT INSTRUCTIONS
Continue with the gabapentin, meloxicam, gabapentin, Xanax  EMG-NCV showed L5 and S1 nerve root impingement on both legs  I will refer to Dr. Diaz for further treatment  I will see as needed

## 2020-11-17 ENCOUNTER — OFFICE VISIT (OUTPATIENT)
Dept: PULMONOLOGY | Facility: CLINIC | Age: 65
End: 2020-11-17
Payer: COMMERCIAL

## 2020-11-17 ENCOUNTER — LAB VISIT (OUTPATIENT)
Dept: OTOLARYNGOLOGY | Facility: CLINIC | Age: 65
End: 2020-11-17
Payer: COMMERCIAL

## 2020-11-17 ENCOUNTER — TELEPHONE (OUTPATIENT)
Dept: PAIN MEDICINE | Facility: CLINIC | Age: 65
End: 2020-11-17

## 2020-11-17 VITALS
RESPIRATION RATE: 18 BRPM | OXYGEN SATURATION: 98 % | TEMPERATURE: 97 F | SYSTOLIC BLOOD PRESSURE: 130 MMHG | BODY MASS INDEX: 44.38 KG/M2 | WEIGHT: 250.44 LBS | DIASTOLIC BLOOD PRESSURE: 86 MMHG | HEIGHT: 63 IN

## 2020-11-17 DIAGNOSIS — G47.33 OSA ON CPAP: Primary | ICD-10-CM

## 2020-11-17 DIAGNOSIS — R06.02 SOB (SHORTNESS OF BREATH): ICD-10-CM

## 2020-11-17 DIAGNOSIS — G47.19 EXCESSIVE DAYTIME SLEEPINESS: ICD-10-CM

## 2020-11-17 PROCEDURE — 99214 OFFICE O/P EST MOD 30 MIN: CPT | Mod: S$GLB,,, | Performed by: INTERNAL MEDICINE

## 2020-11-17 PROCEDURE — 99999 PR PBB SHADOW E&M-EST. PATIENT-LVL V: ICD-10-PCS | Mod: PBBFAC,,, | Performed by: INTERNAL MEDICINE

## 2020-11-17 PROCEDURE — 3075F PR MOST RECENT SYSTOLIC BLOOD PRESS GE 130-139MM HG: ICD-10-PCS | Mod: CPTII,S$GLB,, | Performed by: INTERNAL MEDICINE

## 2020-11-17 PROCEDURE — 3079F PR MOST RECENT DIASTOLIC BLOOD PRESSURE 80-89 MM HG: ICD-10-PCS | Mod: CPTII,S$GLB,, | Performed by: INTERNAL MEDICINE

## 2020-11-17 PROCEDURE — 99214 PR OFFICE/OUTPT VISIT, EST, LEVL IV, 30-39 MIN: ICD-10-PCS | Mod: S$GLB,,, | Performed by: INTERNAL MEDICINE

## 2020-11-17 PROCEDURE — 3008F BODY MASS INDEX DOCD: CPT | Mod: CPTII,S$GLB,, | Performed by: INTERNAL MEDICINE

## 2020-11-17 PROCEDURE — 3079F DIAST BP 80-89 MM HG: CPT | Mod: CPTII,S$GLB,, | Performed by: INTERNAL MEDICINE

## 2020-11-17 PROCEDURE — 99999 PR PBB SHADOW E&M-EST. PATIENT-LVL V: CPT | Mod: PBBFAC,,, | Performed by: INTERNAL MEDICINE

## 2020-11-17 PROCEDURE — 3075F SYST BP GE 130 - 139MM HG: CPT | Mod: CPTII,S$GLB,, | Performed by: INTERNAL MEDICINE

## 2020-11-17 PROCEDURE — U0003 INFECTIOUS AGENT DETECTION BY NUCLEIC ACID (DNA OR RNA); SEVERE ACUTE RESPIRATORY SYNDROME CORONAVIRUS 2 (SARS-COV-2) (CORONAVIRUS DISEASE [COVID-19]), AMPLIFIED PROBE TECHNIQUE, MAKING USE OF HIGH THROUGHPUT TECHNOLOGIES AS DESCRIBED BY CMS-2020-01-R: HCPCS

## 2020-11-17 PROCEDURE — 3008F PR BODY MASS INDEX (BMI) DOCUMENTED: ICD-10-PCS | Mod: CPTII,S$GLB,, | Performed by: INTERNAL MEDICINE

## 2020-11-17 NOTE — PROGRESS NOTES
Pulmonary Outpatient Follow Up Visit     Subjective:       Patient ID: Mariel Becerril is a 65 y.o. female.    Chief Complaint: Sleep Apnea      HPI          64-year-old female patient known with mild GIL presenting for 8 months follow-up.  Last seen by nurse practitioner February 2020.    Underwent uneventful left knee surgery with [facility] with Dr. Connolly.     Compliant with CPAP.  Somnolent, excessive daytime sleepiness [place] Sleepiness Scale score 10.  Despite CPAP compliance.    SOB.  Uses albuterol with relief.  Previous PFT 2018 mild DLCO reduction otherwise no major findings     Initially evaluated in 2018 for establishing care and treatment of GIL.    Review of Systems   Constitutional: Negative for fever and chills.   HENT: Negative for hearing loss.    Eyes: Negative for redness.   Respiratory: Positive for apnea, snoring and somnolence.    Cardiovascular: Negative for chest pain.   Genitourinary: Negative for hematuria.   Endocrine: Negative for cold intolerance.    Musculoskeletal: Positive for arthralgias and back pain.   Skin: Negative for rash.   Gastrointestinal: Negative for abdominal pain.   Neurological: Negative for syncope and headaches.   Hematological: Negative for adenopathy. Does not bruise/bleed easily.   Psychiatric/Behavioral: Positive for sleep disturbance.       Outpatient Encounter Medications as of 11/17/2020   Medication Sig Dispense Refill    acetaminophen (TYLENOL) 325 MG tablet Take 2 tablets (650 mg total) by mouth every 8 (eight) hours as needed. 60 tablet 2    ALPRAZolam (XANAX) 0.25 MG tablet Take 1 tablet (0.25 mg total) by mouth 2 (two) times daily as needed for Anxiety. 20 tablet 0    amLODIPine (NORVASC) 5 MG tablet Take 1 tablet (5 mg total) by mouth once daily. 90 tablet 3    aspirin (ECOTRIN) 81 MG EC tablet Take 81 mg by mouth every 12 (twelve) hours.      benzonatate (TESSALON) 100 MG capsule Take 1 capsule by  mouth three times daily as needed 30 capsule 0    ciprofloxacin HCl (CIPRO) 500 MG tablet Take 1 tablet (500 mg total) by mouth every 12 (twelve) hours. 20 tablet 0    diclofenac sodium (VOLTAREN) 1 % Gel Apply 2 g topically 3 (three) times daily as needed. 200 g 2    eflornithine (VANIQA) 13.9 % cream Apply topically 2 (two) times daily. 45 g 3    ergocalciferol, vitamin D2, (VITAMIN D ORAL) Take 2,000 Units by mouth once daily.      fluticasone propionate (FLONASE) 50 mcg/actuation nasal spray USE 1 SPRAY(S) IN EACH NOSTRIL IN THE EVENING 16 g 0    furosemide (LASIX) 40 MG tablet Take 1 tablet (40 mg total) by mouth once daily. 90 tablet 3    gabapentin (NEURONTIN) 300 MG capsule Take 1 capsule (300 mg total) by mouth 2 (two) times daily. 60 capsule 2    ketoconazole (NIZORAL) 2 % cream Apply topically 2 (two) times daily. 30 g 1    levocetirizine (XYZAL) 5 MG tablet Take 1 tablet (5 mg total) by mouth every evening. 30 tablet 1    meclizine (ANTIVERT) 25 mg tablet Take 1 tablet (25 mg total) by mouth 3 (three) times daily as needed for Dizziness. 30 tablet 0    meloxicam (MOBIC) 15 MG tablet Take 1 tablet (15 mg total) by mouth once daily. 90 tablet 3    methocarbamoL (ROBAXIN) 750 MG Tab Take 1 tablet (750 mg total) by mouth 3 (three) times daily. 90 tablet 1    metoprolol succinate (TOPROL-XL) 50 MG 24 hr tablet       montelukast (SINGULAIR) 10 mg tablet Take 10 mg by mouth every evening.  1    nozaseptin (NOZIN) nasal  1 each by Each Nostril route 2 (two) times daily. 1 applicator 0    nystatin (MYCOSTATIN) cream APPLY  CREAM TOPICALLY TO AFFECTED AREA TWICE DAILY 30 g 0    nystatin (MYCOSTATIN) powder Apply topically 2 (two) times daily. 120 g 1    omeprazole (PRILOSEC) 40 MG capsule Take 1 capsule (40 mg total) by mouth once daily. 90 capsule 3    ondansetron (ZOFRAN-ODT) 4 MG TbDL Take 1 tablet (4 mg total) by mouth every 8 (eight) hours as needed. 10 tablet 0    oxyCODONE  (ROXICODONE) 10 mg Tab immediate release tablet Take 1 tablet (10 mg total) by mouth every 8 (eight) hours as needed (pain 6-7/10 pain scale score). 21 tablet 0    oxyCODONE-acetaminophen (PERCOCET) 5-325 mg per tablet Take 1 tablet by mouth every 8 (eight) hours as needed for Pain. 21 tablet 0    SF 5000 PLUS 1.1 % Crea BRUSH FOR 2 MINUTES AT BEDTIME THEN EXPECTORATE THE EXCESS.(NOTHING TO EAT OR DRINK FOR 30 MINUTES AFTER USE )      topiramate (TOPAMAX) 50 MG tablet Take 1 tablet (50 mg total) by mouth once daily. 90 tablet 3    traMADoL (ULTRAM) 50 mg tablet TAKE 1 TABLET BY MOUTH TWICE DAILY AS NEEDED 14 each 0    albuterol (PROAIR HFA) 90 mcg/actuation inhaler Inhale 2 puffs into the lungs every 6 (six) hours as needed for Wheezing. Rescue 18 g 0    chlorhexidine (PERIDEX) 0.12 % solution SWISH AND SPIT 15 MLS BY MOUTH TWICE A DAY FOR 7 DAYS      furosemide (LASIX) 20 MG tablet Take 1 tablet (20 mg total) by mouth daily as needed. 90 tablet 3    gatifloxacin 0.5 % Drop drops Place 1 drop into the left eye 2 (two) times daily. Eyedrops to start one day before surgery (Patient not taking: Reported on 11/17/2020) 1 Bottle 2    gatifloxacin 0.5 % Drop drops Apply 1 drop to eye 2 (two) times daily. Eyedrops to start one day before surgery (Patient not taking: Reported on 11/17/2020) 1 Bottle 2    ketorolac 0.5% (ACULAR) 0.5 % Drop Place 1 drop into the left eye 4 (four) times daily. Eyedrops to start one day before surgery (Patient not taking: Reported on 11/17/2020) 1 Bottle 2    phentermine (ADIPEX-P) 37.5 mg tablet Take 37.5 mg by mouth once daily.      prednisoLONE acetate (PRED FORTE) 1 % DrpS Place 1 drop into the left eye 4 (four) times daily. Start one day before surgery (Patient not taking: Reported on 11/17/2020) 1 Bottle 2    predniSONE (DELTASONE) 5 MG tablet Take 1 tablet (5 mg total) by mouth once daily. (Patient not taking: Reported on 11/17/2020) 30 tablet 1    [DISCONTINUED] amLODIPine  "(NORVASC) 10 MG tablet Take 1 tablet (10 mg total) by mouth once daily. 90 tablet 3    [DISCONTINUED] benzonatate (TESSALON) 100 MG capsule TAKE 1 CAPSULE BY MOUTH THREE TIMES DAILY AS  NEEDED  FOR  COUGH 30 capsule 0    [DISCONTINUED] chlorhexidine (PERIDEX) 0.12 % solution SWISH AND SPIT 15 MLS BY MOUTH TWICE A DAY FOR 7 DAYS  7    [DISCONTINUED] diclofenac sodium (VOLTAREN) 1 % Gel Apply 4 grams topically 3 (three) times daily as needed. 2 Tube 0    [DISCONTINUED] fluticasone propionate (FLONASE) 50 mcg/actuation nasal spray 1 spray (50 mcg total) by Each Nare route once daily. (Patient taking differently: 1 spray by Each Nostril route every evening. ) 16 g 5    [DISCONTINUED] HYDROcodone-acetaminophen (NORCO) 7.5-325 mg per tablet       [DISCONTINUED] ibuprofen (ADVIL,MOTRIN) 600 MG tablet TAKE 1 TABLET BY MOUTH EVERY 6 HOURS FOR 2 DAYS THEN AS NEEDED FOR PAIN  0    [DISCONTINUED] meloxicam (MOBIC) 15 MG tablet Take 1 tablet (15 mg total) by mouth once daily. 30 tablet 3    [DISCONTINUED] methocarbamol (ROBAXIN) 500 MG Tab TAKE 1 TABLET BY MOUTH TWICE DAILY AS NEEDED 60 tablet 0    [DISCONTINUED] montelukast (SINGULAIR) 10 mg tablet TAKE 1 TABLET BY MOUTH ONCE DAILY IN THE EVENING 90 tablet 4    [DISCONTINUED] omeprazole (PRILOSEC) 40 MG capsule TAKE 1 CAPSULE BY MOUTH ONCE DAILY 30 capsule 3    [DISCONTINUED] topiramate (TOPAMAX) 50 MG tablet Take 1 tablet (50 mg total) by mouth once daily. 90 tablet 1    [DISCONTINUED] traMADol (ULTRAM) 50 mg tablet Take 1 tablet (50 mg total) by mouth 2 (two) times daily as needed. 60 tablet 0     No facility-administered encounter medications on file as of 11/17/2020.        Objective:     Vital Signs (Most Recent)  Vital Signs  Temp: 96.8 °F (36 °C)  Resp: 18  SpO2: 98 %  BP: 130/86  Height and Weight  Height: 5' 3" (160 cm)  Weight: 113.6 kg (250 lb 7.1 oz)  BSA (Calculated - sq m): 2.25 sq meters  BMI (Calculated): 44.4  Weight in (lb) to have BMI = 25: " 140.8]  Wt Readings from Last 2 Encounters:   11/17/20 113.6 kg (250 lb 7.1 oz)   11/16/20 114 kg (251 lb 7 oz)       Physical Exam   Constitutional: She is oriented to person, place, and time. She appears well-developed and well-nourished.   HENT:   Head: Normocephalic.   Mouth/Throat: Mallampati Score: IV.   Neck: Neck supple.   Cardiovascular: Normal rate and regular rhythm.   Pulmonary/Chest: Normal expansion and effort normal. She has decreased breath sounds.   Abdominal: Soft.   Musculoskeletal:         General: Tenderness present.      Comments: Left knee pain   Lymphadenopathy:     She has no cervical adenopathy.   Neurological: She is alert and oriented to person, place, and time.   Skin: Skin is warm.   Psychiatric: She has a normal mood and affect. Her behavior is normal. Judgment and thought content normal.       Laboratory  Lab Results   Component Value Date    WBC 8.96 03/06/2020    RBC 2.82 (L) 03/06/2020    HGB 8.0 (L) 03/06/2020    HCT 26.1 (L) 03/06/2020    MCV 93 03/06/2020    MCH 28.4 03/06/2020    MCHC 30.7 (L) 03/06/2020    RDW 14.1 03/06/2020     03/06/2020    MPV 9.5 03/06/2020    GRAN 5.7 03/06/2020    GRAN 63.8 03/06/2020    LYMPH 2.3 03/06/2020    LYMPH 25.9 03/06/2020    MONO 0.8 03/06/2020    MONO 9.2 03/06/2020    EOS 2 09/11/2020    BASO 1 09/11/2020    EOSINOPHIL 0.3 03/06/2020    BASOPHIL 0.2 03/06/2020       BMP  Lab Results   Component Value Date     03/06/2020    K 3.8 03/06/2020     (H) 03/06/2020    CO2 23 03/06/2020    BUN 11 03/06/2020    CREATININE 0.8 03/06/2020    CALCIUM 8.3 (L) 03/06/2020    ANIONGAP 6 (L) 03/06/2020    ESTGFRAFRICA >60 03/06/2020    EGFRNONAA >60 03/06/2020    AST 45 (H) 03/06/2020    ALT 47 (H) 03/06/2020    PROT 5.1 (L) 03/06/2020       Lab Results   Component Value Date    BNP <10 08/20/2018       No results found for: TSH    No results found for: SEDRATE    No results found for: CRP      Diagnostic Results:    I have personally  reviewed today the following studies:    CXR 10/2019 Stable       EKG 10/30/2019 borderline.    Compliance Summary  10/18/2020 - 11/16/2020 (30 days)  Days with Device Usage 30 days  Days without Device Usage 0 days  Percent Days with Device Usage 100.0%  Cumulative Usage 7 days 10 hrs. 20 mins. 54 secs.  Maximum Usage (1 Day) 9 hrs. 2 mins. 8 secs.  Average Usage (All Days) 5 hrs. 56 mins. 41 secs.  Average Usage (Days Used) 5 hrs. 56 mins. 41 secs.  Minimum Usage (1 Day) 2 hrs. 57 mins. 4 secs.  Percent of Days with Usage >= 4 Hours 96.7%  Percent of Days with Usage < 4 Hours 3.3%  Date Range  Total Blower Time 7 days 10 hrs. 44 mins. 1 secs.  Average AHI 1.9  Auto-CPAP Summary  Auto-CPAP Mean Pressure 9.2 cmH2O  Auto-CPAP Peak Average Pressure 16.3 cmH2O  Average Device Pressure <= 90% of Time 12.7 cmH2O  Average Time in Large Leak Per Day 4 mins. 52 secs.    2018 in-lab polysomnography repeated showed AHI of 12.8 per hour    Chest x-ray September 5, 2018     Heart size upper limits normal without failure.  Aorta is tortuous.  Atherosclerotic calcification noted.  There is calcified mediastinal and hilar nodes suggesting prior granulomatous disease.  No focal consolidation or effusion.  Degenerative change throughout the spine with multilevel bridging syndesmophytes.       Echo 9/18:       1 - Concentric hypertrophy.     2 - No wall motion abnormalities.     3 - Normal left ventricular systolic function (EF 60-65%).     4 - Normal left ventricular diastolic function.     5 - Normal right ventricular systolic function .     6 - The estimated PA systolic pressure is 27 mmHg.         PFT 2018 showed mild DLCO reduction but no significant restriction or obstruction noted.    Assessment/Plan:   GIL on CPAP  -     PULSE OXIMETRY OVERNIGHT; Future    BMI 45.0-49.9, adult  -     Ambulatory referral/consult to Weight Management Program; Future; Expected date: 11/24/2020  -     COVID-19 Routine Screening;  Future    Excessive daytime sleepiness  -     PULSE OXIMETRY OVERNIGHT; Future    SOB (shortness of breath)  -     Spirometry without Bronchodilator; Future      Continue auto CPAP 5-20 cm water.  Check overnight oximetry on auto PAP.    General weight loss/lifestyle modification strategies  (elicit support from others; identify saboteurs; non-food rewards).  Diet interventions: low calorie (1000 kCal/d) deficit diet    With weight management referral    Repeat spirometry .  Continue albuterol p.r.n. never smoker.  Denies history of asthma .    Up-to-date on influenza vaccination.      Follow up in about 3 months (around 2/17/2021).    This note was prepared using voice recognition system and is likely to have sound alike errors that may have been overlooked even after proof reading.  Please call me with any questions    Discussed diagnosis, its evaluation, treatment and usual course. All questions answered.      Ann Woods MD

## 2020-11-17 NOTE — TELEPHONE ENCOUNTER
Tried to call to inform pt  is not filing out paperwork she dropped off as he does not find approriate and we are interventional, so we want pt to work and gain mobility  . No answer. Left   Alexis Boyd, MA [facility]   [facility]

## 2020-11-18 LAB — SARS-COV-2 RNA RESP QL NAA+PROBE: NOT DETECTED

## 2020-11-20 ENCOUNTER — CLINICAL SUPPORT (OUTPATIENT)
Dept: PULMONOLOGY | Facility: CLINIC | Age: 65
End: 2020-11-20
Payer: COMMERCIAL

## 2020-11-20 ENCOUNTER — OFFICE VISIT (OUTPATIENT)
Dept: PAIN MEDICINE | Facility: CLINIC | Age: 65
End: 2020-11-20
Payer: COMMERCIAL

## 2020-11-20 VITALS
WEIGHT: 250 LBS | SYSTOLIC BLOOD PRESSURE: 107 MMHG | BODY MASS INDEX: 44.3 KG/M2 | RESPIRATION RATE: 18 BRPM | DIASTOLIC BLOOD PRESSURE: 75 MMHG | HEART RATE: 76 BPM | HEIGHT: 63 IN

## 2020-11-20 DIAGNOSIS — M70.62 GREATER TROCHANTERIC BURSITIS OF BOTH HIPS: ICD-10-CM

## 2020-11-20 DIAGNOSIS — Z96.652 HISTORY OF LEFT KNEE REPLACEMENT: ICD-10-CM

## 2020-11-20 DIAGNOSIS — R06.02 SOB (SHORTNESS OF BREATH): ICD-10-CM

## 2020-11-20 DIAGNOSIS — M46.1 SACROILIITIS: ICD-10-CM

## 2020-11-20 DIAGNOSIS — M25.562 CHRONIC PAIN OF BOTH KNEES: ICD-10-CM

## 2020-11-20 DIAGNOSIS — M17.0 PRIMARY OSTEOARTHRITIS OF BOTH KNEES: Primary | ICD-10-CM

## 2020-11-20 DIAGNOSIS — G47.19 EXCESSIVE DAYTIME SLEEPINESS: ICD-10-CM

## 2020-11-20 DIAGNOSIS — M79.18 PIRIFORMIS MUSCLE PAIN: ICD-10-CM

## 2020-11-20 DIAGNOSIS — G89.29 CHRONIC PAIN OF BOTH KNEES: ICD-10-CM

## 2020-11-20 DIAGNOSIS — M70.61 GREATER TROCHANTERIC BURSITIS OF BOTH HIPS: ICD-10-CM

## 2020-11-20 DIAGNOSIS — G47.33 OSA ON CPAP: ICD-10-CM

## 2020-11-20 DIAGNOSIS — M54.16 LUMBAR RADICULOPATHY: ICD-10-CM

## 2020-11-20 DIAGNOSIS — M25.561 CHRONIC PAIN OF BOTH KNEES: ICD-10-CM

## 2020-11-20 LAB
BRPFT: NORMAL
FEF 25 75 LLN: 0.75
FEF 25 75 PRE REF: 133.9 %
FEF 25 75 REF: 1.81
FEV1 FVC LLN: 67
FEV1 FVC PRE REF: 104.6 %
FEV1 FVC REF: 79
FEV1 LLN: 1.42
FEV1 PRE REF: 97.8 %
FEV1 REF: 1.99
FVC LLN: 1.82
FVC PRE REF: 93.1 %
FVC REF: 2.51
PEF LLN: 3.22
PEF PRE REF: 87.5 %
PEF REF: 5.16
PRE FEF 25 75: 2.43 L/S
PRE FET 100: 8.31 SEC
PRE FEV1 FVC: 83.1 %
PRE FEV1: 1.94 L
PRE FVC: 2.34 L
PRE PEF: 4.51 L/S

## 2020-11-20 PROCEDURE — 99214 PR OFFICE/OUTPT VISIT, EST, LEVL IV, 30-39 MIN: ICD-10-PCS | Mod: S$GLB,,, | Performed by: PHYSICIAN ASSISTANT

## 2020-11-20 PROCEDURE — 94010 BREATHING CAPACITY TEST: CPT | Mod: S$GLB,,, | Performed by: INTERNAL MEDICINE

## 2020-11-20 PROCEDURE — 99999 PR PBB SHADOW E&M-EST. PATIENT-LVL V: CPT | Mod: PBBFAC,,, | Performed by: PHYSICIAN ASSISTANT

## 2020-11-20 PROCEDURE — 3078F PR MOST RECENT DIASTOLIC BLOOD PRESSURE < 80 MM HG: ICD-10-PCS | Mod: CPTII,S$GLB,, | Performed by: PHYSICIAN ASSISTANT

## 2020-11-20 PROCEDURE — 94762 PR NONINVASV OXYGEN SATUR, CONT: ICD-10-PCS | Mod: S$GLB,,, | Performed by: INTERNAL MEDICINE

## 2020-11-20 PROCEDURE — 1125F PR PAIN SEVERITY QUANTIFIED, PAIN PRESENT: ICD-10-PCS | Mod: S$GLB,,, | Performed by: PHYSICIAN ASSISTANT

## 2020-11-20 PROCEDURE — 94010 BREATHING CAPACITY TEST: ICD-10-PCS | Mod: S$GLB,,, | Performed by: INTERNAL MEDICINE

## 2020-11-20 PROCEDURE — 3074F SYST BP LT 130 MM HG: CPT | Mod: CPTII,S$GLB,, | Performed by: PHYSICIAN ASSISTANT

## 2020-11-20 PROCEDURE — 99214 OFFICE O/P EST MOD 30 MIN: CPT | Mod: S$GLB,,, | Performed by: PHYSICIAN ASSISTANT

## 2020-11-20 PROCEDURE — 3008F PR BODY MASS INDEX (BMI) DOCUMENTED: ICD-10-PCS | Mod: CPTII,S$GLB,, | Performed by: PHYSICIAN ASSISTANT

## 2020-11-20 PROCEDURE — 1125F AMNT PAIN NOTED PAIN PRSNT: CPT | Mod: S$GLB,,, | Performed by: PHYSICIAN ASSISTANT

## 2020-11-20 PROCEDURE — 3074F PR MOST RECENT SYSTOLIC BLOOD PRESSURE < 130 MM HG: ICD-10-PCS | Mod: CPTII,S$GLB,, | Performed by: PHYSICIAN ASSISTANT

## 2020-11-20 PROCEDURE — 94762 N-INVAS EAR/PLS OXIMTRY CONT: CPT | Mod: S$GLB,,, | Performed by: INTERNAL MEDICINE

## 2020-11-20 PROCEDURE — 3008F BODY MASS INDEX DOCD: CPT | Mod: CPTII,S$GLB,, | Performed by: PHYSICIAN ASSISTANT

## 2020-11-20 PROCEDURE — 3078F DIAST BP <80 MM HG: CPT | Mod: CPTII,S$GLB,, | Performed by: PHYSICIAN ASSISTANT

## 2020-11-20 PROCEDURE — 99999 PR PBB SHADOW E&M-EST. PATIENT-LVL V: ICD-10-PCS | Mod: PBBFAC,,, | Performed by: PHYSICIAN ASSISTANT

## 2020-11-20 NOTE — H&P (VIEW-ONLY)
Established Patient Chronic Pain Note (Follow up visit)    Chief Complaint:   Chief Complaint   Patient presents with    Low-back Pain       SUBJECTIVE:     Interval History (11/20/2020): Patient was last seen on 9/2/2020. Since then, patient reports. Patient reports pain as 8/10 today.  She has had knee injection with Dr. Connolly, and this is the first time she reports she had pain relief of her left knee. She is here today because she reports Dr. Connolly told her to see us for her low back pain.     Interval HPI (9/2/2020):  Mariel Becerril is a 65 y.o. female who presents to the clinic for a follow-up appointment for left knee pain.  She was last seen 4 weeks ago where she received a left pes anserine bursa injection in the clinic.  She reports that this injection provided limited relief.  Since the last visit, Mariel Becerril states the pain has been persistant. Current pain intensity is 9/10.  She recently underwent a revision of the left knee with Dr. Connolly in March 2020 that unfortunately has not provided significant relief in her knee pain.     Patient denies night fever/night sweats, urinary incontinence, bowel incontinence, significant weight loss, significant motor weakness and loss of sensations.    Pain Disability Index Review:  Last 3 PDI Scores 11/20/2020 9/2/2020 7/28/2020   Pain Disability Index (PDI) 30 43 51     Interval HPI 07/28/2020:  Mariel Becerril is a 64 y.o. female who presents to the clinic for a follow-up appointment for left knee pain.  She presents today for MRI results review and EMG/NCS follow-up.  Since the last visit, Mariel Becerril states the pain has been persistant. Current pain intensity is 9/10.  She recently underwent a revision of the left knee with Dr. Connolly in March 2020 that unfortunately has not provided significant relief in her knee pain.    Interval HPI 07/08/2020:  Mariel Becerril is a 64 y.o. female who presents to the clinic  for a follow-up appointment for left knee pain. Since the last visit, Mariel Becerril states the pain has been persistant. Current pain intensity is 9/10.  She recent underwent a revision of the left knee with Dr. Connolly in March 2020 that unfortunately has not provided significant relief in her knee pain.  She is scheduled for EMG/NCS within the next week or so.  She has not had significant workup for lumbar radiculopathy previously, but does state that she has back pain.      Initial HPI 11/20/2018:  Mariel Becerril is a 63 y.o. female  who is presenting with a chief complaint of Knee Pain. The patient began experiencing this problem insidiously, and the pain has been gradually worsening over the past 12 month(s). The pain is described as throbbing, cramping, aching and heavy and is located in the left knee. Pain is intermittent and lasts hours. The pain radiates to pain is nonradiating. The patient rates her pain a 8 out of ten and interferes with activities of daily living a 8 out of ten. Pain is exacerbated by prolonged standing, ambulation, and is improved by rest. Patient reports no prior trauma, no prior spinal surgery       Non-Pharmacologic Treatments:  Physical Therapy/Home Exercise: yes  Ice/Heat:yes  TENS: no  Acupuncture: no  Massage: no  Chiropractic: no    Other: no    Pain Medications:  - Opioids: Norco, tramadol, Percocet  - Adjuvant Medications: NSAIDs, Tylenol, gabapentin, Robaxin  - Anti-Coagulants: Aspirin    Opioid Contract: no     report (pulled on 9/2/2020):  Reviewed and consistent with medication use as prescribed.      Pain Procedures:   -multiple intra-articular knee injections  -left genicular nerve block on 12/20/2018  -left TKA March 2020  -left pes anserine bursa injection 07/28/2020, limited relief        Imaging & EMG/NCS (Reviewed on 11/20/2020):  EMG/NCS left lower extremity 07/14/2020:  Results:     - The left peroneal motor and sensory responses were normal.  - The left  tibial motor response was normal.   - The left sural sensory response could not be obtained, likely secondary to body habitus.  - Needle EMG examination of the left lower extremity and lumbar paraspinals was normal.      Interpretation:     1. Normal electrodiagnostic examination. No evidence of left peroneal or tibial neuropathy. No evidence of left lumbar radiculopathy or diffuse polyneuropathy.      2. Should symptoms progress or fail to resolve, repeat studies in 6 to 12 months may be warranted.       MRI lumbar spine 07/21/2020:  The distal cord and conus appear normal.     The lumbar vertebra reveal grade 1 L4-5 spondylolisthesis.  Small L3 vertebral body hemangioma is present.     No acute or chronic compression fracture.     T12-L1: There is broad-based disc bulge with hypointense T1 and T2 signal material extending both superiorly and inferiorly from the disc margin.  Possible old calcified disc extrusion versus calcification of the posterior longitudinal ligament.     L1-2:     Mild disc bulge.     L2-3:     Mild disc bulge with mild hypertrophic ligamentum flavum.     L3-4:     Mild disc bulge with mild hypertrophic ligamentum flavum.     L4-5:     Mild disc degeneration with disc narrowing, desiccation and disc bulge.  Moderate to severe facet arthritis with grade 1 spondylolisthesis of approximately 4 mm.  Mild central with moderate foraminal stenosis.     L5-S1:    Mild disc degeneration with generalized disc bulge.  Moderate left and mild right facet arthritis.  Mild lateral recess stenosis with mild bilateral foraminal stenosis.    X-ray left knee 06/29/2020:  Stable postsurgical changes left total knee arthroplasty.  Components well seated without fracture, dislocation or loosening.  Cannot exclude tiny suprapatellar effusion.  Faint calcifications again noted projecting over the superior margin of the left medial femoral condyle.     Osteopenia and tricompartment degenerative changes noted on the  "right.  There is extensive degenerative change involving the patellofemoral joint check Lizeth along the lateral facet with lateral tilting and prominent marginal osteophyte formation.  Narrowing of the medial and lateral compartments and marginal spurring.  No acute fracture or dislocation.      X-ray bilateral knee 05/22/2020:  There is mild-to-moderate joint space narrowing and osteophyte formation seen involving all 3 compartments of the right knee.  The greatest degree of degenerative changes at the right patellofemoral compartment.  A left total knee arthroplasty is in place.  No hardware failure or loosening.  No periprosthetic fracture.  No joint effusions are suggested.  No acute fracture or dislocation.            REVIEW OF SYSTEMS:    GENERAL:  No weight loss, malaise or fevers.  HEENT:   No recent changes in vision or hearing  NECK:  Negative for lumps, no difficulty with swallowing.  RESPIRATORY:  Negative for cough, wheezing or shortness of breath, patient denies any recent URI.  CARDIOVASCULAR:  Negative for chest pain, leg swelling or palpitations.  GI:  Negative for abdominal discomfort, blood in stools or black stools or change in bowel habits.  MUSCULOSKELETAL:  See HPI.  SKIN:  Negative for lesions, rash, and itching.  PSYCH:  No mood disorder or recent psychosocial stressors.  Patients sleep is not disturbed secondary to pain.  HEMATOLOGY/LYMPHOLOGY:  Negative for prolonged bleeding, bruising easily or swollen nodes.  Patient is currently taking anti-coagulants - ASA  NEURO:   No history of headaches, syncope, paralysis, seizures or tremors.  All other reviewed and negative other than HPI.    OBJECTIVE:    PHYSICAL EXAMINATION:  Vitals:    11/20/20 1431   BP: 107/75   Pulse: 76   Resp: 18   Weight: 113.4 kg (250 lb)   Height: 5' 3" (1.6 m)   PainSc:   8   PainLoc: Back    (reviewed on 11/20/2020)    General: alert and oriented, in no apparent distress. Obese.  Gait: normal gait.  Skin: no rashes, " no discoloration, no obvious lesions  HEENT: normocephalic, atraumatic. Pupils equal and round.  Cardiovascular: no significant peripheral edema present.  Respiratory: without use of accessory muscles of respiration.    Musculoskeletal - Lumbar Spine:  - Limited ROM secondary to pain reproduction and body habitus  - Pain on flexion of lumbar spine: Absent  - Pain on extension of lumbar spine: Present   - Lumbar facet loading: Present bilaterally  - TTP over the lumbar facet joints: Present, less so than over SIJ    - TTP over the lumbar paraspinals: Absent   - TTP over the SI joints: Present bilaterally   - TTP over GT bursa: Present bilaterally   - TTP over piriformis:  Present bilaterally   - Straight Leg Raise: Equivocal   - JEFF/ Paul's: Equivocal     Neuro - Lower Extremities:  - RLE Strength:     >> 5/5 strength with right hip flexion/ extension    >> 5/5 strength with right knee flexion/ extension    >> 5/5 strength in right ankle with dorsiflexion    >> 5/5 strength in right ankle with plantar flexion  - LLE Strength:     >> 5/5 strength with left hip flexion/ extension    >> 5/5 strength with knee flexion extension on the left     >> 5/5 strength in left ankle with dorsiflexion    >> 5/5 strength in left ankle with plantar flexion  - Extremity Reflexes: Brisk and symmetric throughout  - Sensory: Sensation to light touch intact bilaterally      Psych:  Mood and affect is appropriate              ASSESSMENT: 65 y.o. year old female with low back and left knee pain, consistent with     1. Primary osteoarthritis of both knees     2. History of left knee replacement     3. Chronic pain of both knees     4. Lumbar radiculopathy     5. Sacroiliitis  IR SI Joint Injection w/Imaging    IR SI Joint Injection w/Imaging    IR Aspiration Injection Large Joint W FL    IR Aspiration Injection Large Joint W FL    IR Aspiration Injection Large Joint W FL    IR Aspiration Injection Large Joint W FL    Case  Request-RAD/Other Procedure Area: Bilateral SIJ + Bilateral Piriformis + Bilateral GT Bursa Injection, Bilateral SIJ + Bilateral Piriformis + Bilateral GT Bursa Injection, Bilateral SIJ + Bilateral Piriformis + Bilateral GT Bursa Injection   6. Greater trochanteric bursitis of both hips  IR SI Joint Injection w/Imaging    IR SI Joint Injection w/Imaging    IR Aspiration Injection Large Joint W FL    IR Aspiration Injection Large Joint W FL    IR Aspiration Injection Large Joint W FL    IR Aspiration Injection Large Joint W FL    Case Request-RAD/Other Procedure Area: Bilateral SIJ + Bilateral Piriformis + Bilateral GT Bursa Injection, Bilateral SIJ + Bilateral Piriformis + Bilateral GT Bursa Injection, Bilateral SIJ + Bilateral Piriformis + Bilateral GT Bursa Injection   7. Piriformis muscle pain  IR SI Joint Injection w/Imaging    IR SI Joint Injection w/Imaging    IR Aspiration Injection Large Joint W FL    IR Aspiration Injection Large Joint W FL    IR Aspiration Injection Large Joint W FL    IR Aspiration Injection Large Joint W FL    Case Request-RAD/Other Procedure Area: Bilateral SIJ + Bilateral Piriformis + Bilateral GT Bursa Injection, Bilateral SIJ + Bilateral Piriformis + Bilateral GT Bursa Injection, Bilateral SIJ + Bilateral Piriformis + Bilateral GT Bursa Injection         PLAN:     1. Interventional:   - Schedule Bilateral SIJ + Bilateral Piriformis + Bilateral GT Bursa Injection.  - Consider bilateral L5/S1 TF GISELA - per Dr. Connolly.  Will consider this if pain not relieved by injection.   - consider genicular nerve blocks/RFA in the future.  Although she is reporting pain relief after knee injection with Dr. Connolly.     2. Pharmacologic:   - Continue gabapentin 300mg BID and Voltaren 1% gel PRN.  - Try Salon Pas 4% lidocaine patches for pain topically.   - Anticoagulation use: ASA.     3. Rehabilitative: Encouraged regular exercise. Continue exercises and activities as tolerated.  Continue use  of compression stockings to help lower extremity edema.    4. Diagnostic: None for now.    5. Follow up: 4 weeks post-injection - will send online ticket scheduling on [email or web address]     - This condition does not require this patient to take time off of work, and the primary goal of our Pain Management services is to improve the patient's functional capacity.   - I discussed the risks, benefits, and alternatives to potential treatment options. All questions and concerns were fully addressed today in clinic.           Disclaimer:  This note was prepared using voice recognition system and is likely to have sound alike errors that may have been overlooked even after proof reading.  Please call me with any questions.

## 2020-11-20 NOTE — PROGRESS NOTES
Established Patient Chronic Pain Note (Follow up visit)    Chief Complaint:   Chief Complaint   Patient presents with    Low-back Pain       SUBJECTIVE:     Interval History (11/20/2020): Patient was last seen on 9/2/2020. Since then, patient reports. Patient reports pain as 8/10 today.  She has had knee injection with Dr. Connolly, and this is the first time she reports she had pain relief of her left knee. She is here today because she reports Dr. Connolly told her to see us for her low back pain.     Interval HPI (9/2/2020):  Mariel Becerril is a 65 y.o. female who presents to the clinic for a follow-up appointment for left knee pain.  She was last seen 4 weeks ago where she received a left pes anserine bursa injection in the clinic.  She reports that this injection provided limited relief.  Since the last visit, Mariel Becerril states the pain has been persistant. Current pain intensity is 9/10.  She recently underwent a revision of the left knee with Dr. Connolly in March 2020 that unfortunately has not provided significant relief in her knee pain.     Patient denies night fever/night sweats, urinary incontinence, bowel incontinence, significant weight loss, significant motor weakness and loss of sensations.    Pain Disability Index Review:  Last 3 PDI Scores 11/20/2020 9/2/2020 7/28/2020   Pain Disability Index (PDI) 30 43 51     Interval HPI 07/28/2020:  Mariel Becerril is a 64 y.o. female who presents to the clinic for a follow-up appointment for left knee pain.  She presents today for MRI results review and EMG/NCS follow-up.  Since the last visit, Mariel Becerril states the pain has been persistant. Current pain intensity is 9/10.  She recently underwent a revision of the left knee with Dr. Connolly in March 2020 that unfortunately has not provided significant relief in her knee pain.    Interval HPI 07/08/2020:  Mariel Becerril is a 64 y.o. female who presents to the clinic  for a follow-up appointment for left knee pain. Since the last visit, Mariel Becerril states the pain has been persistant. Current pain intensity is 9/10.  She recent underwent a revision of the left knee with Dr. Connolly in March 2020 that unfortunately has not provided significant relief in her knee pain.  She is scheduled for EMG/NCS within the next week or so.  She has not had significant workup for lumbar radiculopathy previously, but does state that she has back pain.      Initial HPI 11/20/2018:  Mariel Becerril is a 63 y.o. female  who is presenting with a chief complaint of Knee Pain. The patient began experiencing this problem insidiously, and the pain has been gradually worsening over the past 12 month(s). The pain is described as throbbing, cramping, aching and heavy and is located in the left knee. Pain is intermittent and lasts hours. The pain radiates to pain is nonradiating. The patient rates her pain a 8 out of ten and interferes with activities of daily living a 8 out of ten. Pain is exacerbated by prolonged standing, ambulation, and is improved by rest. Patient reports no prior trauma, no prior spinal surgery       Non-Pharmacologic Treatments:  Physical Therapy/Home Exercise: yes  Ice/Heat:yes  TENS: no  Acupuncture: no  Massage: no  Chiropractic: no    Other: no    Pain Medications:  - Opioids: Norco, tramadol, Percocet  - Adjuvant Medications: NSAIDs, Tylenol, gabapentin, Robaxin  - Anti-Coagulants: Aspirin    Opioid Contract: no     report (pulled on 9/2/2020):  Reviewed and consistent with medication use as prescribed.      Pain Procedures:   -multiple intra-articular knee injections  -left genicular nerve block on 12/20/2018  -left TKA March 2020  -left pes anserine bursa injection 07/28/2020, limited relief        Imaging & EMG/NCS (Reviewed on 11/20/2020):  EMG/NCS left lower extremity 07/14/2020:  Results:     - The left peroneal motor and sensory responses were normal.  - The left  tibial motor response was normal.   - The left sural sensory response could not be obtained, likely secondary to body habitus.  - Needle EMG examination of the left lower extremity and lumbar paraspinals was normal.      Interpretation:     1. Normal electrodiagnostic examination. No evidence of left peroneal or tibial neuropathy. No evidence of left lumbar radiculopathy or diffuse polyneuropathy.      2. Should symptoms progress or fail to resolve, repeat studies in 6 to 12 months may be warranted.       MRI lumbar spine 07/21/2020:  The distal cord and conus appear normal.     The lumbar vertebra reveal grade 1 L4-5 spondylolisthesis.  Small L3 vertebral body hemangioma is present.     No acute or chronic compression fracture.     T12-L1: There is broad-based disc bulge with hypointense T1 and T2 signal material extending both superiorly and inferiorly from the disc margin.  Possible old calcified disc extrusion versus calcification of the posterior longitudinal ligament.     L1-2:     Mild disc bulge.     L2-3:     Mild disc bulge with mild hypertrophic ligamentum flavum.     L3-4:     Mild disc bulge with mild hypertrophic ligamentum flavum.     L4-5:     Mild disc degeneration with disc narrowing, desiccation and disc bulge.  Moderate to severe facet arthritis with grade 1 spondylolisthesis of approximately 4 mm.  Mild central with moderate foraminal stenosis.     L5-S1:    Mild disc degeneration with generalized disc bulge.  Moderate left and mild right facet arthritis.  Mild lateral recess stenosis with mild bilateral foraminal stenosis.    X-ray left knee 06/29/2020:  Stable postsurgical changes left total knee arthroplasty.  Components well seated without fracture, dislocation or loosening.  Cannot exclude tiny suprapatellar effusion.  Faint calcifications again noted projecting over the superior margin of the left medial femoral condyle.     Osteopenia and tricompartment degenerative changes noted on the  "right.  There is extensive degenerative change involving the patellofemoral joint check Lizeth along the lateral facet with lateral tilting and prominent marginal osteophyte formation.  Narrowing of the medial and lateral compartments and marginal spurring.  No acute fracture or dislocation.      X-ray bilateral knee 05/22/2020:  There is mild-to-moderate joint space narrowing and osteophyte formation seen involving all 3 compartments of the right knee.  The greatest degree of degenerative changes at the right patellofemoral compartment.  A left total knee arthroplasty is in place.  No hardware failure or loosening.  No periprosthetic fracture.  No joint effusions are suggested.  No acute fracture or dislocation.            REVIEW OF SYSTEMS:    GENERAL:  No weight loss, malaise or fevers.  HEENT:   No recent changes in vision or hearing  NECK:  Negative for lumps, no difficulty with swallowing.  RESPIRATORY:  Negative for cough, wheezing or shortness of breath, patient denies any recent URI.  CARDIOVASCULAR:  Negative for chest pain, leg swelling or palpitations.  GI:  Negative for abdominal discomfort, blood in stools or black stools or change in bowel habits.  MUSCULOSKELETAL:  See HPI.  SKIN:  Negative for lesions, rash, and itching.  PSYCH:  No mood disorder or recent psychosocial stressors.  Patients sleep is not disturbed secondary to pain.  HEMATOLOGY/LYMPHOLOGY:  Negative for prolonged bleeding, bruising easily or swollen nodes.  Patient is currently taking anti-coagulants - ASA  NEURO:   No history of headaches, syncope, paralysis, seizures or tremors.  All other reviewed and negative other than HPI.    OBJECTIVE:    PHYSICAL EXAMINATION:  Vitals:    11/20/20 1431   BP: 107/75   Pulse: 76   Resp: 18   Weight: 113.4 kg (250 lb)   Height: 5' 3" (1.6 m)   PainSc:   8   PainLoc: Back    (reviewed on 11/20/2020)    General: alert and oriented, in no apparent distress. Obese.  Gait: normal gait.  Skin: no rashes, " no discoloration, no obvious lesions  HEENT: normocephalic, atraumatic. Pupils equal and round.  Cardiovascular: no significant peripheral edema present.  Respiratory: without use of accessory muscles of respiration.    Musculoskeletal - Lumbar Spine:  - Limited ROM secondary to pain reproduction and body habitus  - Pain on flexion of lumbar spine: Absent  - Pain on extension of lumbar spine: Present   - Lumbar facet loading: Present bilaterally  - TTP over the lumbar facet joints: Present, less so than over SIJ    - TTP over the lumbar paraspinals: Absent   - TTP over the SI joints: Present bilaterally   - TTP over GT bursa: Present bilaterally   - TTP over piriformis:  Present bilaterally   - Straight Leg Raise: Equivocal   - JEFF/ Paul's: Equivocal     Neuro - Lower Extremities:  - RLE Strength:     >> 5/5 strength with right hip flexion/ extension    >> 5/5 strength with right knee flexion/ extension    >> 5/5 strength in right ankle with dorsiflexion    >> 5/5 strength in right ankle with plantar flexion  - LLE Strength:     >> 5/5 strength with left hip flexion/ extension    >> 5/5 strength with knee flexion extension on the left     >> 5/5 strength in left ankle with dorsiflexion    >> 5/5 strength in left ankle with plantar flexion  - Extremity Reflexes: Brisk and symmetric throughout  - Sensory: Sensation to light touch intact bilaterally      Psych:  Mood and affect is appropriate              ASSESSMENT: 65 y.o. year old female with low back and left knee pain, consistent with     1. Primary osteoarthritis of both knees     2. History of left knee replacement     3. Chronic pain of both knees     4. Lumbar radiculopathy     5. Sacroiliitis  IR SI Joint Injection w/Imaging    IR SI Joint Injection w/Imaging    IR Aspiration Injection Large Joint W FL    IR Aspiration Injection Large Joint W FL    IR Aspiration Injection Large Joint W FL    IR Aspiration Injection Large Joint W FL    Case  Request-RAD/Other Procedure Area: Bilateral SIJ + Bilateral Piriformis + Bilateral GT Bursa Injection, Bilateral SIJ + Bilateral Piriformis + Bilateral GT Bursa Injection, Bilateral SIJ + Bilateral Piriformis + Bilateral GT Bursa Injection   6. Greater trochanteric bursitis of both hips  IR SI Joint Injection w/Imaging    IR SI Joint Injection w/Imaging    IR Aspiration Injection Large Joint W FL    IR Aspiration Injection Large Joint W FL    IR Aspiration Injection Large Joint W FL    IR Aspiration Injection Large Joint W FL    Case Request-RAD/Other Procedure Area: Bilateral SIJ + Bilateral Piriformis + Bilateral GT Bursa Injection, Bilateral SIJ + Bilateral Piriformis + Bilateral GT Bursa Injection, Bilateral SIJ + Bilateral Piriformis + Bilateral GT Bursa Injection   7. Piriformis muscle pain  IR SI Joint Injection w/Imaging    IR SI Joint Injection w/Imaging    IR Aspiration Injection Large Joint W FL    IR Aspiration Injection Large Joint W FL    IR Aspiration Injection Large Joint W FL    IR Aspiration Injection Large Joint W FL    Case Request-RAD/Other Procedure Area: Bilateral SIJ + Bilateral Piriformis + Bilateral GT Bursa Injection, Bilateral SIJ + Bilateral Piriformis + Bilateral GT Bursa Injection, Bilateral SIJ + Bilateral Piriformis + Bilateral GT Bursa Injection         PLAN:     1. Interventional:   - Schedule Bilateral SIJ + Bilateral Piriformis + Bilateral GT Bursa Injection.  - Consider bilateral L5/S1 TF GISELA - per Dr. Connolly.  Will consider this if pain not relieved by injection.   - consider genicular nerve blocks/RFA in the future.  Although she is reporting pain relief after knee injection with Dr. Connolly.     2. Pharmacologic:   - Continue gabapentin 300mg BID and Voltaren 1% gel PRN.  - Try Salon Pas 4% lidocaine patches for pain topically.   - Anticoagulation use: ASA.     3. Rehabilitative: Encouraged regular exercise. Continue exercises and activities as tolerated.  Continue use  of compression stockings to help lower extremity edema.    4. Diagnostic: None for now.    5. Follow up: 4 weeks post-injection - will send online ticket scheduling on [employer]     - This condition does not require this patient to take time off of work, and the primary goal of our Pain Management services is to improve the patient's functional capacity.   - I discussed the risks, benefits, and alternatives to potential treatment options. All questions and concerns were fully addressed today in clinic.           Disclaimer:  This note was prepared using voice recognition system and is likely to have sound alike errors that may have been overlooked even after proof reading.  Please call me with any questions.

## 2020-11-20 NOTE — PATIENT INSTRUCTIONS
Pain Management Pre-Procedure Instructions  (also available in your [employer] account)    Patient Name:___Mariel CARRASCO Webb____MRN: [id number] you are scheduled to have the following procedure:__ Joint Injection  _with______Thanh Diaz MD on: _11/30/20____ at: [facility]    You will be contacted the day before your procedure to be given an arrival and procedure time                                                                                                            Day of Procedure   Ensure you have obtained arrival time from the Pain Management department  o We will call 48 hours in advance with your arrival time. Please check any voicemails you may have  o If you arrive past your scheduled procedure time, you may be asked to reschedule your procedure.   For your safety, ensure you have a  with you to remain present throughout your procedure   o If you arrive without a responsible adult to stay with you and drive you home, you may be asked to reschedule your procedure   Take all of your prescribed medications (exceptions noted below) with a small amount of water  o Stop all insulin and blood sugar medication night before and day of , take other medications as prescribed   o [x] Nothing by mouth after midnight the night before your procedure.  It is ok to take your regular medications with a small sip of water.     Wear loose, comfortable clothing    You may wear glasses, dentures, contact lenses and/or hearing aids. Please leave all valuable items at home.   Contact the Pain Management department at [phone] or via [employer] if you are:  o Running a fever above 100 degrees  o Feel ill, have any type of infection, or are taking antibiotics now or have in the past 2 weeks  o Have had any outpatient procedures in the past 2 weeks (colonoscopy, major dental work, etc.)  o If you are allergic to iodine, IVP dye or shellfish.      Contact Information: [phone], ask to speak  to the pain management department with any questions or concerns or send a message via [employer]

## 2020-11-23 NOTE — PRE-PROCEDURE INSTRUCTIONS
Spoke with patient regarding procedure scheduled on 11/30    Arrival time AWAITING INSURANCE APPROVAL    Has patient been sick with fever or on antibiotics within the last 7 days? No    Does the patient have any open wounds, sores or rashes? No    Has patient received a vaccination within the last 7 days? No    Has the patient stopped all medications as directed? Na    Does patient have a pacemaker and or defibrillator? no    Does the patient have a ride to and from procedure and someone reliable to remain with patient? One of her daughters. Coordinating.     Is the patient diabetic? no    Does the patient have sleep apnea? Or use O2 at home? GIL ON CPAP    Is the patient receiving sedation? yes    Is the patient instructed to remain NPO beginning at midnight the night before their procedure? yes    Procedure location confirmed with patient? Yes    Covid- Denies signs/symptoms. Instructed to notify PAT/MD if any changes.

## 2020-11-24 ENCOUNTER — PATIENT MESSAGE (OUTPATIENT)
Dept: ORTHOPEDICS | Facility: CLINIC | Age: 65
End: 2020-11-24

## 2020-11-24 NOTE — PROCEDURES
[facility] [address] * [place] [address]  Telephone: [phone]  Test date: 20 Start: 20 23:22:21 Mariel Becerril  Doctor: Dr [place]: 20 06:15:17 [id number]  Oximetry: Summary Report  Comments: AUTOPAP [id number] and ROOM AIR  Recording time: 06:52:56 Highest pulse: 93 Highest SpO2: 100%  Excluded samplin:06:00 Lowest pulse: 52 Lowest SpO2: 85%  Total valid samplin:46:56 Mean pulse: 67 Mean SpO2: 95.9%  1 S.D.: 5.1 1 S.D.: 1.7  Time with SpO2<90: 0:00:28, 0.1%  Time with SpO2<80: 0:00:00, 0.0%  Time with SpO2<70: 0:00:00, 0.0%  Time with SpO2<60: 0:00:00, 0.0%  Time with SpO2<89: 0:00:20, 0.1%  Time with SpO2 =>90: 6:46:28, 99.9%  Time with SpO2=>80 & <90: 0:00:28, 0.1%  Time with SpO2=>70 & <80: 0:00:00, 0.0%  Time with SpO2=>60 & <70: 0:00:00, 0.0%  The longest continuous time with saturation <=88 was 00:00:20, which started at  20 04:24:57.  A desaturation event was defined as a decrease of saturation by 4 or more.  One event was excluded due to artifact.  There were 7 desaturation events over 3 minutes duration.  There were 20 desaturation events of less than 3 minutes duration during which:  The mean high was 97.7%. The mean low was 92.3%.  The number of these events that were:  > 0 & <10 seconds: 0 > 0 seconds: 20  =>10 & <20 seconds: 3 =>10 seconds: 20  =>20 & <30 seconds: 9 =>20 seconds: 17  =>30 & <40 seconds: 3 =>30 seconds: 8  =>40 & <50 seconds: 2 =>40 seconds: 5  =>50 & <60 seconds: 0 =>50 seconds: 3  =>60 seconds: 3 =>60 seconds: 3  The mean length of desaturation events that were >=10 sec & <=3 mins was: 38.0 sec.  Desaturation event index (events >=10 sec per sampled hour): 2.9  Desaturation event index (events >= 0 sec per sampled hour): 2.9

## 2020-11-27 NOTE — PRE-PROCEDURE INSTRUCTIONS
Called and informed patient her procedure has been approved, her arrival time will be  On 11/30/2020 at the [facility] with dr. Diaz. Instructions provided to patient on where to go. All questions answered.

## 2020-11-28 ENCOUNTER — PATIENT MESSAGE (OUTPATIENT)
Dept: PULMONOLOGY | Facility: CLINIC | Age: 65
End: 2020-11-28

## 2020-11-30 ENCOUNTER — HOSPITAL ENCOUNTER (OUTPATIENT)
Facility: HOSPITAL | Age: 65
Discharge: HOME | End: 2020-11-30
Attending: PHYSICAL MEDICINE & REHABILITATION | Admitting: PHYSICAL MEDICINE & REHABILITATION
Payer: COMMERCIAL

## 2020-11-30 ENCOUNTER — PATIENT MESSAGE (OUTPATIENT)
Dept: INTERNAL MEDICINE | Facility: CLINIC | Age: 65
End: 2020-11-30

## 2020-11-30 VITALS
BODY MASS INDEX: 44.14 KG/M2 | DIASTOLIC BLOOD PRESSURE: 81 MMHG | HEIGHT: 63 IN | SYSTOLIC BLOOD PRESSURE: 150 MMHG | HEART RATE: 64 BPM | RESPIRATION RATE: 16 BRPM | WEIGHT: 249.13 LBS | OXYGEN SATURATION: 100 % | TEMPERATURE: 98 F

## 2020-11-30 DIAGNOSIS — M46.1 SACROILIITIS: ICD-10-CM

## 2020-11-30 DIAGNOSIS — M79.18 PIRIFORMIS MUSCLE PAIN: ICD-10-CM

## 2020-11-30 DIAGNOSIS — M70.61 GREATER TROCHANTERIC BURSITIS OF BOTH HIPS: ICD-10-CM

## 2020-11-30 DIAGNOSIS — M70.62 GREATER TROCHANTERIC BURSITIS OF BOTH HIPS: ICD-10-CM

## 2020-11-30 PROCEDURE — 99152 MOD SED SAME PHYS/QHP 5/>YRS: CPT | Mod: ,,, | Performed by: PHYSICAL MEDICINE & REHABILITATION

## 2020-11-30 PROCEDURE — 20610 DRAIN/INJ JOINT/BURSA W/O US: CPT | Mod: 50 | Performed by: PHYSICAL MEDICINE & REHABILITATION

## 2020-11-30 PROCEDURE — 27096 INJECT SACROILIAC JOINT: CPT | Mod: 50,,, | Performed by: PHYSICAL MEDICINE & REHABILITATION

## 2020-11-30 PROCEDURE — 25500020 PHARM REV CODE 255: Performed by: PHYSICAL MEDICINE & REHABILITATION

## 2020-11-30 PROCEDURE — 20552 NJX 1/MLT TRIGGER POINT 1/2: CPT | Mod: 50 | Performed by: PHYSICAL MEDICINE & REHABILITATION

## 2020-11-30 PROCEDURE — 20610 PR DRAIN/INJECT LARGE JOINT/BURSA: ICD-10-PCS | Mod: 50,59,, | Performed by: PHYSICAL MEDICINE & REHABILITATION

## 2020-11-30 PROCEDURE — 20610 DRAIN/INJ JOINT/BURSA W/O US: CPT | Mod: 50,59,, | Performed by: PHYSICAL MEDICINE & REHABILITATION

## 2020-11-30 PROCEDURE — 99152 MOD SED SAME PHYS/QHP 5/>YRS: CPT | Performed by: PHYSICAL MEDICINE & REHABILITATION

## 2020-11-30 PROCEDURE — 99152 PR MOD CONSCIOUS SEDATION, SAME PHYS, 5+ YRS, FIRST 15 MIN: ICD-10-PCS | Mod: ,,, | Performed by: PHYSICAL MEDICINE & REHABILITATION

## 2020-11-30 PROCEDURE — 63600175 PHARM REV CODE 636 W HCPCS: Performed by: PHYSICAL MEDICINE & REHABILITATION

## 2020-11-30 PROCEDURE — 27096 INJECT SACROILIAC JOINT: CPT | Mod: 50 | Performed by: PHYSICAL MEDICINE & REHABILITATION

## 2020-11-30 PROCEDURE — 25000003 PHARM REV CODE 250: Performed by: PHYSICAL MEDICINE & REHABILITATION

## 2020-11-30 PROCEDURE — 20552 NJX 1/MLT TRIGGER POINT 1/2: CPT | Mod: 59,,, | Performed by: PHYSICAL MEDICINE & REHABILITATION

## 2020-11-30 PROCEDURE — 20552 PR INJECT TRIGGER POINT, 1 OR 2: ICD-10-PCS | Mod: 59,,, | Performed by: PHYSICAL MEDICINE & REHABILITATION

## 2020-11-30 PROCEDURE — 27096 PR INJECTION,SACROILIAC JOINT: ICD-10-PCS | Mod: 50,,, | Performed by: PHYSICAL MEDICINE & REHABILITATION

## 2020-11-30 RX ORDER — METHYLPREDNISOLONE ACETATE 40 MG/ML
INJECTION, SUSPENSION INTRA-ARTICULAR; INTRALESIONAL; INTRAMUSCULAR; SOFT TISSUE
Status: DISCONTINUED | OUTPATIENT
Start: 2020-11-30 | End: 2020-11-30 | Stop reason: HOSPADM

## 2020-11-30 RX ORDER — ONDANSETRON 2 MG/ML
4 INJECTION INTRAMUSCULAR; INTRAVENOUS ONCE AS NEEDED
Status: DISCONTINUED | OUTPATIENT
Start: 2020-11-30 | End: 2020-11-30 | Stop reason: HOSPADM

## 2020-11-30 RX ORDER — MIDAZOLAM HYDROCHLORIDE 1 MG/ML
INJECTION, SOLUTION INTRAMUSCULAR; INTRAVENOUS
Status: DISCONTINUED | OUTPATIENT
Start: 2020-11-30 | End: 2020-11-30 | Stop reason: HOSPADM

## 2020-11-30 RX ORDER — FENTANYL CITRATE 50 UG/ML
INJECTION, SOLUTION INTRAMUSCULAR; INTRAVENOUS
Status: DISCONTINUED | OUTPATIENT
Start: 2020-11-30 | End: 2020-11-30 | Stop reason: HOSPADM

## 2020-11-30 RX ORDER — BUPIVACAINE HYDROCHLORIDE 2.5 MG/ML
INJECTION, SOLUTION EPIDURAL; INFILTRATION; INTRACAUDAL
Status: DISCONTINUED | OUTPATIENT
Start: 2020-11-30 | End: 2020-11-30 | Stop reason: HOSPADM

## 2020-11-30 NOTE — PLAN OF CARE
Discharge instructions reviewed with patient, verbalized understanding. 2 injection sites to lower back; 1 injection site to each hip; clean, dry and intact. Voiced no complaints. Vital signs stable. Discharging home with ride.

## 2020-11-30 NOTE — DISCHARGE INSTRUCTIONS

## 2020-11-30 NOTE — OP NOTE
Time-out taken to identify patient and procedure side prior to starting the procedure.     11/30/2020      PROCEDURE:  1) Bilateral greater trochanteric bursa injection  2) Bilateral sacroiliac joint injection          3) Bilateral piriformis trigger point injection                    REASON FOR PROCEDURE:   Sacroiliitis [M46.1]  Greater trochanteric bursitis[M70.61]  Myalgia other site [M79.18]    PHYSICIAN: Thanh Diaz MD  ASSISTANTS: None    SEDATION: Conscious sedation provided by M.D    The patient was monitored with continuous pulse oximetry, EKG, and intermittent blood pressure monitors.  The patient was hemodynamically stable throughout the entire process was responsive to voice, and breathing spontaneously.  Supplemental O2 was provided at 2L/min via nasal cannula.  Patient was comfortable for the duration of the procedure. (See nurse documentation and case log for sedation time)    There was a total of 2mg IV Midazolam and 25mcg Fentanyl titrated for the procedure    MEDICATIONS INJECTED: 0.5mL 20mg/ml Depo-Medrol and 4.5mL Bupivacaine 0.25% into each site    LOCAL ANESTHETIC USED: Xylocaine 1% 6ml     ESTIMATED BLOOD LOSS: None.   COMPLICATIONS: None.     TECHNIQUE:   Sacroiliac joint injection:   Laying in the prone position, the patient was prepped and draped in the usual sterile fashion using ChloraPrep and fenestrated drape.  The area was determined under fluoroscopy.  Local Xylocaine was injected by raising a wheel and going down to the periosteum using a 27-gauge hypodermic needle.  The 3.5 inch 22-gauge spinal needle was introduce into the Bilateral sacroiliac joint.  Negative pressure applied to confirm no intravascular placement.  Omnipaque was injected to confirm placement and to confirm that there was no vascular runoff.  The medication was then injected slowly.  The patient tolerated the procedure well.          Greater trochanteric bursa injection:  The area overlying the greater  "trochanteric bursa was identified using fluoroscopy, and the area overlying the skin was prepped and draped in usual sterile fashion. Local Xylocaine was injected by raising a wheel and going down to the periosteum using a 27-gauge hypodermic needle. A 5 inch 22-gauge spinal needle was introduce into the Bilateral greater trochanteric bursa Negative pressure applied to confirm no intravascular placement. Omnipaque was injected to confirm placement and to confirm that there was no vascular runoff. The medication was then injected slowly.  Displacement of the contrast after injection of the medication confirmed that the medication went into the area of the greater trochanteric bursa                     Piriformis trigger point injection:   Laying in the prone position, the area overlying the right piriformis muscle was identified under fluoroscopy in the AP view, then 5 mL 1% lidocaine was injected into the subcutaneous tissue and deeper tissues over this area through a 25 gauge needle 1.5" needle. The right hip joint was visualized in an AP view. The tip of a 22-gauge 3 1/2 inch Quincke-type spinal needle was advanced 3 cm inferior and 3 cm lateral to the inferior aspect of the SI joint.  Once the piriformis muscle was thought to be encountered, 3 ml of Omnipaque 300 contrast agent was slowly injected. Confirmation of spread of contrast agent within the piriformis muscle was made in the AP fluoroscopic view. Subsequently,  4 ml of Bupivacaine 0.25% and 1mL of  40 mg/mL depomedrol was slowly administered. Displacement of the radio opaque contrast after injection of the medication confirmed that the medication went into the area of the piriformis muscle. The needle was removed and bleeding was nil.  The same was done for the left piriformis muscle. A sterile dressing was applied. Mariel was taken back to the recovery room for further observation.       The patient was monitored for approximately 30 minutes after the " procedure. Patient was given post procedure and discharge instructions to follow at home. We will see the patient back in two weeks or the patient may call to inform of status. The patient was discharged in a stable condition

## 2020-11-30 NOTE — DISCHARGE SUMMARY
Discharge Note  Short Stay      SUMMARY     Admit Date: 11/30/2020    Attending Physician: Thanh Diaz MD        Discharge Physician: Thanh Diaz MD        Discharge Date: 11/30/2020 8:17 AM    Procedure(s) (LRB):  Bilateral SIJ + Bilateral Piriformis + Bilateral GT Bursa Injection (Bilateral)  Bilateral SIJ + Bilateral Piriformis + Bilateral GT Bursa Injection (Bilateral)  Bilateral SIJ + Bilateral Piriformis + Bilateral GT Bursa Injection (Bilateral)    Final Diagnosis: Sacroiliitis [M46.1]  Greater trochanteric bursitis of both hips [M70.61, M70.62]  Piriformis muscle pain [M79.18]    Disposition: Home or self care    Patient Instructions:   Current Discharge Medication List      CONTINUE these medications which have NOT CHANGED    Details   acetaminophen (TYLENOL) 325 MG tablet Take 2 tablets (650 mg total) by mouth every 8 (eight) hours as needed.  Qty: 60 tablet, Refills: 2      ALPRAZolam (XANAX) 0.25 MG tablet Take 1 tablet (0.25 mg total) by mouth 2 (two) times daily as needed for Anxiety.  Qty: 20 tablet, Refills: 0    Associated Diagnoses: Anxiety      amLODIPine (NORVASC) 5 MG tablet Take 1 tablet (5 mg total) by mouth once daily.  Qty: 90 tablet, Refills: 3    Comments: .  Associated Diagnoses: Essential hypertension      aspirin (ECOTRIN) 81 MG EC tablet Take 81 mg by mouth every 12 (twelve) hours.      benzonatate (TESSALON) 100 MG capsule Take 1 capsule by mouth three times daily as needed  Qty: 30 capsule, Refills: 0      ergocalciferol, vitamin D2, (VITAMIN D ORAL) Take 2,000 Units by mouth once daily.      fluticasone propionate (FLONASE) 50 mcg/actuation nasal spray USE 1 SPRAY(S) IN EACH NOSTRIL IN THE EVENING  Qty: 16 g, Refills: 0    Associated Diagnoses: Allergic rhinitis, unspecified seasonality, unspecified trigger      furosemide (LASIX) 40 MG tablet Take 1 tablet (40 mg total) by mouth once daily.  Qty: 90 tablet, Refills: 3    Associated Diagnoses: Essential hypertension       gabapentin (NEURONTIN) 300 MG capsule Take 1 capsule (300 mg total) by mouth 2 (two) times daily.  Qty: 60 capsule, Refills: 2      meclizine (ANTIVERT) 25 mg tablet Take 1 tablet (25 mg total) by mouth 3 (three) times daily as needed for Dizziness.  Qty: 30 tablet, Refills: 0    Associated Diagnoses: Dizziness      meloxicam (MOBIC) 15 MG tablet Take 1 tablet (15 mg total) by mouth once daily.  Qty: 90 tablet, Refills: 3      methocarbamoL (ROBAXIN) 750 MG Tab Take 1 tablet (750 mg total) by mouth 3 (three) times daily.  Qty: 90 tablet, Refills: 1      metoprolol succinate (TOPROL-XL) 50 MG 24 hr tablet       montelukast (SINGULAIR) 10 mg tablet Take 10 mg by mouth every evening.  Refills: 1      omeprazole (PRILOSEC) 40 MG capsule Take 1 capsule (40 mg total) by mouth once daily.  Qty: 90 capsule, Refills: 3    Associated Diagnoses: Gastroesophageal reflux disease without esophagitis      phentermine (ADIPEX-P) 37.5 mg tablet Take 37.5 mg by mouth once daily.      predniSONE (DELTASONE) 5 MG tablet Take 1 tablet (5 mg total) by mouth once daily.  Qty: 30 tablet, Refills: 1      topiramate (TOPAMAX) 50 MG tablet Take 1 tablet (50 mg total) by mouth once daily.  Qty: 90 tablet, Refills: 3    Associated Diagnoses: History of migraine headaches      traMADoL (ULTRAM) 50 mg tablet TAKE 1 TABLET BY MOUTH TWICE DAILY AS NEEDED  Qty: 14 each, Refills: 0    Comments: Treatment less than 7 days Treatment is medically necessary  Associated Diagnoses: Chronic right shoulder pain      albuterol (PROAIR HFA) 90 mcg/actuation inhaler Inhale 2 puffs into the lungs every 6 (six) hours as needed for Wheezing. Rescue  Qty: 18 g, Refills: 0    Associated Diagnoses: Bronchitis      chlorhexidine (PERIDEX) 0.12 % solution SWISH AND SPIT 15 MLS BY MOUTH TWICE A DAY FOR 7 DAYS      ciprofloxacin HCl (CIPRO) 500 MG tablet Take 1 tablet (500 mg total) by mouth every 12 (twelve) hours.  Qty: 20 tablet, Refills: 0    Associated Diagnoses:  Otalgia, left; Left non-suppurative otitis media      diclofenac sodium (VOLTAREN) 1 % Gel Apply 2 g topically 3 (three) times daily as needed.  Qty: 200 g, Refills: 2    Associated Diagnoses: Status post total knee replacement using cement, left; Posterior left knee pain      eflornithine (VANIQA) 13.9 % cream Apply topically 2 (two) times daily.  Qty: 45 g, Refills: 3    Associated Diagnoses: Hirsutism      !! gatifloxacin 0.5 % Drop drops Place 1 drop into the left eye 2 (two) times daily. Eyedrops to start one day before surgery  Qty: 1 Bottle, Refills: 2    Associated Diagnoses: Nuclear sclerosis of both eyes; Posterior subcapsular age-related cataract of both eyes      !! gatifloxacin 0.5 % Drop drops Apply 1 drop to eye 2 (two) times daily. Eyedrops to start one day before surgery  Qty: 1 Bottle, Refills: 2      ketoconazole (NIZORAL) 2 % cream Apply topically 2 (two) times daily.  Qty: 30 g, Refills: 1    Associated Diagnoses: Onychomycosis      ketorolac 0.5% (ACULAR) 0.5 % Drop Place 1 drop into the left eye 4 (four) times daily. Eyedrops to start one day before surgery  Qty: 1 Bottle, Refills: 2    Associated Diagnoses: Nuclear sclerosis of both eyes; Posterior subcapsular age-related cataract of both eyes      levocetirizine (XYZAL) 5 MG tablet Take 1 tablet (5 mg total) by mouth every evening.  Qty: 30 tablet, Refills: 1    Associated Diagnoses: Seasonal allergies      nozaseptin (NOZIN) nasal  1 each by Each Nostril route 2 (two) times daily.  Qty: 1 applicator, Refills: 0      nystatin (MYCOSTATIN) cream APPLY  CREAM TOPICALLY TO AFFECTED AREA TWICE DAILY  Qty: 30 g, Refills: 0    Associated Diagnoses: Tear of skin of buttock, initial encounter; Yeast dermatitis      nystatin (MYCOSTATIN) powder Apply topically 2 (two) times daily.  Qty: 120 g, Refills: 1    Associated Diagnoses: Tear of skin of buttock, initial encounter; Yeast dermatitis      ondansetron (ZOFRAN-ODT) 4 MG TbDL Take 1 tablet  (4 mg total) by mouth every 8 (eight) hours as needed.  Qty: 10 tablet, Refills: 0    Associated Diagnoses: Nausea      oxyCODONE (ROXICODONE) 10 mg Tab immediate release tablet Take 1 tablet (10 mg total) by mouth every 8 (eight) hours as needed (pain 6-7/10 pain scale score).  Qty: 21 tablet, Refills: 0    Comments: Quantity prescribed more than 7 day supply? Yes, quantity medically necessary  Associated Diagnoses: Arthritis of knee, left; Status post total left knee replacement      oxyCODONE-acetaminophen (PERCOCET) 5-325 mg per tablet Take 1 tablet by mouth every 8 (eight) hours as needed for Pain.  Qty: 21 tablet, Refills: 0    Comments: Quantity prescribed more than 7 day supply? No  Associated Diagnoses: Status post total knee replacement using cement, left; Posterior left knee pain      prednisoLONE acetate (PRED FORTE) 1 % DrpS Place 1 drop into the left eye 4 (four) times daily. Start one day before surgery  Qty: 1 Bottle, Refills: 2    Associated Diagnoses: Nuclear sclerosis of both eyes; Posterior subcapsular age-related cataract of both eyes      SF 5000 PLUS 1.1 % Crea BRUSH FOR 2 MINUTES AT BEDTIME THEN EXPECTORATE THE EXCESS.(NOTHING TO EAT OR DRINK FOR 30 MINUTES AFTER USE )       !! - Potential duplicate medications found. Please discuss with provider.              Discharge Diagnosis: Sacroiliitis [M46.1]  Greater trochanteric bursitis of both hips [M70.61, M70.62]  Piriformis muscle pain [M79.18]  Condition on Discharge: Stable with no complications to procedure   Diet on Discharge: Same as before.  Activity: as per instruction sheet.  Discharge to: Home with a responsible adult.  Follow up: 2-4 weeks       Please call the office if you experience any weakness or loss of sensation, fever > 101.5, pain uncontrolled with oral medications, persistent nausea/vomiting/or diarrhea, redness or drainage from the incisions, or any other worrisome concerns. If physician on call was not reached or could  not communicate with our office for any reason please go to the nearest emergency department

## 2020-12-03 ENCOUNTER — HOSPITAL ENCOUNTER (OUTPATIENT)
Dept: CARDIOLOGY | Facility: HOSPITAL | Age: 65
Discharge: HOME | End: 2020-12-03
Attending: INTERNAL MEDICINE
Payer: COMMERCIAL

## 2020-12-03 ENCOUNTER — OFFICE VISIT (OUTPATIENT)
Dept: CARDIOLOGY | Facility: CLINIC | Age: 65
End: 2020-12-03
Payer: COMMERCIAL

## 2020-12-03 VITALS
SYSTOLIC BLOOD PRESSURE: 112 MMHG | WEIGHT: 246.25 LBS | HEART RATE: 62 BPM | BODY MASS INDEX: 43.63 KG/M2 | DIASTOLIC BLOOD PRESSURE: 82 MMHG | HEIGHT: 63 IN

## 2020-12-03 DIAGNOSIS — R00.2 PALPITATIONS: ICD-10-CM

## 2020-12-03 DIAGNOSIS — I10 ESSENTIAL HYPERTENSION: ICD-10-CM

## 2020-12-03 DIAGNOSIS — R06.09 DOE (DYSPNEA ON EXERTION): ICD-10-CM

## 2020-12-03 DIAGNOSIS — E66.01 MORBID OBESITY DUE TO EXCESS CALORIES: ICD-10-CM

## 2020-12-03 DIAGNOSIS — I10 ESSENTIAL HYPERTENSION: Primary | ICD-10-CM

## 2020-12-03 DIAGNOSIS — G47.33 OSA ON CPAP: ICD-10-CM

## 2020-12-03 DIAGNOSIS — I49.3 SYMPTOMATIC PVCS: Primary | ICD-10-CM

## 2020-12-03 PROCEDURE — 3008F PR BODY MASS INDEX (BMI) DOCUMENTED: ICD-10-PCS | Mod: CPTII,S$GLB,, | Performed by: INTERNAL MEDICINE

## 2020-12-03 PROCEDURE — 93010 ELECTROCARDIOGRAM REPORT: CPT | Mod: ,,, | Performed by: INTERNAL MEDICINE

## 2020-12-03 PROCEDURE — 93005 ELECTROCARDIOGRAM TRACING: CPT

## 2020-12-03 PROCEDURE — 3074F PR MOST RECENT SYSTOLIC BLOOD PRESSURE < 130 MM HG: ICD-10-PCS | Mod: CPTII,S$GLB,, | Performed by: INTERNAL MEDICINE

## 2020-12-03 PROCEDURE — 99999 PR PBB SHADOW E&M-EST. PATIENT-LVL IV: CPT | Mod: PBBFAC,,, | Performed by: INTERNAL MEDICINE

## 2020-12-03 PROCEDURE — 99214 OFFICE O/P EST MOD 30 MIN: CPT | Mod: S$GLB,,, | Performed by: INTERNAL MEDICINE

## 2020-12-03 PROCEDURE — 99999 PR PBB SHADOW E&M-EST. PATIENT-LVL IV: ICD-10-PCS | Mod: PBBFAC,,, | Performed by: INTERNAL MEDICINE

## 2020-12-03 PROCEDURE — 3079F PR MOST RECENT DIASTOLIC BLOOD PRESSURE 80-89 MM HG: ICD-10-PCS | Mod: CPTII,S$GLB,, | Performed by: INTERNAL MEDICINE

## 2020-12-03 PROCEDURE — 3079F DIAST BP 80-89 MM HG: CPT | Mod: CPTII,S$GLB,, | Performed by: INTERNAL MEDICINE

## 2020-12-03 PROCEDURE — 3008F BODY MASS INDEX DOCD: CPT | Mod: CPTII,S$GLB,, | Performed by: INTERNAL MEDICINE

## 2020-12-03 PROCEDURE — 93010 EKG 12-LEAD: ICD-10-PCS | Mod: ,,, | Performed by: INTERNAL MEDICINE

## 2020-12-03 PROCEDURE — 3074F SYST BP LT 130 MM HG: CPT | Mod: CPTII,S$GLB,, | Performed by: INTERNAL MEDICINE

## 2020-12-03 PROCEDURE — 99214 PR OFFICE/OUTPT VISIT, EST, LEVL IV, 30-39 MIN: ICD-10-PCS | Mod: S$GLB,,, | Performed by: INTERNAL MEDICINE

## 2020-12-03 RX ORDER — ASPIRIN 325 MG
325 TABLET ORAL DAILY
COMMUNITY
End: 2023-02-22

## 2020-12-03 NOTE — PROGRESS NOTES
Subjective:   Patient ID:  Mariel Becerril is a 65 y.o. female who presents for cardiac consult of Follow-up (palpitations)      Palpitations   Associated symptoms include dizziness and shortness of breath (improved). Pertinent negatives include no chest pain.   Dizziness:    Associated symptoms: palpitations.no chest pain.  Hypertension  Associated symptoms include palpitations and shortness of breath (improved). Pertinent negatives include no chest pain.     The patient came in today for cardiac consult of Follow-up (palpitations)    Mariel Becerril is a 65 y.o. female with current medical conditions HTN, PVCs, obesity, GERD, OA, CPAP of knees presents for follow CV evaluation.      8/20/18  Pt has been having SOB, AREVALO. Is always in pain, is exhausted on pain meds. She feels like her heart if affected and is tired after walking a few steps.  Pt also feels chest pressure. Pt feels chest pressure daily, just tries to rest and goes away. Chest pain is substernal without radiation. 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 Uses CPAP every night, but last eval was over 5 years.   Feels palpitations, rarely, was on digoxin then.    ECG - NSR, poor RWP    9/27/18  Pt had negative nuclear stress and 2D ECHO with normal Bi V function. GIL workup underway, recent visit with pulmonary. Has bad sinus infection, has been using Tessalon perles, saw ENT received steroid dose pack which has been improved. Still coughing a lot, using cough syrup and on Levaquin x 14 days. No chest pain but more congestion. Has been doing well with Lasix - taking it almost daily.     11/12/19  She will have knee surgery at [facility] with Dr. Moy Ramirez. She has been getting PT/OT and brace but no improvement in knee pain. She does have chronic fatigue/dyspnea but no CP. She does have palpitations feels fluttering feeling.     2/13/20  Freq PVCs noted on Holter, started metoprolol 25mg daily and monitor BP and HR, increased BB due to palpitations  but BP dropped now off norvasc as BP dropped. She will have knee surgery  by Dr. Connolly. She also takes lasix daily now.     20  She had knee replacement in L knee in March but still has significant pain. She had weight gain since surgery and could not walk much, she weight 222 lbs before surgery. She had been to pain management had injections.     12/3/20  She still has L knee pain, possible hamstring injury. Used CBD cream as well. She saw Dr. Pak for back pain and had another injection for back/hips. She will see Dr. Longoria for weight loss surgery. She has occ mild chest pressure/pain concerned maybe due to weight gain.     ECG - NSR    Patient feels no PND, no dizziness, no syncope, no CNS symptoms.    Patient is compliant with medications.    Family history includes Arthritis in her mother; Depression in her sister; Heart disease in her father -  at age 81; Hypertension in her mother and sister.    TEST DESCRIPTION   PREDOMINANT RHYTHM  1. Sinus rhythm with heart rates varying between 56 and 156 bpm with an average of 86 bpm.   VENTRICULAR ARRHYTHMIAS  1. There were frequent PVCs totalling 3780 and averaging 78 per hour.   The ventricular arrhythmias percentage was 1.6.   There were 1174 bigeminal cycles.  There were 9 triplets.   2. There were no episodes of ventricular tachycardia.    SUPRA VENTRICULAR ARRHYTHMIAS  1. There were very rare PACs recorded totalling 18 and averaging less than 1 per hour.   2. There were no episodes of sustained supraventricular tachycardia.    ECG 10/30/19  Normal sinus rhythm  Possible Left atrial enlargement  Borderline Abnormal ECG    Nuclear Stress 18  Nuclear Quantitative Functional Analysis:   LVEF: >= 70 %    Impression: NORMAL MYOCARDIAL PERFUSION  1. The perfusion scan is free of evidence for myocardial ischemia or injury.   2. Resting wall motion is physiologic.   3. Resting LV function is normal.   4. The ventricular volumes are normal at rest  and stress.   5. The extracardiac distribution of radioactivity is normal.     2D ECHO 9/5/18  CONCLUSIONS     1 - Concentric hypertrophy.     2 - No wall motion abnormalities.     3 - Normal left ventricular systolic function (EF 60-65%).     4 - Normal left ventricular diastolic function.     5 - Normal right ventricular systolic function .     6 - The estimated PA systolic pressure is 27 mmHg.     Past Medical History:   Diagnosis Date    Frequent headaches     Hypertension     Osteoarthritis 04/02/2019    Bilateral knees, ankles and feet    Severe obesity (BMI >= 40)     Sleep apnea        Past Surgical History:   Procedure Laterality Date    BLADDER SUSPENSION      CATARACT EXTRACTION, BILATERAL      COLONOSCOPY N/A 12/3/2018    Procedure: COLONOSCOPY;  Surgeon: Mari Rader MD;  Location: [facility] ENDO;  Service: Endoscopy;  Laterality: N/A;    HYSTERECTOMY      partial 2000    INJECTION OF ANESTHETIC AGENT INTO SACROILIAC JOINT Bilateral 11/30/2020    Procedure: Bilateral SIJ + Bilateral Piriformis + Bilateral GT Bursa Injection;  Surgeon: Thanh Diaz MD;  Location: [facility] PAIN MGT;  Service: Pain Management;  Laterality: Bilateral;    INJECTION OF JOINT Bilateral 11/30/2020    Procedure: Bilateral SIJ + Bilateral Piriformis + Bilateral GT Bursa Injection;  Surgeon: Thanh Diaz MD;  Location: [facility] PAIN MGT;  Service: Pain Management;  Laterality: Bilateral;    INJECTION OF PIRIFORMIS MUSCLE Bilateral 11/30/2020    Procedure: Bilateral SIJ + Bilateral Piriformis + Bilateral GT Bursa Injection;  Surgeon: Thanh Diaz MD;  Location: [facility] PAIN MGT;  Service: Pain Management;  Laterality: Bilateral;    tonsilectomy      TOTAL KNEE ARTHROPLASTY Left 3/4/2020    Procedure: ARTHROPLASTY, KNEE, TOTAL;  Surgeon: Moy Connolly MD;  Location: [facility] OR;  Service: Orthopedics;  Laterality: Left;       Social History     Tobacco Use    Smoking status: Never Smoker    Smokeless tobacco: Never  Used   Substance Use Topics    Alcohol use: No    Drug use: No       Family History   Problem Relation Age of Onset    Heart attack Father        Patient's Medications   New Prescriptions    No medications on file   Previous Medications    ACETAMINOPHEN (TYLENOL) 325 MG TABLET    Take 2 tablets (650 mg total) by mouth every 8 (eight) hours as needed.    ALBUTEROL (PROAIR HFA) 90 MCG/ACTUATION INHALER    Inhale 2 puffs into the lungs every 6 (six) hours as needed for Wheezing. Rescue    ALPRAZOLAM (XANAX) 0.25 MG TABLET    Take 1 tablet (0.25 mg total) by mouth 2 (two) times daily as needed for Anxiety.    AMLODIPINE (NORVASC) 5 MG TABLET    Take 1 tablet (5 mg total) by mouth once daily.    ASPIRIN (ECOTRIN) 81 MG EC TABLET    Take 81 mg by mouth every 12 (twelve) hours.    ASPIRIN 325 MG TABLET    Take 325 mg by mouth once daily.    BENZONATATE (TESSALON) 100 MG CAPSULE    Take 1 capsule by mouth three times daily as needed    CHLORHEXIDINE (PERIDEX) 0.12 % SOLUTION    SWISH AND SPIT 15 MLS BY MOUTH TWICE A DAY FOR 7 DAYS    CIPROFLOXACIN HCL (CIPRO) 500 MG TABLET    Take 1 tablet (500 mg total) by mouth every 12 (twelve) hours.    DICLOFENAC SODIUM (VOLTAREN) 1 % GEL    Apply 2 g topically 3 (three) times daily as needed.    EFLORNITHINE (VANIQA) 13.9 % CREAM    Apply topically 2 (two) times daily.    ERGOCALCIFEROL, VITAMIN D2, (VITAMIN D ORAL)    Take 2,000 Units by mouth once daily.    FLUTICASONE PROPIONATE (FLONASE) 50 MCG/ACTUATION NASAL SPRAY    USE 1 SPRAY(S) IN EACH NOSTRIL IN THE EVENING    FUROSEMIDE (LASIX) 40 MG TABLET    Take 1 tablet (40 mg total) by mouth once daily.    GABAPENTIN (NEURONTIN) 300 MG CAPSULE    Take 1 capsule (300 mg total) by mouth 2 (two) times daily.    GATIFLOXACIN 0.5 % DROP DROPS    Place 1 drop into the left eye 2 (two) times daily. Eyedrops to start one day before surgery    GATIFLOXACIN 0.5 % DROP DROPS    Apply 1 drop to eye 2 (two) times daily. Eyedrops to start one  day before surgery    KETOCONAZOLE (NIZORAL) 2 % CREAM    Apply topically 2 (two) times daily.    KETOROLAC 0.5% (ACULAR) 0.5 % DROP    Place 1 drop into the left eye 4 (four) times daily. Eyedrops to start one day before surgery    LEVOCETIRIZINE (XYZAL) 5 MG TABLET    Take 1 tablet (5 mg total) by mouth every evening.    MECLIZINE (ANTIVERT) 25 MG TABLET    Take 1 tablet (25 mg total) by mouth 3 (three) times daily as needed for Dizziness.    MELOXICAM (MOBIC) 15 MG TABLET    Take 1 tablet (15 mg total) by mouth once daily.    METHOCARBAMOL (ROBAXIN) 750 MG TAB    Take 1 tablet (750 mg total) by mouth 3 (three) times daily.    METOPROLOL SUCCINATE (TOPROL-XL) 50 MG 24 HR TABLET        MONTELUKAST (SINGULAIR) 10 MG TABLET    Take 10 mg by mouth every evening.    NOZASEPTIN (NOZIN) NASAL     1 each by Each Nostril route 2 (two) times daily.    NYSTATIN (MYCOSTATIN) CREAM    APPLY  CREAM TOPICALLY TO AFFECTED AREA TWICE DAILY    NYSTATIN (MYCOSTATIN) POWDER    Apply topically 2 (two) times daily.    OMEPRAZOLE (PRILOSEC) 40 MG CAPSULE    Take 1 capsule (40 mg total) by mouth once daily.    ONDANSETRON (ZOFRAN-ODT) 4 MG TBDL    Take 1 tablet (4 mg total) by mouth every 8 (eight) hours as needed.    OXYCODONE (ROXICODONE) 10 MG TAB IMMEDIATE RELEASE TABLET    Take 1 tablet (10 mg total) by mouth every 8 (eight) hours as needed (pain 6-7/10 pain scale score).    OXYCODONE-ACETAMINOPHEN (PERCOCET) 5-325 MG PER TABLET    Take 1 tablet by mouth every 8 (eight) hours as needed for Pain.    PHENTERMINE (ADIPEX-P) 37.5 MG TABLET    Take 37.5 mg by mouth once daily.    PREDNISOLONE ACETATE (PRED FORTE) 1 % DRPS    Place 1 drop into the left eye 4 (four) times daily. Start one day before surgery    PREDNISONE (DELTASONE) 5 MG TABLET    Take 1 tablet (5 mg total) by mouth once daily.    SF 5000 PLUS 1.1 % CREA    BRUSH FOR 2 MINUTES AT BEDTIME THEN EXPECTORATE THE EXCESS.(NOTHING TO EAT OR DRINK FOR 30 MINUTES AFTER USE  ")    TOPIRAMATE (TOPAMAX) 50 MG TABLET    Take 1 tablet (50 mg total) by mouth once daily.    TRAMADOL (ULTRAM) 50 MG TABLET    TAKE 1 TABLET BY MOUTH TWICE DAILY AS NEEDED   Modified Medications    No medications on file   Discontinued Medications    No medications on file       Review of Systems   Constitutional: Negative.    HENT: Negative.    Eyes: Negative.    Respiratory: Positive for shortness of breath (improved).    Cardiovascular: Positive for palpitations. Negative for chest pain and leg swelling.   Gastrointestinal: Positive for heartburn.   Genitourinary: Negative.    Musculoskeletal: Positive for joint pain.   Skin: Negative.    Neurological: Positive for dizziness.   Endo/Heme/Allergies: Negative.    Psychiatric/Behavioral: Negative.    All 12 systems otherwise negative.      Wt Readings from Last 3 Encounters:   12/03/20 111.7 kg (246 lb 4.1 oz)   11/30/20 113 kg (249 lb 1.9 oz)   11/20/20 113.4 kg (250 lb)     Temp Readings from Last 3 Encounters:   11/30/20 97.7 °F (36.5 °C) (Temporal)   11/17/20 96.8 °F (36 °C)   11/09/20 98.5 °F (36.9 °C) (Tympanic)     BP Readings from Last 3 Encounters:   12/03/20 112/82   11/30/20 (!) 150/81   11/20/20 107/75     Pulse Readings from Last 3 Encounters:   12/03/20 62   11/30/20 64   11/20/20 76       /82 (BP Location: Left arm, Patient Position: Sitting, BP Method: Large (Manual))   Pulse 62   Ht 5' 3" (1.6 m)   Wt 111.7 kg (246 lb 4.1 oz)   BMI 43.62 kg/m²     Objective:   Physical Exam   Constitutional: She is oriented to person, place, and time. She appears well-developed and well-nourished. No distress.   HENT:   Head: Normocephalic and atraumatic.   Nose: Nose normal.   Mouth/Throat: Oropharynx is clear and moist.   Eyes: Conjunctivae and EOM are normal. No scleral icterus.   Neck: Normal range of motion. Neck supple. No JVD present. No thyromegaly present.   Cardiovascular: Normal rate, regular rhythm, S1 normal and S2 normal. Exam reveals no " gallop, no S3, no S4 and no friction rub.   Murmur heard.  Pulmonary/Chest: Effort normal and breath sounds normal. No stridor. No respiratory distress. She has no wheezes. She has no rales. She exhibits no tenderness.   Abdominal: Soft. Bowel sounds are normal. She exhibits no distension and no mass. There is no abdominal tenderness. There is no rebound.   Genitourinary:    Genitourinary Comments: Deferred     Musculoskeletal: Normal range of motion.         General: No tenderness, deformity or edema.   Lymphadenopathy:     She has no cervical adenopathy.   Neurological: She is alert and oriented to person, place, and time. She exhibits normal muscle tone. Coordination normal.   Skin: Skin is warm and dry. No rash noted. She is not diaphoretic. No erythema. No pallor.   Psychiatric: She has a normal mood and affect. Her behavior is normal. Judgment and thought content normal.   Nursing note and vitals reviewed.      Lab Results   Component Value Date     03/06/2020    K 3.8 03/06/2020     (H) 03/06/2020    CO2 23 03/06/2020    BUN 11 03/06/2020    CREATININE 0.8 03/06/2020     03/06/2020    AST 45 (H) 03/06/2020    ALT 47 (H) 03/06/2020    ALBUMIN 2.6 (L) 03/06/2020    PROT 5.1 (L) 03/06/2020    BILITOT 0.3 03/06/2020    WBC 8.96 03/06/2020    HGB 8.0 (L) 03/06/2020    HCT 26.1 (L) 03/06/2020    MCV 93 03/06/2020     03/06/2020    INR 1.0 10/30/2019    CHOL 124 09/14/2018    HDL 48 09/14/2018    LDLCALC 64.0 09/14/2018    TRIG 60 09/14/2018    BNP <10 08/20/2018     Assessment:      1. Symptomatic PVCs    2. Palpitations    3. Essential hypertension    4. Morbid obesity due to excess calories    5. AREVALO (dyspnea on exertion)    6. GIL on CPAP        Plan:   1. Chest pressure/AREVALO - improved/resolved  - pharm nuclear stress test - neg for ischemia - 9/2018  - 2D ECHO - normal Bi V function    2. AREVALO with edema sec to CVI  - improved with lasix 40 mg  - rec compression    3. HTN  - cont  meds  - low salt diet    4. Obesity  - rec weight loss with diet/exercise  - will have eval by Dr. Longoria for weight loss surgery  - will discuss preop testing if she is candidate for surgery     5. GIL  - cont CPAP  - appreciate pulm recs    6. Palpitations sec to PVCS  - cont BB    Thank you for allowing me to participate in this patient's care. Please do not hesitate to contact me with any questions or concerns. Consult note has been forwarded to the referral physician.     Juan Alberto Luis MD, [facility]  Cardiovascular Disease  [facility], [place]  [phone] (P)

## 2020-12-07 ENCOUNTER — OFFICE VISIT (OUTPATIENT)
Dept: INTERNAL MEDICINE | Facility: CLINIC | Age: 65
End: 2020-12-07
Payer: COMMERCIAL

## 2020-12-07 VITALS
HEIGHT: 63 IN | SYSTOLIC BLOOD PRESSURE: 126 MMHG | WEIGHT: 245.56 LBS | HEART RATE: 77 BPM | OXYGEN SATURATION: 99 % | TEMPERATURE: 99 F | RESPIRATION RATE: 18 BRPM | DIASTOLIC BLOOD PRESSURE: 74 MMHG | BODY MASS INDEX: 43.51 KG/M2

## 2020-12-07 DIAGNOSIS — J30.2 SEASONAL ALLERGIES: ICD-10-CM

## 2020-12-07 DIAGNOSIS — J40 BRONCHITIS: ICD-10-CM

## 2020-12-07 PROCEDURE — 99213 OFFICE O/P EST LOW 20 MIN: CPT | Mod: S$GLB,,, | Performed by: FAMILY MEDICINE

## 2020-12-07 PROCEDURE — 3074F SYST BP LT 130 MM HG: CPT | Mod: CPTII,S$GLB,, | Performed by: FAMILY MEDICINE

## 2020-12-07 PROCEDURE — 99213 PR OFFICE/OUTPT VISIT, EST, LEVL III, 20-29 MIN: ICD-10-PCS | Mod: S$GLB,,, | Performed by: FAMILY MEDICINE

## 2020-12-07 PROCEDURE — 1126F AMNT PAIN NOTED NONE PRSNT: CPT | Mod: S$GLB,,, | Performed by: FAMILY MEDICINE

## 2020-12-07 PROCEDURE — 1126F PR PAIN SEVERITY QUANTIFIED, NO PAIN PRESENT: ICD-10-PCS | Mod: S$GLB,,, | Performed by: FAMILY MEDICINE

## 2020-12-07 PROCEDURE — 3074F PR MOST RECENT SYSTOLIC BLOOD PRESSURE < 130 MM HG: ICD-10-PCS | Mod: CPTII,S$GLB,, | Performed by: FAMILY MEDICINE

## 2020-12-07 PROCEDURE — 3008F PR BODY MASS INDEX (BMI) DOCUMENTED: ICD-10-PCS | Mod: CPTII,S$GLB,, | Performed by: FAMILY MEDICINE

## 2020-12-07 PROCEDURE — 99999 PR PBB SHADOW E&M-EST. PATIENT-LVL V: CPT | Mod: PBBFAC,,, | Performed by: FAMILY MEDICINE

## 2020-12-07 PROCEDURE — 3008F BODY MASS INDEX DOCD: CPT | Mod: CPTII,S$GLB,, | Performed by: FAMILY MEDICINE

## 2020-12-07 PROCEDURE — 99999 PR PBB SHADOW E&M-EST. PATIENT-LVL V: ICD-10-PCS | Mod: PBBFAC,,, | Performed by: FAMILY MEDICINE

## 2020-12-07 PROCEDURE — 3078F PR MOST RECENT DIASTOLIC BLOOD PRESSURE < 80 MM HG: ICD-10-PCS | Mod: CPTII,S$GLB,, | Performed by: FAMILY MEDICINE

## 2020-12-07 PROCEDURE — 3078F DIAST BP <80 MM HG: CPT | Mod: CPTII,S$GLB,, | Performed by: FAMILY MEDICINE

## 2020-12-07 RX ORDER — MUPIROCIN 20 MG/G
OINTMENT TOPICAL 3 TIMES DAILY
Qty: 30 G | Refills: 3 | Status: SHIPPED | OUTPATIENT
Start: 2020-12-07 | End: 2021-10-05

## 2020-12-07 RX ORDER — LEVOCETIRIZINE DIHYDROCHLORIDE 5 MG/1
5 TABLET, FILM COATED ORAL NIGHTLY
Qty: 30 TABLET | Refills: 1 | Status: SHIPPED | OUTPATIENT
Start: 2020-12-07 | End: 2021-02-17

## 2020-12-07 RX ORDER — ALBUTEROL SULFATE 90 UG/1
2 AEROSOL, METERED RESPIRATORY (INHALATION) EVERY 6 HOURS PRN
Qty: 18 G | Refills: 0 | Status: SHIPPED | OUTPATIENT
Start: 2020-12-07 | End: 2023-11-16

## 2020-12-07 NOTE — PROGRESS NOTES
Subjective:       Patient ID: Mariel Becerril is a 65 y.o. female.    Chief Complaint: Follow-up (L shoulder pain)    HPI Ms. Becerril presents today for follow up.   Took antibiotics for ears  More on the left side she is having discomfort    Clears up and comes back     Headaches she notes also     Left shoulder pain  Last Monday had injections from pain management    Follow up 12/22/2020     It helped some but she is feeling some pain coming back.       Review of Systems   Constitutional: Negative for activity change, appetite change, fatigue and fever.   HENT: Positive for ear pain. Negative for congestion and sinus pressure.    Eyes: Negative for pain and visual disturbance.   Respiratory: Negative for cough, chest tightness and wheezing.    Cardiovascular: Negative for chest pain, palpitations and leg swelling.   Gastrointestinal: Negative for abdominal distention, abdominal pain, constipation, diarrhea, nausea and vomiting.   Endocrine: Negative for polydipsia and polyuria.   Genitourinary: Negative for difficulty urinating, dyspareunia, dysuria, flank pain and hematuria.   Musculoskeletal: Positive for back pain. Negative for arthralgias.   Skin: Negative for rash.   Neurological: Negative for dizziness, tremors, syncope, weakness, numbness and headaches.   Psychiatric/Behavioral: Negative for agitation and confusion.           Past Medical History:   Diagnosis Date    Frequent headaches     Hypertension     Osteoarthritis 04/02/2019    Bilateral knees, ankles and feet    Severe obesity (BMI >= 40)     Sleep apnea      Past Surgical History:   Procedure Laterality Date    BLADDER SUSPENSION      CATARACT EXTRACTION, BILATERAL      COLONOSCOPY N/A 12/3/2018    Procedure: COLONOSCOPY;  Surgeon: Mari Rader MD;  Location: [facility];  Service: Endoscopy;  Laterality: N/A;    HYSTERECTOMY      partial 2000    INJECTION OF ANESTHETIC AGENT INTO SACROILIAC JOINT Bilateral 11/30/2020    Procedure:  Bilateral SIJ + Bilateral Piriformis + Bilateral GT Bursa Injection;  Surgeon: Thanh Diaz MD;  Location: [facility];  Service: Pain Management;  Laterality: Bilateral;    INJECTION OF JOINT Bilateral 11/30/2020    Procedure: Bilateral SIJ + Bilateral Piriformis + Bilateral GT Bursa Injection;  Surgeon: Thanh Diaz MD;  Location: [facility];  Service: Pain Management;  Laterality: Bilateral;    INJECTION OF PIRIFORMIS MUSCLE Bilateral 11/30/2020    Procedure: Bilateral SIJ + Bilateral Piriformis + Bilateral GT Bursa Injection;  Surgeon: Thanh Diaz MD;  Location: [facility];  Service: Pain Management;  Laterality: Bilateral;    tonsilectomy      TOTAL KNEE ARTHROPLASTY Left 3/4/2020    Procedure: ARTHROPLASTY, KNEE, TOTAL;  Surgeon: Moy Connolly MD;  Location: [facility] OR;  Service: Orthopedics;  Laterality: Left;         Objective:        Physical Exam  Vitals signs reviewed.   Constitutional:       Appearance: Normal appearance. She is obese.   HENT:      Head: Normocephalic and atraumatic.      Comments: Fluid behind left TM     Right Ear: Tympanic membrane normal.      Left Ear: Tympanic membrane normal.   Neurological:      General: No focal deficit present.      Mental Status: She is alert and oriented to person, place, and time.   Psychiatric:         Mood and Affect: Mood normal.         Behavior: Behavior normal.             Assessment/Plan:     Bronchitis  -     albuterol (PROAIR HFA) 90 mcg/actuation inhaler; Inhale 2 puffs into the lungs every 6 (six) hours as needed for Wheezing. Rescue  Dispense: 18 g; Refill: 0    Seasonal allergies  -     levocetirizine (XYZAL) 5 MG tablet; Take 1 tablet (5 mg total) by mouth every evening.  Dispense: 30 tablet; Refill: 1    Other orders  -     mupirocin (BACTROBAN) 2 % ointment; Apply topically 3 (three) times daily.  Dispense: 30 g; Refill: 3          Follow up as needed     Jaclyn Becerril MD  [facility]   Family Medicine

## 2020-12-09 ENCOUNTER — TELEPHONE (OUTPATIENT)
Dept: INTERNAL MEDICINE | Facility: CLINIC | Age: 65
End: 2020-12-09

## 2020-12-09 ENCOUNTER — OFFICE VISIT (OUTPATIENT)
Dept: SURGERY | Facility: CLINIC | Age: 65
End: 2020-12-09
Payer: COMMERCIAL

## 2020-12-09 ENCOUNTER — PATIENT OUTREACH (OUTPATIENT)
Dept: ADMINISTRATIVE | Facility: OTHER | Age: 65
End: 2020-12-09

## 2020-12-09 VITALS
SYSTOLIC BLOOD PRESSURE: 150 MMHG | DIASTOLIC BLOOD PRESSURE: 88 MMHG | HEIGHT: 63 IN | WEIGHT: 246.69 LBS | TEMPERATURE: 98 F | HEART RATE: 87 BPM | BODY MASS INDEX: 43.71 KG/M2

## 2020-12-09 DIAGNOSIS — M17.0 PRIMARY OSTEOARTHRITIS OF BOTH KNEES: ICD-10-CM

## 2020-12-09 DIAGNOSIS — E66.01 MORBID OBESITY DUE TO EXCESS CALORIES: ICD-10-CM

## 2020-12-09 DIAGNOSIS — I10 ESSENTIAL HYPERTENSION: ICD-10-CM

## 2020-12-09 DIAGNOSIS — G47.33 OSA ON CPAP: Primary | ICD-10-CM

## 2020-12-09 PROCEDURE — 3077F PR MOST RECENT SYSTOLIC BLOOD PRESSURE >= 140 MM HG: ICD-10-PCS | Mod: CPTII,S$GLB,, | Performed by: SURGERY

## 2020-12-09 PROCEDURE — 99999 PR PBB SHADOW E&M-EST. PATIENT-LVL V: CPT | Mod: PBBFAC,,, | Performed by: SURGERY

## 2020-12-09 PROCEDURE — 3077F SYST BP >= 140 MM HG: CPT | Mod: CPTII,S$GLB,, | Performed by: SURGERY

## 2020-12-09 PROCEDURE — 1126F PR PAIN SEVERITY QUANTIFIED, NO PAIN PRESENT: ICD-10-PCS | Mod: S$GLB,,, | Performed by: SURGERY

## 2020-12-09 PROCEDURE — 1126F AMNT PAIN NOTED NONE PRSNT: CPT | Mod: S$GLB,,, | Performed by: SURGERY

## 2020-12-09 PROCEDURE — 3008F PR BODY MASS INDEX (BMI) DOCUMENTED: ICD-10-PCS | Mod: CPTII,S$GLB,, | Performed by: SURGERY

## 2020-12-09 PROCEDURE — 99214 PR OFFICE/OUTPT VISIT, EST, LEVL IV, 30-39 MIN: ICD-10-PCS | Mod: S$GLB,,, | Performed by: SURGERY

## 2020-12-09 PROCEDURE — 99214 OFFICE O/P EST MOD 30 MIN: CPT | Mod: S$GLB,,, | Performed by: SURGERY

## 2020-12-09 PROCEDURE — 3008F BODY MASS INDEX DOCD: CPT | Mod: CPTII,S$GLB,, | Performed by: SURGERY

## 2020-12-09 PROCEDURE — 3079F DIAST BP 80-89 MM HG: CPT | Mod: CPTII,S$GLB,, | Performed by: SURGERY

## 2020-12-09 PROCEDURE — 3079F PR MOST RECENT DIASTOLIC BLOOD PRESSURE 80-89 MM HG: ICD-10-PCS | Mod: CPTII,S$GLB,, | Performed by: SURGERY

## 2020-12-09 PROCEDURE — 99999 PR PBB SHADOW E&M-EST. PATIENT-LVL V: ICD-10-PCS | Mod: PBBFAC,,, | Performed by: SURGERY

## 2020-12-09 NOTE — LETTER
December 11, 2020      Jaclyn Becerril MD  [address] [place] [address] [address] [place] [address]           [facility]  [address] [facility]  [place] [address]  Phone: [phone]  Fax: [phone]          Patient: Mariel Becerril   MR Number: [id number]   YOB: 1955   Date of Visit: 12/9/2020       Dear Dr. Jaclyn Becerril:    Thank you for referring Mariel Becerril to me for evaluation. Attached you will find relevant portions of my assessment and plan of care.    If you have questions, please do not hesitate to call me. I look forward to following Mariel Becerril along with you.    Sincerely,    Anthony Rodríguez MD    Enclosure  CC:  No Recipients    If you would like to receive this communication electronically, please contact [email or web address] or [phone] to request more information on [employer] Link access.    For providers and/or their staff who would like to refer a patient to [facility], please contact us through our one-stop-shop provider referral line, [facility] [place], at [phone].    If you feel you have received this communication in error or would no longer like to receive these types of communications, please e-mail [email or web address]

## 2020-12-09 NOTE — TELEPHONE ENCOUNTER
----- Message from Sarah Bowers sent at 12/9/2020 10:24 AM CST -----  Contact: PT  .Type:  Patient Returning Call    Who Called: Mariel  Who Left Message for Patient:  Does the patient know what this is regarding?: insurance form [employer]  Would the patient rather a call back or a response via "[email or web address]?  callback  Best Call Back Number: .[phone] (home)     Additional Information:  please fax for to [phone] claim # [id number]

## 2020-12-10 ENCOUNTER — PATIENT MESSAGE (OUTPATIENT)
Dept: INTERNAL MEDICINE | Facility: CLINIC | Age: 65
End: 2020-12-10
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
## 2020-12-11 ENCOUNTER — HOSPITAL ENCOUNTER (OUTPATIENT)
Dept: RADIOLOGY | Facility: HOSPITAL | Age: 65
Discharge: HOME | End: 2020-12-11
Attending: SURGERY
Payer: COMMERCIAL

## 2020-12-11 ENCOUNTER — LAB VISIT (OUTPATIENT)
Dept: LAB | Facility: HOSPITAL | Age: 65
End: 2020-12-11
Attending: SURGERY
Payer: COMMERCIAL

## 2020-12-11 ENCOUNTER — PATIENT MESSAGE (OUTPATIENT)
Dept: INTERNAL MEDICINE | Facility: CLINIC | Age: 65
End: 2020-12-11

## 2020-12-11 ENCOUNTER — NUTRITION (OUTPATIENT)
Dept: NUTRITION | Facility: CLINIC | Age: 65
End: 2020-12-11
Payer: COMMERCIAL

## 2020-12-11 VITALS — WEIGHT: 247.56 LBS | BODY MASS INDEX: 43.86 KG/M2 | HEIGHT: 63 IN

## 2020-12-11 DIAGNOSIS — G47.33 OSA ON CPAP: ICD-10-CM

## 2020-12-11 DIAGNOSIS — Z71.3 DIETARY COUNSELING: Primary | ICD-10-CM

## 2020-12-11 DIAGNOSIS — E66.01 MORBID OBESITY DUE TO EXCESS CALORIES: ICD-10-CM

## 2020-12-11 DIAGNOSIS — M17.0 PRIMARY OSTEOARTHRITIS OF BOTH KNEES: ICD-10-CM

## 2020-12-11 DIAGNOSIS — I10 ESSENTIAL HYPERTENSION: ICD-10-CM

## 2020-12-11 LAB
25(OH)D3+25(OH)D2 SERPL-MCNC: 34 NG/ML (ref 30–96)
ALBUMIN SERPL BCP-MCNC: 3.6 G/DL (ref 3.5–5.2)
ALP SERPL-CCNC: 87 U/L (ref 55–135)
ALT SERPL W/O P-5'-P-CCNC: 14 U/L (ref 10–44)
ANION GAP SERPL CALC-SCNC: 11 MMOL/L (ref 8–16)
AST SERPL-CCNC: 21 U/L (ref 10–40)
BILIRUB SERPL-MCNC: 0.4 MG/DL (ref 0.1–1)
BUN SERPL-MCNC: 18 MG/DL (ref 8–23)
CALCIUM SERPL-MCNC: 9.2 MG/DL (ref 8.7–10.5)
CHLORIDE SERPL-SCNC: 109 MMOL/L (ref 95–110)
CO2 SERPL-SCNC: 24 MMOL/L (ref 23–29)
CREAT SERPL-MCNC: 0.8 MG/DL (ref 0.5–1.4)
EST. GFR  (AFRICAN AMERICAN): >60 ML/MIN/1.73 M^2
EST. GFR  (NON AFRICAN AMERICAN): >60 ML/MIN/1.73 M^2
GLUCOSE SERPL-MCNC: 91 MG/DL (ref 70–110)
IRON SERPL-MCNC: 93 UG/DL (ref 30–160)
POTASSIUM SERPL-SCNC: 3.3 MMOL/L (ref 3.5–5.1)
PROT SERPL-MCNC: 6.7 G/DL (ref 6–8.4)
SATURATED IRON: 33 % (ref 20–50)
SODIUM SERPL-SCNC: 144 MMOL/L (ref 136–145)
T4 FREE SERPL-MCNC: 0.82 NG/DL (ref 0.71–1.51)
TOTAL IRON BINDING CAPACITY: 281 UG/DL (ref 250–450)
TRANSFERRIN SERPL-MCNC: 190 MG/DL (ref 200–375)
TSH SERPL DL<=0.005 MIU/L-ACNC: 0.88 UIU/ML (ref 0.4–4)
VIT B12 SERPL-MCNC: 876 PG/ML (ref 210–950)

## 2020-12-11 PROCEDURE — 82306 VITAMIN D 25 HYDROXY: CPT

## 2020-12-11 PROCEDURE — 80053 COMPREHEN METABOLIC PANEL: CPT

## 2020-12-11 PROCEDURE — 97802 MEDICAL NUTRITION INDIV IN: CPT | Mod: S$GLB,,, | Performed by: DIETITIAN, REGISTERED

## 2020-12-11 PROCEDURE — 71046 XR CHEST PA AND LATERAL: ICD-10-PCS | Mod: 26,,, | Performed by: RADIOLOGY

## 2020-12-11 PROCEDURE — 36415 COLL VENOUS BLD VENIPUNCTURE: CPT

## 2020-12-11 PROCEDURE — 99999 PR PBB SHADOW E&M-EST. PATIENT-LVL II: ICD-10-PCS | Mod: PBBFAC,,, | Performed by: DIETITIAN, REGISTERED

## 2020-12-11 PROCEDURE — 84439 ASSAY OF FREE THYROXINE: CPT

## 2020-12-11 PROCEDURE — 71046 X-RAY EXAM CHEST 2 VIEWS: CPT | Mod: 26,,, | Performed by: RADIOLOGY

## 2020-12-11 PROCEDURE — 83540 ASSAY OF IRON: CPT

## 2020-12-11 PROCEDURE — 84443 ASSAY THYROID STIM HORMONE: CPT

## 2020-12-11 PROCEDURE — 82607 VITAMIN B-12: CPT

## 2020-12-11 PROCEDURE — 99999 PR PBB SHADOW E&M-EST. PATIENT-LVL II: CPT | Mod: PBBFAC,,, | Performed by: DIETITIAN, REGISTERED

## 2020-12-11 PROCEDURE — 71046 X-RAY EXAM CHEST 2 VIEWS: CPT | Mod: TC

## 2020-12-11 PROCEDURE — 84134 ASSAY OF PREALBUMIN: CPT

## 2020-12-11 PROCEDURE — 83036 HEMOGLOBIN GLYCOSYLATED A1C: CPT

## 2020-12-11 PROCEDURE — 85025 COMPLETE CBC W/AUTO DIFF WBC: CPT

## 2020-12-11 PROCEDURE — 97802 PR MED NUTR THER, 1ST, INDIV, EA 15 MIN: ICD-10-PCS | Mod: S$GLB,,, | Performed by: DIETITIAN, REGISTERED

## 2020-12-11 PROCEDURE — 84425 ASSAY OF VITAMIN B-1: CPT

## 2020-12-11 NOTE — LETTER
December 11, 2020        Anthony Rodríguez MD  [address] [facility]  4th [address]  [facility] [address]             [facility]  [address]  [place] [address]  Phone: [phone]  Fax: [phone]   Patient: Mariel Becerril   MR Number: [id number]   YOB: 1955   Date of Visit: 12/11/2020       Dear Dr. Rodríguez:    Thank you for referring Mariel Becerril to me for evaluation. Below are the relevant portions of my assessment and plan of care.    Patient approved for surgery from a bariatric standpoint. Went over diet recommendations in great detail with patient during today's visit. Patient left with good understanding of diet.           If you have questions, please do not hesitate to call me. I look forward to following Mariel along with you.    Sincerely,      Rama Ritter, PRUDENCE           CC  No Recipients

## 2020-12-11 NOTE — PROGRESS NOTES
BARIATRIC NEW PATIENT EVALUATION    CHIEF COMPLAINT:     Morbid obesity with a BMI of  45.45 and inability to lose weight.    HISTORY OF PRESENT ILLNESS:  Mariel Becerril is a 65 y.o.-year-old female presents to re-evaluate for sleeve gastrectomy.  She initially elected not to pursue it as she was not ready to take it on but now feels she is in a better position to move forward with weight loss surgery.  In the interim she has undergone knee surgery which allows her to be more mobile but still has some back pain.  She has lost some weight but feels like she is regaining it slowly.    Initially presenting for  morbid obesity with a BMI of  45.45 and inability to lose weight. The patient has tried  Diet and previously exercise well however due to pain in her left knee she is no longer able to exercise as her mobility is limited.  She was previously able to lose about 40 lb dropping from 297 down to current weight of 259.    Height 5 ft 3 in  Weight 246 lb  BMI 43.8    HPI    CO-MORBIDITIES:  hypertension, obstructive sleep apnea and osteoarthritis    PAST MEDICAL HISTORY:  Past Medical History:   Diagnosis Date    Frequent headaches     Hypertension     Osteoarthritis 04/02/2019    Bilateral knees, ankles and feet    Severe obesity (BMI >= 40)     Sleep apnea         PAST SURGICAL HISTORY:  Past Surgical History:   Procedure Laterality Date    BLADDER SUSPENSION      CATARACT EXTRACTION, BILATERAL      COLONOSCOPY N/A 12/3/2018    Procedure: COLONOSCOPY;  Surgeon: Mari Rader MD;  Location: [facility];  Service: Endoscopy;  Laterality: N/A;    HYSTERECTOMY      partial 2000    INJECTION OF ANESTHETIC AGENT INTO SACROILIAC JOINT Bilateral 11/30/2020    Procedure: Bilateral SIJ + Bilateral Piriformis + Bilateral GT Bursa Injection;  Surgeon: Thanh Diaz MD;  Location: [facility];  Service: Pain Management;  Laterality: Bilateral;    INJECTION OF JOINT Bilateral 11/30/2020    Procedure: Bilateral  SIJ + Bilateral Piriformis + Bilateral GT Bursa Injection;  Surgeon: Thanh Diaz MD;  Location: [facility];  Service: Pain Management;  Laterality: Bilateral;    INJECTION OF PIRIFORMIS MUSCLE Bilateral 11/30/2020    Procedure: Bilateral SIJ + Bilateral Piriformis + Bilateral GT Bursa Injection;  Surgeon: Thanh Diaz MD;  Location: [facility];  Service: Pain Management;  Laterality: Bilateral;    tonsilectomy      TOTAL KNEE ARTHROPLASTY Left 3/4/2020    Procedure: ARTHROPLASTY, KNEE, TOTAL;  Surgeon: Moy Connolly MD;  Location: [facility];  Service: Orthopedics;  Laterality: Left;       FAMILY HISTORY:  Family History   Problem Relation Age of Onset    Heart attack Father         SOCIAL HISTORY:   reports that she has never smoked. She has never used smokeless tobacco. She reports that she does not drink alcohol or use drugs.     MEDICATIONS:  Current Outpatient Medications on File Prior to Visit   Medication Sig Dispense Refill    acetaminophen (TYLENOL) 325 MG tablet Take 2 tablets (650 mg total) by mouth every 8 (eight) hours as needed. 60 tablet 2    albuterol (PROAIR HFA) 90 mcg/actuation inhaler Inhale 2 puffs into the lungs every 6 (six) hours as needed for Wheezing. Rescue 18 g 0    ALPRAZolam (XANAX) 0.25 MG tablet Take 1 tablet by mouth twice daily as needed for anxiety 20 tablet 0    amLODIPine (NORVASC) 5 MG tablet Take 1 tablet (5 mg total) by mouth once daily. 90 tablet 3    aspirin (ECOTRIN) 81 MG EC tablet Take 81 mg by mouth every 12 (twelve) hours.      aspirin 325 MG tablet Take 325 mg by mouth once daily.      benzonatate (TESSALON) 100 MG capsule Take 1 capsule by mouth three times daily as needed 30 capsule 0    chlorhexidine (PERIDEX) 0.12 % solution SWISH AND SPIT 15 MLS BY MOUTH TWICE A DAY FOR 7 DAYS      ciprofloxacin HCl (CIPRO) 500 MG tablet Take 1 tablet (500 mg total) by mouth every 12 (twelve) hours. 20 tablet 0    diclofenac sodium (VOLTAREN) 1 %  Gel Apply 2 g topically 3 (three) times daily as needed. 200 g 2    eflornithine (VANIQA) 13.9 % cream Apply topically 2 (two) times daily. 45 g 3    ergocalciferol, vitamin D2, (VITAMIN D ORAL) Take 2,000 Units by mouth once daily.      fluticasone propionate (FLONASE) 50 mcg/actuation nasal spray USE 1 SPRAY(S) IN EACH NOSTRIL IN THE EVENING 16 g 0    furosemide (LASIX) 40 MG tablet Take 1 tablet (40 mg total) by mouth once daily. 90 tablet 3    gabapentin (NEURONTIN) 300 MG capsule Take 1 capsule (300 mg total) by mouth 2 (two) times daily. 60 capsule 2    gatifloxacin 0.5 % Drop drops Place 1 drop into the left eye 2 (two) times daily. Eyedrops to start one day before surgery 1 Bottle 2    gatifloxacin 0.5 % Drop drops Apply 1 drop to eye 2 (two) times daily. Eyedrops to start one day before surgery 1 Bottle 2    ketorolac 0.5% (ACULAR) 0.5 % Drop Place 1 drop into the left eye 4 (four) times daily. Eyedrops to start one day before surgery 1 Bottle 2    levocetirizine (XYZAL) 5 MG tablet Take 1 tablet (5 mg total) by mouth every evening. 30 tablet 1    meclizine (ANTIVERT) 25 mg tablet Take 1 tablet (25 mg total) by mouth 3 (three) times daily as needed for Dizziness. 30 tablet 0    meloxicam (MOBIC) 15 MG tablet Take 1 tablet (15 mg total) by mouth once daily. 90 tablet 3    methocarbamoL (ROBAXIN) 750 MG Tab Take 1 tablet (750 mg total) by mouth 3 (three) times daily. 90 tablet 1    metoprolol succinate (TOPROL-XL) 50 MG 24 hr tablet       montelukast (SINGULAIR) 10 mg tablet Take 10 mg by mouth every evening.  1    mupirocin (BACTROBAN) 2 % ointment Apply topically 3 (three) times daily. 30 g 3    nozaseptin (NOZIN) nasal  1 each by Each Nostril route 2 (two) times daily. 1 applicator 0    nystatin (MYCOSTATIN) cream APPLY  CREAM TOPICALLY TO AFFECTED AREA TWICE DAILY 30 g 0    nystatin (MYCOSTATIN) powder Apply topically 2 (two) times daily. 120 g 1    omeprazole (PRILOSEC) 40 MG  capsule Take 1 capsule (40 mg total) by mouth once daily. 90 capsule 3    ondansetron (ZOFRAN-ODT) 4 MG TbDL Take 1 tablet (4 mg total) by mouth every 8 (eight) hours as needed. 10 tablet 0    oxyCODONE (ROXICODONE) 10 mg Tab immediate release tablet Take 1 tablet (10 mg total) by mouth every 8 (eight) hours as needed (pain 6-7/10 pain scale score). 21 tablet 0    oxyCODONE-acetaminophen (PERCOCET) 5-325 mg per tablet Take 1 tablet by mouth every 8 (eight) hours as needed for Pain. 21 tablet 0    prednisoLONE acetate (PRED FORTE) 1 % DrpS Place 1 drop into the left eye 4 (four) times daily. Start one day before surgery 1 Bottle 2    predniSONE (DELTASONE) 5 MG tablet Take 1 tablet (5 mg total) by mouth once daily. 30 tablet 1    SF 5000 PLUS 1.1 % Crea BRUSH FOR 2 MINUTES AT BEDTIME THEN EXPECTORATE THE EXCESS.(NOTHING TO EAT OR DRINK FOR 30 MINUTES AFTER USE )      topiramate (TOPAMAX) 50 MG tablet Take 1 tablet (50 mg total) by mouth once daily. 90 tablet 3    traMADoL (ULTRAM) 50 mg tablet TAKE 1 TABLET BY MOUTH TWICE DAILY AS NEEDED 14 each 0    ketoconazole (NIZORAL) 2 % cream Apply topically 2 (two) times daily. 30 g 1     No current facility-administered medications on file prior to visit.      Medications have been reviewed.    ALLERGIES:  Review of patient's allergies indicates:   Allergen Reactions    Penicillins Rash     Allergies have been reviewed.    ROS:  Review of Systems   Constitutional: Negative for activity change, appetite change, chills, diaphoresis, fatigue, fever and unexpected weight change.   HENT: Negative for congestion, dental problem, rhinorrhea and sore throat.    Eyes: Negative for visual disturbance.   Respiratory: Negative for cough, chest tightness, shortness of breath and wheezing.    Cardiovascular: Negative for chest pain, palpitations and leg swelling.   Gastrointestinal: Negative for abdominal distention, abdominal pain, constipation, diarrhea, nausea and vomiting.    Endocrine: Negative for cold intolerance, heat intolerance, polydipsia, polyphagia and polyuria.   Genitourinary: Negative for difficulty urinating, dysuria, frequency, hematuria and urgency.   Musculoskeletal: Positive for arthralgias, back pain and gait problem. Negative for myalgias and neck pain.   Skin: Negative for color change, pallor, rash and wound.   Neurological: Negative for dizziness, syncope, weakness, light-headedness, numbness and headaches.   Hematological: Negative for adenopathy. Does not bruise/bleed easily.   Psychiatric/Behavioral: Negative for confusion, decreased concentration and sleep disturbance. The patient is not nervous/anxious.        PE:  Physical Exam  Vitals signs reviewed.   Constitutional:       General: She is not in acute distress.     Appearance: She is well-developed. She is not diaphoretic.   HENT:      Head: Normocephalic and atraumatic.      Right Ear: External ear normal.      Left Ear: External ear normal.   Eyes:      General: No scleral icterus.     Conjunctiva/sclera: Conjunctivae normal.      Pupils: Pupils are equal, round, and reactive to light.   Neck:      Musculoskeletal: Normal range of motion and neck supple.      Thyroid: No thyromegaly.      Trachea: No tracheal deviation.   Cardiovascular:      Rate and Rhythm: Normal rate and regular rhythm.      Heart sounds: Normal heart sounds. No murmur. No friction rub. No gallop.    Pulmonary:      Effort: Pulmonary effort is normal. No respiratory distress.      Breath sounds: Normal breath sounds. No wheezing or rales.   Chest:      Chest wall: No tenderness.   Abdominal:      General: Bowel sounds are normal. There is no distension.      Palpations: Abdomen is soft.      Tenderness: There is no abdominal tenderness.      Hernia: No hernia is present.   Musculoskeletal: Normal range of motion.         General: No tenderness or deformity.   Lymphadenopathy:      Cervical: No cervical adenopathy.   Skin:      General: Skin is warm and dry.      Coloration: Skin is not pale.      Findings: No erythema or rash.   Neurological:      Mental Status: She is alert and oriented to person, place, and time.   Psychiatric:         Behavior: Behavior normal.         Thought Content: Thought content normal.         Judgment: Judgment normal.         DIAGNOSIS:  1.  Morbid obesity with a BMI of  45.45 and inability to lose weight.  2. Co-morbidities: hypertension, obstructive sleep apnea and osteoarthritis    PLAN:    The patient is a good candidate for operatively-induced weight loss.  The patient's comorbidities can significantly improve from weight loss following surgery.    We discussed preliminary steps of the program including the evaluation process.  I have outlined the risks of the procedures including, but not limited to, bleeding, slippage, erosion, stricture, death, deep venous thrombosis, pulmonary embolus, healing issues including intra-abdominal leakage or perforation with abscess or need for drainage or reoperation, wound infection, need for open surgery, injury to intra-abdominal organs or bleeding requiring transfusion, intensive care unit care, and possible prolonged hospitalization.      Other long-term issues were discussed including, but not limited to, permanent change in volume of food and type of foods eaten and importance of ongoing long-term followup.  I advised the patient that if they can lose weight before surgery, it will reduce her operative risk.  The patient understands and wishes to proceed.    PLAN: The patient is interested in proceeding with laparoscopic vertical sleeve gastrectomy with possible robotic assistance. We will start the next step of the evaluation process to include acquiring letters of medical necessity and insurance preapproval.  In addition, she will be sent for a pre-surgical psychological evaluation.  Once insurance approves request or the patient has made other financial  arrangements for surgery, we will schedule the following preoperative tests:  EKG, chest x-ray, full panel of baseline labs and an upper endoscopy to evaluate the extent of reflux and/or presence of a hiatal hernia.  The patient will also see the dietician preoperatively.  We will see her back for a final visit after the above have been completed.    Referring Physician: Jaclyn Becerril MD  RTC: As scheduled.

## 2020-12-12 LAB
BASOPHILS # BLD AUTO: 0.03 K/UL (ref 0–0.2)
BASOPHILS NFR BLD: 0.5 % (ref 0–1.9)
DIFFERENTIAL METHOD: ABNORMAL
EOSINOPHIL # BLD AUTO: 0.1 K/UL (ref 0–0.5)
EOSINOPHIL NFR BLD: 1.5 % (ref 0–8)
ERYTHROCYTE [DISTWIDTH] IN BLOOD BY AUTOMATED COUNT: 14.5 % (ref 11.5–14.5)
ESTIMATED AVG GLUCOSE: 108 MG/DL (ref 68–131)
HBA1C MFR BLD HPLC: 5.4 % (ref 4–5.6)
HCT VFR BLD AUTO: 39.6 % (ref 37–48.5)
HGB BLD-MCNC: 11.7 G/DL (ref 12–16)
IMM GRANULOCYTES # BLD AUTO: 0.01 K/UL (ref 0–0.04)
IMM GRANULOCYTES NFR BLD AUTO: 0.2 % (ref 0–0.5)
LYMPHOCYTES # BLD AUTO: 2.1 K/UL (ref 1–4.8)
LYMPHOCYTES NFR BLD: 35.2 % (ref 18–48)
MCH RBC QN AUTO: 29 PG (ref 27–31)
MCHC RBC AUTO-ENTMCNC: 29.5 G/DL (ref 32–36)
MCV RBC AUTO: 98 FL (ref 82–98)
MONOCYTES # BLD AUTO: 0.3 K/UL (ref 0.3–1)
MONOCYTES NFR BLD: 4.7 % (ref 4–15)
NEUTROPHILS # BLD AUTO: 3.5 K/UL (ref 1.8–7.7)
NEUTROPHILS NFR BLD: 57.9 % (ref 38–73)
NRBC BLD-RTO: 0 /100 WBC
PLATELET # BLD AUTO: 236 K/UL (ref 150–350)
PMV BLD AUTO: 10.2 FL (ref 9.2–12.9)
PREALB SERPL-MCNC: 17 MG/DL (ref 20–43)
RBC # BLD AUTO: 4.04 M/UL (ref 4–5.4)
WBC # BLD AUTO: 5.99 K/UL (ref 3.9–12.7)

## 2020-12-14 ENCOUNTER — TELEPHONE (OUTPATIENT)
Dept: ENDOSCOPY | Facility: HOSPITAL | Age: 65
End: 2020-12-14

## 2020-12-14 RX ORDER — IMIPRAMINE HYDROCHLORIDE 10 MG/1
10 TABLET, FILM COATED ORAL NIGHTLY
Qty: 90 TABLET | Refills: 1 | Status: SHIPPED | OUTPATIENT
Start: 2020-12-14 | End: 2021-03-08 | Stop reason: SDUPTHER

## 2020-12-15 ENCOUNTER — OFFICE VISIT (OUTPATIENT)
Dept: PAIN MEDICINE | Facility: CLINIC | Age: 65
End: 2020-12-15
Payer: COMMERCIAL

## 2020-12-15 VITALS
HEIGHT: 63 IN | WEIGHT: 247.56 LBS | HEART RATE: 66 BPM | DIASTOLIC BLOOD PRESSURE: 78 MMHG | SYSTOLIC BLOOD PRESSURE: 121 MMHG | BODY MASS INDEX: 43.86 KG/M2

## 2020-12-15 DIAGNOSIS — M79.671 PAIN OF BOTH HEELS: ICD-10-CM

## 2020-12-15 DIAGNOSIS — M79.672 PAIN OF BOTH HEELS: ICD-10-CM

## 2020-12-15 DIAGNOSIS — M51.36 DDD (DEGENERATIVE DISC DISEASE), LUMBAR: ICD-10-CM

## 2020-12-15 DIAGNOSIS — M54.16 BILATERAL LUMBAR RADICULOPATHY: Primary | ICD-10-CM

## 2020-12-15 PROCEDURE — 99999 PR PBB SHADOW E&M-EST. PATIENT-LVL V: CPT | Mod: PBBFAC,,, | Performed by: PHYSICIAN ASSISTANT

## 2020-12-15 PROCEDURE — 3074F PR MOST RECENT SYSTOLIC BLOOD PRESSURE < 130 MM HG: ICD-10-PCS | Mod: CPTII,S$GLB,, | Performed by: PHYSICIAN ASSISTANT

## 2020-12-15 PROCEDURE — 1125F PR PAIN SEVERITY QUANTIFIED, PAIN PRESENT: ICD-10-PCS | Mod: S$GLB,,, | Performed by: PHYSICIAN ASSISTANT

## 2020-12-15 PROCEDURE — 99999 PR PBB SHADOW E&M-EST. PATIENT-LVL V: ICD-10-PCS | Mod: PBBFAC,,, | Performed by: PHYSICIAN ASSISTANT

## 2020-12-15 PROCEDURE — 1125F AMNT PAIN NOTED PAIN PRSNT: CPT | Mod: S$GLB,,, | Performed by: PHYSICIAN ASSISTANT

## 2020-12-15 PROCEDURE — 99214 PR OFFICE/OUTPT VISIT, EST, LEVL IV, 30-39 MIN: ICD-10-PCS | Mod: S$GLB,,, | Performed by: PHYSICIAN ASSISTANT

## 2020-12-15 PROCEDURE — 3008F PR BODY MASS INDEX (BMI) DOCUMENTED: ICD-10-PCS | Mod: CPTII,S$GLB,, | Performed by: PHYSICIAN ASSISTANT

## 2020-12-15 PROCEDURE — 3008F BODY MASS INDEX DOCD: CPT | Mod: CPTII,S$GLB,, | Performed by: PHYSICIAN ASSISTANT

## 2020-12-15 PROCEDURE — 3078F PR MOST RECENT DIASTOLIC BLOOD PRESSURE < 80 MM HG: ICD-10-PCS | Mod: CPTII,S$GLB,, | Performed by: PHYSICIAN ASSISTANT

## 2020-12-15 PROCEDURE — 99214 OFFICE O/P EST MOD 30 MIN: CPT | Mod: S$GLB,,, | Performed by: PHYSICIAN ASSISTANT

## 2020-12-15 PROCEDURE — 3078F DIAST BP <80 MM HG: CPT | Mod: CPTII,S$GLB,, | Performed by: PHYSICIAN ASSISTANT

## 2020-12-15 PROCEDURE — 3074F SYST BP LT 130 MM HG: CPT | Mod: CPTII,S$GLB,, | Performed by: PHYSICIAN ASSISTANT

## 2020-12-15 NOTE — PROGRESS NOTES
Established Patient Chronic Pain Note (Follow up visit)    Chief Complaint:   Chief Complaint   Patient presents with    Back Pain     Lower back pain radiating into buttock and down left leg    Foot Pain     Bilateral heel pain       SUBJECTIVE:     Interval History (12/15/2020): Patient was seen on 11/30/20 (2 weeks ago). At that time she underwent bilateral SIJ + piriformis + GT bursa injection.  The patient reports that she is/was better following the procedure.  she reports 100% pain relief x 1.5 weeks.  The changes have NOT continued through this visit.  Patient reports pain as 9/10 today.  She is currently in physical therapy for strengthening of her hamstring for knee issues @ [facility] in [place].     Pain Disability Index Review:  Last 3 PDI Scores 11/20/2020 9/2/2020 7/28/2020   Pain Disability Index (PDI) 30 43 51       Interval History (11/20/2020): Patient was last seen on 9/2/2020. Since then, patient reports. Patient reports pain as 8/10 today.  She has had knee injection with Dr. Connolly, and this is the first time she reports she had pain relief of her left knee. She is here today because she reports Dr. Connolly told her to see us for her low back pain.     Interval HPI (9/2/2020):  Mariel Becerril is a 65 y.o. female who presents to the clinic for a follow-up appointment for left knee pain.  She was last seen 4 weeks ago where she received a left pes anserine bursa injection in the clinic.  She reports that this injection provided limited relief.  Since the last visit, Mariel Becerril states the pain has been persistant. Current pain intensity is 9/10.  She recently underwent a revision of the left knee with Dr. Connolly in March 2020 that unfortunately has not provided significant relief in her knee pain.     Patient denies night fever/night sweats, urinary incontinence, bowel incontinence, significant weight loss, significant motor weakness and loss of sensations.    Interval  HPI 07/28/2020:  Mariel Becerril is a 64 y.o. female who presents to the clinic for a follow-up appointment for left knee pain.  She presents today for MRI results review and EMG/NCS follow-up.  Since the last visit, Mariel Becerril states the pain has been persistant. Current pain intensity is 9/10.  She recently underwent a revision of the left knee with Dr. Connolly in March 2020 that unfortunately has not provided significant relief in her knee pain.    Interval HPI 07/08/2020:  Mariel Becerril is a 64 y.o. female who presents to the clinic for a follow-up appointment for left knee pain. Since the last visit, Mariel Becerril states the pain has been persistant. Current pain intensity is 9/10.  She recent underwent a revision of the left knee with Dr. Connolly in March 2020 that unfortunately has not provided significant relief in her knee pain.  She is scheduled for EMG/NCS within the next week or so.  She has not had significant workup for lumbar radiculopathy previously, but does state that she has back pain.      Initial HPI 11/20/2018:  Mariel Becerril is a 63 y.o. female  who is presenting with a chief complaint of Knee Pain. The patient began experiencing this problem insidiously, and the pain has been gradually worsening over the past 12 month(s). The pain is described as throbbing, cramping, aching and heavy and is located in the left knee. Pain is intermittent and lasts hours. The pain radiates to pain is nonradiating. The patient rates her pain a 8 out of ten and interferes with activities of daily living a 8 out of ten. Pain is exacerbated by prolonged standing, ambulation, and is improved by rest. Patient reports no prior trauma, no prior spinal surgery       Non-Pharmacologic Treatments:  Physical Therapy/Home Exercise: yes  Ice/Heat:yes  TENS: no  Acupuncture: no  Massage: no  Chiropractic: no    Other: no    Pain Medications:  - Opioids: Norco, tramadol, Percocet  - Adjuvant  Medications: NSAIDs, Tylenol, gabapentin, Robaxin  - Anti-Coagulants: Aspirin    Opioid Contract: no     report (pulled on 9/2/2020):  Reviewed and consistent with medication use as prescribed.      Pain Procedures:   -multiple intra-articular knee injections  -left genicular nerve block on 12/20/2018  -left TKA March 2020  -left pes anserine bursa injection 07/28/2020, limited relief  -bilateral SIJ + piriformis + GT bursa injection on 11/30/20 with 100% pain relief x 1.5 weeks        Imaging & EMG/NCS (Reviewed on 12/15/2020):    EMG/NCS left lower extremity 07/14/2020:  Results:   - The left peroneal motor and sensory responses were normal.  - The left tibial motor response was normal.   - The left sural sensory response could not be obtained, likely secondary to body habitus.  - Needle EMG examination of the left lower extremity and lumbar paraspinals was normal.    Interpretation:   1. Normal electrodiagnostic examination. No evidence of left peroneal or tibial neuropathy. No evidence of left lumbar radiculopathy or diffuse polyneuropathy.   2. Should symptoms progress or fail to resolve, repeat studies in 6 to 12 months may be warranted.       MRI lumbar spine 07/21/2020:  The distal cord and conus appear normal.   The lumbar vertebra reveal grade 1 L4-5 spondylolisthesis.  Small L3 vertebral body hemangioma is present.   No acute or chronic compression fracture.   T12-L1: There is broad-based disc bulge with hypointense T1 and T2 signal material extending both superiorly and inferiorly from the disc margin.  Possible old calcified disc extrusion versus calcification of the posterior longitudinal ligament.   L1-2:     Mild disc bulge.   L2-3:     Mild disc bulge with mild hypertrophic ligamentum flavum.   L3-4:     Mild disc bulge with mild hypertrophic ligamentum flavum.   L4-5:     Mild disc degeneration with disc narrowing, desiccation and disc bulge.  Moderate to severe facet arthritis with grade 1  spondylolisthesis of approximately 4 mm.  Mild central with moderate foraminal stenosis.   L5-S1:    Mild disc degeneration with generalized disc bulge.  Moderate left and mild right facet arthritis.  Mild lateral recess stenosis with mild bilateral foraminal stenosis.    X-ray left knee 06/29/2020:  Stable postsurgical changes left total knee arthroplasty.  Components well seated without fracture, dislocation or loosening.  Cannot exclude tiny suprapatellar effusion.  Faint calcifications again noted projecting over the superior margin of the left medial femoral condyle.  Osteopenia and tricompartment degenerative changes noted on the right.  There is extensive degenerative change involving the patellofemoral joint check Lizeth along the lateral facet with lateral tilting and prominent marginal osteophyte formation.  Narrowing of the medial and lateral compartments and marginal spurring.  No acute fracture or dislocation.    X-ray bilateral knee 05/22/2020:  There is mild-to-moderate joint space narrowing and osteophyte formation seen involving all 3 compartments of the right knee.  The greatest degree of degenerative changes at the right patellofemoral compartment.  A left total knee arthroplasty is in place.  No hardware failure or loosening.  No periprosthetic fracture.  No joint effusions are suggested.  No acute fracture or dislocation.            REVIEW OF SYSTEMS:    GENERAL:  No weight loss, malaise or fevers.  HEENT:   No recent changes in vision or hearing  NECK:  Negative for lumps, no difficulty with swallowing.  RESPIRATORY:  Negative for cough, wheezing or shortness of breath, patient denies any recent URI.  CARDIOVASCULAR:  Negative for chest pain, leg swelling or palpitations.  GI:  Negative for abdominal discomfort, blood in stools or black stools or change in bowel habits.  MUSCULOSKELETAL:  See HPI.  SKIN:  Negative for lesions, rash, and itching.  PSYCH:  No mood disorder or recent psychosocial  "stressors.  Patients sleep is not disturbed secondary to pain.  HEMATOLOGY/LYMPHOLOGY:  Negative for prolonged bleeding, bruising easily or swollen nodes.  Patient is currently taking anti-coagulants - ASA  NEURO:   No history of headaches, syncope, paralysis, seizures or tremors.  All other reviewed and negative other than HPI.    OBJECTIVE:    PHYSICAL EXAMINATION:  Vitals:    12/15/20 1048   BP: 121/78   Pulse: 66   Weight: 112.3 kg (247 lb 9.2 oz)   Height: 5' 3" (1.6 m)   PainSc:   9    (reviewed on 12/15/2020)    General: alert and oriented, in no apparent distress. Morbidly obese.   Gait: normal gait.  Skin: no rashes, no discoloration, no obvious lesions  HEENT: normocephalic, atraumatic. Pupils equal and round.  Cardiovascular: no significant peripheral edema present.  Respiratory: without use of accessory muscles of respiration.    Musculoskeletal - Lumbar Spine:  - Limited ROM secondary to pain reproduction and body habitus  - Pain on flexion of lumbar spine: Absent  - Pain on extension of lumbar spine: Present   - Lumbar facet loading: Present bilaterally  - TTP over the lumbar facet joints: Present, less so than over SIJ    - TTP over the SI joints: Present bilaterally   - TTP over GT bursa: Present bilaterally   - TTP over piriformis:  Present bilaterally   - Straight Leg Raise: Equivocal   - JEFF/ Paul's: Equivocal     Neuro - Lower Extremities:  - RLE Strength:     >> 5/5 strength with right hip flexion/ extension    >> 5/5 strength with right knee flexion/ extension    >> 5/5 strength in right ankle with dorsiflexion    >> 5/5 strength in right ankle with plantar flexion  - LLE Strength:     >> 5/5 strength with left hip flexion/ extension    >> 5/5 strength with knee flexion extension on the left     >> 5/5 strength in left ankle with dorsiflexion    >> 5/5 strength in left ankle with plantar flexion  - Extremity Reflexes: Brisk and symmetric throughout  - Sensory: Sensation to light touch " intact bilaterally      Psych:  Mood and affect is appropriate              ASSESSMENT: 65 y.o. year old female with low back and left knee pain, consistent with     1. Bilateral lumbar radiculopathy  IR Epidural Transfor 1st Vert Lumbar Tyler    Case Request-RAD/Other Procedure Area: Bilateral L5/S1 TF GISELA   2. Pain of both heels     3. DDD (degenerative disc disease), lumbar           PLAN:     1. Interventional:   - S/p bilateral SIJ + piriformis + GT bursa injection on 11/30/20 with 100% pain relief x 1.5 weeks.  - Schedule bilateral L5/S1 TF GISELA - per Dr. Connolly.  Patient is taking ASA; she will have to stop prior to procedure.  Will get clearance from Dr. Luis (Cardiology). Patient will be instructed to stop all ASA products, NSAIDs, tumeric, fish oil, and Vitamin E.     *If no relief of heel pain after GISELA, patient was encouraged to follow-up with Dr. Narvaez (podiatry) - last seen on 11/16/20 for routine foot care; had x-ray in May 2020.  - consider genicular nerve blocks/RFA in the future.  Although she is reporting pain relief after knee injection with Dr. Connolly.     2. Pharmacologic:   - Continue gabapentin 300mg BID and Voltaren 1% gel PRN.  - Try Salon Pas 4% lidocaine patches for pain topically.   - Anticoagulation use: ASA - Dr. Luis (Cardiology).     3. Rehabilitative: Continue exercises and activities as tolerated.  Continue use of compression stockings to help lower extremity edema.  She is currently  in physical therapy for strengthening of her hamstring for knee issues @ [facility].     4. Diagnostic: None for now.    5. Follow up: 4 weeks post-injection - will send online ticket scheduling on [employer]     - This condition does not require this patient to take time off of work, and the primary goal of our Pain Management services is to improve the patient's functional capacity.   - I discussed the risks, benefits, and alternatives to potential treatment options. All questions and  concerns were fully addressed today in clinic.           Disclaimer:  This note was prepared using voice recognition system and is likely to have sound alike errors that may have been overlooked even after proof reading.  Please call me with any questions.

## 2020-12-16 ENCOUNTER — TELEPHONE (OUTPATIENT)
Dept: PAIN MEDICINE | Facility: CLINIC | Age: 65
End: 2020-12-16

## 2020-12-16 LAB — VIT B1 BLD-MCNC: 70 UG/L (ref 38–122)

## 2020-12-16 NOTE — TELEPHONE ENCOUNTER
----- Message from Juan Alberto Luis MD sent at 12/15/2020  5:02 PM CST -----  Regarding: RE: Cardiac clearance for procedure with Dr. Diaz  Yes she may hold aspirin 1 week prior and resume post op. Thanks    - PM  ----- Message -----  From: Maria Esther Neff MA  Sent: 12/15/2020   4:03 PM CST  To: Juan Alberto Luis MD  Subject: Cardiac clearance for procedure with Dr. Morelos#    Good afternoon. Dr. Diaz request to perform Lumbar Transforaminal GISELA for patient. Pt is currently on Aspirin 325mg and will need to d/c 1 (one) week prior to having this injection. Please advise or contact me at ext [phone] with questions.     Thank you,  Allye

## 2020-12-17 ENCOUNTER — TELEPHONE (OUTPATIENT)
Dept: PAIN MEDICINE | Facility: CLINIC | Age: 65
End: 2020-12-17

## 2020-12-17 ENCOUNTER — IMMUNIZATION (OUTPATIENT)
Dept: PHARMACY | Facility: CLINIC | Age: 65
End: 2020-12-17
Payer: COMMERCIAL

## 2020-12-17 DIAGNOSIS — M54.16 BILATERAL LUMBAR RADICULOPATHY: Primary | ICD-10-CM

## 2020-12-17 RX ORDER — TRAMADOL HYDROCHLORIDE 50 MG/1
50 TABLET ORAL EVERY 6 HOURS PRN
Qty: 28 TABLET | Refills: 0 | Status: SHIPPED | OUTPATIENT
Start: 2020-12-17 | End: 2021-02-17 | Stop reason: SDUPTHER

## 2020-12-17 NOTE — TELEPHONE ENCOUNTER
Would you want to bridge percocet to procedure ? Pt not set up yet , kishore will be calling to set up . Pt saw isabell on 12/15. Please advise

## 2020-12-17 NOTE — TELEPHONE ENCOUNTER
----- Message from Marce Padilla sent at 12/17/2020 11:48 AM CST -----  Regarding: refill  .Type:  RX Refill Request    Who Called: pt     Refill or New Rx: refill  RX Name and Strength: oxycodone 10 mg   How is the patient currently taking it? (ex. 1XDay):   Is this a 30 day or 90 day RX:   Preferred Pharmacy with phone number:     [facility] [address] [place], [place] [address] [address] [address]  Phone: [phone] Fax: [phone]      Local or Mail Order:# local   Ordering Provider:   Would the patient rather a call back or a response via [facility]?call back or [place]   Best Call Back Number: [phone] (home)   Additional Information:   pt needs Rx until next  procedure

## 2020-12-17 NOTE — TELEPHONE ENCOUNTER
Informed pt med was sent . Pt understood. All questions answered.   Alexis Boyd,MA [facility]

## 2020-12-18 ENCOUNTER — TELEPHONE (OUTPATIENT)
Dept: ENDOSCOPY | Facility: HOSPITAL | Age: 65
End: 2020-12-18

## 2020-12-18 ENCOUNTER — PATIENT MESSAGE (OUTPATIENT)
Dept: ENDOSCOPY | Facility: HOSPITAL | Age: 65
End: 2020-12-18

## 2020-12-18 DIAGNOSIS — G47.33 OSA ON CPAP: Primary | ICD-10-CM

## 2020-12-18 NOTE — TELEPHONE ENCOUNTER
Location Screening:    If answers yes to any of the following, schedule at [facility] [place] ONLY. If No, OK for either location.    1. Is there a diagnosis of heart failure, severe heart valve disease (aortic stenosis) or mechanical valve? no  a. Is the Left Ventricle Ejection Fraction <30% ? no    2. Does the pt have pulmonary hypertension? no   a. Is pulmonary arterial pressure gradient >50mmHg? no   b. Is there evidence of right ventricular dysfunction? no    3. Does the pt have achalasia? no    4. Any history of negative reaction to anesthesia? no   a. Myasthenia gravis? no   b. Malignant hyperthermia? no   c. Other? not applicable    5. Is procedure for esophageal banding? no      COVID Screening    1. Have you had a fever in the last 7 days or have you used fever reducing medicines for a fever in the last 7 days?  no    2. Are you experiencing shortness of breath, cough, muscle aches, loss of taste or loss of smell?  no    If answered yes to questions 1 and 2, the patient must seek medical attention with their PCP.  Do not schedule their procedure.     3. Are you residing with anyone who has tested positive for Covid?  no    If answered yes to question 3, recommend 14 day self-quarantine from the date of relative's positive test and place special needs note in the depot.  Wait to schedule.     ENDO screening    1. Have you been admitted for cardiac, kidney or pulmonary causes to the hospital in the past 3 months? no   If yes, schedule an appointment with PCP before scheduling endoscopic procedure.     2. Have you had a stent placed in the last 12 months? no   If yes, for a screening visit, cancel and message the ordering provider.  The patient will need a new order when the time is appropriate.     3. Have you had a stroke or heart attack in the past 6 months? no   If yes, cancel and refer patient to ordering provider for clearance, also message ordering provider to inform.     4. Have you had any chest pain in the past  "3 months? no   If yes, Have you been evaluated by your PCP and/or cardiologist and it was determined to not be heart related? not applicable   If No, Pt needs to be seen by PCP or Cardiologist .  Pt can be scheduled once clearance obtained by either of those providers.     5. Do you take prescription weight loss medications?  no   If yes, must stop for 2 weeks prior to procedure.     6. Have you been diagnosed with diverticulitis within the past 3 months? no   If yes, must have been seen by GI within the last 3 months, if not schedule with [facility].    If pt has been seen by GI, schedule procedure 8-12 weeks post antibiotic treatment.     7. Are you on Dialysis? no  If yes, schedule procedure for the day AFTER dialysis.  Appt time should be 9am or later, patient arrival time is 2 hours prior.  Nulytely or miralax prep for all patients with kidney disease.     8. Are you diabetic?  no   If yes, schedule morning appt. Advise pt to hold all diabetic meds day of procedure.     9. If pt is older than 80 years of age and HAS NOT been seen by GI or PCP within the last 6 months, needs appt with [facility].   If pt has been seen by the GI provider or PCP within the past 6  months AND meets criteria, schedule procedure AND send message to the endoscopist.     10. Is patient on a "high risk" medication (blood thinner/antiplatelet agent)?  no   If yes, has cardiac clearance been obtained within the last 60 days? N/A   If no, a new clearance needs to be obtained.     Final Questions:    1.I have reviewed the last colonoscopy for recommendations regarding next procedure bowel prep.  no  2. I have reviewed medications and allergies.  yes  3. I have verified the pharmacy information and appropriate prep sent if needed. yes  4. Prep instructions have been mailed or sent to portal per patient request. yes        All schedulers will have ability to reach out to the ordering GI provider to clarify any issues.     "

## 2020-12-21 ENCOUNTER — PATIENT MESSAGE (OUTPATIENT)
Dept: PAIN MEDICINE | Facility: CLINIC | Age: 65
End: 2020-12-21

## 2020-12-22 ENCOUNTER — TELEPHONE (OUTPATIENT)
Dept: PREADMISSION TESTING | Facility: HOSPITAL | Age: 65
End: 2020-12-22

## 2020-12-22 ENCOUNTER — OFFICE VISIT (OUTPATIENT)
Dept: PODIATRY | Facility: CLINIC | Age: 65
End: 2020-12-22
Payer: COMMERCIAL

## 2020-12-22 VITALS
HEART RATE: 74 BPM | SYSTOLIC BLOOD PRESSURE: 108 MMHG | BODY MASS INDEX: 44.45 KG/M2 | DIASTOLIC BLOOD PRESSURE: 74 MMHG | HEIGHT: 63 IN | WEIGHT: 250.88 LBS

## 2020-12-22 DIAGNOSIS — M24.571 CONTRACTURE, RIGHT ANKLE: ICD-10-CM

## 2020-12-22 DIAGNOSIS — M24.572 CONTRACTURE, LEFT ANKLE: ICD-10-CM

## 2020-12-22 DIAGNOSIS — M21.42 PES PLANUS OF BOTH FEET: ICD-10-CM

## 2020-12-22 DIAGNOSIS — M79.672 PAIN IN LEFT FOOT: ICD-10-CM

## 2020-12-22 DIAGNOSIS — E66.01 MORBID OBESITY DUE TO EXCESS CALORIES: ICD-10-CM

## 2020-12-22 DIAGNOSIS — M48.062 LUMBAR STENOSIS WITH NEUROGENIC CLAUDICATION: ICD-10-CM

## 2020-12-22 DIAGNOSIS — M21.41 PES PLANUS OF BOTH FEET: ICD-10-CM

## 2020-12-22 DIAGNOSIS — M72.2 PLANTAR FASCIITIS: Primary | ICD-10-CM

## 2020-12-22 DIAGNOSIS — M79.671 PAIN IN RIGHT FOOT: ICD-10-CM

## 2020-12-22 PROCEDURE — 3078F DIAST BP <80 MM HG: CPT | Mod: CPTII,S$GLB,, | Performed by: PODIATRIST

## 2020-12-22 PROCEDURE — 1125F PR PAIN SEVERITY QUANTIFIED, PAIN PRESENT: ICD-10-PCS | Mod: S$GLB,,, | Performed by: PODIATRIST

## 2020-12-22 PROCEDURE — 3074F SYST BP LT 130 MM HG: CPT | Mod: CPTII,S$GLB,, | Performed by: PODIATRIST

## 2020-12-22 PROCEDURE — 3074F PR MOST RECENT SYSTOLIC BLOOD PRESSURE < 130 MM HG: ICD-10-PCS | Mod: CPTII,S$GLB,, | Performed by: PODIATRIST

## 2020-12-22 PROCEDURE — 99999 PR PBB SHADOW E&M-EST. PATIENT-LVL IV: CPT | Mod: PBBFAC,,, | Performed by: PODIATRIST

## 2020-12-22 PROCEDURE — 3008F BODY MASS INDEX DOCD: CPT | Mod: CPTII,S$GLB,, | Performed by: PODIATRIST

## 2020-12-22 PROCEDURE — 99214 PR OFFICE/OUTPT VISIT, EST, LEVL IV, 30-39 MIN: ICD-10-PCS | Mod: 25,S$GLB,, | Performed by: PODIATRIST

## 2020-12-22 PROCEDURE — 20550 NJX 1 TENDON SHEATH/LIGAMENT: CPT | Mod: 50,S$GLB,, | Performed by: PODIATRIST

## 2020-12-22 PROCEDURE — 1125F AMNT PAIN NOTED PAIN PRSNT: CPT | Mod: S$GLB,,, | Performed by: PODIATRIST

## 2020-12-22 PROCEDURE — 20550 PR INJECT TENDON SHEATH/LIGAMENT: ICD-10-PCS | Mod: 50,S$GLB,, | Performed by: PODIATRIST

## 2020-12-22 PROCEDURE — 99214 OFFICE O/P EST MOD 30 MIN: CPT | Mod: 25,S$GLB,, | Performed by: PODIATRIST

## 2020-12-22 PROCEDURE — 3008F PR BODY MASS INDEX (BMI) DOCUMENTED: ICD-10-PCS | Mod: CPTII,S$GLB,, | Performed by: PODIATRIST

## 2020-12-22 PROCEDURE — 99999 PR PBB SHADOW E&M-EST. PATIENT-LVL IV: ICD-10-PCS | Mod: PBBFAC,,, | Performed by: PODIATRIST

## 2020-12-22 PROCEDURE — 3078F PR MOST RECENT DIASTOLIC BLOOD PRESSURE < 80 MM HG: ICD-10-PCS | Mod: CPTII,S$GLB,, | Performed by: PODIATRIST

## 2020-12-22 RX ORDER — DEXAMETHASONE SODIUM PHOSPHATE 4 MG/ML
4 INJECTION, SOLUTION INTRA-ARTICULAR; INTRALESIONAL; INTRAMUSCULAR; INTRAVENOUS; SOFT TISSUE ONCE
Status: COMPLETED | OUTPATIENT
Start: 2020-12-22 | End: 2020-12-22

## 2020-12-22 RX ORDER — TRIAMCINOLONE ACETONIDE 40 MG/ML
40 INJECTION, SUSPENSION INTRA-ARTICULAR; INTRAMUSCULAR ONCE
Status: COMPLETED | OUTPATIENT
Start: 2020-12-22 | End: 2020-12-22

## 2020-12-22 RX ADMIN — DEXAMETHASONE SODIUM PHOSPHATE 4 MG: 4 INJECTION, SOLUTION INTRA-ARTICULAR; INTRALESIONAL; INTRAMUSCULAR; INTRAVENOUS; SOFT TISSUE at 04:12

## 2020-12-22 RX ADMIN — TRIAMCINOLONE ACETONIDE 40 MG: 40 INJECTION, SUSPENSION INTRA-ARTICULAR; INTRAMUSCULAR at 04:12

## 2020-12-22 NOTE — PROGRESS NOTES
Subjective:       Patient ID: Mariel Becerril is a 65 y.o. female.    Chief Complaint: Heel Pain (B/L, 9/10, tennis w/sock, non diabetic, pcp Dr. Becerril.)      HPI: Mariel Becerril complains of severe pains to the left and right foot. States pains are sharp and stabbing-like in nature. Pains are to the plantar foot, mostly with walking and standing. Rates the pains at approx. 9/10. States post-static dyskinesia. Denies any recent identifiable trauma. Does limp with gait.  States infrequent Tylenol and/or NSAID medications thus far. Pains have been present for the past several weeks to months and the pains have worsened over the past couple of weeks.  She does not relate or have a history of plantar fasciitis. States walking and standing causes and/or exacerbates the symptoms. Patient has had no corticosteroid injection(s) to the left the right foot, prior. Patient's Primary Care Provider is Jaclyn Becerril MD.  Does have a history of lower back pathology, and does see Pain Management for cortisone injections.    Review of patient's allergies indicates:   Allergen Reactions    Penicillins Rash       Past Medical History:   Diagnosis Date    COPD (chronic obstructive pulmonary disease)     Digestive disorder     Frequent headaches     Hypertension     Osteoarthritis 04/02/2019    Bilateral knees, ankles and feet    Severe obesity (BMI >= 40)     Sleep apnea        Family History   Problem Relation Age of Onset    Heart attack Father        Social History     Socioeconomic History    Marital status:      Spouse name: Not on file    Number of children: Not on file    Years of education: Not on file    Highest education level: Not on file   Occupational History    Not on file   Social Needs    Financial resource strain: Not on file    Food insecurity     Worry: Not on file     Inability: Not on file    Transportation needs     Medical: Not on file     Non-medical: Not on file   Tobacco Use     Smoking status: Never Smoker    Smokeless tobacco: Never Used   Substance and Sexual Activity    Alcohol use: No    Drug use: No    Sexual activity: Never     Partners: Male     Birth control/protection: See Surgical Hx   Lifestyle    Physical activity     Days per week: Not on file     Minutes per session: Not on file    Stress: Not on file   Relationships    Social connections     Talks on phone: Not on file     Gets together: Not on file     Attends Baptism service: Not on file     Active member of club or organization: Not on file     Attends meetings of clubs or organizations: Not on file     Relationship status: Not on file   Other Topics Concern    Not on file   Social History Narrative    Not on file       Past Surgical History:   Procedure Laterality Date    BLADDER SUSPENSION      CATARACT EXTRACTION, BILATERAL      COLONOSCOPY N/A 12/3/2018    Procedure: COLONOSCOPY;  Surgeon: Mari Rader MD;  Location: [facility];  Service: Endoscopy;  Laterality: N/A;    HYSTERECTOMY      partial 2000    INJECTION OF ANESTHETIC AGENT INTO SACROILIAC JOINT Bilateral 11/30/2020    Procedure: Bilateral SIJ + Bilateral Piriformis + Bilateral GT Bursa Injection;  Surgeon: Thanh Diaz MD;  Location: [facility];  Service: Pain Management;  Laterality: Bilateral;    INJECTION OF JOINT Bilateral 11/30/2020    Procedure: Bilateral SIJ + Bilateral Piriformis + Bilateral GT Bursa Injection;  Surgeon: Thanh Diaz MD;  Location: [facility] PAIN [facility];  Service: Pain Management;  Laterality: Bilateral;    INJECTION OF PIRIFORMIS MUSCLE Bilateral 11/30/2020    Procedure: Bilateral SIJ + Bilateral Piriformis + Bilateral GT Bursa Injection;  Surgeon: Thanh Diaz MD;  Location: [facility] PAIN [facility];  Service: Pain Management;  Laterality: Bilateral;    tonsilectomy      TOTAL KNEE ARTHROPLASTY Left 3/4/2020    Procedure: ARTHROPLASTY, KNEE, TOTAL;  Surgeon: Moy Connolly MD;  Location: [facility] OR;   "Service: Orthopedics;  Laterality: Left;       Review of Systems   Constitutional: Negative for chills, fatigue and fever.   HENT: Negative for hearing loss.    Eyes: Negative for photophobia and visual disturbance.   Respiratory: Negative for cough, chest tightness, shortness of breath and wheezing.    Cardiovascular: Negative for chest pain and palpitations.   Gastrointestinal: Negative for constipation, diarrhea, nausea and vomiting.   Endocrine: Negative for cold intolerance and heat intolerance.   Genitourinary: Negative for flank pain.   Musculoskeletal: Positive for back pain and gait problem. Negative for neck pain and neck stiffness.   Skin: Negative for wound.   Neurological: Negative for light-headedness and headaches.   Psychiatric/Behavioral: Negative for sleep disturbance.         Objective:   /74   Pulse 74   Ht 5' 3" (1.6 m)   Wt 113.8 kg (250 lb 14.1 oz)   BMI 44.44 kg/m²         Physical Exam   LOWER EXTREMITY PHYSICAL EXAMINATION    VASCULAR: The right DP pulse is 2/4 and the left DP is 2/4. The right PT pulse is 2/4 and the left PT pulse is 2/4. Proximal to distal, warm to warm. No dependent rubor or elevation palor is noted. Capillary refill time is less than 3 seconds. Hair growth is appreciated to the dorsal foot and digits.    NEUROLOGY: Proprioception is intact, bilateral. Sensation to light touch is intact. Negative Tinel's Sign and negative Valleix Sign. No neurological sensations with compression of the area of Francis's Nerve in the area of the Abductor Hallucis muscle belly.    ORTHOPEDIC:  There is mild tenderness to palpation of the area around the plantar medial calcaneal tubercle on the left and right foot. Pains are characterized as sharp and stabbing-like with direct palpation of the area. There is also severe pain to palpation of the immediate plantar aspect of the heel, and no pains to the lateral band of the fascia. No edema is noted. No fullness is noted. No pains or " defects are noted within the plantar fascia at the arch. No plantar fibromas are noted. No defects are noted within the ligament. Dorsiflexion of the MTPJs with simultaneous palpation of the fascia at the arch, does worsen and exacerbate the pains. No pains with medial to lateral compression of the heel. Equinus contracture is noted. Antalgic gait pattern is noted.     DERMATOLOGY: Skin is supple, dry and intact.    Assessment:     1. Plantar fasciitis    2. Pain in right foot    3. Pain in left foot    4. Contracture, right ankle    5. Contracture, left ankle    6. Pes planus of both feet    7. Morbid obesity due to excess calories    8. Lumbar stenosis with neurogenic claudication          Plan:     Pain in right foot  Pain in left foot  Contracture, right ankle  Contracture, left ankle  Pes planus of both feet  Plantar fasciitis  -     dexamethasone injection 4 mg  -     triamcinolone acetonide injection 40 mg    Thorough discussion is had with the patient today, concerning the diagnosis, its etiology, and the treatment algorithm at present.    Following sterile preparation with alcohol swab and/or betadine paint, injection was given into and around the area of the left and right plantar medial calcaneal tubercle, using 25-gauge needle. Injection consisted of approximately 0.5cc of Dexamethasone Sodium Phosphate, 0.5cc of Kenalog-40 and 0.5cc of 1% Lidocaine w/ or w/o Epinephrine or 0.25% or 0.50% Marcaine plain. Patient tolerated procedure well, and without complications or complaints. Patient educated that the area of pathology, might actually be slightly more painful, due to the injection, over the course of the next one to two days.    Did discuss proper and supportive shoe gear in detail and at length with the patient.  These are shoes with firm and robust arch support; medial counter.  Shoes which only bend at the metatarsophalangeal joint and which are rigid in the midfoot and hindfoot. Patient urged to  purchase running type or cross training type shoes gear which are designed for pronation control.    Prescription is written for Orthotist evaluation at [facility], [address], [place] [address], for lower extremity orthotic(s) management and/or shoe gear management.     Morbid obesity due to excess calories  Patient is counseled and reminded concerning the importance of good nutrition and healthy eating habits, which may include blood sugar control, to prevent and/or help podiatric foot and ankle complications.    Lumbar stenosis with neurogenic claudication  Continue follow-up and treatment with Pain Management.    Patient may have concomitant radicular symptoms given her history of lumbar stenosis radiculopathy.          Future Appointments   Date Time Provider Department Center   12/28/2020 10:20 AM COVID TESTING, [facility]   1/15/2021  2:00 PM DALILA RosalesW [facility] [place]   2/16/2021  8:00 AM Marcus Narvaez DPM [facility] POD [facility]   2/17/2021 12:20 PM Thanh Diaz MD [facility] IN PN [facility]   2/22/2021  1:00 PM Ann Woods MD [facility] PUL[facility]   3/11/2021  1:00 PM Juan Alberto Luis MD [facility] CARDIO [facility]

## 2020-12-22 NOTE — TELEPHONE ENCOUNTER
PAT call completed and patient educated on procedure instructions. Medical history discussed and patient informed of arrival times 0900 12/31/2020. Pt will be accompanied by daughter and is made aware of limited-visitor policy, and that  is to remain during entire visit. All questions and concerns addressed. Endoscopy instructions reviewed. Informed to take AM medications of amlodipine, metoprolol, and lasix. NPO after midnight. Patient verbalizes understanding of teaching and all instructions. Pre-procedure Covid testing 12/28/2020 at the [facility]. Patient aware. Pt instructed to monitor [facility] account for covid results. Pt also instructed to bring all home medications the morning of procedure. Pt verbalized understanding.

## 2020-12-28 ENCOUNTER — LAB VISIT (OUTPATIENT)
Dept: OTOLARYNGOLOGY | Facility: CLINIC | Age: 65
End: 2020-12-28
Payer: COMMERCIAL

## 2020-12-28 DIAGNOSIS — G47.33 OSA ON CPAP: ICD-10-CM

## 2020-12-28 LAB — SARS-COV-2 RNA RESP QL NAA+PROBE: NOT DETECTED

## 2020-12-28 PROCEDURE — U0003 INFECTIOUS AGENT DETECTION BY NUCLEIC ACID (DNA OR RNA); SEVERE ACUTE RESPIRATORY SYNDROME CORONAVIRUS 2 (SARS-COV-2) (CORONAVIRUS DISEASE [COVID-19]), AMPLIFIED PROBE TECHNIQUE, MAKING USE OF HIGH THROUGHPUT TECHNOLOGIES AS DESCRIBED BY CMS-2020-01-R: HCPCS

## 2020-12-30 ENCOUNTER — ANESTHESIA EVENT (OUTPATIENT)
Dept: ENDOSCOPY | Facility: HOSPITAL | Age: 65
End: 2020-12-30
Payer: COMMERCIAL
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
## 2020-12-31 ENCOUNTER — HOSPITAL ENCOUNTER (OUTPATIENT)
Facility: HOSPITAL | Age: 65
Discharge: HOME | End: 2020-12-31
Attending: SURGERY | Admitting: SURGERY
Payer: COMMERCIAL

## 2020-12-31 ENCOUNTER — ANESTHESIA (OUTPATIENT)
Dept: ENDOSCOPY | Facility: HOSPITAL | Age: 65
End: 2020-12-31
Payer: COMMERCIAL

## 2020-12-31 VITALS
DIASTOLIC BLOOD PRESSURE: 80 MMHG | OXYGEN SATURATION: 100 % | HEIGHT: 63 IN | BODY MASS INDEX: 43.59 KG/M2 | TEMPERATURE: 98 F | RESPIRATION RATE: 18 BRPM | SYSTOLIC BLOOD PRESSURE: 120 MMHG | WEIGHT: 246 LBS | HEART RATE: 80 BPM

## 2020-12-31 DIAGNOSIS — E66.01 MORBID OBESITY: ICD-10-CM

## 2020-12-31 DIAGNOSIS — E66.01 MORBID OBESITY DUE TO EXCESS CALORIES: Primary | ICD-10-CM

## 2020-12-31 PROCEDURE — 37000009 HC ANESTHESIA EA ADD 15 MINS: Performed by: SURGERY

## 2020-12-31 PROCEDURE — 27201012 HC FORCEPS, HOT/COLD, DISP: Performed by: SURGERY

## 2020-12-31 PROCEDURE — 25000003 PHARM REV CODE 250: Performed by: NURSE ANESTHETIST, CERTIFIED REGISTERED

## 2020-12-31 PROCEDURE — 88305 TISSUE EXAM BY PATHOLOGIST: ICD-10-PCS | Mod: 26,,, | Performed by: PATHOLOGY

## 2020-12-31 PROCEDURE — D9220A PRA ANESTHESIA: ICD-10-PCS | Mod: QZ,ANES,, | Performed by: ANESTHESIOLOGY

## 2020-12-31 PROCEDURE — D9220A PRA ANESTHESIA: Mod: QZ,ANES,, | Performed by: ANESTHESIOLOGY

## 2020-12-31 PROCEDURE — 63600175 PHARM REV CODE 636 W HCPCS: Performed by: NURSE ANESTHETIST, CERTIFIED REGISTERED

## 2020-12-31 PROCEDURE — 88305 TISSUE EXAM BY PATHOLOGIST: CPT | Mod: 26,,, | Performed by: PATHOLOGY

## 2020-12-31 PROCEDURE — 43239 PR EGD, FLEX, W/BIOPSY, SGL/MULTI: ICD-10-PCS | Mod: ,,, | Performed by: SURGERY

## 2020-12-31 PROCEDURE — D9220A PRA ANESTHESIA: ICD-10-PCS | Mod: QZ,CRNA,, | Performed by: NURSE ANESTHETIST, CERTIFIED REGISTERED

## 2020-12-31 PROCEDURE — 43239 EGD BIOPSY SINGLE/MULTIPLE: CPT | Mod: ,,, | Performed by: SURGERY

## 2020-12-31 PROCEDURE — 43239 EGD BIOPSY SINGLE/MULTIPLE: CPT | Performed by: SURGERY

## 2020-12-31 PROCEDURE — D9220A PRA ANESTHESIA: Mod: QZ,CRNA,, | Performed by: NURSE ANESTHETIST, CERTIFIED REGISTERED

## 2020-12-31 PROCEDURE — 88305 TISSUE EXAM BY PATHOLOGIST: CPT | Performed by: PATHOLOGY

## 2020-12-31 PROCEDURE — 37000008 HC ANESTHESIA 1ST 15 MINUTES: Performed by: SURGERY

## 2020-12-31 RX ORDER — LIDOCAINE HYDROCHLORIDE 20 MG/ML
INJECTION INTRAVENOUS
Status: DISCONTINUED | OUTPATIENT
Start: 2020-12-31 | End: 2020-12-31

## 2020-12-31 RX ORDER — PROPOFOL 10 MG/ML
VIAL (ML) INTRAVENOUS
Status: DISCONTINUED | OUTPATIENT
Start: 2020-12-31 | End: 2020-12-31

## 2020-12-31 RX ORDER — SODIUM CHLORIDE, SODIUM LACTATE, POTASSIUM CHLORIDE, CALCIUM CHLORIDE 600; 310; 30; 20 MG/100ML; MG/100ML; MG/100ML; MG/100ML
INJECTION, SOLUTION INTRAVENOUS CONTINUOUS
Status: DISCONTINUED | OUTPATIENT
Start: 2020-12-31 | End: 2020-12-31 | Stop reason: HOSPADM

## 2020-12-31 RX ORDER — SODIUM CHLORIDE 0.9 % (FLUSH) 0.9 %
3 SYRINGE (ML) INJECTION
Status: DISCONTINUED | OUTPATIENT
Start: 2020-12-31 | End: 2020-12-31 | Stop reason: HOSPADM

## 2020-12-31 RX ADMIN — PROPOFOL 50 MG: 10 INJECTION, EMULSION INTRAVENOUS at 10:12

## 2020-12-31 RX ADMIN — Medication 100 MG: at 10:12

## 2020-12-31 NOTE — ANESTHESIA POSTPROCEDURE EVALUATION
Anesthesia Post Evaluation    Patient: Mariel Becerril    Procedure(s) Performed: Procedure(s) (LRB):  ESOPHAGOGASTRODUODENOSCOPY (EGD) (N/A)    Final Anesthesia Type: general      Patient location during evaluation: PACU  Patient participation: Yes- Able to Participate  Level of consciousness: awake and alert  Post-procedure vital signs: reviewed and stable  Pain management: adequate  Airway patency: patent    PONV status at discharge: No PONV  Anesthetic complications: no      Cardiovascular status: stable  Respiratory status: spontaneous ventilation  Hydration status: euvolemic  Follow-up not needed.          Vitals Value Taken Time   /80 12/31/20 1129   Temp 36.7 °C (98 °F) 12/31/20 1101   Pulse 80 12/31/20 1129   Resp 18 12/31/20 1129   SpO2 100 % 12/31/20 1129         Event Time   Out of Recovery 11:31:00         Pain/Loren Score: Loren Score: 10 (12/31/2020 11:29 AM)

## 2020-12-31 NOTE — TRANSFER OF CARE
"Anesthesia Transfer of Care Note    Patient: Mariel Becerril    Procedure(s) Performed: Procedure(s) (LRB):  ESOPHAGOGASTRODUODENOSCOPY (EGD) (N/A)    Patient location: PACU    Anesthesia Type: general    Transport from OR: Transported from OR on room air with adequate spontaneous ventilation    Post pain: adequate analgesia    Post assessment: no apparent anesthetic complications and tolerated procedure well    Post vital signs: stable    Level of consciousness: awake and alert    Nausea/Vomiting: no nausea/vomiting    Complications: none    Transfer of care protocol was followed      Last vitals:   Visit Vitals  /74 (BP Location: Right arm, Patient Position: Sitting)   Pulse 72   Temp 36.7 °C (98.1 °F) (Temporal)   Resp 16   Ht 5' 3" (1.6 m)   Wt 111.6 kg (246 lb)   SpO2 97%   Breastfeeding No   BMI 43.58 kg/m²     "

## 2021-01-04 ENCOUNTER — HOSPITAL ENCOUNTER (OUTPATIENT)
Facility: HOSPITAL | Age: 66
Discharge: HOME | End: 2021-01-04
Attending: PHYSICAL MEDICINE & REHABILITATION | Admitting: PHYSICAL MEDICINE & REHABILITATION
Payer: COMMERCIAL

## 2021-01-04 VITALS
HEART RATE: 68 BPM | HEIGHT: 63 IN | TEMPERATURE: 98 F | BODY MASS INDEX: 44.8 KG/M2 | DIASTOLIC BLOOD PRESSURE: 70 MMHG | OXYGEN SATURATION: 100 % | SYSTOLIC BLOOD PRESSURE: 124 MMHG | WEIGHT: 252.88 LBS | RESPIRATION RATE: 16 BRPM

## 2021-01-04 DIAGNOSIS — M54.16 LUMBAR RADICULOPATHY: ICD-10-CM

## 2021-01-04 PROCEDURE — 25000003 PHARM REV CODE 250: Performed by: PHYSICAL MEDICINE & REHABILITATION

## 2021-01-04 PROCEDURE — 63600175 PHARM REV CODE 636 W HCPCS: Performed by: PHYSICAL MEDICINE & REHABILITATION

## 2021-01-04 PROCEDURE — 99152 MOD SED SAME PHYS/QHP 5/>YRS: CPT | Performed by: PHYSICAL MEDICINE & REHABILITATION

## 2021-01-04 PROCEDURE — 64483 NJX AA&/STRD TFRM EPI L/S 1: CPT | Mod: 50 | Performed by: PHYSICAL MEDICINE & REHABILITATION

## 2021-01-04 PROCEDURE — 25500020 PHARM REV CODE 255: Performed by: PHYSICAL MEDICINE & REHABILITATION

## 2021-01-04 PROCEDURE — 64483 PR EPIDURAL INJ, ANES/STEROID, TRANSFORAMINAL, LUMB/SACR, SNGL LEVL: ICD-10-PCS | Mod: 50,,, | Performed by: PHYSICAL MEDICINE & REHABILITATION

## 2021-01-04 PROCEDURE — 64483 NJX AA&/STRD TFRM EPI L/S 1: CPT | Mod: 50,,, | Performed by: PHYSICAL MEDICINE & REHABILITATION

## 2021-01-04 RX ORDER — MIDAZOLAM HYDROCHLORIDE 1 MG/ML
INJECTION, SOLUTION INTRAMUSCULAR; INTRAVENOUS
Status: DISCONTINUED | OUTPATIENT
Start: 2021-01-04 | End: 2021-01-04 | Stop reason: HOSPADM

## 2021-01-04 RX ORDER — BUPIVACAINE HYDROCHLORIDE 2.5 MG/ML
INJECTION, SOLUTION EPIDURAL; INFILTRATION; INTRACAUDAL
Status: DISCONTINUED | OUTPATIENT
Start: 2021-01-04 | End: 2021-01-04 | Stop reason: HOSPADM

## 2021-01-04 RX ORDER — FENTANYL CITRATE 50 UG/ML
INJECTION, SOLUTION INTRAMUSCULAR; INTRAVENOUS
Status: DISCONTINUED | OUTPATIENT
Start: 2021-01-04 | End: 2021-01-04 | Stop reason: HOSPADM

## 2021-01-04 RX ORDER — METHYLPREDNISOLONE ACETATE 40 MG/ML
INJECTION, SUSPENSION INTRA-ARTICULAR; INTRALESIONAL; INTRAMUSCULAR; SOFT TISSUE
Status: DISCONTINUED | OUTPATIENT
Start: 2021-01-04 | End: 2021-01-04 | Stop reason: HOSPADM

## 2021-01-04 RX ORDER — ONDANSETRON 2 MG/ML
4 INJECTION INTRAMUSCULAR; INTRAVENOUS ONCE AS NEEDED
Status: DISCONTINUED | OUTPATIENT
Start: 2021-01-04 | End: 2021-03-23 | Stop reason: SDUPTHER

## 2021-01-05 ENCOUNTER — PATIENT MESSAGE (OUTPATIENT)
Dept: SURGERY | Facility: CLINIC | Age: 66
End: 2021-01-05

## 2021-01-05 LAB
FINAL PATHOLOGIC DIAGNOSIS: NORMAL
GROSS: NORMAL
Lab: NORMAL

## 2021-01-06 ENCOUNTER — PATIENT MESSAGE (OUTPATIENT)
Dept: SURGERY | Facility: CLINIC | Age: 66
End: 2021-01-06

## 2021-01-15 ENCOUNTER — OFFICE VISIT (OUTPATIENT)
Dept: PSYCHIATRY | Facility: CLINIC | Age: 66
End: 2021-01-15
Payer: COMMERCIAL

## 2021-01-15 ENCOUNTER — OFFICE VISIT (OUTPATIENT)
Dept: OPHTHALMOLOGY | Facility: CLINIC | Age: 66
End: 2021-01-15
Payer: COMMERCIAL

## 2021-01-15 DIAGNOSIS — H04.129 DRY EYE: Primary | ICD-10-CM

## 2021-01-15 DIAGNOSIS — E66.01 MORBID OBESITY DUE TO EXCESS CALORIES: ICD-10-CM

## 2021-01-15 DIAGNOSIS — Z01.818 PREOP EXAMINATION: Primary | ICD-10-CM

## 2021-01-15 PROCEDURE — 99999 PR PBB SHADOW E&M-EST. PATIENT-LVL III: CPT | Mod: PBBFAC,,, | Performed by: OPTOMETRIST

## 2021-01-15 PROCEDURE — 99999 PR PBB SHADOW E&M-EST. PATIENT-LVL I: ICD-10-PCS | Mod: PBBFAC,,, | Performed by: SOCIAL WORKER

## 2021-01-15 PROCEDURE — 92012 INTRM OPH EXAM EST PATIENT: CPT | Mod: S$GLB,,, | Performed by: OPTOMETRIST

## 2021-01-15 PROCEDURE — 90791 PR PSYCHIATRIC DIAGNOSTIC EVALUATION: ICD-10-PCS | Mod: S$GLB,,, | Performed by: SOCIAL WORKER

## 2021-01-15 PROCEDURE — 90791 PSYCH DIAGNOSTIC EVALUATION: CPT | Mod: S$GLB,,, | Performed by: SOCIAL WORKER

## 2021-01-15 PROCEDURE — 99999 PR PBB SHADOW E&M-EST. PATIENT-LVL I: CPT | Mod: PBBFAC,,, | Performed by: SOCIAL WORKER

## 2021-01-15 PROCEDURE — 92012 PR EYE EXAM, EST PATIENT,INTERMED: ICD-10-PCS | Mod: S$GLB,,, | Performed by: OPTOMETRIST

## 2021-01-15 PROCEDURE — 99999 PR PBB SHADOW E&M-EST. PATIENT-LVL III: ICD-10-PCS | Mod: PBBFAC,,, | Performed by: OPTOMETRIST

## 2021-01-17 ENCOUNTER — PATIENT MESSAGE (OUTPATIENT)
Dept: SURGERY | Facility: CLINIC | Age: 66
End: 2021-01-17

## 2021-01-27 ENCOUNTER — OFFICE VISIT (OUTPATIENT)
Dept: SURGERY | Facility: CLINIC | Age: 66
End: 2021-01-27
Payer: COMMERCIAL

## 2021-01-27 VITALS
HEART RATE: 77 BPM | DIASTOLIC BLOOD PRESSURE: 80 MMHG | BODY MASS INDEX: 46.28 KG/M2 | SYSTOLIC BLOOD PRESSURE: 121 MMHG | WEIGHT: 261.25 LBS

## 2021-01-27 DIAGNOSIS — M17.0 PRIMARY OSTEOARTHRITIS OF BOTH KNEES: ICD-10-CM

## 2021-01-27 DIAGNOSIS — G47.33 OSA ON CPAP: Primary | ICD-10-CM

## 2021-01-27 DIAGNOSIS — Z01.818 PRE-OP TESTING: ICD-10-CM

## 2021-01-27 DIAGNOSIS — Z01.818 PREOP TESTING: Primary | ICD-10-CM

## 2021-01-27 DIAGNOSIS — E66.01 MORBID OBESITY: ICD-10-CM

## 2021-01-27 PROCEDURE — 3074F PR MOST RECENT SYSTOLIC BLOOD PRESSURE < 130 MM HG: ICD-10-PCS | Mod: CPTII,S$GLB,, | Performed by: SURGERY

## 2021-01-27 PROCEDURE — 3074F SYST BP LT 130 MM HG: CPT | Mod: CPTII,S$GLB,, | Performed by: SURGERY

## 2021-01-27 PROCEDURE — 99999 PR PBB SHADOW E&M-EST. PATIENT-LVL IV: ICD-10-PCS | Mod: PBBFAC,,, | Performed by: SURGERY

## 2021-01-27 PROCEDURE — 1126F PR PAIN SEVERITY QUANTIFIED, NO PAIN PRESENT: ICD-10-PCS | Mod: S$GLB,,, | Performed by: SURGERY

## 2021-01-27 PROCEDURE — 99214 OFFICE O/P EST MOD 30 MIN: CPT | Mod: S$GLB,,, | Performed by: SURGERY

## 2021-01-27 PROCEDURE — 1126F AMNT PAIN NOTED NONE PRSNT: CPT | Mod: S$GLB,,, | Performed by: SURGERY

## 2021-01-27 PROCEDURE — 3079F DIAST BP 80-89 MM HG: CPT | Mod: CPTII,S$GLB,, | Performed by: SURGERY

## 2021-01-27 PROCEDURE — 99214 PR OFFICE/OUTPT VISIT, EST, LEVL IV, 30-39 MIN: ICD-10-PCS | Mod: S$GLB,,, | Performed by: SURGERY

## 2021-01-27 PROCEDURE — 99999 PR PBB SHADOW E&M-EST. PATIENT-LVL IV: CPT | Mod: PBBFAC,,, | Performed by: SURGERY

## 2021-01-27 PROCEDURE — 3008F BODY MASS INDEX DOCD: CPT | Mod: CPTII,S$GLB,, | Performed by: SURGERY

## 2021-01-27 PROCEDURE — 3008F PR BODY MASS INDEX (BMI) DOCUMENTED: ICD-10-PCS | Mod: CPTII,S$GLB,, | Performed by: SURGERY

## 2021-01-27 PROCEDURE — 3079F PR MOST RECENT DIASTOLIC BLOOD PRESSURE 80-89 MM HG: ICD-10-PCS | Mod: CPTII,S$GLB,, | Performed by: SURGERY

## 2021-02-03 DIAGNOSIS — Z01.818 PRE-OP TESTING: ICD-10-CM

## 2021-02-03 DIAGNOSIS — G47.33 OSA ON CPAP: ICD-10-CM

## 2021-02-03 DIAGNOSIS — I10 ESSENTIAL HYPERTENSION: ICD-10-CM

## 2021-02-03 DIAGNOSIS — M17.0 PRIMARY OSTEOARTHRITIS OF BOTH KNEES: ICD-10-CM

## 2021-02-03 DIAGNOSIS — E66.01 MORBID OBESITY WITH BMI OF 45.0-49.9, ADULT: ICD-10-CM

## 2021-02-03 DIAGNOSIS — E66.01 MORBID OBESITY: Primary | ICD-10-CM

## 2021-02-03 RX ORDER — SODIUM CHLORIDE 9 MG/ML
INJECTION, SOLUTION INTRAVENOUS CONTINUOUS
Status: CANCELLED | OUTPATIENT
Start: 2021-02-03

## 2021-02-03 RX ORDER — HEPARIN SODIUM 5000 [USP'U]/ML
5000 INJECTION, SOLUTION INTRAVENOUS; SUBCUTANEOUS
Status: CANCELLED | OUTPATIENT
Start: 2021-02-03

## 2021-02-03 RX ORDER — LIDOCAINE HYDROCHLORIDE 10 MG/ML
1 INJECTION, SOLUTION EPIDURAL; INFILTRATION; INTRACAUDAL; PERINEURAL ONCE
Status: CANCELLED | OUTPATIENT
Start: 2021-02-03 | End: 2021-02-03

## 2021-02-15 RX ORDER — GABAPENTIN 300 MG/1
CAPSULE ORAL
Qty: 60 CAPSULE | Refills: 0 | Status: SHIPPED | OUTPATIENT
Start: 2021-02-15 | End: 2021-10-20

## 2021-02-16 DIAGNOSIS — J30.2 SEASONAL ALLERGIES: ICD-10-CM

## 2021-02-16 DIAGNOSIS — F41.9 ANXIETY: ICD-10-CM

## 2021-02-17 ENCOUNTER — TELEPHONE (OUTPATIENT)
Dept: PAIN MEDICINE | Facility: CLINIC | Age: 66
End: 2021-02-17

## 2021-02-17 ENCOUNTER — OFFICE VISIT (OUTPATIENT)
Dept: PAIN MEDICINE | Facility: CLINIC | Age: 66
End: 2021-02-17
Payer: COMMERCIAL

## 2021-02-17 VITALS
HEIGHT: 63 IN | HEART RATE: 81 BPM | BODY MASS INDEX: 46.42 KG/M2 | SYSTOLIC BLOOD PRESSURE: 113 MMHG | RESPIRATION RATE: 18 BRPM | WEIGHT: 262 LBS | DIASTOLIC BLOOD PRESSURE: 70 MMHG

## 2021-02-17 DIAGNOSIS — M51.36 DDD (DEGENERATIVE DISC DISEASE), LUMBAR: ICD-10-CM

## 2021-02-17 DIAGNOSIS — M54.16 LUMBAR RADICULOPATHY: Primary | ICD-10-CM

## 2021-02-17 PROCEDURE — 3074F PR MOST RECENT SYSTOLIC BLOOD PRESSURE < 130 MM HG: ICD-10-PCS | Mod: CPTII,S$GLB,, | Performed by: PHYSICAL MEDICINE & REHABILITATION

## 2021-02-17 PROCEDURE — 3008F PR BODY MASS INDEX (BMI) DOCUMENTED: ICD-10-PCS | Mod: CPTII,S$GLB,, | Performed by: PHYSICAL MEDICINE & REHABILITATION

## 2021-02-17 PROCEDURE — 99999 PR PBB SHADOW E&M-EST. PATIENT-LVL III: CPT | Mod: PBBFAC,,, | Performed by: PHYSICAL MEDICINE & REHABILITATION

## 2021-02-17 PROCEDURE — 3074F SYST BP LT 130 MM HG: CPT | Mod: CPTII,S$GLB,, | Performed by: PHYSICAL MEDICINE & REHABILITATION

## 2021-02-17 PROCEDURE — 99214 OFFICE O/P EST MOD 30 MIN: CPT | Mod: S$GLB,,, | Performed by: PHYSICAL MEDICINE & REHABILITATION

## 2021-02-17 PROCEDURE — 3078F PR MOST RECENT DIASTOLIC BLOOD PRESSURE < 80 MM HG: ICD-10-PCS | Mod: CPTII,S$GLB,, | Performed by: PHYSICAL MEDICINE & REHABILITATION

## 2021-02-17 PROCEDURE — 1125F AMNT PAIN NOTED PAIN PRSNT: CPT | Mod: S$GLB,,, | Performed by: PHYSICAL MEDICINE & REHABILITATION

## 2021-02-17 PROCEDURE — 3078F DIAST BP <80 MM HG: CPT | Mod: CPTII,S$GLB,, | Performed by: PHYSICAL MEDICINE & REHABILITATION

## 2021-02-17 PROCEDURE — 99999 PR PBB SHADOW E&M-EST. PATIENT-LVL III: ICD-10-PCS | Mod: PBBFAC,,, | Performed by: PHYSICAL MEDICINE & REHABILITATION

## 2021-02-17 PROCEDURE — 99214 PR OFFICE/OUTPT VISIT, EST, LEVL IV, 30-39 MIN: ICD-10-PCS | Mod: S$GLB,,, | Performed by: PHYSICAL MEDICINE & REHABILITATION

## 2021-02-17 PROCEDURE — 1125F PR PAIN SEVERITY QUANTIFIED, PAIN PRESENT: ICD-10-PCS | Mod: S$GLB,,, | Performed by: PHYSICAL MEDICINE & REHABILITATION

## 2021-02-17 PROCEDURE — 3008F BODY MASS INDEX DOCD: CPT | Mod: CPTII,S$GLB,, | Performed by: PHYSICAL MEDICINE & REHABILITATION

## 2021-02-17 RX ORDER — LEVOCETIRIZINE DIHYDROCHLORIDE 5 MG/1
TABLET, FILM COATED ORAL
Qty: 90 TABLET | Refills: 3 | Status: SHIPPED | OUTPATIENT
Start: 2021-02-17 | End: 2022-03-21

## 2021-02-17 RX ORDER — TRAMADOL HYDROCHLORIDE 50 MG/1
TABLET ORAL
Qty: 30 TABLET | Refills: 0 | Status: SHIPPED | OUTPATIENT
Start: 2021-02-17 | End: 2021-02-24 | Stop reason: SDUPTHER

## 2021-02-18 ENCOUNTER — LAB VISIT (OUTPATIENT)
Dept: LAB | Facility: HOSPITAL | Age: 66
End: 2021-02-18
Attending: SURGERY
Payer: COMMERCIAL

## 2021-02-18 ENCOUNTER — OFFICE VISIT (OUTPATIENT)
Dept: PODIATRY | Facility: CLINIC | Age: 66
End: 2021-02-18
Payer: COMMERCIAL

## 2021-02-18 VITALS — BODY MASS INDEX: 46.78 KG/M2 | WEIGHT: 264 LBS | HEIGHT: 63 IN

## 2021-02-18 DIAGNOSIS — G47.33 OSA ON CPAP: ICD-10-CM

## 2021-02-18 DIAGNOSIS — M79.674 PAIN IN TOES OF BOTH FEET: ICD-10-CM

## 2021-02-18 DIAGNOSIS — M79.675 PAIN IN TOES OF BOTH FEET: ICD-10-CM

## 2021-02-18 DIAGNOSIS — B35.1 ONYCHOMYCOSIS: Primary | ICD-10-CM

## 2021-02-18 DIAGNOSIS — E66.01 MORBID OBESITY: ICD-10-CM

## 2021-02-18 DIAGNOSIS — I10 ESSENTIAL HYPERTENSION: ICD-10-CM

## 2021-02-18 DIAGNOSIS — M17.0 PRIMARY OSTEOARTHRITIS OF BOTH KNEES: ICD-10-CM

## 2021-02-18 LAB
BASOPHILS # BLD AUTO: 0.06 K/UL (ref 0–0.2)
BASOPHILS NFR BLD: 0.9 % (ref 0–1.9)
DIFFERENTIAL METHOD: ABNORMAL
EOSINOPHIL # BLD AUTO: 0.2 K/UL (ref 0–0.5)
EOSINOPHIL NFR BLD: 2.8 % (ref 0–8)
ERYTHROCYTE [DISTWIDTH] IN BLOOD BY AUTOMATED COUNT: 14.3 % (ref 11.5–14.5)
HCT VFR BLD AUTO: 37.7 % (ref 37–48.5)
HGB BLD-MCNC: 11.2 G/DL (ref 12–16)
IMM GRANULOCYTES # BLD AUTO: 0.02 K/UL (ref 0–0.04)
IMM GRANULOCYTES NFR BLD AUTO: 0.3 % (ref 0–0.5)
LYMPHOCYTES # BLD AUTO: 2.2 K/UL (ref 1–4.8)
LYMPHOCYTES NFR BLD: 32.4 % (ref 18–48)
MCH RBC QN AUTO: 29.5 PG (ref 27–31)
MCHC RBC AUTO-ENTMCNC: 29.7 G/DL (ref 32–36)
MCV RBC AUTO: 99 FL (ref 82–98)
MONOCYTES # BLD AUTO: 0.5 K/UL (ref 0.3–1)
MONOCYTES NFR BLD: 7.4 % (ref 4–15)
NEUTROPHILS # BLD AUTO: 3.8 K/UL (ref 1.8–7.7)
NEUTROPHILS NFR BLD: 56.2 % (ref 38–73)
NRBC BLD-RTO: 0 /100 WBC
PLATELET # BLD AUTO: 262 K/UL (ref 150–350)
PMV BLD AUTO: 9.6 FL (ref 9.2–12.9)
RBC # BLD AUTO: 3.8 M/UL (ref 4–5.4)
WBC # BLD AUTO: 6.76 K/UL (ref 3.9–12.7)

## 2021-02-18 PROCEDURE — 1126F AMNT PAIN NOTED NONE PRSNT: CPT | Mod: ,,, | Performed by: PODIATRIST

## 2021-02-18 PROCEDURE — 1126F PR PAIN SEVERITY QUANTIFIED, NO PAIN PRESENT: ICD-10-PCS | Mod: ,,, | Performed by: PODIATRIST

## 2021-02-18 PROCEDURE — 17999 UNLISTD PX SKN MUC MEMB SUBQ: CPT | Mod: CSM,S$GLB,, | Performed by: PODIATRIST

## 2021-02-18 PROCEDURE — 80053 COMPREHEN METABOLIC PANEL: CPT

## 2021-02-18 PROCEDURE — 17999 PR NON-COVERED FOOT CARE: ICD-10-PCS | Mod: CSM,S$GLB,, | Performed by: PODIATRIST

## 2021-02-18 PROCEDURE — 36415 COLL VENOUS BLD VENIPUNCTURE: CPT

## 2021-02-18 PROCEDURE — 3008F BODY MASS INDEX DOCD: CPT | Mod: CPTII,,, | Performed by: PODIATRIST

## 2021-02-18 PROCEDURE — 99999 PR PBB SHADOW E&M-EST. PATIENT-LVL II: CPT | Mod: PBBFAC,,, | Performed by: PODIATRIST

## 2021-02-18 PROCEDURE — 85025 COMPLETE CBC W/AUTO DIFF WBC: CPT

## 2021-02-18 PROCEDURE — 99999 PR PBB SHADOW E&M-EST. PATIENT-LVL II: ICD-10-PCS | Mod: PBBFAC,,, | Performed by: PODIATRIST

## 2021-02-18 PROCEDURE — 3008F PR BODY MASS INDEX (BMI) DOCUMENTED: ICD-10-PCS | Mod: CPTII,,, | Performed by: PODIATRIST

## 2021-02-18 PROCEDURE — 99499 UNLISTED E&M SERVICE: CPT | Mod: ,,, | Performed by: PODIATRIST

## 2021-02-18 PROCEDURE — 99499 NO LOS: ICD-10-PCS | Mod: ,,, | Performed by: PODIATRIST

## 2021-02-19 LAB
ALBUMIN SERPL BCP-MCNC: 3.4 G/DL (ref 3.5–5.2)
ALP SERPL-CCNC: 91 U/L (ref 55–135)
ALT SERPL W/O P-5'-P-CCNC: 20 U/L (ref 10–44)
ANION GAP SERPL CALC-SCNC: 10 MMOL/L (ref 8–16)
AST SERPL-CCNC: 25 U/L (ref 10–40)
BILIRUB SERPL-MCNC: 0.2 MG/DL (ref 0.1–1)
BUN SERPL-MCNC: 22 MG/DL (ref 8–23)
CALCIUM SERPL-MCNC: 9.2 MG/DL (ref 8.7–10.5)
CHLORIDE SERPL-SCNC: 110 MMOL/L (ref 95–110)
CO2 SERPL-SCNC: 24 MMOL/L (ref 23–29)
CREAT SERPL-MCNC: 0.9 MG/DL (ref 0.5–1.4)
EST. GFR  (AFRICAN AMERICAN): >60 ML/MIN/1.73 M^2
EST. GFR  (NON AFRICAN AMERICAN): >60 ML/MIN/1.73 M^2
GLUCOSE SERPL-MCNC: 94 MG/DL (ref 70–110)
POTASSIUM SERPL-SCNC: 4 MMOL/L (ref 3.5–5.1)
PROT SERPL-MCNC: 6.6 G/DL (ref 6–8.4)
SODIUM SERPL-SCNC: 144 MMOL/L (ref 136–145)

## 2021-02-19 RX ORDER — ALPRAZOLAM 0.25 MG/1
TABLET ORAL
Qty: 20 TABLET | Refills: 0 | Status: SHIPPED | OUTPATIENT
Start: 2021-02-19 | End: 2021-03-15

## 2021-02-21 DIAGNOSIS — J30.9 ALLERGIC RHINITIS, UNSPECIFIED SEASONALITY, UNSPECIFIED TRIGGER: ICD-10-CM

## 2021-02-23 RX ORDER — FLUTICASONE PROPIONATE 50 MCG
SPRAY, SUSPENSION (ML) NASAL
Qty: 48 G | Refills: 3 | Status: SHIPPED | OUTPATIENT
Start: 2021-02-23 | End: 2021-06-23 | Stop reason: ALTCHOICE

## 2021-02-24 ENCOUNTER — PATIENT MESSAGE (OUTPATIENT)
Dept: SURGERY | Facility: HOSPITAL | Age: 66
End: 2021-02-24

## 2021-02-24 ENCOUNTER — TELEPHONE (OUTPATIENT)
Dept: SURGERY | Facility: CLINIC | Age: 66
End: 2021-02-24

## 2021-02-24 ENCOUNTER — PATIENT MESSAGE (OUTPATIENT)
Dept: PAIN MEDICINE | Facility: CLINIC | Age: 66
End: 2021-02-24

## 2021-02-24 ENCOUNTER — OFFICE VISIT (OUTPATIENT)
Dept: PULMONOLOGY | Facility: CLINIC | Age: 66
End: 2021-02-24
Payer: COMMERCIAL

## 2021-02-24 VITALS
BODY MASS INDEX: 45.86 KG/M2 | WEIGHT: 258.81 LBS | HEART RATE: 73 BPM | OXYGEN SATURATION: 98 % | DIASTOLIC BLOOD PRESSURE: 78 MMHG | SYSTOLIC BLOOD PRESSURE: 132 MMHG | TEMPERATURE: 97 F | RESPIRATION RATE: 16 BRPM | HEIGHT: 63 IN

## 2021-02-24 DIAGNOSIS — G47.19 EXCESSIVE DAYTIME SLEEPINESS: ICD-10-CM

## 2021-02-24 DIAGNOSIS — R06.02 SOB (SHORTNESS OF BREATH): ICD-10-CM

## 2021-02-24 DIAGNOSIS — G47.33 OSA ON CPAP: Primary | ICD-10-CM

## 2021-02-24 PROCEDURE — 99214 PR OFFICE/OUTPT VISIT, EST, LEVL IV, 30-39 MIN: ICD-10-PCS | Mod: S$GLB,,, | Performed by: INTERNAL MEDICINE

## 2021-02-24 PROCEDURE — 99999 PR PBB SHADOW E&M-EST. PATIENT-LVL III: ICD-10-PCS | Mod: PBBFAC,,, | Performed by: INTERNAL MEDICINE

## 2021-02-24 PROCEDURE — 99214 OFFICE O/P EST MOD 30 MIN: CPT | Mod: S$GLB,,, | Performed by: INTERNAL MEDICINE

## 2021-02-24 PROCEDURE — 3075F SYST BP GE 130 - 139MM HG: CPT | Mod: CPTII,S$GLB,, | Performed by: INTERNAL MEDICINE

## 2021-02-24 PROCEDURE — 3008F PR BODY MASS INDEX (BMI) DOCUMENTED: ICD-10-PCS | Mod: CPTII,S$GLB,, | Performed by: INTERNAL MEDICINE

## 2021-02-24 PROCEDURE — 99999 PR PBB SHADOW E&M-EST. PATIENT-LVL III: CPT | Mod: PBBFAC,,, | Performed by: INTERNAL MEDICINE

## 2021-02-24 PROCEDURE — 3078F DIAST BP <80 MM HG: CPT | Mod: CPTII,S$GLB,, | Performed by: INTERNAL MEDICINE

## 2021-02-24 PROCEDURE — 3075F PR MOST RECENT SYSTOLIC BLOOD PRESS GE 130-139MM HG: ICD-10-PCS | Mod: CPTII,S$GLB,, | Performed by: INTERNAL MEDICINE

## 2021-02-24 PROCEDURE — 3078F PR MOST RECENT DIASTOLIC BLOOD PRESSURE < 80 MM HG: ICD-10-PCS | Mod: CPTII,S$GLB,, | Performed by: INTERNAL MEDICINE

## 2021-02-24 PROCEDURE — 3008F BODY MASS INDEX DOCD: CPT | Mod: CPTII,S$GLB,, | Performed by: INTERNAL MEDICINE

## 2021-02-24 RX ORDER — TRAMADOL HYDROCHLORIDE 50 MG/1
TABLET ORAL
Qty: 14 TABLET | Refills: 0 | Status: SHIPPED | OUTPATIENT
Start: 2021-02-24 | End: 2021-10-13 | Stop reason: SDUPTHER

## 2021-03-02 ENCOUNTER — ANESTHESIA EVENT (OUTPATIENT)
Dept: SURGERY | Facility: HOSPITAL | Age: 66
DRG: 621 | End: 2021-03-02
Payer: COMMERCIAL

## 2021-03-02 ENCOUNTER — HOSPITAL ENCOUNTER (INPATIENT)
Facility: HOSPITAL | Age: 66
LOS: 1 days | Discharge: HOME | DRG: 621 | End: 2021-03-03
Attending: SURGERY | Admitting: SURGERY
Payer: COMMERCIAL

## 2021-03-02 ENCOUNTER — ANESTHESIA (OUTPATIENT)
Dept: SURGERY | Facility: HOSPITAL | Age: 66
DRG: 621 | End: 2021-03-02
Payer: COMMERCIAL

## 2021-03-02 DIAGNOSIS — G47.33 OSA ON CPAP: ICD-10-CM

## 2021-03-02 DIAGNOSIS — E66.01 MORBID OBESITY: ICD-10-CM

## 2021-03-02 DIAGNOSIS — I10 ESSENTIAL HYPERTENSION: ICD-10-CM

## 2021-03-02 DIAGNOSIS — M17.0 PRIMARY OSTEOARTHRITIS OF BOTH KNEES: ICD-10-CM

## 2021-03-02 DIAGNOSIS — E66.01 MORBID OBESITY WITH BMI OF 45.0-49.9, ADULT: ICD-10-CM

## 2021-03-02 LAB
POCT GLUCOSE: 125 MG/DL (ref 70–110)
POCT GLUCOSE: 143 MG/DL (ref 70–110)

## 2021-03-02 PROCEDURE — 25000003 PHARM REV CODE 250: Performed by: SURGERY

## 2021-03-02 PROCEDURE — 27201423 OPTIME MED/SURG SUP & DEVICES STERILE SUPPLY: Performed by: SURGERY

## 2021-03-02 PROCEDURE — 63600175 PHARM REV CODE 636 W HCPCS: Performed by: NURSE ANESTHETIST, CERTIFIED REGISTERED

## 2021-03-02 PROCEDURE — 63600175 PHARM REV CODE 636 W HCPCS: Performed by: SURGERY

## 2021-03-02 PROCEDURE — 36000712 HC OR TIME LEV V 1ST 15 MIN: Performed by: SURGERY

## 2021-03-02 PROCEDURE — 71000039 HC RECOVERY, EACH ADD'L HOUR: Performed by: SURGERY

## 2021-03-02 PROCEDURE — 97530 THERAPEUTIC ACTIVITIES: CPT

## 2021-03-02 PROCEDURE — 25000003 PHARM REV CODE 250: Performed by: NURSE ANESTHETIST, CERTIFIED REGISTERED

## 2021-03-02 PROCEDURE — 37000009 HC ANESTHESIA EA ADD 15 MINS: Performed by: SURGERY

## 2021-03-02 PROCEDURE — 94799 UNLISTED PULMONARY SVC/PX: CPT

## 2021-03-02 PROCEDURE — 88307 TISSUE EXAM BY PATHOLOGIST: CPT | Mod: 26,,, | Performed by: PATHOLOGY

## 2021-03-02 PROCEDURE — 97161 PT EVAL LOW COMPLEX 20 MIN: CPT

## 2021-03-02 PROCEDURE — 88342 IMHCHEM/IMCYTCHM 1ST ANTB: CPT | Mod: 26,,, | Performed by: PATHOLOGY

## 2021-03-02 PROCEDURE — 71000033 HC RECOVERY, INTIAL HOUR: Performed by: SURGERY

## 2021-03-02 PROCEDURE — 99900035 HC TECH TIME PER 15 MIN (STAT)

## 2021-03-02 PROCEDURE — 36000713 HC OR TIME LEV V EA ADD 15 MIN: Performed by: SURGERY

## 2021-03-02 PROCEDURE — 88342 CHG IMMUNOCYTOCHEMISTRY: ICD-10-PCS | Mod: 26,,, | Performed by: PATHOLOGY

## 2021-03-02 PROCEDURE — 88342 IMHCHEM/IMCYTCHM 1ST ANTB: CPT | Performed by: PATHOLOGY

## 2021-03-02 PROCEDURE — 88307 PR  SURG PATH,LEVEL V: ICD-10-PCS | Mod: 26,,, | Performed by: PATHOLOGY

## 2021-03-02 PROCEDURE — 94660 CPAP INITIATION&MGMT: CPT

## 2021-03-02 PROCEDURE — 37000008 HC ANESTHESIA 1ST 15 MINUTES: Performed by: SURGERY

## 2021-03-02 PROCEDURE — S0028 INJECTION, FAMOTIDINE, 20 MG: HCPCS | Performed by: SURGERY

## 2021-03-02 PROCEDURE — 63600175 PHARM REV CODE 636 W HCPCS: Performed by: ANESTHESIOLOGY

## 2021-03-02 PROCEDURE — 27000190 HC CPAP FULL FACE MASK W/VALVE

## 2021-03-02 PROCEDURE — 43775 PR LAP, GAST RESTRICT PROC, LONGITUDINAL GASTRECTOMY: ICD-10-PCS | Mod: ,,, | Performed by: SURGERY

## 2021-03-02 PROCEDURE — 11000001 HC ACUTE MED/SURG PRIVATE ROOM

## 2021-03-02 PROCEDURE — 88307 TISSUE EXAM BY PATHOLOGIST: CPT | Performed by: PATHOLOGY

## 2021-03-02 PROCEDURE — 43775 LAP SLEEVE GASTRECTOMY: CPT | Mod: ,,, | Performed by: SURGERY

## 2021-03-02 RX ORDER — HYDROMORPHONE HYDROCHLORIDE 2 MG/ML
0.2 INJECTION, SOLUTION INTRAMUSCULAR; INTRAVENOUS; SUBCUTANEOUS EVERY 5 MIN PRN
Status: DISCONTINUED | OUTPATIENT
Start: 2021-03-02 | End: 2021-03-02 | Stop reason: HOSPADM

## 2021-03-02 RX ORDER — NEOSTIGMINE METHYLSULFATE 1 MG/ML
INJECTION, SOLUTION INTRAVENOUS
Status: DISCONTINUED | OUTPATIENT
Start: 2021-03-02 | End: 2021-03-02

## 2021-03-02 RX ORDER — HYDROMORPHONE HCL IN 0.9% NACL 6 MG/30 ML
PATIENT CONTROLLED ANALGESIA SYRINGE INTRAVENOUS CONTINUOUS
Status: DISCONTINUED | OUTPATIENT
Start: 2021-03-02 | End: 2021-03-03

## 2021-03-02 RX ORDER — ROCURONIUM BROMIDE 10 MG/ML
INJECTION, SOLUTION INTRAVENOUS
Status: DISCONTINUED | OUTPATIENT
Start: 2021-03-02 | End: 2021-03-02

## 2021-03-02 RX ORDER — ALBUTEROL SULFATE 90 UG/1
2 AEROSOL, METERED RESPIRATORY (INHALATION) EVERY 6 HOURS PRN
Status: DISCONTINUED | OUTPATIENT
Start: 2021-03-02 | End: 2021-03-02

## 2021-03-02 RX ORDER — SODIUM CHLORIDE, SODIUM LACTATE, POTASSIUM CHLORIDE, CALCIUM CHLORIDE 600; 310; 30; 20 MG/100ML; MG/100ML; MG/100ML; MG/100ML
INJECTION, SOLUTION INTRAVENOUS CONTINUOUS PRN
Status: DISCONTINUED | OUTPATIENT
Start: 2021-03-02 | End: 2021-03-02

## 2021-03-02 RX ORDER — FAMOTIDINE 20 MG/50ML
20 INJECTION, SOLUTION INTRAVENOUS EVERY 12 HOURS
Status: DISCONTINUED | OUTPATIENT
Start: 2021-03-02 | End: 2021-03-03 | Stop reason: HOSPADM

## 2021-03-02 RX ORDER — ONDANSETRON 2 MG/ML
4 INJECTION INTRAMUSCULAR; INTRAVENOUS EVERY 8 HOURS PRN
Status: DISCONTINUED | OUTPATIENT
Start: 2021-03-02 | End: 2021-03-03 | Stop reason: HOSPADM

## 2021-03-02 RX ORDER — LIDOCAINE HYDROCHLORIDE 10 MG/ML
INJECTION, SOLUTION EPIDURAL; INFILTRATION; INTRACAUDAL; PERINEURAL
Status: DISCONTINUED | OUTPATIENT
Start: 2021-03-02 | End: 2021-03-02

## 2021-03-02 RX ORDER — ALBUTEROL SULFATE 0.83 MG/ML
2.5 SOLUTION RESPIRATORY (INHALATION) EVERY 4 HOURS PRN
Status: DISCONTINUED | OUTPATIENT
Start: 2021-03-02 | End: 2021-03-02

## 2021-03-02 RX ORDER — PHENYLEPHRINE HYDROCHLORIDE 10 MG/ML
INJECTION INTRAVENOUS
Status: DISCONTINUED | OUTPATIENT
Start: 2021-03-02 | End: 2021-03-02

## 2021-03-02 RX ORDER — CHLORHEXIDINE GLUCONATE ORAL RINSE 1.2 MG/ML
10 SOLUTION DENTAL 2 TIMES DAILY
Status: DISCONTINUED | OUTPATIENT
Start: 2021-03-02 | End: 2021-03-03 | Stop reason: HOSPADM

## 2021-03-02 RX ORDER — LIDOCAINE HYDROCHLORIDE 10 MG/ML
1 INJECTION, SOLUTION EPIDURAL; INFILTRATION; INTRACAUDAL; PERINEURAL ONCE
Status: DISCONTINUED | OUTPATIENT
Start: 2021-03-02 | End: 2021-03-02 | Stop reason: HOSPADM

## 2021-03-02 RX ORDER — ONDANSETRON 2 MG/ML
4 INJECTION INTRAMUSCULAR; INTRAVENOUS DAILY PRN
Status: DISCONTINUED | OUTPATIENT
Start: 2021-03-02 | End: 2021-03-02 | Stop reason: HOSPADM

## 2021-03-02 RX ORDER — DIPHENHYDRAMINE HYDROCHLORIDE 50 MG/ML
25 INJECTION INTRAMUSCULAR; INTRAVENOUS EVERY 4 HOURS PRN
Status: DISCONTINUED | OUTPATIENT
Start: 2021-03-02 | End: 2021-03-03 | Stop reason: HOSPADM

## 2021-03-02 RX ORDER — HEPARIN SODIUM 5000 [USP'U]/ML
5000 INJECTION, SOLUTION INTRAVENOUS; SUBCUTANEOUS
Status: DISCONTINUED | OUTPATIENT
Start: 2021-03-02 | End: 2021-03-02 | Stop reason: HOSPADM

## 2021-03-02 RX ORDER — METOPROLOL TARTRATE 1 MG/ML
5 INJECTION, SOLUTION INTRAVENOUS ONCE
Status: DISCONTINUED | OUTPATIENT
Start: 2021-03-02 | End: 2021-03-02

## 2021-03-02 RX ORDER — DEXAMETHASONE SODIUM PHOSPHATE 4 MG/ML
INJECTION, SOLUTION INTRA-ARTICULAR; INTRALESIONAL; INTRAMUSCULAR; INTRAVENOUS; SOFT TISSUE
Status: DISCONTINUED | OUTPATIENT
Start: 2021-03-02 | End: 2021-03-02

## 2021-03-02 RX ORDER — SODIUM CHLORIDE 9 MG/ML
INJECTION, SOLUTION INTRAVENOUS CONTINUOUS
Status: DISCONTINUED | OUTPATIENT
Start: 2021-03-02 | End: 2021-03-02

## 2021-03-02 RX ORDER — ACETAMINOPHEN 10 MG/ML
INJECTION, SOLUTION INTRAVENOUS
Status: DISCONTINUED | OUTPATIENT
Start: 2021-03-02 | End: 2021-03-02

## 2021-03-02 RX ORDER — BISACODYL 10 MG
10 SUPPOSITORY, RECTAL RECTAL DAILY PRN
Status: DISCONTINUED | OUTPATIENT
Start: 2021-03-02 | End: 2021-03-03 | Stop reason: HOSPADM

## 2021-03-02 RX ORDER — SODIUM CHLORIDE, SODIUM LACTATE, POTASSIUM CHLORIDE, CALCIUM CHLORIDE 600; 310; 30; 20 MG/100ML; MG/100ML; MG/100ML; MG/100ML
INJECTION, SOLUTION INTRAVENOUS CONTINUOUS
Status: DISCONTINUED | OUTPATIENT
Start: 2021-03-02 | End: 2021-03-03

## 2021-03-02 RX ORDER — ALBUTEROL SULFATE 0.83 MG/ML
2.5 SOLUTION RESPIRATORY (INHALATION) EVERY 6 HOURS PRN
Status: DISCONTINUED | OUTPATIENT
Start: 2021-03-02 | End: 2021-03-03 | Stop reason: HOSPADM

## 2021-03-02 RX ORDER — PROPOFOL 10 MG/ML
VIAL (ML) INTRAVENOUS
Status: DISCONTINUED | OUTPATIENT
Start: 2021-03-02 | End: 2021-03-02

## 2021-03-02 RX ORDER — HYDRALAZINE HYDROCHLORIDE 20 MG/ML
10 INJECTION INTRAMUSCULAR; INTRAVENOUS EVERY 6 HOURS PRN
Status: DISCONTINUED | OUTPATIENT
Start: 2021-03-02 | End: 2021-03-03 | Stop reason: HOSPADM

## 2021-03-02 RX ORDER — ONDANSETRON 2 MG/ML
INJECTION INTRAMUSCULAR; INTRAVENOUS
Status: DISCONTINUED | OUTPATIENT
Start: 2021-03-02 | End: 2021-03-02

## 2021-03-02 RX ORDER — METOPROLOL TARTRATE 1 MG/ML
5 INJECTION, SOLUTION INTRAVENOUS EVERY 6 HOURS
Status: DISCONTINUED | OUTPATIENT
Start: 2021-03-02 | End: 2021-03-03

## 2021-03-02 RX ORDER — GLUCAGON 1 MG
1 KIT INJECTION
Status: DISCONTINUED | OUTPATIENT
Start: 2021-03-02 | End: 2021-03-03 | Stop reason: HOSPADM

## 2021-03-02 RX ORDER — SUCCINYLCHOLINE CHLORIDE 20 MG/ML
INJECTION INTRAMUSCULAR; INTRAVENOUS
Status: DISCONTINUED | OUTPATIENT
Start: 2021-03-02 | End: 2021-03-02

## 2021-03-02 RX ORDER — FENTANYL CITRATE 50 UG/ML
INJECTION, SOLUTION INTRAMUSCULAR; INTRAVENOUS
Status: DISCONTINUED | OUTPATIENT
Start: 2021-03-02 | End: 2021-03-02

## 2021-03-02 RX ORDER — MIDAZOLAM HYDROCHLORIDE 1 MG/ML
INJECTION INTRAMUSCULAR; INTRAVENOUS
Status: DISCONTINUED | OUTPATIENT
Start: 2021-03-02 | End: 2021-03-02

## 2021-03-02 RX ORDER — LIDOCAINE HYDROCHLORIDE 10 MG/ML
INJECTION, SOLUTION EPIDURAL; INFILTRATION; INTRACAUDAL; PERINEURAL
Status: DISCONTINUED | OUTPATIENT
Start: 2021-03-02 | End: 2021-03-02 | Stop reason: HOSPADM

## 2021-03-02 RX ORDER — CLINDAMYCIN PHOSPHATE 900 MG/50ML
900 INJECTION, SOLUTION INTRAVENOUS
Status: COMPLETED | OUTPATIENT
Start: 2021-03-02 | End: 2021-03-02

## 2021-03-02 RX ORDER — NALOXONE HCL 0.4 MG/ML
0.02 VIAL (ML) INJECTION
Status: DISCONTINUED | OUTPATIENT
Start: 2021-03-02 | End: 2021-03-03 | Stop reason: HOSPADM

## 2021-03-02 RX ORDER — INSULIN ASPART 100 [IU]/ML
0-5 INJECTION, SOLUTION INTRAVENOUS; SUBCUTANEOUS EVERY 6 HOURS PRN
Status: DISCONTINUED | OUTPATIENT
Start: 2021-03-02 | End: 2021-03-03 | Stop reason: HOSPADM

## 2021-03-02 RX ORDER — BUPIVACAINE HYDROCHLORIDE 2.5 MG/ML
INJECTION, SOLUTION EPIDURAL; INFILTRATION; INTRACAUDAL
Status: DISCONTINUED | OUTPATIENT
Start: 2021-03-02 | End: 2021-03-02 | Stop reason: HOSPADM

## 2021-03-02 RX ADMIN — CHLORHEXIDINE GLUCONATE 0.12% ORAL RINSE 10 ML: 1.2 LIQUID ORAL at 08:03

## 2021-03-02 RX ADMIN — ACETAMINOPHEN 1000 MG: 10 INJECTION, SOLUTION INTRAVENOUS at 08:03

## 2021-03-02 RX ADMIN — ROCURONIUM BROMIDE 35 MG: 10 INJECTION, SOLUTION INTRAVENOUS at 07:03

## 2021-03-02 RX ADMIN — CHLORHEXIDINE GLUCONATE 0.12% ORAL RINSE 10 ML: 1.2 LIQUID ORAL at 11:03

## 2021-03-02 RX ADMIN — METOROPROLOL TARTRATE 5 MG: 5 INJECTION, SOLUTION INTRAVENOUS at 11:03

## 2021-03-02 RX ADMIN — PROPOFOL 160 MG: 10 INJECTION, EMULSION INTRAVENOUS at 07:03

## 2021-03-02 RX ADMIN — HYDROMORPHONE HYDROCHLORIDE 0.2 MG: 2 INJECTION INTRAMUSCULAR; INTRAVENOUS; SUBCUTANEOUS at 09:03

## 2021-03-02 RX ADMIN — PHENYLEPHRINE HYDROCHLORIDE 100 MCG: 10 INJECTION INTRAVENOUS at 07:03

## 2021-03-02 RX ADMIN — SODIUM CHLORIDE, SODIUM LACTATE, POTASSIUM CHLORIDE, AND CALCIUM CHLORIDE: 600; 310; 30; 20 INJECTION, SOLUTION INTRAVENOUS at 06:03

## 2021-03-02 RX ADMIN — SUCCINYLCHOLINE CHLORIDE 160 MG: 20 INJECTION, SOLUTION INTRAMUSCULAR; INTRAVENOUS at 07:03

## 2021-03-02 RX ADMIN — NEOSTIGMINE METHYLSULFATE 5 MG: 1 INJECTION INTRAVENOUS at 08:03

## 2021-03-02 RX ADMIN — LIDOCAINE HYDROCHLORIDE 100 MG: 10 INJECTION, SOLUTION EPIDURAL; INFILTRATION; INTRACAUDAL; PERINEURAL at 07:03

## 2021-03-02 RX ADMIN — GLYCOPYRROLATE 0.8 MG: 0.2 INJECTION, SOLUTION INTRAMUSCULAR; INTRAVENOUS at 08:03

## 2021-03-02 RX ADMIN — ROCURONIUM BROMIDE 5 MG: 10 INJECTION, SOLUTION INTRAVENOUS at 07:03

## 2021-03-02 RX ADMIN — FENTANYL CITRATE 100 MCG: 50 INJECTION, SOLUTION INTRAMUSCULAR; INTRAVENOUS at 07:03

## 2021-03-02 RX ADMIN — SODIUM CHLORIDE, SODIUM LACTATE, POTASSIUM CHLORIDE, AND CALCIUM CHLORIDE: .6; .31; .03; .02 INJECTION, SOLUTION INTRAVENOUS at 11:03

## 2021-03-02 RX ADMIN — Medication: at 11:03

## 2021-03-02 RX ADMIN — ONDANSETRON 4 MG: 2 INJECTION, SOLUTION INTRAMUSCULAR; INTRAVENOUS at 08:03

## 2021-03-02 RX ADMIN — FENTANYL CITRATE 50 MCG: 50 INJECTION, SOLUTION INTRAMUSCULAR; INTRAVENOUS at 08:03

## 2021-03-02 RX ADMIN — FAMOTIDINE 20 MG: 20 INJECTION, SOLUTION INTRAVENOUS at 11:03

## 2021-03-02 RX ADMIN — PHENYLEPHRINE HYDROCHLORIDE 100 MCG: 10 INJECTION INTRAVENOUS at 08:03

## 2021-03-02 RX ADMIN — METOROPROLOL TARTRATE 5 MG: 5 INJECTION, SOLUTION INTRAVENOUS at 05:03

## 2021-03-02 RX ADMIN — DEXAMETHASONE SODIUM PHOSPHATE 8 MG: 4 INJECTION, SOLUTION INTRAMUSCULAR; INTRAVENOUS at 07:03

## 2021-03-02 RX ADMIN — HEPARIN SODIUM 5000 UNITS: 5000 INJECTION INTRAVENOUS; SUBCUTANEOUS at 06:03

## 2021-03-02 RX ADMIN — LIDOCAINE HYDROCHLORIDE 100 MG: 10 INJECTION, SOLUTION EPIDURAL; INFILTRATION; INTRACAUDAL; PERINEURAL at 09:03

## 2021-03-02 RX ADMIN — SODIUM CHLORIDE, SODIUM LACTATE, POTASSIUM CHLORIDE, AND CALCIUM CHLORIDE: 600; 310; 30; 20 INJECTION, SOLUTION INTRAVENOUS at 08:03

## 2021-03-02 RX ADMIN — CLINDAMYCIN PHOSPHATE 900 MG: 18 INJECTION, SOLUTION INTRAVENOUS at 07:03

## 2021-03-02 RX ADMIN — ROCURONIUM BROMIDE 10 MG: 10 INJECTION, SOLUTION INTRAVENOUS at 07:03

## 2021-03-02 RX ADMIN — FENTANYL CITRATE 50 MCG: 50 INJECTION, SOLUTION INTRAMUSCULAR; INTRAVENOUS at 09:03

## 2021-03-02 RX ADMIN — ROCURONIUM BROMIDE 10 MG: 10 INJECTION, SOLUTION INTRAVENOUS at 08:03

## 2021-03-02 RX ADMIN — FAMOTIDINE 20 MG: 20 INJECTION, SOLUTION INTRAVENOUS at 08:03

## 2021-03-02 RX ADMIN — MIDAZOLAM HYDROCHLORIDE 2 MG: 1 INJECTION, SOLUTION INTRAMUSCULAR; INTRAVENOUS at 06:03

## 2021-03-03 VITALS
HEIGHT: 63 IN | TEMPERATURE: 99 F | BODY MASS INDEX: 45.35 KG/M2 | RESPIRATION RATE: 14 BRPM | OXYGEN SATURATION: 97 % | WEIGHT: 255.94 LBS | SYSTOLIC BLOOD PRESSURE: 127 MMHG | DIASTOLIC BLOOD PRESSURE: 64 MMHG | HEART RATE: 73 BPM

## 2021-03-03 LAB
ANION GAP SERPL CALC-SCNC: 6 MMOL/L (ref 8–16)
BUN SERPL-MCNC: 10 MG/DL (ref 8–23)
CALCIUM SERPL-MCNC: 8.8 MG/DL (ref 8.7–10.5)
CHLORIDE SERPL-SCNC: 109 MMOL/L (ref 95–110)
CO2 SERPL-SCNC: 26 MMOL/L (ref 23–29)
CREAT SERPL-MCNC: 0.8 MG/DL (ref 0.5–1.4)
ERYTHROCYTE [DISTWIDTH] IN BLOOD BY AUTOMATED COUNT: 13.4 % (ref 11.5–14.5)
EST. GFR  (AFRICAN AMERICAN): >60 ML/MIN/1.73 M^2
EST. GFR  (NON AFRICAN AMERICAN): >60 ML/MIN/1.73 M^2
GLUCOSE SERPL-MCNC: 87 MG/DL (ref 70–110)
HCT VFR BLD AUTO: 36.3 % (ref 37–48.5)
HGB BLD-MCNC: 11.1 G/DL (ref 12–16)
MCH RBC QN AUTO: 29.8 PG (ref 27–31)
MCHC RBC AUTO-ENTMCNC: 30.6 G/DL (ref 32–36)
MCV RBC AUTO: 97 FL (ref 82–98)
PLATELET # BLD AUTO: 239 K/UL (ref 150–350)
PMV BLD AUTO: 9.4 FL (ref 9.2–12.9)
POCT GLUCOSE: 86 MG/DL (ref 70–110)
POTASSIUM SERPL-SCNC: 4.3 MMOL/L (ref 3.5–5.1)
RBC # BLD AUTO: 3.73 M/UL (ref 4–5.4)
SODIUM SERPL-SCNC: 141 MMOL/L (ref 136–145)
WBC # BLD AUTO: 7.41 K/UL (ref 3.9–12.7)

## 2021-03-03 PROCEDURE — 94760 N-INVAS EAR/PLS OXIMETRY 1: CPT

## 2021-03-03 PROCEDURE — 36415 COLL VENOUS BLD VENIPUNCTURE: CPT

## 2021-03-03 PROCEDURE — 25000242 PHARM REV CODE 250 ALT 637 W/ HCPCS: Performed by: SURGERY

## 2021-03-03 PROCEDURE — 80048 BASIC METABOLIC PNL TOTAL CA: CPT

## 2021-03-03 PROCEDURE — S0028 INJECTION, FAMOTIDINE, 20 MG: HCPCS | Performed by: SURGERY

## 2021-03-03 PROCEDURE — 99900035 HC TECH TIME PER 15 MIN (STAT)

## 2021-03-03 PROCEDURE — 63600175 PHARM REV CODE 636 W HCPCS: Performed by: SURGERY

## 2021-03-03 PROCEDURE — 94799 UNLISTED PULMONARY SVC/PX: CPT

## 2021-03-03 PROCEDURE — 25000003 PHARM REV CODE 250: Performed by: SURGERY

## 2021-03-03 PROCEDURE — 85027 COMPLETE CBC AUTOMATED: CPT

## 2021-03-03 RX ORDER — MONTELUKAST SODIUM 10 MG/1
10 TABLET ORAL NIGHTLY
Status: DISCONTINUED | OUTPATIENT
Start: 2021-03-03 | End: 2021-03-03 | Stop reason: HOSPADM

## 2021-03-03 RX ORDER — ALPRAZOLAM 0.25 MG/1
0.25 TABLET ORAL 2 TIMES DAILY PRN
Status: DISCONTINUED | OUTPATIENT
Start: 2021-03-03 | End: 2021-03-03 | Stop reason: HOSPADM

## 2021-03-03 RX ORDER — GABAPENTIN 300 MG/1
300 CAPSULE ORAL 2 TIMES DAILY
Status: DISCONTINUED | OUTPATIENT
Start: 2021-03-03 | End: 2021-03-03 | Stop reason: HOSPADM

## 2021-03-03 RX ORDER — FLUTICASONE PROPIONATE 50 MCG
1 SPRAY, SUSPENSION (ML) NASAL DAILY
Status: DISCONTINUED | OUTPATIENT
Start: 2021-03-03 | End: 2021-03-03 | Stop reason: HOSPADM

## 2021-03-03 RX ORDER — HYDROCODONE BITARTRATE AND ACETAMINOPHEN 10; 325 MG/1; MG/1
1 TABLET ORAL EVERY 4 HOURS PRN
Status: DISCONTINUED | OUTPATIENT
Start: 2021-03-03 | End: 2021-03-03 | Stop reason: HOSPADM

## 2021-03-03 RX ORDER — METOPROLOL SUCCINATE 50 MG/1
50 TABLET, EXTENDED RELEASE ORAL DAILY
Status: DISCONTINUED | OUTPATIENT
Start: 2021-03-03 | End: 2021-03-03 | Stop reason: HOSPADM

## 2021-03-03 RX ORDER — AMLODIPINE BESYLATE 5 MG/1
5 TABLET ORAL DAILY
Status: DISCONTINUED | OUTPATIENT
Start: 2021-03-03 | End: 2021-03-03 | Stop reason: HOSPADM

## 2021-03-03 RX ORDER — HYDROCODONE BITARTRATE AND ACETAMINOPHEN 5; 325 MG/1; MG/1
1 TABLET ORAL EVERY 6 HOURS PRN
Qty: 15 TABLET | Refills: 0 | Status: SHIPPED | OUTPATIENT
Start: 2021-03-03 | End: 2021-04-20

## 2021-03-03 RX ORDER — MORPHINE SULFATE 2 MG/ML
2 INJECTION, SOLUTION INTRAMUSCULAR; INTRAVENOUS
Status: DISCONTINUED | OUTPATIENT
Start: 2021-03-03 | End: 2021-03-03 | Stop reason: HOSPADM

## 2021-03-03 RX ORDER — TOPIRAMATE 25 MG/1
50 TABLET ORAL DAILY
Status: DISCONTINUED | OUTPATIENT
Start: 2021-03-03 | End: 2021-03-03 | Stop reason: HOSPADM

## 2021-03-03 RX ADMIN — FAMOTIDINE 20 MG: 20 INJECTION, SOLUTION INTRAVENOUS at 09:03

## 2021-03-03 RX ADMIN — HYDROCODONE BITARTRATE AND ACETAMINOPHEN 1 TABLET: 10; 325 TABLET ORAL at 12:03

## 2021-03-03 RX ADMIN — ONDANSETRON 4 MG: 2 INJECTION INTRAMUSCULAR; INTRAVENOUS at 08:03

## 2021-03-03 RX ADMIN — GABAPENTIN 300 MG: 300 CAPSULE ORAL at 10:03

## 2021-03-03 RX ADMIN — METOPROLOL SUCCINATE 50 MG: 50 TABLET, EXTENDED RELEASE ORAL at 10:03

## 2021-03-03 RX ADMIN — TOPIRAMATE 50 MG: 25 TABLET, FILM COATED ORAL at 10:03

## 2021-03-03 RX ADMIN — CHLORHEXIDINE GLUCONATE 0.12% ORAL RINSE 10 ML: 1.2 LIQUID ORAL at 09:03

## 2021-03-03 RX ADMIN — AMLODIPINE BESYLATE 5 MG: 5 TABLET ORAL at 10:03

## 2021-03-03 RX ADMIN — FLUTICASONE PROPIONATE 50 MCG: 50 SPRAY, METERED NASAL at 10:03

## 2021-03-03 RX ADMIN — HYDROCODONE BITARTRATE AND ACETAMINOPHEN 1 TABLET: 10; 325 TABLET ORAL at 08:03

## 2021-03-05 ENCOUNTER — PATIENT OUTREACH (OUTPATIENT)
Dept: ADMINISTRATIVE | Facility: CLINIC | Age: 66
End: 2021-03-05

## 2021-03-05 RX ORDER — ONDANSETRON HYDROCHLORIDE 8 MG/1
8 TABLET, FILM COATED ORAL EVERY 8 HOURS PRN
Qty: 20 TABLET | Refills: 0 | Status: SHIPPED | OUTPATIENT
Start: 2021-03-05 | End: 2024-03-30 | Stop reason: SDUPTHER

## 2021-03-08 RX ORDER — IMIPRAMINE HYDROCHLORIDE 10 MG/1
10 TABLET, FILM COATED ORAL NIGHTLY
Qty: 90 TABLET | Refills: 1 | Status: SHIPPED | OUTPATIENT
Start: 2021-03-08 | End: 2023-11-16

## 2021-03-09 LAB
COMMENT: NORMAL
FINAL PATHOLOGIC DIAGNOSIS: NORMAL
GROSS: NORMAL
Lab: NORMAL

## 2021-03-11 ENCOUNTER — OFFICE VISIT (OUTPATIENT)
Dept: CARDIOLOGY | Facility: CLINIC | Age: 66
End: 2021-03-11
Payer: COMMERCIAL

## 2021-03-11 ENCOUNTER — LAB VISIT (OUTPATIENT)
Dept: LAB | Facility: HOSPITAL | Age: 66
End: 2021-03-11
Attending: INTERNAL MEDICINE
Payer: COMMERCIAL

## 2021-03-11 ENCOUNTER — PATIENT MESSAGE (OUTPATIENT)
Dept: PAIN MEDICINE | Facility: CLINIC | Age: 66
End: 2021-03-11

## 2021-03-11 VITALS
BODY MASS INDEX: 44.42 KG/M2 | SYSTOLIC BLOOD PRESSURE: 118 MMHG | HEART RATE: 93 BPM | OXYGEN SATURATION: 98 % | WEIGHT: 250.69 LBS | HEIGHT: 63 IN | DIASTOLIC BLOOD PRESSURE: 78 MMHG

## 2021-03-11 DIAGNOSIS — I49.3 SYMPTOMATIC PVCS: Primary | ICD-10-CM

## 2021-03-11 DIAGNOSIS — E66.01 MORBID OBESITY WITH BMI OF 45.0-49.9, ADULT: ICD-10-CM

## 2021-03-11 DIAGNOSIS — G47.33 OSA ON CPAP: ICD-10-CM

## 2021-03-11 DIAGNOSIS — I49.3 PVC'S (PREMATURE VENTRICULAR CONTRACTIONS): ICD-10-CM

## 2021-03-11 DIAGNOSIS — I49.3 SYMPTOMATIC PVCS: ICD-10-CM

## 2021-03-11 DIAGNOSIS — D64.9 ANEMIA, UNSPECIFIED TYPE: ICD-10-CM

## 2021-03-11 DIAGNOSIS — R00.2 PALPITATIONS: ICD-10-CM

## 2021-03-11 DIAGNOSIS — R06.09 DOE (DYSPNEA ON EXERTION): ICD-10-CM

## 2021-03-11 DIAGNOSIS — I10 ESSENTIAL HYPERTENSION: ICD-10-CM

## 2021-03-11 DIAGNOSIS — R07.89 CHEST PRESSURE: ICD-10-CM

## 2021-03-11 LAB
BASOPHILS # BLD AUTO: 0.08 K/UL (ref 0–0.2)
BASOPHILS NFR BLD: 1.2 % (ref 0–1.9)
DIFFERENTIAL METHOD: ABNORMAL
EOSINOPHIL # BLD AUTO: 0.4 K/UL (ref 0–0.5)
EOSINOPHIL NFR BLD: 6.2 % (ref 0–8)
ERYTHROCYTE [DISTWIDTH] IN BLOOD BY AUTOMATED COUNT: 13.3 % (ref 11.5–14.5)
HCT VFR BLD AUTO: 38.1 % (ref 37–48.5)
HGB BLD-MCNC: 11.7 G/DL (ref 12–16)
IMM GRANULOCYTES # BLD AUTO: 0.01 K/UL (ref 0–0.04)
IMM GRANULOCYTES NFR BLD AUTO: 0.2 % (ref 0–0.5)
LYMPHOCYTES # BLD AUTO: 1.9 K/UL (ref 1–4.8)
LYMPHOCYTES NFR BLD: 29 % (ref 18–48)
MCH RBC QN AUTO: 29.5 PG (ref 27–31)
MCHC RBC AUTO-ENTMCNC: 30.7 G/DL (ref 32–36)
MCV RBC AUTO: 96 FL (ref 82–98)
MONOCYTES # BLD AUTO: 0.4 K/UL (ref 0.3–1)
MONOCYTES NFR BLD: 6.3 % (ref 4–15)
NEUTROPHILS # BLD AUTO: 3.8 K/UL (ref 1.8–7.7)
NEUTROPHILS NFR BLD: 57.1 % (ref 38–73)
NRBC BLD-RTO: 0 /100 WBC
PLATELET # BLD AUTO: 281 K/UL (ref 150–350)
PMV BLD AUTO: 9.8 FL (ref 9.2–12.9)
RBC # BLD AUTO: 3.96 M/UL (ref 4–5.4)
WBC # BLD AUTO: 6.66 K/UL (ref 3.9–12.7)

## 2021-03-11 PROCEDURE — 85025 COMPLETE CBC W/AUTO DIFF WBC: CPT | Performed by: INTERNAL MEDICINE

## 2021-03-11 PROCEDURE — 3078F PR MOST RECENT DIASTOLIC BLOOD PRESSURE < 80 MM HG: ICD-10-PCS | Mod: CPTII,S$GLB,, | Performed by: INTERNAL MEDICINE

## 2021-03-11 PROCEDURE — 99214 PR OFFICE/OUTPT VISIT, EST, LEVL IV, 30-39 MIN: ICD-10-PCS | Mod: S$GLB,,, | Performed by: INTERNAL MEDICINE

## 2021-03-11 PROCEDURE — 3008F BODY MASS INDEX DOCD: CPT | Mod: CPTII,S$GLB,, | Performed by: INTERNAL MEDICINE

## 2021-03-11 PROCEDURE — 3074F PR MOST RECENT SYSTOLIC BLOOD PRESSURE < 130 MM HG: ICD-10-PCS | Mod: CPTII,S$GLB,, | Performed by: INTERNAL MEDICINE

## 2021-03-11 PROCEDURE — 99999 PR PBB SHADOW E&M-EST. PATIENT-LVL V: CPT | Mod: PBBFAC,,, | Performed by: INTERNAL MEDICINE

## 2021-03-11 PROCEDURE — 99214 OFFICE O/P EST MOD 30 MIN: CPT | Mod: S$GLB,,, | Performed by: INTERNAL MEDICINE

## 2021-03-11 PROCEDURE — 80048 BASIC METABOLIC PNL TOTAL CA: CPT | Performed by: INTERNAL MEDICINE

## 2021-03-11 PROCEDURE — 83735 ASSAY OF MAGNESIUM: CPT | Performed by: INTERNAL MEDICINE

## 2021-03-11 PROCEDURE — 3008F PR BODY MASS INDEX (BMI) DOCUMENTED: ICD-10-PCS | Mod: CPTII,S$GLB,, | Performed by: INTERNAL MEDICINE

## 2021-03-11 PROCEDURE — 3074F SYST BP LT 130 MM HG: CPT | Mod: CPTII,S$GLB,, | Performed by: INTERNAL MEDICINE

## 2021-03-11 PROCEDURE — 99999 PR PBB SHADOW E&M-EST. PATIENT-LVL V: ICD-10-PCS | Mod: PBBFAC,,, | Performed by: INTERNAL MEDICINE

## 2021-03-11 PROCEDURE — 1126F PR PAIN SEVERITY QUANTIFIED, NO PAIN PRESENT: ICD-10-PCS | Mod: S$GLB,,, | Performed by: INTERNAL MEDICINE

## 2021-03-11 PROCEDURE — 3078F DIAST BP <80 MM HG: CPT | Mod: CPTII,S$GLB,, | Performed by: INTERNAL MEDICINE

## 2021-03-11 PROCEDURE — 36415 COLL VENOUS BLD VENIPUNCTURE: CPT | Performed by: INTERNAL MEDICINE

## 2021-03-11 PROCEDURE — 1126F AMNT PAIN NOTED NONE PRSNT: CPT | Mod: S$GLB,,, | Performed by: INTERNAL MEDICINE

## 2021-03-12 ENCOUNTER — TELEPHONE (OUTPATIENT)
Dept: CARDIOLOGY | Facility: CLINIC | Age: 66
End: 2021-03-12

## 2021-03-12 LAB
ANION GAP SERPL CALC-SCNC: 12 MMOL/L (ref 8–16)
BUN SERPL-MCNC: 23 MG/DL (ref 8–23)
CALCIUM SERPL-MCNC: 8.9 MG/DL (ref 8.7–10.5)
CHLORIDE SERPL-SCNC: 108 MMOL/L (ref 95–110)
CO2 SERPL-SCNC: 22 MMOL/L (ref 23–29)
CREAT SERPL-MCNC: 0.8 MG/DL (ref 0.5–1.4)
EST. GFR  (AFRICAN AMERICAN): >60 ML/MIN/1.73 M^2
EST. GFR  (NON AFRICAN AMERICAN): >60 ML/MIN/1.73 M^2
GLUCOSE SERPL-MCNC: 70 MG/DL (ref 70–110)
MAGNESIUM SERPL-MCNC: 2.1 MG/DL (ref 1.6–2.6)
POTASSIUM SERPL-SCNC: 3.6 MMOL/L (ref 3.5–5.1)
SODIUM SERPL-SCNC: 142 MMOL/L (ref 136–145)

## 2021-03-12 RX ORDER — POTASSIUM CHLORIDE 20 MEQ/1
20 TABLET, EXTENDED RELEASE ORAL DAILY
Qty: 60 TABLET | Refills: 2 | Status: SHIPPED | OUTPATIENT
Start: 2021-03-12 | End: 2021-09-28 | Stop reason: SDUPTHER

## 2021-03-13 DIAGNOSIS — F41.9 ANXIETY: ICD-10-CM

## 2021-03-15 RX ORDER — ALPRAZOLAM 0.25 MG/1
TABLET ORAL
Qty: 20 TABLET | Refills: 0 | Status: SHIPPED | OUTPATIENT
Start: 2021-03-15 | End: 2021-03-19

## 2021-03-17 ENCOUNTER — NUTRITION (OUTPATIENT)
Dept: INTERNAL MEDICINE | Facility: CLINIC | Age: 66
End: 2021-03-17
Payer: COMMERCIAL

## 2021-03-17 ENCOUNTER — OFFICE VISIT (OUTPATIENT)
Dept: SURGERY | Facility: CLINIC | Age: 66
End: 2021-03-17
Payer: COMMERCIAL

## 2021-03-17 VITALS
BODY MASS INDEX: 43.97 KG/M2 | DIASTOLIC BLOOD PRESSURE: 73 MMHG | SYSTOLIC BLOOD PRESSURE: 111 MMHG | HEART RATE: 71 BPM | WEIGHT: 248.25 LBS

## 2021-03-17 DIAGNOSIS — M47.26 OSTEOARTHRITIS OF SPINE WITH RADICULOPATHY, LUMBAR REGION: ICD-10-CM

## 2021-03-17 DIAGNOSIS — G47.33 OSA ON CPAP: ICD-10-CM

## 2021-03-17 DIAGNOSIS — E66.01 MORBID OBESITY WITH BMI OF 40.0-44.9, ADULT: Primary | ICD-10-CM

## 2021-03-17 DIAGNOSIS — Z98.84 STATUS POST BARIATRIC SURGERY: ICD-10-CM

## 2021-03-17 DIAGNOSIS — I10 ESSENTIAL HYPERTENSION: ICD-10-CM

## 2021-03-17 DIAGNOSIS — Z71.3 DIETARY COUNSELING: ICD-10-CM

## 2021-03-17 PROCEDURE — 99999 PR PBB SHADOW E&M-EST. PATIENT-LVL IV: ICD-10-PCS | Mod: PBBFAC,,, | Performed by: SURGERY

## 2021-03-17 PROCEDURE — 97802 MEDICAL NUTRITION INDIV IN: CPT | Mod: S$GLB,,, | Performed by: DIETITIAN, REGISTERED

## 2021-03-17 PROCEDURE — 99024 PR POST-OP FOLLOW-UP VISIT: ICD-10-PCS | Mod: S$GLB,,, | Performed by: SURGERY

## 2021-03-17 PROCEDURE — 99999 PR PBB SHADOW E&M-EST. PATIENT-LVL IV: CPT | Mod: PBBFAC,,, | Performed by: SURGERY

## 2021-03-17 PROCEDURE — 1126F PR PAIN SEVERITY QUANTIFIED, NO PAIN PRESENT: ICD-10-PCS | Mod: S$GLB,,, | Performed by: SURGERY

## 2021-03-17 PROCEDURE — 97802 PR MED NUTR THER, 1ST, INDIV, EA 15 MIN: ICD-10-PCS | Mod: S$GLB,,, | Performed by: DIETITIAN, REGISTERED

## 2021-03-17 PROCEDURE — 1126F AMNT PAIN NOTED NONE PRSNT: CPT | Mod: S$GLB,,, | Performed by: SURGERY

## 2021-03-17 PROCEDURE — 99024 POSTOP FOLLOW-UP VISIT: CPT | Mod: S$GLB,,, | Performed by: SURGERY

## 2021-03-17 PROCEDURE — 3008F BODY MASS INDEX DOCD: CPT | Mod: CPTII,S$GLB,, | Performed by: SURGERY

## 2021-03-17 PROCEDURE — 3008F PR BODY MASS INDEX (BMI) DOCUMENTED: ICD-10-PCS | Mod: CPTII,S$GLB,, | Performed by: SURGERY

## 2021-03-18 DIAGNOSIS — F41.9 ANXIETY: ICD-10-CM

## 2021-03-19 RX ORDER — ALPRAZOLAM 0.25 MG/1
TABLET ORAL
Qty: 20 TABLET | Refills: 0 | Status: SHIPPED | OUTPATIENT
Start: 2021-03-19 | End: 2021-10-05

## 2021-03-22 ENCOUNTER — PATIENT MESSAGE (OUTPATIENT)
Dept: CARDIOLOGY | Facility: HOSPITAL | Age: 66
End: 2021-03-22

## 2021-03-23 ENCOUNTER — PATIENT MESSAGE (OUTPATIENT)
Dept: PAIN MEDICINE | Facility: HOSPITAL | Age: 66
End: 2021-03-23

## 2021-03-23 ENCOUNTER — HOSPITAL ENCOUNTER (OUTPATIENT)
Facility: HOSPITAL | Age: 66
Discharge: HOME | End: 2021-03-23
Attending: PHYSICAL MEDICINE & REHABILITATION | Admitting: PHYSICAL MEDICINE & REHABILITATION
Payer: COMMERCIAL

## 2021-03-23 ENCOUNTER — TELEPHONE (OUTPATIENT)
Dept: INTERNAL MEDICINE | Facility: CLINIC | Age: 66
End: 2021-03-23

## 2021-03-23 VITALS
DIASTOLIC BLOOD PRESSURE: 74 MMHG | WEIGHT: 244.81 LBS | TEMPERATURE: 97 F | HEART RATE: 65 BPM | HEIGHT: 63 IN | OXYGEN SATURATION: 100 % | BODY MASS INDEX: 43.38 KG/M2 | RESPIRATION RATE: 15 BRPM | SYSTOLIC BLOOD PRESSURE: 117 MMHG

## 2021-03-23 DIAGNOSIS — M51.36 DDD (DEGENERATIVE DISC DISEASE), LUMBAR: Primary | ICD-10-CM

## 2021-03-23 DIAGNOSIS — M54.16 LUMBAR RADICULOPATHY: Primary | ICD-10-CM

## 2021-03-23 PROBLEM — E66.01 MORBID OBESITY: Status: RESOLVED | Noted: 2020-12-31 | Resolved: 2021-03-23

## 2021-03-23 PROBLEM — E66.01 MORBID OBESITY DUE TO EXCESS CALORIES: Status: RESOLVED | Noted: 2018-08-20 | Resolved: 2021-03-23

## 2021-03-23 PROCEDURE — 63600175 PHARM REV CODE 636 W HCPCS: Performed by: PHYSICAL MEDICINE & REHABILITATION

## 2021-03-23 PROCEDURE — 25000003 PHARM REV CODE 250: Performed by: PHYSICAL MEDICINE & REHABILITATION

## 2021-03-23 PROCEDURE — 64483 NJX AA&/STRD TFRM EPI L/S 1: CPT | Mod: 50 | Performed by: PHYSICAL MEDICINE & REHABILITATION

## 2021-03-23 PROCEDURE — 25500020 PHARM REV CODE 255: Performed by: PHYSICAL MEDICINE & REHABILITATION

## 2021-03-23 PROCEDURE — 64483 NJX AA&/STRD TFRM EPI L/S 1: CPT | Mod: 50,,, | Performed by: PHYSICAL MEDICINE & REHABILITATION

## 2021-03-23 PROCEDURE — 64483 PR EPIDURAL INJ, ANES/STEROID, TRANSFORAMINAL, LUMB/SACR, SNGL LEVL: ICD-10-PCS | Mod: 50,,, | Performed by: PHYSICAL MEDICINE & REHABILITATION

## 2021-03-23 RX ORDER — METHYLPREDNISOLONE ACETATE 40 MG/ML
INJECTION, SUSPENSION INTRA-ARTICULAR; INTRALESIONAL; INTRAMUSCULAR; SOFT TISSUE
Status: DISCONTINUED | OUTPATIENT
Start: 2021-03-23 | End: 2021-03-23 | Stop reason: HOSPADM

## 2021-03-23 RX ORDER — ONDANSETRON 2 MG/ML
4 INJECTION INTRAMUSCULAR; INTRAVENOUS ONCE AS NEEDED
Status: COMPLETED | OUTPATIENT
Start: 2021-03-23 | End: 2021-03-23

## 2021-03-23 RX ORDER — FENTANYL CITRATE 50 UG/ML
INJECTION, SOLUTION INTRAMUSCULAR; INTRAVENOUS
Status: DISCONTINUED | OUTPATIENT
Start: 2021-03-23 | End: 2021-03-23 | Stop reason: HOSPADM

## 2021-03-23 RX ORDER — MIDAZOLAM HYDROCHLORIDE 1 MG/ML
INJECTION, SOLUTION INTRAMUSCULAR; INTRAVENOUS
Status: DISCONTINUED | OUTPATIENT
Start: 2021-03-23 | End: 2021-03-23 | Stop reason: HOSPADM

## 2021-03-23 RX ORDER — BUPIVACAINE HYDROCHLORIDE 5 MG/ML
INJECTION, SOLUTION EPIDURAL; INTRACAUDAL
Status: DISCONTINUED | OUTPATIENT
Start: 2021-03-23 | End: 2021-03-23 | Stop reason: HOSPADM

## 2021-03-23 RX ADMIN — ONDANSETRON 4 MG: 2 INJECTION INTRAMUSCULAR; INTRAVENOUS at 08:03

## 2021-03-24 ENCOUNTER — TELEPHONE (OUTPATIENT)
Dept: PRIMARY CARE CLINIC | Facility: CLINIC | Age: 66
End: 2021-03-24

## 2021-03-24 ENCOUNTER — TELEPHONE (OUTPATIENT)
Dept: HEMATOLOGY/ONCOLOGY | Facility: CLINIC | Age: 66
End: 2021-03-24

## 2021-03-24 ENCOUNTER — TELEPHONE (OUTPATIENT)
Dept: PHYSICAL MEDICINE AND REHAB | Facility: CLINIC | Age: 66
End: 2021-03-24

## 2021-03-25 ENCOUNTER — LAB VISIT (OUTPATIENT)
Dept: LAB | Facility: HOSPITAL | Age: 66
End: 2021-03-25
Attending: INTERNAL MEDICINE
Payer: COMMERCIAL

## 2021-03-25 ENCOUNTER — OFFICE VISIT (OUTPATIENT)
Dept: HEMATOLOGY/ONCOLOGY | Facility: CLINIC | Age: 66
End: 2021-03-25
Payer: COMMERCIAL

## 2021-03-25 VITALS
DIASTOLIC BLOOD PRESSURE: 78 MMHG | HEART RATE: 82 BPM | TEMPERATURE: 98 F | OXYGEN SATURATION: 98 % | RESPIRATION RATE: 16 BRPM | SYSTOLIC BLOOD PRESSURE: 112 MMHG | BODY MASS INDEX: 43.36 KG/M2 | HEIGHT: 63 IN | WEIGHT: 244.69 LBS

## 2021-03-25 DIAGNOSIS — R42 EPISODIC LIGHTHEADEDNESS: ICD-10-CM

## 2021-03-25 DIAGNOSIS — D64.9 ANEMIA, UNSPECIFIED TYPE: Primary | ICD-10-CM

## 2021-03-25 DIAGNOSIS — D64.9 ANEMIA, UNSPECIFIED TYPE: ICD-10-CM

## 2021-03-25 DIAGNOSIS — Z98.84 HISTORY OF BARIATRIC SURGERY: ICD-10-CM

## 2021-03-25 LAB — FERRITIN SERPL-MCNC: 49 NG/ML (ref 20–300)

## 2021-03-25 PROCEDURE — 99204 PR OFFICE/OUTPT VISIT, NEW, LEVL IV, 45-59 MIN: ICD-10-PCS | Mod: S$GLB,,, | Performed by: INTERNAL MEDICINE

## 2021-03-25 PROCEDURE — 3074F PR MOST RECENT SYSTOLIC BLOOD PRESSURE < 130 MM HG: ICD-10-PCS | Mod: CPTII,S$GLB,, | Performed by: INTERNAL MEDICINE

## 2021-03-25 PROCEDURE — 3008F BODY MASS INDEX DOCD: CPT | Mod: CPTII,S$GLB,, | Performed by: INTERNAL MEDICINE

## 2021-03-25 PROCEDURE — 82728 ASSAY OF FERRITIN: CPT | Performed by: INTERNAL MEDICINE

## 2021-03-25 PROCEDURE — 3078F PR MOST RECENT DIASTOLIC BLOOD PRESSURE < 80 MM HG: ICD-10-PCS | Mod: CPTII,S$GLB,, | Performed by: INTERNAL MEDICINE

## 2021-03-25 PROCEDURE — 1126F PR PAIN SEVERITY QUANTIFIED, NO PAIN PRESENT: ICD-10-PCS | Mod: S$GLB,,, | Performed by: INTERNAL MEDICINE

## 2021-03-25 PROCEDURE — 36415 COLL VENOUS BLD VENIPUNCTURE: CPT | Performed by: INTERNAL MEDICINE

## 2021-03-25 PROCEDURE — 82746 ASSAY OF FOLIC ACID SERUM: CPT | Performed by: INTERNAL MEDICINE

## 2021-03-25 PROCEDURE — 99204 OFFICE O/P NEW MOD 45 MIN: CPT | Mod: S$GLB,,, | Performed by: INTERNAL MEDICINE

## 2021-03-25 PROCEDURE — 1126F AMNT PAIN NOTED NONE PRSNT: CPT | Mod: S$GLB,,, | Performed by: INTERNAL MEDICINE

## 2021-03-25 PROCEDURE — 99999 PR PBB SHADOW E&M-EST. PATIENT-LVL V: CPT | Mod: PBBFAC,,, | Performed by: INTERNAL MEDICINE

## 2021-03-25 PROCEDURE — 3008F PR BODY MASS INDEX (BMI) DOCUMENTED: ICD-10-PCS | Mod: CPTII,S$GLB,, | Performed by: INTERNAL MEDICINE

## 2021-03-25 PROCEDURE — 3078F DIAST BP <80 MM HG: CPT | Mod: CPTII,S$GLB,, | Performed by: INTERNAL MEDICINE

## 2021-03-25 PROCEDURE — 3074F SYST BP LT 130 MM HG: CPT | Mod: CPTII,S$GLB,, | Performed by: INTERNAL MEDICINE

## 2021-03-25 PROCEDURE — 99999 PR PBB SHADOW E&M-EST. PATIENT-LVL V: ICD-10-PCS | Mod: PBBFAC,,, | Performed by: INTERNAL MEDICINE

## 2021-03-26 LAB — FOLATE SERPL-MCNC: 36.6 NG/ML (ref 4–24)

## 2021-04-09 ENCOUNTER — PATIENT MESSAGE (OUTPATIENT)
Dept: CARDIOLOGY | Facility: CLINIC | Age: 66
End: 2021-04-09

## 2021-04-09 RX ORDER — METOPROLOL SUCCINATE 50 MG/1
50 TABLET, EXTENDED RELEASE ORAL DAILY
Qty: 90 TABLET | Refills: 1 | Status: SHIPPED | OUTPATIENT
Start: 2021-04-09 | End: 2021-10-14 | Stop reason: SDUPTHER

## 2021-04-12 ENCOUNTER — HOSPITAL ENCOUNTER (OUTPATIENT)
Dept: CARDIOLOGY | Facility: HOSPITAL | Age: 66
Discharge: HOME | End: 2021-04-12
Attending: INTERNAL MEDICINE
Payer: COMMERCIAL

## 2021-04-12 DIAGNOSIS — I49.3 PVC'S (PREMATURE VENTRICULAR CONTRACTIONS): ICD-10-CM

## 2021-04-12 DIAGNOSIS — I49.3 SYMPTOMATIC PVCS: ICD-10-CM

## 2021-04-12 DIAGNOSIS — R00.2 PALPITATIONS: ICD-10-CM

## 2021-04-12 PROCEDURE — 93227 XTRNL ECG REC<48 HR R&I: CPT | Mod: ,,, | Performed by: INTERNAL MEDICINE

## 2021-04-12 PROCEDURE — 93227 HOLTER MONITOR - 48 HOUR (CUPID ONLY): ICD-10-PCS | Mod: ,,, | Performed by: INTERNAL MEDICINE

## 2021-04-12 PROCEDURE — 93225 XTRNL ECG REC<48 HRS REC: CPT

## 2021-04-15 ENCOUNTER — PATIENT MESSAGE (OUTPATIENT)
Dept: CARDIOLOGY | Facility: CLINIC | Age: 66
End: 2021-04-15

## 2021-04-15 LAB
OHS CV EVENT MONITOR DAY: 2
OHS CV HOLTER LENGTH DECIMAL HOURS: 96
OHS CV HOLTER LENGTH HOURS: 48
OHS CV HOLTER LENGTH MINUTES: 0

## 2021-04-19 ENCOUNTER — TELEPHONE (OUTPATIENT)
Dept: PAIN MEDICINE | Facility: CLINIC | Age: 66
End: 2021-04-19

## 2021-04-20 ENCOUNTER — OFFICE VISIT (OUTPATIENT)
Dept: PAIN MEDICINE | Facility: CLINIC | Age: 66
End: 2021-04-20
Payer: COMMERCIAL

## 2021-04-20 VITALS
HEART RATE: 71 BPM | RESPIRATION RATE: 18 BRPM | SYSTOLIC BLOOD PRESSURE: 100 MMHG | DIASTOLIC BLOOD PRESSURE: 64 MMHG | BODY MASS INDEX: 42.52 KG/M2 | WEIGHT: 240 LBS | HEIGHT: 63 IN

## 2021-04-20 DIAGNOSIS — M54.16 BILATERAL LUMBAR RADICULOPATHY: Primary | ICD-10-CM

## 2021-04-20 DIAGNOSIS — M51.36 DDD (DEGENERATIVE DISC DISEASE), LUMBAR: ICD-10-CM

## 2021-04-20 DIAGNOSIS — M46.1 SACROILIITIS: ICD-10-CM

## 2021-04-20 PROCEDURE — 1125F AMNT PAIN NOTED PAIN PRSNT: CPT | Mod: S$GLB,,, | Performed by: PHYSICIAN ASSISTANT

## 2021-04-20 PROCEDURE — 99214 PR OFFICE/OUTPT VISIT, EST, LEVL IV, 30-39 MIN: ICD-10-PCS | Mod: S$GLB,,, | Performed by: PHYSICIAN ASSISTANT

## 2021-04-20 PROCEDURE — 1125F PR PAIN SEVERITY QUANTIFIED, PAIN PRESENT: ICD-10-PCS | Mod: S$GLB,,, | Performed by: PHYSICIAN ASSISTANT

## 2021-04-20 PROCEDURE — 3008F PR BODY MASS INDEX (BMI) DOCUMENTED: ICD-10-PCS | Mod: CPTII,S$GLB,, | Performed by: PHYSICIAN ASSISTANT

## 2021-04-20 PROCEDURE — 3008F BODY MASS INDEX DOCD: CPT | Mod: CPTII,S$GLB,, | Performed by: PHYSICIAN ASSISTANT

## 2021-04-20 PROCEDURE — 99999 PR PBB SHADOW E&M-EST. PATIENT-LVL III: ICD-10-PCS | Mod: PBBFAC,,, | Performed by: PHYSICIAN ASSISTANT

## 2021-04-20 PROCEDURE — 99214 OFFICE O/P EST MOD 30 MIN: CPT | Mod: S$GLB,,, | Performed by: PHYSICIAN ASSISTANT

## 2021-04-20 PROCEDURE — 99999 PR PBB SHADOW E&M-EST. PATIENT-LVL III: CPT | Mod: PBBFAC,,, | Performed by: PHYSICIAN ASSISTANT

## 2021-04-20 RX ORDER — LIDOCAINE AND PRILOCAINE 25; 25 MG/G; MG/G
CREAM TOPICAL
Qty: 60 G | Refills: 0 | Status: SHIPPED | OUTPATIENT
Start: 2021-04-20 | End: 2021-08-04

## 2021-04-26 ENCOUNTER — OFFICE VISIT (OUTPATIENT)
Dept: OBSTETRICS AND GYNECOLOGY | Facility: CLINIC | Age: 66
End: 2021-04-26
Payer: COMMERCIAL

## 2021-04-26 VITALS — WEIGHT: 242.5 LBS | BODY MASS INDEX: 42.96 KG/M2 | DIASTOLIC BLOOD PRESSURE: 78 MMHG | SYSTOLIC BLOOD PRESSURE: 120 MMHG

## 2021-04-26 DIAGNOSIS — Z01.419 WELL WOMAN EXAM WITH ROUTINE GYNECOLOGICAL EXAM: Primary | ICD-10-CM

## 2021-04-26 PROCEDURE — 3008F BODY MASS INDEX DOCD: CPT | Mod: CPTII,S$GLB,, | Performed by: OBSTETRICS & GYNECOLOGY

## 2021-04-26 PROCEDURE — 99999 PR PBB SHADOW E&M-EST. PATIENT-LVL II: CPT | Mod: PBBFAC,,, | Performed by: OBSTETRICS & GYNECOLOGY

## 2021-04-26 PROCEDURE — 3008F PR BODY MASS INDEX (BMI) DOCUMENTED: ICD-10-PCS | Mod: CPTII,S$GLB,, | Performed by: OBSTETRICS & GYNECOLOGY

## 2021-04-26 PROCEDURE — 99386 PREV VISIT NEW AGE 40-64: CPT | Mod: S$GLB,,, | Performed by: OBSTETRICS & GYNECOLOGY

## 2021-04-26 PROCEDURE — 1126F AMNT PAIN NOTED NONE PRSNT: CPT | Mod: S$GLB,,, | Performed by: OBSTETRICS & GYNECOLOGY

## 2021-04-26 PROCEDURE — 1126F PR PAIN SEVERITY QUANTIFIED, NO PAIN PRESENT: ICD-10-PCS | Mod: S$GLB,,, | Performed by: OBSTETRICS & GYNECOLOGY

## 2021-04-26 PROCEDURE — 99999 PR PBB SHADOW E&M-EST. PATIENT-LVL II: ICD-10-PCS | Mod: PBBFAC,,, | Performed by: OBSTETRICS & GYNECOLOGY

## 2021-04-26 PROCEDURE — 99386 PR PREVENTIVE VISIT,NEW,40-64: ICD-10-PCS | Mod: S$GLB,,, | Performed by: OBSTETRICS & GYNECOLOGY

## 2021-04-27 ENCOUNTER — OFFICE VISIT (OUTPATIENT)
Dept: CARDIOLOGY | Facility: CLINIC | Age: 66
End: 2021-04-27
Payer: COMMERCIAL

## 2021-04-27 VITALS
SYSTOLIC BLOOD PRESSURE: 102 MMHG | RESPIRATION RATE: 16 BRPM | DIASTOLIC BLOOD PRESSURE: 70 MMHG | WEIGHT: 240.94 LBS | HEART RATE: 70 BPM | BODY MASS INDEX: 42.69 KG/M2 | OXYGEN SATURATION: 97 % | HEIGHT: 63 IN

## 2021-04-27 DIAGNOSIS — R00.2 PALPITATIONS: ICD-10-CM

## 2021-04-27 DIAGNOSIS — G47.33 OSA ON CPAP: ICD-10-CM

## 2021-04-27 DIAGNOSIS — R06.09 DOE (DYSPNEA ON EXERTION): ICD-10-CM

## 2021-04-27 DIAGNOSIS — I10 ESSENTIAL HYPERTENSION: ICD-10-CM

## 2021-04-27 DIAGNOSIS — I49.3 SYMPTOMATIC PVCS: Primary | ICD-10-CM

## 2021-04-27 DIAGNOSIS — Z98.84 HX OF GASTRIC BYPASS: ICD-10-CM

## 2021-04-27 DIAGNOSIS — E66.01 MORBID OBESITY WITH BMI OF 40.0-44.9, ADULT: ICD-10-CM

## 2021-04-27 PROCEDURE — 99999 PR PBB SHADOW E&M-EST. PATIENT-LVL III: CPT | Mod: PBBFAC,,, | Performed by: INTERNAL MEDICINE

## 2021-04-27 PROCEDURE — 99214 PR OFFICE/OUTPT VISIT, EST, LEVL IV, 30-39 MIN: ICD-10-PCS | Mod: S$GLB,,, | Performed by: INTERNAL MEDICINE

## 2021-04-27 PROCEDURE — 99214 OFFICE O/P EST MOD 30 MIN: CPT | Mod: S$GLB,,, | Performed by: INTERNAL MEDICINE

## 2021-04-27 PROCEDURE — 3008F PR BODY MASS INDEX (BMI) DOCUMENTED: ICD-10-PCS | Mod: CPTII,S$GLB,, | Performed by: INTERNAL MEDICINE

## 2021-04-27 PROCEDURE — 99999 PR PBB SHADOW E&M-EST. PATIENT-LVL III: ICD-10-PCS | Mod: PBBFAC,,, | Performed by: INTERNAL MEDICINE

## 2021-04-27 PROCEDURE — 3008F BODY MASS INDEX DOCD: CPT | Mod: CPTII,S$GLB,, | Performed by: INTERNAL MEDICINE

## 2021-04-27 RX ORDER — AMLODIPINE BESYLATE 2.5 MG/1
2.5 TABLET ORAL DAILY
Qty: 30 TABLET | Refills: 3 | Status: SHIPPED | OUTPATIENT
Start: 2021-04-27 | End: 2021-07-29

## 2021-05-21 DIAGNOSIS — Z96.652 HISTORY OF TOTAL LEFT KNEE REPLACEMENT: Primary | ICD-10-CM

## 2021-05-24 ENCOUNTER — HOSPITAL ENCOUNTER (OUTPATIENT)
Dept: RADIOLOGY | Facility: HOSPITAL | Age: 66
Discharge: HOME | End: 2021-05-24
Attending: ORTHOPAEDIC SURGERY
Payer: COMMERCIAL

## 2021-05-24 ENCOUNTER — OFFICE VISIT (OUTPATIENT)
Dept: ORTHOPEDICS | Facility: CLINIC | Age: 66
End: 2021-05-24
Payer: COMMERCIAL

## 2021-05-24 VITALS
HEIGHT: 63 IN | WEIGHT: 240 LBS | HEART RATE: 71 BPM | BODY MASS INDEX: 42.52 KG/M2 | DIASTOLIC BLOOD PRESSURE: 66 MMHG | SYSTOLIC BLOOD PRESSURE: 109 MMHG

## 2021-05-24 DIAGNOSIS — M54.17 LUMBOSACRAL RADICULOPATHY AT L5: ICD-10-CM

## 2021-05-24 DIAGNOSIS — Z96.652 HISTORY OF TOTAL LEFT KNEE REPLACEMENT: ICD-10-CM

## 2021-05-24 DIAGNOSIS — Z96.652 HISTORY OF TOTAL LEFT KNEE REPLACEMENT: Primary | ICD-10-CM

## 2021-05-24 DIAGNOSIS — G56.03 CARPAL TUNNEL SYNDROME, BILATERAL: ICD-10-CM

## 2021-05-24 DIAGNOSIS — M54.17 LUMBOSACRAL RADICULOPATHY AT S1: ICD-10-CM

## 2021-05-24 PROCEDURE — 1125F PR PAIN SEVERITY QUANTIFIED, PAIN PRESENT: ICD-10-PCS | Mod: S$GLB,,, | Performed by: ORTHOPAEDIC SURGERY

## 2021-05-24 PROCEDURE — 99213 PR OFFICE/OUTPT VISIT, EST, LEVL III, 20-29 MIN: ICD-10-PCS | Mod: S$GLB,,, | Performed by: ORTHOPAEDIC SURGERY

## 2021-05-24 PROCEDURE — 99999 PR PBB SHADOW E&M-EST. PATIENT-LVL V: CPT | Mod: PBBFAC,,, | Performed by: ORTHOPAEDIC SURGERY

## 2021-05-24 PROCEDURE — 73564 X-RAY EXAM KNEE 4 OR MORE: CPT | Mod: TC,LT

## 2021-05-24 PROCEDURE — 73562 XR KNEE ORTHO LEFT WITH FLEXION: ICD-10-PCS | Mod: 26,RT,, | Performed by: RADIOLOGY

## 2021-05-24 PROCEDURE — 1125F AMNT PAIN NOTED PAIN PRSNT: CPT | Mod: S$GLB,,, | Performed by: ORTHOPAEDIC SURGERY

## 2021-05-24 PROCEDURE — 3008F BODY MASS INDEX DOCD: CPT | Mod: CPTII,S$GLB,, | Performed by: ORTHOPAEDIC SURGERY

## 2021-05-24 PROCEDURE — 3008F PR BODY MASS INDEX (BMI) DOCUMENTED: ICD-10-PCS | Mod: CPTII,S$GLB,, | Performed by: ORTHOPAEDIC SURGERY

## 2021-05-24 PROCEDURE — 73564 X-RAY EXAM KNEE 4 OR MORE: CPT | Mod: 26,LT,, | Performed by: RADIOLOGY

## 2021-05-24 PROCEDURE — 73562 X-RAY EXAM OF KNEE 3: CPT | Mod: 26,RT,, | Performed by: RADIOLOGY

## 2021-05-24 PROCEDURE — 73564 XR KNEE ORTHO LEFT WITH FLEXION: ICD-10-PCS | Mod: 26,LT,, | Performed by: RADIOLOGY

## 2021-05-24 PROCEDURE — 99213 OFFICE O/P EST LOW 20 MIN: CPT | Mod: S$GLB,,, | Performed by: ORTHOPAEDIC SURGERY

## 2021-05-24 PROCEDURE — 99999 PR PBB SHADOW E&M-EST. PATIENT-LVL V: ICD-10-PCS | Mod: PBBFAC,,, | Performed by: ORTHOPAEDIC SURGERY

## 2021-05-24 RX ORDER — OMEPRAZOLE 40 MG/1
40 CAPSULE, DELAYED RELEASE ORAL DAILY
COMMUNITY
Start: 2021-05-20 | End: 2021-06-04 | Stop reason: SDUPTHER

## 2021-06-04 ENCOUNTER — OFFICE VISIT (OUTPATIENT)
Dept: INTERNAL MEDICINE | Facility: CLINIC | Age: 66
End: 2021-06-04
Payer: COMMERCIAL

## 2021-06-04 VITALS
RESPIRATION RATE: 18 BRPM | SYSTOLIC BLOOD PRESSURE: 112 MMHG | WEIGHT: 245.81 LBS | BODY MASS INDEX: 43.55 KG/M2 | HEART RATE: 69 BPM | OXYGEN SATURATION: 97 % | TEMPERATURE: 99 F | HEIGHT: 63 IN | DIASTOLIC BLOOD PRESSURE: 70 MMHG

## 2021-06-04 DIAGNOSIS — Z23 NEED FOR SHINGLES VACCINE: ICD-10-CM

## 2021-06-04 DIAGNOSIS — Z00.00 ROUTINE PHYSICAL EXAMINATION: Primary | ICD-10-CM

## 2021-06-04 PROCEDURE — 99999 PR PBB SHADOW E&M-EST. PATIENT-LVL III: CPT | Mod: PBBFAC,,, | Performed by: FAMILY MEDICINE

## 2021-06-04 PROCEDURE — 1126F AMNT PAIN NOTED NONE PRSNT: CPT | Mod: S$GLB,,, | Performed by: FAMILY MEDICINE

## 2021-06-04 PROCEDURE — 3008F BODY MASS INDEX DOCD: CPT | Mod: CPTII,S$GLB,, | Performed by: FAMILY MEDICINE

## 2021-06-04 PROCEDURE — 1126F PR PAIN SEVERITY QUANTIFIED, NO PAIN PRESENT: ICD-10-PCS | Mod: S$GLB,,, | Performed by: FAMILY MEDICINE

## 2021-06-04 PROCEDURE — 99396 PREV VISIT EST AGE 40-64: CPT | Mod: S$GLB,,, | Performed by: FAMILY MEDICINE

## 2021-06-04 PROCEDURE — 99396 PR PREVENTIVE VISIT,EST,40-64: ICD-10-PCS | Mod: S$GLB,,, | Performed by: FAMILY MEDICINE

## 2021-06-04 PROCEDURE — 99999 PR PBB SHADOW E&M-EST. PATIENT-LVL III: ICD-10-PCS | Mod: PBBFAC,,, | Performed by: FAMILY MEDICINE

## 2021-06-04 PROCEDURE — 3008F PR BODY MASS INDEX (BMI) DOCUMENTED: ICD-10-PCS | Mod: CPTII,S$GLB,, | Performed by: FAMILY MEDICINE

## 2021-06-04 RX ORDER — OMEPRAZOLE 40 MG/1
40 CAPSULE, DELAYED RELEASE ORAL DAILY
Qty: 90 CAPSULE | Refills: 1 | Status: SHIPPED | OUTPATIENT
Start: 2021-06-04 | End: 2021-12-21

## 2021-06-07 RX ORDER — METHOCARBAMOL 750 MG/1
TABLET, FILM COATED ORAL
Qty: 90 TABLET | Refills: 0 | Status: SHIPPED | OUTPATIENT
Start: 2021-06-07 | End: 2021-08-11 | Stop reason: SDUPTHER

## 2021-06-14 DIAGNOSIS — B37.2 YEAST DERMATITIS: ICD-10-CM

## 2021-06-14 DIAGNOSIS — S31.801A TEAR OF SKIN OF BUTTOCK, INITIAL ENCOUNTER: ICD-10-CM

## 2021-06-15 RX ORDER — NYSTATIN 100000 U/G
CREAM TOPICAL
Qty: 30 G | Refills: 0 | Status: SHIPPED | OUTPATIENT
Start: 2021-06-15 | End: 2021-07-30

## 2021-06-21 ENCOUNTER — OFFICE VISIT (OUTPATIENT)
Dept: SURGERY | Facility: CLINIC | Age: 66
End: 2021-06-21
Payer: COMMERCIAL

## 2021-06-21 ENCOUNTER — LAB VISIT (OUTPATIENT)
Dept: LAB | Facility: HOSPITAL | Age: 66
End: 2021-06-21
Attending: SURGERY
Payer: COMMERCIAL

## 2021-06-21 ENCOUNTER — NUTRITION (OUTPATIENT)
Dept: INTERNAL MEDICINE | Facility: CLINIC | Age: 66
End: 2021-06-21
Payer: COMMERCIAL

## 2021-06-21 VITALS
WEIGHT: 235.25 LBS | HEART RATE: 81 BPM | DIASTOLIC BLOOD PRESSURE: 82 MMHG | BODY MASS INDEX: 41.68 KG/M2 | SYSTOLIC BLOOD PRESSURE: 124 MMHG | TEMPERATURE: 99 F | HEIGHT: 63 IN

## 2021-06-21 DIAGNOSIS — E66.01 MORBID OBESITY WITH BMI OF 40.0-44.9, ADULT: ICD-10-CM

## 2021-06-21 DIAGNOSIS — M47.26 OSTEOARTHRITIS OF SPINE WITH RADICULOPATHY, LUMBAR REGION: ICD-10-CM

## 2021-06-21 DIAGNOSIS — G47.33 OSA ON CPAP: ICD-10-CM

## 2021-06-21 DIAGNOSIS — M17.0 PRIMARY OSTEOARTHRITIS OF BOTH KNEES: ICD-10-CM

## 2021-06-21 DIAGNOSIS — I10 ESSENTIAL HYPERTENSION: ICD-10-CM

## 2021-06-21 DIAGNOSIS — Z71.3 DIETARY COUNSELING: ICD-10-CM

## 2021-06-21 DIAGNOSIS — Z98.84 STATUS POST BARIATRIC SURGERY: ICD-10-CM

## 2021-06-21 DIAGNOSIS — E66.01 MORBID OBESITY WITH BMI OF 40.0-44.9, ADULT: Primary | ICD-10-CM

## 2021-06-21 LAB
BASOPHILS # BLD AUTO: 0.06 K/UL (ref 0–0.2)
BASOPHILS NFR BLD: 1 % (ref 0–1.9)
DIFFERENTIAL METHOD: ABNORMAL
EOSINOPHIL # BLD AUTO: 0.1 K/UL (ref 0–0.5)
EOSINOPHIL NFR BLD: 2.4 % (ref 0–8)
ERYTHROCYTE [DISTWIDTH] IN BLOOD BY AUTOMATED COUNT: 14.5 % (ref 11.5–14.5)
HCT VFR BLD AUTO: 39.7 % (ref 37–48.5)
HGB BLD-MCNC: 12.3 G/DL (ref 12–16)
IMM GRANULOCYTES # BLD AUTO: 0.01 K/UL (ref 0–0.04)
IMM GRANULOCYTES NFR BLD AUTO: 0.2 % (ref 0–0.5)
LYMPHOCYTES # BLD AUTO: 2.2 K/UL (ref 1–4.8)
LYMPHOCYTES NFR BLD: 37.6 % (ref 18–48)
MCH RBC QN AUTO: 29.4 PG (ref 27–31)
MCHC RBC AUTO-ENTMCNC: 31 G/DL (ref 32–36)
MCV RBC AUTO: 95 FL (ref 82–98)
MONOCYTES # BLD AUTO: 0.4 K/UL (ref 0.3–1)
MONOCYTES NFR BLD: 7.3 % (ref 4–15)
NEUTROPHILS # BLD AUTO: 3 K/UL (ref 1.8–7.7)
NEUTROPHILS NFR BLD: 51.5 % (ref 38–73)
NRBC BLD-RTO: 0 /100 WBC
PLATELET # BLD AUTO: 277 K/UL (ref 150–450)
PMV BLD AUTO: 9.6 FL (ref 9.2–12.9)
RBC # BLD AUTO: 4.18 M/UL (ref 4–5.4)
WBC # BLD AUTO: 5.75 K/UL (ref 3.9–12.7)

## 2021-06-21 PROCEDURE — 83540 ASSAY OF IRON: CPT | Performed by: SURGERY

## 2021-06-21 PROCEDURE — 84425 ASSAY OF VITAMIN B-1: CPT | Performed by: SURGERY

## 2021-06-21 PROCEDURE — 99999 PR PBB SHADOW E&M-EST. PATIENT-LVL V: ICD-10-PCS | Mod: PBBFAC,,, | Performed by: SURGERY

## 2021-06-21 PROCEDURE — 99999 PR PBB SHADOW E&M-EST. PATIENT-LVL V: CPT | Mod: PBBFAC,,, | Performed by: SURGERY

## 2021-06-21 PROCEDURE — 97803 MED NUTRITION INDIV SUBSEQ: CPT | Mod: S$GLB,,, | Performed by: DIETITIAN, REGISTERED

## 2021-06-21 PROCEDURE — 99024 POSTOP FOLLOW-UP VISIT: CPT | Mod: S$GLB,,, | Performed by: SURGERY

## 2021-06-21 PROCEDURE — 85025 COMPLETE CBC W/AUTO DIFF WBC: CPT | Performed by: SURGERY

## 2021-06-21 PROCEDURE — 97803 PR MED NUTR THER, SUBSQ, INDIV, EA 15 MIN: ICD-10-PCS | Mod: S$GLB,,, | Performed by: DIETITIAN, REGISTERED

## 2021-06-21 PROCEDURE — 82607 VITAMIN B-12: CPT | Performed by: SURGERY

## 2021-06-21 PROCEDURE — 99024 PR POST-OP FOLLOW-UP VISIT: ICD-10-PCS | Mod: S$GLB,,, | Performed by: SURGERY

## 2021-06-21 PROCEDURE — 84443 ASSAY THYROID STIM HORMONE: CPT | Performed by: SURGERY

## 2021-06-21 PROCEDURE — 84134 ASSAY OF PREALBUMIN: CPT | Performed by: SURGERY

## 2021-06-21 PROCEDURE — 82306 VITAMIN D 25 HYDROXY: CPT | Performed by: SURGERY

## 2021-06-21 PROCEDURE — 80053 COMPREHEN METABOLIC PANEL: CPT | Performed by: SURGERY

## 2021-06-21 PROCEDURE — 83036 HEMOGLOBIN GLYCOSYLATED A1C: CPT | Performed by: SURGERY

## 2021-06-21 PROCEDURE — 36415 COLL VENOUS BLD VENIPUNCTURE: CPT | Performed by: SURGERY

## 2021-06-21 PROCEDURE — 84439 ASSAY OF FREE THYROXINE: CPT | Performed by: SURGERY

## 2021-06-22 ENCOUNTER — PATIENT MESSAGE (OUTPATIENT)
Dept: SURGERY | Facility: CLINIC | Age: 66
End: 2021-06-22

## 2021-06-22 LAB
25(OH)D3+25(OH)D2 SERPL-MCNC: 74 NG/ML (ref 30–96)
ALBUMIN SERPL BCP-MCNC: 3.5 G/DL (ref 3.5–5.2)
ALP SERPL-CCNC: 87 U/L (ref 55–135)
ALT SERPL W/O P-5'-P-CCNC: 19 U/L (ref 10–44)
ANION GAP SERPL CALC-SCNC: 12 MMOL/L (ref 8–16)
AST SERPL-CCNC: 22 U/L (ref 10–40)
BILIRUB SERPL-MCNC: 0.3 MG/DL (ref 0.1–1)
BUN SERPL-MCNC: 14 MG/DL (ref 8–23)
CALCIUM SERPL-MCNC: 9.4 MG/DL (ref 8.7–10.5)
CHLORIDE SERPL-SCNC: 109 MMOL/L (ref 95–110)
CO2 SERPL-SCNC: 21 MMOL/L (ref 23–29)
CREAT SERPL-MCNC: 0.8 MG/DL (ref 0.5–1.4)
EST. GFR  (AFRICAN AMERICAN): >60 ML/MIN/1.73 M^2
EST. GFR  (NON AFRICAN AMERICAN): >60 ML/MIN/1.73 M^2
ESTIMATED AVG GLUCOSE: 103 MG/DL (ref 68–131)
GLUCOSE SERPL-MCNC: 83 MG/DL (ref 70–110)
HBA1C MFR BLD: 5.2 % (ref 4–5.6)
IRON SERPL-MCNC: 42 UG/DL (ref 30–160)
POTASSIUM SERPL-SCNC: 3.6 MMOL/L (ref 3.5–5.1)
PREALB SERPL-MCNC: 14 MG/DL (ref 20–43)
PROT SERPL-MCNC: 6.6 G/DL (ref 6–8.4)
SATURATED IRON: 14 % (ref 20–50)
SODIUM SERPL-SCNC: 142 MMOL/L (ref 136–145)
T4 FREE SERPL-MCNC: 0.99 NG/DL (ref 0.71–1.51)
TOTAL IRON BINDING CAPACITY: 297 UG/DL (ref 250–450)
TRANSFERRIN SERPL-MCNC: 201 MG/DL (ref 200–375)
TSH SERPL DL<=0.005 MIU/L-ACNC: 0.6 UIU/ML (ref 0.4–4)
VIT B12 SERPL-MCNC: >2000 PG/ML (ref 210–950)

## 2021-06-23 ENCOUNTER — OFFICE VISIT (OUTPATIENT)
Dept: INTERNAL MEDICINE | Facility: CLINIC | Age: 66
End: 2021-06-23
Payer: COMMERCIAL

## 2021-06-23 VITALS
BODY MASS INDEX: 42.05 KG/M2 | HEIGHT: 63 IN | DIASTOLIC BLOOD PRESSURE: 68 MMHG | HEART RATE: 91 BPM | OXYGEN SATURATION: 97 % | TEMPERATURE: 98 F | SYSTOLIC BLOOD PRESSURE: 122 MMHG | WEIGHT: 237.31 LBS

## 2021-06-23 DIAGNOSIS — R04.0 EPISTAXIS: Primary | ICD-10-CM

## 2021-06-23 DIAGNOSIS — G47.33 OSA ON CPAP: ICD-10-CM

## 2021-06-23 PROCEDURE — 99214 OFFICE O/P EST MOD 30 MIN: CPT | Mod: S$GLB,,, | Performed by: FAMILY MEDICINE

## 2021-06-23 PROCEDURE — 99214 PR OFFICE/OUTPT VISIT, EST, LEVL IV, 30-39 MIN: ICD-10-PCS | Mod: S$GLB,,, | Performed by: FAMILY MEDICINE

## 2021-06-23 PROCEDURE — 99999 PR PBB SHADOW E&M-EST. PATIENT-LVL III: CPT | Mod: PBBFAC,,, | Performed by: FAMILY MEDICINE

## 2021-06-23 PROCEDURE — 99999 PR PBB SHADOW E&M-EST. PATIENT-LVL III: ICD-10-PCS | Mod: PBBFAC,,, | Performed by: FAMILY MEDICINE

## 2021-06-25 ENCOUNTER — PATIENT MESSAGE (OUTPATIENT)
Dept: PULMONOLOGY | Facility: CLINIC | Age: 66
End: 2021-06-25

## 2021-06-25 LAB — VIT B1 BLD-MCNC: 76 UG/L (ref 38–122)

## 2021-07-07 ENCOUNTER — PATIENT MESSAGE (OUTPATIENT)
Dept: PULMONOLOGY | Facility: CLINIC | Age: 66
End: 2021-07-07

## 2021-07-07 ENCOUNTER — OFFICE VISIT (OUTPATIENT)
Dept: INTERNAL MEDICINE | Facility: CLINIC | Age: 66
End: 2021-07-07
Payer: MEDICARE

## 2021-07-07 VITALS
OXYGEN SATURATION: 97 % | TEMPERATURE: 97 F | BODY MASS INDEX: 42.8 KG/M2 | DIASTOLIC BLOOD PRESSURE: 84 MMHG | HEART RATE: 66 BPM | SYSTOLIC BLOOD PRESSURE: 122 MMHG | WEIGHT: 241.63 LBS

## 2021-07-07 DIAGNOSIS — R04.0 EPISTAXIS: Primary | ICD-10-CM

## 2021-07-07 PROCEDURE — 99999 PR PBB SHADOW E&M-EST. PATIENT-LVL III: CPT | Mod: PBBFAC,,, | Performed by: FAMILY MEDICINE

## 2021-07-07 PROCEDURE — 1125F PR PAIN SEVERITY QUANTIFIED, PAIN PRESENT: ICD-10-PCS | Mod: S$GLB,,, | Performed by: FAMILY MEDICINE

## 2021-07-07 PROCEDURE — 99212 PR OFFICE/OUTPT VISIT, EST, LEVL II, 10-19 MIN: ICD-10-PCS | Mod: S$GLB,,, | Performed by: FAMILY MEDICINE

## 2021-07-07 PROCEDURE — 3008F PR BODY MASS INDEX (BMI) DOCUMENTED: ICD-10-PCS | Mod: CPTII,S$GLB,, | Performed by: FAMILY MEDICINE

## 2021-07-07 PROCEDURE — 99999 PR PBB SHADOW E&M-EST. PATIENT-LVL III: ICD-10-PCS | Mod: PBBFAC,,, | Performed by: FAMILY MEDICINE

## 2021-07-07 PROCEDURE — 3008F BODY MASS INDEX DOCD: CPT | Mod: CPTII,S$GLB,, | Performed by: FAMILY MEDICINE

## 2021-07-07 PROCEDURE — 99212 OFFICE O/P EST SF 10 MIN: CPT | Mod: S$GLB,,, | Performed by: FAMILY MEDICINE

## 2021-07-07 PROCEDURE — 1125F AMNT PAIN NOTED PAIN PRSNT: CPT | Mod: S$GLB,,, | Performed by: FAMILY MEDICINE

## 2021-07-15 ENCOUNTER — PATIENT MESSAGE (OUTPATIENT)
Dept: PULMONOLOGY | Facility: CLINIC | Age: 66
End: 2021-07-15

## 2021-07-16 DIAGNOSIS — G47.33 OSA ON CPAP: Primary | ICD-10-CM

## 2021-07-17 ENCOUNTER — PATIENT MESSAGE (OUTPATIENT)
Dept: PULMONOLOGY | Facility: CLINIC | Age: 66
End: 2021-07-17

## 2021-07-29 ENCOUNTER — HOSPITAL ENCOUNTER (OUTPATIENT)
Dept: CARDIOLOGY | Facility: HOSPITAL | Age: 66
Discharge: HOME | End: 2021-07-29
Attending: INTERNAL MEDICINE
Payer: MEDICARE

## 2021-07-29 ENCOUNTER — OFFICE VISIT (OUTPATIENT)
Dept: CARDIOLOGY | Facility: CLINIC | Age: 66
End: 2021-07-29
Payer: MEDICARE

## 2021-07-29 VITALS
SYSTOLIC BLOOD PRESSURE: 106 MMHG | WEIGHT: 238.13 LBS | BODY MASS INDEX: 42.19 KG/M2 | DIASTOLIC BLOOD PRESSURE: 70 MMHG | OXYGEN SATURATION: 99 % | HEIGHT: 63 IN | HEART RATE: 68 BPM

## 2021-07-29 DIAGNOSIS — R07.89 CHEST PRESSURE: ICD-10-CM

## 2021-07-29 DIAGNOSIS — I49.3 SYMPTOMATIC PVCS: ICD-10-CM

## 2021-07-29 DIAGNOSIS — I10 ESSENTIAL HYPERTENSION: Primary | ICD-10-CM

## 2021-07-29 DIAGNOSIS — E66.01 MORBID OBESITY WITH BMI OF 40.0-44.9, ADULT: ICD-10-CM

## 2021-07-29 DIAGNOSIS — R00.2 PALPITATIONS: ICD-10-CM

## 2021-07-29 DIAGNOSIS — G47.33 OSA ON CPAP: ICD-10-CM

## 2021-07-29 DIAGNOSIS — R06.09 DOE (DYSPNEA ON EXERTION): ICD-10-CM

## 2021-07-29 DIAGNOSIS — I10 ESSENTIAL HYPERTENSION: ICD-10-CM

## 2021-07-29 PROCEDURE — 3078F PR MOST RECENT DIASTOLIC BLOOD PRESSURE < 80 MM HG: ICD-10-PCS | Mod: CPTII,S$GLB,, | Performed by: INTERNAL MEDICINE

## 2021-07-29 PROCEDURE — 3044F HG A1C LEVEL LT 7.0%: CPT | Mod: CPTII,S$GLB,, | Performed by: INTERNAL MEDICINE

## 2021-07-29 PROCEDURE — 93010 ELECTROCARDIOGRAM REPORT: CPT | Mod: ,,, | Performed by: INTERNAL MEDICINE

## 2021-07-29 PROCEDURE — 99214 PR OFFICE/OUTPT VISIT, EST, LEVL IV, 30-39 MIN: ICD-10-PCS | Mod: S$GLB,,, | Performed by: INTERNAL MEDICINE

## 2021-07-29 PROCEDURE — 93005 ELECTROCARDIOGRAM TRACING: CPT

## 2021-07-29 PROCEDURE — 3074F PR MOST RECENT SYSTOLIC BLOOD PRESSURE < 130 MM HG: ICD-10-PCS | Mod: CPTII,S$GLB,, | Performed by: INTERNAL MEDICINE

## 2021-07-29 PROCEDURE — 93010 EKG 12-LEAD: ICD-10-PCS | Mod: ,,, | Performed by: INTERNAL MEDICINE

## 2021-07-29 PROCEDURE — 3074F SYST BP LT 130 MM HG: CPT | Mod: CPTII,S$GLB,, | Performed by: INTERNAL MEDICINE

## 2021-07-29 PROCEDURE — 1160F PR REVIEW ALL MEDS BY PRESCRIBER/CLIN PHARMACIST DOCUMENTED: ICD-10-PCS | Mod: CPTII,S$GLB,, | Performed by: INTERNAL MEDICINE

## 2021-07-29 PROCEDURE — 99999 PR PBB SHADOW E&M-EST. PATIENT-LVL V: CPT | Mod: PBBFAC,,, | Performed by: INTERNAL MEDICINE

## 2021-07-29 PROCEDURE — 3008F BODY MASS INDEX DOCD: CPT | Mod: CPTII,S$GLB,, | Performed by: INTERNAL MEDICINE

## 2021-07-29 PROCEDURE — 3008F PR BODY MASS INDEX (BMI) DOCUMENTED: ICD-10-PCS | Mod: CPTII,S$GLB,, | Performed by: INTERNAL MEDICINE

## 2021-07-29 PROCEDURE — 1159F PR MEDICATION LIST DOCUMENTED IN MEDICAL RECORD: ICD-10-PCS | Mod: CPTII,S$GLB,, | Performed by: INTERNAL MEDICINE

## 2021-07-29 PROCEDURE — 1159F MED LIST DOCD IN RCRD: CPT | Mod: CPTII,S$GLB,, | Performed by: INTERNAL MEDICINE

## 2021-07-29 PROCEDURE — 1160F RVW MEDS BY RX/DR IN RCRD: CPT | Mod: CPTII,S$GLB,, | Performed by: INTERNAL MEDICINE

## 2021-07-29 PROCEDURE — 3078F DIAST BP <80 MM HG: CPT | Mod: CPTII,S$GLB,, | Performed by: INTERNAL MEDICINE

## 2021-07-29 PROCEDURE — 99999 PR PBB SHADOW E&M-EST. PATIENT-LVL V: ICD-10-PCS | Mod: PBBFAC,,, | Performed by: INTERNAL MEDICINE

## 2021-07-29 PROCEDURE — 3044F PR MOST RECENT HEMOGLOBIN A1C LEVEL <7.0%: ICD-10-PCS | Mod: CPTII,S$GLB,, | Performed by: INTERNAL MEDICINE

## 2021-07-29 PROCEDURE — 99214 OFFICE O/P EST MOD 30 MIN: CPT | Mod: S$GLB,,, | Performed by: INTERNAL MEDICINE

## 2021-07-29 RX ORDER — FUROSEMIDE 40 MG/1
40 TABLET ORAL DAILY PRN
Qty: 90 TABLET | Refills: 3 | Status: SHIPPED | OUTPATIENT
Start: 2021-07-29 | End: 2022-02-18 | Stop reason: SDUPTHER

## 2021-07-30 DIAGNOSIS — S31.801A TEAR OF SKIN OF BUTTOCK, INITIAL ENCOUNTER: ICD-10-CM

## 2021-07-30 DIAGNOSIS — B37.2 YEAST DERMATITIS: ICD-10-CM

## 2021-07-30 RX ORDER — NYSTATIN 100000 U/G
CREAM TOPICAL
Qty: 30 G | Refills: 0 | Status: SHIPPED | OUTPATIENT
Start: 2021-07-30 | End: 2022-08-04

## 2021-07-30 RX ORDER — NYSTATIN 100000 U/G
CREAM TOPICAL
Qty: 30 G | Refills: 0 | Status: CANCELLED | OUTPATIENT
Start: 2021-07-30

## 2021-08-02 ENCOUNTER — PATIENT MESSAGE (OUTPATIENT)
Dept: PULMONOLOGY | Facility: CLINIC | Age: 66
End: 2021-08-02

## 2021-08-06 ENCOUNTER — OFFICE VISIT (OUTPATIENT)
Dept: INTERNAL MEDICINE | Facility: CLINIC | Age: 66
End: 2021-08-06
Payer: MEDICARE

## 2021-08-06 ENCOUNTER — TELEPHONE (OUTPATIENT)
Dept: INTERNAL MEDICINE | Facility: CLINIC | Age: 66
End: 2021-08-06

## 2021-08-06 ENCOUNTER — TELEPHONE (OUTPATIENT)
Dept: ORTHOPEDICS | Facility: CLINIC | Age: 66
End: 2021-08-06

## 2021-08-06 DIAGNOSIS — M17.12 PRIMARY OSTEOARTHRITIS OF LEFT KNEE: ICD-10-CM
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
## 2021-08-06 DIAGNOSIS — M54.50 ACUTE LOW BACK PAIN, UNSPECIFIED BACK PAIN LATERALITY, UNSPECIFIED WHETHER SCIATICA PRESENT: ICD-10-CM

## 2021-08-06 PROCEDURE — 1159F MED LIST DOCD IN RCRD: CPT | Mod: CPTII,95,, | Performed by: NURSE PRACTITIONER

## 2021-08-06 PROCEDURE — 3044F PR MOST RECENT HEMOGLOBIN A1C LEVEL <7.0%: ICD-10-PCS | Mod: CPTII,95,, | Performed by: NURSE PRACTITIONER

## 2021-08-06 PROCEDURE — 99212 PR OFFICE/OUTPT VISIT, EST, LEVL II, 10-19 MIN: ICD-10-PCS | Mod: 95,,, | Performed by: NURSE PRACTITIONER

## 2021-08-06 PROCEDURE — 3044F HG A1C LEVEL LT 7.0%: CPT | Mod: CPTII,95,, | Performed by: NURSE PRACTITIONER

## 2021-08-06 PROCEDURE — 99212 OFFICE O/P EST SF 10 MIN: CPT | Mod: 95,,, | Performed by: NURSE PRACTITIONER

## 2021-08-06 PROCEDURE — 1159F PR MEDICATION LIST DOCUMENTED IN MEDICAL RECORD: ICD-10-PCS | Mod: CPTII,95,, | Performed by: NURSE PRACTITIONER

## 2021-08-06 PROCEDURE — 1160F PR REVIEW ALL MEDS BY PRESCRIBER/CLIN PHARMACIST DOCUMENTED: ICD-10-PCS | Mod: CPTII,95,, | Performed by: NURSE PRACTITIONER

## 2021-08-06 PROCEDURE — 1160F RVW MEDS BY RX/DR IN RCRD: CPT | Mod: CPTII,95,, | Performed by: NURSE PRACTITIONER

## 2021-08-11 RX ORDER — METHOCARBAMOL 750 MG/1
TABLET, FILM COATED ORAL
Qty: 90 TABLET | Refills: 0 | Status: SHIPPED | OUTPATIENT
Start: 2021-08-11 | End: 2021-08-17 | Stop reason: SDUPTHER

## 2021-08-13 ENCOUNTER — TELEPHONE (OUTPATIENT)
Dept: INTERNAL MEDICINE | Facility: CLINIC | Age: 66
End: 2021-08-13

## 2021-08-16 ENCOUNTER — TELEPHONE (OUTPATIENT)
Dept: ORTHOPEDICS | Facility: CLINIC | Age: 66
End: 2021-08-16

## 2021-08-16 ENCOUNTER — PATIENT MESSAGE (OUTPATIENT)
Dept: PAIN MEDICINE | Facility: CLINIC | Age: 66
End: 2021-08-16

## 2021-08-17 ENCOUNTER — OFFICE VISIT (OUTPATIENT)
Dept: PAIN MEDICINE | Facility: CLINIC | Age: 66
End: 2021-08-17
Payer: MEDICARE

## 2021-08-17 VITALS — WEIGHT: 238.13 LBS | BODY MASS INDEX: 42.19 KG/M2 | RESPIRATION RATE: 17 BRPM | HEIGHT: 63 IN

## 2021-08-17 DIAGNOSIS — Z86.69 HISTORY OF MIGRAINE HEADACHES: ICD-10-CM
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
## 2021-08-17 DIAGNOSIS — Z96.652 HISTORY OF LEFT KNEE REPLACEMENT: ICD-10-CM

## 2021-08-17 DIAGNOSIS — M54.16 BILATERAL LUMBAR RADICULOPATHY: Primary | ICD-10-CM

## 2021-08-17 DIAGNOSIS — M79.18 PIRIFORMIS MUSCLE PAIN: ICD-10-CM

## 2021-08-17 PROCEDURE — 99214 PR OFFICE/OUTPT VISIT, EST, LEVL IV, 30-39 MIN: ICD-10-PCS | Mod: 95,,, | Performed by: PHYSICIAN ASSISTANT

## 2021-08-17 PROCEDURE — 3008F PR BODY MASS INDEX (BMI) DOCUMENTED: ICD-10-PCS | Mod: CPTII,95,, | Performed by: PHYSICIAN ASSISTANT

## 2021-08-17 PROCEDURE — 3008F BODY MASS INDEX DOCD: CPT | Mod: CPTII,95,, | Performed by: PHYSICIAN ASSISTANT

## 2021-08-17 PROCEDURE — 3044F PR MOST RECENT HEMOGLOBIN A1C LEVEL <7.0%: ICD-10-PCS | Mod: CPTII,95,, | Performed by: PHYSICIAN ASSISTANT

## 2021-08-17 PROCEDURE — 1159F MED LIST DOCD IN RCRD: CPT | Mod: CPTII,95,, | Performed by: PHYSICIAN ASSISTANT

## 2021-08-17 PROCEDURE — 1159F PR MEDICATION LIST DOCUMENTED IN MEDICAL RECORD: ICD-10-PCS | Mod: CPTII,95,, | Performed by: PHYSICIAN ASSISTANT

## 2021-08-17 PROCEDURE — 3044F HG A1C LEVEL LT 7.0%: CPT | Mod: CPTII,95,, | Performed by: PHYSICIAN ASSISTANT

## 2021-08-17 PROCEDURE — 99214 OFFICE O/P EST MOD 30 MIN: CPT | Mod: 95,,, | Performed by: PHYSICIAN ASSISTANT

## 2021-08-17 PROCEDURE — 1125F PR PAIN SEVERITY QUANTIFIED, PAIN PRESENT: ICD-10-PCS | Mod: CPTII,95,, | Performed by: PHYSICIAN ASSISTANT

## 2021-08-17 PROCEDURE — 1125F AMNT PAIN NOTED PAIN PRSNT: CPT | Mod: CPTII,95,, | Performed by: PHYSICIAN ASSISTANT

## 2021-08-17 PROCEDURE — 1160F RVW MEDS BY RX/DR IN RCRD: CPT | Mod: CPTII,95,, | Performed by: PHYSICIAN ASSISTANT

## 2021-08-17 PROCEDURE — 1160F PR REVIEW ALL MEDS BY PRESCRIBER/CLIN PHARMACIST DOCUMENTED: ICD-10-PCS | Mod: CPTII,95,, | Performed by: PHYSICIAN ASSISTANT

## 2021-08-17 RX ORDER — METHOCARBAMOL 750 MG/1
750 TABLET, FILM COATED ORAL 3 TIMES DAILY PRN
Qty: 90 TABLET | Refills: 1 | Status: SHIPPED | OUTPATIENT
Start: 2021-08-17 | End: 2021-12-06

## 2021-08-17 RX ORDER — TOPIRAMATE 50 MG/1
50 TABLET, FILM COATED ORAL 2 TIMES DAILY
Qty: 180 TABLET | Refills: 0 | Status: SHIPPED | OUTPATIENT
Start: 2021-08-17 | End: 2021-10-20 | Stop reason: SDUPTHER

## 2021-08-18 ENCOUNTER — HOSPITAL ENCOUNTER (OUTPATIENT)
Dept: RADIOLOGY | Facility: HOSPITAL | Age: 66
Discharge: HOME | End: 2021-08-18
Attending: PHYSICIAN ASSISTANT
Payer: MEDICARE
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
## 2021-08-18 DIAGNOSIS — Z96.652 HISTORY OF LEFT KNEE REPLACEMENT: ICD-10-CM

## 2021-08-18 PROCEDURE — 73564 X-RAY EXAM KNEE 4 OR MORE: CPT | Mod: TC,LT

## 2021-08-18 PROCEDURE — 73564 XR KNEE ORTHO LEFT WITH FLEXION: ICD-10-PCS | Mod: 26,LT,, | Performed by: RADIOLOGY

## 2021-08-18 PROCEDURE — 73564 X-RAY EXAM KNEE 4 OR MORE: CPT | Mod: 26,LT,, | Performed by: RADIOLOGY

## 2021-08-18 PROCEDURE — 73562 XR KNEE ORTHO LEFT WITH FLEXION: ICD-10-PCS | Mod: 26,RT,, | Performed by: RADIOLOGY

## 2021-08-18 PROCEDURE — 73562 X-RAY EXAM OF KNEE 3: CPT | Mod: 26,RT,, | Performed by: RADIOLOGY

## 2021-09-09 ENCOUNTER — PATIENT MESSAGE (OUTPATIENT)
Dept: PAIN MEDICINE | Facility: CLINIC | Age: 66
End: 2021-09-09

## 2021-09-10 ENCOUNTER — OFFICE VISIT (OUTPATIENT)
Dept: INTERNAL MEDICINE | Facility: CLINIC | Age: 66
End: 2021-09-10
Payer: MEDICARE

## 2021-09-10 ENCOUNTER — PATIENT MESSAGE (OUTPATIENT)
Dept: PAIN MEDICINE | Facility: CLINIC | Age: 66
End: 2021-09-10

## 2021-09-10 DIAGNOSIS — Z96.652 STATUS POST TOTAL KNEE REPLACEMENT USING CEMENT, LEFT: ICD-10-CM

## 2021-09-10 DIAGNOSIS — M25.562 POSTERIOR LEFT KNEE PAIN: ICD-10-CM

## 2021-09-10 DIAGNOSIS — M54.16 LUMBAR RADICULOPATHY: Primary | ICD-10-CM

## 2021-09-10 PROCEDURE — 99212 PR OFFICE/OUTPT VISIT, EST, LEVL II, 10-19 MIN: ICD-10-PCS | Mod: 95,,, | Performed by: NURSE PRACTITIONER

## 2021-09-10 PROCEDURE — 1160F PR REVIEW ALL MEDS BY PRESCRIBER/CLIN PHARMACIST DOCUMENTED: ICD-10-PCS | Mod: CPTII,95,, | Performed by: NURSE PRACTITIONER

## 2021-09-10 PROCEDURE — 1159F MED LIST DOCD IN RCRD: CPT | Mod: CPTII,95,, | Performed by: NURSE PRACTITIONER

## 2021-09-10 PROCEDURE — 1160F RVW MEDS BY RX/DR IN RCRD: CPT | Mod: CPTII,95,, | Performed by: NURSE PRACTITIONER

## 2021-09-10 PROCEDURE — 3044F PR MOST RECENT HEMOGLOBIN A1C LEVEL <7.0%: ICD-10-PCS | Mod: CPTII,95,, | Performed by: NURSE PRACTITIONER

## 2021-09-10 PROCEDURE — 99212 OFFICE O/P EST SF 10 MIN: CPT | Mod: 95,,, | Performed by: NURSE PRACTITIONER

## 2021-09-10 PROCEDURE — 3044F HG A1C LEVEL LT 7.0%: CPT | Mod: CPTII,95,, | Performed by: NURSE PRACTITIONER

## 2021-09-10 PROCEDURE — 1159F PR MEDICATION LIST DOCUMENTED IN MEDICAL RECORD: ICD-10-PCS | Mod: CPTII,95,, | Performed by: NURSE PRACTITIONER

## 2021-09-10 RX ORDER — DICLOFENAC SODIUM 10 MG/G
2 GEL TOPICAL 3 TIMES DAILY PRN
Qty: 200 G | Refills: 2 | Status: SHIPPED | OUTPATIENT
Start: 2021-09-10 | End: 2022-01-25 | Stop reason: SDUPTHER

## 2021-09-10 RX ORDER — LIDOCAINE AND PRILOCAINE 25; 25 MG/G; MG/G
CREAM TOPICAL
Qty: 30 G | Refills: 1 | Status: SHIPPED | OUTPATIENT
Start: 2021-09-10 | End: 2021-10-13 | Stop reason: SDUPTHER

## 2021-09-13 ENCOUNTER — PATIENT MESSAGE (OUTPATIENT)
Dept: PAIN MEDICINE | Facility: CLINIC | Age: 66
End: 2021-09-13

## 2021-09-14 ENCOUNTER — PATIENT MESSAGE (OUTPATIENT)
Dept: PAIN MEDICINE | Facility: CLINIC | Age: 66
End: 2021-09-14

## 2021-09-21 ENCOUNTER — PATIENT MESSAGE (OUTPATIENT)
Dept: PAIN MEDICINE | Facility: CLINIC | Age: 66
End: 2021-09-21

## 2021-09-22 ENCOUNTER — OFFICE VISIT (OUTPATIENT)
Dept: SURGERY | Facility: CLINIC | Age: 66
End: 2021-09-22
Payer: MEDICARE

## 2021-09-22 ENCOUNTER — NUTRITION (OUTPATIENT)
Dept: INTERNAL MEDICINE | Facility: CLINIC | Age: 66
End: 2021-09-22
Payer: MEDICARE

## 2021-09-22 VITALS
TEMPERATURE: 98 F | SYSTOLIC BLOOD PRESSURE: 173 MMHG | BODY MASS INDEX: 42.89 KG/M2 | DIASTOLIC BLOOD PRESSURE: 93 MMHG | HEIGHT: 63 IN | WEIGHT: 242.06 LBS | HEART RATE: 61 BPM

## 2021-09-22 DIAGNOSIS — G47.33 OSA ON CPAP: ICD-10-CM

## 2021-09-22 DIAGNOSIS — Z71.3 DIETARY COUNSELING: ICD-10-CM

## 2021-09-22 DIAGNOSIS — M47.26 OSTEOARTHRITIS OF SPINE WITH RADICULOPATHY, LUMBAR REGION: ICD-10-CM

## 2021-09-22 DIAGNOSIS — Z98.84 STATUS POST BARIATRIC SURGERY: ICD-10-CM

## 2021-09-22 DIAGNOSIS — I10 ESSENTIAL HYPERTENSION: ICD-10-CM

## 2021-09-22 DIAGNOSIS — E66.01 MORBID OBESITY WITH BMI OF 40.0-44.9, ADULT: Primary | ICD-10-CM

## 2021-09-22 PROCEDURE — 1159F MED LIST DOCD IN RCRD: CPT | Mod: CPTII,S$GLB,, | Performed by: SURGERY

## 2021-09-22 PROCEDURE — 3077F SYST BP >= 140 MM HG: CPT | Mod: CPTII,S$GLB,, | Performed by: SURGERY

## 2021-09-22 PROCEDURE — 99999 PR PBB SHADOW E&M-EST. PATIENT-LVL III: ICD-10-PCS | Mod: PBBFAC,,, | Performed by: SURGERY

## 2021-09-22 PROCEDURE — 3044F HG A1C LEVEL LT 7.0%: CPT | Mod: CPTII,S$GLB,, | Performed by: SURGERY

## 2021-09-22 PROCEDURE — 1160F PR REVIEW ALL MEDS BY PRESCRIBER/CLIN PHARMACIST DOCUMENTED: ICD-10-PCS | Mod: CPTII,S$GLB,, | Performed by: SURGERY

## 2021-09-22 PROCEDURE — 97803 PR MED NUTR THER, SUBSQ, INDIV, EA 15 MIN: ICD-10-PCS | Mod: S$GLB,,, | Performed by: DIETITIAN, REGISTERED

## 2021-09-22 PROCEDURE — 1159F PR MEDICATION LIST DOCUMENTED IN MEDICAL RECORD: ICD-10-PCS | Mod: CPTII,S$GLB,, | Performed by: SURGERY

## 2021-09-22 PROCEDURE — 99999 PR PBB SHADOW E&M-EST. PATIENT-LVL III: CPT | Mod: PBBFAC,,, | Performed by: SURGERY

## 2021-09-22 PROCEDURE — 97803 MED NUTRITION INDIV SUBSEQ: CPT | Mod: S$GLB,,, | Performed by: DIETITIAN, REGISTERED

## 2021-09-22 PROCEDURE — 3080F PR MOST RECENT DIASTOLIC BLOOD PRESSURE >= 90 MM HG: ICD-10-PCS | Mod: CPTII,S$GLB,, | Performed by: SURGERY

## 2021-09-22 PROCEDURE — 3077F PR MOST RECENT SYSTOLIC BLOOD PRESSURE >= 140 MM HG: ICD-10-PCS | Mod: CPTII,S$GLB,, | Performed by: SURGERY

## 2021-09-22 PROCEDURE — 3044F PR MOST RECENT HEMOGLOBIN A1C LEVEL <7.0%: ICD-10-PCS | Mod: CPTII,S$GLB,, | Performed by: SURGERY

## 2021-09-22 PROCEDURE — 1160F RVW MEDS BY RX/DR IN RCRD: CPT | Mod: CPTII,S$GLB,, | Performed by: SURGERY

## 2021-09-22 PROCEDURE — 99214 PR OFFICE/OUTPT VISIT, EST, LEVL IV, 30-39 MIN: ICD-10-PCS | Mod: S$GLB,,, | Performed by: SURGERY

## 2021-09-22 PROCEDURE — 99214 OFFICE O/P EST MOD 30 MIN: CPT | Mod: S$GLB,,, | Performed by: SURGERY

## 2021-09-22 PROCEDURE — 3008F BODY MASS INDEX DOCD: CPT | Mod: CPTII,S$GLB,, | Performed by: SURGERY

## 2021-09-22 PROCEDURE — 3080F DIAST BP >= 90 MM HG: CPT | Mod: CPTII,S$GLB,, | Performed by: SURGERY

## 2021-09-22 PROCEDURE — 3008F PR BODY MASS INDEX (BMI) DOCUMENTED: ICD-10-PCS | Mod: CPTII,S$GLB,, | Performed by: SURGERY

## 2021-09-27 ENCOUNTER — TELEPHONE (OUTPATIENT)
Dept: PAIN MEDICINE | Facility: CLINIC | Age: 66
End: 2021-09-27

## 2021-09-27 ENCOUNTER — PATIENT MESSAGE (OUTPATIENT)
Dept: PAIN MEDICINE | Facility: CLINIC | Age: 66
End: 2021-09-27

## 2021-09-28 ENCOUNTER — TELEPHONE (OUTPATIENT)
Dept: INTERNAL MEDICINE | Facility: CLINIC | Age: 66
End: 2021-09-28

## 2021-09-28 RX ORDER — POTASSIUM CHLORIDE 20 MEQ/1
20 TABLET, EXTENDED RELEASE ORAL DAILY
Qty: 60 TABLET | Refills: 2 | Status: SHIPPED | OUTPATIENT
Start: 2021-09-28 | End: 2022-02-18 | Stop reason: SDUPTHER

## 2021-10-04 DIAGNOSIS — F41.9 ANXIETY: ICD-10-CM

## 2021-10-05 ENCOUNTER — PATIENT MESSAGE (OUTPATIENT)
Dept: PAIN MEDICINE | Facility: HOSPITAL | Age: 66
End: 2021-10-05

## 2021-10-05 ENCOUNTER — HOSPITAL ENCOUNTER (OUTPATIENT)
Facility: HOSPITAL | Age: 66
Discharge: HOME | End: 2021-10-05
Attending: PHYSICAL MEDICINE & REHABILITATION | Admitting: PHYSICAL MEDICINE & REHABILITATION
Payer: MEDICARE

## 2021-10-05 VITALS
DIASTOLIC BLOOD PRESSURE: 73 MMHG | WEIGHT: 237 LBS | RESPIRATION RATE: 20 BRPM | BODY MASS INDEX: 41.99 KG/M2 | TEMPERATURE: 98 F | HEIGHT: 63 IN | SYSTOLIC BLOOD PRESSURE: 136 MMHG | OXYGEN SATURATION: 100 % | HEART RATE: 67 BPM

## 2021-10-05 DIAGNOSIS — M54.16 LUMBAR RADICULOPATHY: ICD-10-CM

## 2021-10-05 DIAGNOSIS — M46.1 SACROILIITIS: Primary | ICD-10-CM

## 2021-10-05 PROCEDURE — 64483 PR EPIDURAL INJ, ANES/STEROID, TRANSFORAMINAL, LUMB/SACR, SNGL LEVL: ICD-10-PCS | Mod: 50,,, | Performed by: PHYSICAL MEDICINE & REHABILITATION

## 2021-10-05 PROCEDURE — 25500020 PHARM REV CODE 255: Performed by: PHYSICAL MEDICINE & REHABILITATION

## 2021-10-05 PROCEDURE — 64483 NJX AA&/STRD TFRM EPI L/S 1: CPT | Mod: 50 | Performed by: PHYSICAL MEDICINE & REHABILITATION

## 2021-10-05 PROCEDURE — 99152 MOD SED SAME PHYS/QHP 5/>YRS: CPT | Performed by: PHYSICAL MEDICINE & REHABILITATION

## 2021-10-05 PROCEDURE — 63600175 PHARM REV CODE 636 W HCPCS: Performed by: PHYSICAL MEDICINE & REHABILITATION

## 2021-10-05 PROCEDURE — 64483 NJX AA&/STRD TFRM EPI L/S 1: CPT | Mod: 50,,, | Performed by: PHYSICAL MEDICINE & REHABILITATION

## 2021-10-05 PROCEDURE — 25000003 PHARM REV CODE 250: Performed by: PHYSICAL MEDICINE & REHABILITATION

## 2021-10-05 RX ORDER — MIDAZOLAM HYDROCHLORIDE 1 MG/ML
INJECTION, SOLUTION INTRAMUSCULAR; INTRAVENOUS
Status: DISCONTINUED | OUTPATIENT
Start: 2021-10-05 | End: 2021-10-05 | Stop reason: HOSPADM

## 2021-10-05 RX ORDER — ONDANSETRON 2 MG/ML
4 INJECTION INTRAMUSCULAR; INTRAVENOUS ONCE AS NEEDED
Status: DISCONTINUED | OUTPATIENT
Start: 2021-10-05 | End: 2021-10-05 | Stop reason: HOSPADM

## 2021-10-05 RX ORDER — FENTANYL CITRATE 50 UG/ML
INJECTION, SOLUTION INTRAMUSCULAR; INTRAVENOUS
Status: DISCONTINUED | OUTPATIENT
Start: 2021-10-05 | End: 2021-10-05 | Stop reason: HOSPADM

## 2021-10-05 RX ORDER — MUPIROCIN 20 MG/G
OINTMENT TOPICAL
Qty: 22 G | Refills: 0 | Status: SHIPPED | OUTPATIENT
Start: 2021-10-05 | End: 2022-05-13 | Stop reason: SDUPTHER

## 2021-10-05 RX ORDER — METHYLPREDNISOLONE ACETATE 40 MG/ML
INJECTION, SUSPENSION INTRA-ARTICULAR; INTRALESIONAL; INTRAMUSCULAR; SOFT TISSUE
Status: DISCONTINUED | OUTPATIENT
Start: 2021-10-05 | End: 2021-10-05 | Stop reason: HOSPADM

## 2021-10-05 RX ORDER — BUPIVACAINE HYDROCHLORIDE 2.5 MG/ML
INJECTION, SOLUTION EPIDURAL; INFILTRATION; INTRACAUDAL
Status: DISCONTINUED | OUTPATIENT
Start: 2021-10-05 | End: 2021-10-05 | Stop reason: HOSPADM

## 2021-10-05 RX ORDER — ALPRAZOLAM 0.25 MG/1
TABLET ORAL
Qty: 20 TABLET | Refills: 0 | Status: SHIPPED | OUTPATIENT
Start: 2021-10-05 | End: 2021-10-26

## 2021-10-07 ENCOUNTER — PATIENT MESSAGE (OUTPATIENT)
Dept: PAIN MEDICINE | Facility: HOSPITAL | Age: 66
End: 2021-10-07

## 2021-10-11 ENCOUNTER — TELEPHONE (OUTPATIENT)
Dept: PAIN MEDICINE | Facility: CLINIC | Age: 66
End: 2021-10-11

## 2021-10-12 ENCOUNTER — PATIENT OUTREACH (OUTPATIENT)
Dept: ADMINISTRATIVE | Facility: OTHER | Age: 66
End: 2021-10-12

## 2021-10-12 DIAGNOSIS — Z12.31 ENCOUNTER FOR SCREENING MAMMOGRAM FOR MALIGNANT NEOPLASM OF BREAST: Primary | ICD-10-CM

## 2021-10-13 ENCOUNTER — OFFICE VISIT (OUTPATIENT)
Dept: PAIN MEDICINE | Facility: CLINIC | Age: 66
End: 2021-10-13
Payer: MEDICARE

## 2021-10-13 VITALS
DIASTOLIC BLOOD PRESSURE: 66 MMHG | BODY MASS INDEX: 41.79 KG/M2 | SYSTOLIC BLOOD PRESSURE: 99 MMHG | HEART RATE: 76 BPM | HEIGHT: 63 IN | WEIGHT: 235.88 LBS | RESPIRATION RATE: 18 BRPM

## 2021-10-13 DIAGNOSIS — M51.36 DDD (DEGENERATIVE DISC DISEASE), LUMBAR: ICD-10-CM

## 2021-10-13 DIAGNOSIS — M17.0 PRIMARY OSTEOARTHRITIS OF BOTH KNEES: ICD-10-CM

## 2021-10-13 DIAGNOSIS — M54.16 BILATERAL LUMBAR RADICULOPATHY: Primary | ICD-10-CM

## 2021-10-13 DIAGNOSIS — M79.18 PIRIFORMIS MUSCLE PAIN: ICD-10-CM

## 2021-10-13 PROCEDURE — 3078F PR MOST RECENT DIASTOLIC BLOOD PRESSURE < 80 MM HG: ICD-10-PCS | Mod: CPTII,S$GLB,, | Performed by: PHYSICIAN ASSISTANT

## 2021-10-13 PROCEDURE — 99999 PR PBB SHADOW E&M-EST. PATIENT-LVL III: CPT | Mod: PBBFAC,,, | Performed by: PHYSICIAN ASSISTANT

## 2021-10-13 PROCEDURE — 3078F DIAST BP <80 MM HG: CPT | Mod: CPTII,S$GLB,, | Performed by: PHYSICIAN ASSISTANT

## 2021-10-13 PROCEDURE — 3008F PR BODY MASS INDEX (BMI) DOCUMENTED: ICD-10-PCS | Mod: CPTII,S$GLB,, | Performed by: PHYSICIAN ASSISTANT

## 2021-10-13 PROCEDURE — 99214 PR OFFICE/OUTPT VISIT, EST, LEVL IV, 30-39 MIN: ICD-10-PCS | Mod: S$GLB,,, | Performed by: PHYSICIAN ASSISTANT

## 2021-10-13 PROCEDURE — 3044F PR MOST RECENT HEMOGLOBIN A1C LEVEL <7.0%: ICD-10-PCS | Mod: CPTII,S$GLB,, | Performed by: PHYSICIAN ASSISTANT

## 2021-10-13 PROCEDURE — 3074F PR MOST RECENT SYSTOLIC BLOOD PRESSURE < 130 MM HG: ICD-10-PCS | Mod: CPTII,S$GLB,, | Performed by: PHYSICIAN ASSISTANT

## 2021-10-13 PROCEDURE — 3008F BODY MASS INDEX DOCD: CPT | Mod: CPTII,S$GLB,, | Performed by: PHYSICIAN ASSISTANT

## 2021-10-13 PROCEDURE — 1160F RVW MEDS BY RX/DR IN RCRD: CPT | Mod: CPTII,S$GLB,, | Performed by: PHYSICIAN ASSISTANT

## 2021-10-13 PROCEDURE — 1159F MED LIST DOCD IN RCRD: CPT | Mod: CPTII,S$GLB,, | Performed by: PHYSICIAN ASSISTANT

## 2021-10-13 PROCEDURE — 3074F SYST BP LT 130 MM HG: CPT | Mod: CPTII,S$GLB,, | Performed by: PHYSICIAN ASSISTANT

## 2021-10-13 PROCEDURE — 99214 OFFICE O/P EST MOD 30 MIN: CPT | Mod: S$GLB,,, | Performed by: PHYSICIAN ASSISTANT

## 2021-10-13 PROCEDURE — 1159F PR MEDICATION LIST DOCUMENTED IN MEDICAL RECORD: ICD-10-PCS | Mod: CPTII,S$GLB,, | Performed by: PHYSICIAN ASSISTANT

## 2021-10-13 PROCEDURE — 1160F PR REVIEW ALL MEDS BY PRESCRIBER/CLIN PHARMACIST DOCUMENTED: ICD-10-PCS | Mod: CPTII,S$GLB,, | Performed by: PHYSICIAN ASSISTANT

## 2021-10-13 PROCEDURE — 3044F HG A1C LEVEL LT 7.0%: CPT | Mod: CPTII,S$GLB,, | Performed by: PHYSICIAN ASSISTANT

## 2021-10-13 PROCEDURE — 99999 PR PBB SHADOW E&M-EST. PATIENT-LVL III: ICD-10-PCS | Mod: PBBFAC,,, | Performed by: PHYSICIAN ASSISTANT

## 2021-10-13 RX ORDER — LIDOCAINE AND PRILOCAINE 25; 25 MG/G; MG/G
CREAM TOPICAL
Qty: 30 G | Refills: 1 | Status: SHIPPED | OUTPATIENT
Start: 2021-10-13 | End: 2022-02-22 | Stop reason: SDUPTHER

## 2021-10-14 ENCOUNTER — PATIENT MESSAGE (OUTPATIENT)
Dept: PAIN MEDICINE | Facility: CLINIC | Age: 66
End: 2021-10-14

## 2021-10-14 RX ORDER — METOPROLOL SUCCINATE 50 MG/1
50 TABLET, EXTENDED RELEASE ORAL DAILY
Qty: 90 TABLET | Refills: 1 | Status: SHIPPED | OUTPATIENT
Start: 2021-10-14 | End: 2022-02-18 | Stop reason: SDUPTHER

## 2021-10-15 ENCOUNTER — PATIENT MESSAGE (OUTPATIENT)
Dept: PAIN MEDICINE | Facility: CLINIC | Age: 66
End: 2021-10-15
Payer: MEDICARE

## 2021-10-15 ENCOUNTER — TELEPHONE (OUTPATIENT)
Dept: PAIN MEDICINE | Facility: CLINIC | Age: 66
End: 2021-10-15

## 2021-10-15 RX ORDER — METHYLPREDNISOLONE 4 MG/1
TABLET ORAL
Qty: 1 PACKAGE | Refills: 0 | Status: SHIPPED | OUTPATIENT
Start: 2021-10-15 | End: 2021-10-20 | Stop reason: ALTCHOICE

## 2021-10-20 ENCOUNTER — OFFICE VISIT (OUTPATIENT)
Dept: PAIN MEDICINE | Facility: CLINIC | Age: 66
End: 2021-10-20
Payer: MEDICARE

## 2021-10-20 ENCOUNTER — TELEPHONE (OUTPATIENT)
Dept: PAIN MEDICINE | Facility: CLINIC | Age: 66
End: 2021-10-20

## 2021-10-20 DIAGNOSIS — Z86.69 HISTORY OF MIGRAINE HEADACHES: ICD-10-CM

## 2021-10-20 DIAGNOSIS — M54.16 BILATERAL LUMBAR RADICULOPATHY: ICD-10-CM

## 2021-10-20 DIAGNOSIS — M51.36 DDD (DEGENERATIVE DISC DISEASE), LUMBAR: Primary | ICD-10-CM

## 2021-10-20 DIAGNOSIS — M54.16 LUMBAR RADICULOPATHY: ICD-10-CM

## 2021-10-20 PROCEDURE — 99213 OFFICE O/P EST LOW 20 MIN: CPT | Mod: 95,,, | Performed by: PHYSICAL MEDICINE & REHABILITATION

## 2021-10-20 PROCEDURE — 99213 PR OFFICE/OUTPT VISIT, EST, LEVL III, 20-29 MIN: ICD-10-PCS | Mod: 95,,, | Performed by: PHYSICAL MEDICINE & REHABILITATION

## 2021-10-20 RX ORDER — MELOXICAM 15 MG/1
15 TABLET ORAL DAILY
Qty: 90 TABLET | Refills: 3 | Status: SHIPPED | OUTPATIENT
Start: 2021-10-20 | End: 2022-10-05 | Stop reason: SDUPTHER

## 2021-10-20 RX ORDER — TOPIRAMATE 50 MG/1
50 TABLET, FILM COATED ORAL 2 TIMES DAILY
Qty: 180 TABLET | Refills: 0 | Status: SHIPPED | OUTPATIENT
Start: 2021-10-20 | End: 2022-04-04

## 2021-10-20 RX ORDER — GABAPENTIN 300 MG/1
600 CAPSULE ORAL NIGHTLY
Qty: 60 CAPSULE | Refills: 2 | Status: SHIPPED | OUTPATIENT
Start: 2021-10-20 | End: 2022-02-04

## 2021-10-21 ENCOUNTER — OFFICE VISIT (OUTPATIENT)
Dept: NEUROSURGERY | Facility: CLINIC | Age: 66
End: 2021-10-21
Payer: MEDICARE

## 2021-10-21 ENCOUNTER — TELEPHONE (OUTPATIENT)
Dept: PAIN MEDICINE | Facility: CLINIC | Age: 66
End: 2021-10-21

## 2021-10-21 VITALS
HEIGHT: 63 IN | HEART RATE: 82 BPM | BODY MASS INDEX: 41.53 KG/M2 | DIASTOLIC BLOOD PRESSURE: 77 MMHG | WEIGHT: 234.38 LBS | OXYGEN SATURATION: 100 % | SYSTOLIC BLOOD PRESSURE: 139 MMHG

## 2021-10-21 DIAGNOSIS — M48.07 SPINAL STENOSIS, LUMBOSACRAL REGION: ICD-10-CM

## 2021-10-21 DIAGNOSIS — G89.29 CHRONIC LEFT-SIDED LOW BACK PAIN WITH LEFT-SIDED SCIATICA: ICD-10-CM

## 2021-10-21 DIAGNOSIS — M54.42 CHRONIC LEFT-SIDED LOW BACK PAIN WITH LEFT-SIDED SCIATICA: ICD-10-CM

## 2021-10-21 DIAGNOSIS — M43.16 SPONDYLOLISTHESIS OF LUMBAR REGION: ICD-10-CM

## 2021-10-21 DIAGNOSIS — G81.90 HEMIPARESIS, UNSPECIFIED HEMIPARESIS ETIOLOGY, UNSPECIFIED LATERALITY: Primary | ICD-10-CM

## 2021-10-21 DIAGNOSIS — M54.16 BILATERAL LUMBAR RADICULOPATHY: ICD-10-CM

## 2021-10-21 DIAGNOSIS — R26.9 GAIT DISTURBANCE: ICD-10-CM

## 2021-10-21 DIAGNOSIS — M43.16 SPONDYLOLISTHESIS AT L4-L5 LEVEL: ICD-10-CM

## 2021-10-21 DIAGNOSIS — M54.9 DORSALGIA, UNSPECIFIED: ICD-10-CM

## 2021-10-21 PROCEDURE — 99999 PR PBB SHADOW E&M-EST. PATIENT-LVL IV: CPT | Mod: PBBFAC,,, | Performed by: PHYSICIAN ASSISTANT

## 2021-10-21 PROCEDURE — 3008F PR BODY MASS INDEX (BMI) DOCUMENTED: ICD-10-PCS | Mod: CPTII,S$GLB,, | Performed by: PHYSICIAN ASSISTANT

## 2021-10-21 PROCEDURE — 1160F RVW MEDS BY RX/DR IN RCRD: CPT | Mod: CPTII,S$GLB,, | Performed by: PHYSICIAN ASSISTANT

## 2021-10-21 PROCEDURE — 99215 OFFICE O/P EST HI 40 MIN: CPT | Mod: S$GLB,,, | Performed by: PHYSICIAN ASSISTANT

## 2021-10-21 PROCEDURE — 3008F BODY MASS INDEX DOCD: CPT | Mod: CPTII,S$GLB,, | Performed by: PHYSICIAN ASSISTANT

## 2021-10-21 PROCEDURE — 3078F PR MOST RECENT DIASTOLIC BLOOD PRESSURE < 80 MM HG: ICD-10-PCS | Mod: CPTII,S$GLB,, | Performed by: PHYSICIAN ASSISTANT

## 2021-10-21 PROCEDURE — 99215 PR OFFICE/OUTPT VISIT, EST, LEVL V, 40-54 MIN: ICD-10-PCS | Mod: S$GLB,,, | Performed by: PHYSICIAN ASSISTANT

## 2021-10-21 PROCEDURE — 3075F PR MOST RECENT SYSTOLIC BLOOD PRESS GE 130-139MM HG: ICD-10-PCS | Mod: CPTII,S$GLB,, | Performed by: PHYSICIAN ASSISTANT

## 2021-10-21 PROCEDURE — 1159F PR MEDICATION LIST DOCUMENTED IN MEDICAL RECORD: ICD-10-PCS | Mod: CPTII,S$GLB,, | Performed by: PHYSICIAN ASSISTANT

## 2021-10-21 PROCEDURE — 3078F DIAST BP <80 MM HG: CPT | Mod: CPTII,S$GLB,, | Performed by: PHYSICIAN ASSISTANT

## 2021-10-21 PROCEDURE — 3044F PR MOST RECENT HEMOGLOBIN A1C LEVEL <7.0%: ICD-10-PCS | Mod: CPTII,S$GLB,, | Performed by: PHYSICIAN ASSISTANT

## 2021-10-21 PROCEDURE — 3075F SYST BP GE 130 - 139MM HG: CPT | Mod: CPTII,S$GLB,, | Performed by: PHYSICIAN ASSISTANT

## 2021-10-21 PROCEDURE — 1159F MED LIST DOCD IN RCRD: CPT | Mod: CPTII,S$GLB,, | Performed by: PHYSICIAN ASSISTANT

## 2021-10-21 PROCEDURE — 1160F PR REVIEW ALL MEDS BY PRESCRIBER/CLIN PHARMACIST DOCUMENTED: ICD-10-PCS | Mod: CPTII,S$GLB,, | Performed by: PHYSICIAN ASSISTANT

## 2021-10-21 PROCEDURE — 3044F HG A1C LEVEL LT 7.0%: CPT | Mod: CPTII,S$GLB,, | Performed by: PHYSICIAN ASSISTANT

## 2021-10-21 PROCEDURE — 99999 PR PBB SHADOW E&M-EST. PATIENT-LVL IV: ICD-10-PCS | Mod: PBBFAC,,, | Performed by: PHYSICIAN ASSISTANT

## 2021-10-22 RX ORDER — HYDROCODONE BITARTRATE AND ACETAMINOPHEN 5; 325 MG/1; MG/1
1 TABLET ORAL EVERY 6 HOURS PRN
Qty: 15 TABLET | Refills: 0 | Status: SHIPPED | OUTPATIENT
Start: 2021-10-22 | End: 2022-04-11

## 2021-10-25 ENCOUNTER — PATIENT MESSAGE (OUTPATIENT)
Dept: NEUROSURGERY | Facility: CLINIC | Age: 66
End: 2021-10-25
Payer: MEDICARE

## 2021-10-25 ENCOUNTER — HOSPITAL ENCOUNTER (OUTPATIENT)
Dept: RADIOLOGY | Facility: HOSPITAL | Age: 66
Discharge: HOME | End: 2021-10-25
Attending: PHYSICIAN ASSISTANT
Payer: MEDICARE

## 2021-10-25 DIAGNOSIS — G81.90 HEMIPARESIS, UNSPECIFIED HEMIPARESIS ETIOLOGY, UNSPECIFIED LATERALITY: ICD-10-CM

## 2021-10-25 DIAGNOSIS — G81.92 HEMIPARESIS OF LEFT DOMINANT SIDE, UNSPECIFIED HEMIPARESIS ETIOLOGY: Primary | ICD-10-CM

## 2021-10-25 DIAGNOSIS — G89.29 CHRONIC LEFT-SIDED LOW BACK PAIN WITH LEFT-SIDED SCIATICA: ICD-10-CM

## 2021-10-25 DIAGNOSIS — M54.9 DORSALGIA, UNSPECIFIED: ICD-10-CM

## 2021-10-25 DIAGNOSIS — F41.9 ANXIETY: ICD-10-CM

## 2021-10-25 DIAGNOSIS — M43.16 SPONDYLOLISTHESIS AT L4-L5 LEVEL: ICD-10-CM

## 2021-10-25 DIAGNOSIS — M48.07 SPINAL STENOSIS, LUMBOSACRAL REGION: ICD-10-CM

## 2021-10-25 DIAGNOSIS — R26.9 GAIT DISTURBANCE: ICD-10-CM

## 2021-10-25 DIAGNOSIS — M43.16 SPONDYLOLISTHESIS OF LUMBAR REGION: ICD-10-CM

## 2021-10-25 DIAGNOSIS — M54.42 CHRONIC LEFT-SIDED LOW BACK PAIN WITH LEFT-SIDED SCIATICA: ICD-10-CM

## 2021-10-25 PROCEDURE — 72156 MRI NECK SPINE W/O & W/DYE: CPT | Mod: TC

## 2021-10-25 PROCEDURE — A9585 GADOBUTROL INJECTION: HCPCS | Performed by: PHYSICIAN ASSISTANT

## 2021-10-25 PROCEDURE — 25500020 PHARM REV CODE 255: Performed by: PHYSICIAN ASSISTANT

## 2021-10-25 PROCEDURE — 72148 MRI LUMBAR SPINE W/O DYE: CPT | Mod: TC

## 2021-10-25 PROCEDURE — 70553 MRI BRAIN STEM W/O & W/DYE: CPT | Mod: TC

## 2021-10-25 RX ORDER — GADOBUTROL 604.72 MG/ML
10 INJECTION INTRAVENOUS
Status: COMPLETED | OUTPATIENT
Start: 2021-10-25 | End: 2021-10-25

## 2021-10-25 RX ADMIN — GADOBUTROL 10 ML: 604.72 INJECTION INTRAVENOUS at 05:10

## 2021-10-26 ENCOUNTER — HOSPITAL ENCOUNTER (OUTPATIENT)
Dept: RADIOLOGY | Facility: HOSPITAL | Age: 66
Discharge: HOME | End: 2021-10-26
Attending: PHYSICIAN ASSISTANT
Payer: MEDICARE

## 2021-10-26 DIAGNOSIS — M43.16 SPONDYLOLISTHESIS AT L4-L5 LEVEL: ICD-10-CM

## 2021-10-26 DIAGNOSIS — G89.29 CHRONIC LEFT-SIDED LOW BACK PAIN WITH LEFT-SIDED SCIATICA: ICD-10-CM

## 2021-10-26 DIAGNOSIS — M54.42 CHRONIC LEFT-SIDED LOW BACK PAIN WITH LEFT-SIDED SCIATICA: ICD-10-CM

## 2021-10-26 PROCEDURE — 72110 X-RAY EXAM L-2 SPINE 4/>VWS: CPT | Mod: TC

## 2021-10-26 RX ORDER — ALPRAZOLAM 0.25 MG/1
TABLET ORAL
Qty: 20 TABLET | Refills: 0 | Status: SHIPPED | OUTPATIENT
Start: 2021-10-26 | End: 2023-05-12

## 2021-10-27 ENCOUNTER — PATIENT MESSAGE (OUTPATIENT)
Dept: PAIN MEDICINE | Facility: CLINIC | Age: 66
End: 2021-10-27
Payer: MEDICARE

## 2021-10-27 ENCOUNTER — OFFICE VISIT (OUTPATIENT)
Dept: INTERNAL MEDICINE | Facility: CLINIC | Age: 66
End: 2021-10-27
Payer: MEDICARE

## 2021-10-27 ENCOUNTER — LAB VISIT (OUTPATIENT)
Dept: LAB | Facility: HOSPITAL | Age: 66
End: 2021-10-27
Attending: NURSE PRACTITIONER
Payer: MEDICARE

## 2021-10-27 VITALS
WEIGHT: 232.94 LBS | BODY MASS INDEX: 41.27 KG/M2 | TEMPERATURE: 98 F | OXYGEN SATURATION: 98 % | HEART RATE: 88 BPM | DIASTOLIC BLOOD PRESSURE: 70 MMHG | SYSTOLIC BLOOD PRESSURE: 102 MMHG | HEIGHT: 63 IN

## 2021-10-27 DIAGNOSIS — G81.94 HEMIPLEGIA AFFECTING LEFT NONDOMINANT SIDE, UNSPECIFIED ETIOLOGY, UNSPECIFIED HEMIPLEGIA TYPE: ICD-10-CM

## 2021-10-27 DIAGNOSIS — M54.16 LUMBAR RADICULOPATHY: ICD-10-CM

## 2021-10-27 DIAGNOSIS — Z01.818 PREOPERATIVE EXAMINATION, UNSPECIFIED: Primary | ICD-10-CM

## 2021-10-27 DIAGNOSIS — Z79.01 LONG TERM (CURRENT) USE OF ANTICOAGULANTS: ICD-10-CM

## 2021-10-27 DIAGNOSIS — Z01.818 PREOPERATIVE EXAMINATION, UNSPECIFIED: ICD-10-CM

## 2021-10-27 LAB
ALBUMIN SERPL BCP-MCNC: 3.7 G/DL (ref 3.5–5.2)
ALP SERPL-CCNC: 94 U/L (ref 55–135)
ALT SERPL W/O P-5'-P-CCNC: 29 U/L (ref 10–44)
ANION GAP SERPL CALC-SCNC: 8 MMOL/L (ref 8–16)
APTT BLDCRRT: 25.6 SEC (ref 21–32)
AST SERPL-CCNC: 31 U/L (ref 10–40)
BASOPHILS # BLD AUTO: 0.06 K/UL (ref 0–0.2)
BASOPHILS NFR BLD: 0.9 % (ref 0–1.9)
BILIRUB SERPL-MCNC: 0.4 MG/DL (ref 0.1–1)
BILIRUB UR QL STRIP: NEGATIVE
BUN SERPL-MCNC: 33 MG/DL (ref 8–23)
CALCIUM SERPL-MCNC: 9.7 MG/DL (ref 8.7–10.5)
CAOX CRY UR QL COMP ASSIST: ABNORMAL
CHLORIDE SERPL-SCNC: 103 MMOL/L (ref 95–110)
CLARITY UR REFRACT.AUTO: ABNORMAL
CO2 SERPL-SCNC: 25 MMOL/L (ref 23–29)
COLOR UR AUTO: YELLOW
CREAT SERPL-MCNC: 1 MG/DL (ref 0.5–1.4)
DIFFERENTIAL METHOD: ABNORMAL
EOSINOPHIL # BLD AUTO: 0.2 K/UL (ref 0–0.5)
EOSINOPHIL NFR BLD: 3.1 % (ref 0–8)
ERYTHROCYTE [DISTWIDTH] IN BLOOD BY AUTOMATED COUNT: 13.6 % (ref 11.5–14.5)
EST. GFR  (AFRICAN AMERICAN): >60 ML/MIN/1.73 M^2
EST. GFR  (NON AFRICAN AMERICAN): 59.7 ML/MIN/1.73 M^2
GLUCOSE SERPL-MCNC: 102 MG/DL (ref 70–110)
GLUCOSE UR QL STRIP: NEGATIVE
HCT VFR BLD AUTO: 42.5 % (ref 37–48.5)
HGB BLD-MCNC: 13.3 G/DL (ref 12–16)
HGB UR QL STRIP: NEGATIVE
HYALINE CASTS UR QL AUTO: 40 /LPF
IMM GRANULOCYTES # BLD AUTO: 0.01 K/UL (ref 0–0.04)
IMM GRANULOCYTES NFR BLD AUTO: 0.1 % (ref 0–0.5)
INR PPP: 1 (ref 0.8–1.2)
KETONES UR QL STRIP: NEGATIVE
LEUKOCYTE ESTERASE UR QL STRIP: ABNORMAL
LYMPHOCYTES # BLD AUTO: 2.6 K/UL (ref 1–4.8)
LYMPHOCYTES NFR BLD: 38.1 % (ref 18–48)
MCH RBC QN AUTO: 29.9 PG (ref 27–31)
MCHC RBC AUTO-ENTMCNC: 31.3 G/DL (ref 32–36)
MCV RBC AUTO: 96 FL (ref 82–98)
MICROSCOPIC COMMENT: ABNORMAL
MONOCYTES # BLD AUTO: 0.4 K/UL (ref 0.3–1)
MONOCYTES NFR BLD: 6.4 % (ref 4–15)
NEUTROPHILS # BLD AUTO: 3.5 K/UL (ref 1.8–7.7)
NEUTROPHILS NFR BLD: 51.4 % (ref 38–73)
NITRITE UR QL STRIP: NEGATIVE
NRBC BLD-RTO: 0 /100 WBC
PH UR STRIP: 5 [PH] (ref 5–8)
PLATELET # BLD AUTO: 263 K/UL (ref 150–450)
PMV BLD AUTO: 10.3 FL (ref 9.2–12.9)
POTASSIUM SERPL-SCNC: 4.3 MMOL/L (ref 3.5–5.1)
PROT SERPL-MCNC: 7.1 G/DL (ref 6–8.4)
PROT UR QL STRIP: NEGATIVE
PROTHROMBIN TIME: 10.9 SEC (ref 9–12.5)
RBC # BLD AUTO: 4.45 M/UL (ref 4–5.4)
RBC #/AREA URNS AUTO: 1 /HPF (ref 0–4)
SODIUM SERPL-SCNC: 136 MMOL/L (ref 136–145)
SP GR UR STRIP: 1.01 (ref 1–1.03)
SQUAMOUS #/AREA URNS AUTO: 1 /HPF
URN SPEC COLLECT METH UR: ABNORMAL
WBC # BLD AUTO: 6.75 K/UL (ref 3.9–12.7)
WBC #/AREA URNS AUTO: 5 /HPF (ref 0–5)

## 2021-10-27 PROCEDURE — 3078F PR MOST RECENT DIASTOLIC BLOOD PRESSURE < 80 MM HG: ICD-10-PCS | Mod: CPTII,S$GLB,, | Performed by: NURSE PRACTITIONER

## 2021-10-27 PROCEDURE — 1159F MED LIST DOCD IN RCRD: CPT | Mod: CPTII,S$GLB,, | Performed by: NURSE PRACTITIONER

## 2021-10-27 PROCEDURE — 3074F PR MOST RECENT SYSTOLIC BLOOD PRESSURE < 130 MM HG: ICD-10-PCS | Mod: CPTII,S$GLB,, | Performed by: NURSE PRACTITIONER

## 2021-10-27 PROCEDURE — 1159F PR MEDICATION LIST DOCUMENTED IN MEDICAL RECORD: ICD-10-PCS | Mod: CPTII,S$GLB,, | Performed by: NURSE PRACTITIONER

## 2021-10-27 PROCEDURE — 3044F PR MOST RECENT HEMOGLOBIN A1C LEVEL <7.0%: ICD-10-PCS | Mod: CPTII,S$GLB,, | Performed by: NURSE PRACTITIONER

## 2021-10-27 PROCEDURE — 81001 URINALYSIS AUTO W/SCOPE: CPT | Performed by: NURSE PRACTITIONER

## 2021-10-27 PROCEDURE — 93010 ELECTROCARDIOGRAM REPORT: CPT | Mod: S$GLB,,, | Performed by: INTERNAL MEDICINE

## 2021-10-27 PROCEDURE — 85730 THROMBOPLASTIN TIME PARTIAL: CPT | Performed by: NURSE PRACTITIONER

## 2021-10-27 PROCEDURE — 3074F SYST BP LT 130 MM HG: CPT | Mod: CPTII,S$GLB,, | Performed by: NURSE PRACTITIONER

## 2021-10-27 PROCEDURE — 99999 PR PBB SHADOW E&M-EST. PATIENT-LVL III: CPT | Mod: PBBFAC,,, | Performed by: NURSE PRACTITIONER

## 2021-10-27 PROCEDURE — 80053 COMPREHEN METABOLIC PANEL: CPT | Performed by: NURSE PRACTITIONER

## 2021-10-27 PROCEDURE — 93010 EKG 12-LEAD: ICD-10-PCS | Mod: S$GLB,,, | Performed by: INTERNAL MEDICINE

## 2021-10-27 PROCEDURE — 1160F PR REVIEW ALL MEDS BY PRESCRIBER/CLIN PHARMACIST DOCUMENTED: ICD-10-PCS | Mod: CPTII,S$GLB,, | Performed by: NURSE PRACTITIONER

## 2021-10-27 PROCEDURE — 99213 PR OFFICE/OUTPT VISIT, EST, LEVL III, 20-29 MIN: ICD-10-PCS | Mod: S$GLB,,, | Performed by: NURSE PRACTITIONER

## 2021-10-27 PROCEDURE — 93005 ELECTROCARDIOGRAM TRACING: CPT | Mod: S$GLB,,, | Performed by: NURSE PRACTITIONER

## 2021-10-27 PROCEDURE — 36415 COLL VENOUS BLD VENIPUNCTURE: CPT | Mod: PO | Performed by: NURSE PRACTITIONER

## 2021-10-27 PROCEDURE — 93005 EKG 12-LEAD: ICD-10-PCS | Mod: S$GLB,,, | Performed by: NURSE PRACTITIONER

## 2021-10-27 PROCEDURE — 3044F HG A1C LEVEL LT 7.0%: CPT | Mod: CPTII,S$GLB,, | Performed by: NURSE PRACTITIONER

## 2021-10-27 PROCEDURE — 99213 OFFICE O/P EST LOW 20 MIN: CPT | Mod: S$GLB,,, | Performed by: NURSE PRACTITIONER

## 2021-10-27 PROCEDURE — 3078F DIAST BP <80 MM HG: CPT | Mod: CPTII,S$GLB,, | Performed by: NURSE PRACTITIONER

## 2021-10-27 PROCEDURE — 85610 PROTHROMBIN TIME: CPT | Performed by: NURSE PRACTITIONER

## 2021-10-27 PROCEDURE — 99999 PR PBB SHADOW E&M-EST. PATIENT-LVL III: ICD-10-PCS | Mod: PBBFAC,,, | Performed by: NURSE PRACTITIONER

## 2021-10-27 PROCEDURE — 3008F BODY MASS INDEX DOCD: CPT | Mod: CPTII,S$GLB,, | Performed by: NURSE PRACTITIONER

## 2021-10-27 PROCEDURE — 3008F PR BODY MASS INDEX (BMI) DOCUMENTED: ICD-10-PCS | Mod: CPTII,S$GLB,, | Performed by: NURSE PRACTITIONER

## 2021-10-27 PROCEDURE — 1160F RVW MEDS BY RX/DR IN RCRD: CPT | Mod: CPTII,S$GLB,, | Performed by: NURSE PRACTITIONER

## 2021-10-27 PROCEDURE — 85025 COMPLETE CBC W/AUTO DIFF WBC: CPT | Performed by: NURSE PRACTITIONER

## 2021-10-27 RX ORDER — OXYCODONE AND ACETAMINOPHEN 10; 325 MG/1; MG/1
1 TABLET ORAL 4 TIMES DAILY PRN
COMMUNITY
Start: 2021-10-26 | End: 2022-04-11

## 2021-10-28 ENCOUNTER — OFFICE VISIT (OUTPATIENT)
Dept: PULMONOLOGY | Facility: CLINIC | Age: 66
End: 2021-10-28
Payer: MEDICARE

## 2021-10-28 ENCOUNTER — PATIENT MESSAGE (OUTPATIENT)
Dept: INTERNAL MEDICINE | Facility: CLINIC | Age: 66
End: 2021-10-28
Payer: MEDICARE

## 2021-10-28 ENCOUNTER — PATIENT MESSAGE (OUTPATIENT)
Dept: NEUROSURGERY | Facility: CLINIC | Age: 66
End: 2021-10-28
Payer: MEDICARE

## 2021-10-28 VITALS
OXYGEN SATURATION: 95 % | RESPIRATION RATE: 16 BRPM | WEIGHT: 229.25 LBS | DIASTOLIC BLOOD PRESSURE: 82 MMHG | HEIGHT: 63 IN | HEART RATE: 98 BPM | BODY MASS INDEX: 40.62 KG/M2 | SYSTOLIC BLOOD PRESSURE: 116 MMHG

## 2021-10-28 DIAGNOSIS — E66.01 CLASS 3 SEVERE OBESITY DUE TO EXCESS CALORIES WITH SERIOUS COMORBIDITY AND BODY MASS INDEX (BMI) OF 40.0 TO 44.9 IN ADULT: ICD-10-CM

## 2021-10-28 DIAGNOSIS — G47.33 OSA ON CPAP: Primary | ICD-10-CM

## 2021-10-28 DIAGNOSIS — Z98.84 HISTORY OF WEIGHT LOSS SURGERY: ICD-10-CM

## 2021-10-28 DIAGNOSIS — I10 HYPERTENSION, UNSPECIFIED TYPE: ICD-10-CM

## 2021-10-28 DIAGNOSIS — I10 ESSENTIAL HYPERTENSION: Primary | ICD-10-CM

## 2021-10-28 PROCEDURE — 1159F PR MEDICATION LIST DOCUMENTED IN MEDICAL RECORD: ICD-10-PCS | Mod: CPTII,S$GLB,, | Performed by: INTERNAL MEDICINE

## 2021-10-28 PROCEDURE — 3074F PR MOST RECENT SYSTOLIC BLOOD PRESSURE < 130 MM HG: ICD-10-PCS | Mod: CPTII,S$GLB,, | Performed by: INTERNAL MEDICINE

## 2021-10-28 PROCEDURE — 3079F PR MOST RECENT DIASTOLIC BLOOD PRESSURE 80-89 MM HG: ICD-10-PCS | Mod: CPTII,S$GLB,, | Performed by: INTERNAL MEDICINE

## 2021-10-28 PROCEDURE — 1160F RVW MEDS BY RX/DR IN RCRD: CPT | Mod: CPTII,S$GLB,, | Performed by: INTERNAL MEDICINE

## 2021-10-28 PROCEDURE — 3044F PR MOST RECENT HEMOGLOBIN A1C LEVEL <7.0%: ICD-10-PCS | Mod: CPTII,S$GLB,, | Performed by: INTERNAL MEDICINE

## 2021-10-28 PROCEDURE — 3079F DIAST BP 80-89 MM HG: CPT | Mod: CPTII,S$GLB,, | Performed by: INTERNAL MEDICINE

## 2021-10-28 PROCEDURE — 99999 PR PBB SHADOW E&M-EST. PATIENT-LVL III: CPT | Mod: PBBFAC,,, | Performed by: INTERNAL MEDICINE

## 2021-10-28 PROCEDURE — 1159F MED LIST DOCD IN RCRD: CPT | Mod: CPTII,S$GLB,, | Performed by: INTERNAL MEDICINE

## 2021-10-28 PROCEDURE — 3008F PR BODY MASS INDEX (BMI) DOCUMENTED: ICD-10-PCS | Mod: CPTII,S$GLB,, | Performed by: INTERNAL MEDICINE

## 2021-10-28 PROCEDURE — 99999 PR PBB SHADOW E&M-EST. PATIENT-LVL III: ICD-10-PCS | Mod: PBBFAC,,, | Performed by: INTERNAL MEDICINE

## 2021-10-28 PROCEDURE — 99214 PR OFFICE/OUTPT VISIT, EST, LEVL IV, 30-39 MIN: ICD-10-PCS | Mod: S$GLB,,, | Performed by: INTERNAL MEDICINE

## 2021-10-28 PROCEDURE — 3074F SYST BP LT 130 MM HG: CPT | Mod: CPTII,S$GLB,, | Performed by: INTERNAL MEDICINE

## 2021-10-28 PROCEDURE — 3008F BODY MASS INDEX DOCD: CPT | Mod: CPTII,S$GLB,, | Performed by: INTERNAL MEDICINE

## 2021-10-28 PROCEDURE — 99214 OFFICE O/P EST MOD 30 MIN: CPT | Mod: S$GLB,,, | Performed by: INTERNAL MEDICINE

## 2021-10-28 PROCEDURE — 1160F PR REVIEW ALL MEDS BY PRESCRIBER/CLIN PHARMACIST DOCUMENTED: ICD-10-PCS | Mod: CPTII,S$GLB,, | Performed by: INTERNAL MEDICINE

## 2021-10-28 PROCEDURE — 3044F HG A1C LEVEL LT 7.0%: CPT | Mod: CPTII,S$GLB,, | Performed by: INTERNAL MEDICINE

## 2021-10-29 ENCOUNTER — PATIENT MESSAGE (OUTPATIENT)
Dept: NEUROSURGERY | Facility: CLINIC | Age: 66
End: 2021-10-29
Payer: MEDICARE

## 2021-11-01 ENCOUNTER — PATIENT MESSAGE (OUTPATIENT)
Dept: PAIN MEDICINE | Facility: CLINIC | Age: 66
End: 2021-11-01
Payer: MEDICARE

## 2021-11-04 ENCOUNTER — OFFICE VISIT (OUTPATIENT)
Dept: CARDIOLOGY | Facility: CLINIC | Age: 66
End: 2021-11-04
Payer: MEDICARE

## 2021-11-04 VITALS
DIASTOLIC BLOOD PRESSURE: 72 MMHG | OXYGEN SATURATION: 97 % | WEIGHT: 232.38 LBS | HEIGHT: 63 IN | BODY MASS INDEX: 41.18 KG/M2 | HEART RATE: 88 BPM | SYSTOLIC BLOOD PRESSURE: 112 MMHG

## 2021-11-04 DIAGNOSIS — D64.9 ANEMIA, UNSPECIFIED TYPE: ICD-10-CM

## 2021-11-04 DIAGNOSIS — Z01.810 PREOP CARDIOVASCULAR EXAM: Primary | ICD-10-CM

## 2021-11-04 DIAGNOSIS — I49.3 SYMPTOMATIC PVCS: ICD-10-CM

## 2021-11-04 DIAGNOSIS — E66.01 MORBID OBESITY WITH BMI OF 40.0-44.9, ADULT: ICD-10-CM

## 2021-11-04 DIAGNOSIS — Z98.84 HX OF GASTRIC BYPASS: ICD-10-CM

## 2021-11-04 DIAGNOSIS — I10 ESSENTIAL HYPERTENSION: ICD-10-CM

## 2021-11-04 DIAGNOSIS — G47.33 OSA ON CPAP: ICD-10-CM

## 2021-11-04 DIAGNOSIS — R00.2 PALPITATIONS: ICD-10-CM

## 2021-11-04 DIAGNOSIS — R06.09 DOE (DYSPNEA ON EXERTION): ICD-10-CM

## 2021-11-04 PROCEDURE — 3078F PR MOST RECENT DIASTOLIC BLOOD PRESSURE < 80 MM HG: ICD-10-PCS | Mod: CPTII,S$GLB,, | Performed by: INTERNAL MEDICINE

## 2021-11-04 PROCEDURE — 99999 PR PBB SHADOW E&M-EST. PATIENT-LVL III: CPT | Mod: PBBFAC,,, | Performed by: INTERNAL MEDICINE

## 2021-11-04 PROCEDURE — 1159F PR MEDICATION LIST DOCUMENTED IN MEDICAL RECORD: ICD-10-PCS | Mod: CPTII,S$GLB,, | Performed by: INTERNAL MEDICINE

## 2021-11-04 PROCEDURE — 99214 OFFICE O/P EST MOD 30 MIN: CPT | Mod: S$GLB,,, | Performed by: INTERNAL MEDICINE

## 2021-11-04 PROCEDURE — 3044F HG A1C LEVEL LT 7.0%: CPT | Mod: CPTII,S$GLB,, | Performed by: INTERNAL MEDICINE

## 2021-11-04 PROCEDURE — 3044F PR MOST RECENT HEMOGLOBIN A1C LEVEL <7.0%: ICD-10-PCS | Mod: CPTII,S$GLB,, | Performed by: INTERNAL MEDICINE

## 2021-11-04 PROCEDURE — 99214 PR OFFICE/OUTPT VISIT, EST, LEVL IV, 30-39 MIN: ICD-10-PCS | Mod: S$GLB,,, | Performed by: INTERNAL MEDICINE

## 2021-11-04 PROCEDURE — 3074F PR MOST RECENT SYSTOLIC BLOOD PRESSURE < 130 MM HG: ICD-10-PCS | Mod: CPTII,S$GLB,, | Performed by: INTERNAL MEDICINE

## 2021-11-04 PROCEDURE — 3078F DIAST BP <80 MM HG: CPT | Mod: CPTII,S$GLB,, | Performed by: INTERNAL MEDICINE

## 2021-11-04 PROCEDURE — 3008F BODY MASS INDEX DOCD: CPT | Mod: CPTII,S$GLB,, | Performed by: INTERNAL MEDICINE

## 2021-11-04 PROCEDURE — 1160F RVW MEDS BY RX/DR IN RCRD: CPT | Mod: CPTII,S$GLB,, | Performed by: INTERNAL MEDICINE

## 2021-11-04 PROCEDURE — 3074F SYST BP LT 130 MM HG: CPT | Mod: CPTII,S$GLB,, | Performed by: INTERNAL MEDICINE

## 2021-11-04 PROCEDURE — 3008F PR BODY MASS INDEX (BMI) DOCUMENTED: ICD-10-PCS | Mod: CPTII,S$GLB,, | Performed by: INTERNAL MEDICINE

## 2021-11-04 PROCEDURE — 99999 PR PBB SHADOW E&M-EST. PATIENT-LVL III: ICD-10-PCS | Mod: PBBFAC,,, | Performed by: INTERNAL MEDICINE

## 2021-11-04 PROCEDURE — 1160F PR REVIEW ALL MEDS BY PRESCRIBER/CLIN PHARMACIST DOCUMENTED: ICD-10-PCS | Mod: CPTII,S$GLB,, | Performed by: INTERNAL MEDICINE

## 2021-11-04 PROCEDURE — 1159F MED LIST DOCD IN RCRD: CPT | Mod: CPTII,S$GLB,, | Performed by: INTERNAL MEDICINE

## 2021-11-12 ENCOUNTER — TELEPHONE (OUTPATIENT)
Dept: PAIN MEDICINE | Facility: CLINIC | Age: 66
End: 2021-11-12
Payer: MEDICARE

## 2021-12-21 RX ORDER — OMEPRAZOLE 40 MG/1
CAPSULE, DELAYED RELEASE ORAL
Qty: 30 CAPSULE | Refills: 0 | Status: SHIPPED | OUTPATIENT
Start: 2021-12-21 | End: 2022-01-21

## 2022-01-21 RX ORDER — OMEPRAZOLE 40 MG/1
CAPSULE, DELAYED RELEASE ORAL
Qty: 30 CAPSULE | Refills: 0 | Status: SHIPPED | OUTPATIENT
Start: 2022-01-21 | End: 2022-02-17

## 2022-01-21 NOTE — TELEPHONE ENCOUNTER
No new care gaps identified.  Powered by [employer]. Reference number: [id number].   1/21/2022 5:31:40 AM CST

## 2022-01-25 DIAGNOSIS — M25.562 POSTERIOR LEFT KNEE PAIN: ICD-10-CM

## 2022-01-25 DIAGNOSIS — Z96.652 STATUS POST TOTAL KNEE REPLACEMENT USING CEMENT, LEFT: ICD-10-CM

## 2022-01-26 ENCOUNTER — TELEPHONE (OUTPATIENT)
Dept: INTERNAL MEDICINE | Facility: CLINIC | Age: 67
End: 2022-01-26

## 2022-01-26 RX ORDER — DICLOFENAC SODIUM 10 MG/G
2 GEL TOPICAL 3 TIMES DAILY PRN
Qty: 200 G | Refills: 2 | Status: SHIPPED | OUTPATIENT
Start: 2022-01-26 | End: 2022-04-13 | Stop reason: SDUPTHER

## 2022-01-26 NOTE — TELEPHONE ENCOUNTER
I informed that her Rx voltaren gel has been sent to the pharmacy. She verbalized understanding.  //kah

## 2022-01-26 NOTE — TELEPHONE ENCOUNTER
----- Message from Valeria Davies sent at 1/26/2022  2:41 PM CST -----  Pt is calling in regards to diclofenac sodium (VOLTAREN) 1 % Gel. Please call [phone]      [facility] [address] [place] [address]  [address] [facility] [address]  Phone: [phone] Fax: [phone]

## 2022-02-15 ENCOUNTER — TELEPHONE (OUTPATIENT)
Dept: ORTHOPEDICS | Facility: CLINIC | Age: 67
End: 2022-02-15
Payer: MEDICARE

## 2022-02-15 NOTE — TELEPHONE ENCOUNTER
Left message for patient to call back -- stated on message that Dr. Connolly will be in surgery on 02/18/22

## 2022-02-15 NOTE — TELEPHONE ENCOUNTER
----- Message from Jeanine Gutiérrez sent at 2/15/2022 10:36 AM CST -----  Contact: Patient  Type:  Sooner Apoointment Request    Caller is requesting a sooner appointment.  Caller declined first available appointment listed below.  Caller will not accept being placed on the waitlist and is requesting a message be sent to doctor.  Name of Caller:Mariel Becerril  When is the first available appointment? no dates showing available  Symptoms:follow up   Would the patient rather a call back or a response via [facility]?Call back  Best Call Back Number:[phone] or [phone]  Additional Information: patient would like to see if she can schedule for 2/18 if possible.

## 2022-02-18 ENCOUNTER — OFFICE VISIT (OUTPATIENT)
Dept: CARDIOLOGY | Facility: CLINIC | Age: 67
End: 2022-02-18
Payer: MEDICARE

## 2022-02-18 ENCOUNTER — OFFICE VISIT (OUTPATIENT)
Dept: PULMONOLOGY | Facility: CLINIC | Age: 67
End: 2022-02-18
Payer: MEDICARE

## 2022-02-18 VITALS
SYSTOLIC BLOOD PRESSURE: 122 MMHG | RESPIRATION RATE: 12 BRPM | WEIGHT: 243 LBS | DIASTOLIC BLOOD PRESSURE: 80 MMHG | OXYGEN SATURATION: 98 % | BODY MASS INDEX: 43.05 KG/M2 | HEART RATE: 70 BPM | HEIGHT: 63 IN

## 2022-02-18 VITALS
HEART RATE: 76 BPM | OXYGEN SATURATION: 99 % | BODY MASS INDEX: 43.43 KG/M2 | HEIGHT: 63 IN | DIASTOLIC BLOOD PRESSURE: 60 MMHG | WEIGHT: 245.13 LBS | SYSTOLIC BLOOD PRESSURE: 120 MMHG

## 2022-02-18 DIAGNOSIS — E66.01 MORBID OBESITY WITH BMI OF 40.0-44.9, ADULT: ICD-10-CM

## 2022-02-18 DIAGNOSIS — R53.83 OTHER FATIGUE: ICD-10-CM

## 2022-02-18 DIAGNOSIS — R00.2 PALPITATIONS: ICD-10-CM

## 2022-02-18 DIAGNOSIS — M46.1 SACROILIITIS: ICD-10-CM

## 2022-02-18 DIAGNOSIS — R07.89 CHEST PRESSURE: ICD-10-CM

## 2022-02-18 DIAGNOSIS — I10 ESSENTIAL HYPERTENSION: Primary | ICD-10-CM

## 2022-02-18 DIAGNOSIS — R06.09 DOE (DYSPNEA ON EXERTION): ICD-10-CM

## 2022-02-18 DIAGNOSIS — G47.33 OSA ON CPAP: ICD-10-CM

## 2022-02-18 DIAGNOSIS — I49.3 SYMPTOMATIC PVCS: ICD-10-CM

## 2022-02-18 DIAGNOSIS — R45.89 DEPRESSED MOOD: ICD-10-CM

## 2022-02-18 PROCEDURE — 3008F BODY MASS INDEX DOCD: CPT | Mod: CPTII,S$GLB,, | Performed by: INTERNAL MEDICINE

## 2022-02-18 PROCEDURE — 3288F PR FALLS RISK ASSESSMENT DOCUMENTED: ICD-10-PCS | Mod: CPTII,S$GLB,, | Performed by: PHYSICIAN ASSISTANT

## 2022-02-18 PROCEDURE — 99999 PR PBB SHADOW E&M-EST. PATIENT-LVL V: CPT | Mod: PBBFAC,,, | Performed by: PHYSICIAN ASSISTANT

## 2022-02-18 PROCEDURE — 3008F PR BODY MASS INDEX (BMI) DOCUMENTED: ICD-10-PCS | Mod: CPTII,S$GLB,, | Performed by: INTERNAL MEDICINE

## 2022-02-18 PROCEDURE — 1160F PR REVIEW ALL MEDS BY PRESCRIBER/CLIN PHARMACIST DOCUMENTED: ICD-10-PCS | Mod: CPTII,S$GLB,, | Performed by: PHYSICIAN ASSISTANT

## 2022-02-18 PROCEDURE — 3008F BODY MASS INDEX DOCD: CPT | Mod: CPTII,S$GLB,, | Performed by: PHYSICIAN ASSISTANT

## 2022-02-18 PROCEDURE — 99999 PR PBB SHADOW E&M-EST. PATIENT-LVL V: CPT | Mod: PBBFAC,,, | Performed by: INTERNAL MEDICINE

## 2022-02-18 PROCEDURE — 99214 OFFICE O/P EST MOD 30 MIN: CPT | Mod: S$GLB,,, | Performed by: PHYSICIAN ASSISTANT

## 2022-02-18 PROCEDURE — 1160F RVW MEDS BY RX/DR IN RCRD: CPT | Mod: CPTII,S$GLB,, | Performed by: PHYSICIAN ASSISTANT

## 2022-02-18 PROCEDURE — 3288F PR FALLS RISK ASSESSMENT DOCUMENTED: ICD-10-PCS | Mod: CPTII,S$GLB,, | Performed by: INTERNAL MEDICINE

## 2022-02-18 PROCEDURE — 99999 PR PBB SHADOW E&M-EST. PATIENT-LVL V: ICD-10-PCS | Mod: PBBFAC,,, | Performed by: INTERNAL MEDICINE

## 2022-02-18 PROCEDURE — 3288F FALL RISK ASSESSMENT DOCD: CPT | Mod: CPTII,S$GLB,, | Performed by: INTERNAL MEDICINE

## 2022-02-18 PROCEDURE — 3078F PR MOST RECENT DIASTOLIC BLOOD PRESSURE < 80 MM HG: ICD-10-PCS | Mod: CPTII,S$GLB,, | Performed by: INTERNAL MEDICINE

## 2022-02-18 PROCEDURE — 3079F PR MOST RECENT DIASTOLIC BLOOD PRESSURE 80-89 MM HG: ICD-10-PCS | Mod: CPTII,S$GLB,, | Performed by: PHYSICIAN ASSISTANT

## 2022-02-18 PROCEDURE — 99214 OFFICE O/P EST MOD 30 MIN: CPT | Mod: S$GLB,,, | Performed by: INTERNAL MEDICINE

## 2022-02-18 PROCEDURE — 3074F SYST BP LT 130 MM HG: CPT | Mod: CPTII,S$GLB,, | Performed by: INTERNAL MEDICINE

## 2022-02-18 PROCEDURE — 99999 PR PBB SHADOW E&M-EST. PATIENT-LVL V: ICD-10-PCS | Mod: PBBFAC,,, | Performed by: PHYSICIAN ASSISTANT

## 2022-02-18 PROCEDURE — 1101F PT FALLS ASSESS-DOCD LE1/YR: CPT | Mod: CPTII,S$GLB,, | Performed by: PHYSICIAN ASSISTANT

## 2022-02-18 PROCEDURE — 1101F PR PT FALLS ASSESS DOC 0-1 FALLS W/OUT INJ PAST YR: ICD-10-PCS | Mod: CPTII,S$GLB,, | Performed by: INTERNAL MEDICINE

## 2022-02-18 PROCEDURE — 3074F PR MOST RECENT SYSTOLIC BLOOD PRESSURE < 130 MM HG: ICD-10-PCS | Mod: CPTII,S$GLB,, | Performed by: PHYSICIAN ASSISTANT

## 2022-02-18 PROCEDURE — 3074F SYST BP LT 130 MM HG: CPT | Mod: CPTII,S$GLB,, | Performed by: PHYSICIAN ASSISTANT

## 2022-02-18 PROCEDURE — 3078F DIAST BP <80 MM HG: CPT | Mod: CPTII,S$GLB,, | Performed by: INTERNAL MEDICINE

## 2022-02-18 PROCEDURE — 1126F AMNT PAIN NOTED NONE PRSNT: CPT | Mod: CPTII,S$GLB,, | Performed by: INTERNAL MEDICINE

## 2022-02-18 PROCEDURE — 1126F PR PAIN SEVERITY QUANTIFIED, NO PAIN PRESENT: ICD-10-PCS | Mod: CPTII,S$GLB,, | Performed by: INTERNAL MEDICINE

## 2022-02-18 PROCEDURE — 1159F MED LIST DOCD IN RCRD: CPT | Mod: CPTII,S$GLB,, | Performed by: PHYSICIAN ASSISTANT

## 2022-02-18 PROCEDURE — 3079F DIAST BP 80-89 MM HG: CPT | Mod: CPTII,S$GLB,, | Performed by: PHYSICIAN ASSISTANT

## 2022-02-18 PROCEDURE — 99214 PR OFFICE/OUTPT VISIT, EST, LEVL IV, 30-39 MIN: ICD-10-PCS | Mod: S$GLB,,, | Performed by: INTERNAL MEDICINE

## 2022-02-18 PROCEDURE — 1159F PR MEDICATION LIST DOCUMENTED IN MEDICAL RECORD: ICD-10-PCS | Mod: CPTII,S$GLB,, | Performed by: PHYSICIAN ASSISTANT

## 2022-02-18 PROCEDURE — 1101F PR PT FALLS ASSESS DOC 0-1 FALLS W/OUT INJ PAST YR: ICD-10-PCS | Mod: CPTII,S$GLB,, | Performed by: PHYSICIAN ASSISTANT

## 2022-02-18 PROCEDURE — 99214 PR OFFICE/OUTPT VISIT, EST, LEVL IV, 30-39 MIN: ICD-10-PCS | Mod: S$GLB,,, | Performed by: PHYSICIAN ASSISTANT

## 2022-02-18 PROCEDURE — 1101F PT FALLS ASSESS-DOCD LE1/YR: CPT | Mod: CPTII,S$GLB,, | Performed by: INTERNAL MEDICINE

## 2022-02-18 PROCEDURE — 3288F FALL RISK ASSESSMENT DOCD: CPT | Mod: CPTII,S$GLB,, | Performed by: PHYSICIAN ASSISTANT

## 2022-02-18 PROCEDURE — 3008F PR BODY MASS INDEX (BMI) DOCUMENTED: ICD-10-PCS | Mod: CPTII,S$GLB,, | Performed by: PHYSICIAN ASSISTANT

## 2022-02-18 PROCEDURE — 3074F PR MOST RECENT SYSTOLIC BLOOD PRESSURE < 130 MM HG: ICD-10-PCS | Mod: CPTII,S$GLB,, | Performed by: INTERNAL MEDICINE

## 2022-02-18 RX ORDER — AMLODIPINE BESYLATE 5 MG/1
TABLET ORAL
COMMUNITY
Start: 2021-11-02 | End: 2023-02-22

## 2022-02-18 RX ORDER — METOPROLOL SUCCINATE 50 MG/1
50 TABLET, EXTENDED RELEASE ORAL DAILY
Qty: 90 TABLET | Refills: 3 | Status: SHIPPED | OUTPATIENT
Start: 2022-02-18 | End: 2022-05-05 | Stop reason: SDUPTHER

## 2022-02-18 RX ORDER — FUROSEMIDE 40 MG/1
40 TABLET ORAL DAILY PRN
Qty: 90 TABLET | Refills: 3 | Status: SHIPPED | OUTPATIENT
Start: 2022-02-18 | End: 2022-02-22 | Stop reason: SDUPTHER

## 2022-02-18 RX ORDER — POTASSIUM CHLORIDE 20 MEQ/1
20 TABLET, EXTENDED RELEASE ORAL DAILY PRN
Qty: 60 TABLET | Refills: 2 | Status: SHIPPED | OUTPATIENT
Start: 2022-02-18 | End: 2023-11-16 | Stop reason: SDUPTHER

## 2022-02-18 NOTE — PROGRESS NOTES
"Subjective:       Patient ID: Mariel Becerril is a 66 y.o. female.    Chief Complaint: Sleep Apnea      67yo female here for follow up of GIL on CPAP  Using AutoPAP 5-20, compliant with CPAP, benefits from use  Patient states improved symptoms with use of CPAP. Sleeping more soundly. Waking up feeling more refreshed. Less headaches with CPAP.  Reports improved daytime sleepiness although [place] scoring high today. She mentions severe back pain contributing to her daytime fatigue, has decreased energy and some depressed mood, has not seen psychiatry. She says she is currently receiving back decompression therapy for her pain and feels hopeful that this will help with her pain and her fatigue/low energy  She definitely wants to keep using CPAP though and feels it is beneficial  When asked about her fatigue while driving she says when she feels tried while driving she pulls over into a parking lot and will take a 20 minute nap  Denies cataplexy, leg weakness, "sleep attacks"    EPWORTH SLEEPINESS SCALE 2/18/2022   Sitting and reading 2   Watching TV 2   Sitting, inactive in a public place (e.g. a theatre or a meeting) 2   As a passenger in a car for an hour without a break 3   Lying down to rest in the afternoon when circumstances permit 3   Sitting and talking to someone 1   Sitting quietly after a lunch without alcohol 2   In a car, while stopped for a few minutes in traffic 1   Total score 16     Last visit with Dr. Woods 10/28/21:  64-year-old female patient known with mild GIL presenting for 1 year follow-up.    Underwent uneventful left knee surgery with [facility] with Dr. Connolly.   Underwent weight loss surgery with Dr. Rodríguez lost about 20 lb so far.  Recently received ibabybox Station 2 replacement CPAP machine.  Compliant with CPAP therapy.  [place] Sleepiness Scale score 6.   Overnight oximetry on CPAP shows transient hypoxemia no need for O2.  Not needing albuterol.  Initially evaluated in 2018 for " establishing care and treatment of GIL.    Immunization History   Administered Date(s) Administered    COVID-19, MRNA, LN-S, PF (MODERNA FULL 0.5 ML DOSE) 02/04/2021, 03/04/2021    Influenza - Quadrivalent - PF *Preferred* (6 months and older) 10/01/2014, 11/15/2018, 10/30/2019, 11/09/2020    Tdap 12/17/2020      Tobacco Use: Low Risk     Smoking Tobacco Use: Never Smoker    Smokeless Tobacco Use: Never Used      Past Medical History:   Diagnosis Date    COPD (chronic obstructive pulmonary disease)     NO HOME O2    Digestive disorder     Frequent headaches     Hypertension     Osteoarthritis 04/02/2019    Bilateral knees, ankles and feet    Severe obesity (BMI >= 40)     Sleep apnea     CPAP      Current Outpatient Medications on File Prior to Visit   Medication Sig Dispense Refill    albuterol (PROAIR HFA) 90 mcg/actuation inhaler Inhale 2 puffs into the lungs every 6 (six) hours as needed for Wheezing. Rescue 18 g 0    aspirin 325 MG tablet Take 325 mg by mouth once daily.      diclofenac sodium (VOLTAREN) 1 % Gel Apply 2 g topically 3 (three) times daily as needed. 200 g 2    ergocalciferol, vitamin D2, (VITAMIN D ORAL) Take 2,000 Units by mouth once daily.      gabapentin (NEURONTIN) 300 MG capsule TAKE 2 CAPSULES BY MOUTH IN THE EVENING 60 capsule 0    levocetirizine (XYZAL) 5 MG tablet TAKE 1 TABLET BY MOUTH ONCE DAILY IN THE EVENING 90 tablet 3    LIDOcaine-prilocaine (EMLA) cream APPLY  CREAM TOPICALLY UP TO 4 TIMES DAILY AS NEEDED FOR PAIN 30 g 1    meloxicam (MOBIC) 15 MG tablet Take 1 tablet (15 mg total) by mouth once daily. 90 tablet 3    methocarbamoL (ROBAXIN) 750 MG Tab Take 1 tablet by mouth three times daily as needed for muscle spasm 90 tablet 0    mupirocin (BACTROBAN) 2 % ointment APPLY  OINTMENT TOPICALLY THREE TIMES DAILY 22 g 0    nystatin (MYCOSTATIN) cream APPLY  CREAM TOPICALLY TO AFFECTED AREA TWICE DAILY 30 g 0    nystatin (MYCOSTATIN) powder Apply topically 2  (two) times daily. 120 g 1    omeprazole (PRILOSEC) 40 MG capsule Take 1 capsule by mouth once daily 30 capsule 0    ondansetron (ZOFRAN) 8 MG tablet Take 1 tablet (8 mg total) by mouth every 8 (eight) hours as needed for Nausea. 20 tablet 0    topiramate (TOPAMAX) 50 MG tablet Take 1 tablet (50 mg total) by mouth 2 (two) times daily. 180 tablet 0    [DISCONTINUED] metoprolol succinate (TOPROL-XL) 50 MG 24 hr tablet Take 1 tablet (50 mg total) by mouth once daily. 90 tablet 1    acetaminophen (TYLENOL) 325 MG tablet Take 2 tablets (650 mg total) by mouth every 8 (eight) hours as needed. (Patient not taking: Reported on 2/18/2022) 60 tablet 2    ALPRAZolam (XANAX) 0.25 MG tablet Take 1 tablet by mouth twice daily as needed for anxiety (Patient not taking: Reported on 2/18/2022) 20 tablet 0    amLODIPine (NORVASC) 5 MG tablet       benzonatate (TESSALON) 100 MG capsule Take 1 capsule by mouth three times daily as needed (Patient not taking: Reported on 2/18/2022) 30 capsule 0    eflornithine (VANIQA) 13.9 % cream Apply topically 2 (two) times daily. (Patient not taking: Reported on 2/18/2022) 45 g 3    HYDROcodone-acetaminophen (NORCO) 5-325 mg per tablet Take 1 tablet by mouth every 6 (six) hours as needed for Pain. (Patient not taking: Reported on 2/18/2022) 15 tablet 0    imipramine (TOFRANIL) 10 MG Tab Take 1 tablet (10 mg total) by mouth every evening. (Patient not taking: Reported on 2/18/2022) 90 tablet 1    meclizine (ANTIVERT) 25 mg tablet Take 1 tablet (25 mg total) by mouth 3 (three) times daily as needed for Dizziness. (Patient not taking: Reported on 2/18/2022) 30 tablet 0    montelukast (SINGULAIR) 10 mg tablet Take 10 mg by mouth every evening.  1    oxyCODONE-acetaminophen (PERCOCET)  mg per tablet Take 1 tablet by mouth 4 (four) times daily as needed.      SF 5000 PLUS 1.1 % Crea BRUSH FOR 2 MINUTES AT BEDTIME THEN EXPECTORATE THE EXCESS.(NOTHING TO EAT OR DRINK FOR 30 MINUTES  "AFTER USE )      [DISCONTINUED] furosemide (LASIX) 40 MG tablet Take 1 tablet (40 mg total) by mouth daily as needed (edema, swelling due to fluid). (Patient not taking: Reported on 2/18/2022) 90 tablet 3    [DISCONTINUED] potassium chloride SA (K-DUR,KLOR-CON) 20 MEQ tablet Take 1 tablet (20 mEq total) by mouth once daily. (Patient not taking: Reported on 2/18/2022) 60 tablet 2     No current facility-administered medications on file prior to visit.        Review of Systems   Constitutional: Positive for fatigue. Negative for fever, weight loss, appetite change and weakness.   HENT: Negative for postnasal drip, rhinorrhea, sinus pressure, trouble swallowing and congestion.    Respiratory: Positive for somnolence. Negative for cough, sputum production, choking, chest tightness, shortness of breath, wheezing and dyspnea on extertion.    Cardiovascular: Negative for chest pain and leg swelling.   Musculoskeletal: Positive for arthralgias, back pain and gait problem. Negative for joint swelling.   Gastrointestinal: Negative for nausea, vomiting and abdominal pain.   Neurological: Negative for dizziness, weakness and headaches.   All other systems reviewed and are negative.      Objective:       Vitals:    02/18/22 1344   BP: 122/80   Pulse: 70   Resp: 12   SpO2: 98%   Weight: 110.2 kg (243 lb)   Height: 5' 3" (1.6 m)       Physical Exam   Constitutional: She is oriented to person, place, and time. She appears well-developed and well-nourished. No distress. She is obese.   HENT:   Head: Normocephalic.   Mouth/Throat: Oropharynx is clear and moist.   Cardiovascular: Normal rate and regular rhythm.   Pulmonary/Chest: Effort normal and breath sounds normal. No respiratory distress. She has no wheezes. She has no rhonchi. She has no rales.   Musculoskeletal:         General: No edema.      Cervical back: Normal range of motion and neck supple.   Lymphadenopathy: No supraclavicular adenopathy is present.     She has no " cervical adenopathy.   Neurological: She is alert and oriented to person, place, and time. Gait normal.   Skin: Skin is warm and dry.   Psychiatric: Her affect is blunt. She exhibits a depressed mood. She expresses no homicidal and no suicidal ideation.   Vitals reviewed.    Personal Diagnostic Review    X-Ray Chest 1 View  Narrative: EXAMINATION:  XR CHEST 1 VIEW    CLINICAL HISTORY:  Encounter for other preprocedural examination    TECHNIQUE:  Single frontal view of the chest was performed.    COMPARISON:  December 11, 2020    FINDINGS:  Stable appearance cardiomediastinal contour.  Pulmonary vasculatures symmetric and heart size normal.  Atherosclerotic calcification aortic arch mild tortuosity.  Calcified mediastinal nodes identified.  Trachea midline and CP angles clear.  Lungs symmetrically aerated without acute infiltrate, consolidation or effusion.  Granuloma left mid to upper lung field without change.  Degenerative change throughout the spine with multilevel marginal spurring.  Generalized osteopenia.  Impression: Stable exam without acute infiltrate.    Electronically signed by: Palomo Nelson MD  Date:    10/27/2021  Time:    19:15    Compliance Summary  12/28/2021 - 1/26/2022 (30 days)  Days with Device Usage 25 days  Days without Device Usage 5 days  Percent Days with Device Usage 83.3%  Cumulative Usage 5 days 9 hrs. 55 mins. 19 secs.  Maximum Usage (1 Day) 7 hrs. 58 mins. 14 secs.  Average Usage (All Days) 4 hrs. 19 mins. 50 secs.  Average Usage (Days Used) 5 hrs. 11 mins. 48 secs.  Minimum Usage (1 Day) 50 mins. 8 secs.  Percent of Days with Usage >= 4 Hours 63.3%  Percent of Days with Usage < 4 Hours 36.7%  Date Range  Total Blower Time 5 days 9 hrs. 55 mins. 21 secs.  Average AHI 1.3  Auto-CPAP Summary  Auto-CPAP Mean Pressure 7.7 cmH2O  Auto-CPAP Peak Average Pressure 9.3 cmH2O  Device Pressure <= 90% of Time 11.1 cmH2O  Average Time in Large Leak Per Day 0 secs.        Assessment/Plan:      "  Problem List Items Addressed This Visit        Psychiatric    Depressed mood     Denies SI or HI  Unsure of cause, seems related to back pain, patient is hopeful of improvement with back pain treatments  psychiatry recommended, referral placed            Cardiac/Vascular    Essential hypertension - Primary     Stable, F/u regularly with PCP              Endocrine    Morbid obesity with BMI of 40.0-44.9, adult     Weight loss and exercise to improve overall health.              Orthopedic    Sacroiliitis       Other    GIL on CPAP     Continue CPAP 5-20  Compliant with use, AHI <2 with use  Benefits from use  Discussed therapeutic goals for CPAP: Ideal usage 100% of nights for 6-8 hours per night. Minimum usage is 70% of night for at least 4 hours per night.           Other fatigue     Recommend PCP follow up asap for basic blood work to evaluate  Psychiatry referral         Relevant Orders    Ambulatory referral/consult to Psychiatry        Patient was educated about the symptoms and signs of drowsy driving and about effective countermeasures. Symptoms of drowsy driving include difficulty focusing, frequent blinking, heavy eyelids, daydreaming, wandering/disconnected thoughts, difficulty remembering the last few miles driven (sometimes called "automatic behavior") or missing exits and street signs, frequent yawning, rubbing eyes, difficulty keeping head up, drifting from [place] to [place], tailgating or hitting a shoulder, and feeling restless and irritable .  Patient was made  aware that the ability to self-rate sleepiness and performance is unreliable . Although performance continues to decline with cumulative days of sleep deprivation, subjective ratings of sleepiness tend to level off after the first few days in laboratory conditions of sleep deprivation , and drivers are often unaware of their own level of sleepiness .  Rather than driving while drowsy, patient was told to use other modes of transportation, such " as ride sharing, public transportation, taxis, or walking. Importantly, drivers should be advised to plan ahead so that they avoid driving during times of day when they are likely to be sleepy, such as mid-afternoon, late at night, or after a period of sleep deprivation; to keep their trips short (under 20 minutes); and to use alternative modes of transportation.  Patient was warned about the risk of driving while drowsy.  Patient was instructed that patients who are considered high-risk (eg, those with excessive daytime sleepiness and a history of a drowsy driving crash or near miss) should be warned not to drive until therapy has been instituted and proven effective.    Follow up in 3 months.    Discussed diagnosis, its evaluation, treatment and usual course. All questions answered.    Patient verbalized understanding of plan and left in no acute distress    Thank you for the courtesy of participating in the care of this patient    Bryanna Lai PA-C

## 2022-02-18 NOTE — ASSESSMENT & PLAN NOTE
Continue CPAP 5-20  Compliant with use, AHI <2 with use  Benefits from use  Discussed therapeutic goals for CPAP: Ideal usage 100% of nights for 6-8 hours per night. Minimum usage is 70% of night for at least 4 hours per night.

## 2022-02-18 NOTE — ASSESSMENT & PLAN NOTE
Denies SI or HI  Unsure of cause, seems related to back pain, patient is hopeful of improvement with back pain treatments  psychiatry recommended, referral placed

## 2022-02-18 NOTE — PROGRESS NOTES
Subjective:   Patient ID:  Mariel Becerril is a 66 y.o. female who presents for follow-up of Essential hypertension  Patient denies CP, angina or anginal equivalent.Pt usually sees Dr. Luis.  Metoprolol helps palpitations/PVCs.    Hypertension  This is a chronic problem. The current episode started more than 1 year ago. The problem has been gradually improving since onset. The problem is controlled. Pertinent negatives include no chest pain, palpitations or shortness of breath. Past treatments include beta blockers. The current treatment provides moderate improvement. There are no compliance problems.    Palpitations   This is a chronic problem. The current episode started more than 1 year ago. The problem occurs intermittently. The problem has been waxing and waning. Pertinent negatives include no chest pain, dizziness or shortness of breath. She has tried beta blockers for the symptoms. The treatment provided moderate relief.   Edema  This is a chronic problem. The current episode started more than 1 year ago. The problem occurs intermittently. The problem has been gradually improving. Pertinent negatives include no chest pain. Nothing aggravates the symptoms. She has tried rest (diuretics) for the symptoms. The treatment provided moderate relief.       Review of Systems   Constitutional: Negative. Negative for weight gain.   HENT: Negative.    Eyes: Negative.    Cardiovascular: Negative.  Negative for chest pain, leg swelling and palpitations.   Respiratory: Negative.  Negative for shortness of breath.    Endocrine: Negative.    Hematologic/Lymphatic: Negative.    Skin: Negative.    Musculoskeletal: Negative for muscle weakness.   Gastrointestinal: Negative.    Genitourinary: Negative.    Neurological: Negative.  Negative for dizziness.   Psychiatric/Behavioral: Negative.    Allergic/Immunologic: Negative.      Family History   Problem Relation Age of Onset    Heart attack Father      Past Medical History:    Diagnosis Date    COPD (chronic obstructive pulmonary disease)     NO HOME O2    Digestive disorder     Frequent headaches     Hypertension     Osteoarthritis 04/02/2019    Bilateral knees, ankles and feet    Severe obesity (BMI >= 40)     Sleep apnea     CPAP     Social History     Socioeconomic History    Marital status:    Tobacco Use    Smoking status: Never Smoker    Smokeless tobacco: Never Used   Substance and Sexual Activity    Alcohol use: Never    Drug use: No    Sexual activity: Never     Partners: Male     Birth control/protection: See Surgical Hx     Current Outpatient Medications on File Prior to Visit   Medication Sig Dispense Refill    acetaminophen (TYLENOL) 325 MG tablet Take 2 tablets (650 mg total) by mouth every 8 (eight) hours as needed. (Patient not taking: Reported on 2/18/2022) 60 tablet 2    albuterol (PROAIR HFA) 90 mcg/actuation inhaler Inhale 2 puffs into the lungs every 6 (six) hours as needed for Wheezing. Rescue 18 g 0    ALPRAZolam (XANAX) 0.25 MG tablet Take 1 tablet by mouth twice daily as needed for anxiety (Patient not taking: Reported on 2/18/2022) 20 tablet 0    amLODIPine (NORVASC) 5 MG tablet       aspirin 325 MG tablet Take 325 mg by mouth once daily.      benzonatate (TESSALON) 100 MG capsule Take 1 capsule by mouth three times daily as needed (Patient not taking: Reported on 2/18/2022) 30 capsule 0    diclofenac sodium (VOLTAREN) 1 % Gel Apply 2 g topically 3 (three) times daily as needed. 200 g 2    eflornithine (VANIQA) 13.9 % cream Apply topically 2 (two) times daily. (Patient not taking: Reported on 2/18/2022) 45 g 3    ergocalciferol, vitamin D2, (VITAMIN D ORAL) Take 2,000 Units by mouth once daily.      furosemide (LASIX) 40 MG tablet Take 1 tablet (40 mg total) by mouth daily as needed (edema, swelling due to fluid). (Patient not taking: Reported on 2/18/2022) 90 tablet 3    gabapentin (NEURONTIN) 300 MG capsule TAKE 2 CAPSULES BY  MOUTH IN THE EVENING 60 capsule 0    HYDROcodone-acetaminophen (NORCO) 5-325 mg per tablet Take 1 tablet by mouth every 6 (six) hours as needed for Pain. (Patient not taking: Reported on 2/18/2022) 15 tablet 0    imipramine (TOFRANIL) 10 MG Tab Take 1 tablet (10 mg total) by mouth every evening. (Patient not taking: Reported on 2/18/2022) 90 tablet 1    levocetirizine (XYZAL) 5 MG tablet TAKE 1 TABLET BY MOUTH ONCE DAILY IN THE EVENING 90 tablet 3    LIDOcaine-prilocaine (EMLA) cream APPLY  CREAM TOPICALLY UP TO 4 TIMES DAILY AS NEEDED FOR PAIN 30 g 1    meclizine (ANTIVERT) 25 mg tablet Take 1 tablet (25 mg total) by mouth 3 (three) times daily as needed for Dizziness. (Patient not taking: Reported on 2/18/2022) 30 tablet 0    meloxicam (MOBIC) 15 MG tablet Take 1 tablet (15 mg total) by mouth once daily. 90 tablet 3    methocarbamoL (ROBAXIN) 750 MG Tab Take 1 tablet by mouth three times daily as needed for muscle spasm 90 tablet 0    metoprolol succinate (TOPROL-XL) 50 MG 24 hr tablet Take 1 tablet (50 mg total) by mouth once daily. 90 tablet 1    montelukast (SINGULAIR) 10 mg tablet Take 10 mg by mouth every evening.  1    mupirocin (BACTROBAN) 2 % ointment APPLY  OINTMENT TOPICALLY THREE TIMES DAILY 22 g 0    nystatin (MYCOSTATIN) cream APPLY  CREAM TOPICALLY TO AFFECTED AREA TWICE DAILY 30 g 0    nystatin (MYCOSTATIN) powder Apply topically 2 (two) times daily. 120 g 1    omeprazole (PRILOSEC) 40 MG capsule Take 1 capsule by mouth once daily 30 capsule 0    ondansetron (ZOFRAN) 8 MG tablet Take 1 tablet (8 mg total) by mouth every 8 (eight) hours as needed for Nausea. 20 tablet 0    oxyCODONE-acetaminophen (PERCOCET)  mg per tablet Take 1 tablet by mouth 4 (four) times daily as needed.      potassium chloride SA (K-DUR,KLOR-CON) 20 MEQ tablet Take 1 tablet (20 mEq total) by mouth once daily. (Patient not taking: Reported on 2/18/2022) 60 tablet 2    SF 5000 PLUS 1.1 % Crea BRUSH FOR 2  MINUTES AT BEDTIME THEN EXPECTORATE THE EXCESS.(NOTHING TO EAT OR DRINK FOR 30 MINUTES AFTER USE )      topiramate (TOPAMAX) 50 MG tablet Take 1 tablet (50 mg total) by mouth 2 (two) times daily. 180 tablet 0     No current facility-administered medications on file prior to visit.     Review of patient's allergies indicates:   Allergen Reactions    Penicillins Rash       Objective:     Physical Exam  Vitals and nursing note reviewed.   Constitutional:       Appearance: She is well-developed.   HENT:      Head: Normocephalic and atraumatic.   Eyes:      Conjunctiva/sclera: Conjunctivae normal.      Pupils: Pupils are equal, round, and reactive to light.   Cardiovascular:      Rate and Rhythm: Normal rate and regular rhythm.      Pulses: Intact distal pulses.      Heart sounds: Normal heart sounds.   Pulmonary:      Effort: Pulmonary effort is normal.      Breath sounds: Normal breath sounds.   Abdominal:      General: Bowel sounds are normal.      Palpations: Abdomen is soft.   Musculoskeletal:         General: Normal range of motion.      Cervical back: Normal range of motion and neck supple.   Skin:     General: Skin is warm and dry.   Neurological:      Mental Status: She is alert and oriented to person, place, and time.         Assessment:     1. Essential hypertension    2. Palpitations    3. Symptomatic PVCs    4. GIL on CPAP    5. Chest pressure    6. AREVALO (dyspnea on exertion)        Plan:     Essential hypertension    Palpitations    Symptomatic PVCs    GIL on CPAP    Chest pressure    AREVALO (dyspnea on exertion)      Continue metoprolol- HTN/palpitations  Continue diuretics/K prn- edema  F/u [place]

## 2022-02-22 ENCOUNTER — OFFICE VISIT (OUTPATIENT)
Dept: INTERNAL MEDICINE | Facility: CLINIC | Age: 67
End: 2022-02-22
Payer: MEDICARE

## 2022-02-22 VITALS
DIASTOLIC BLOOD PRESSURE: 72 MMHG | WEIGHT: 246.56 LBS | SYSTOLIC BLOOD PRESSURE: 116 MMHG | RESPIRATION RATE: 18 BRPM | HEART RATE: 89 BPM | HEIGHT: 63 IN | TEMPERATURE: 98 F | BODY MASS INDEX: 43.69 KG/M2 | OXYGEN SATURATION: 97 %

## 2022-02-22 DIAGNOSIS — M54.16 LUMBAR RADICULOPATHY: ICD-10-CM

## 2022-02-22 DIAGNOSIS — I10 ESSENTIAL HYPERTENSION: ICD-10-CM

## 2022-02-22 DIAGNOSIS — M17.0 PRIMARY OSTEOARTHRITIS OF BOTH KNEES: ICD-10-CM

## 2022-02-22 DIAGNOSIS — M47.26 OSTEOARTHRITIS OF SPINE WITH RADICULOPATHY, LUMBAR REGION: ICD-10-CM

## 2022-02-22 DIAGNOSIS — Z78.0 POST-MENOPAUSAL: ICD-10-CM

## 2022-02-22 DIAGNOSIS — Z00.00 ROUTINE MEDICAL EXAM: Primary | ICD-10-CM

## 2022-02-22 PROCEDURE — 1126F PR PAIN SEVERITY QUANTIFIED, NO PAIN PRESENT: ICD-10-PCS | Mod: CPTII,S$GLB,, | Performed by: NURSE PRACTITIONER

## 2022-02-22 PROCEDURE — 90732 PPSV23 VACC 2 YRS+ SUBQ/IM: CPT | Mod: S$GLB,,, | Performed by: NURSE PRACTITIONER

## 2022-02-22 PROCEDURE — 3008F BODY MASS INDEX DOCD: CPT | Mod: CPTII,S$GLB,, | Performed by: NURSE PRACTITIONER

## 2022-02-22 PROCEDURE — 3074F PR MOST RECENT SYSTOLIC BLOOD PRESSURE < 130 MM HG: ICD-10-PCS | Mod: CPTII,S$GLB,, | Performed by: NURSE PRACTITIONER

## 2022-02-22 PROCEDURE — 1126F AMNT PAIN NOTED NONE PRSNT: CPT | Mod: CPTII,S$GLB,, | Performed by: NURSE PRACTITIONER

## 2022-02-22 PROCEDURE — 1159F PR MEDICATION LIST DOCUMENTED IN MEDICAL RECORD: ICD-10-PCS | Mod: CPTII,S$GLB,, | Performed by: NURSE PRACTITIONER

## 2022-02-22 PROCEDURE — G0009 PNEUMOCOCCAL POLYSACCHARIDE VACCINE 23-VALENT =>2YO SQ IM: ICD-10-PCS | Mod: S$GLB,,, | Performed by: NURSE PRACTITIONER

## 2022-02-22 PROCEDURE — 3008F PR BODY MASS INDEX (BMI) DOCUMENTED: ICD-10-PCS | Mod: CPTII,S$GLB,, | Performed by: NURSE PRACTITIONER

## 2022-02-22 PROCEDURE — 99214 PR OFFICE/OUTPT VISIT, EST, LEVL IV, 30-39 MIN: ICD-10-PCS | Mod: S$GLB,,, | Performed by: NURSE PRACTITIONER

## 2022-02-22 PROCEDURE — 1101F PR PT FALLS ASSESS DOC 0-1 FALLS W/OUT INJ PAST YR: ICD-10-PCS | Mod: CPTII,S$GLB,, | Performed by: NURSE PRACTITIONER

## 2022-02-22 PROCEDURE — 3078F PR MOST RECENT DIASTOLIC BLOOD PRESSURE < 80 MM HG: ICD-10-PCS | Mod: CPTII,S$GLB,, | Performed by: NURSE PRACTITIONER

## 2022-02-22 PROCEDURE — 99999 PR PBB SHADOW E&M-EST. PATIENT-LVL IV: ICD-10-PCS | Mod: PBBFAC,,, | Performed by: NURSE PRACTITIONER

## 2022-02-22 PROCEDURE — 1159F MED LIST DOCD IN RCRD: CPT | Mod: CPTII,S$GLB,, | Performed by: NURSE PRACTITIONER

## 2022-02-22 PROCEDURE — 99214 OFFICE O/P EST MOD 30 MIN: CPT | Mod: S$GLB,,, | Performed by: NURSE PRACTITIONER

## 2022-02-22 PROCEDURE — 3288F PR FALLS RISK ASSESSMENT DOCUMENTED: ICD-10-PCS | Mod: CPTII,S$GLB,, | Performed by: NURSE PRACTITIONER

## 2022-02-22 PROCEDURE — 3078F DIAST BP <80 MM HG: CPT | Mod: CPTII,S$GLB,, | Performed by: NURSE PRACTITIONER

## 2022-02-22 PROCEDURE — 99999 PR PBB SHADOW E&M-EST. PATIENT-LVL IV: CPT | Mod: PBBFAC,,, | Performed by: NURSE PRACTITIONER

## 2022-02-22 PROCEDURE — 1160F PR REVIEW ALL MEDS BY PRESCRIBER/CLIN PHARMACIST DOCUMENTED: ICD-10-PCS | Mod: CPTII,S$GLB,, | Performed by: NURSE PRACTITIONER

## 2022-02-22 PROCEDURE — 90732 PNEUMOCOCCAL POLYSACCHARIDE VACCINE 23-VALENT =>2YO SQ IM: ICD-10-PCS | Mod: S$GLB,,, | Performed by: NURSE PRACTITIONER

## 2022-02-22 PROCEDURE — 1101F PT FALLS ASSESS-DOCD LE1/YR: CPT | Mod: CPTII,S$GLB,, | Performed by: NURSE PRACTITIONER

## 2022-02-22 PROCEDURE — G0009 ADMIN PNEUMOCOCCAL VACCINE: HCPCS | Mod: S$GLB,,, | Performed by: NURSE PRACTITIONER

## 2022-02-22 PROCEDURE — 1160F RVW MEDS BY RX/DR IN RCRD: CPT | Mod: CPTII,S$GLB,, | Performed by: NURSE PRACTITIONER

## 2022-02-22 PROCEDURE — 3288F FALL RISK ASSESSMENT DOCD: CPT | Mod: CPTII,S$GLB,, | Performed by: NURSE PRACTITIONER

## 2022-02-22 PROCEDURE — 3074F SYST BP LT 130 MM HG: CPT | Mod: CPTII,S$GLB,, | Performed by: NURSE PRACTITIONER

## 2022-02-22 RX ORDER — FUROSEMIDE 40 MG/1
40 TABLET ORAL DAILY PRN
Qty: 90 TABLET | Refills: 3 | Status: SHIPPED | OUTPATIENT
Start: 2022-02-22 | End: 2023-08-16

## 2022-02-22 RX ORDER — LIDOCAINE AND PRILOCAINE 25; 25 MG/G; MG/G
CREAM TOPICAL
Qty: 30 G | Refills: 1 | Status: SHIPPED | OUTPATIENT
Start: 2022-02-22 | End: 2022-09-13 | Stop reason: SDUPTHER

## 2022-02-22 NOTE — PROGRESS NOTES
Subjective:       Patient ID: Mariel Becerril is a 66 y.o. female.    Chief Complaint: Follow-up    Patient presents for follow up.  Overall, doing well.      Did not have spinal surgery with Dr. Whelan. Currently doing decompression therapy at [facility] (chiropractor) .  Twice a week.  Doing well.  Pain is much better.    Review of Systems   Constitutional: Negative for chills, fatigue and fever.   HENT: Negative for nasal congestion, drooling, rhinorrhea and sinus pressure/congestion.    Cardiovascular: Negative for chest pain and leg swelling.   Musculoskeletal: Positive for arthralgias and back pain.   Psychiatric/Behavioral: Negative for agitation and confusion.         Objective:      Physical Exam  Vitals reviewed.   Constitutional:       Appearance: Normal appearance.   HENT:      Head: Normocephalic.      Right Ear: Tympanic membrane and ear canal normal.      Left Ear: Tympanic membrane and ear canal normal.   Cardiovascular:      Rate and Rhythm: Normal rate and regular rhythm.   Pulmonary:      Effort: Pulmonary effort is normal.      Breath sounds: Normal breath sounds.   Abdominal:      General: Bowel sounds are normal. There is no distension.   Musculoskeletal:         General: Normal range of motion.      Right lower leg: Edema (trace) present.      Left lower leg: Edema (trace ) present.   Skin:     General: Skin is warm.   Neurological:      General: No focal deficit present.      Mental Status: She is alert and oriented to person, place, and time.   Psychiatric:         Mood and Affect: Mood normal.         Behavior: Behavior is cooperative.         Assessment:       Problem List Items Addressed This Visit     Primary osteoarthritis of both knees    Relevant Medications    LIDOcaine-prilocaine (EMLA) cream    Essential hypertension    Relevant Medications    furosemide (LASIX) 40 MG tablet    Osteoarthritis of spine with radiculopathy, lumbar region    Lumbar radiculopathy      Other  Visit Diagnoses     Routine medical exam    -  Primary    Relevant Orders    Urinalysis    DXA Bone Density Spine And Hip    (In Office Administered) Pneumococcal Polysaccharide Vaccine (23 Valent) (SQ/IM) (Completed)    Post-menopausal        Relevant Orders    DXA Bone Density Spine And Hip          Plan:           Routine medical exam  -     Urinalysis; Future; Expected date: 02/22/2022  -     DXA Bone Density Spine And Hip; Future; Expected date: 02/22/2022  -     (In Office Administered) Pneumococcal Polysaccharide Vaccine (23 Valent) (SQ/IM)    Post-menopausal  -     DXA Bone Density Spine And Hip; Future; Expected date: 02/22/2022    Essential hypertension  -     furosemide (LASIX) 40 MG tablet; Take 1 tablet (40 mg total) by mouth daily as needed (edema, swelling due to fluid).  Dispense: 90 tablet; Refill: 3    Osteoarthritis of spine with radiculopathy, lumbar region    Lumbar radiculopathy    Primary osteoarthritis of both knees  -     LIDOcaine-prilocaine (EMLA) cream; APPLY  CREAM TOPICALLY UP TO 4 TIMES DAILY AS NEEDED FOR PAIN  Dispense: 30 g; Refill: 1        Repeat urine.     Schedule DEXA.    6 month f/u with PCP

## 2022-03-08 RX ORDER — GABAPENTIN 300 MG/1
600 CAPSULE ORAL NIGHTLY
Qty: 60 CAPSULE | Refills: 0 | Status: SHIPPED | OUTPATIENT
Start: 2022-03-08 | End: 2022-05-05 | Stop reason: SDUPTHER

## 2022-03-10 DIAGNOSIS — M79.18 PIRIFORMIS MUSCLE PAIN: ICD-10-CM

## 2022-03-11 RX ORDER — METHOCARBAMOL 750 MG/1
750 TABLET, FILM COATED ORAL 3 TIMES DAILY PRN
Qty: 90 TABLET | Refills: 0 | Status: SHIPPED | OUTPATIENT
Start: 2022-03-11 | End: 2022-04-25

## 2022-03-17 DIAGNOSIS — J30.2 SEASONAL ALLERGIES: ICD-10-CM

## 2022-03-17 NOTE — TELEPHONE ENCOUNTER
Care Due:                  Date            Visit Type   Department     Provider  --------------------------------------------------------------------------------                                MYCHART                              ANNUAL                              CHECKUP/PHY  [facility]  Last Visit: 06-      S            MEDICINE       [facility]  Becerril                              EP -                              PRIMARY      [facility]  Next Visit: 08-      CARE ([facility])   MEDICINE       [facility]                                                            Last  Test          Frequency    Reason                     Performed    Due Date  --------------------------------------------------------------------------------    Office Visit  12 months..  omeprazole...............  06- 05-    Powered by [employer]. Reference number: [id number].   3/17/2022 5:31:33 AM CDT

## 2022-03-21 RX ORDER — LEVOCETIRIZINE DIHYDROCHLORIDE 5 MG/1
TABLET, FILM COATED ORAL
Qty: 90 TABLET | Refills: 0 | Status: SHIPPED | OUTPATIENT
Start: 2022-03-21

## 2022-03-21 NOTE — TELEPHONE ENCOUNTER
Refill Center Decision:   APPROVE - Med(s) that meet protocol have been signed  Comments: Future OV scheduled with PCP 8/22.

## 2022-04-11 ENCOUNTER — OFFICE VISIT (OUTPATIENT)
Dept: INTERNAL MEDICINE | Facility: CLINIC | Age: 67
End: 2022-04-11
Payer: MEDICARE

## 2022-04-11 VITALS
RESPIRATION RATE: 18 BRPM | TEMPERATURE: 98 F | BODY MASS INDEX: 43.3 KG/M2 | WEIGHT: 244.38 LBS | HEART RATE: 75 BPM | DIASTOLIC BLOOD PRESSURE: 72 MMHG | SYSTOLIC BLOOD PRESSURE: 124 MMHG | HEIGHT: 63 IN | OXYGEN SATURATION: 97 %

## 2022-04-11 DIAGNOSIS — M54.16 LUMBAR RADICULOPATHY: ICD-10-CM

## 2022-04-11 DIAGNOSIS — H93.13 TINNITUS OF BOTH EARS: ICD-10-CM

## 2022-04-11 DIAGNOSIS — M54.2 CERVICALGIA: Primary | ICD-10-CM

## 2022-04-11 PROCEDURE — 99213 PR OFFICE/OUTPT VISIT, EST, LEVL III, 20-29 MIN: ICD-10-PCS | Mod: S$GLB,,, | Performed by: NURSE PRACTITIONER

## 2022-04-11 PROCEDURE — 3288F PR FALLS RISK ASSESSMENT DOCUMENTED: ICD-10-PCS | Mod: CPTII,S$GLB,, | Performed by: NURSE PRACTITIONER

## 2022-04-11 PROCEDURE — 3074F SYST BP LT 130 MM HG: CPT | Mod: CPTII,S$GLB,, | Performed by: NURSE PRACTITIONER

## 2022-04-11 PROCEDURE — 1125F PR PAIN SEVERITY QUANTIFIED, PAIN PRESENT: ICD-10-PCS | Mod: CPTII,S$GLB,, | Performed by: NURSE PRACTITIONER

## 2022-04-11 PROCEDURE — 3078F DIAST BP <80 MM HG: CPT | Mod: CPTII,S$GLB,, | Performed by: NURSE PRACTITIONER

## 2022-04-11 PROCEDURE — 1160F PR REVIEW ALL MEDS BY PRESCRIBER/CLIN PHARMACIST DOCUMENTED: ICD-10-PCS | Mod: CPTII,S$GLB,, | Performed by: NURSE PRACTITIONER

## 2022-04-11 PROCEDURE — 1101F PT FALLS ASSESS-DOCD LE1/YR: CPT | Mod: CPTII,S$GLB,, | Performed by: NURSE PRACTITIONER

## 2022-04-11 PROCEDURE — 3074F PR MOST RECENT SYSTOLIC BLOOD PRESSURE < 130 MM HG: ICD-10-PCS | Mod: CPTII,S$GLB,, | Performed by: NURSE PRACTITIONER

## 2022-04-11 PROCEDURE — 1159F PR MEDICATION LIST DOCUMENTED IN MEDICAL RECORD: ICD-10-PCS | Mod: CPTII,S$GLB,, | Performed by: NURSE PRACTITIONER

## 2022-04-11 PROCEDURE — 99213 OFFICE O/P EST LOW 20 MIN: CPT | Mod: S$GLB,,, | Performed by: NURSE PRACTITIONER

## 2022-04-11 PROCEDURE — 3288F FALL RISK ASSESSMENT DOCD: CPT | Mod: CPTII,S$GLB,, | Performed by: NURSE PRACTITIONER

## 2022-04-11 PROCEDURE — 1125F AMNT PAIN NOTED PAIN PRSNT: CPT | Mod: CPTII,S$GLB,, | Performed by: NURSE PRACTITIONER

## 2022-04-11 PROCEDURE — 99999 PR PBB SHADOW E&M-EST. PATIENT-LVL V: ICD-10-PCS | Mod: PBBFAC,,, | Performed by: NURSE PRACTITIONER

## 2022-04-11 PROCEDURE — 3008F BODY MASS INDEX DOCD: CPT | Mod: CPTII,S$GLB,, | Performed by: NURSE PRACTITIONER

## 2022-04-11 PROCEDURE — 99999 PR PBB SHADOW E&M-EST. PATIENT-LVL V: CPT | Mod: PBBFAC,,, | Performed by: NURSE PRACTITIONER

## 2022-04-11 PROCEDURE — 1159F MED LIST DOCD IN RCRD: CPT | Mod: CPTII,S$GLB,, | Performed by: NURSE PRACTITIONER

## 2022-04-11 PROCEDURE — 1160F RVW MEDS BY RX/DR IN RCRD: CPT | Mod: CPTII,S$GLB,, | Performed by: NURSE PRACTITIONER

## 2022-04-11 PROCEDURE — 1101F PR PT FALLS ASSESS DOC 0-1 FALLS W/OUT INJ PAST YR: ICD-10-PCS | Mod: CPTII,S$GLB,, | Performed by: NURSE PRACTITIONER

## 2022-04-11 PROCEDURE — 3008F PR BODY MASS INDEX (BMI) DOCUMENTED: ICD-10-PCS | Mod: CPTII,S$GLB,, | Performed by: NURSE PRACTITIONER

## 2022-04-11 PROCEDURE — 3078F PR MOST RECENT DIASTOLIC BLOOD PRESSURE < 80 MM HG: ICD-10-PCS | Mod: CPTII,S$GLB,, | Performed by: NURSE PRACTITIONER

## 2022-04-11 RX ORDER — MONTELUKAST SODIUM 10 MG/1
10 TABLET ORAL NIGHTLY
Qty: 30 TABLET | Refills: 11 | Status: SHIPPED | OUTPATIENT
Start: 2022-04-11 | End: 2023-05-24

## 2022-04-11 NOTE — PROGRESS NOTES
Subjective:       Patient ID: Mariel Becerril is a 66 y.o. female.    Chief Complaint: Referral (PT for neck and back inury) and Otalgia (Tyler ringing)    Patient presents for a new referral for physical therapy.      Reports standing in the check out [place] and a pellet of boxes fell onto her (head-down) 03/12/2022.      Currently in physical therapy with [facility].      Reports tinnitus that started after the incident.  Intermittent but never goes away.     Reports nosebleeds intermittent.   Last bleed about one week ago.  Not related to rhinorrhea.   Taking  mg daily.    Review of Systems   Constitutional: Negative for chills, fatigue and fever.   HENT: Positive for nosebleeds (intermittent ).    Cardiovascular: Negative for chest pain and leg swelling.   Musculoskeletal: Positive for arthralgias, gait problem, myalgias and neck pain.   Neurological: Positive for headaches.   Psychiatric/Behavioral: Negative for agitation and confusion.         Objective:      Physical Exam  Vitals reviewed.   Constitutional:       Appearance: Normal appearance.   HENT:      Head: Normocephalic.   Cardiovascular:      Rate and Rhythm: Normal rate and regular rhythm.   Pulmonary:      Effort: Pulmonary effort is normal.      Breath sounds: Normal breath sounds.   Musculoskeletal:         General: Tenderness and signs of injury present.      Cervical back: Tenderness present.      Thoracic back: Tenderness present. Decreased range of motion.      Lumbar back: Tenderness present.   Skin:     General: Skin is warm.   Neurological:      General: No focal deficit present.      Mental Status: She is alert and oriented to person, place, and time.   Psychiatric:         Mood and Affect: Mood normal.         Behavior: Behavior is cooperative.         Assessment:       Problem List Items Addressed This Visit     Lumbar radiculopathy    Relevant Orders    Ambulatory referral/consult to Physical/Occupational Therapy       Other Visit Diagnoses     Cervicalgia    -  Primary    Relevant Orders    Ambulatory referral/consult to Physical/Occupational Therapy    Tinnitus of both ears        Relevant Medications    montelukast (SINGULAIR) 10 mg tablet    Other Relevant Orders    Ambulatory referral/consult to ENT          Plan:         Cervicalgia  -     Ambulatory referral/consult to Physical/Occupational Therapy; Future; Expected date: 04/18/2022    Lumbar radiculopathy  -     Ambulatory referral/consult to Physical/Occupational Therapy; Future; Expected date: 04/18/2022    Tinnitus of both ears  -     Ambulatory referral/consult to ENT; Future; Expected date: 04/18/2022  -     montelukast (SINGULAIR) 10 mg tablet; Take 1 tablet (10 mg total) by mouth every evening.  Dispense: 30 tablet; Refill: 11      Schedule ENT- in 1-2 weeks.    Fax referral to [facility] [phone] (fax)

## 2022-04-11 NOTE — PATIENT INSTRUCTIONS
Take Singulair every night for one week.     Follow up with ENT if no improvement in ringing in ears and to evaluate nosebleeds.     Physical therapy with [facility].

## 2022-04-13 DIAGNOSIS — Z96.652 STATUS POST TOTAL KNEE REPLACEMENT USING CEMENT, LEFT: ICD-10-CM

## 2022-04-13 DIAGNOSIS — M25.562 POSTERIOR LEFT KNEE PAIN: ICD-10-CM

## 2022-04-13 RX ORDER — DICLOFENAC SODIUM 10 MG/G
2 GEL TOPICAL 3 TIMES DAILY PRN
Qty: 200 G | Refills: 2 | Status: SHIPPED | OUTPATIENT
Start: 2022-04-13 | End: 2022-09-13 | Stop reason: SDUPTHER

## 2022-04-14 ENCOUNTER — PATIENT OUTREACH (OUTPATIENT)
Dept: ADMINISTRATIVE | Facility: OTHER | Age: 67
End: 2022-04-14
Payer: MEDICARE

## 2022-04-15 NOTE — PROGRESS NOTES
Health Maintenance Due   Topic Date Due    DEXA Scan  Never done    Shingles Vaccine (1 of 2) Never done    Mammogram  11/09/2021     Updates were requested from care everywhere.  Chart was reviewed for overdue Proactive [facility] Encounters ([facility]) topics (CRS, Breast Cancer Screening, Eye exam)  Health Maintenance has been updated.  LINKS immunization registry triggered.  Immunizations were reconciled.

## 2022-04-18 ENCOUNTER — HOSPITAL ENCOUNTER (OUTPATIENT)
Dept: RADIOLOGY | Facility: HOSPITAL | Age: 67
Discharge: HOME | End: 2022-04-18
Attending: PHYSICIAN ASSISTANT
Payer: MEDICARE

## 2022-04-18 ENCOUNTER — OFFICE VISIT (OUTPATIENT)
Dept: ORTHOPEDICS | Facility: CLINIC | Age: 67
End: 2022-04-18
Payer: MEDICARE

## 2022-04-18 ENCOUNTER — PATIENT MESSAGE (OUTPATIENT)
Dept: ORTHOPEDICS | Facility: CLINIC | Age: 67
End: 2022-04-18

## 2022-04-18 VITALS — WEIGHT: 244 LBS | BODY MASS INDEX: 43.23 KG/M2 | HEIGHT: 63 IN

## 2022-04-18 DIAGNOSIS — M54.16 LUMBAR BACK PAIN WITH RADICULOPATHY AFFECTING LEFT LOWER EXTREMITY: ICD-10-CM

## 2022-04-18 DIAGNOSIS — Z96.652 S/P TOTAL KNEE ARTHROPLASTY, LEFT: ICD-10-CM

## 2022-04-18 DIAGNOSIS — M17.0 PRIMARY OSTEOARTHRITIS OF BOTH KNEES: ICD-10-CM

## 2022-04-18 DIAGNOSIS — Z96.652 S/P TOTAL KNEE ARTHROPLASTY, LEFT: Primary | ICD-10-CM

## 2022-04-18 DIAGNOSIS — M54.16 LUMBAR RADICULOPATHY: Primary | ICD-10-CM

## 2022-04-18 DIAGNOSIS — Z96.652 S/P TKR (TOTAL KNEE REPLACEMENT) USING CEMENT, LEFT: ICD-10-CM

## 2022-04-18 PROCEDURE — 1160F RVW MEDS BY RX/DR IN RCRD: CPT | Mod: CPTII,S$GLB,, | Performed by: PHYSICIAN ASSISTANT

## 2022-04-18 PROCEDURE — 3288F PR FALLS RISK ASSESSMENT DOCUMENTED: ICD-10-PCS | Mod: CPTII,S$GLB,, | Performed by: PHYSICIAN ASSISTANT

## 2022-04-18 PROCEDURE — 1125F AMNT PAIN NOTED PAIN PRSNT: CPT | Mod: CPTII,S$GLB,, | Performed by: PHYSICIAN ASSISTANT

## 2022-04-18 PROCEDURE — 1159F MED LIST DOCD IN RCRD: CPT | Mod: CPTII,S$GLB,, | Performed by: PHYSICIAN ASSISTANT

## 2022-04-18 PROCEDURE — 99999 PR PBB SHADOW E&M-EST. PATIENT-LVL V: CPT | Mod: PBBFAC,,, | Performed by: PHYSICIAN ASSISTANT

## 2022-04-18 PROCEDURE — 3008F PR BODY MASS INDEX (BMI) DOCUMENTED: ICD-10-PCS | Mod: CPTII,S$GLB,, | Performed by: PHYSICIAN ASSISTANT

## 2022-04-18 PROCEDURE — 1159F PR MEDICATION LIST DOCUMENTED IN MEDICAL RECORD: ICD-10-PCS | Mod: CPTII,S$GLB,, | Performed by: PHYSICIAN ASSISTANT

## 2022-04-18 PROCEDURE — 99214 OFFICE O/P EST MOD 30 MIN: CPT | Mod: S$GLB,,, | Performed by: PHYSICIAN ASSISTANT

## 2022-04-18 PROCEDURE — 1160F PR REVIEW ALL MEDS BY PRESCRIBER/CLIN PHARMACIST DOCUMENTED: ICD-10-PCS | Mod: CPTII,S$GLB,, | Performed by: PHYSICIAN ASSISTANT

## 2022-04-18 PROCEDURE — 73564 X-RAY EXAM KNEE 4 OR MORE: CPT | Mod: TC,50

## 2022-04-18 PROCEDURE — 1101F PR PT FALLS ASSESS DOC 0-1 FALLS W/OUT INJ PAST YR: ICD-10-PCS | Mod: CPTII,S$GLB,, | Performed by: PHYSICIAN ASSISTANT

## 2022-04-18 PROCEDURE — 99214 PR OFFICE/OUTPT VISIT, EST, LEVL IV, 30-39 MIN: ICD-10-PCS | Mod: S$GLB,,, | Performed by: PHYSICIAN ASSISTANT

## 2022-04-18 PROCEDURE — 3008F BODY MASS INDEX DOCD: CPT | Mod: CPTII,S$GLB,, | Performed by: PHYSICIAN ASSISTANT

## 2022-04-18 PROCEDURE — 99999 PR PBB SHADOW E&M-EST. PATIENT-LVL V: ICD-10-PCS | Mod: PBBFAC,,, | Performed by: PHYSICIAN ASSISTANT

## 2022-04-18 PROCEDURE — 3288F FALL RISK ASSESSMENT DOCD: CPT | Mod: CPTII,S$GLB,, | Performed by: PHYSICIAN ASSISTANT

## 2022-04-18 PROCEDURE — 1125F PR PAIN SEVERITY QUANTIFIED, PAIN PRESENT: ICD-10-PCS | Mod: CPTII,S$GLB,, | Performed by: PHYSICIAN ASSISTANT

## 2022-04-18 PROCEDURE — 73564 X-RAY EXAM KNEE 4 OR MORE: CPT | Mod: 26,50,, | Performed by: RADIOLOGY

## 2022-04-18 PROCEDURE — 1101F PT FALLS ASSESS-DOCD LE1/YR: CPT | Mod: CPTII,S$GLB,, | Performed by: PHYSICIAN ASSISTANT

## 2022-04-18 PROCEDURE — 73564 XR KNEE ORTHO BILAT WITH FLEXION: ICD-10-PCS | Mod: 26,50,, | Performed by: RADIOLOGY
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
## 2022-04-25 ENCOUNTER — OFFICE VISIT (OUTPATIENT)
Dept: OTOLARYNGOLOGY | Facility: CLINIC | Age: 67
End: 2022-04-25
Payer: MEDICARE

## 2022-04-25 ENCOUNTER — CLINICAL SUPPORT (OUTPATIENT)
Dept: AUDIOLOGY | Facility: CLINIC | Age: 67
End: 2022-04-25
Payer: MEDICARE

## 2022-04-25 VITALS
HEART RATE: 72 BPM | WEIGHT: 247.38 LBS | BODY MASS INDEX: 43.82 KG/M2 | DIASTOLIC BLOOD PRESSURE: 74 MMHG | TEMPERATURE: 98 F | SYSTOLIC BLOOD PRESSURE: 122 MMHG

## 2022-04-25 DIAGNOSIS — S06.0X0A: Primary | ICD-10-CM

## 2022-04-25 DIAGNOSIS — H93.13 TINNITUS OF BOTH EARS: ICD-10-CM

## 2022-04-25 DIAGNOSIS — H90.3 SENSORINEURAL HEARING LOSS (SNHL), BILATERAL: Primary | ICD-10-CM

## 2022-04-25 PROCEDURE — 99213 PR OFFICE/OUTPT VISIT, EST, LEVL III, 20-29 MIN: ICD-10-PCS | Mod: 25,S$GLB,, | Performed by: STUDENT IN AN ORGANIZED HEALTH CARE EDUCATION/TRAINING PROGRAM

## 2022-04-25 PROCEDURE — 92567 PR TYMPA2METRY: ICD-10-PCS | Mod: S$GLB,,, | Performed by: AUDIOLOGIST-HEARING AID FITTER

## 2022-04-25 PROCEDURE — 1126F PR PAIN SEVERITY QUANTIFIED, NO PAIN PRESENT: ICD-10-PCS | Mod: CPTII,S$GLB,, | Performed by: STUDENT IN AN ORGANIZED HEALTH CARE EDUCATION/TRAINING PROGRAM

## 2022-04-25 PROCEDURE — 3078F PR MOST RECENT DIASTOLIC BLOOD PRESSURE < 80 MM HG: ICD-10-PCS | Mod: CPTII,S$GLB,, | Performed by: STUDENT IN AN ORGANIZED HEALTH CARE EDUCATION/TRAINING PROGRAM

## 2022-04-25 PROCEDURE — 92557 PR COMPREHENSIVE HEARING TEST: ICD-10-PCS | Mod: S$GLB,,, | Performed by: AUDIOLOGIST-HEARING AID FITTER

## 2022-04-25 PROCEDURE — 92567 TYMPANOMETRY: CPT | Mod: S$GLB,,, | Performed by: AUDIOLOGIST-HEARING AID FITTER

## 2022-04-25 PROCEDURE — 99999 PR PBB SHADOW E&M-EST. PATIENT-LVL II: CPT | Mod: PBBFAC,,, | Performed by: AUDIOLOGIST-HEARING AID FITTER

## 2022-04-25 PROCEDURE — 1159F PR MEDICATION LIST DOCUMENTED IN MEDICAL RECORD: ICD-10-PCS | Mod: CPTII,S$GLB,, | Performed by: STUDENT IN AN ORGANIZED HEALTH CARE EDUCATION/TRAINING PROGRAM

## 2022-04-25 PROCEDURE — 3288F FALL RISK ASSESSMENT DOCD: CPT | Mod: CPTII,S$GLB,, | Performed by: STUDENT IN AN ORGANIZED HEALTH CARE EDUCATION/TRAINING PROGRAM

## 2022-04-25 PROCEDURE — 3074F SYST BP LT 130 MM HG: CPT | Mod: CPTII,S$GLB,, | Performed by: STUDENT IN AN ORGANIZED HEALTH CARE EDUCATION/TRAINING PROGRAM

## 2022-04-25 PROCEDURE — 1159F MED LIST DOCD IN RCRD: CPT | Mod: CPTII,S$GLB,, | Performed by: STUDENT IN AN ORGANIZED HEALTH CARE EDUCATION/TRAINING PROGRAM

## 2022-04-25 PROCEDURE — 3008F PR BODY MASS INDEX (BMI) DOCUMENTED: ICD-10-PCS | Mod: CPTII,S$GLB,, | Performed by: STUDENT IN AN ORGANIZED HEALTH CARE EDUCATION/TRAINING PROGRAM

## 2022-04-25 PROCEDURE — 99999 PR PBB SHADOW E&M-EST. PATIENT-LVL II: ICD-10-PCS | Mod: PBBFAC,,, | Performed by: AUDIOLOGIST-HEARING AID FITTER

## 2022-04-25 PROCEDURE — 1101F PR PT FALLS ASSESS DOC 0-1 FALLS W/OUT INJ PAST YR: ICD-10-PCS | Mod: CPTII,S$GLB,, | Performed by: STUDENT IN AN ORGANIZED HEALTH CARE EDUCATION/TRAINING PROGRAM

## 2022-04-25 PROCEDURE — 3288F PR FALLS RISK ASSESSMENT DOCUMENTED: ICD-10-PCS | Mod: CPTII,S$GLB,, | Performed by: STUDENT IN AN ORGANIZED HEALTH CARE EDUCATION/TRAINING PROGRAM

## 2022-04-25 PROCEDURE — 69210 PR REMOVAL IMPACTED CERUMEN REQUIRING INSTRUMENTATION, UNILATERAL: ICD-10-PCS | Mod: S$GLB,,, | Performed by: STUDENT IN AN ORGANIZED HEALTH CARE EDUCATION/TRAINING PROGRAM

## 2022-04-25 PROCEDURE — 99213 OFFICE O/P EST LOW 20 MIN: CPT | Mod: 25,S$GLB,, | Performed by: STUDENT IN AN ORGANIZED HEALTH CARE EDUCATION/TRAINING PROGRAM

## 2022-04-25 PROCEDURE — 1126F AMNT PAIN NOTED NONE PRSNT: CPT | Mod: CPTII,S$GLB,, | Performed by: STUDENT IN AN ORGANIZED HEALTH CARE EDUCATION/TRAINING PROGRAM

## 2022-04-25 PROCEDURE — 3078F DIAST BP <80 MM HG: CPT | Mod: CPTII,S$GLB,, | Performed by: STUDENT IN AN ORGANIZED HEALTH CARE EDUCATION/TRAINING PROGRAM

## 2022-04-25 PROCEDURE — 92557 COMPREHENSIVE HEARING TEST: CPT | Mod: S$GLB,,, | Performed by: AUDIOLOGIST-HEARING AID FITTER

## 2022-04-25 PROCEDURE — 3074F PR MOST RECENT SYSTOLIC BLOOD PRESSURE < 130 MM HG: ICD-10-PCS | Mod: CPTII,S$GLB,, | Performed by: STUDENT IN AN ORGANIZED HEALTH CARE EDUCATION/TRAINING PROGRAM

## 2022-04-25 PROCEDURE — 69210 REMOVE IMPACTED EAR WAX UNI: CPT | Mod: S$GLB,,, | Performed by: STUDENT IN AN ORGANIZED HEALTH CARE EDUCATION/TRAINING PROGRAM

## 2022-04-25 PROCEDURE — 1101F PT FALLS ASSESS-DOCD LE1/YR: CPT | Mod: CPTII,S$GLB,, | Performed by: STUDENT IN AN ORGANIZED HEALTH CARE EDUCATION/TRAINING PROGRAM

## 2022-04-25 PROCEDURE — 99999 PR PBB SHADOW E&M-EST. PATIENT-LVL V: ICD-10-PCS | Mod: PBBFAC,,, | Performed by: STUDENT IN AN ORGANIZED HEALTH CARE EDUCATION/TRAINING PROGRAM

## 2022-04-25 PROCEDURE — 3008F BODY MASS INDEX DOCD: CPT | Mod: CPTII,S$GLB,, | Performed by: STUDENT IN AN ORGANIZED HEALTH CARE EDUCATION/TRAINING PROGRAM

## 2022-04-25 PROCEDURE — 99999 PR PBB SHADOW E&M-EST. PATIENT-LVL V: CPT | Mod: PBBFAC,,, | Performed by: STUDENT IN AN ORGANIZED HEALTH CARE EDUCATION/TRAINING PROGRAM

## 2022-04-25 NOTE — PATIENT INSTRUCTIONS
Tinnitus (ringing in the ears)  Tinnitus is the perception of sound when no actual external noise is present. While it is commonly referred to as ringing in the ears, tinnitus can manifest many different perceptions of sound, including buzzing, hissing, whistling, swooshing, and clicking.     Preventative measures to help prevent and minimize tinnitus include:     Reduce exposure to extremely loud noise  Avoid total silence  Decrease salt intake  Monitor one's blood pressure  Avoid stimulants such as caffeine and nicotine  Exercise  Reduce fatigue  Manage stress    Sound Therapy: Sound therapy is a broad term that may be used in many ways. In general, sound therapy means the use of external noise in order to alter your perception of tinnitus. Like other tinnitus treatments, sound therapies do not cure the condition, but they may significantly lower the burden and intensity of tinnitus. Some options for sound therapy include:    Portable sound generator/sound machines: these can be purchased at certain electronic suppliers. They can produce soothing sounds that help to dampen your brain's recognition of the tinnitus.   Example of a portable and affordable sound machine: https://[employer]/Portable-Relaxing-Soothing-Charging-Auto-Off/dp/N15P5BJBMC/ref=pd_lpo_2?pd_rd_i=[email or web address]&psc=1    Home masking: such as the use of electric fans, radios or television to dampen the tinnitus.    Music therapy: Many patients find that music, particularly classical passages that don't contain wide variations in loudness (ampliltude) can be both soothing to the limbic system (the emotional processor in the brain that is commonly negatively linked to a patient's reaction to tinnitus) and stimulating to the auditory cortex. There are several Apps available that have preselected music that can help with tinnitus. Some examples include: [employer], [place], [employer] Tinnitus Calmer, and MyNoise, among others.    Amplification: The use  of hearing aids and a combination of hearing aids and maskers are often effective ways to minimize tinnitus. While it is not clear whether hearing aids help by amplifying background sounds that can mask out the tinnitus or by actually altering the production of tinnitus, most hearing aid wearers report at least some reduction in their tinnitus. This may be due to the reduction in contrast between tinnitus and silence, or because of the new stimulation provided to the brain. This an especially helpful option when the patient also suffers from hearing loss as the hearing aids may simultaneously improving the hearing and tinnitus.    Tinnitus Retraining Therapy (TRT) does exist, but is not offered by any providers in our state. If all other measures fail and your tinnitus is debilitating it might be worth searching for TRT providers in nearby states. These techniques consist of two main components -- directive counseling and low level sound generators. Directive counseling provides intensive, individualized education regarding the causes and effects of tinnitus on the ear, the brain, and the coping mechanism. Low-level sound generators may help the brain relearn a pattern that will de-emphasize the importance of the tinnitus. These devices also may be helpful in desensitizing patients who are overly sensitive to sound.    There are also support groups that exist which can be helpful for some patients.     More helpful information can be found on the American Tinnitus Association website: https://www.socrates.org/
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
## 2022-04-26 ENCOUNTER — PATIENT MESSAGE (OUTPATIENT)
Dept: ADMINISTRATIVE | Facility: HOSPITAL | Age: 67
End: 2022-04-26
Payer: MEDICARE

## 2022-04-26 NOTE — TELEPHONE ENCOUNTER
No new care gaps identified.  Powered by [employer]. Reference number: [id number].   4/26/2022 5:31:51 AM CDT

## 2022-04-27 RX ORDER — OMEPRAZOLE 40 MG/1
CAPSULE, DELAYED RELEASE ORAL
Qty: 30 CAPSULE | Refills: 0 | Status: SHIPPED | OUTPATIENT
Start: 2022-04-27 | End: 2022-05-30

## 2022-04-27 NOTE — TELEPHONE ENCOUNTER
Refill Routing Note   Medication(s) are not appropriate for processing by [facility] for the following reason(s):      - Patient has been seen in the ED/Hospital since the last PCP visit  - Required indication for medication not on problem list (gerd)    [facility] action(s):  Defer          Medication reconciliation completed: No     Appointments  past 12m or future 3m with PCP    Date Provider   Last Visit   6/4/2021 Jaclyn Becerril MD   Next Visit   8/22/2022 Jaclyn Becerril MD   ED visits in past 90 days: 0        Note composed:8:57 PM 04/26/2022

## 2022-05-01 ENCOUNTER — PATIENT MESSAGE (OUTPATIENT)
Dept: INTERNAL MEDICINE | Facility: CLINIC | Age: 67
End: 2022-05-01
Payer: MEDICARE

## 2022-05-05 ENCOUNTER — OFFICE VISIT (OUTPATIENT)
Dept: CARDIOLOGY | Facility: CLINIC | Age: 67
End: 2022-05-05
Payer: MEDICARE

## 2022-05-05 ENCOUNTER — HOSPITAL ENCOUNTER (OUTPATIENT)
Dept: CARDIOLOGY | Facility: HOSPITAL | Age: 67
Discharge: HOME | End: 2022-05-05
Attending: INTERNAL MEDICINE
Payer: MEDICARE

## 2022-05-05 VITALS
SYSTOLIC BLOOD PRESSURE: 130 MMHG | OXYGEN SATURATION: 99 % | WEIGHT: 255.94 LBS | DIASTOLIC BLOOD PRESSURE: 86 MMHG | BODY MASS INDEX: 45.35 KG/M2 | HEART RATE: 90 BPM | HEIGHT: 63 IN

## 2022-05-05 DIAGNOSIS — E66.01 MORBID OBESITY WITH BMI OF 40.0-44.9, ADULT: ICD-10-CM

## 2022-05-05 DIAGNOSIS — G47.33 OSA ON CPAP: ICD-10-CM

## 2022-05-05 DIAGNOSIS — K21.9 GASTROESOPHAGEAL REFLUX DISEASE, UNSPECIFIED WHETHER ESOPHAGITIS PRESENT: ICD-10-CM

## 2022-05-05 DIAGNOSIS — R06.09 DOE (DYSPNEA ON EXERTION): ICD-10-CM

## 2022-05-05 DIAGNOSIS — Z72.821 INADEQUATE SLEEP HYGIENE: ICD-10-CM

## 2022-05-05 DIAGNOSIS — R00.2 PALPITATIONS: ICD-10-CM

## 2022-05-05 DIAGNOSIS — I49.3 SYMPTOMATIC PVCS: ICD-10-CM

## 2022-05-05 DIAGNOSIS — I10 ESSENTIAL HYPERTENSION: ICD-10-CM

## 2022-05-05 DIAGNOSIS — Z98.84 HX OF GASTRIC BYPASS: ICD-10-CM

## 2022-05-05 DIAGNOSIS — R07.89 CHEST PRESSURE: ICD-10-CM

## 2022-05-05 DIAGNOSIS — R07.89 CHEST PRESSURE: Primary | ICD-10-CM

## 2022-05-05 DIAGNOSIS — D64.9 ANEMIA, UNSPECIFIED TYPE: ICD-10-CM

## 2022-05-05 DIAGNOSIS — I10 ESSENTIAL HYPERTENSION: Primary | ICD-10-CM

## 2022-05-05 PROCEDURE — 93005 ELECTROCARDIOGRAM TRACING: CPT

## 2022-05-05 PROCEDURE — 93010 ELECTROCARDIOGRAM REPORT: CPT | Mod: ,,, | Performed by: INTERNAL MEDICINE

## 2022-05-05 PROCEDURE — 99215 PR OFFICE/OUTPT VISIT, EST, LEVL V, 40-54 MIN: ICD-10-PCS | Mod: S$GLB,,, | Performed by: INTERNAL MEDICINE

## 2022-05-05 PROCEDURE — 1160F RVW MEDS BY RX/DR IN RCRD: CPT | Mod: CPTII,S$GLB,, | Performed by: INTERNAL MEDICINE

## 2022-05-05 PROCEDURE — 3079F PR MOST RECENT DIASTOLIC BLOOD PRESSURE 80-89 MM HG: ICD-10-PCS | Mod: CPTII,S$GLB,, | Performed by: INTERNAL MEDICINE

## 2022-05-05 PROCEDURE — 3008F PR BODY MASS INDEX (BMI) DOCUMENTED: ICD-10-PCS | Mod: CPTII,S$GLB,, | Performed by: INTERNAL MEDICINE

## 2022-05-05 PROCEDURE — 1126F PR PAIN SEVERITY QUANTIFIED, NO PAIN PRESENT: ICD-10-PCS | Mod: CPTII,S$GLB,, | Performed by: INTERNAL MEDICINE

## 2022-05-05 PROCEDURE — 3075F PR MOST RECENT SYSTOLIC BLOOD PRESS GE 130-139MM HG: ICD-10-PCS | Mod: CPTII,S$GLB,, | Performed by: INTERNAL MEDICINE

## 2022-05-05 PROCEDURE — 99999 PR PBB SHADOW E&M-EST. PATIENT-LVL V: ICD-10-PCS | Mod: PBBFAC,,, | Performed by: INTERNAL MEDICINE

## 2022-05-05 PROCEDURE — 93010 EKG 12-LEAD: ICD-10-PCS | Mod: ,,, | Performed by: INTERNAL MEDICINE

## 2022-05-05 PROCEDURE — 3079F DIAST BP 80-89 MM HG: CPT | Mod: CPTII,S$GLB,, | Performed by: INTERNAL MEDICINE

## 2022-05-05 PROCEDURE — 3075F SYST BP GE 130 - 139MM HG: CPT | Mod: CPTII,S$GLB,, | Performed by: INTERNAL MEDICINE

## 2022-05-05 PROCEDURE — 1159F MED LIST DOCD IN RCRD: CPT | Mod: CPTII,S$GLB,, | Performed by: INTERNAL MEDICINE

## 2022-05-05 PROCEDURE — 99215 OFFICE O/P EST HI 40 MIN: CPT | Mod: S$GLB,,, | Performed by: INTERNAL MEDICINE

## 2022-05-05 PROCEDURE — 1126F AMNT PAIN NOTED NONE PRSNT: CPT | Mod: CPTII,S$GLB,, | Performed by: INTERNAL MEDICINE

## 2022-05-05 PROCEDURE — 99999 PR PBB SHADOW E&M-EST. PATIENT-LVL V: CPT | Mod: PBBFAC,,, | Performed by: INTERNAL MEDICINE

## 2022-05-05 PROCEDURE — 1159F PR MEDICATION LIST DOCUMENTED IN MEDICAL RECORD: ICD-10-PCS | Mod: CPTII,S$GLB,, | Performed by: INTERNAL MEDICINE

## 2022-05-05 PROCEDURE — 3008F BODY MASS INDEX DOCD: CPT | Mod: CPTII,S$GLB,, | Performed by: INTERNAL MEDICINE

## 2022-05-05 PROCEDURE — 1160F PR REVIEW ALL MEDS BY PRESCRIBER/CLIN PHARMACIST DOCUMENTED: ICD-10-PCS | Mod: CPTII,S$GLB,, | Performed by: INTERNAL MEDICINE

## 2022-05-05 RX ORDER — METOPROLOL SUCCINATE 50 MG/1
50 TABLET, EXTENDED RELEASE ORAL DAILY
Qty: 90 TABLET | Refills: 3 | Status: SHIPPED | OUTPATIENT
Start: 2022-05-05 | End: 2023-02-22 | Stop reason: SDUPTHER

## 2022-05-05 RX ORDER — NITROGLYCERIN 0.4 MG/1
0.4 TABLET SUBLINGUAL EVERY 5 MIN PRN
Qty: 30 TABLET | Refills: 0 | Status: SHIPPED | OUTPATIENT
Start: 2022-05-05 | End: 2023-11-16 | Stop reason: SDUPTHER

## 2022-05-05 RX ORDER — GABAPENTIN 300 MG/1
600 CAPSULE ORAL NIGHTLY
Qty: 60 CAPSULE | Refills: 1 | Status: SHIPPED | OUTPATIENT
Start: 2022-05-05 | End: 2022-07-27 | Stop reason: SDUPTHER

## 2022-05-05 NOTE — PROGRESS NOTES
Subjective:   Patient ID:  Mariel Becerril is a 66 y.o. female who presents for cardiac consult of No chief complaint on file.      Palpitations   Associated symptoms include dizziness and shortness of breath (improved). Pertinent negatives include no chest pain.   Dizziness:    Associated symptoms: palpitations.no chest pain.  Hypertension  Associated symptoms include palpitations and shortness of breath (improved). Pertinent negatives include no chest pain.     The patient came in today for cardiac consult of No chief complaint on file.    Mariel Becerril is a 66 y.o. female with current medical conditions HTN, PVCs, obesity s/p gastric sleeve, GERD, OA, anemia, GIL on CPAP presents for follow CV evaluation.      8/20/18  Pt has been having SOB, AREVALO. Is always in pain, is exhausted on pain meds. She feels like her heart if affected and is tired after walking a few steps.  Pt also feels chest pressure. Pt feels chest pressure daily, just tries to rest and goes away. Chest pain is substernal without radiation. 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 Uses CPAP every night, but last eval was over 5 years.   Feels palpitations, rarely, was on digoxin then.    ECG - NSR, poor RWP    9/27/18  Pt had negative nuclear stress and 2D ECHO with normal Bi V function. GIL workup underway, recent visit with pulmonary. Has bad sinus infection, has been using Tessalon perles, saw ENT received steroid dose pack which has been improved. Still coughing a lot, using cough syrup and on Levaquin x 14 days. No chest pain but more congestion. Has been doing well with Lasix - taking it almost daily.     11/12/19  She will have knee surgery at [facility] with Dr. Moy Ramirez. She has been getting PT/OT and brace but no improvement in knee pain. She does have chronic fatigue/dyspnea but no CP. She does have palpitations feels fluttering feeling.     2/13/20  Freq PVCs noted on Holter, started metoprolol 25mg daily and monitor BP and HR,  increased BB due to palpitations but BP dropped now off norvasc as BP dropped. She will have knee surgery March 4th by Dr. Connolly. She also takes lasix daily now.     8/11/20  She had knee replacement in L knee in March but still has significant pain. She had weight gain since surgery and could not walk much, she weight 222 lbs before surgery. She had been to pain management had injections.     12/3/20  She still has L knee pain, possible hamstring injury. Used CBD cream as well. She saw Dr. Pak for back pain and had another injection for back/hips. She will see Dr. Longoria for weight loss surgery. She has occ mild chest pressure/pain concerned maybe due to weight gain.   ECG - NSR    3/11/21  She is s/p gastric sleeve 2 weeks ago, is on high protein soft food diet. She has been getting more tired lately. She is taking a lot of vitamins. She is taking a stool softener.     4/27/21  Holter this month with rare PVCs, PACs, AVG HR 77. BP and HR well controlled today. Weight 240 lbs. SHe has been feeling more drowsy. 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     7/29/21  She had to stop CPAP due to recall. She feels more tired and has more headaches. Weight is 238 trying to improve. BP low normal.   ECG - NSR, LAE    11/4/21  She will have lumbar surgery by Dr. Whelan at [facility]. ECG - NSR, LAE, anterior T wave - old. No CP/SOB.     5/5/22  BP and HR stable today. BMI 45 - 255 lbs. She did not have surgery yet - she is doing decompression therapy instead with Dr. Boyer, has ice therapy. She has more GERD sx. She also has more chest tightness taking extra tums.   ECG - NSR    Normal sinus rhythm   Possible Left atrial enlargement   T wave abnormality, consider anterior ischemia   Abnormal ECG   When compared with ECG of 29-JUL-2021 11:03,   No significant change was found     Patient feels no PND, no dizziness, no syncope, no CNS symptoms.    Patient is compliant with medications.    Family history includes  Arthritis in her mother; Depression in her sister; Heart disease in her father -  at age 81; Hypertension in her mother and sister.    HOLTER 2021  · Sinus rhythm with heart rates varying between 48 and 124 bpm with an average of 77 bpm.  · There were very rare PVCs totalling 12 and averaging 0.13 per hour.  · There were very rare PACs totalling 24 and averaging 0.25 per hour.      Prior Holter 2019  TEST DESCRIPTION   PREDOMINANT RHYTHM  1. Sinus rhythm with heart rates varying between 56 and 156 bpm with an average of 86 bpm.   VENTRICULAR ARRHYTHMIAS  1. There were frequent PVCs totalling 3780 and averaging 78 per hour.   The ventricular arrhythmias percentage was 1.6.   There were 1174 bigeminal cycles.  There were 9 triplets.   2. There were no episodes of ventricular tachycardia.    SUPRA VENTRICULAR ARRHYTHMIAS  1. There were very rare PACs recorded totalling 18 and averaging less than 1 per hour.   2. There were no episodes of sustained supraventricular tachycardia.        Nuclear Stress 18  Nuclear Quantitative Functional Analysis:   LVEF: >= 70 %    Impression: NORMAL MYOCARDIAL PERFUSION  1. The perfusion scan is free of evidence for myocardial ischemia or injury.   2. Resting wall motion is physiologic.   3. Resting LV function is normal.   4. The ventricular volumes are normal at rest and stress.   5. The extracardiac distribution of radioactivity is normal.     2D ECHO 18  CONCLUSIONS     1 - Concentric hypertrophy.     2 - No wall motion abnormalities.     3 - Normal left ventricular systolic function (EF 60-65%).     4 - Normal left ventricular diastolic function.     5 - Normal right ventricular systolic function .     6 - The estimated PA systolic pressure is 27 mmHg.     Past Medical History:   Diagnosis Date    COPD (chronic obstructive pulmonary disease)     NO HOME O2    Digestive disorder     Frequent headaches     Hypertension     Osteoarthritis 2019    Bilateral  knees, ankles and feet    Severe obesity (BMI >= 40)     Sleep apnea     CPAP       Past Surgical History:   Procedure Laterality Date    BLADDER SUSPENSION      CATARACT EXTRACTION, BILATERAL      COLONOSCOPY N/A 12/3/2018    Procedure: COLONOSCOPY;  Surgeon: Mari Rader MD;  Location: [facility] ENDO;  Service: Endoscopy;  Laterality: N/A;    ESOPHAGOGASTRODUODENOSCOPY N/A 12/31/2020    Procedure: ESOPHAGOGASTRODUODENOSCOPY (EGD);  Surgeon: Anthony Rodríguez MD;  Location: [facility] ENDO;  Service: General;  Laterality: N/A;    HYSTERECTOMY      partial 2000    INJECTION OF ANESTHETIC AGENT INTO SACROILIAC JOINT Bilateral 11/30/2020    Procedure: Bilateral SIJ + Bilateral Piriformis + Bilateral GT Bursa Injection;  Surgeon: Thanh Diaz MD;  Location: [facility] PAIN MGT;  Service: Pain Management;  Laterality: Bilateral;    INJECTION OF JOINT Bilateral 11/30/2020    Procedure: Bilateral SIJ + Bilateral Piriformis + Bilateral GT Bursa Injection;  Surgeon: Thanh Diaz MD;  Location: [facility] PAIN MGT;  Service: Pain Management;  Laterality: Bilateral;    INJECTION OF PIRIFORMIS MUSCLE Bilateral 11/30/2020    Procedure: Bilateral SIJ + Bilateral Piriformis + Bilateral GT Bursa Injection;  Surgeon: Thanh Diaz MD;  Location: [facility] PAIN MGT;  Service: Pain Management;  Laterality: Bilateral;    ROBOT-ASSISTED LAPAROSCOPIC SLEEVE GASTRECTOMY USING DA EZEQUIEL XI N/A 3/2/2021    Procedure: XI ROBOTIC SLEEVE GASTRECTOMY;  Surgeon: Anthony Rodríguez MD;  Location: [facility] OR;  Service: General;  Laterality: N/A;    SELECTIVE INJECTION OF ANESTHETIC AGENT AROUND LUMBAR SPINAL NERVE ROOT BY TRANSFORAMINAL APPROACH Bilateral 1/4/2021    Procedure: Bilateral L5/S1 TF GISELA;  Surgeon: Thanh Diaz MD;  Location: [facility] PAIN MGT;  Service: Pain Management;  Laterality: Bilateral;    SELECTIVE INJECTION OF ANESTHETIC AGENT AROUND LUMBAR SPINAL NERVE ROOT BY TRANSFORAMINAL APPROACH Bilateral 3/23/2021    Procedure: Bilateral L5/S1 TF  GISELA;  Surgeon: Thanh Diaz MD;  Location: [facility] PAIN MGT;  Service: Pain Management;  Laterality: Bilateral;    SELECTIVE INJECTION OF ANESTHETIC AGENT AROUND LUMBAR SPINAL NERVE ROOT BY TRANSFORAMINAL APPROACH Bilateral 10/5/2021    Procedure: Bilateral L5/S1 TF GISELA;  Surgeon: Thanh Diaz MD;  Location: [facility] PAIN MGT;  Service: Pain Management;  Laterality: Bilateral;    tonsilectomy      TOTAL KNEE ARTHROPLASTY Left 3/4/2020    Procedure: ARTHROPLASTY, KNEE, TOTAL;  Surgeon: Moy Connolly MD;  Location: [facility] OR;  Service: Orthopedics;  Laterality: Left;       Social History     Tobacco Use    Smoking status: Never Smoker    Smokeless tobacco: Never Used   Substance Use Topics    Alcohol use: Never    Drug use: No       Family History   Problem Relation Age of Onset    Heart attack Father        Patient's Medications   New Prescriptions    No medications on file   Previous Medications    ACETAMINOPHEN (TYLENOL) 325 MG TABLET    Take 2 tablets (650 mg total) by mouth every 8 (eight) hours as needed.    ALBUTEROL (PROAIR HFA) 90 MCG/ACTUATION INHALER    Inhale 2 puffs into the lungs every 6 (six) hours as needed for Wheezing. Rescue    ALPRAZOLAM (XANAX) 0.25 MG TABLET    Take 1 tablet by mouth twice daily as needed for anxiety    AMLODIPINE (NORVASC) 5 MG TABLET        ASPIRIN 325 MG TABLET    Take 325 mg by mouth once daily.    BENZONATATE (TESSALON) 100 MG CAPSULE    Take 1 capsule by mouth three times daily as needed    DICLOFENAC SODIUM (VOLTAREN) 1 % GEL    Apply 2 g topically 3 (three) times daily as needed.    EFLORNITHINE (VANIQA) 13.9 % CREAM    Apply topically 2 (two) times daily.    ERGOCALCIFEROL, VITAMIN D2, (VITAMIN D ORAL)    Take 2,000 Units by mouth once daily.    FUROSEMIDE (LASIX) 40 MG TABLET    Take 1 tablet (40 mg total) by mouth daily as needed (edema, swelling due to fluid).    GABAPENTIN (NEURONTIN) 300 MG CAPSULE    Take 2 capsules (600 mg total) by mouth every  evening.    IMIPRAMINE (TOFRANIL) 10 MG TAB    Take 1 tablet (10 mg total) by mouth every evening.    LEVOCETIRIZINE (XYZAL) 5 MG TABLET    TAKE 1 TABLET BY MOUTH ONCE DAILY IN THE EVENING    LIDOCAINE-PRILOCAINE (EMLA) CREAM    APPLY  CREAM TOPICALLY UP TO 4 TIMES DAILY AS NEEDED FOR PAIN    MECLIZINE (ANTIVERT) 25 MG TABLET    Take 1 tablet (25 mg total) by mouth 3 (three) times daily as needed for Dizziness.    MELOXICAM (MOBIC) 15 MG TABLET    Take 1 tablet (15 mg total) by mouth once daily.    METHOCARBAMOL (ROBAXIN) 750 MG TAB    Take 1 tablet by mouth three times daily as needed    METOPROLOL SUCCINATE (TOPROL-XL) 50 MG 24 HR TABLET    Take 1 tablet (50 mg total) by mouth once daily.    MONTELUKAST (SINGULAIR) 10 MG TABLET    Take 1 tablet (10 mg total) by mouth every evening.    MUPIROCIN (BACTROBAN) 2 % OINTMENT    APPLY  OINTMENT TOPICALLY THREE TIMES DAILY    NYSTATIN (MYCOSTATIN) CREAM    APPLY  CREAM TOPICALLY TO AFFECTED AREA TWICE DAILY    NYSTATIN (MYCOSTATIN) POWDER    Apply topically 2 (two) times daily.    OMEPRAZOLE (PRILOSEC) 40 MG CAPSULE    Take 1 capsule by mouth once daily    ONDANSETRON (ZOFRAN) 8 MG TABLET    Take 1 tablet (8 mg total) by mouth every 8 (eight) hours as needed for Nausea.    POTASSIUM CHLORIDE SA (K-DUR,KLOR-CON) 20 MEQ TABLET    Take 1 tablet (20 mEq total) by mouth daily as needed (if taking lasix).    SF 5000 PLUS 1.1 % CREA    BRUSH FOR 2 MINUTES AT BEDTIME THEN EXPECTORATE THE EXCESS.(NOTHING TO EAT OR DRINK FOR 30 MINUTES AFTER USE )    TOPIRAMATE (TOPAMAX) 50 MG TABLET    Take 1 tablet by mouth twice daily   Modified Medications    No medications on file   Discontinued Medications    No medications on file       Review of Systems   Constitutional: Negative.    HENT: Negative.    Eyes: Negative.    Respiratory: Positive for shortness of breath (improved).    Cardiovascular: Positive for palpitations. Negative for chest pain and leg swelling.   Gastrointestinal:  "Positive for heartburn.   Genitourinary: Negative.    Musculoskeletal: Positive for joint pain.   Skin: Negative.    Neurological: Positive for dizziness.   Endo/Heme/Allergies: Negative.    Psychiatric/Behavioral: Negative.    All 12 systems otherwise negative.      Wt Readings from Last 3 Encounters:   05/05/22 116.1 kg (255 lb 15.3 oz)   04/25/22 112.2 kg (247 lb 5.7 oz)   04/18/22 110.7 kg (244 lb)     Temp Readings from Last 3 Encounters:   04/25/22 97.9 °F (36.6 °C) (Temporal)   04/11/22 97.7 °F (36.5 °C) (Temporal)   02/22/22 97.9 °F (36.6 °C) (Temporal)     BP Readings from Last 3 Encounters:   05/05/22 130/86   04/25/22 122/74   04/11/22 124/72     Pulse Readings from Last 3 Encounters:   05/05/22 90   04/25/22 72   04/11/22 75       /86   Pulse 90   Ht 5' 3" (1.6 m)   Wt 116.1 kg (255 lb 15.3 oz)   SpO2 99%   BMI 45.34 kg/m²     Objective:   Physical Exam  Vitals and nursing note reviewed.   Constitutional:       General: She is not in acute distress.     Appearance: She is well-developed. She is not diaphoretic.   HENT:      Head: Normocephalic and atraumatic.      Nose: Nose normal.   Eyes:      General: No scleral icterus.     Conjunctiva/sclera: Conjunctivae normal.   Neck:      Thyroid: No thyromegaly.      Vascular: No JVD.   Cardiovascular:      Rate and Rhythm: Normal rate and regular rhythm.      Heart sounds: S1 normal and S2 normal. Murmur heard.     No friction rub. No gallop. No S3 or S4 sounds.   Pulmonary:      Effort: Pulmonary effort is normal. No respiratory distress.      Breath sounds: Normal breath sounds. No stridor. No wheezing or rales.   Chest:      Chest wall: No tenderness.   Abdominal:      General: Bowel sounds are normal. There is no distension.      Palpations: Abdomen is soft. There is no mass.      Tenderness: There is no abdominal tenderness. There is no rebound.   Genitourinary:     Comments: Deferred  Musculoskeletal:         General: No tenderness or " deformity. Normal range of motion.      Cervical back: Normal range of motion and neck supple.   Lymphadenopathy:      Cervical: No cervical adenopathy.   Skin:     General: Skin is warm and dry.      Coloration: Skin is not pale.      Findings: No erythema or rash.   Neurological:      Mental Status: She is alert and oriented to person, place, and time.      Motor: No abnormal muscle tone.      Coordination: Coordination normal.   Psychiatric:         Behavior: Behavior normal.         Thought Content: Thought content normal.         Judgment: Judgment normal.         Lab Results   Component Value Date     10/27/2021    K 4.3 10/27/2021     10/27/2021    CO2 25 10/27/2021    BUN 33 (H) 10/27/2021    CREATININE 1.0 10/27/2021     10/27/2021    HGBA1C 5.2 06/21/2021    MG 2.1 03/11/2021    AST 31 10/27/2021    ALT 29 10/27/2021    ALBUMIN 3.7 10/27/2021    PROT 7.1 10/27/2021    BILITOT 0.4 10/27/2021    WBC 6.75 10/27/2021    HGB 13.3 10/27/2021    HCT 42.5 10/27/2021    MCV 96 10/27/2021     10/27/2021    INR 1.0 10/27/2021    TSH 0.599 06/21/2021    CHOL 124 09/14/2018    HDL 48 09/14/2018    LDLCALC 64.0 09/14/2018    TRIG 60 09/14/2018    BNP <10 08/20/2018     Assessment:      1. Essential hypertension    2. GIL on CPAP    3. Chest pressure    4. Palpitations    5. Symptomatic PVCs    6. AREVALO (dyspnea on exertion)    7. Morbid obesity with BMI of 40.0-44.9, adult    8. Hx of gastric bypass    9. Anemia, unspecified type        Plan:   1. Chest pressure/AREVALO - restarted   - order pharm nuclear stress, pt cannot walk on treadmill  - order ECHO   - give NTG    2. AREVALO with edema sec to CVI  - improved with lasix 40 mg  - rec compression stockings    3. HTN - low normal  - cont meds  - stop Norvasc  - low salt diet    4. Obesity s/p gastric sleeve 2/2021  - rec weight loss with diet/exercise  - f/u GI as well - proceed for EGD if needed     5. GIL  - cont CPAP  - appreciate pulm recs    6.  Palpitations sec to PVCS  - cont BB  - Holter 4/2021 rare PVCs, PACs, AVG HR 77    7. Anemia  - f/u with heme/onc    8. Pre-OP CV evaluation prior to lumbar surgery by Dr. Whelan at [facility] - deferred   Low periop risk of CV events for moderate risk procedure.  No chest pain, active arrhythmia and CHF symptoms.  Ok to proceed to the scheduled surgery without further cardiac study.  OK to hold Aspirin 7 days before the procedure and resume ASAP postop.  Continue Metoprolol and Statin periop.    Thank you for allowing me to participate in this patient's care. Please do not hesitate to contact me with any questions or concerns. Consult note has been forwarded to the referral physician.     Juan Alberto Luis MD, [facility]  Cardiovascular Disease  [facility], [place]  [phone] (P)

## 2022-05-11 ENCOUNTER — PATIENT MESSAGE (OUTPATIENT)
Dept: INTERNAL MEDICINE | Facility: CLINIC | Age: 67
End: 2022-05-11
Payer: MEDICARE

## 2022-05-18 ENCOUNTER — OFFICE VISIT (OUTPATIENT)
Dept: PULMONOLOGY | Facility: CLINIC | Age: 67
End: 2022-05-18
Payer: MEDICARE

## 2022-05-18 ENCOUNTER — HOSPITAL ENCOUNTER (OUTPATIENT)
Dept: CARDIOLOGY | Facility: HOSPITAL | Age: 67
Discharge: HOME | End: 2022-05-18
Attending: INTERNAL MEDICINE
Payer: MEDICARE

## 2022-05-18 ENCOUNTER — HOSPITAL ENCOUNTER (OUTPATIENT)
Dept: RADIOLOGY | Facility: HOSPITAL | Age: 67
Discharge: HOME | End: 2022-05-18
Attending: INTERNAL MEDICINE
Payer: MEDICARE

## 2022-05-18 VITALS
SYSTOLIC BLOOD PRESSURE: 146 MMHG | HEART RATE: 69 BPM | HEIGHT: 63 IN | RESPIRATION RATE: 16 BRPM | DIASTOLIC BLOOD PRESSURE: 84 MMHG | BODY MASS INDEX: 45.19 KG/M2 | WEIGHT: 255.06 LBS | OXYGEN SATURATION: 98 %

## 2022-05-18 VITALS — BODY MASS INDEX: 45.18 KG/M2 | HEIGHT: 63 IN | WEIGHT: 255 LBS

## 2022-05-18 DIAGNOSIS — R07.89 CHEST PRESSURE: ICD-10-CM

## 2022-05-18 DIAGNOSIS — I10 ESSENTIAL HYPERTENSION: ICD-10-CM

## 2022-05-18 DIAGNOSIS — I49.3 SYMPTOMATIC PVCS: ICD-10-CM

## 2022-05-18 DIAGNOSIS — Z98.84 HX OF GASTRIC BYPASS: ICD-10-CM

## 2022-05-18 DIAGNOSIS — R06.09 DOE (DYSPNEA ON EXERTION): ICD-10-CM

## 2022-05-18 DIAGNOSIS — E66.01 MORBID OBESITY WITH BMI OF 40.0-44.9, ADULT: ICD-10-CM

## 2022-05-18 DIAGNOSIS — R00.2 PALPITATIONS: ICD-10-CM

## 2022-05-18 DIAGNOSIS — D64.9 ANEMIA, UNSPECIFIED TYPE: ICD-10-CM

## 2022-05-18 DIAGNOSIS — G47.33 OSA ON CPAP: ICD-10-CM

## 2022-05-18 DIAGNOSIS — R40.0 DAYTIME SOMNOLENCE: ICD-10-CM

## 2022-05-18 DIAGNOSIS — G47.33 OSA ON CPAP: Primary | ICD-10-CM

## 2022-05-18 DIAGNOSIS — R45.89 DEPRESSED MOOD: ICD-10-CM

## 2022-05-18 DIAGNOSIS — G47.10 HYPERSOMNIA, UNSPECIFIED: ICD-10-CM

## 2022-05-18 LAB
AV INDEX (PROSTH): 0.81
AV MEAN GRADIENT: 4 MMHG
AV PEAK GRADIENT: 8 MMHG
AV VALVE AREA: 2.6 CM2
AV VELOCITY RATIO: 0.87
BSA FOR ECHO PROCEDURE: 2.27 M2
CV ECHO LV RWT: 0.61 CM
DOP CALC AO PEAK VEL: 1.38 M/S
DOP CALC AO VTI: 34.7 CM
DOP CALC LVOT AREA: 3.2 CM2
DOP CALC LVOT DIAMETER: 2.02 CM
DOP CALC LVOT PEAK VEL: 1.2 M/S
DOP CALC LVOT STROKE VOLUME: 90.33 CM3
DOP CALC RVOT PEAK VEL: 0.6 M/S
DOP CALC RVOT VTI: 14.9 CM
DOP CALCLVOT PEAK VEL VTI: 28.2 CM
E WAVE DECELERATION TIME: 236.49 MSEC
E/A RATIO: 0.86
E/E' RATIO: 13.47 M/S
ECHO EF ESTIMATED: 65 %
ECHO LV POSTERIOR WALL: 1.3 CM (ref 0.6–1.1)
EJECTION FRACTION: 65 %
FRACTIONAL SHORTENING: 33 % (ref 28–44)
INTERVENTRICULAR SEPTUM: 1.33 CM (ref 0.6–1.1)
IVRT: 95.15 MSEC
LA MAJOR: 4.98 CM
LA MINOR: 5.19 CM
LA WIDTH: 3.8 CM
LEFT ATRIUM SIZE: 4.53 CM
LEFT ATRIUM VOLUME INDEX MOD: 24.8 ML/M2
LEFT ATRIUM VOLUME INDEX: 34.8 ML/M2
LEFT ATRIUM VOLUME MOD: 53.04 CM3
LEFT ATRIUM VOLUME: 74.37 CM3
LEFT INTERNAL DIMENSION IN SYSTOLE: 2.87 CM (ref 2.1–4)
LEFT VENTRICLE DIASTOLIC VOLUME INDEX: 38.31 ML/M2
LEFT VENTRICLE DIASTOLIC VOLUME: 81.98 ML
LEFT VENTRICLE MASS INDEX: 98 G/M2
LEFT VENTRICLE SYSTOLIC VOLUME INDEX: 13.4 ML/M2
LEFT VENTRICLE SYSTOLIC VOLUME: 28.73 ML
LEFT VENTRICULAR INTERNAL DIMENSION IN DIASTOLE: 4.28 CM (ref 3.5–6)
LEFT VENTRICULAR MASS: 209.88 G
LV LATERAL E/E' RATIO: 12.63 M/S
LV SEPTAL E/E' RATIO: 14.43 M/S
LVOT MG: 3 MMHG
LVOT MV: 0.81 CM/S
MV PEAK A VEL: 1.18 M/S
MV PEAK E VEL: 1.01 M/S
PISA TR MAX VEL: 2.29 M/S
PULM VEIN S/D RATIO: 1.86
PV MEAN GRADIENT: 0.82 MMHG
PV PEAK D VEL: 0.34 M/S
PV PEAK S VEL: 0.63 M/S
PV PEAK VELOCITY: 0.82 CM/S
RA MAJOR: 4.89 CM
RA PRESSURE: 8 MMHG
RA WIDTH: 3.8 CM
RIGHT VENTRICULAR END-DIASTOLIC DIMENSION: 3.17 CM
SINUS: 3.05 CM
STJ: 2.73 CM
TDI LATERAL: 0.08 M/S
TDI SEPTAL: 0.07 M/S
TDI: 0.08 M/S
TR MAX PG: 21 MMHG
TRICUSPID ANNULAR PLANE SYSTOLIC EXCURSION: 2.34 CM
TV REST PULMONARY ARTERY PRESSURE: 29 MMHG

## 2022-05-18 PROCEDURE — 93018 CV STRESS TEST I&R ONLY: CPT | Mod: ,,, | Performed by: INTERNAL MEDICINE

## 2022-05-18 PROCEDURE — 78452 STRESS TEST WITH MYOCARDIAL PERFUSION (CUPID ONLY): ICD-10-PCS | Mod: 26,,, | Performed by: INTERNAL MEDICINE

## 2022-05-18 PROCEDURE — 1160F PR REVIEW ALL MEDS BY PRESCRIBER/CLIN PHARMACIST DOCUMENTED: ICD-10-PCS | Mod: CPTII,S$GLB,, | Performed by: PHYSICIAN ASSISTANT

## 2022-05-18 PROCEDURE — 3008F PR BODY MASS INDEX (BMI) DOCUMENTED: ICD-10-PCS | Mod: CPTII,S$GLB,, | Performed by: PHYSICIAN ASSISTANT

## 2022-05-18 PROCEDURE — 1101F PT FALLS ASSESS-DOCD LE1/YR: CPT | Mod: CPTII,S$GLB,, | Performed by: PHYSICIAN ASSISTANT

## 2022-05-18 PROCEDURE — 93306 TTE W/DOPPLER COMPLETE: CPT

## 2022-05-18 PROCEDURE — 93018 STRESS TEST WITH MYOCARDIAL PERFUSION (CUPID ONLY): ICD-10-PCS | Mod: ,,, | Performed by: INTERNAL MEDICINE

## 2022-05-18 PROCEDURE — 3288F PR FALLS RISK ASSESSMENT DOCUMENTED: ICD-10-PCS | Mod: CPTII,S$GLB,, | Performed by: PHYSICIAN ASSISTANT

## 2022-05-18 PROCEDURE — 63600175 PHARM REV CODE 636 W HCPCS: Performed by: INTERNAL MEDICINE

## 2022-05-18 PROCEDURE — 93016 CV STRESS TEST SUPVJ ONLY: CPT | Mod: ,,, | Performed by: INTERNAL MEDICINE

## 2022-05-18 PROCEDURE — 3077F SYST BP >= 140 MM HG: CPT | Mod: CPTII,S$GLB,, | Performed by: PHYSICIAN ASSISTANT

## 2022-05-18 PROCEDURE — 99214 OFFICE O/P EST MOD 30 MIN: CPT | Mod: S$GLB,,, | Performed by: PHYSICIAN ASSISTANT

## 2022-05-18 PROCEDURE — 3288F FALL RISK ASSESSMENT DOCD: CPT | Mod: CPTII,S$GLB,, | Performed by: PHYSICIAN ASSISTANT

## 2022-05-18 PROCEDURE — 93306 TTE W/DOPPLER COMPLETE: CPT | Mod: 26,,, | Performed by: INTERNAL MEDICINE

## 2022-05-18 PROCEDURE — 99999 PR PBB SHADOW E&M-EST. PATIENT-LVL V: ICD-10-PCS | Mod: PBBFAC,,, | Performed by: PHYSICIAN ASSISTANT

## 2022-05-18 PROCEDURE — 99214 PR OFFICE/OUTPT VISIT, EST, LEVL IV, 30-39 MIN: ICD-10-PCS | Mod: S$GLB,,, | Performed by: PHYSICIAN ASSISTANT

## 2022-05-18 PROCEDURE — A9502 TC99M TETROFOSMIN: HCPCS

## 2022-05-18 PROCEDURE — 3008F BODY MASS INDEX DOCD: CPT | Mod: CPTII,S$GLB,, | Performed by: PHYSICIAN ASSISTANT

## 2022-05-18 PROCEDURE — 3079F DIAST BP 80-89 MM HG: CPT | Mod: CPTII,S$GLB,, | Performed by: PHYSICIAN ASSISTANT

## 2022-05-18 PROCEDURE — 93306 ECHO (CUPID ONLY): ICD-10-PCS | Mod: 26,,, | Performed by: INTERNAL MEDICINE

## 2022-05-18 PROCEDURE — 93016 STRESS TEST WITH MYOCARDIAL PERFUSION (CUPID ONLY): ICD-10-PCS | Mod: ,,, | Performed by: INTERNAL MEDICINE

## 2022-05-18 PROCEDURE — 1160F RVW MEDS BY RX/DR IN RCRD: CPT | Mod: CPTII,S$GLB,, | Performed by: PHYSICIAN ASSISTANT

## 2022-05-18 PROCEDURE — 3079F PR MOST RECENT DIASTOLIC BLOOD PRESSURE 80-89 MM HG: ICD-10-PCS | Mod: CPTII,S$GLB,, | Performed by: PHYSICIAN ASSISTANT

## 2022-05-18 PROCEDURE — 3077F PR MOST RECENT SYSTOLIC BLOOD PRESSURE >= 140 MM HG: ICD-10-PCS | Mod: CPTII,S$GLB,, | Performed by: PHYSICIAN ASSISTANT

## 2022-05-18 PROCEDURE — 78452 HT MUSCLE IMAGE SPECT MULT: CPT | Mod: 26,,, | Performed by: INTERNAL MEDICINE

## 2022-05-18 PROCEDURE — 1159F PR MEDICATION LIST DOCUMENTED IN MEDICAL RECORD: ICD-10-PCS | Mod: CPTII,S$GLB,, | Performed by: PHYSICIAN ASSISTANT

## 2022-05-18 PROCEDURE — 1159F MED LIST DOCD IN RCRD: CPT | Mod: CPTII,S$GLB,, | Performed by: PHYSICIAN ASSISTANT

## 2022-05-18 PROCEDURE — 99999 PR PBB SHADOW E&M-EST. PATIENT-LVL V: CPT | Mod: PBBFAC,,, | Performed by: PHYSICIAN ASSISTANT

## 2022-05-18 PROCEDURE — 1101F PR PT FALLS ASSESS DOC 0-1 FALLS W/OUT INJ PAST YR: ICD-10-PCS | Mod: CPTII,S$GLB,, | Performed by: PHYSICIAN ASSISTANT

## 2022-05-18 PROCEDURE — 93017 CV STRESS TEST TRACING ONLY: CPT

## 2022-05-18 RX ORDER — AMINOPHYLLINE 25 MG/ML
75 INJECTION, SOLUTION INTRAVENOUS ONCE
Status: COMPLETED | OUTPATIENT
Start: 2022-05-18 | End: 2022-05-18

## 2022-05-18 RX ORDER — REGADENOSON 0.08 MG/ML
0.4 INJECTION, SOLUTION INTRAVENOUS ONCE
Status: COMPLETED | OUTPATIENT
Start: 2022-05-18 | End: 2022-05-18

## 2022-05-18 RX ADMIN — REGADENOSON 0.4 MG: 0.08 INJECTION, SOLUTION INTRAVENOUS at 12:05

## 2022-05-18 RX ADMIN — AMINOPHYLLINE 75 MG: 25 INJECTION, SOLUTION INTRAVENOUS at 12:05

## 2022-05-18 NOTE — ASSESSMENT & PLAN NOTE
Continue CPAP 5-20  Compliant with use, AHI 1.5 with use  Benefits from use  Discussed therapeutic goals for CPAP: Ideal usage 100% of nights for 6-8 hours per night. Minimum usage is 70% of night for at least 4 hours per night.

## 2022-05-18 NOTE — PROGRESS NOTES
"Subjective:       Patient ID: Mariel Becerril is a 66 y.o. female.    Chief Complaint: GIL, fatigue    5/18/22  67yo female here for follow up of GIL on CPAP  100% compliance and benefits from use  Sleeps more soundly  Still has daytime fatigue  In bed at 11pm, wakes at 7am, no trouble falling asleep  Sometimes seeing things that are not there when waking up    EPWORTH SLEEPINESS SCALE 5/18/2022   Sitting and reading 2   Watching TV 2   Sitting, inactive in a public place (e.g. a theatre or a meeting) 2   As a passenger in a car for an hour without a break 2   Lying down to rest in the afternoon when circumstances permit 3   Sitting and talking to someone 1   Sitting quietly after a lunch without alcohol 2   In a car, while stopped for a few minutes in traffic 0   Total score 14       2/18/22  67yo female here for follow up of GIL on CPAP  Using AutoPAP 5-20, compliant with CPAP, benefits from use  Patient states improved symptoms with use of CPAP. Sleeping more soundly. Waking up feeling more refreshed. Less headaches with CPAP.  Reports improved daytime sleepiness although [place] scoring high today. She mentions severe back pain contributing to her daytime fatigue, has decreased energy and some depressed mood, has not seen psychiatry. She says she is currently receiving back decompression therapy for her pain and feels hopeful that this will help with her pain and her fatigue/low energy  She definitely wants to keep using CPAP though and feels it is beneficial  When asked about her fatigue while driving she says when she feels tried while driving she pulls over into a parking lot and will take a 20 minute nap  Denies cataplexy, leg weakness, "sleep attacks"    EPWORTH SLEEPINESS SCALE 2/18/2022   Sitting and reading 2   Watching TV 2   Sitting, inactive in a public place (e.g. a theatre or a meeting) 2   As a passenger in a car for an hour without a break 3   Lying down to rest in the afternoon when " circumstances permit 3   Sitting and talking to someone 1   Sitting quietly after a lunch without alcohol 2   In a car, while stopped for a few minutes in traffic 1   Total score 16     Last visit with Dr. Woods 10/28/21:  64-year-old female patient known with mild GIL presenting for 1 year follow-up.    Underwent uneventful left knee surgery with [facility] with Dr. Connolly.   Underwent weight loss surgery with Dr. Rodríguez lost about 20 lb so far.  Recently received [employer] [place] 2 replacement CPAP machine.  Compliant with CPAP therapy.  Burke Sleepiness Scale score 6.   Overnight oximetry on CPAP shows transient hypoxemia no need for O2.  Not needing albuterol.  Initially evaluated in 2018 for establishing care and treatment of GIL.    Immunization History   Administered Date(s) Administered    COVID-19, MRNA, LN-S, PF (MODERNA FULL 0.5 ML DOSE) 02/04/2021, 03/04/2021, 01/12/2022    Influenza - Quadrivalent - PF *Preferred* (6 months and older) 10/01/2014, 11/15/2018, 10/30/2019, 11/09/2020    Pneumococcal Polysaccharide - 23 Valent 02/22/2022    Tdap 12/17/2020      Tobacco Use: Low Risk     Smoking Tobacco Use: Never Smoker    Smokeless Tobacco Use: Never Used      Past Medical History:   Diagnosis Date    COPD (chronic obstructive pulmonary disease)     NO HOME O2    Digestive disorder     Frequent headaches     Hypertension     Osteoarthritis 04/02/2019    Bilateral knees, ankles and feet    Severe obesity (BMI >= 40)     Sleep apnea     CPAP      Current Outpatient Medications on File Prior to Visit   Medication Sig Dispense Refill    acetaminophen (TYLENOL) 325 MG tablet Take 2 tablets (650 mg total) by mouth every 8 (eight) hours as needed. 60 tablet 2    ALPRAZolam (XANAX) 0.25 MG tablet Take 1 tablet by mouth twice daily as needed for anxiety 20 tablet 0    amLODIPine (NORVASC) 5 MG tablet       aspirin 325 MG tablet Take 325 mg by mouth once daily.      benzonatate (TESSALON) 100 MG  capsule Take 1 capsule by mouth three times daily as needed 30 capsule 0    diclofenac sodium (VOLTAREN) 1 % Gel Apply 2 g topically 3 (three) times daily as needed. 200 g 2    eflornithine (VANIQA) 13.9 % cream Apply topically 2 (two) times daily. 45 g 3    ergocalciferol, vitamin D2, (VITAMIN D ORAL) Take 2,000 Units by mouth once daily.      furosemide (LASIX) 40 MG tablet Take 1 tablet (40 mg total) by mouth daily as needed (edema, swelling due to fluid). 90 tablet 3    gabapentin (NEURONTIN) 300 MG capsule Take 2 capsules (600 mg total) by mouth every evening. 60 capsule 1    levocetirizine (XYZAL) 5 MG tablet TAKE 1 TABLET BY MOUTH ONCE DAILY IN THE EVENING 90 tablet 0    LIDOcaine-prilocaine (EMLA) cream APPLY  CREAM TOPICALLY UP TO 4 TIMES DAILY AS NEEDED FOR PAIN (Patient taking differently: APPLY  CREAM TOPICALLY UP TO 4 TIMES DAILY AS NEEDED FOR PAIN) 30 g 1    meclizine (ANTIVERT) 25 mg tablet Take 1 tablet (25 mg total) by mouth 3 (three) times daily as needed for Dizziness. 30 tablet 0    meloxicam (MOBIC) 15 MG tablet Take 1 tablet (15 mg total) by mouth once daily. 90 tablet 3    methocarbamoL (ROBAXIN) 750 MG Tab Take 1 tablet by mouth three times daily as needed 90 tablet 0    metoprolol succinate (TOPROL-XL) 50 MG 24 hr tablet Take 1 tablet (50 mg total) by mouth once daily. 90 tablet 3    montelukast (SINGULAIR) 10 mg tablet Take 1 tablet (10 mg total) by mouth every evening. 30 tablet 11    mupirocin (BACTROBAN) 2 % ointment Apply topically 2 (two) times daily. 22 g 0    nitroGLYCERIN (NITROSTAT) 0.4 MG SL tablet Place 1 tablet (0.4 mg total) under the tongue every 5 (five) minutes as needed for Chest pain. 30 tablet 0    nystatin (MYCOSTATIN) cream APPLY  CREAM TOPICALLY TO AFFECTED AREA TWICE DAILY 30 g 0    nystatin (MYCOSTATIN) powder Apply topically 2 (two) times daily. 120 g 1    omeprazole (PRILOSEC) 40 MG capsule Take 1 capsule by mouth once daily 30 capsule 0     ondansetron (ZOFRAN) 8 MG tablet Take 1 tablet (8 mg total) by mouth every 8 (eight) hours as needed for Nausea. 20 tablet 0    potassium chloride SA (K-DUR,KLOR-CON) 20 MEQ tablet Take 1 tablet (20 mEq total) by mouth daily as needed (if taking lasix). 60 tablet 2    SF 5000 PLUS 1.1 % Crea BRUSH FOR 2 MINUTES AT BEDTIME THEN EXPECTORATE THE EXCESS.(NOTHING TO EAT OR DRINK FOR 30 MINUTES AFTER USE )      topiramate (TOPAMAX) 50 MG tablet Take 1 tablet by mouth twice daily 180 tablet 0    albuterol (PROAIR HFA) 90 mcg/actuation inhaler Inhale 2 puffs into the lungs every 6 (six) hours as needed for Wheezing. Rescue 18 g 0    imipramine (TOFRANIL) 10 MG Tab Take 1 tablet (10 mg total) by mouth every evening. (Patient not taking: No sig reported) 90 tablet 1     Current Facility-Administered Medications on File Prior to Visit   Medication Dose Route Frequency Provider Last Rate Last Admin    [COMPLETED] aminophylline injection 75 mg  75 mg Intravenous Once Juan Alberto Luis MD   75 mg at 05/18/22 1255    [COMPLETED] regadenoson injection 0.4 mg  0.4 mg Intravenous Once Juan Alberto Luis MD   0.4 mg at 05/18/22 1250        Review of Systems   Constitutional: Positive for fatigue. Negative for fever, weight loss, appetite change and weakness.   HENT: Negative for postnasal drip, rhinorrhea, sinus pressure, trouble swallowing and congestion.    Respiratory: Positive for somnolence. Negative for cough, sputum production, choking, chest tightness, shortness of breath, wheezing and dyspnea on extertion.    Cardiovascular: Negative for chest pain and leg swelling.   Musculoskeletal: Positive for arthralgias, back pain and gait problem. Negative for joint swelling.   Gastrointestinal: Negative for nausea, vomiting and abdominal pain.   Neurological: Negative for dizziness, weakness and headaches.   All other systems reviewed and are negative.      Objective:       Vitals:    05/18/22 1515   BP: (!) 146/84   Pulse: 69   Resp: 16  "  SpO2: 98%   Weight: 115.7 kg (255 lb 1.2 oz)   Height: 5' 3" (1.6 m)       Physical Exam   Constitutional: She is oriented to person, place, and time. She appears well-developed and well-nourished. No distress. She is obese.   HENT:   Head: Normocephalic.   Mouth/Throat: Oropharynx is clear and moist.   Cardiovascular: Normal rate and regular rhythm.   Pulmonary/Chest: Effort normal and breath sounds normal. No respiratory distress. She has no wheezes. She has no rhonchi. She has no rales.   Musculoskeletal:         General: No edema.      Cervical back: Normal range of motion and neck supple.   Lymphadenopathy: No supraclavicular adenopathy is present.     She has no cervical adenopathy.   Neurological: She is alert and oriented to person, place, and time. Gait normal.   Skin: Skin is warm and dry.   Psychiatric: Her affect is blunt. She exhibits a depressed mood. She expresses no homicidal and no suicidal ideation.   Vitals reviewed.    Personal Diagnostic Review    Echo  · The left ventricle is normal in size with mild concentric hypertrophy   and normal systolic function.  · Mild left atrial enlargement.  · The estimated ejection fraction is 65%.  · Indeterminate left ventricular diastolic function.  · Normal right ventricular size with normal right ventricular systolic   function.  · Mild mitral regurgitation.  · Mild tricuspid regurgitation.  · Intermediate central venous pressure (8 mmHg).  · The estimated PA systolic pressure is 29 mmHg.       Compliance Summary  4/18/2022 - 5/17/2022 (30 days)  Days with Device Usage 30 days  Days without Device Usage 0 days  Percent Days with Device Usage 100.0%  Cumulative Usage 6 days 8 hrs. 17 mins. 41 secs.  Maximum Usage (1 Day) 7 hrs. 12 mins. 11 secs.  Average Usage (All Days) 5 hrs. 4 mins. 35 secs.  Average Usage (Days Used) 5 hrs. 4 mins. 35 secs.  Minimum Usage (1 Day) 1 hrs. 29 mins. 28 secs.  Percent of Days with Usage >= 4 Hours 83.3%  Percent of Days with " "Usage < 4 Hours 16.7%  Date Range  Total Blower Time 6 days 8 hrs. 17 mins. 46 secs.  Average AHI 1.5  Auto-CPAP Summary  Auto-CPAP Mean Pressure 7.5 cmH2O  Auto-CPAP Peak Average Pressure 10.2 cmH2O  Device Pressure <= 90% of Time 10.7 cmH2O  Average Time in Large Leak Per Day 18 secs.        Assessment/Plan:       Problem List Items Addressed This Visit        Psychiatric    Depressed mood     Denies SI or HI  psychiatry recommended, referral placed  Patient says she has been told by multiple doctors she needs to see psychiatry, she says today that she agrees and will go             Relevant Orders    Ambulatory referral/consult to Psychiatry       Other    GIL on CPAP - Primary     Continue CPAP 5-20  Compliant with use, AHI 1.5 with use  Benefits from use  Discussed therapeutic goals for CPAP: Ideal usage 100% of nights for 6-8 hours per night. Minimum usage is 70% of night for at least 4 hours per night.             Hypersomnia, unspecified      May be from depression  Will get PSG/MSLT, instructed to bring CPAP to wear during PSG           Relevant Orders    Polysomnography with MSLT      Other Visit Diagnoses     Daytime somnolence            Patient was educated about the symptoms and signs of drowsy driving and about effective countermeasures. Symptoms of drowsy driving include difficulty focusing, frequent blinking, heavy eyelids, daydreaming, wandering/disconnected thoughts, difficulty remembering the last few miles driven (sometimes called "automatic behavior") or missing exits and street signs, frequent yawning, rubbing eyes, difficulty keeping head up, drifting from [place] to [place], tailgating or hitting a shoulder, and feeling restless and irritable .  Patient was made  aware that the ability to self-rate sleepiness and performance is unreliable . Although performance continues to decline with cumulative days of sleep deprivation, subjective ratings of sleepiness tend to level off after the first few " days in laboratory conditions of sleep deprivation , and drivers are often unaware of their own level of sleepiness .  Rather than driving while drowsy, patient was told to use other modes of transportation, such as ride sharing, public transportation, taxis, or walking. Importantly, drivers should be advised to plan ahead so that they avoid driving during times of day when they are likely to be sleepy, such as mid-afternoon, late at night, or after a period of sleep deprivation; to keep their trips short (under 20 minutes); and to use alternative modes of transportation.  Patient was warned about the risk of driving while drowsy.  Patient was instructed that patients who are considered high-risk (eg, those with excessive daytime sleepiness and a history of a drowsy driving crash or near miss) should be warned not to drive until therapy has been instituted and proven effective.    Follow up in 3 months with Dr. Bhatti.    Discussed diagnosis, its evaluation, treatment and usual course. All questions answered.    Patient verbalized understanding of plan and left in no acute distress    Thank you for the courtesy of participating in the care of this patient    Bryanna Lai PA-C

## 2022-05-18 NOTE — ASSESSMENT & PLAN NOTE
Denies SI or HI  psychiatry recommended, referral placed  Patient says she has been told by multiple doctors she needs to see psychiatry, she says today that she agrees and will go

## 2022-05-19 LAB
CV STRESS BASE HR: 68 BPM
DIASTOLIC BLOOD PRESSURE: 90 MMHG
NUC REST EJECTION FRACTION: 93
NUC STRESS EJECTION FRACTION: 94 %
OHS CV CPX 85 PERCENT MAX PREDICTED HEART RATE MALE: 126
OHS CV CPX MAX PREDICTED HEART RATE: 149
OHS CV CPX PATIENT IS FEMALE: 1
OHS CV CPX PATIENT IS MALE: 0
OHS CV CPX PEAK DIASTOLIC BLOOD PRESSURE: 63 MMHG
OHS CV CPX PEAK HEAR RATE: 107 BPM
OHS CV CPX PEAK RATE PRESSURE PRODUCT: NORMAL
OHS CV CPX PEAK SYSTOLIC BLOOD PRESSURE: 122 MMHG
OHS CV CPX PERCENT MAX PREDICTED HEART RATE ACHIEVED: 72
OHS CV CPX RATE PRESSURE PRODUCT PRESENTING: 9384
STRESS ECHO POST EXERCISE DUR MIN: 1 MINUTES
STRESS ECHO POST EXERCISE DUR SEC: 10 SECONDS
SYSTOLIC BLOOD PRESSURE: 138 MMHG

## 2022-05-27 ENCOUNTER — PATIENT MESSAGE (OUTPATIENT)
Dept: OTOLARYNGOLOGY | Facility: CLINIC | Age: 67
End: 2022-05-27
Payer: MEDICARE

## 2022-05-28 NOTE — TELEPHONE ENCOUNTER
No new care gaps identified.  [facility] Embedded Care Gaps. Reference number: [id number]. 5/28/2022   5:31:47 AM JOSET

## 2022-05-30 RX ORDER — OMEPRAZOLE 40 MG/1
CAPSULE, DELAYED RELEASE ORAL
Qty: 30 CAPSULE | Refills: 0 | Status: SHIPPED | OUTPATIENT
Start: 2022-05-30 | End: 2022-06-27

## 2022-06-01 ENCOUNTER — PATIENT MESSAGE (OUTPATIENT)
Dept: PHYSICAL MEDICINE AND REHAB | Facility: CLINIC | Age: 67
End: 2022-06-01
Payer: MEDICARE

## 2022-06-02 ENCOUNTER — PATIENT MESSAGE (OUTPATIENT)
Dept: INTERNAL MEDICINE | Facility: CLINIC | Age: 67
End: 2022-06-02
Payer: MEDICARE

## 2022-06-18 ENCOUNTER — PATIENT MESSAGE (OUTPATIENT)
Dept: INTERNAL MEDICINE | Facility: CLINIC | Age: 67
End: 2022-06-18
Payer: MEDICARE

## 2022-06-21 ENCOUNTER — HOSPITAL ENCOUNTER (OUTPATIENT)
Dept: SLEEP MEDICINE | Facility: HOSPITAL | Age: 67
Discharge: HOME | End: 2022-06-21
Attending: PHYSICIAN ASSISTANT
Payer: MEDICARE

## 2022-06-21 DIAGNOSIS — G47.61 PLMD (PERIODIC LIMB MOVEMENT DISORDER): ICD-10-CM

## 2022-06-21 DIAGNOSIS — G47.10 HYPERSOMNIA, UNSPECIFIED: ICD-10-CM

## 2022-06-21 DIAGNOSIS — G47.11 IDIOPATHIC HYPERSOMNOLENCE: ICD-10-CM

## 2022-06-21 PROCEDURE — 95810 PR POLYSOMNOGRAPHY, 4 OR MORE: ICD-10-PCS | Mod: 26,,, | Performed by: INTERNAL MEDICINE

## 2022-06-21 PROCEDURE — 95810 POLYSOM 6/> YRS 4/> PARAM: CPT

## 2022-06-21 PROCEDURE — 95805 MULTIPLE SLEEP LATENCY TEST: CPT

## 2022-06-21 PROCEDURE — 95810 POLYSOM 6/> YRS 4/> PARAM: CPT | Mod: 26,,, | Performed by: INTERNAL MEDICINE

## 2022-06-21 NOTE — Clinical Note
No sleep onset REMs present. This study does not suggest narcolepsy. Total number of naps attempted: 5 . Total number of naps with sleep attained: Pathologic sleepiness was evidenced by short mean sleep latency. Night #1: Sleep latency 05:54 min Night #2: Sleep latency 02:13 min Night #3: Sleep latency 09:25 min Night #4: Sleep latency 02:28 min Night #5: Sleep latency 04:56 min Pathologic Sleepiness (G47.10) Idiopathic hypersomnia (G47.11)

## 2022-06-22 PROCEDURE — 95805 PR MULTIPLE SLEEP LATENCY TEST: ICD-10-PCS | Mod: 26,,, | Performed by: INTERNAL MEDICINE

## 2022-06-22 PROCEDURE — 95805 MULTIPLE SLEEP LATENCY TEST: CPT | Mod: 26,,, | Performed by: INTERNAL MEDICINE

## 2022-06-29 ENCOUNTER — PATIENT MESSAGE (OUTPATIENT)
Dept: PSYCHIATRY | Facility: CLINIC | Age: 67
End: 2022-06-29
Payer: MEDICARE

## 2022-06-29 NOTE — PROCEDURES
"No sleep onset REMs present. This study does not suggest narcolepsy.   Total number of naps attempted: 5 . Total number of naps with sleep attained:   Pathologic sleepiness was evidenced by short mean sleep latency.   Night #1: Sleep latency 05:54 min   Night #2: Sleep latency 02:13 min   Night #3: Sleep latency 09:25 min   Night #4: Sleep latency 02:28 min   Night #5: Sleep latency 04:56 min   Pathologic Sleepiness (G47.10)   Idiopathic hypersomnia (G47.11)       See imported Sleep Study result in "Chart Review" under the   "Media tab".      (This Sleep Study was interpreted by a Board Certified Sleep  Specialist who conducted an epoch-by-epoch review of the entire  raw data recording.)     (The indication for this sleep study was reviewed and deemed  appropriate by [facility] Practice Parameters or other reasons by a  Board Certified Sleep Specialist.)      The sleep study that you ordered is complete.  You have ordered sleep LAB services to perform the sleep study for@       You can look  for the report in the  Media as a procedure document type.    As the ordering provider, you are responsible for reviewing the results and implementing a treatment plan with your patient.     If you need a Sleep Medicine provider to explain the sleep study findings and arrange treatment for the patient, please refer patient for consultation to our Sleep Clinic via [facility] with Ambulatory Consult Sleep.     To do that please place an order for an  "Ambulatory Consult Sleep" - it will go to our clinic work queue for our Medical Assistant to contact the patient for an appointment.     For any questions, please contact our clinic staff at  to talk to clinical staff or pulmonary nurse navigator    Shaun Bhatti MD     "

## 2022-06-30 ENCOUNTER — OFFICE VISIT (OUTPATIENT)
Dept: OPHTHALMOLOGY | Facility: CLINIC | Age: 67
End: 2022-06-30
Payer: MEDICARE

## 2022-06-30 DIAGNOSIS — H52.4 PRESBYOPIA: ICD-10-CM

## 2022-06-30 DIAGNOSIS — H25.043 POSTERIOR SUBCAPSULAR AGE-RELATED CATARACT OF BOTH EYES: ICD-10-CM

## 2022-06-30 DIAGNOSIS — H04.129 DRY EYE: ICD-10-CM

## 2022-06-30 DIAGNOSIS — Z96.1 PSEUDOPHAKIA OF BOTH EYES: Primary | ICD-10-CM

## 2022-06-30 PROCEDURE — 1160F RVW MEDS BY RX/DR IN RCRD: CPT | Mod: CPTII,S$GLB,, | Performed by: OPTOMETRIST

## 2022-06-30 PROCEDURE — 1159F PR MEDICATION LIST DOCUMENTED IN MEDICAL RECORD: ICD-10-PCS | Mod: CPTII,S$GLB,, | Performed by: OPTOMETRIST

## 2022-06-30 PROCEDURE — 99999 PR PBB SHADOW E&M-EST. PATIENT-LVL II: ICD-10-PCS | Mod: PBBFAC,,, | Performed by: OPTOMETRIST

## 2022-06-30 PROCEDURE — 1160F PR REVIEW ALL MEDS BY PRESCRIBER/CLIN PHARMACIST DOCUMENTED: ICD-10-PCS | Mod: CPTII,S$GLB,, | Performed by: OPTOMETRIST

## 2022-06-30 PROCEDURE — 92015 DETERMINE REFRACTIVE STATE: CPT | Mod: S$GLB,,, | Performed by: OPTOMETRIST

## 2022-06-30 PROCEDURE — 92014 COMPRE OPH EXAM EST PT 1/>: CPT | Mod: S$GLB,,, | Performed by: OPTOMETRIST

## 2022-06-30 PROCEDURE — 1159F MED LIST DOCD IN RCRD: CPT | Mod: CPTII,S$GLB,, | Performed by: OPTOMETRIST

## 2022-06-30 PROCEDURE — 92014 PR EYE EXAM, EST PATIENT,COMPREHESV: ICD-10-PCS | Mod: S$GLB,,, | Performed by: OPTOMETRIST

## 2022-06-30 PROCEDURE — 99999 PR PBB SHADOW E&M-EST. PATIENT-LVL II: CPT | Mod: PBBFAC,,, | Performed by: OPTOMETRIST

## 2022-06-30 PROCEDURE — 92015 PR REFRACTION: ICD-10-PCS | Mod: S$GLB,,, | Performed by: OPTOMETRIST

## 2022-06-30 NOTE — PROGRESS NOTES
HPI     Annual Exam     Comments: Vision changes since last eye exam?: no  Any eye pain today: no  Other ocular symptoms: no  Interested in contact lens fitting today? no                      Last edited by Keiko Iverson MA on 6/30/2022 11:02 AM. (History)            Assessment /Plan     For exam results, see Encounter Report.    Pseudophakia of both eyes  Posterior subcapsular age-related cataract of both eyes  Stable OU  Mild PCO OU, YAG not yet indicated  Monitor 12 months    Dry eye  Recommended refresh for contacts prn    Presbyopia  Contact Lens Prescription (6/30/2022)        Brand Base Curve Diameter Sphere    Right No lens 8.4      Left Air Optix Night and Day 8.4 13.8 +2.50    Expiration Date: 6/30/2023    Replacement: Monthly    Wearing Schedule: Daily Wear            RTC 1 yr for dilated eye exam or PRN if any problems.   Discussed above and answered questions.

## 2022-07-12 ENCOUNTER — TELEPHONE (OUTPATIENT)
Dept: AUDIOLOGY | Facility: CLINIC | Age: 67
End: 2022-07-12
Payer: MEDICARE

## 2022-07-12 NOTE — TELEPHONE ENCOUNTER
Spoke with patient on the phone. She continues to have bothersome tinnitus and is interested in trying hearing aids. Notified patient she may check with [employer] for coverage (possibly [employer]) if she is interested to go through her insurance. She has copy of her hearing test. Patient will contact us as needed; she is also looking into coverage through her injury claim with [employer]. /rt/

## 2022-07-20 ENCOUNTER — TELEPHONE (OUTPATIENT)
Dept: AUDIOLOGY | Facility: CLINIC | Age: 67
End: 2022-07-20
Payer: MEDICARE

## 2022-07-20 NOTE — TELEPHONE ENCOUNTER
----- Message from Cece Archuleta sent at 7/20/2022  7:17 AM CDT -----  Contact: Mariel Floyd would like a call back at [phone], Regards to her last visit in April of 2022 that she has some concerns about.    Thanks  Td

## 2022-07-20 NOTE — TELEPHONE ENCOUNTER
Returned patient's call as Yuri Burton was out of office. Patient wanted to discuss visits from 2019. Informed patient she has visits from September and December 2019. Patient noted she was not seen in December 2019. I could not answer questions regarding that visit. Patient would like to speak to audiologist that completed her visits. Advised patient that audiologist who saw her would contact her next week when she was in office.

## 2022-07-27 ENCOUNTER — OFFICE VISIT (OUTPATIENT)
Dept: PAIN MEDICINE | Facility: CLINIC | Age: 67
End: 2022-07-27
Payer: MEDICARE

## 2022-07-27 DIAGNOSIS — M54.16 BILATERAL LUMBAR RADICULOPATHY: Primary | ICD-10-CM

## 2022-07-27 DIAGNOSIS — M25.562 POSTERIOR LEFT KNEE PAIN: ICD-10-CM

## 2022-07-27 DIAGNOSIS — M79.18 PIRIFORMIS MUSCLE PAIN: ICD-10-CM

## 2022-07-27 DIAGNOSIS — Z96.652 STATUS POST TOTAL KNEE REPLACEMENT USING CEMENT, LEFT: ICD-10-CM

## 2022-07-27 PROCEDURE — 99214 PR OFFICE/OUTPT VISIT, EST, LEVL IV, 30-39 MIN: ICD-10-PCS | Mod: 95,,, | Performed by: PHYSICIAN ASSISTANT

## 2022-07-27 PROCEDURE — 1160F RVW MEDS BY RX/DR IN RCRD: CPT | Mod: CPTII,95,, | Performed by: PHYSICIAN ASSISTANT

## 2022-07-27 PROCEDURE — 1159F PR MEDICATION LIST DOCUMENTED IN MEDICAL RECORD: ICD-10-PCS | Mod: CPTII,95,, | Performed by: PHYSICIAN ASSISTANT

## 2022-07-27 PROCEDURE — 1159F MED LIST DOCD IN RCRD: CPT | Mod: CPTII,95,, | Performed by: PHYSICIAN ASSISTANT

## 2022-07-27 PROCEDURE — 1160F PR REVIEW ALL MEDS BY PRESCRIBER/CLIN PHARMACIST DOCUMENTED: ICD-10-PCS | Mod: CPTII,95,, | Performed by: PHYSICIAN ASSISTANT

## 2022-07-27 PROCEDURE — 99214 OFFICE O/P EST MOD 30 MIN: CPT | Mod: 95,,, | Performed by: PHYSICIAN ASSISTANT

## 2022-07-27 RX ORDER — METHOCARBAMOL 750 MG/1
750 TABLET, FILM COATED ORAL 3 TIMES DAILY PRN
Qty: 90 TABLET | Refills: 3 | Status: SHIPPED | OUTPATIENT
Start: 2022-07-27 | End: 2022-10-05 | Stop reason: SDUPTHER

## 2022-07-27 RX ORDER — METHOCARBAMOL 750 MG/1
750 TABLET, FILM COATED ORAL 3 TIMES DAILY PRN
Qty: 90 TABLET | Refills: 0 | Status: CANCELLED | OUTPATIENT
Start: 2022-07-27

## 2022-07-27 RX ORDER — GABAPENTIN 300 MG/1
600 CAPSULE ORAL NIGHTLY
Qty: 60 CAPSULE | Refills: 5 | Status: SHIPPED | OUTPATIENT
Start: 2022-07-27 | End: 2023-05-10 | Stop reason: SDUPTHER
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
## 2022-08-01 ENCOUNTER — PATIENT MESSAGE (OUTPATIENT)
Dept: OPTOMETRY | Facility: CLINIC | Age: 67
End: 2022-08-01
Payer: MEDICARE

## 2022-08-08 ENCOUNTER — OFFICE VISIT (OUTPATIENT)
Dept: PSYCHIATRY | Facility: CLINIC | Age: 67
End: 2022-08-08
Payer: MEDICARE

## 2022-08-08 DIAGNOSIS — F06.31 DEPRESSION DUE TO PHYSICAL ILLNESS: ICD-10-CM

## 2022-08-08 PROCEDURE — 90791 PSYCH DIAGNOSTIC EVALUATION: CPT | Mod: S$GLB,,, | Performed by: SOCIAL WORKER

## 2022-08-08 PROCEDURE — 90791 PR PSYCHIATRIC DIAGNOSTIC EVALUATION: ICD-10-PCS | Mod: S$GLB,,, | Performed by: SOCIAL WORKER
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
## 2022-08-09 ENCOUNTER — PATIENT MESSAGE (OUTPATIENT)
Dept: ADMINISTRATIVE | Facility: HOSPITAL | Age: 67
End: 2022-08-09
Payer: MEDICARE

## 2022-08-18 ENCOUNTER — OFFICE VISIT (OUTPATIENT)
Dept: CARDIOLOGY | Facility: CLINIC | Age: 67
End: 2022-08-18
Payer: MEDICARE

## 2022-08-18 VITALS
SYSTOLIC BLOOD PRESSURE: 124 MMHG | BODY MASS INDEX: 44.22 KG/M2 | OXYGEN SATURATION: 99 % | HEIGHT: 63 IN | DIASTOLIC BLOOD PRESSURE: 78 MMHG | WEIGHT: 249.56 LBS | HEART RATE: 78 BPM

## 2022-08-18 DIAGNOSIS — Z98.84 HX OF GASTRIC BYPASS: ICD-10-CM

## 2022-08-18 DIAGNOSIS — I10 ESSENTIAL HYPERTENSION: Primary | ICD-10-CM

## 2022-08-18 DIAGNOSIS — R06.09 DOE (DYSPNEA ON EXERTION): ICD-10-CM

## 2022-08-18 DIAGNOSIS — E66.01 MORBID OBESITY WITH BMI OF 40.0-44.9, ADULT: ICD-10-CM

## 2022-08-18 DIAGNOSIS — R00.2 PALPITATIONS: ICD-10-CM

## 2022-08-18 DIAGNOSIS — K21.9 GASTROESOPHAGEAL REFLUX DISEASE, UNSPECIFIED WHETHER ESOPHAGITIS PRESENT: ICD-10-CM

## 2022-08-18 DIAGNOSIS — G47.33 OSA ON CPAP: ICD-10-CM

## 2022-08-18 DIAGNOSIS — I49.3 SYMPTOMATIC PVCS: ICD-10-CM

## 2022-08-18 DIAGNOSIS — D64.9 ANEMIA, UNSPECIFIED TYPE: ICD-10-CM

## 2022-08-18 PROCEDURE — 1101F PR PT FALLS ASSESS DOC 0-1 FALLS W/OUT INJ PAST YR: ICD-10-PCS | Mod: CPTII,S$GLB,, | Performed by: INTERNAL MEDICINE

## 2022-08-18 PROCEDURE — 1126F AMNT PAIN NOTED NONE PRSNT: CPT | Mod: CPTII,S$GLB,, | Performed by: INTERNAL MEDICINE

## 2022-08-18 PROCEDURE — 3074F SYST BP LT 130 MM HG: CPT | Mod: CPTII,S$GLB,, | Performed by: INTERNAL MEDICINE

## 2022-08-18 PROCEDURE — 1101F PT FALLS ASSESS-DOCD LE1/YR: CPT | Mod: CPTII,S$GLB,, | Performed by: INTERNAL MEDICINE

## 2022-08-18 PROCEDURE — 3078F PR MOST RECENT DIASTOLIC BLOOD PRESSURE < 80 MM HG: ICD-10-PCS | Mod: CPTII,S$GLB,, | Performed by: INTERNAL MEDICINE

## 2022-08-18 PROCEDURE — 99214 PR OFFICE/OUTPT VISIT, EST, LEVL IV, 30-39 MIN: ICD-10-PCS | Mod: S$GLB,,, | Performed by: INTERNAL MEDICINE

## 2022-08-18 PROCEDURE — 1160F PR REVIEW ALL MEDS BY PRESCRIBER/CLIN PHARMACIST DOCUMENTED: ICD-10-PCS | Mod: CPTII,S$GLB,, | Performed by: INTERNAL MEDICINE

## 2022-08-18 PROCEDURE — 99999 PR PBB SHADOW E&M-EST. PATIENT-LVL III: CPT | Mod: PBBFAC,,, | Performed by: INTERNAL MEDICINE

## 2022-08-18 PROCEDURE — 3074F PR MOST RECENT SYSTOLIC BLOOD PRESSURE < 130 MM HG: ICD-10-PCS | Mod: CPTII,S$GLB,, | Performed by: INTERNAL MEDICINE

## 2022-08-18 PROCEDURE — 1126F PR PAIN SEVERITY QUANTIFIED, NO PAIN PRESENT: ICD-10-PCS | Mod: CPTII,S$GLB,, | Performed by: INTERNAL MEDICINE

## 2022-08-18 PROCEDURE — 1159F MED LIST DOCD IN RCRD: CPT | Mod: CPTII,S$GLB,, | Performed by: INTERNAL MEDICINE

## 2022-08-18 PROCEDURE — 3078F DIAST BP <80 MM HG: CPT | Mod: CPTII,S$GLB,, | Performed by: INTERNAL MEDICINE

## 2022-08-18 PROCEDURE — 99999 PR PBB SHADOW E&M-EST. PATIENT-LVL III: ICD-10-PCS | Mod: PBBFAC,,, | Performed by: INTERNAL MEDICINE

## 2022-08-18 PROCEDURE — 3008F BODY MASS INDEX DOCD: CPT | Mod: CPTII,S$GLB,, | Performed by: INTERNAL MEDICINE

## 2022-08-18 PROCEDURE — 1160F RVW MEDS BY RX/DR IN RCRD: CPT | Mod: CPTII,S$GLB,, | Performed by: INTERNAL MEDICINE

## 2022-08-18 PROCEDURE — 99214 OFFICE O/P EST MOD 30 MIN: CPT | Mod: S$GLB,,, | Performed by: INTERNAL MEDICINE

## 2022-08-18 PROCEDURE — 3288F FALL RISK ASSESSMENT DOCD: CPT | Mod: CPTII,S$GLB,, | Performed by: INTERNAL MEDICINE

## 2022-08-18 PROCEDURE — 1159F PR MEDICATION LIST DOCUMENTED IN MEDICAL RECORD: ICD-10-PCS | Mod: CPTII,S$GLB,, | Performed by: INTERNAL MEDICINE

## 2022-08-18 PROCEDURE — 3008F PR BODY MASS INDEX (BMI) DOCUMENTED: ICD-10-PCS | Mod: CPTII,S$GLB,, | Performed by: INTERNAL MEDICINE

## 2022-08-18 PROCEDURE — 3288F PR FALLS RISK ASSESSMENT DOCUMENTED: ICD-10-PCS | Mod: CPTII,S$GLB,, | Performed by: INTERNAL MEDICINE

## 2022-08-18 NOTE — PROGRESS NOTES
Subjective:   Patient ID:  Mariel Becerril is a 66 y.o. female who presents for cardiac consult of No chief complaint on file.      Palpitations   Associated symptoms include dizziness and shortness of breath (improved). Pertinent negatives include no chest pain.   Dizziness:    Associated symptoms: palpitations.no chest pain.  Hypertension  Associated symptoms include palpitations and shortness of breath (improved). Pertinent negatives include no chest pain.     The patient came in today for cardiac consult of No chief complaint on file.    Mariel Becerril is a 66 y.o. female with current medical conditions HTN, PVCs, obesity s/p gastric sleeve, GERD, OA, anemia, GIL on CPAP presents for follow CV evaluation.      8/20/18  Pt has been having SOB, AREVALO. Is always in pain, is exhausted on pain meds. She feels like her heart if affected and is tired after walking a few steps.  Pt also feels chest pressure. Pt feels chest pressure daily, just tries to rest and goes away. Chest pain is substernal without radiation. 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 Uses CPAP every night, but last eval was over 5 years.   Feels palpitations, rarely, was on digoxin then.    ECG - NSR, poor RWP    9/27/18  Pt had negative nuclear stress and 2D ECHO with normal Bi V function. GIL workup underway, recent visit with pulmonary. Has bad sinus infection, has been using Tessalon perles, saw ENT received steroid dose pack which has been improved. Still coughing a lot, using cough syrup and on Levaquin x 14 days. No chest pain but more congestion. Has been doing well with Lasix - taking it almost daily.     11/12/19  She will have knee surgery at [facility] with Dr. Moy Ramirez. She has been getting PT/OT and brace but no improvement in knee pain. She does have chronic fatigue/dyspnea but no CP. She does have palpitations feels fluttering feeling.     2/13/20  Freq PVCs noted on Holter, started metoprolol 25mg daily and monitor BP and HR,  increased BB due to palpitations but BP dropped now off norvasc as BP dropped. She will have knee surgery March 4th by Dr. Connolly. She also takes lasix daily now.     8/11/20  She had knee replacement in L knee in March but still has significant pain. She had weight gain since surgery and could not walk much, she weight 222 lbs before surgery. She had been to pain management had injections.     12/3/20  She still has L knee pain, possible hamstring injury. Used CBD cream as well. She saw Dr. Pak for back pain and had another injection for back/hips. She will see Dr. Longoria for weight loss surgery. She has occ mild chest pressure/pain concerned maybe due to weight gain.   ECG - NSR    3/11/21  She is s/p gastric sleeve 2 weeks ago, is on high protein soft food diet. She has been getting more tired lately. She is taking a lot of vitamins. She is taking a stool softener.     4/27/21  Holter this month with rare PVCs, PACs, AVG HR 77. BP and HR well controlled today. Weight 240 lbs. SHe has been feeling more drowsy. 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     7/29/21  She had to stop CPAP due to recall. She feels more tired and has more headaches. Weight is 238 trying to improve. BP low normal.   ECG - NSR, LAE    11/4/21  She will have lumbar surgery by Dr. Whelan at [facility]. ECG - NSR, LAE, anterior T wave - old. No CP/SOB.     5/5/22  BP and HR stable today. BMI 45 - 255 lbs. She did not have surgery yet - she is doing decompression therapy instead with Dr. Boyer, has ice therapy. She has more GERD sx. She also has more chest tightness taking extra tums.   ECG - NSR    8/18/22  Nuclear stress neg 5/2022. ECHO with normal bi V function, mild MR, mild TR, PASP 29 mmHg. She is doing PT/OT with ice therapy still.       Patient feels no PND, no dizziness, no syncope, no CNS symptoms.    Patient is compliant with medications.    Family history includes Arthritis in her mother; Depression in her sister; Heart  disease in her father -  at age 81; Hypertension in her mother and sister.      Results for orders placed during the hospital encounter of 22    Echo    Interpretation Summary  · The left ventricle is normal in size with mild concentric hypertrophy and normal systolic function.  · Mild left atrial enlargement.  · The estimated ejection fraction is 65%.  · Indeterminate left ventricular diastolic function.  · Normal right ventricular size with normal right ventricular systolic function.  · Mild mitral regurgitation.  · Mild tricuspid regurgitation.  · Intermediate central venous pressure (8 mmHg).  · The estimated PA systolic pressure is 29 mmHg.      Results for orders placed during the hospital encounter of 22    Nuclear Stress - Cardiology Interpreted    Interpretation Summary    Normal myocardial perfusion scan. There is no evidence of myocardial ischemia or infarction.    The gated perfusion images showed an ejection fraction of > 70% at rest. The gated perfusion images showed an ejection fraction of > 70% post stress.    The EKG portion of this study is negative for ischemia.    The patient reported no chest pain during the stress test.    There were no arrhythmias during stress.      Normal sinus rhythm   Possible Left atrial enlargement   T wave abnormality, consider anterior ischemia   Abnormal ECG   When compared with ECG of 2021 11:03,   No significant change was found       HOLTER 2021  · Sinus rhythm with heart rates varying between 48 and 124 bpm with an average of 77 bpm.  · There were very rare PVCs totalling 12 and averaging 0.13 per hour.  · There were very rare PACs totalling 24 and averaging 0.25 per hour.      Prior Holter 2019  TEST DESCRIPTION   PREDOMINANT RHYTHM  1. Sinus rhythm with heart rates varying between 56 and 156 bpm with an average of 86 bpm.   VENTRICULAR ARRHYTHMIAS  1. There were frequent PVCs totalling 3780 and averaging 78 per hour.   The  ventricular arrhythmias percentage was 1.6.   There were 1174 bigeminal cycles.  There were 9 triplets.   2. There were no episodes of ventricular tachycardia.    SUPRA VENTRICULAR ARRHYTHMIAS  1. There were very rare PACs recorded totalling 18 and averaging less than 1 per hour.   2. There were no episodes of sustained supraventricular tachycardia.      Past Medical History:   Diagnosis Date    COPD (chronic obstructive pulmonary disease)     NO HOME O2    Digestive disorder     Frequent headaches     Hypertension     Osteoarthritis 04/02/2019    Bilateral knees, ankles and feet    Severe obesity (BMI >= 40)     Sleep apnea     CPAP       Past Surgical History:   Procedure Laterality Date    BLADDER SUSPENSION      CATARACT EXTRACTION, BILATERAL      COLONOSCOPY N/A 12/3/2018    Procedure: COLONOSCOPY;  Surgeon: Mari Rader MD;  Location: [facility];  Service: Endoscopy;  Laterality: N/A;    ESOPHAGOGASTRODUODENOSCOPY N/A 12/31/2020    Procedure: ESOPHAGOGASTRODUODENOSCOPY (EGD);  Surgeon: Anthony Rodríguez MD;  Location: [facility];  Service: General;  Laterality: N/A;    HYSTERECTOMY      partial 2000    INJECTION OF ANESTHETIC AGENT INTO SACROILIAC JOINT Bilateral 11/30/2020    Procedure: Bilateral SIJ + Bilateral Piriformis + Bilateral GT Bursa Injection;  Surgeon: Thanh Diaz MD;  Location: [facility];  Service: Pain Management;  Laterality: Bilateral;    INJECTION OF JOINT Bilateral 11/30/2020    Procedure: Bilateral SIJ + Bilateral Piriformis + Bilateral GT Bursa Injection;  Surgeon: Thanh Diaz MD;  Location: [facility];  Service: Pain Management;  Laterality: Bilateral;    INJECTION OF PIRIFORMIS MUSCLE Bilateral 11/30/2020    Procedure: Bilateral SIJ + Bilateral Piriformis + Bilateral GT Bursa Injection;  Surgeon: Thanh Diaz MD;  Location: [facility];  Service: Pain Management;  Laterality: Bilateral;    ROBOT-ASSISTED LAPAROSCOPIC SLEEVE GASTRECTOMY USING DA EZEQUIEL  XI N/A 3/2/2021    Procedure: XI ROBOTIC SLEEVE GASTRECTOMY;  Surgeon: Anthony Rodríguez MD;  Location: [facility] OR;  Service: General;  Laterality: N/A;    SELECTIVE INJECTION OF ANESTHETIC AGENT AROUND LUMBAR SPINAL NERVE ROOT BY TRANSFORAMINAL APPROACH Bilateral 1/4/2021    Procedure: Bilateral L5/S1 TF GISELA;  Surgeon: Thanh Diaz MD;  Location: [facility];  Service: Pain Management;  Laterality: Bilateral;    SELECTIVE INJECTION OF ANESTHETIC AGENT AROUND LUMBAR SPINAL NERVE ROOT BY TRANSFORAMINAL APPROACH Bilateral 3/23/2021    Procedure: Bilateral L5/S1 TF GISELA;  Surgeon: Thanh Diaz MD;  Location: [facility];  Service: Pain Management;  Laterality: Bilateral;    SELECTIVE INJECTION OF ANESTHETIC AGENT AROUND LUMBAR SPINAL NERVE ROOT BY TRANSFORAMINAL APPROACH Bilateral 10/5/2021    Procedure: Bilateral L5/S1 TF GISELA;  Surgeon: Thanh Diaz MD;  Location: [facility];  Service: Pain Management;  Laterality: Bilateral;    tonsilectomy      TOTAL KNEE ARTHROPLASTY Left 3/4/2020    Procedure: ARTHROPLASTY, KNEE, TOTAL;  Surgeon: Moy Connolly MD;  Location: [facility] OR;  Service: Orthopedics;  Laterality: Left;       Social History     Tobacco Use    Smoking status: Never Smoker    Smokeless tobacco: Never Used   Substance Use Topics    Alcohol use: Never    Drug use: No       Family History   Problem Relation Age of Onset    Heart attack Father        Patient's Medications   New Prescriptions    No medications on file   Previous Medications    ACETAMINOPHEN (TYLENOL) 325 MG TABLET    Take 2 tablets (650 mg total) by mouth every 8 (eight) hours as needed.    ALBUTEROL (PROAIR HFA) 90 MCG/ACTUATION INHALER    Inhale 2 puffs into the lungs every 6 (six) hours as needed for Wheezing. Rescue    ALPRAZOLAM (XANAX) 0.25 MG TABLET    Take 1 tablet by mouth twice daily as needed for anxiety    AMLODIPINE (NORVASC) 5 MG TABLET        ASPIRIN 325 MG TABLET    Take 325 mg by mouth once daily.     BENZONATATE (TESSALON) 100 MG CAPSULE    Take 1 capsule by mouth three times daily as needed    DICLOFENAC SODIUM (VOLTAREN) 1 % GEL    Apply 2 g topically 3 (three) times daily as needed.    EFLORNITHINE (VANIQA) 13.9 % CREAM    Apply topically 2 (two) times daily.    ERGOCALCIFEROL, VITAMIN D2, (VITAMIN D ORAL)    Take 2,000 Units by mouth once daily.    FUROSEMIDE (LASIX) 40 MG TABLET    Take 1 tablet (40 mg total) by mouth daily as needed (edema, swelling due to fluid).    GABAPENTIN (NEURONTIN) 300 MG CAPSULE    Take 2 capsules (600 mg total) by mouth every evening.    IMIPRAMINE (TOFRANIL) 10 MG TAB    Take 1 tablet (10 mg total) by mouth every evening.    LEVOCETIRIZINE (XYZAL) 5 MG TABLET    TAKE 1 TABLET BY MOUTH ONCE DAILY IN THE EVENING    LIDOCAINE-PRILOCAINE (EMLA) CREAM    APPLY  CREAM TOPICALLY UP TO 4 TIMES DAILY AS NEEDED FOR PAIN    MECLIZINE (ANTIVERT) 25 MG TABLET    Take 1 tablet (25 mg total) by mouth 3 (three) times daily as needed for Dizziness.    MELOXICAM (MOBIC) 15 MG TABLET    Take 1 tablet (15 mg total) by mouth once daily.    METHOCARBAMOL (ROBAXIN) 750 MG TAB    Take 1 tablet (750 mg total) by mouth 3 (three) times daily as needed.    METOPROLOL SUCCINATE (TOPROL-XL) 50 MG 24 HR TABLET    Take 1 tablet (50 mg total) by mouth once daily.    MONTELUKAST (SINGULAIR) 10 MG TABLET    Take 1 tablet (10 mg total) by mouth every evening.    MUPIROCIN (BACTROBAN) 2 % OINTMENT    APPLY  OINTMENT TOPICALLY TWICE DAILY    NITROGLYCERIN (NITROSTAT) 0.4 MG SL TABLET    Place 1 tablet (0.4 mg total) under the tongue every 5 (five) minutes as needed for Chest pain.    NYSTATIN (MYCOSTATIN) CREAM    APPLY  CREAM TOPICALLY TO AFFECTED AREA TWICE DAILY    NYSTATIN (MYCOSTATIN) POWDER    Apply topically 2 (two) times daily.    OMEPRAZOLE (PRILOSEC) 40 MG CAPSULE    Take 1 capsule by mouth once daily    ONDANSETRON (ZOFRAN) 8 MG TABLET    Take 1 tablet (8 mg total) by mouth every 8 (eight) hours as needed  "for Nausea.    POTASSIUM CHLORIDE SA (K-DUR,KLOR-CON) 20 MEQ TABLET    Take 1 tablet (20 mEq total) by mouth daily as needed (if taking lasix).    SF 5000 PLUS 1.1 % CREA    BRUSH FOR 2 MINUTES AT BEDTIME THEN EXPECTORATE THE EXCESS.(NOTHING TO EAT OR DRINK FOR 30 MINUTES AFTER USE )    TOPIRAMATE (TOPAMAX) 50 MG TABLET    Take 1 tablet by mouth twice daily   Modified Medications    No medications on file   Discontinued Medications    No medications on file       Review of Systems   Constitutional: Negative.    HENT: Negative.    Eyes: Negative.    Respiratory: Positive for shortness of breath (improved).    Cardiovascular: Positive for palpitations. Negative for chest pain and leg swelling.   Gastrointestinal: Positive for heartburn.   Genitourinary: Negative.    Musculoskeletal: Positive for joint pain.   Skin: Negative.    Neurological: Positive for dizziness.   Endo/Heme/Allergies: Negative.    Psychiatric/Behavioral: Negative.    All 12 systems otherwise negative.      Wt Readings from Last 3 Encounters:   08/18/22 113.2 kg (249 lb 9 oz)   05/18/22 115.7 kg (255 lb 1.2 oz)   05/18/22 115.7 kg (255 lb)     Temp Readings from Last 3 Encounters:   04/25/22 97.9 °F (36.6 °C) (Temporal)   04/11/22 97.7 °F (36.5 °C) (Temporal)   02/22/22 97.9 °F (36.6 °C) (Temporal)     BP Readings from Last 3 Encounters:   08/18/22 124/78   05/18/22 (!) 146/84   05/05/22 130/86     Pulse Readings from Last 3 Encounters:   08/18/22 78   05/18/22 69   05/05/22 90       /78   Pulse 78   Ht 5' 3" (1.6 m)   Wt 113.2 kg (249 lb 9 oz)   SpO2 99%   BMI 44.21 kg/m²     Objective:   Physical Exam  Vitals and nursing note reviewed.   Constitutional:       General: She is not in acute distress.     Appearance: She is well-developed. She is not diaphoretic.   HENT:      Head: Normocephalic and atraumatic.      Nose: Nose normal.   Eyes:      General: No scleral icterus.     Conjunctiva/sclera: Conjunctivae normal.   Neck:      " Thyroid: No thyromegaly.      Vascular: No JVD.   Cardiovascular:      Rate and Rhythm: Normal rate and regular rhythm.      Heart sounds: S1 normal and S2 normal. Murmur heard.     No friction rub. No gallop. No S3 or S4 sounds.   Pulmonary:      Effort: Pulmonary effort is normal. No respiratory distress.      Breath sounds: Normal breath sounds. No stridor. No wheezing or rales.   Chest:      Chest wall: No tenderness.   Abdominal:      General: Bowel sounds are normal. There is no distension.      Palpations: Abdomen is soft. There is no mass.      Tenderness: There is no abdominal tenderness. There is no rebound.   Genitourinary:     Comments: Deferred  Musculoskeletal:         General: No tenderness or deformity. Normal range of motion.      Cervical back: Normal range of motion and neck supple.   Lymphadenopathy:      Cervical: No cervical adenopathy.   Skin:     General: Skin is warm and dry.      Coloration: Skin is not pale.      Findings: No erythema or rash.   Neurological:      Mental Status: She is alert and oriented to person, place, and time.      Motor: No abnormal muscle tone.      Coordination: Coordination normal.   Psychiatric:         Behavior: Behavior normal.         Thought Content: Thought content normal.         Judgment: Judgment normal.         Lab Results   Component Value Date     10/27/2021    K 4.3 10/27/2021     10/27/2021    CO2 25 10/27/2021    BUN 33 (H) 10/27/2021    CREATININE 1.0 10/27/2021     10/27/2021    HGBA1C 5.2 06/21/2021    MG 2.1 03/11/2021    AST 31 10/27/2021    ALT 29 10/27/2021    ALBUMIN 3.7 10/27/2021    PROT 7.1 10/27/2021    BILITOT 0.4 10/27/2021    WBC 6.75 10/27/2021    HGB 13.3 10/27/2021    HCT 42.5 10/27/2021    MCV 96 10/27/2021     10/27/2021    INR 1.0 10/27/2021    TSH 0.599 06/21/2021    CHOL 124 09/14/2018    HDL 48 09/14/2018    LDLCALC 64.0 09/14/2018    TRIG 60 09/14/2018    BNP <10 08/20/2018     Assessment:      1.  Essential hypertension    2. GIL on CPAP    3. Palpitations    4. Symptomatic PVCs    5. AREVALO (dyspnea on exertion)    6. Morbid obesity with BMI of 40.0-44.9, adult    7. Hx of gastric bypass    8. Anemia, unspecified type    9. Gastroesophageal reflux disease, unspecified whether esophagitis present        Plan:   1. Chest pressure/AREVALO - resolved   - Nuclear stress neg 5/2022.   - ECHO with normal bi V function, mild MR, mild TR, PASP 29 mmHg.   - give NTG    2. AREVALO with edema sec to CVI  - improved with lasix 40 mg  - rec compression stockings    3. HTN - low normal  - cont meds  - stop Norvasc  - low salt diet    4. Obesity s/p gastric sleeve 2/2021 with gerd  - rec weight loss with diet/exercise; cont PPI  - f/u GI as well - proceed for EGD if needed     5. GIL  - cont CPAP  - appreciate pulm recs    6. Palpitations sec to PVCS  - cont BB  - Holter 4/2021 rare PVCs, PACs, AVG HR 77    7. Anemia  - f/u with heme/onc    8. Pre-OP CV evaluation prior to lumbar surgery by Dr. Whelan at [facility] - deferred   Low periop risk of CV events for moderate risk procedure.  No chest pain, active arrhythmia and CHF symptoms.  Ok to proceed to the scheduled surgery without further cardiac study.  OK to hold Aspirin 7 days before the procedure and resume ASAP postop.  Continue Metoprolol and Statin periop.    Thank you for allowing me to participate in this patient's care. Please do not hesitate to contact me with any questions or concerns. Consult note has been forwarded to the referral physician.     Juan Alberto Luis MD, [facility]  Cardiovascular Disease  [facility], [place]  [phone] (P)

## 2022-08-22 ENCOUNTER — OFFICE VISIT (OUTPATIENT)
Dept: INTERNAL MEDICINE | Facility: CLINIC | Age: 67
End: 2022-08-22
Payer: MEDICARE

## 2022-08-22 VITALS
SYSTOLIC BLOOD PRESSURE: 130 MMHG | BODY MASS INDEX: 44.5 KG/M2 | WEIGHT: 251.13 LBS | OXYGEN SATURATION: 99 % | RESPIRATION RATE: 18 BRPM | HEART RATE: 64 BPM | TEMPERATURE: 98 F | DIASTOLIC BLOOD PRESSURE: 82 MMHG | HEIGHT: 63 IN

## 2022-08-22 DIAGNOSIS — G81.94 HEMIPLEGIA AFFECTING LEFT NONDOMINANT SIDE, UNSPECIFIED ETIOLOGY, UNSPECIFIED HEMIPLEGIA TYPE: ICD-10-CM

## 2022-08-22 DIAGNOSIS — Z86.69 HISTORY OF MIGRAINE HEADACHES: ICD-10-CM

## 2022-08-22 DIAGNOSIS — H93.19 TINNITUS, UNSPECIFIED LATERALITY: ICD-10-CM

## 2022-08-22 DIAGNOSIS — M54.16 BILATERAL LUMBAR RADICULOPATHY: Primary | ICD-10-CM

## 2022-08-22 PROBLEM — L68.0 HIRSUTISM: Status: ACTIVE | Noted: 2020-10-08

## 2022-08-22 PROBLEM — M17.12 PRIMARY OSTEOARTHRITIS OF LEFT KNEE: Status: ACTIVE | Noted: 2019-08-07

## 2022-08-22 PROCEDURE — 99999 PR PBB SHADOW E&M-EST. PATIENT-LVL V: CPT | Mod: PBBFAC,,, | Performed by: FAMILY MEDICINE

## 2022-08-22 PROCEDURE — 1126F AMNT PAIN NOTED NONE PRSNT: CPT | Mod: CPTII,S$GLB,, | Performed by: FAMILY MEDICINE

## 2022-08-22 PROCEDURE — 3008F PR BODY MASS INDEX (BMI) DOCUMENTED: ICD-10-PCS | Mod: CPTII,S$GLB,, | Performed by: FAMILY MEDICINE

## 2022-08-22 PROCEDURE — 3008F BODY MASS INDEX DOCD: CPT | Mod: CPTII,S$GLB,, | Performed by: FAMILY MEDICINE

## 2022-08-22 PROCEDURE — 3288F PR FALLS RISK ASSESSMENT DOCUMENTED: ICD-10-PCS | Mod: CPTII,S$GLB,, | Performed by: FAMILY MEDICINE

## 2022-08-22 PROCEDURE — 1159F MED LIST DOCD IN RCRD: CPT | Mod: CPTII,S$GLB,, | Performed by: FAMILY MEDICINE

## 2022-08-22 PROCEDURE — 1101F PT FALLS ASSESS-DOCD LE1/YR: CPT | Mod: CPTII,S$GLB,, | Performed by: FAMILY MEDICINE

## 2022-08-22 PROCEDURE — 99214 PR OFFICE/OUTPT VISIT, EST, LEVL IV, 30-39 MIN: ICD-10-PCS | Mod: S$GLB,,, | Performed by: FAMILY MEDICINE

## 2022-08-22 PROCEDURE — 99214 OFFICE O/P EST MOD 30 MIN: CPT | Mod: S$GLB,,, | Performed by: FAMILY MEDICINE

## 2022-08-22 PROCEDURE — 3288F FALL RISK ASSESSMENT DOCD: CPT | Mod: CPTII,S$GLB,, | Performed by: FAMILY MEDICINE

## 2022-08-22 PROCEDURE — 3079F DIAST BP 80-89 MM HG: CPT | Mod: CPTII,S$GLB,, | Performed by: FAMILY MEDICINE

## 2022-08-22 PROCEDURE — 99999 PR PBB SHADOW E&M-EST. PATIENT-LVL V: ICD-10-PCS | Mod: PBBFAC,,, | Performed by: FAMILY MEDICINE

## 2022-08-22 PROCEDURE — 1159F PR MEDICATION LIST DOCUMENTED IN MEDICAL RECORD: ICD-10-PCS | Mod: CPTII,S$GLB,, | Performed by: FAMILY MEDICINE

## 2022-08-22 PROCEDURE — 3075F SYST BP GE 130 - 139MM HG: CPT | Mod: CPTII,S$GLB,, | Performed by: FAMILY MEDICINE

## 2022-08-22 PROCEDURE — 3075F PR MOST RECENT SYSTOLIC BLOOD PRESS GE 130-139MM HG: ICD-10-PCS | Mod: CPTII,S$GLB,, | Performed by: FAMILY MEDICINE

## 2022-08-22 PROCEDURE — 1101F PR PT FALLS ASSESS DOC 0-1 FALLS W/OUT INJ PAST YR: ICD-10-PCS | Mod: CPTII,S$GLB,, | Performed by: FAMILY MEDICINE

## 2022-08-22 PROCEDURE — 3079F PR MOST RECENT DIASTOLIC BLOOD PRESSURE 80-89 MM HG: ICD-10-PCS | Mod: CPTII,S$GLB,, | Performed by: FAMILY MEDICINE

## 2022-08-22 PROCEDURE — 1126F PR PAIN SEVERITY QUANTIFIED, NO PAIN PRESENT: ICD-10-PCS | Mod: CPTII,S$GLB,, | Performed by: FAMILY MEDICINE

## 2022-08-22 RX ORDER — TOPIRAMATE 100 MG/1
100 TABLET, FILM COATED ORAL 2 TIMES DAILY
Qty: 180 TABLET | Refills: 1 | Status: SHIPPED | OUTPATIENT
Start: 2022-08-22 | End: 2022-10-05 | Stop reason: SDUPTHER

## 2022-08-22 NOTE — PROGRESS NOTES
Subjective:       Patient ID: Mariel Becerril is a 66 y.o. female.    Chief Complaint: Headache and Tinnitus    HPI Ms. Becerril presents today for follow up     March she was in line at [facility]   Boxes fell and hit her on the head and left side     Tinnitus comes and goes     Seen ENT and was told there is no cure      Low back discomfort/pain; sciatic pain   She has been doing well with decompression. No surgery was needed after this.     HAs went down significantly with Topamax twice a day       Review of Systems   Constitutional: Negative for activity change, appetite change, fatigue and fever.   HENT: Positive for tinnitus. Negative for congestion, ear pain and sinus pressure.    Eyes: Negative for pain and visual disturbance.   Respiratory: Negative for cough, chest tightness and wheezing.    Cardiovascular: Negative for chest pain, palpitations and leg swelling.   Gastrointestinal: Negative for abdominal distention, abdominal pain, constipation, diarrhea, nausea and vomiting.   Endocrine: Negative for polydipsia and polyuria.   Genitourinary: Negative for difficulty urinating, dyspareunia, dysuria, flank pain and hematuria.   Musculoskeletal: Negative for arthralgias and back pain.   Skin: Negative for rash.   Neurological: Positive for headaches. Negative for dizziness, tremors, syncope, weakness and numbness.   Psychiatric/Behavioral: Negative for agitation and confusion.           Past Medical History:   Diagnosis Date    COPD (chronic obstructive pulmonary disease)     NO HOME O2    Digestive disorder     Frequent headaches     Hypertension     Osteoarthritis 04/02/2019    Bilateral knees, ankles and feet    Severe obesity (BMI >= 40)     Sleep apnea     CPAP     Past Surgical History:   Procedure Laterality Date    BLADDER SUSPENSION      CATARACT EXTRACTION, BILATERAL      COLONOSCOPY N/A 12/3/2018    Procedure: COLONOSCOPY;  Surgeon: Mari Rader MD;  Location: [facility];  Service:  Endoscopy;  Laterality: N/A;    ESOPHAGOGASTRODUODENOSCOPY N/A 12/31/2020    Procedure: ESOPHAGOGASTRODUODENOSCOPY (EGD);  Surgeon: Anthony Rodríguez MD;  Location: [facility] END[facility];  Service: General;  Laterality: N/A;    HYSTERECTOMY      partial 2000    INJECTION OF ANESTHETIC AGENT INTO SACROILIAC JOINT Bilateral 11/30/2020    Procedure: Bilateral SIJ + Bilateral Piriformis + Bilateral GT Bursa Injection;  Surgeon: Thanh Diaz MD;  Location: [facility];  Service: Pain Management;  Laterality: Bilateral;    INJECTION OF JOINT Bilateral 11/30/2020    Procedure: Bilateral SIJ + Bilateral Piriformis + Bilateral GT Bursa Injection;  Surgeon: Thanh Diaz MD;  Location: [facility];  Service: Pain Management;  Laterality: Bilateral;    INJECTION OF PIRIFORMIS MUSCLE Bilateral 11/30/2020    Procedure: Bilateral SIJ + Bilateral Piriformis + Bilateral GT Bursa Injection;  Surgeon: Thanh Diaz MD;  Location: [facility];  Service: Pain Management;  Laterality: Bilateral;    ROBOT-ASSISTED LAPAROSCOPIC SLEEVE GASTRECTOMY USING DA EZEQUIEL XI N/A 3/2/2021    Procedure: XI ROBOTIC SLEEVE GASTRECTOMY;  Surgeon: Anthony Rodríguez MD;  Location: [facility];  Service: General;  Laterality: N/A;    SELECTIVE INJECTION OF ANESTHETIC AGENT AROUND LUMBAR SPINAL NERVE ROOT BY TRANSFORAMINAL APPROACH Bilateral 1/4/2021    Procedure: Bilateral L5/S1 TF GISELA;  Surgeon: Thanh Diaz MD;  Location: [facility];  Service: Pain Management;  Laterality: Bilateral;    SELECTIVE INJECTION OF ANESTHETIC AGENT AROUND LUMBAR SPINAL NERVE ROOT BY TRANSFORAMINAL APPROACH Bilateral 3/23/2021    Procedure: Bilateral L5/S1 TF GISELA;  Surgeon: Thanh Diaz MD;  Location: [facility];  Service: Pain Management;  Laterality: Bilateral;    SELECTIVE INJECTION OF ANESTHETIC AGENT AROUND LUMBAR SPINAL NERVE ROOT BY TRANSFORAMINAL APPROACH Bilateral 10/5/2021    Procedure: Bilateral L5/S1 TF GISELA;  Surgeon: Thanh Diaz MD;   [facility];  Service: Pain Management;  Laterality: Bilateral;    tonsilectomy      TOTAL KNEE ARTHROPLASTY Left 3/4/2020    Procedure: ARTHROPLASTY, KNEE, TOTAL;  Surgeon: Moy Connolly MD;  [facility] OR;  Service: Orthopedics;  Laterality: Left;     Family History   Problem Relation Age of Onset    Heart attack Father      Social History     Socioeconomic History    Marital status:    Tobacco Use    Smoking status: Never Smoker    Smokeless tobacco: Never Used   Substance and Sexual Activity    Alcohol use: Never    Drug use: No    Sexual activity: Never     Partners: Male     Birth control/protection: See Surgical Hx       Objective:        Physical Exam  Vitals reviewed.   Constitutional:       Appearance: Normal appearance. She is obese.   HENT:      Head: Normocephalic and atraumatic.   Cardiovascular:      Rate and Rhythm: Normal rate and regular rhythm.   Pulmonary:      Effort: Pulmonary effort is normal.   Skin:     General: Skin is warm.   Neurological:      Mental Status: She is alert and oriented to person, place, and time.   Psychiatric:         Mood and Affect: Mood normal.         Behavior: Behavior normal.           Results for orders placed or performed during the hospital encounter of 05/18/22   Nuclear Stress - Cardiology Interpreted   Result Value Ref Range    85% Max Predicted      Max Predicted      [facility] CV CPX PATIENT IS MALE 0.0     [facility] CV CPX PATIENT IS FEMALE 1.0     HR at rest 68 bpm    Systolic blood pressure 138 mmHg    Diastolic blood pressure 90 mmHg    RPP 9,384     Exercise duration (min) 1 minutes    Exercise duration (sec) 10 seconds    Peak  bpm    Peak Systolic  mmHg    Peak Diatolic BP 63 mmHg    Peak RPP 13,054     % Max HR Achieved 72     Nuc Rest EF 93     Nuc Stress EF 94 %       Assessment/Plan:     Bilateral lumbar radiculopathy  -     topiramate (TOPAMAX) 100 MG tablet; Take 1 tablet (100 mg total) by mouth  2 (two) times daily.  Dispense: 180 tablet; Refill: 1    History of migraine headaches  -     topiramate (TOPAMAX) 100 MG tablet; Take 1 tablet (100 mg total) by mouth 2 (two) times daily.  Dispense: 180 tablet; Refill: 1    Hemiplegia affecting left nondominant side, unspecified etiology, unspecified hemiplegia type    Tinnitus, unspecified laterality    pt no longer has hemiplegia   She has improved since her surgery on her knee     Tinnitus- follow up with ENT as scheduled    Follow up as needed     Jaclyn Becerril MD  ON   Family Medicine

## 2022-08-23 RX ORDER — OMEPRAZOLE 40 MG/1
CAPSULE, DELAYED RELEASE ORAL
Qty: 90 CAPSULE | Refills: 3 | Status: SHIPPED | OUTPATIENT
Start: 2022-08-23 | End: 2023-07-18

## 2022-08-23 NOTE — TELEPHONE ENCOUNTER
Refill Decision Note   Mariel Becerril  is requesting a refill authorization.  Brief Assessment and Rationale for Refill:  Approve     Medication Therapy Plan:       Medication Reconciliation Completed: No   Comments:     No Care Gaps recommended.     Note composed:8:15 AM 08/23/2022

## 2022-08-23 NOTE — TELEPHONE ENCOUNTER
No new care gaps identified.  [facility] Embedded Care Gaps. Reference number: [id number]. 8/23/2022   5:31:28 AM JOSET

## 2022-09-19 ENCOUNTER — CLINICAL SUPPORT (OUTPATIENT)
Dept: INTERNAL MEDICINE | Facility: CLINIC | Age: 67
End: 2022-09-19
Payer: MEDICARE

## 2022-09-19 VITALS — WEIGHT: 248.25 LBS | BODY MASS INDEX: 43.97 KG/M2

## 2022-09-19 DIAGNOSIS — E66.01 MORBID OBESITY WITH BMI OF 40.0-44.9, ADULT: Primary | ICD-10-CM

## 2022-09-19 PROCEDURE — 99999 PR PBB SHADOW E&M-EST. PATIENT-LVL I: CPT | Mod: PBBFAC,,,

## 2022-09-19 PROCEDURE — 99999 PR PBB SHADOW E&M-EST. PATIENT-LVL I: ICD-10-PCS | Mod: PBBFAC,,,

## 2022-09-19 NOTE — PROGRESS NOTES
Pt here for weight check for Dr. Becerril.  lbs 8/22/2022.    Today she is 248 lbs. Continue medications and f/u as scheduled.

## 2022-10-04 ENCOUNTER — PATIENT MESSAGE (OUTPATIENT)
Dept: ADMINISTRATIVE | Facility: HOSPITAL | Age: 67
End: 2022-10-04
Payer: MEDICARE

## 2022-10-05 ENCOUNTER — OFFICE VISIT (OUTPATIENT)
Dept: PAIN MEDICINE | Facility: CLINIC | Age: 67
End: 2022-10-05
Payer: MEDICARE

## 2022-10-05 VITALS
DIASTOLIC BLOOD PRESSURE: 81 MMHG | HEART RATE: 70 BPM | WEIGHT: 248 LBS | SYSTOLIC BLOOD PRESSURE: 123 MMHG | BODY MASS INDEX: 43.94 KG/M2 | HEIGHT: 63 IN

## 2022-10-05 DIAGNOSIS — M79.18 PIRIFORMIS MUSCLE PAIN: ICD-10-CM

## 2022-10-05 DIAGNOSIS — Z96.652 STATUS POST TOTAL KNEE REPLACEMENT USING CEMENT, LEFT: ICD-10-CM

## 2022-10-05 DIAGNOSIS — M25.562 POSTERIOR LEFT KNEE PAIN: ICD-10-CM

## 2022-10-05 DIAGNOSIS — Z86.69 HISTORY OF MIGRAINE HEADACHES: ICD-10-CM

## 2022-10-05 DIAGNOSIS — M17.0 PRIMARY OSTEOARTHRITIS OF BOTH KNEES: ICD-10-CM

## 2022-10-05 DIAGNOSIS — M54.16 BILATERAL LUMBAR RADICULOPATHY: ICD-10-CM

## 2022-10-05 PROCEDURE — 3079F PR MOST RECENT DIASTOLIC BLOOD PRESSURE 80-89 MM HG: ICD-10-PCS | Mod: CPTII,S$GLB,, | Performed by: PHYSICAL MEDICINE & REHABILITATION

## 2022-10-05 PROCEDURE — 99999 PR PBB SHADOW E&M-EST. PATIENT-LVL III: ICD-10-PCS | Mod: PBBFAC,,, | Performed by: PHYSICAL MEDICINE & REHABILITATION

## 2022-10-05 PROCEDURE — 3074F PR MOST RECENT SYSTOLIC BLOOD PRESSURE < 130 MM HG: ICD-10-PCS | Mod: CPTII,S$GLB,, | Performed by: PHYSICAL MEDICINE & REHABILITATION

## 2022-10-05 PROCEDURE — 3079F DIAST BP 80-89 MM HG: CPT | Mod: CPTII,S$GLB,, | Performed by: PHYSICAL MEDICINE & REHABILITATION

## 2022-10-05 PROCEDURE — 3288F FALL RISK ASSESSMENT DOCD: CPT | Mod: CPTII,S$GLB,, | Performed by: PHYSICAL MEDICINE & REHABILITATION

## 2022-10-05 PROCEDURE — 3008F PR BODY MASS INDEX (BMI) DOCUMENTED: ICD-10-PCS | Mod: CPTII,S$GLB,, | Performed by: PHYSICAL MEDICINE & REHABILITATION

## 2022-10-05 PROCEDURE — 3008F BODY MASS INDEX DOCD: CPT | Mod: CPTII,S$GLB,, | Performed by: PHYSICAL MEDICINE & REHABILITATION

## 2022-10-05 PROCEDURE — 1125F AMNT PAIN NOTED PAIN PRSNT: CPT | Mod: CPTII,S$GLB,, | Performed by: PHYSICAL MEDICINE & REHABILITATION

## 2022-10-05 PROCEDURE — 1101F PR PT FALLS ASSESS DOC 0-1 FALLS W/OUT INJ PAST YR: ICD-10-PCS | Mod: CPTII,S$GLB,, | Performed by: PHYSICAL MEDICINE & REHABILITATION

## 2022-10-05 PROCEDURE — 99214 PR OFFICE/OUTPT VISIT, EST, LEVL IV, 30-39 MIN: ICD-10-PCS | Mod: S$GLB,,, | Performed by: PHYSICAL MEDICINE & REHABILITATION

## 2022-10-05 PROCEDURE — 3074F SYST BP LT 130 MM HG: CPT | Mod: CPTII,S$GLB,, | Performed by: PHYSICAL MEDICINE & REHABILITATION

## 2022-10-05 PROCEDURE — 1159F MED LIST DOCD IN RCRD: CPT | Mod: CPTII,S$GLB,, | Performed by: PHYSICAL MEDICINE & REHABILITATION

## 2022-10-05 PROCEDURE — 99999 PR PBB SHADOW E&M-EST. PATIENT-LVL III: CPT | Mod: PBBFAC,,, | Performed by: PHYSICAL MEDICINE & REHABILITATION

## 2022-10-05 PROCEDURE — 1159F PR MEDICATION LIST DOCUMENTED IN MEDICAL RECORD: ICD-10-PCS | Mod: CPTII,S$GLB,, | Performed by: PHYSICAL MEDICINE & REHABILITATION

## 2022-10-05 PROCEDURE — 1125F PR PAIN SEVERITY QUANTIFIED, PAIN PRESENT: ICD-10-PCS | Mod: CPTII,S$GLB,, | Performed by: PHYSICAL MEDICINE & REHABILITATION

## 2022-10-05 PROCEDURE — 99214 OFFICE O/P EST MOD 30 MIN: CPT | Mod: S$GLB,,, | Performed by: PHYSICAL MEDICINE & REHABILITATION

## 2022-10-05 PROCEDURE — 3288F PR FALLS RISK ASSESSMENT DOCUMENTED: ICD-10-PCS | Mod: CPTII,S$GLB,, | Performed by: PHYSICAL MEDICINE & REHABILITATION

## 2022-10-05 PROCEDURE — 1101F PT FALLS ASSESS-DOCD LE1/YR: CPT | Mod: CPTII,S$GLB,, | Performed by: PHYSICAL MEDICINE & REHABILITATION

## 2022-10-05 RX ORDER — LIDOCAINE AND PRILOCAINE 25; 25 MG/G; MG/G
CREAM TOPICAL
Qty: 30 G | Refills: 1 | Status: SHIPPED | OUTPATIENT
Start: 2022-10-05 | End: 2023-01-10 | Stop reason: SDUPTHER

## 2022-10-05 RX ORDER — MELOXICAM 15 MG/1
15 TABLET ORAL DAILY
Qty: 90 TABLET | Refills: 3 | Status: SHIPPED | OUTPATIENT
Start: 2022-10-05 | End: 2023-01-10 | Stop reason: SDUPTHER

## 2022-10-05 RX ORDER — METHOCARBAMOL 750 MG/1
750 TABLET, FILM COATED ORAL 3 TIMES DAILY PRN
Qty: 90 TABLET | Refills: 3 | Status: SHIPPED | OUTPATIENT
Start: 2022-10-05 | End: 2023-01-10 | Stop reason: SDUPTHER

## 2022-10-05 RX ORDER — DICLOFENAC SODIUM 10 MG/G
2 GEL TOPICAL 3 TIMES DAILY PRN
Qty: 200 G | Refills: 0 | Status: SHIPPED | OUTPATIENT
Start: 2022-10-05 | End: 2022-12-06 | Stop reason: SDUPTHER

## 2022-10-05 RX ORDER — TOPIRAMATE 100 MG/1
100 TABLET, FILM COATED ORAL 2 TIMES DAILY
Qty: 180 TABLET | Refills: 1 | Status: SHIPPED | OUTPATIENT
Start: 2022-10-05 | End: 2023-01-10 | Stop reason: SDUPTHER
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
## 2022-10-11 ENCOUNTER — PATIENT MESSAGE (OUTPATIENT)
Dept: INTERNAL MEDICINE | Facility: CLINIC | Age: 67
End: 2022-10-11
Payer: MEDICARE

## 2022-10-18 ENCOUNTER — IMMUNIZATION (OUTPATIENT)
Dept: PHARMACY | Facility: CLINIC | Age: 67
End: 2022-10-18
Payer: MEDICARE

## 2022-10-18 ENCOUNTER — IMMUNIZATION (OUTPATIENT)
Dept: PHARMACY | Facility: CLINIC | Age: 67
End: 2022-10-18

## 2022-10-18 ENCOUNTER — PATIENT MESSAGE (OUTPATIENT)
Dept: PHARMACY | Facility: CLINIC | Age: 67
End: 2022-10-18
Payer: MEDICARE

## 2022-11-09 ENCOUNTER — NUTRITION (OUTPATIENT)
Dept: INTERNAL MEDICINE | Facility: CLINIC | Age: 67
End: 2022-11-09
Payer: MEDICARE

## 2022-11-09 ENCOUNTER — OFFICE VISIT (OUTPATIENT)
Dept: SURGERY | Facility: CLINIC | Age: 67
End: 2022-11-09
Payer: MEDICARE

## 2022-11-09 ENCOUNTER — LAB VISIT (OUTPATIENT)
Dept: LAB | Facility: HOSPITAL | Age: 67
End: 2022-11-09
Payer: MEDICARE

## 2022-11-09 VITALS
DIASTOLIC BLOOD PRESSURE: 74 MMHG | HEART RATE: 79 BPM | BODY MASS INDEX: 42.58 KG/M2 | TEMPERATURE: 99 F | SYSTOLIC BLOOD PRESSURE: 113 MMHG | HEIGHT: 63 IN | WEIGHT: 240.31 LBS

## 2022-11-09 DIAGNOSIS — E66.01 MORBID OBESITY WITH BMI OF 40.0-44.9, ADULT: ICD-10-CM

## 2022-11-09 DIAGNOSIS — Z98.84 STATUS POST BARIATRIC SURGERY: Primary | ICD-10-CM

## 2022-11-09 DIAGNOSIS — E66.01 MORBID OBESITY WITH BMI OF 40.0-44.9, ADULT: Primary | ICD-10-CM

## 2022-11-09 DIAGNOSIS — Z71.3 DIETARY COUNSELING: ICD-10-CM

## 2022-11-09 LAB
ALBUMIN SERPL BCP-MCNC: 3.6 G/DL (ref 3.5–5.2)
ALP SERPL-CCNC: 92 U/L (ref 55–135)
ALT SERPL W/O P-5'-P-CCNC: 34 U/L (ref 10–44)
ANION GAP SERPL CALC-SCNC: 10 MMOL/L (ref 8–16)
AST SERPL-CCNC: 35 U/L (ref 10–40)
BASOPHILS # BLD AUTO: 0.04 K/UL (ref 0–0.2)
BASOPHILS NFR BLD: 0.8 % (ref 0–1.9)
BILIRUB SERPL-MCNC: 0.2 MG/DL (ref 0.1–1)
BUN SERPL-MCNC: 19 MG/DL (ref 8–23)
CALCIUM SERPL-MCNC: 9 MG/DL (ref 8.7–10.5)
CHLORIDE SERPL-SCNC: 111 MMOL/L (ref 95–110)
CO2 SERPL-SCNC: 23 MMOL/L (ref 23–29)
CREAT SERPL-MCNC: 0.8 MG/DL (ref 0.5–1.4)
DIFFERENTIAL METHOD: ABNORMAL
EOSINOPHIL # BLD AUTO: 0.2 K/UL (ref 0–0.5)
EOSINOPHIL NFR BLD: 4 % (ref 0–8)
ERYTHROCYTE [DISTWIDTH] IN BLOOD BY AUTOMATED COUNT: 14 % (ref 11.5–14.5)
EST. GFR  (NO RACE VARIABLE): >60 ML/MIN/1.73 M^2
ESTIMATED AVG GLUCOSE: 108 MG/DL (ref 68–131)
GLUCOSE SERPL-MCNC: 87 MG/DL (ref 70–110)
HBA1C MFR BLD: 5.4 % (ref 4–5.6)
HCT VFR BLD AUTO: 40.2 % (ref 37–48.5)
HGB BLD-MCNC: 12.2 G/DL (ref 12–16)
IMM GRANULOCYTES # BLD AUTO: 0.01 K/UL (ref 0–0.04)
IMM GRANULOCYTES NFR BLD AUTO: 0.2 % (ref 0–0.5)
IRON SERPL-MCNC: 69 UG/DL (ref 30–160)
LYMPHOCYTES # BLD AUTO: 2 K/UL (ref 1–4.8)
LYMPHOCYTES NFR BLD: 38.2 % (ref 18–48)
MCH RBC QN AUTO: 29.9 PG (ref 27–31)
MCHC RBC AUTO-ENTMCNC: 30.3 G/DL (ref 32–36)
MCV RBC AUTO: 99 FL (ref 82–98)
MONOCYTES # BLD AUTO: 0.4 K/UL (ref 0.3–1)
MONOCYTES NFR BLD: 7.5 % (ref 4–15)
NEUTROPHILS # BLD AUTO: 2.6 K/UL (ref 1.8–7.7)
NEUTROPHILS NFR BLD: 49.3 % (ref 38–73)
NRBC BLD-RTO: 0 /100 WBC
PLATELET # BLD AUTO: 257 K/UL (ref 150–450)
PMV BLD AUTO: 9.9 FL (ref 9.2–12.9)
POTASSIUM SERPL-SCNC: 4.1 MMOL/L (ref 3.5–5.1)
PREALB SERPL-MCNC: 23 MG/DL (ref 20–43)
PROT SERPL-MCNC: 6.7 G/DL (ref 6–8.4)
RBC # BLD AUTO: 4.08 M/UL (ref 4–5.4)
SATURATED IRON: 25 % (ref 20–50)
SODIUM SERPL-SCNC: 144 MMOL/L (ref 136–145)
T4 FREE SERPL-MCNC: 0.78 NG/DL (ref 0.71–1.51)
TOTAL IRON BINDING CAPACITY: 271 UG/DL (ref 250–450)
TRANSFERRIN SERPL-MCNC: 183 MG/DL (ref 200–375)
TSH SERPL DL<=0.005 MIU/L-ACNC: 1.19 UIU/ML (ref 0.4–4)
VIT B12 SERPL-MCNC: >2000 PG/ML (ref 210–950)
WBC # BLD AUTO: 5.31 K/UL (ref 3.9–12.7)

## 2022-11-09 PROCEDURE — 99214 PR OFFICE/OUTPT VISIT, EST, LEVL IV, 30-39 MIN: ICD-10-PCS | Mod: S$GLB,,,

## 2022-11-09 PROCEDURE — 1126F AMNT PAIN NOTED NONE PRSNT: CPT | Mod: CPTII,S$GLB,,

## 2022-11-09 PROCEDURE — 84134 ASSAY OF PREALBUMIN: CPT

## 2022-11-09 PROCEDURE — 85025 COMPLETE CBC W/AUTO DIFF WBC: CPT

## 2022-11-09 PROCEDURE — 1160F PR REVIEW ALL MEDS BY PRESCRIBER/CLIN PHARMACIST DOCUMENTED: ICD-10-PCS | Mod: CPTII,S$GLB,,

## 2022-11-09 PROCEDURE — 83036 HEMOGLOBIN GLYCOSYLATED A1C: CPT

## 2022-11-09 PROCEDURE — 84425 ASSAY OF VITAMIN B-1: CPT

## 2022-11-09 PROCEDURE — 99214 OFFICE O/P EST MOD 30 MIN: CPT | Mod: S$GLB,,,

## 2022-11-09 PROCEDURE — 84466 ASSAY OF TRANSFERRIN: CPT

## 2022-11-09 PROCEDURE — 3008F PR BODY MASS INDEX (BMI) DOCUMENTED: ICD-10-PCS | Mod: CPTII,S$GLB,,

## 2022-11-09 PROCEDURE — 97803 PR MED NUTR THER, SUBSQ, INDIV, EA 15 MIN: ICD-10-PCS | Mod: S$GLB,,, | Performed by: DIETITIAN, REGISTERED

## 2022-11-09 PROCEDURE — 1126F PR PAIN SEVERITY QUANTIFIED, NO PAIN PRESENT: ICD-10-PCS | Mod: CPTII,S$GLB,,

## 2022-11-09 PROCEDURE — 97803 MED NUTRITION INDIV SUBSEQ: CPT | Mod: S$GLB,,, | Performed by: DIETITIAN, REGISTERED

## 2022-11-09 PROCEDURE — 99999 PR PBB SHADOW E&M-EST. PATIENT-LVL III: ICD-10-PCS | Mod: PBBFAC,,,

## 2022-11-09 PROCEDURE — 3078F PR MOST RECENT DIASTOLIC BLOOD PRESSURE < 80 MM HG: ICD-10-PCS | Mod: CPTII,S$GLB,,

## 2022-11-09 PROCEDURE — 3008F BODY MASS INDEX DOCD: CPT | Mod: CPTII,S$GLB,,

## 2022-11-09 PROCEDURE — 1159F MED LIST DOCD IN RCRD: CPT | Mod: CPTII,S$GLB,,

## 2022-11-09 PROCEDURE — 99999 PR PBB SHADOW E&M-EST. PATIENT-LVL III: CPT | Mod: PBBFAC,,,

## 2022-11-09 PROCEDURE — 84443 ASSAY THYROID STIM HORMONE: CPT

## 2022-11-09 PROCEDURE — 1160F RVW MEDS BY RX/DR IN RCRD: CPT | Mod: CPTII,S$GLB,,

## 2022-11-09 PROCEDURE — 1159F PR MEDICATION LIST DOCUMENTED IN MEDICAL RECORD: ICD-10-PCS | Mod: CPTII,S$GLB,,

## 2022-11-09 PROCEDURE — 3074F SYST BP LT 130 MM HG: CPT | Mod: CPTII,S$GLB,,

## 2022-11-09 PROCEDURE — 80053 COMPREHEN METABOLIC PANEL: CPT

## 2022-11-09 PROCEDURE — 82607 VITAMIN B-12: CPT

## 2022-11-09 PROCEDURE — 84439 ASSAY OF FREE THYROXINE: CPT

## 2022-11-09 PROCEDURE — 3078F DIAST BP <80 MM HG: CPT | Mod: CPTII,S$GLB,,

## 2022-11-09 PROCEDURE — 82652 VIT D 1 25-DIHYDROXY: CPT

## 2022-11-09 PROCEDURE — 3074F PR MOST RECENT SYSTOLIC BLOOD PRESSURE < 130 MM HG: ICD-10-PCS | Mod: CPTII,S$GLB,,

## 2022-11-09 NOTE — PROGRESS NOTES
NUTRITION NOTE    Referring Physician: Dr. Rodríguez  Reason for MNT Referral: Follow-up 20 months s/p Gastric Sleeve, 3-2-2021    PAST MEDICAL HISTORY:    Denies nausea, vomiting, constipation, and diarrhea.  Reports doing well.    Reports weight increased to 250 lbs earlier this year due to lack of motivation to continue with bariatric diet.   After reaching high weight of 250 lbs, began working towards weight loss again.  Reached 248 lbs October 5th.  Weight is currently stalling at 238-240 lbs.    Includes protein shakes (20-30 grams protein choices) 3-4 times per day. + 1 meal consisting of salad + chicken/shrimp + small amount ranch dressing.     Includes cheese and wheat bread a few times.         Past Medical History:   Diagnosis Date    COPD (chronic obstructive pulmonary disease)     NO HOME O2    Digestive disorder     Frequent headaches     Hypertension     Osteoarthritis 04/02/2019    Bilateral knees, ankles and feet    Severe obesity (BMI >= 40)     Sleep apnea     CPAP       CLINICAL DATA:  67 y.o. female.    There were no vitals filed for this visit.    Current Weight: 240 lbs   9-: 242 lbs              6-: 234.96 lbs              2 week post op, 3-: 248 lbs              Surgery weight: 255 lbs              Pre-op, 1-: 261  BMI: 42.6  Total Weight Loss: -15 lbs since surgery      LABS:  None available    CURRENT DIET:  Bariatric Diet.  Not tracking intake, but is keeping a mental log. Estimated to consume 800-1000 calories per day and 80 grams protein.      Meal Pattern: Includes protein shakes (20-30 grams protein choices) 3-4 times per day. + 1 meal consisting of salad + chicken/shrimp + small amount ranch dressing.   Excess protein supplement intake.  Adequate dairy intake.  Adequate vegetable intake. Tolerates raw vegetables and lettuce.  Adequate fruit intake.  Starchy CHO: bread  Other:     EXERCISE:  Adequate light exercise.    Restrictions to Exercise:  None.    VITAMINS / MINERALS:  Cetrum Silver for women  Calcium    ASSESSMENT:  Doing poorly overall (limited weight loss since surgery)  Weight loss.  Excess calorie intake.  Excess protein intake.  Adequate fluid intake.  Following diet inappropriately.  Exercising.  InAdequate vitamins & minerals.    BARIATRIC DIET DISCUSSION:  Instructed and provided written materials on bariatric diet plan.  Reinforced post-op nutrition guidelines.    PLAN / RECOMMENDATIONS:  May begin to incorporate raw vegetables, lettuce, unsalted nuts, and light popcorn as tolerated.  May begin to swallow whole pills as tolerated.  Back on track with diet plan.  Decrease protein intake.  Maintain fluid intake.  Increase exercise.  Increase appropriate vitamins & minerals.  Adjust vitamins & minerals by include 18 mg iron.    Track intake of 800 calories daily.  Add variety to exercise routine.  Decrease protein shake intake by 1    Return to clinic in 4 month or sooner if needed (recommended).    SESSION TIME: 30 minutes

## 2022-11-09 NOTE — PROGRESS NOTES
BARIATRIC NEW PATIENT EVALUATION    CHIEF COMPLAINT:     Morbid obesity with a BMI of  45.45 and inability to lose weight.    HISTORY OF PRESENT ILLNESS:  Mariel Becerril is a 67 y.o.-year-old female s/p robotic gastric sleeve 3/2/21 presents for follow-up.  She is doing well but still has not lost much weight since the last visit. She is exercising. She visited with Doreen and is trying to get back on track. She is otherwise feeling well.     presents for preop sleeve gastrectomy.  She has undergone evaluation with dietitian and psych with no contraindications to surgery. She has also completed EGD with no significant abnormalities noted. She would like to move forward with surgery.    presents to re-evaluate for sleeve gastrectomy.  She initially elected not to pursue it as she was not ready to take it on but now feels she is in a better position to move forward with weight loss surgery.  In the interim she has undergone knee surgery which allows her to be more mobile but still has some back pain.  She has lost some weight but feels like she is regaining it slowly.    Initially presenting for  morbid obesity with a BMI of  45.45 and inability to lose weight. The patient has tried  Diet and previously exercise well however due to pain in her left knee she is no longer able to exercise as her mobility is limited.  She was previously able to lose about 40 lb dropping from 297 down to current weight of 259.    Height 5 ft 3 in  Weight 246->261 ->248-> 235 ->242  --> 240  BMI 43.8 -> 46 ->43-> 41 ->42--> 42      CO-MORBIDITIES:  hypertension, obstructive sleep apnea and osteoarthritis    PAST MEDICAL HISTORY:  Past Medical History:   Diagnosis Date    COPD (chronic obstructive pulmonary disease)     NO HOME O2    Digestive disorder     Frequent headaches     Hypertension     Osteoarthritis 04/02/2019    Bilateral knees, ankles and feet    Severe obesity (BMI >= 40)     Sleep apnea     CPAP        PAST SURGICAL  HISTORY:  Past Surgical History:   Procedure Laterality Date    BLADDER SUSPENSION      CATARACT EXTRACTION, BILATERAL      COLONOSCOPY N/A 12/3/2018    Procedure: COLONOSCOPY;  Surgeon: Mari Rader MD;  Location: [facility] ENDO;  Service: Endoscopy;  Laterality: N/A;    ESOPHAGOGASTRODUODENOSCOPY N/A 12/31/2020    Procedure: ESOPHAGOGASTRODUODENOSCOPY (EGD);  Surgeon: Anthony Rodríguez MD;  Location: [facility] ENDO;  Service: General;  Laterality: N/A;    HYSTERECTOMY      partial 2000    INJECTION OF ANESTHETIC AGENT INTO SACROILIAC JOINT Bilateral 11/30/2020    Procedure: Bilateral SIJ + Bilateral Piriformis + Bilateral GT Bursa Injection;  Surgeon: Thanh Diaz MD;  Location: [facility] PAIN MGT;  Service: Pain Management;  Laterality: Bilateral;    INJECTION OF JOINT Bilateral 11/30/2020    Procedure: Bilateral SIJ + Bilateral Piriformis + Bilateral GT Bursa Injection;  Surgeon: Thanh Diaz MD;  Location: [facility] PAIN MGT;  Service: Pain Management;  Laterality: Bilateral;    INJECTION OF PIRIFORMIS MUSCLE Bilateral 11/30/2020    Procedure: Bilateral SIJ + Bilateral Piriformis + Bilateral GT Bursa Injection;  Surgeon: Thanh Diaz MD;  Location: [facility] PAIN MGT;  Service: Pain Management;  Laterality: Bilateral;    ROBOT-ASSISTED LAPAROSCOPIC SLEEVE GASTRECTOMY USING DA EZEQUIEL XI N/A 3/2/2021    Procedure: XI ROBOTIC SLEEVE GASTRECTOMY;  Surgeon: Anthony Rodríguez MD;  Location: [facility] OR;  Service: General;  Laterality: N/A;    SELECTIVE INJECTION OF ANESTHETIC AGENT AROUND LUMBAR SPINAL NERVE ROOT BY TRANSFORAMINAL APPROACH Bilateral 1/4/2021    Procedure: Bilateral L5/S1 TF GISELA;  Surgeon: Thanh Diaz MD;  Location: [facility] PAIN MGT;  Service: Pain Management;  Laterality: Bilateral;    SELECTIVE INJECTION OF ANESTHETIC AGENT AROUND LUMBAR SPINAL NERVE ROOT BY TRANSFORAMINAL APPROACH Bilateral 3/23/2021    Procedure: Bilateral L5/S1 TF GISELA;  Surgeon: Thanh Diaz MD;  Location: [facility] PAIN MGT;  Service: Pain  Management;  Laterality: Bilateral;    SELECTIVE INJECTION OF ANESTHETIC AGENT AROUND LUMBAR SPINAL NERVE ROOT BY TRANSFORAMINAL APPROACH Bilateral 10/5/2021    Procedure: Bilateral L5/S1 TF GISELA;  Surgeon: Thanh Diaz MD;  Location: [facility] PAIN T;  Service: Pain Management;  Laterality: Bilateral;    tonsilectomy      TOTAL KNEE ARTHROPLASTY Left 3/4/2020    Procedure: ARTHROPLASTY, KNEE, TOTAL;  Surgeon: Moy Connolly MD;  Location: [facility] OR;  Service: Orthopedics;  Laterality: Left;       FAMILY HISTORY:  Family History   Problem Relation Age of Onset    Heart attack Father         SOCIAL HISTORY:   reports that she has never smoked. She has never used smokeless tobacco. She reports that she does not drink alcohol and does not use drugs.     MEDICATIONS:  Current Outpatient Medications on File Prior to Visit   Medication Sig Dispense Refill    acetaminophen (TYLENOL) 325 MG tablet Take 2 tablets (650 mg total) by mouth every 8 (eight) hours as needed. 60 tablet 2    ALPRAZolam (XANAX) 0.25 MG tablet Take 1 tablet by mouth twice daily as needed for anxiety 20 tablet 0    amLODIPine (NORVASC) 5 MG tablet       aspirin 325 MG tablet Take 325 mg by mouth once daily.      benzonatate (TESSALON) 100 MG capsule Take 1 capsule by mouth three times daily as needed 30 capsule 0    diclofenac sodium (VOLTAREN) 1 % Gel Apply 2 g topically 3 (three) times daily as needed. 200 g 0    eflornithine (VANIQA) 13.9 % cream Apply topically 2 (two) times daily. 45 g 3    ergocalciferol, vitamin D2, (VITAMIN D ORAL) Take 2,000 Units by mouth once daily.      furosemide (LASIX) 40 MG tablet Take 1 tablet (40 mg total) by mouth daily as needed (edema, swelling due to fluid). 90 tablet 3    gabapentin (NEURONTIN) 300 MG capsule Take 2 capsules (600 mg total) by mouth every evening. 60 capsule 5    levocetirizine (XYZAL) 5 MG tablet TAKE 1 TABLET BY MOUTH ONCE DAILY IN THE EVENING 90 tablet 0    LIDOcaine-prilocaine (EMLA)  cream APPLY  CREAM TOPICALLY UP TO 4 TIMES DAILY AS NEEDED FOR PAIN 30 g 1    meclizine (ANTIVERT) 25 mg tablet Take 1 tablet (25 mg total) by mouth 3 (three) times daily as needed for Dizziness. 30 tablet 0    meloxicam (MOBIC) 15 MG tablet Take 1 tablet (15 mg total) by mouth once daily. 90 tablet 3    methocarbamoL (ROBAXIN) 750 MG Tab Take 1 tablet (750 mg total) by mouth 3 (three) times daily as needed. 90 tablet 3    metoprolol succinate (TOPROL-XL) 50 MG 24 hr tablet Take 1 tablet (50 mg total) by mouth once daily. 90 tablet 3    montelukast (SINGULAIR) 10 mg tablet Take 1 tablet (10 mg total) by mouth every evening. 30 tablet 11    mupirocin (BACTROBAN) 2 % ointment Apply topically once daily. 22 g 0    nitroGLYCERIN (NITROSTAT) 0.4 MG SL tablet Place 1 tablet (0.4 mg total) under the tongue every 5 (five) minutes as needed for Chest pain. 30 tablet 0    nystatin (MYCOSTATIN) cream APPLY  CREAM TOPICALLY TO AFFECTED AREA TWICE DAILY 30 g 0    nystatin (MYCOSTATIN) powder Apply topically 2 (two) times daily. 120 g 1    omeprazole (PRILOSEC) 40 MG capsule Take 1 capsule by mouth once daily 90 capsule 3    ondansetron (ZOFRAN) 8 MG tablet Take 1 tablet (8 mg total) by mouth every 8 (eight) hours as needed for Nausea. 20 tablet 0    potassium chloride SA (K-DUR,KLOR-CON) 20 MEQ tablet Take 1 tablet (20 mEq total) by mouth daily as needed (if taking lasix). 60 tablet 2    SF 5000 PLUS 1.1 % Crea BRUSH FOR 2 MINUTES AT BEDTIME THEN EXPECTORATE THE EXCESS.(NOTHING TO EAT OR DRINK FOR 30 MINUTES AFTER USE )      topiramate (TOPAMAX) 100 MG tablet Take 1 tablet (100 mg total) by mouth 2 (two) times daily. 180 tablet 1    albuterol (PROAIR HFA) 90 mcg/actuation inhaler Inhale 2 puffs into the lungs every 6 (six) hours as needed for Wheezing. Rescue 18 g 0    imipramine (TOFRANIL) 10 MG Tab Take 1 tablet (10 mg total) by mouth every evening. 90 tablet 1     No current facility-administered medications on file prior to  visit.     Medications have been reviewed.    ALLERGIES:  Review of patient's allergies indicates:   Allergen Reactions    Penicillins Rash     Allergies have been reviewed.    ROS:  Review of Systems   Constitutional:  Negative for chills, fatigue, fever and unexpected weight change.   Respiratory:  Negative for cough, shortness of breath, wheezing and stridor.    Cardiovascular:  Negative for chest pain, palpitations and leg swelling.   Gastrointestinal:  Negative for abdominal distention, abdominal pain, constipation, diarrhea, nausea and vomiting.   Genitourinary:  Negative for difficulty urinating, dysuria, frequency, hematuria and urgency.   Skin:  Negative for color change, pallor, rash and wound.   Hematological:  Does not bruise/bleed easily.     PE:  Physical Exam  Vitals reviewed.   Constitutional:       General: She is not in acute distress.     Appearance: She is well-developed. She is obese.   HENT:      Head: Normocephalic and atraumatic.      Right Ear: External ear normal.      Left Ear: External ear normal.   Eyes:      Conjunctiva/sclera: Conjunctivae normal.   Cardiovascular:      Rate and Rhythm: Normal rate.   Pulmonary:      Effort: Pulmonary effort is normal. No respiratory distress.   Abdominal:      General: There is no distension.      Palpations: Abdomen is soft.      Tenderness: There is no abdominal tenderness.      Comments: Incisions well-healed   Musculoskeletal:      Cervical back: Neck supple.   Skin:     General: Skin is warm and dry.   Neurological:      Mental Status: She is alert and oriented to person, place, and time.   Psychiatric:         Behavior: Behavior normal.     EGD:  Impression:           - Z-line regular, 37 cm from the incisors.                         - Gastritis. Biopsied.                         - Multiple gastric polyps. Resected and retrieved.                         - Normal second portion of the duodenum.     Final Pathologic Diagnosis GREATER CURVATURE OF  STOMACH, SLEEVE GASTRECTOMY:   - Benign portion of stomach with fundic gland polyps and proton pump   inhibitor effect.   - No Helicobacter pylori identified on H&E or immunohistochemical stain.   - No dysplasia or malignancy.         DIAGNOSIS:  1.  Morbid obesity with a BMI of  45.45 and inability to lose weight.  2. Co-morbidities: hypertension, obstructive sleep apnea and osteoarthritis    S/p robotic sleeve gastrectomy    PLAN:  Reinforced importance of calorie deficit and exercise in continued weight loss  Bariatric diet reinforced/dietician   Encouraged exercise  Nutritional labs  Follow-up in 6 months      Kimber Valencia PA-C  [facility]

## 2022-11-10 ENCOUNTER — PATIENT MESSAGE (OUTPATIENT)
Dept: SURGERY | Facility: CLINIC | Age: 67
End: 2022-11-10
Payer: MEDICARE

## 2022-11-10 ENCOUNTER — PATIENT MESSAGE (OUTPATIENT)
Dept: INTERNAL MEDICINE | Facility: CLINIC | Age: 67
End: 2022-11-10
Payer: MEDICARE

## 2022-11-14 LAB — 1,25(OH)2D3 SERPL-MCNC: 80 PG/ML (ref 20–79)

## 2022-11-17 LAB — VIT B1 BLD-MCNC: 92 UG/L (ref 38–122)

## 2022-11-18 ENCOUNTER — PATIENT MESSAGE (OUTPATIENT)
Dept: RESEARCH | Facility: HOSPITAL | Age: 67
End: 2022-11-18
Payer: MEDICARE

## 2022-12-01 ENCOUNTER — OFFICE VISIT (OUTPATIENT)
Dept: INTERNAL MEDICINE | Facility: CLINIC | Age: 67
End: 2022-12-01
Payer: MEDICARE

## 2022-12-01 DIAGNOSIS — Z71.89 ENCOUNTER FOR MEDICATION REVIEW AND COUNSELING: Primary | ICD-10-CM

## 2022-12-01 PROCEDURE — 1160F RVW MEDS BY RX/DR IN RCRD: CPT | Mod: CPTII,95,, | Performed by: NURSE PRACTITIONER

## 2022-12-01 PROCEDURE — 3044F HG A1C LEVEL LT 7.0%: CPT | Mod: CPTII,95,, | Performed by: NURSE PRACTITIONER

## 2022-12-01 PROCEDURE — 99212 PR OFFICE/OUTPT VISIT, EST, LEVL II, 10-19 MIN: ICD-10-PCS | Mod: 95,,, | Performed by: NURSE PRACTITIONER

## 2022-12-01 PROCEDURE — 3044F PR MOST RECENT HEMOGLOBIN A1C LEVEL <7.0%: ICD-10-PCS | Mod: CPTII,95,, | Performed by: NURSE PRACTITIONER

## 2022-12-01 PROCEDURE — 1159F PR MEDICATION LIST DOCUMENTED IN MEDICAL RECORD: ICD-10-PCS | Mod: CPTII,95,, | Performed by: NURSE PRACTITIONER

## 2022-12-01 PROCEDURE — 1159F MED LIST DOCD IN RCRD: CPT | Mod: CPTII,95,, | Performed by: NURSE PRACTITIONER

## 2022-12-01 PROCEDURE — 1160F PR REVIEW ALL MEDS BY PRESCRIBER/CLIN PHARMACIST DOCUMENTED: ICD-10-PCS | Mod: CPTII,95,, | Performed by: NURSE PRACTITIONER

## 2022-12-01 PROCEDURE — 99212 OFFICE O/P EST SF 10 MIN: CPT | Mod: 95,,, | Performed by: NURSE PRACTITIONER

## 2022-12-01 NOTE — PROGRESS NOTES
Subjective:       Patient ID: Mariel Becerril is a 67 y.o. female.    Chief Complaint: No chief complaint on file.    The patient location is: [place]  The chief complaint leading to consultation is: medication review     Visit type: audiovisual    Face to Face time with patient: 10 minutes of total time spent on the encounter, which includes face to face time and non-face to face time preparing to see the patient (eg, review of tests), Obtaining and/or reviewing separately obtained history, Documenting clinical information in the electronic or other health record, Independently interpreting results (not separately reported) and communicating results to the patient/family/caregiver, or Care coordination (not separately reported).         Each patient to whom he or she provides medical services by telemedicine is:  (1) informed of the relationship between the physician and patient and the respective role of any other health care provider with respect to management of the patient; and (2) notified that he or she may decline to receive medical services by telemedicine and may withdraw from such care at any time.    Notes:        Patient presents with to discuss medications.  Wanting to get off some medications (pain prevention/treating medications).   Discussed pain medication/muscle relaxer.      Advised not to changed ASA dose.  Advised to consult with cardiology regarding her ASA dose.      Has concerned of constipation.     Review of Systems   Constitutional:  Negative for activity change.   HENT:  Positive for hearing loss. Negative for trouble swallowing.    Eyes:  Positive for discharge. Negative for visual disturbance.   Respiratory:  Negative for chest tightness and wheezing.    Cardiovascular:  Negative for chest pain and palpitations.   Gastrointestinal:  Positive for constipation. Negative for blood in stool, diarrhea and vomiting.   Endocrine: Negative for polydipsia and polyuria.   Genitourinary:   Negative for difficulty urinating, dysuria, hematuria and menstrual problem.   Musculoskeletal:  Negative for arthralgias, joint swelling and neck pain.   Neurological:  Negative for weakness and headaches.   Psychiatric/Behavioral:  Negative for confusion and dysphoric mood.        Objective:      Physical Exam  Pulmonary:      Effort: Pulmonary effort is normal. No respiratory distress.   Neurological:      General: No focal deficit present.      Mental Status: She is alert.   Psychiatric:         Mood and Affect: Mood normal.       Assessment:       Problem List Items Addressed This Visit    None  Visit Diagnoses       Encounter for medication review and counseling    -  Primary    Advised to take muscle relaxers and pain relievers only as needed.  Omeprazole as needed.             Plan:           Encounter for medication review and counseling  Comments:  Advised to take muscle relaxers and pain relievers only as needed.  Omeprazole as needed.

## 2022-12-19 ENCOUNTER — PATIENT OUTREACH (OUTPATIENT)
Dept: ADMINISTRATIVE | Facility: HOSPITAL | Age: 67
End: 2022-12-19
Payer: MEDICARE

## 2022-12-19 NOTE — PROGRESS NOTES
Working Mammogram Report:     Pt overdue for mammogram. Called to offer scheduling.  Scheduled 02/01/2023

## 2023-01-10 ENCOUNTER — OFFICE VISIT (OUTPATIENT)
Dept: PAIN MEDICINE | Facility: CLINIC | Age: 68
End: 2023-01-10
Payer: MEDICARE

## 2023-01-10 VITALS
HEIGHT: 63 IN | RESPIRATION RATE: 14 BRPM | DIASTOLIC BLOOD PRESSURE: 83 MMHG | HEART RATE: 70 BPM | WEIGHT: 226.94 LBS | SYSTOLIC BLOOD PRESSURE: 131 MMHG | BODY MASS INDEX: 40.21 KG/M2

## 2023-01-10 DIAGNOSIS — Z96.652 STATUS POST TOTAL KNEE REPLACEMENT USING CEMENT, LEFT: ICD-10-CM

## 2023-01-10 DIAGNOSIS — M79.18 PIRIFORMIS MUSCLE PAIN: ICD-10-CM

## 2023-01-10 DIAGNOSIS — M54.16 BILATERAL LUMBAR RADICULOPATHY: ICD-10-CM

## 2023-01-10 DIAGNOSIS — M25.562 POSTERIOR LEFT KNEE PAIN: ICD-10-CM

## 2023-01-10 DIAGNOSIS — M17.0 PRIMARY OSTEOARTHRITIS OF BOTH KNEES: ICD-10-CM

## 2023-01-10 DIAGNOSIS — Z86.69 HISTORY OF MIGRAINE HEADACHES: ICD-10-CM

## 2023-01-10 PROCEDURE — 99999 PR PBB SHADOW E&M-EST. PATIENT-LVL III: CPT | Mod: PBBFAC,,, | Performed by: PHYSICIAN ASSISTANT

## 2023-01-10 PROCEDURE — 3079F PR MOST RECENT DIASTOLIC BLOOD PRESSURE 80-89 MM HG: ICD-10-PCS | Mod: CPTII,S$GLB,, | Performed by: PHYSICIAN ASSISTANT

## 2023-01-10 PROCEDURE — 1101F PR PT FALLS ASSESS DOC 0-1 FALLS W/OUT INJ PAST YR: ICD-10-PCS | Mod: CPTII,S$GLB,, | Performed by: PHYSICIAN ASSISTANT

## 2023-01-10 PROCEDURE — 3288F FALL RISK ASSESSMENT DOCD: CPT | Mod: CPTII,S$GLB,, | Performed by: PHYSICIAN ASSISTANT

## 2023-01-10 PROCEDURE — 3288F PR FALLS RISK ASSESSMENT DOCUMENTED: ICD-10-PCS | Mod: CPTII,S$GLB,, | Performed by: PHYSICIAN ASSISTANT

## 2023-01-10 PROCEDURE — 3008F PR BODY MASS INDEX (BMI) DOCUMENTED: ICD-10-PCS | Mod: CPTII,S$GLB,, | Performed by: PHYSICIAN ASSISTANT

## 2023-01-10 PROCEDURE — 1160F PR REVIEW ALL MEDS BY PRESCRIBER/CLIN PHARMACIST DOCUMENTED: ICD-10-PCS | Mod: CPTII,S$GLB,, | Performed by: PHYSICIAN ASSISTANT

## 2023-01-10 PROCEDURE — 3079F DIAST BP 80-89 MM HG: CPT | Mod: CPTII,S$GLB,, | Performed by: PHYSICIAN ASSISTANT

## 2023-01-10 PROCEDURE — 1126F AMNT PAIN NOTED NONE PRSNT: CPT | Mod: CPTII,S$GLB,, | Performed by: PHYSICIAN ASSISTANT

## 2023-01-10 PROCEDURE — 1126F PR PAIN SEVERITY QUANTIFIED, NO PAIN PRESENT: ICD-10-PCS | Mod: CPTII,S$GLB,, | Performed by: PHYSICIAN ASSISTANT

## 2023-01-10 PROCEDURE — 3075F PR MOST RECENT SYSTOLIC BLOOD PRESS GE 130-139MM HG: ICD-10-PCS | Mod: CPTII,S$GLB,, | Performed by: PHYSICIAN ASSISTANT

## 2023-01-10 PROCEDURE — 99214 OFFICE O/P EST MOD 30 MIN: CPT | Mod: S$GLB,,, | Performed by: PHYSICIAN ASSISTANT

## 2023-01-10 PROCEDURE — 1101F PT FALLS ASSESS-DOCD LE1/YR: CPT | Mod: CPTII,S$GLB,, | Performed by: PHYSICIAN ASSISTANT

## 2023-01-10 PROCEDURE — 1159F PR MEDICATION LIST DOCUMENTED IN MEDICAL RECORD: ICD-10-PCS | Mod: CPTII,S$GLB,, | Performed by: PHYSICIAN ASSISTANT

## 2023-01-10 PROCEDURE — 1159F MED LIST DOCD IN RCRD: CPT | Mod: CPTII,S$GLB,, | Performed by: PHYSICIAN ASSISTANT

## 2023-01-10 PROCEDURE — 3008F BODY MASS INDEX DOCD: CPT | Mod: CPTII,S$GLB,, | Performed by: PHYSICIAN ASSISTANT

## 2023-01-10 PROCEDURE — 99214 PR OFFICE/OUTPT VISIT, EST, LEVL IV, 30-39 MIN: ICD-10-PCS | Mod: S$GLB,,, | Performed by: PHYSICIAN ASSISTANT

## 2023-01-10 PROCEDURE — 3075F SYST BP GE 130 - 139MM HG: CPT | Mod: CPTII,S$GLB,, | Performed by: PHYSICIAN ASSISTANT

## 2023-01-10 PROCEDURE — 99999 PR PBB SHADOW E&M-EST. PATIENT-LVL III: ICD-10-PCS | Mod: PBBFAC,,, | Performed by: PHYSICIAN ASSISTANT

## 2023-01-10 PROCEDURE — 1160F RVW MEDS BY RX/DR IN RCRD: CPT | Mod: CPTII,S$GLB,, | Performed by: PHYSICIAN ASSISTANT

## 2023-01-10 RX ORDER — METHOCARBAMOL 750 MG/1
750 TABLET, FILM COATED ORAL 3 TIMES DAILY PRN
Qty: 90 TABLET | Refills: 3 | Status: SHIPPED | OUTPATIENT
Start: 2023-01-10 | End: 2023-03-09 | Stop reason: SDUPTHER

## 2023-01-10 RX ORDER — MELOXICAM 15 MG/1
15 TABLET ORAL DAILY
Qty: 90 TABLET | Refills: 3 | Status: SHIPPED | OUTPATIENT
Start: 2023-01-10 | End: 2023-05-10 | Stop reason: SDUPTHER

## 2023-01-10 RX ORDER — DICLOFENAC SODIUM 10 MG/G
2 GEL TOPICAL 3 TIMES DAILY PRN
Qty: 200 G | Refills: 0 | Status: SHIPPED | OUTPATIENT
Start: 2023-01-10 | End: 2023-02-20 | Stop reason: SDUPTHER

## 2023-01-10 RX ORDER — LIDOCAINE AND PRILOCAINE 25; 25 MG/G; MG/G
CREAM TOPICAL
Qty: 30 G | Refills: 1 | Status: SHIPPED | OUTPATIENT
Start: 2023-01-10 | End: 2023-02-20 | Stop reason: SDUPTHER

## 2023-01-10 RX ORDER — MUPIROCIN 20 MG/G
OINTMENT TOPICAL
Qty: 22 G | Refills: 0 | Status: SHIPPED | OUTPATIENT
Start: 2023-01-10 | End: 2023-05-12

## 2023-01-10 RX ORDER — TOPIRAMATE 100 MG/1
100 TABLET, FILM COATED ORAL 2 TIMES DAILY
Qty: 180 TABLET | Refills: 1 | Status: SHIPPED | OUTPATIENT
Start: 2023-01-10 | End: 2023-05-10 | Stop reason: SDUPTHER
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
## 2023-01-19 ENCOUNTER — OFFICE VISIT (OUTPATIENT)
Dept: ORTHOPEDICS | Facility: CLINIC | Age: 68
End: 2023-01-19
Payer: MEDICARE

## 2023-01-19 VITALS — BODY MASS INDEX: 40.04 KG/M2 | HEIGHT: 63 IN | WEIGHT: 226 LBS

## 2023-01-19 DIAGNOSIS — M54.17 LUMBOSACRAL RADICULOPATHY AT L5: ICD-10-CM

## 2023-01-19 DIAGNOSIS — M47.817 ARTHROPATHY OF LUMBOSACRAL FACET JOINT: ICD-10-CM

## 2023-01-19 DIAGNOSIS — Z96.652 HISTORY OF TOTAL LEFT KNEE REPLACEMENT: Primary | ICD-10-CM

## 2023-01-19 DIAGNOSIS — M54.17 LUMBOSACRAL RADICULOPATHY AT S1: ICD-10-CM

## 2023-01-19 DIAGNOSIS — G56.03 CARPAL TUNNEL SYNDROME, BILATERAL: ICD-10-CM

## 2023-01-19 DIAGNOSIS — M17.0 PRIMARY OSTEOARTHRITIS OF BOTH KNEES: Primary | ICD-10-CM

## 2023-01-19 DIAGNOSIS — M17.11 ARTHRITIS OF KNEE, RIGHT: ICD-10-CM

## 2023-01-19 PROCEDURE — 99214 PR OFFICE/OUTPT VISIT, EST, LEVL IV, 30-39 MIN: ICD-10-PCS | Mod: S$GLB,,, | Performed by: ORTHOPAEDIC SURGERY

## 2023-01-19 PROCEDURE — 99999 PR PBB SHADOW E&M-EST. PATIENT-LVL II: CPT | Mod: PBBFAC,,, | Performed by: ORTHOPAEDIC SURGERY

## 2023-01-19 PROCEDURE — 1101F PR PT FALLS ASSESS DOC 0-1 FALLS W/OUT INJ PAST YR: ICD-10-PCS | Mod: CPTII,S$GLB,, | Performed by: ORTHOPAEDIC SURGERY

## 2023-01-19 PROCEDURE — 3288F FALL RISK ASSESSMENT DOCD: CPT | Mod: CPTII,S$GLB,, | Performed by: ORTHOPAEDIC SURGERY

## 2023-01-19 PROCEDURE — 3008F PR BODY MASS INDEX (BMI) DOCUMENTED: ICD-10-PCS | Mod: CPTII,S$GLB,, | Performed by: ORTHOPAEDIC SURGERY

## 2023-01-19 PROCEDURE — 3008F BODY MASS INDEX DOCD: CPT | Mod: CPTII,S$GLB,, | Performed by: ORTHOPAEDIC SURGERY

## 2023-01-19 PROCEDURE — 1159F PR MEDICATION LIST DOCUMENTED IN MEDICAL RECORD: ICD-10-PCS | Mod: CPTII,S$GLB,, | Performed by: ORTHOPAEDIC SURGERY

## 2023-01-19 PROCEDURE — 99214 OFFICE O/P EST MOD 30 MIN: CPT | Mod: S$GLB,,, | Performed by: ORTHOPAEDIC SURGERY

## 2023-01-19 PROCEDURE — 1159F MED LIST DOCD IN RCRD: CPT | Mod: CPTII,S$GLB,, | Performed by: ORTHOPAEDIC SURGERY

## 2023-01-19 PROCEDURE — 1125F PR PAIN SEVERITY QUANTIFIED, PAIN PRESENT: ICD-10-PCS | Mod: CPTII,S$GLB,, | Performed by: ORTHOPAEDIC SURGERY

## 2023-01-19 PROCEDURE — 3288F PR FALLS RISK ASSESSMENT DOCUMENTED: ICD-10-PCS | Mod: CPTII,S$GLB,, | Performed by: ORTHOPAEDIC SURGERY

## 2023-01-19 PROCEDURE — 1101F PT FALLS ASSESS-DOCD LE1/YR: CPT | Mod: CPTII,S$GLB,, | Performed by: ORTHOPAEDIC SURGERY

## 2023-01-19 PROCEDURE — 99999 PR PBB SHADOW E&M-EST. PATIENT-LVL II: ICD-10-PCS | Mod: PBBFAC,,, | Performed by: ORTHOPAEDIC SURGERY

## 2023-01-19 PROCEDURE — 1125F AMNT PAIN NOTED PAIN PRSNT: CPT | Mod: CPTII,S$GLB,, | Performed by: ORTHOPAEDIC SURGERY

## 2023-01-19 NOTE — PATIENT INSTRUCTIONS
Your hands are going numb on you and previously we did the nerve test that showed that you have carpal tunnel syndrome  I will give you braces to wear at night and when you driving  Occasionally we do inject cortisone in the risks for both of your hands when time comes   Your x-ray from April 2022 your left total knee still in excellent alignment and you still have on the right knee some arthritis  Previous nerve testing show you have carpal tunnel syndrome on both hands and pinched nerve in you back from L5-S1 and you occasionally get numbness in the big toe  Gabapentin should help with the numbness in the big toe as well as the carpal tunnel so if you take it every night in might help    Start gabapentin 300 mg 1 tablet at night for couple weeks and then after that you can go up to 2 at night because gabapentin works once you take it daily up to 3 months before it makes an effect on the nerves   Continue with the meloxicam on a daily basis   Will start you back on physical therapy in [facility] for your back and your legs  Continue using the topicals
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
## 2023-02-20 ENCOUNTER — PATIENT OUTREACH (OUTPATIENT)
Dept: ADMINISTRATIVE | Facility: HOSPITAL | Age: 68
End: 2023-02-20
Payer: MEDICARE

## 2023-02-20 DIAGNOSIS — R00.2 PALPITATIONS: Primary | ICD-10-CM

## 2023-02-20 DIAGNOSIS — Z12.31 ENCOUNTER FOR SCREENING MAMMOGRAM FOR MALIGNANT NEOPLASM OF BREAST: Primary | ICD-10-CM

## 2023-02-20 NOTE — PROGRESS NOTES
Attempted to contact patient by phone for a MAMMOGRAM SCHEDULING, was able to speak with patient, scheduled on 4/5/2023.  Updated Care Everywhere and Team.

## 2023-02-22 ENCOUNTER — OFFICE VISIT (OUTPATIENT)
Dept: CARDIOLOGY | Facility: CLINIC | Age: 68
End: 2023-02-22
Payer: MEDICARE

## 2023-02-22 ENCOUNTER — HOSPITAL ENCOUNTER (OUTPATIENT)
Dept: CARDIOLOGY | Facility: HOSPITAL | Age: 68
Discharge: HOME | End: 2023-02-22
Attending: INTERNAL MEDICINE
Payer: MEDICARE

## 2023-02-22 VITALS
BODY MASS INDEX: 42.23 KG/M2 | WEIGHT: 238.31 LBS | OXYGEN SATURATION: 99 % | SYSTOLIC BLOOD PRESSURE: 132 MMHG | DIASTOLIC BLOOD PRESSURE: 78 MMHG | HEIGHT: 63 IN | HEART RATE: 77 BPM | WEIGHT: 238.31 LBS | BODY MASS INDEX: 42.22 KG/M2

## 2023-02-22 DIAGNOSIS — G47.33 OSA ON CPAP: ICD-10-CM

## 2023-02-22 DIAGNOSIS — Z12.31 ENCOUNTER FOR SCREENING MAMMOGRAM FOR MALIGNANT NEOPLASM OF BREAST: ICD-10-CM

## 2023-02-22 DIAGNOSIS — R00.2 PALPITATIONS: Primary | ICD-10-CM

## 2023-02-22 DIAGNOSIS — R07.89 CHEST PRESSURE: ICD-10-CM

## 2023-02-22 DIAGNOSIS — R00.2 PALPITATIONS: ICD-10-CM

## 2023-02-22 DIAGNOSIS — Z98.84 HX OF GASTRIC BYPASS: ICD-10-CM

## 2023-02-22 DIAGNOSIS — R06.09 DOE (DYSPNEA ON EXERTION): ICD-10-CM

## 2023-02-22 DIAGNOSIS — I10 ESSENTIAL HYPERTENSION: ICD-10-CM

## 2023-02-22 DIAGNOSIS — I49.3 SYMPTOMATIC PVCS: ICD-10-CM

## 2023-02-22 DIAGNOSIS — Z72.821 INADEQUATE SLEEP HYGIENE: ICD-10-CM

## 2023-02-22 DIAGNOSIS — E66.01 MORBID OBESITY WITH BMI OF 40.0-44.9, ADULT: ICD-10-CM

## 2023-02-22 DIAGNOSIS — K21.9 GASTROESOPHAGEAL REFLUX DISEASE, UNSPECIFIED WHETHER ESOPHAGITIS PRESENT: ICD-10-CM

## 2023-02-22 PROCEDURE — 3075F SYST BP GE 130 - 139MM HG: CPT | Mod: CPTII,S$GLB,, | Performed by: INTERNAL MEDICINE

## 2023-02-22 PROCEDURE — 3008F PR BODY MASS INDEX (BMI) DOCUMENTED: ICD-10-PCS | Mod: CPTII,S$GLB,, | Performed by: INTERNAL MEDICINE

## 2023-02-22 PROCEDURE — 1126F AMNT PAIN NOTED NONE PRSNT: CPT | Mod: CPTII,S$GLB,, | Performed by: INTERNAL MEDICINE

## 2023-02-22 PROCEDURE — 3008F BODY MASS INDEX DOCD: CPT | Mod: CPTII,S$GLB,, | Performed by: INTERNAL MEDICINE

## 2023-02-22 PROCEDURE — 3075F PR MOST RECENT SYSTOLIC BLOOD PRESS GE 130-139MM HG: ICD-10-PCS | Mod: CPTII,S$GLB,, | Performed by: INTERNAL MEDICINE

## 2023-02-22 PROCEDURE — 93010 ELECTROCARDIOGRAM REPORT: CPT | Mod: ,,, | Performed by: STUDENT IN AN ORGANIZED HEALTH CARE EDUCATION/TRAINING PROGRAM

## 2023-02-22 PROCEDURE — 93010 EKG 12-LEAD: ICD-10-PCS | Mod: ,,, | Performed by: STUDENT IN AN ORGANIZED HEALTH CARE EDUCATION/TRAINING PROGRAM

## 2023-02-22 PROCEDURE — 99999 PR PBB SHADOW E&M-EST. PATIENT-LVL IV: CPT | Mod: PBBFAC,,, | Performed by: INTERNAL MEDICINE

## 2023-02-22 PROCEDURE — 1160F RVW MEDS BY RX/DR IN RCRD: CPT | Mod: CPTII,S$GLB,, | Performed by: INTERNAL MEDICINE

## 2023-02-22 PROCEDURE — 3078F PR MOST RECENT DIASTOLIC BLOOD PRESSURE < 80 MM HG: ICD-10-PCS | Mod: CPTII,S$GLB,, | Performed by: INTERNAL MEDICINE

## 2023-02-22 PROCEDURE — 1160F PR REVIEW ALL MEDS BY PRESCRIBER/CLIN PHARMACIST DOCUMENTED: ICD-10-PCS | Mod: CPTII,S$GLB,, | Performed by: INTERNAL MEDICINE

## 2023-02-22 PROCEDURE — 93005 ELECTROCARDIOGRAM TRACING: CPT

## 2023-02-22 PROCEDURE — 99214 PR OFFICE/OUTPT VISIT, EST, LEVL IV, 30-39 MIN: ICD-10-PCS | Mod: S$GLB,,, | Performed by: INTERNAL MEDICINE

## 2023-02-22 PROCEDURE — 3078F DIAST BP <80 MM HG: CPT | Mod: CPTII,S$GLB,, | Performed by: INTERNAL MEDICINE

## 2023-02-22 PROCEDURE — 99999 PR PBB SHADOW E&M-EST. PATIENT-LVL IV: ICD-10-PCS | Mod: PBBFAC,,, | Performed by: INTERNAL MEDICINE

## 2023-02-22 PROCEDURE — 1159F MED LIST DOCD IN RCRD: CPT | Mod: CPTII,S$GLB,, | Performed by: INTERNAL MEDICINE

## 2023-02-22 PROCEDURE — 1159F PR MEDICATION LIST DOCUMENTED IN MEDICAL RECORD: ICD-10-PCS | Mod: CPTII,S$GLB,, | Performed by: INTERNAL MEDICINE

## 2023-02-22 PROCEDURE — 1126F PR PAIN SEVERITY QUANTIFIED, NO PAIN PRESENT: ICD-10-PCS | Mod: CPTII,S$GLB,, | Performed by: INTERNAL MEDICINE

## 2023-02-22 PROCEDURE — 99214 OFFICE O/P EST MOD 30 MIN: CPT | Mod: S$GLB,,, | Performed by: INTERNAL MEDICINE

## 2023-02-22 RX ORDER — METOPROLOL SUCCINATE 50 MG/1
50 TABLET, EXTENDED RELEASE ORAL DAILY
Qty: 90 TABLET | Refills: 3 | Status: SHIPPED | OUTPATIENT
Start: 2023-02-22 | End: 2023-03-22

## 2023-02-22 RX ORDER — ASPIRIN 81 MG/1
81 TABLET ORAL DAILY
Qty: 90 TABLET | Refills: 3 | Status: SHIPPED | OUTPATIENT
Start: 2023-02-22 | End: 2023-05-20 | Stop reason: SDUPTHER

## 2023-02-22 NOTE — PROGRESS NOTES
Subjective:   Patient ID:  Mariel Becerril is a 67 y.o. female who presents for cardiac consult of No chief complaint on file.      The patient came in today for cardiac consult of No chief complaint on file.    Mariel Becerril is a 67 y.o. female with current medical conditions HTN, PVCs, obesity s/p gastric sleeve, GERD, OA, anemia, GIL on CPAP presents for follow CV evaluation.      8/20/18  Pt has been having SOB, AREVALO. Is always in pain, is exhausted on pain meds. She feels like her heart if affected and is tired after walking a few steps.  Pt also feels chest pressure. Pt feels chest pressure daily, just tries to rest and goes away. Chest pain is substernal without radiation. 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 Uses CPAP every night, but last eval was over 5 years.   Feels palpitations, rarely, was on digoxin then.    ECG - NSR, poor RWP    9/27/18  Pt had negative nuclear stress and 2D ECHO with normal Bi V function. GIL workup underway, recent visit with pulmonary. Has bad sinus infection, has been using Tessalon perles, saw ENT received steroid dose pack which has been improved. Still coughing a lot, using cough syrup and on Levaquin x 14 days. No chest pain but more congestion. Has been doing well with Lasix - taking it almost daily.     11/12/19  She will have knee surgery at [facility] with Dr. Moy Ramirez. She has been getting PT/OT and brace but no improvement in knee pain. She does have chronic fatigue/dyspnea but no CP. She does have palpitations feels fluttering feeling.     2/13/20  Freq PVCs noted on Holter, started metoprolol 25mg daily and monitor BP and HR, increased BB due to palpitations but BP dropped now off norvasc as BP dropped. She will have knee surgery March 4th by Dr. Connolly. She also takes lasix daily now.     8/11/20  She had knee replacement in L knee in March but still has significant pain. She had weight gain since surgery and could not walk much, she weight 222 lbs before  surgery. She had been to pain management had injections.     12/3/20  She still has L knee pain, possible hamstring injury. Used CBD cream as well. She saw Dr. Pak for back pain and had another injection for back/hips. She will see Dr. Longoria for weight loss surgery. She has occ mild chest pressure/pain concerned maybe due to weight gain.   ECG - NSR    3/11/21  She is s/p gastric sleeve 2 weeks ago, is on high protein soft food diet. She has been getting more tired lately. She is taking a lot of vitamins. She is taking a stool softener.     21  Holter this month with rare PVCs, PACs, AVG HR 77. BP and HR well controlled today. Weight 240 lbs. SHe has been feeling more drowsy. 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     21  She had to stop CPAP due to recall. She feels more tired and has more headaches. Weight is 238 trying to improve. BP low normal.   ECG - NSR, LAE    21  She will have lumbar surgery by Dr. Whelan at [facility]. ECG - NSR, LAE, anterior T wave - old. No CP/SOB.     22  BP and HR stable today. BMI 45 - 255 lbs. She did not have surgery yet - she is doing decompression therapy instead with Dr. Boyer, has ice therapy. She has more GERD sx. She also has more chest tightness taking extra tums.   ECG - NSR    22  Nuclear stress neg 2022. ECHO with normal bi V function, mild MR, mild TR, PASP 29 mmHg. She is doing PT/OT with ice therapy still.     23  BP and Hr well controlled. BMI 42 - 238 lbs.   ECG - NSR, LAE      Patient feels no PND, no dizziness, no syncope, no CNS symptoms.    Patient is compliant with medications.    Family history includes Arthritis in her mother; Depression in her sister; Heart disease in her father -  at age 81; Hypertension in her mother and sister.      Results for orders placed during the hospital encounter of 22    Echo    Interpretation Summary  · The left ventricle is normal in size with mild concentric hypertrophy and normal  systolic function.  · Mild left atrial enlargement.  · The estimated ejection fraction is 65%.  · Indeterminate left ventricular diastolic function.  · Normal right ventricular size with normal right ventricular systolic function.  · Mild mitral regurgitation.  · Mild tricuspid regurgitation.  · Intermediate central venous pressure (8 mmHg).  · The estimated PA systolic pressure is 29 mmHg.      Results for orders placed during the hospital encounter of 05/18/22    Nuclear Stress - Cardiology Interpreted    Interpretation Summary    Normal myocardial perfusion scan. There is no evidence of myocardial ischemia or infarction.    The gated perfusion images showed an ejection fraction of > 70% at rest. The gated perfusion images showed an ejection fraction of > 70% post stress.    The EKG portion of this study is negative for ischemia.    The patient reported no chest pain during the stress test.    There were no arrhythmias during stress.      Normal sinus rhythm   Possible Left atrial enlargement   T wave abnormality, consider anterior ischemia   Abnormal ECG   When compared with ECG of 29-JUL-2021 11:03,   No significant change was found       HOLTER 4/2021  Sinus rhythm with heart rates varying between 48 and 124 bpm with an average of 77 bpm.  There were very rare PVCs totalling 12 and averaging 0.13 per hour.  There were very rare PACs totalling 24 and averaging 0.25 per hour.      Prior Holter 11/2019  TEST DESCRIPTION   PREDOMINANT RHYTHM  1. Sinus rhythm with heart rates varying between 56 and 156 bpm with an average of 86 bpm.   VENTRICULAR ARRHYTHMIAS  1. There were frequent PVCs totalling 3780 and averaging 78 per hour.   The ventricular arrhythmias percentage was 1.6.   There were 1174 bigeminal cycles.  There were 9 triplets.   2. There were no episodes of ventricular tachycardia.    SUPRA VENTRICULAR ARRHYTHMIAS  1. There were very rare PACs recorded totalling 18 and averaging less than 1 per hour.   2.  There were no episodes of sustained supraventricular tachycardia.      Past Medical History:   Diagnosis Date    COPD (chronic obstructive pulmonary disease)     NO HOME O2    Digestive disorder     Frequent headaches     Hypertension     Osteoarthritis 04/02/2019    Bilateral knees, ankles and feet    Severe obesity (BMI >= 40)     Sleep apnea     CPAP       Past Surgical History:   Procedure Laterality Date    BLADDER SUSPENSION      CATARACT EXTRACTION, BILATERAL      COLONOSCOPY N/A 12/3/2018    Procedure: COLONOSCOPY;  Surgeon: Mari Rader MD;  Location: [facility] ENDO;  Service: Endoscopy;  Laterality: N/A;    ESOPHAGOGASTRODUODENOSCOPY N/A 12/31/2020    Procedure: ESOPHAGOGASTRODUODENOSCOPY (EGD);  Surgeon: Anthony Rodríguez MD;  Location: [facility] ENDO;  Service: General;  Laterality: N/A;    HYSTERECTOMY      partial 2000    INJECTION OF ANESTHETIC AGENT INTO SACROILIAC JOINT Bilateral 11/30/2020    Procedure: Bilateral SIJ + Bilateral Piriformis + Bilateral GT Bursa Injection;  Surgeon: Thanh Diaz MD;  Location: [facility] PAIN MGT;  Service: Pain Management;  Laterality: Bilateral;    INJECTION OF JOINT Bilateral 11/30/2020    Procedure: Bilateral SIJ + Bilateral Piriformis + Bilateral GT Bursa Injection;  Surgeon: Thanh Diaz MD;  Location: [facility] PAIN MGT;  Service: Pain Management;  Laterality: Bilateral;    INJECTION OF PIRIFORMIS MUSCLE Bilateral 11/30/2020    Procedure: Bilateral SIJ + Bilateral Piriformis + Bilateral GT Bursa Injection;  Surgeon: Thanh Diaz MD;  Location: [facility] PAIN MGT;  Service: Pain Management;  Laterality: Bilateral;    ROBOT-ASSISTED LAPAROSCOPIC SLEEVE GASTRECTOMY USING DA EZEQUIEL XI N/A 3/2/2021    Procedure: XI ROBOTIC SLEEVE GASTRECTOMY;  Surgeon: Anthony Rodríguez MD;  Location: [facility] OR;  Service: General;  Laterality: N/A;    SELECTIVE INJECTION OF ANESTHETIC AGENT AROUND LUMBAR SPINAL NERVE ROOT BY TRANSFORAMINAL APPROACH Bilateral 1/4/2021    Procedure: Bilateral L5/S1 TF  GISELA;  Surgeon: Thanh Diaz MD;  Location: [facility] PAIN MGT;  Service: Pain Management;  Laterality: Bilateral;    SELECTIVE INJECTION OF ANESTHETIC AGENT AROUND LUMBAR SPINAL NERVE ROOT BY TRANSFORAMINAL APPROACH Bilateral 3/23/2021    Procedure: Bilateral L5/S1 TF GISELA;  Surgeon: Thanh Diaz MD;  Location: [facility] PAIN MGT;  Service: Pain Management;  Laterality: Bilateral;    SELECTIVE INJECTION OF ANESTHETIC AGENT AROUND LUMBAR SPINAL NERVE ROOT BY TRANSFORAMINAL APPROACH Bilateral 10/5/2021    Procedure: Bilateral L5/S1 TF GISELA;  Surgeon: Thanh Diaz MD;  Location: [facility] PAIN MGT;  Service: Pain Management;  Laterality: Bilateral;    tonsilectomy      TOTAL KNEE ARTHROPLASTY Left 3/4/2020    Procedure: ARTHROPLASTY, KNEE, TOTAL;  Surgeon: Moy Connolly MD;  Location: [facility] OR;  Service: Orthopedics;  Laterality: Left;       Social History     Tobacco Use    Smoking status: Never    Smokeless tobacco: Never   Substance Use Topics    Alcohol use: Never    Drug use: No       Family History   Problem Relation Age of Onset    Heart attack Father        Patient's Medications   New Prescriptions    No medications on file   Previous Medications    ACETAMINOPHEN (TYLENOL) 325 MG TABLET    Take 2 tablets (650 mg total) by mouth every 8 (eight) hours as needed.    ALBUTEROL (PROAIR HFA) 90 MCG/ACTUATION INHALER    Inhale 2 puffs into the lungs every 6 (six) hours as needed for Wheezing. Rescue    ALPRAZOLAM (XANAX) 0.25 MG TABLET    Take 1 tablet by mouth twice daily as needed for anxiety    AMLODIPINE (NORVASC) 5 MG TABLET        ASPIRIN 325 MG TABLET    Take 325 mg by mouth once daily.    BENZONATATE (TESSALON) 100 MG CAPSULE    Take 1 capsule by mouth three times daily as needed    DICLOFENAC SODIUM (VOLTAREN) 1 % GEL    Apply 2 g topically 3 (three) times daily as needed.    EFLORNITHINE (VANIQA) 13.9 % CREAM    Apply topically 2 (two) times daily.    ERGOCALCIFEROL, VITAMIN D2, (VITAMIN D ORAL)    Take  2,000 Units by mouth once daily.    FUROSEMIDE (LASIX) 40 MG TABLET    Take 1 tablet (40 mg total) by mouth daily as needed (edema, swelling due to fluid).    GABAPENTIN (NEURONTIN) 300 MG CAPSULE    Take 2 capsules (600 mg total) by mouth every evening.    IMIPRAMINE (TOFRANIL) 10 MG TAB    Take 1 tablet (10 mg total) by mouth every evening.    LEVOCETIRIZINE (XYZAL) 5 MG TABLET    TAKE 1 TABLET BY MOUTH ONCE DAILY IN THE EVENING    LIDOCAINE-PRILOCAINE (EMLA) CREAM    APPLY  CREAM TOPICALLY UP TO 4 TIMES DAILY AS NEEDED FOR PAIN    MECLIZINE (ANTIVERT) 25 MG TABLET    Take 1 tablet (25 mg total) by mouth 3 (three) times daily as needed for Dizziness.    MELOXICAM (MOBIC) 15 MG TABLET    Take 1 tablet (15 mg total) by mouth once daily.    METHOCARBAMOL (ROBAXIN) 750 MG TAB    Take 1 tablet (750 mg total) by mouth 3 (three) times daily as needed.    METOPROLOL SUCCINATE (TOPROL-XL) 50 MG 24 HR TABLET    Take 1 tablet (50 mg total) by mouth once daily.    MONTELUKAST (SINGULAIR) 10 MG TABLET    Take 1 tablet (10 mg total) by mouth every evening.    MUPIROCIN (BACTROBAN) 2 % OINTMENT    APPLY TOPICALLY TO AFFECTED AREA ONCE DAILY    NITROGLYCERIN (NITROSTAT) 0.4 MG SL TABLET    Place 1 tablet (0.4 mg total) under the tongue every 5 (five) minutes as needed for Chest pain.    NYSTATIN (MYCOSTATIN) CREAM    APPLY  CREAM TOPICALLY TO AFFECTED AREA TWICE DAILY    NYSTATIN (MYCOSTATIN) POWDER    Apply topically 2 (two) times daily.    OMEPRAZOLE (PRILOSEC) 40 MG CAPSULE    Take 1 capsule by mouth once daily    ONDANSETRON (ZOFRAN) 8 MG TABLET    Take 1 tablet (8 mg total) by mouth every 8 (eight) hours as needed for Nausea.    POTASSIUM CHLORIDE SA (K-DUR,KLOR-CON) 20 MEQ TABLET    Take 1 tablet (20 mEq total) by mouth daily as needed (if taking lasix).    SF 5000 PLUS 1.1 % CREA    BRUSH FOR 2 MINUTES AT BEDTIME THEN EXPECTORATE THE EXCESS.(NOTHING TO EAT OR DRINK FOR 30 MINUTES AFTER USE )    TOPIRAMATE (TOPAMAX) 100 MG  "TABLET    Take 1 tablet (100 mg total) by mouth 2 (two) times daily.   Modified Medications    No medications on file   Discontinued Medications    No medications on file       Review of Systems   Constitutional: Negative.    HENT: Negative.     Eyes: Negative.    Respiratory:  Positive for shortness of breath (improved).    Cardiovascular:  Positive for palpitations. Negative for chest pain and leg swelling.   Gastrointestinal:  Positive for heartburn.   Genitourinary: Negative.    Musculoskeletal:  Positive for joint pain.   Skin: Negative.    Neurological:  Positive for dizziness.   Endo/Heme/Allergies: Negative.    Psychiatric/Behavioral: Negative.     All 12 systems otherwise negative.    Wt Readings from Last 3 Encounters:   02/22/23 108.1 kg (238 lb 5.1 oz)   02/22/23 108.1 kg (238 lb 5.1 oz)   01/19/23 102.5 kg (226 lb)     Temp Readings from Last 3 Encounters:   11/09/22 98.5 °F (36.9 °C)   08/22/22 98.1 °F (36.7 °C) (Oral)   04/25/22 97.9 °F (36.6 °C) (Temporal)     BP Readings from Last 3 Encounters:   02/22/23 132/78   01/10/23 131/83   11/09/22 113/74     Pulse Readings from Last 3 Encounters:   02/22/23 77   01/10/23 70   11/09/22 79       /78 (BP Location: Right arm, Patient Position: Sitting, BP Method: Large (Manual))   Pulse 77   Ht 5' 3" (1.6 m)   Wt 108.1 kg (238 lb 5.1 oz)   SpO2 99%   BMI 42.22 kg/m²     Objective:   Physical Exam  Vitals and nursing note reviewed.   Constitutional:       General: She is not in acute distress.     Appearance: She is well-developed. She is not diaphoretic.   HENT:      Head: Normocephalic and atraumatic.      Nose: Nose normal.   Eyes:      General: No scleral icterus.     Conjunctiva/sclera: Conjunctivae normal.   Neck:      Thyroid: No thyromegaly.      Vascular: No JVD.   Cardiovascular:      Rate and Rhythm: Normal rate and regular rhythm.      Heart sounds: S1 normal and S2 normal. Murmur heard.     No friction rub. No gallop. No S3 or S4 sounds. "   Pulmonary:      Effort: Pulmonary effort is normal. No respiratory distress.      Breath sounds: Normal breath sounds. No stridor. No wheezing or rales.   Chest:      Chest wall: No tenderness.   Abdominal:      General: Bowel sounds are normal. There is no distension.      Palpations: Abdomen is soft. There is no mass.      Tenderness: There is no abdominal tenderness. There is no rebound.   Genitourinary:     Comments: Deferred  Musculoskeletal:         General: No tenderness or deformity. Normal range of motion.      Cervical back: Normal range of motion and neck supple.   Lymphadenopathy:      Cervical: No cervical adenopathy.   Skin:     General: Skin is warm and dry.      Coloration: Skin is not pale.      Findings: No erythema or rash.   Neurological:      Mental Status: She is alert and oriented to person, place, and time.      Motor: No abnormal muscle tone.      Coordination: Coordination normal.   Psychiatric:         Behavior: Behavior normal.         Thought Content: Thought content normal.         Judgment: Judgment normal.       Lab Results   Component Value Date     11/09/2022    K 4.1 11/09/2022     (H) 11/09/2022    CO2 23 11/09/2022    BUN 19 11/09/2022    CREATININE 0.8 11/09/2022    GLU 87 11/09/2022    HGBA1C 5.4 11/09/2022    MG 2.1 03/11/2021    AST 35 11/09/2022    ALT 34 11/09/2022    ALBUMIN 3.6 11/09/2022    PROT 6.7 11/09/2022    BILITOT 0.2 11/09/2022    WBC 5.31 11/09/2022    HGB 12.2 11/09/2022    HCT 40.2 11/09/2022    MCV 99 (H) 11/09/2022     11/09/2022    INR 1.0 10/27/2021    TSH 1.194 11/09/2022    CHOL 124 09/14/2018    HDL 48 09/14/2018    LDLCALC 64.0 09/14/2018    TRIG 60 09/14/2018    BNP <10 08/20/2018     Assessment:      1. Palpitations    2. GIL on CPAP    3. Symptomatic PVCs    4. AREVALO (dyspnea on exertion)    5. Essential hypertension    6. Morbid obesity with BMI of 40.0-44.9, adult    7. Hx of gastric bypass    8. Gastroesophageal reflux disease,  unspecified whether esophagitis present    9. Chest pressure    10. Inadequate sleep hygiene          Plan:   1. Chest pressure/AREVALO - resolved   - Nuclear stress neg 5/2022.   - ECHO with normal bi V function, mild MR, mild TR, PASP 29 mmHg.   - give NTG    2. AREVALO with edema sec to CVI  - improved with lasix 40 mg  - rec compression stockings    3. HTN   - cont meds  - stoped Norvasc  - low salt diet    4. Obesity s/p gastric sleeve 2/2021 with gerd; BMI 42 - 238 lbs.   - rec weight loss with diet/exercise; cont PPI  - f/u GI as well - proceed for EGD if needed     5. GIL, poor sleep   - cont CPAP  - appreciate pulm recs    6. Palpitations sec to PVCS  - cont BB  - Holter 4/2021 rare PVCs, PACs, AVG HR 77    7. Anemia  - f/u with heme/onc      Thank you for allowing me to participate in this patient's care. Please do not hesitate to contact me with any questions or concerns. Consult note has been forwarded to the referral physician.     Juan Alberto Luis MD, [facility]  Cardiovascular Disease  [facility], [place]  [phone] (P)

## 2023-02-27 ENCOUNTER — TELEPHONE (OUTPATIENT)
Dept: GASTROENTEROLOGY | Facility: CLINIC | Age: 68
End: 2023-02-27
Payer: MEDICARE

## 2023-02-28 ENCOUNTER — OFFICE VISIT (OUTPATIENT)
Dept: GASTROENTEROLOGY | Facility: CLINIC | Age: 68
End: 2023-02-28
Payer: MEDICARE

## 2023-02-28 VITALS — BODY MASS INDEX: 42.17 KG/M2 | HEIGHT: 63 IN | WEIGHT: 238 LBS

## 2023-02-28 DIAGNOSIS — K21.9 GASTROESOPHAGEAL REFLUX DISEASE, UNSPECIFIED WHETHER ESOPHAGITIS PRESENT: ICD-10-CM

## 2023-02-28 DIAGNOSIS — K59.00 CONSTIPATION, UNSPECIFIED CONSTIPATION TYPE: Primary | ICD-10-CM

## 2023-02-28 PROCEDURE — 3288F FALL RISK ASSESSMENT DOCD: CPT | Mod: CPTII,95,, | Performed by: PHYSICIAN ASSISTANT

## 2023-02-28 PROCEDURE — 1101F PR PT FALLS ASSESS DOC 0-1 FALLS W/OUT INJ PAST YR: ICD-10-PCS | Mod: CPTII,95,, | Performed by: PHYSICIAN ASSISTANT

## 2023-02-28 PROCEDURE — 1159F MED LIST DOCD IN RCRD: CPT | Mod: CPTII,95,, | Performed by: PHYSICIAN ASSISTANT

## 2023-02-28 PROCEDURE — 3008F PR BODY MASS INDEX (BMI) DOCUMENTED: ICD-10-PCS | Mod: CPTII,95,, | Performed by: PHYSICIAN ASSISTANT

## 2023-02-28 PROCEDURE — 3288F PR FALLS RISK ASSESSMENT DOCUMENTED: ICD-10-PCS | Mod: CPTII,95,, | Performed by: PHYSICIAN ASSISTANT

## 2023-02-28 PROCEDURE — 3008F BODY MASS INDEX DOCD: CPT | Mod: CPTII,95,, | Performed by: PHYSICIAN ASSISTANT

## 2023-02-28 PROCEDURE — 99203 OFFICE O/P NEW LOW 30 MIN: CPT | Mod: 95,,, | Performed by: PHYSICIAN ASSISTANT

## 2023-02-28 PROCEDURE — 1125F AMNT PAIN NOTED PAIN PRSNT: CPT | Mod: CPTII,95,, | Performed by: PHYSICIAN ASSISTANT

## 2023-02-28 PROCEDURE — 1101F PT FALLS ASSESS-DOCD LE1/YR: CPT | Mod: CPTII,95,, | Performed by: PHYSICIAN ASSISTANT

## 2023-02-28 PROCEDURE — 1159F PR MEDICATION LIST DOCUMENTED IN MEDICAL RECORD: ICD-10-PCS | Mod: CPTII,95,, | Performed by: PHYSICIAN ASSISTANT

## 2023-02-28 PROCEDURE — 1125F PR PAIN SEVERITY QUANTIFIED, PAIN PRESENT: ICD-10-PCS | Mod: CPTII,95,, | Performed by: PHYSICIAN ASSISTANT

## 2023-02-28 PROCEDURE — 99203 PR OFFICE/OUTPT VISIT, NEW, LEVL III, 30-44 MIN: ICD-10-PCS | Mod: 95,,, | Performed by: PHYSICIAN ASSISTANT

## 2023-02-28 NOTE — PATIENT INSTRUCTIONS
Recommendations:  -Reflux controlled per patient. States she is no longer on her PPI therapy. Continue GERD diet.   -Constipation for past year. Taking laxative and stool softener if she goes longer than 2 days without a bowel movement. Recommend starting on Miralax BID x 1 week. Can decrease to once daily thereafter if stools moving. Will follow up in 8 weeks. If continues with constipation symptoms while on Miralax will consider trial of Linzess. Would like to try Miralax first given that's he is already on multiple medications. Patient agrees to plan.   -Drink water, increase activity.

## 2023-02-28 NOTE — PROGRESS NOTES
Clinic Consult:  [facility] Consultation Note    Reason for Consult:  The primary encounter diagnosis was Constipation, unspecified constipation type. A diagnosis of Gastroesophageal reflux disease, unspecified whether esophagitis present was also pertinent to this visit.    PCP: Jaclyn Becerril   [address] [facility] / [place] [address]    CC: Constipation    The patient location is: [place]  The chief complaint leading to consultation is: GERD    Visit type: audiovisual    Face to Face time with patient: 15 mins  20 minutes of total time spent on the encounter, which includes face to face time and non-face to face time preparing to see the patient (eg, review of tests), Obtaining and/or reviewing separately obtained history, Documenting clinical information in the electronic or other health record, Independently interpreting results (not separately reported) and communicating results to the patient/family/caregiver, or Care coordination (not separately reported).     Each patient to whom he or she provides medical services by telemedicine is:  (1) informed of the relationship between the physician and patient and the respective role of any other health care provider with respect to management of the patient; and (2) notified that he or she may decline to receive medical services by telemedicine and may withdraw from such care at any time.    Notes:       HPI:  This is a 67 y.o. female here for evaluation of the above. Suffering with constipation x 1 year. Will have a BM about once every other day. Taking laxative (Biscodyl) and stool softener (Docusate Sodium) if she goes more than 2 days. Will do Metamucil as well. Feels like her body is getting used to these medications. Trying to lose weight so taking in a lot of protein. + pellet stools. Will occasionally double up on laxative and then she will have diarrhea. No blood in the stool. Denies abdominal pain. Last colonoscopy 2018 with 5 yr recall.  No  longer taking PPI therapy. States GERD is well controlled. Denies dysphagia or odynophagia.     Review of Systems   Constitutional:  Negative for activity change, appetite change, chills, diaphoresis, fatigue, fever and unexpected weight change.   HENT:  Negative for trouble swallowing.    Respiratory:  Negative for shortness of breath.    Cardiovascular:  Negative for chest pain.   Gastrointestinal:  Positive for constipation. Negative for abdominal distention, abdominal pain, anal bleeding, blood in stool, diarrhea, nausea and vomiting.   Neurological:  Negative for dizziness, weakness and light-headedness.   Psychiatric/Behavioral:  Negative for dysphoric mood. The patient is not nervous/anxious.       Medical History:   Past Medical History:   Diagnosis Date    COPD (chronic obstructive pulmonary disease)     NO HOME O2    Digestive disorder     Frequent headaches     Hypertension     Osteoarthritis 04/02/2019    Bilateral knees, ankles and feet    Severe obesity (BMI >= 40)     Sleep apnea     CPAP       Surgical History:   Past Surgical History:   Procedure Laterality Date    BLADDER SUSPENSION      CATARACT EXTRACTION, BILATERAL      COLONOSCOPY N/A 12/3/2018    Procedure: COLONOSCOPY;  Surgeon: Mari Rader MD;  Location: [facility];  Service: Endoscopy;  Laterality: N/A;    ESOPHAGOGASTRODUODENOSCOPY N/A 12/31/2020    Procedure: ESOPHAGOGASTRODUODENOSCOPY (EGD);  Surgeon: Anthony Rodríguez MD;  Location: [facility];  Service: General;  Laterality: N/A;    HYSTERECTOMY      partial 2000    INJECTION OF ANESTHETIC AGENT INTO SACROILIAC JOINT Bilateral 11/30/2020    Procedure: Bilateral SIJ + Bilateral Piriformis + Bilateral GT Bursa Injection;  Surgeon: Thanh Diaz MD;  Location: [facility];  Service: Pain Management;  Laterality: Bilateral;    INJECTION OF JOINT Bilateral 11/30/2020    Procedure: Bilateral SIJ + Bilateral Piriformis + Bilateral GT Bursa Injection;  Surgeon: Thanh Diaz MD;   Location: [facility];  Service: Pain Management;  Laterality: Bilateral;    INJECTION OF PIRIFORMIS MUSCLE Bilateral 11/30/2020    Procedure: Bilateral SIJ + Bilateral Piriformis + Bilateral GT Bursa Injection;  Surgeon: Thanh Diaz MD;  [facility];  Service: Pain Management;  Laterality: Bilateral;    ROBOT-ASSISTED LAPAROSCOPIC SLEEVE GASTRECTOMY USING DA EZEQUIEL XI N/A 3/2/2021    Procedure: XI ROBOTIC SLEEVE GASTRECTOMY;  Surgeon: Anthony Rodríguez MD;  Location: [facility];  Service: General;  Laterality: N/A;    SELECTIVE INJECTION OF ANESTHETIC AGENT AROUND LUMBAR SPINAL NERVE ROOT BY TRANSFORAMINAL APPROACH Bilateral 1/4/2021    Procedure: Bilateral L5/S1 TF GISELA;  Surgeon: Thanh Diaz MD;  Location: [facility];  Service: Pain Management;  Laterality: Bilateral;    SELECTIVE INJECTION OF ANESTHETIC AGENT AROUND LUMBAR SPINAL NERVE ROOT BY TRANSFORAMINAL APPROACH Bilateral 3/23/2021    Procedure: Bilateral L5/S1 TF GISELA;  Surgeon: Thanh Diaz MD;  [facility];  Service: Pain Management;  Laterality: Bilateral;    SELECTIVE INJECTION OF ANESTHETIC AGENT AROUND LUMBAR SPINAL NERVE ROOT BY TRANSFORAMINAL APPROACH Bilateral 10/5/2021    Procedure: Bilateral L5/S1 TF GISELA;  Surgeon: Thanh Diaz MD;  Location: [facility];  Service: Pain Management;  Laterality: Bilateral;    tonsilectomy      TOTAL KNEE ARTHROPLASTY Left 3/4/2020    Procedure: ARTHROPLASTY, KNEE, TOTAL;  Surgeon: Moy Connolly MD;  Location: [facility];  Service: Orthopedics;  Laterality: Left;       Family History:    Family History   Problem Relation Age of Onset    Heart attack Father        Social History:   Social History     Socioeconomic History    Marital status:    Tobacco Use    Smoking status: Never    Smokeless tobacco: Never   Substance and Sexual Activity    Alcohol use: Never    Drug use: No    Sexual activity: Never     Partners: Male     Birth control/protection: See Surgical  Hx       Allergies:   Review of patient's allergies indicates:   Allergen Reactions    Penicillins Rash       Home Medications:   Current Outpatient Medications on File Prior to Visit   Medication Sig Dispense Refill    acetaminophen (TYLENOL) 325 MG tablet Take 2 tablets (650 mg total) by mouth every 8 (eight) hours as needed. 60 tablet 2    ALPRAZolam (XANAX) 0.25 MG tablet Take 1 tablet by mouth twice daily as needed for anxiety 20 tablet 0    aspirin (ECOTRIN) 81 MG EC tablet Take 1 tablet (81 mg total) by mouth once daily. 90 tablet 3    benzonatate (TESSALON) 100 MG capsule Take 1 capsule by mouth three times daily as needed 30 capsule 0    diclofenac sodium (VOLTAREN) 1 % Gel Apply 2 g topically 3 (three) times daily as needed. 200 g 0    eflornithine (VANIQA) 13.9 % cream Apply topically 2 (two) times daily. 45 g 3    ergocalciferol, vitamin D2, (VITAMIN D ORAL) Take 2,000 Units by mouth once daily.      furosemide (LASIX) 40 MG tablet Take 1 tablet (40 mg total) by mouth daily as needed (edema, swelling due to fluid). 90 tablet 3    gabapentin (NEURONTIN) 300 MG capsule Take 2 capsules (600 mg total) by mouth every evening. 60 capsule 5    levocetirizine (XYZAL) 5 MG tablet TAKE 1 TABLET BY MOUTH ONCE DAILY IN THE EVENING 90 tablet 0    LIDOcaine-prilocaine (EMLA) cream APPLY  CREAM TOPICALLY UP TO 4 TIMES DAILY AS NEEDED FOR PAIN 30 g 1    meclizine (ANTIVERT) 25 mg tablet Take 1 tablet (25 mg total) by mouth 3 (three) times daily as needed for Dizziness. 30 tablet 0    meloxicam (MOBIC) 15 MG tablet Take 1 tablet (15 mg total) by mouth once daily. 90 tablet 3    methocarbamoL (ROBAXIN) 750 MG Tab Take 1 tablet (750 mg total) by mouth 3 (three) times daily as needed. 90 tablet 3    metoprolol succinate (TOPROL-XL) 50 MG 24 hr tablet Take 1 tablet (50 mg total) by mouth once daily. 90 tablet 3    montelukast (SINGULAIR) 10 mg tablet Take 1 tablet (10 mg total) by mouth every evening. 30 tablet 11     "mupirocin (BACTROBAN) 2 % ointment APPLY TOPICALLY TO AFFECTED AREA ONCE DAILY 22 g 0    nitroGLYCERIN (NITROSTAT) 0.4 MG SL tablet Place 1 tablet (0.4 mg total) under the tongue every 5 (five) minutes as needed for Chest pain. 30 tablet 0    nystatin (MYCOSTATIN) cream APPLY  CREAM TOPICALLY TO AFFECTED AREA TWICE DAILY 30 g 0    nystatin (MYCOSTATIN) powder Apply topically 2 (two) times daily. 120 g 1    omeprazole (PRILOSEC) 40 MG capsule Take 1 capsule by mouth once daily 90 capsule 3    ondansetron (ZOFRAN) 8 MG tablet Take 1 tablet (8 mg total) by mouth every 8 (eight) hours as needed for Nausea. 20 tablet 0    potassium chloride SA (K-DUR,KLOR-CON) 20 MEQ tablet Take 1 tablet (20 mEq total) by mouth daily as needed (if taking lasix). 60 tablet 2    SF 5000 PLUS 1.1 % Crea BRUSH FOR 2 MINUTES AT BEDTIME THEN EXPECTORATE THE EXCESS.(NOTHING TO EAT OR DRINK FOR 30 MINUTES AFTER USE )      topiramate (TOPAMAX) 100 MG tablet Take 1 tablet (100 mg total) by mouth 2 (two) times daily. 180 tablet 1    albuterol (PROAIR HFA) 90 mcg/actuation inhaler Inhale 2 puffs into the lungs every 6 (six) hours as needed for Wheezing. Rescue 18 g 0    imipramine (TOFRANIL) 10 MG Tab Take 1 tablet (10 mg total) by mouth every evening. 90 tablet 1     No current facility-administered medications on file prior to visit.       Physical Exam:  Ht 5' 3" (1.6 m)   Wt 108 kg (238 lb) Comment: stated wt  BMI 42.16 kg/m²   Body mass index is 42.16 kg/m².  Physical Exam  Constitutional:       General: She is not in acute distress.     Appearance: Normal appearance. She is not ill-appearing, toxic-appearing or diaphoretic.   HENT:      Head: Normocephalic and atraumatic.   Neurological:      Mental Status: She is alert.         Labs: Pertinent labs reviewed.  Endoscopy:   CRC Screenin - 5 yr recall  Anticoagulation: --    Assessment:  1. Constipation, unspecified constipation type    2. Gastroesophageal reflux disease, " unspecified whether esophagitis present         Recommendations:  -Reflux controlled per patient. States she is no longer on her PPI therapy. Continue GERD diet.   -Constipation for past year. Taking laxative and stool softener if she goes longer than 2 days without a bowel movement. Recommend starting on Miralax BID x 1 week. Can decrease to once daily thereafter if stools moving. Will follow up in 8 weeks. If continues with constipation symptoms while on Miralax will consider trial of Linzess. Would like to try Miralax first given that's he is already on multiple medications. Patient agrees to plan.   -Drink water, increase activity.     Constipation, unspecified constipation type    Gastroesophageal reflux disease, unspecified whether esophagitis present  -     Ambulatory referral/consult to Gastroenterology        No follow-ups on file.    Thank you so much for allowing me to participate in the care of Mariel Guillen PA-C     Wound Care (No Sutures): Petrolatum

## 2023-03-02 ENCOUNTER — PATIENT MESSAGE (OUTPATIENT)
Dept: INTERNAL MEDICINE | Facility: CLINIC | Age: 68
End: 2023-03-02
Payer: MEDICARE

## 2023-04-19 ENCOUNTER — TELEPHONE (OUTPATIENT)
Dept: INTERNAL MEDICINE | Facility: CLINIC | Age: 68
End: 2023-04-19
Payer: MEDICARE

## 2023-04-19 NOTE — TELEPHONE ENCOUNTER
P was contacted to try and get her an annual visit scheduled and she declined stated she does not need an appt at this time and when asked if she wants to continue under Estrellita NP care she replied she see's her and . so I asked who would she like to keep as a PCP . She said it does not matter she was sleeping and she will schedule when she needs an appt. Her last visit was on 12/01/22 virtual.//kah

## 2023-04-20 NOTE — PLAN OF CARE
Ambulated to preop. Pt has sleep apnea and uses cpap at home. Per pulmonary note from nov 2019,   Auto-cpap mean pressure 8.7 cm h2o  Auto-cpap peak average pressure 15.7 cm h2o  Average device pressure <= 90% of time 11.2 cm h2o  resp so set up cpap post op for pt use.   preop completed and pt to surgery.   Detail Level: Detailed Patient Specific Counseling (Will Not Stick From Patient To Patient): 4/20/23\\nNo gluteal pinking today, \\nShe has been using topical antifungal cream. W will try for triamcinolone

## 2023-04-21 ENCOUNTER — PATIENT MESSAGE (OUTPATIENT)
Dept: PAIN MEDICINE | Facility: CLINIC | Age: 68
End: 2023-04-21
Payer: MEDICARE

## 2023-04-21 DIAGNOSIS — M17.0 PRIMARY OSTEOARTHRITIS OF BOTH KNEES: ICD-10-CM

## 2023-04-28 ENCOUNTER — TELEPHONE (OUTPATIENT)
Dept: PAIN MEDICINE | Facility: CLINIC | Age: 68
End: 2023-04-28
Payer: MEDICARE

## 2023-04-28 NOTE — TELEPHONE ENCOUNTER
----- Message from Tessy Diehl sent at 4/28/2023 12:30 PM CDT -----  Contact: Marieltera Floyd stated that the cream for her pain is on back order so she wants to move forward for the compound. Please call her at [phone].

## 2023-04-28 NOTE — TELEPHONE ENCOUNTER
Spoke with patient the topical cream has been on back order for a while and she has tried several pharmacies.  She would like to have the compound cream ordered for her.

## 2023-05-08 ENCOUNTER — TELEPHONE (OUTPATIENT)
Dept: PAIN MEDICINE | Facility: CLINIC | Age: 68
End: 2023-05-08
Payer: MEDICARE

## 2023-05-08 ENCOUNTER — OFFICE VISIT (OUTPATIENT)
Dept: GASTROENTEROLOGY | Facility: CLINIC | Age: 68
End: 2023-05-08
Payer: MEDICARE

## 2023-05-08 VITALS — HEIGHT: 63 IN | WEIGHT: 240 LBS | BODY MASS INDEX: 42.52 KG/M2

## 2023-05-08 DIAGNOSIS — K21.9 GASTROESOPHAGEAL REFLUX DISEASE, UNSPECIFIED WHETHER ESOPHAGITIS PRESENT: ICD-10-CM

## 2023-05-08 DIAGNOSIS — R19.6 HALITOSIS: ICD-10-CM

## 2023-05-08 DIAGNOSIS — R43.8 BITTER TASTE: ICD-10-CM

## 2023-05-08 DIAGNOSIS — K59.00 CONSTIPATION, UNSPECIFIED CONSTIPATION TYPE: Primary | ICD-10-CM

## 2023-05-08 PROCEDURE — 1101F PR PT FALLS ASSESS DOC 0-1 FALLS W/OUT INJ PAST YR: ICD-10-PCS | Mod: CPTII,95,, | Performed by: PHYSICIAN ASSISTANT

## 2023-05-08 PROCEDURE — 3288F PR FALLS RISK ASSESSMENT DOCUMENTED: ICD-10-PCS | Mod: CPTII,95,, | Performed by: PHYSICIAN ASSISTANT

## 2023-05-08 PROCEDURE — 1101F PT FALLS ASSESS-DOCD LE1/YR: CPT | Mod: CPTII,95,, | Performed by: PHYSICIAN ASSISTANT

## 2023-05-08 PROCEDURE — 99213 PR OFFICE/OUTPT VISIT, EST, LEVL III, 20-29 MIN: ICD-10-PCS | Mod: 95,,, | Performed by: PHYSICIAN ASSISTANT

## 2023-05-08 PROCEDURE — 1125F PR PAIN SEVERITY QUANTIFIED, PAIN PRESENT: ICD-10-PCS | Mod: CPTII,95,, | Performed by: PHYSICIAN ASSISTANT

## 2023-05-08 PROCEDURE — 99213 OFFICE O/P EST LOW 20 MIN: CPT | Mod: 95,,, | Performed by: PHYSICIAN ASSISTANT

## 2023-05-08 PROCEDURE — 3008F PR BODY MASS INDEX (BMI) DOCUMENTED: ICD-10-PCS | Mod: CPTII,95,, | Performed by: PHYSICIAN ASSISTANT

## 2023-05-08 PROCEDURE — 3288F FALL RISK ASSESSMENT DOCD: CPT | Mod: CPTII,95,, | Performed by: PHYSICIAN ASSISTANT

## 2023-05-08 PROCEDURE — 3008F BODY MASS INDEX DOCD: CPT | Mod: CPTII,95,, | Performed by: PHYSICIAN ASSISTANT

## 2023-05-08 PROCEDURE — 1159F MED LIST DOCD IN RCRD: CPT | Mod: CPTII,95,, | Performed by: PHYSICIAN ASSISTANT

## 2023-05-08 PROCEDURE — 1125F AMNT PAIN NOTED PAIN PRSNT: CPT | Mod: CPTII,95,, | Performed by: PHYSICIAN ASSISTANT

## 2023-05-08 PROCEDURE — 1159F PR MEDICATION LIST DOCUMENTED IN MEDICAL RECORD: ICD-10-PCS | Mod: CPTII,95,, | Performed by: PHYSICIAN ASSISTANT

## 2023-05-08 NOTE — PROGRESS NOTES
Subjective:      Patient ID: Mariel Becerril is a 67 y.o. female.    Chief Complaint: Gastroesophageal Reflux  The patient location is: [place]  The chief complaint leading to consultation is: constipation follow up    Visit type: audiovisual    Face to Face time with patient: 15 mins  20 minutes of total time spent on the encounter, which includes face to face time and non-face to face time preparing to see the patient (eg, review of tests), Obtaining and/or reviewing separately obtained history, Documenting clinical information in the electronic or other health record, Independently interpreting results (not separately reported) and communicating results to the patient/family/caregiver, or Care coordination (not separately reported).     Each patient to whom he or she provides medical services by telemedicine is:  (1) informed of the relationship between the physician and patient and the respective role of any other health care provider with respect to management of the patient; and (2) notified that he or she may decline to receive medical services by telemedicine and may withdraw from such care at any time.    Notes:     HPI:  Patient reports today via telemedicine for follow up of the above. Last visit she reported constipation. She was instructed to begin with Miralax 1-2x per day. She reports she has been doing this. She is having a bowel movement every other day on average. Still having to strain and not completely emptying. She also reports her reflux seems to be increasing. Taking TUMs more often. Initially reported that she was off of her PPI and feeling well controlled. Today, she states she restarted her Prilosec last month and has seen mild improvement. Feels a bitter taste in her mouth and bad breath.   Last EGD 2020. Colonoscopy 2018.    Review of Systems   Constitutional:  Negative for activity change, appetite change, chills, diaphoresis, fatigue, fever and unexpected weight change.   HENT:   "Negative for trouble swallowing.         "Bad breath" and bitter taste   Respiratory:  Negative for shortness of breath.    Cardiovascular:  Negative for chest pain.   Gastrointestinal:  Positive for constipation. Negative for abdominal distention, abdominal pain, anal bleeding, blood in stool, diarrhea, nausea and vomiting.   Neurological:  Negative for dizziness and weakness.   Psychiatric/Behavioral:  Negative for dysphoric mood. The patient is not nervous/anxious.      Medical History: Reviewed    Social History: Reviewed    Allergies: Reviewed    Objective:     Physical Exam  Constitutional:       General: She is not in acute distress.     Appearance: Normal appearance. She is not ill-appearing, toxic-appearing or diaphoretic.   HENT:      Head: Normocephalic and atraumatic.   Neurological:      Mental Status: She is alert.       Assessment:     1. Constipation, unspecified constipation type    2. Gastroesophageal reflux disease, unspecified whether esophagitis present    3. Halitosis    4. Bitter taste        Plan:     -Agree with restarting PPI therapy - Prilosec 40mg daily. Possible constipation could be increasing upper GI symptoms.  -GERD diet discussed. Possibly the cause of bitter taste and halitosis.   -Trial of low dose Linzess to help her completely evacuate with bowel movements. Follow up 6 weeks virtually to discuss.      Mariel was seen today for gastroesophageal reflux.    Diagnoses and all orders for this visit:    Constipation, unspecified constipation type  -     linaCLOtide (LINZESS) 72 mcg Cap capsule; Take 1 capsule (72 mcg total) by mouth before breakfast.    Gastroesophageal reflux disease, unspecified whether esophagitis present    Halitosis    Bitter taste        No follow-ups on file.    Thank you for the opportunity to participate in the care of this patient.   Mellisa Guillen PA-C.    "
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
## 2023-05-10 ENCOUNTER — OFFICE VISIT (OUTPATIENT)
Dept: PAIN MEDICINE | Facility: CLINIC | Age: 68
End: 2023-05-10
Payer: MEDICARE

## 2023-05-10 VITALS — WEIGHT: 231.56 LBS | HEIGHT: 63 IN | BODY MASS INDEX: 41.03 KG/M2

## 2023-05-10 DIAGNOSIS — Z86.69 HISTORY OF MIGRAINE HEADACHES: ICD-10-CM

## 2023-05-10 DIAGNOSIS — M54.16 BILATERAL LUMBAR RADICULOPATHY: ICD-10-CM

## 2023-05-10 DIAGNOSIS — M79.18 PIRIFORMIS MUSCLE PAIN: ICD-10-CM

## 2023-05-10 PROCEDURE — 1101F PR PT FALLS ASSESS DOC 0-1 FALLS W/OUT INJ PAST YR: ICD-10-PCS | Mod: CPTII,S$GLB,, | Performed by: PHYSICIAN ASSISTANT

## 2023-05-10 PROCEDURE — 99214 PR OFFICE/OUTPT VISIT, EST, LEVL IV, 30-39 MIN: ICD-10-PCS | Mod: S$GLB,,, | Performed by: PHYSICIAN ASSISTANT

## 2023-05-10 PROCEDURE — 1159F PR MEDICATION LIST DOCUMENTED IN MEDICAL RECORD: ICD-10-PCS | Mod: CPTII,S$GLB,, | Performed by: PHYSICIAN ASSISTANT

## 2023-05-10 PROCEDURE — 99999 PR PBB SHADOW E&M-EST. PATIENT-LVL III: ICD-10-PCS | Mod: PBBFAC,,, | Performed by: PHYSICIAN ASSISTANT

## 2023-05-10 PROCEDURE — 1160F RVW MEDS BY RX/DR IN RCRD: CPT | Mod: CPTII,S$GLB,, | Performed by: PHYSICIAN ASSISTANT

## 2023-05-10 PROCEDURE — 1159F MED LIST DOCD IN RCRD: CPT | Mod: CPTII,S$GLB,, | Performed by: PHYSICIAN ASSISTANT

## 2023-05-10 PROCEDURE — 3008F PR BODY MASS INDEX (BMI) DOCUMENTED: ICD-10-PCS | Mod: CPTII,S$GLB,, | Performed by: PHYSICIAN ASSISTANT

## 2023-05-10 PROCEDURE — 1101F PT FALLS ASSESS-DOCD LE1/YR: CPT | Mod: CPTII,S$GLB,, | Performed by: PHYSICIAN ASSISTANT

## 2023-05-10 PROCEDURE — 99214 OFFICE O/P EST MOD 30 MIN: CPT | Mod: S$GLB,,, | Performed by: PHYSICIAN ASSISTANT

## 2023-05-10 PROCEDURE — 3008F BODY MASS INDEX DOCD: CPT | Mod: CPTII,S$GLB,, | Performed by: PHYSICIAN ASSISTANT

## 2023-05-10 PROCEDURE — 1125F PR PAIN SEVERITY QUANTIFIED, PAIN PRESENT: ICD-10-PCS | Mod: CPTII,S$GLB,, | Performed by: PHYSICIAN ASSISTANT

## 2023-05-10 PROCEDURE — 3288F FALL RISK ASSESSMENT DOCD: CPT | Mod: CPTII,S$GLB,, | Performed by: PHYSICIAN ASSISTANT

## 2023-05-10 PROCEDURE — 1125F AMNT PAIN NOTED PAIN PRSNT: CPT | Mod: CPTII,S$GLB,, | Performed by: PHYSICIAN ASSISTANT

## 2023-05-10 PROCEDURE — 3288F PR FALLS RISK ASSESSMENT DOCUMENTED: ICD-10-PCS | Mod: CPTII,S$GLB,, | Performed by: PHYSICIAN ASSISTANT

## 2023-05-10 PROCEDURE — 99999 PR PBB SHADOW E&M-EST. PATIENT-LVL III: CPT | Mod: PBBFAC,,, | Performed by: PHYSICIAN ASSISTANT

## 2023-05-10 PROCEDURE — 1160F PR REVIEW ALL MEDS BY PRESCRIBER/CLIN PHARMACIST DOCUMENTED: ICD-10-PCS | Mod: CPTII,S$GLB,, | Performed by: PHYSICIAN ASSISTANT

## 2023-05-10 RX ORDER — TOPIRAMATE 100 MG/1
100 TABLET, FILM COATED ORAL 2 TIMES DAILY
Qty: 180 TABLET | Refills: 1 | Status: SHIPPED | OUTPATIENT
Start: 2023-05-10 | End: 2023-07-05 | Stop reason: SDUPTHER

## 2023-05-10 RX ORDER — GABAPENTIN 300 MG/1
600 CAPSULE ORAL NIGHTLY
Qty: 60 CAPSULE | Refills: 5 | Status: SHIPPED | OUTPATIENT
Start: 2023-05-10 | End: 2023-07-05 | Stop reason: SDUPTHER

## 2023-05-10 RX ORDER — MELOXICAM 15 MG/1
15 TABLET ORAL DAILY
Qty: 90 TABLET | Refills: 3 | Status: SHIPPED | OUTPATIENT
Start: 2023-05-10 | End: 2023-07-05 | Stop reason: SDUPTHER

## 2023-05-10 RX ORDER — METHOCARBAMOL 750 MG/1
750 TABLET, FILM COATED ORAL 3 TIMES DAILY PRN
Qty: 90 TABLET | Refills: 3 | Status: SHIPPED | OUTPATIENT
Start: 2023-05-10 | End: 2023-07-05 | Stop reason: SDUPTHER

## 2023-05-22 RX ORDER — ASPIRIN 81 MG/1
81 TABLET ORAL DAILY
Qty: 90 TABLET | Refills: 3 | Status: SHIPPED | OUTPATIENT
Start: 2023-05-22 | End: 2024-02-28 | Stop reason: SDUPTHER

## 2023-05-22 RX ORDER — METOPROLOL SUCCINATE 50 MG/1
50 TABLET, EXTENDED RELEASE ORAL DAILY
Qty: 90 TABLET | Refills: 0 | Status: SHIPPED | OUTPATIENT
Start: 2023-05-22 | End: 2023-11-16 | Stop reason: SDUPTHER

## 2023-05-24 ENCOUNTER — APPOINTMENT (OUTPATIENT)
Dept: RADIOLOGY | Facility: HOSPITAL | Age: 68
End: 2023-05-24
Attending: NURSE PRACTITIONER
Payer: MEDICARE

## 2023-05-24 ENCOUNTER — OFFICE VISIT (OUTPATIENT)
Dept: INTERNAL MEDICINE | Facility: CLINIC | Age: 68
End: 2023-05-24
Payer: MEDICARE

## 2023-05-24 VITALS
BODY MASS INDEX: 41.71 KG/M2 | DIASTOLIC BLOOD PRESSURE: 82 MMHG | WEIGHT: 235.44 LBS | HEART RATE: 75 BPM | RESPIRATION RATE: 15 BRPM | SYSTOLIC BLOOD PRESSURE: 130 MMHG | OXYGEN SATURATION: 99 % | TEMPERATURE: 98 F

## 2023-05-24 DIAGNOSIS — M25.562 CHRONIC PAIN OF LEFT KNEE: ICD-10-CM

## 2023-05-24 DIAGNOSIS — G89.29 CHRONIC PAIN OF LEFT KNEE: ICD-10-CM

## 2023-05-24 DIAGNOSIS — J01.90 ACUTE SINUSITIS, RECURRENCE NOT SPECIFIED, UNSPECIFIED LOCATION: Primary | ICD-10-CM

## 2023-05-24 DIAGNOSIS — R04.0 FREQUENT NOSEBLEEDS: ICD-10-CM

## 2023-05-24 DIAGNOSIS — R42 VERTIGO: ICD-10-CM

## 2023-05-24 PROCEDURE — 1101F PR PT FALLS ASSESS DOC 0-1 FALLS W/OUT INJ PAST YR: ICD-10-PCS | Mod: CPTII,S$GLB,, | Performed by: NURSE PRACTITIONER

## 2023-05-24 PROCEDURE — 3075F SYST BP GE 130 - 139MM HG: CPT | Mod: CPTII,S$GLB,, | Performed by: NURSE PRACTITIONER

## 2023-05-24 PROCEDURE — 73560 X-RAY EXAM OF KNEE 1 OR 2: CPT | Mod: TC,PO,RT

## 2023-05-24 PROCEDURE — 3008F BODY MASS INDEX DOCD: CPT | Mod: CPTII,S$GLB,, | Performed by: NURSE PRACTITIONER

## 2023-05-24 PROCEDURE — 3079F PR MOST RECENT DIASTOLIC BLOOD PRESSURE 80-89 MM HG: ICD-10-PCS | Mod: CPTII,S$GLB,, | Performed by: NURSE PRACTITIONER

## 2023-05-24 PROCEDURE — 73562 X-RAY EXAM OF KNEE 3: CPT | Mod: 26,LT,, | Performed by: RADIOLOGY

## 2023-05-24 PROCEDURE — 73562 XR KNEE ORTHO LEFT: ICD-10-PCS | Mod: 26,LT,, | Performed by: RADIOLOGY

## 2023-05-24 PROCEDURE — 1125F AMNT PAIN NOTED PAIN PRSNT: CPT | Mod: CPTII,S$GLB,, | Performed by: NURSE PRACTITIONER

## 2023-05-24 PROCEDURE — 3079F DIAST BP 80-89 MM HG: CPT | Mod: CPTII,S$GLB,, | Performed by: NURSE PRACTITIONER

## 2023-05-24 PROCEDURE — 99213 OFFICE O/P EST LOW 20 MIN: CPT | Mod: S$GLB,,, | Performed by: NURSE PRACTITIONER

## 2023-05-24 PROCEDURE — 3288F PR FALLS RISK ASSESSMENT DOCUMENTED: ICD-10-PCS | Mod: CPTII,S$GLB,, | Performed by: NURSE PRACTITIONER

## 2023-05-24 PROCEDURE — 3008F PR BODY MASS INDEX (BMI) DOCUMENTED: ICD-10-PCS | Mod: CPTII,S$GLB,, | Performed by: NURSE PRACTITIONER

## 2023-05-24 PROCEDURE — 73560 X-RAY EXAM OF KNEE 1 OR 2: CPT | Mod: 26,RT,, | Performed by: RADIOLOGY

## 2023-05-24 PROCEDURE — 1125F PR PAIN SEVERITY QUANTIFIED, PAIN PRESENT: ICD-10-PCS | Mod: CPTII,S$GLB,, | Performed by: NURSE PRACTITIONER

## 2023-05-24 PROCEDURE — 99999 PR PBB SHADOW E&M-EST. PATIENT-LVL V: CPT | Mod: PBBFAC,,, | Performed by: NURSE PRACTITIONER

## 2023-05-24 PROCEDURE — 73560 XR KNEE ORTHO LEFT: ICD-10-PCS | Mod: 26,RT,, | Performed by: RADIOLOGY

## 2023-05-24 PROCEDURE — 3075F PR MOST RECENT SYSTOLIC BLOOD PRESS GE 130-139MM HG: ICD-10-PCS | Mod: CPTII,S$GLB,, | Performed by: NURSE PRACTITIONER

## 2023-05-24 PROCEDURE — 99999 PR PBB SHADOW E&M-EST. PATIENT-LVL V: ICD-10-PCS | Mod: PBBFAC,,, | Performed by: NURSE PRACTITIONER

## 2023-05-24 PROCEDURE — 99213 PR OFFICE/OUTPT VISIT, EST, LEVL III, 20-29 MIN: ICD-10-PCS | Mod: S$GLB,,, | Performed by: NURSE PRACTITIONER

## 2023-05-24 PROCEDURE — 1101F PT FALLS ASSESS-DOCD LE1/YR: CPT | Mod: CPTII,S$GLB,, | Performed by: NURSE PRACTITIONER

## 2023-05-24 PROCEDURE — 3288F FALL RISK ASSESSMENT DOCD: CPT | Mod: CPTII,S$GLB,, | Performed by: NURSE PRACTITIONER

## 2023-05-24 RX ORDER — MONTELUKAST SODIUM 10 MG/1
10 TABLET ORAL NIGHTLY
Qty: 30 TABLET | Refills: 2 | Status: SHIPPED | OUTPATIENT
Start: 2023-05-24 | End: 2024-02-28 | Stop reason: SDUPTHER

## 2023-05-24 RX ORDER — DOXYCYCLINE 100 MG/1
100 CAPSULE ORAL EVERY 12 HOURS
Qty: 20 CAPSULE | Refills: 0 | Status: SHIPPED | OUTPATIENT
Start: 2023-05-24 | End: 2023-06-03

## 2023-05-24 RX ORDER — MECLIZINE HYDROCHLORIDE 25 MG/1
25 TABLET ORAL 3 TIMES DAILY PRN
Qty: 30 TABLET | Refills: 0 | Status: SHIPPED | OUTPATIENT
Start: 2023-05-24 | End: 2023-06-27 | Stop reason: SDUPTHER

## 2023-05-24 NOTE — PROGRESS NOTES
Subjective     Patient ID: Mariel Becerril is a 67 y.o. female.    Chief Complaint: Epistaxis (More frequent nosebleeds per pt )    Patient presents with dizziness and nosebleeds.      Nosebleeds: intermittent.  No particular nostril.  Sometimes with blowing her nose.   Random movements.  Had about 8 since March.      Dizziness: reports more so with position changes.  Was on meclizine before for dizziness.     Reports left knee pain. No injury or falls.  Had TKR 2020.  Worse with walking.       Epistaxis     Review of Systems   Constitutional:  Negative for chills, fatigue and fever.   HENT:  Positive for nosebleeds.    Respiratory:  Negative for cough and shortness of breath.    Gastrointestinal:  Negative for abdominal pain, constipation and diarrhea.   Musculoskeletal:  Positive for arthralgias and joint swelling.   Psychiatric/Behavioral:  Negative for agitation and confusion.         Objective     Physical Exam  Vitals reviewed.   Constitutional:       Appearance: Normal appearance.   HENT:      Right Ear: Tympanic membrane normal.      Left Ear: Tympanic membrane normal.      Nose: No rhinorrhea.      Left Nostril: No epistaxis.   Cardiovascular:      Rate and Rhythm: Normal rate and regular rhythm.   Pulmonary:      Effort: Pulmonary effort is normal.      Breath sounds: Normal breath sounds.   Musculoskeletal:         General: Swelling and tenderness present. Normal range of motion.   Skin:     General: Skin is warm.   Neurological:      General: No focal deficit present.      Mental Status: She is alert.          Assessment and Plan     Problem List Items Addressed This Visit    None  Visit Diagnoses       Acute sinusitis, recurrence not specified, unspecified location    -  Primary    Relevant Medications    montelukast (SINGULAIR) 10 mg tablet    Other Relevant Orders    Ambulatory referral/consult to ENT    Vertigo        Relevant Medications    meclizine (ANTIVERT) 25 mg tablet    Frequent  nosebleeds        Relevant Orders    Ambulatory referral/consult to ENT    Chronic pain of left knee        Relevant Orders    X-ray Knee Ortho Left (Completed)            Acute sinusitis, recurrence not specified, unspecified location  -     Ambulatory referral/consult to ENT; Future; Expected date: 05/31/2023  -     montelukast (SINGULAIR) 10 mg tablet; Take 1 tablet (10 mg total) by mouth every evening. Allergies  Dispense: 30 tablet; Refill: 2  -     doxycycline (VIBRAMYCIN) 100 MG Cap; Take 1 capsule (100 mg total) by mouth every 12 (twelve) hours. for 10 days  Dispense: 20 capsule; Refill: 0    Vertigo  -     meclizine (ANTIVERT) 25 mg tablet; Take 1 tablet (25 mg total) by mouth 3 (three) times daily as needed for Dizziness.  Dispense: 30 tablet; Refill: 0    Frequent nosebleeds  -     Ambulatory referral/consult to ENT; Future; Expected date: 05/31/2023    Chronic pain of left knee  -     X-ray Knee Ortho Left; Future; Expected date: 05/24/2023     Instructed to take all medications as ordered.  Informed if no improvement or symptoms worsens to follow up with primary care physician.  Informed to hydrate and rest.  Printed and review after visit summary with patient.     Recommend ENT appointment     X-ray today

## 2023-05-30 ENCOUNTER — OFFICE VISIT (OUTPATIENT)
Dept: PODIATRY | Facility: CLINIC | Age: 68
End: 2023-05-30
Payer: MEDICARE

## 2023-05-30 DIAGNOSIS — M21.41 ACQUIRED PES PLANUS, RIGHT: ICD-10-CM

## 2023-05-30 DIAGNOSIS — E66.01 MORBID OBESITY WITH BMI OF 40.0-44.9, ADULT: ICD-10-CM

## 2023-05-30 DIAGNOSIS — M72.2 PLANTAR FASCIAL FIBROMATOSIS OF RIGHT FOOT: Primary | ICD-10-CM

## 2023-05-30 DIAGNOSIS — M79.671 PAIN IN RIGHT FOOT: ICD-10-CM

## 2023-05-30 PROCEDURE — 3288F PR FALLS RISK ASSESSMENT DOCUMENTED: ICD-10-PCS | Mod: CPTII,S$GLB,, | Performed by: PODIATRIST

## 2023-05-30 PROCEDURE — 20550 PR INJECT TENDON SHEATH/LIGAMENT: ICD-10-PCS | Mod: RT,S$GLB,, | Performed by: PODIATRIST

## 2023-05-30 PROCEDURE — 20550 NJX 1 TENDON SHEATH/LIGAMENT: CPT | Mod: RT,S$GLB,, | Performed by: PODIATRIST

## 2023-05-30 PROCEDURE — 1159F PR MEDICATION LIST DOCUMENTED IN MEDICAL RECORD: ICD-10-PCS | Mod: CPTII,S$GLB,, | Performed by: PODIATRIST

## 2023-05-30 PROCEDURE — 1101F PR PT FALLS ASSESS DOC 0-1 FALLS W/OUT INJ PAST YR: ICD-10-PCS | Mod: CPTII,S$GLB,, | Performed by: PODIATRIST

## 2023-05-30 PROCEDURE — 1101F PT FALLS ASSESS-DOCD LE1/YR: CPT | Mod: CPTII,S$GLB,, | Performed by: PODIATRIST

## 2023-05-30 PROCEDURE — 3288F FALL RISK ASSESSMENT DOCD: CPT | Mod: CPTII,S$GLB,, | Performed by: PODIATRIST

## 2023-05-30 PROCEDURE — 1160F RVW MEDS BY RX/DR IN RCRD: CPT | Mod: CPTII,S$GLB,, | Performed by: PODIATRIST

## 2023-05-30 PROCEDURE — 1159F MED LIST DOCD IN RCRD: CPT | Mod: CPTII,S$GLB,, | Performed by: PODIATRIST

## 2023-05-30 PROCEDURE — 99213 OFFICE O/P EST LOW 20 MIN: CPT | Mod: 25,S$GLB,, | Performed by: PODIATRIST

## 2023-05-30 PROCEDURE — 99999 PR PBB SHADOW E&M-EST. PATIENT-LVL III: ICD-10-PCS | Mod: PBBFAC,,, | Performed by: PODIATRIST

## 2023-05-30 PROCEDURE — 1160F PR REVIEW ALL MEDS BY PRESCRIBER/CLIN PHARMACIST DOCUMENTED: ICD-10-PCS | Mod: CPTII,S$GLB,, | Performed by: PODIATRIST

## 2023-05-30 PROCEDURE — 99999 PR PBB SHADOW E&M-EST. PATIENT-LVL III: CPT | Mod: PBBFAC,,, | Performed by: PODIATRIST

## 2023-05-30 PROCEDURE — 99213 PR OFFICE/OUTPT VISIT, EST, LEVL III, 20-29 MIN: ICD-10-PCS | Mod: 25,S$GLB,, | Performed by: PODIATRIST

## 2023-05-30 RX ORDER — DEXAMETHASONE SODIUM PHOSPHATE 4 MG/ML
4 INJECTION, SOLUTION INTRA-ARTICULAR; INTRALESIONAL; INTRAMUSCULAR; INTRAVENOUS; SOFT TISSUE ONCE
Status: COMPLETED | OUTPATIENT
Start: 2023-05-30 | End: 2023-05-30

## 2023-05-30 RX ORDER — TRIAMCINOLONE ACETONIDE 40 MG/ML
40 INJECTION, SUSPENSION INTRA-ARTICULAR; INTRAMUSCULAR ONCE
Status: COMPLETED | OUTPATIENT
Start: 2023-05-30 | End: 2023-05-30

## 2023-05-30 RX ADMIN — DEXAMETHASONE SODIUM PHOSPHATE 4 MG: 4 INJECTION, SOLUTION INTRA-ARTICULAR; INTRALESIONAL; INTRAMUSCULAR; INTRAVENOUS; SOFT TISSUE at 04:05

## 2023-05-30 RX ADMIN — TRIAMCINOLONE ACETONIDE 40 MG: 40 INJECTION, SUSPENSION INTRA-ARTICULAR; INTRAMUSCULAR at 04:05

## 2023-05-30 NOTE — PROGRESS NOTES
Subjective:       Patient ID: Mariel Becerril is a 67 y.o. female.    Chief Complaint: Foot Problem (Right foot - pt feels lump (painful when walking) ) and Foot Pain      HPI: Mariel Becerril complains of a large knot/bump to the plantar aspect of the right foot. States 6/10 pains and mostly discomfort with direct palpation/gait. Pains are to the plantar foot, mostly with walking and standing. Denies any recent identifiable trauma. Does limp with gait. States seldom Tylenol and/or NSAID medications thus far. States walking and standing causes and/or exacerbates the symptoms. Patient's Primary Care Provider is Jaclyn Becerril MD.     Review of patient's allergies indicates:   Allergen Reactions    Penicillins Rash       Past Medical History:   Diagnosis Date    COPD (chronic obstructive pulmonary disease)     NO HOME O2    Digestive disorder     Frequent headaches     Hypertension     Osteoarthritis 04/02/2019    Bilateral knees, ankles and feet    Severe obesity (BMI >= 40)     Sleep apnea     CPAP       Family History   Problem Relation Age of Onset    Heart attack Father        Social History     Socioeconomic History    Marital status:    Tobacco Use    Smoking status: Never    Smokeless tobacco: Never   Substance and Sexual Activity    Alcohol use: Never    Drug use: No    Sexual activity: Never     Partners: Male     Birth control/protection: See Surgical Hx       Past Surgical History:   Procedure Laterality Date    BLADDER SUSPENSION      CATARACT EXTRACTION, BILATERAL      COLONOSCOPY N/A 12/3/2018    Procedure: COLONOSCOPY;  Surgeon: Mari Rader MD;  Location: [facility];  Service: Endoscopy;  Laterality: N/A;    ESOPHAGOGASTRODUODENOSCOPY N/A 12/31/2020    Procedure: ESOPHAGOGASTRODUODENOSCOPY (EGD);  Surgeon: Anthony Rodríguez MD;  Location: [facility];  Service: General;  Laterality: N/A;    HYSTERECTOMY      partial 2000    INJECTION OF ANESTHETIC AGENT INTO SACROILIAC JOINT Bilateral  11/30/2020    Procedure: Bilateral SIJ + Bilateral Piriformis + Bilateral GT Bursa Injection;  Surgeon: Thanh Diaz MD;  Location: [facility];  Service: Pain Management;  Laterality: Bilateral;    INJECTION OF JOINT Bilateral 11/30/2020    Procedure: Bilateral SIJ + Bilateral Piriformis + Bilateral GT Bursa Injection;  Surgeon: Thanh Diaz MD;  Location: [facility];  Service: Pain Management;  Laterality: Bilateral;    INJECTION OF PIRIFORMIS MUSCLE Bilateral 11/30/2020    Procedure: Bilateral SIJ + Bilateral Piriformis + Bilateral GT Bursa Injection;  Surgeon: Thanh Diaz MD;  Location: [facility];  Service: Pain Management;  Laterality: Bilateral;    ROBOT-ASSISTED LAPAROSCOPIC SLEEVE GASTRECTOMY USING DA EZEQUIEL XI N/A 3/2/2021    Procedure: XI ROBOTIC SLEEVE GASTRECTOMY;  Surgeon: Anthony Rodríguez MD;  Location: [facility];  Service: General;  Laterality: N/A;    SELECTIVE INJECTION OF ANESTHETIC AGENT AROUND LUMBAR SPINAL NERVE ROOT BY TRANSFORAMINAL APPROACH Bilateral 1/4/2021    Procedure: Bilateral L5/S1 TF GISELA;  Surgeon: Thanh Diaz MD;  Location: [facility];  Service: Pain Management;  Laterality: Bilateral;    SELECTIVE INJECTION OF ANESTHETIC AGENT AROUND LUMBAR SPINAL NERVE ROOT BY TRANSFORAMINAL APPROACH Bilateral 3/23/2021    Procedure: Bilateral L5/S1 TF GISELA;  Surgeon: Thanh Diaz MD;  Location: [facility];  Service: Pain Management;  Laterality: Bilateral;    SELECTIVE INJECTION OF ANESTHETIC AGENT AROUND LUMBAR SPINAL NERVE ROOT BY TRANSFORAMINAL APPROACH Bilateral 10/5/2021    Procedure: Bilateral L5/S1 TF GISELA;  Surgeon: Thanh Diaz MD;  Location: [facility];  Service: Pain Management;  Laterality: Bilateral;    tonsilectomy      TOTAL KNEE ARTHROPLASTY Left 3/4/2020    Procedure: ARTHROPLASTY, KNEE, TOTAL;  Surgeon: Moy Connolly MD;  Location: [facility];  Service: Orthopedics;  Laterality: Left;       Review of Systems   Constitutional:   Negative for chills, fatigue and fever.   HENT:  Negative for hearing loss.    Eyes:  Negative for photophobia and visual disturbance.   Respiratory:  Negative for cough, chest tightness, shortness of breath and wheezing.    Cardiovascular:  Negative for chest pain and palpitations.   Gastrointestinal:  Negative for constipation, diarrhea, nausea and vomiting.   Endocrine: Negative for cold intolerance and heat intolerance.   Genitourinary:  Negative for flank pain.   Musculoskeletal:  Positive for gait problem. Negative for neck pain and neck stiffness.   Neurological:  Negative for light-headedness and headaches.   Psychiatric/Behavioral:  Negative for sleep disturbance.        Objective:   There were no vitals taken for this visit.    Physical Exam   LOWER EXTREMITY PHYSICAL EXAMINATION  ORTHOPEDIC: There is no tenderness to palpation of the area around the plantar medial calcaneal tubercle on the right foot. There is moderate tenderness to palpation of the central band of the plantar fascia and no pains to the lateral band of the plantar fascia. Any/All pains are characterized as sharp and stabbing-like with direct palpation of the area. Plantar fibroma(s) is/are noted to the medial band of the fascia. Slight discomfort to palpation.  With plantar flexion of the ankle and plantar flexion across the metatarsophalangeal joint, the lesions are less palpable.  With dorsiflexion of the ankle and dorsiflexion of the metatarsophalangeal joint, the lesions are very palpable. The lesion is not mobile. Equinus contracture is noted. Antalgic gait pattern is noted.     DERMATOLOGY: Skin is supple, dry and intact.    Assessment:     1. Plantar fascial fibromatosis of right foot    2. Pain in right foot    3. Morbid obesity with BMI of 40.0-44.9, adult    4. Acquired pes planus, right          Plan:     Plantar fascial fibromatosis of right foot  -     triamcinolone acetonide injection 40 mg  -     dexAMETHasone injection 4  mg    Pain in right foot    Morbid obesity with BMI of 40.0-44.9, adult    Acquired pes planus, right        Thorough discussion is had with the patient today, concerning the diagnosis, its etiology, and the treatment algorithm at present.     Treatment options are conservative or surgical.  Conservative options are topical compounding cream versus cortisone injections.  Conservative options does also include appropriate shoe gear with or without modification as to alleviate the stress and tension of the plantar fascia and to offload the aforementioned lesions.    For now, NSAIDs and/or Tylenol as needed.    Following sterile preparation with alcohol swab and/or betadine paint, injection was given into and around the area of the RLE plantar fibroma, using 25-gauge needle. Injection consisted of approximately 0.5cc of Dexamethasone Sodium Phosphate, 0.5cc of Kenalog-40 and 0.5cc of 0.50% Marcaine w/ Epinephrine. Bandage application thereafter. Patient tolerated procedure well, and without complications or complaints. Patient educated that the area of pathology, might actually be slightly more painful, due to the injection, over the course of the next one to two days.     Did discuss proper and supportive shoe gear in detail and at length with the patient.  These are shoes with firm and robust arch support; medial counter.  Shoes which only bend at the metatarsophalangeal joint and which are rigid in the midfoot and hindfoot. Patient urged to purchase running type or cross training type shoes gear which are designed for pronation control.         Future Appointments   Date Time Provider Department Center   5/31/2023  8:20 AM Shaun Bhatti MD ON [facility]  [facility]   6/5/2023 10:45 AM Morgan Pop MD ON ENT  [facility]   6/20/2023  2:00 PM Mellisa Guillen PA-C [facility]   7/5/2023  9:20 AM Carmela Carrasco PA-C ON IN PN [facility]   7/20/2023  1:20 PM Moy Connolly MD  [facility] ORTHO [facility]   7/27/2023  3:40 PM Antonio Carey, OD [facility] OPHTHAL [facility]   11/16/2023 10:00 AM Juan Alberto Luis MD [facility] CARDIO [facility]

## 2023-05-31 ENCOUNTER — OFFICE VISIT (OUTPATIENT)
Dept: PULMONOLOGY | Facility: CLINIC | Age: 68
End: 2023-05-31
Payer: MEDICARE

## 2023-05-31 VITALS
WEIGHT: 234.56 LBS | RESPIRATION RATE: 14 BRPM | BODY MASS INDEX: 41.56 KG/M2 | HEIGHT: 63 IN | HEART RATE: 76 BPM | DIASTOLIC BLOOD PRESSURE: 84 MMHG | SYSTOLIC BLOOD PRESSURE: 130 MMHG | OXYGEN SATURATION: 98 %

## 2023-05-31 DIAGNOSIS — E66.01 MORBID OBESITY WITH BMI OF 40.0-44.9, ADULT: ICD-10-CM

## 2023-05-31 DIAGNOSIS — G47.33 OSA ON CPAP: Primary | ICD-10-CM

## 2023-05-31 PROCEDURE — 3288F PR FALLS RISK ASSESSMENT DOCUMENTED: ICD-10-PCS | Mod: CPTII,S$GLB,, | Performed by: INTERNAL MEDICINE

## 2023-05-31 PROCEDURE — 99999 PR PBB SHADOW E&M-EST. PATIENT-LVL V: ICD-10-PCS | Mod: PBBFAC,,, | Performed by: INTERNAL MEDICINE

## 2023-05-31 PROCEDURE — 3079F PR MOST RECENT DIASTOLIC BLOOD PRESSURE 80-89 MM HG: ICD-10-PCS | Mod: CPTII,S$GLB,, | Performed by: INTERNAL MEDICINE

## 2023-05-31 PROCEDURE — 99214 PR OFFICE/OUTPT VISIT, EST, LEVL IV, 30-39 MIN: ICD-10-PCS | Mod: S$GLB,,, | Performed by: INTERNAL MEDICINE

## 2023-05-31 PROCEDURE — 1159F MED LIST DOCD IN RCRD: CPT | Mod: CPTII,S$GLB,, | Performed by: INTERNAL MEDICINE

## 2023-05-31 PROCEDURE — 99214 OFFICE O/P EST MOD 30 MIN: CPT | Mod: S$GLB,,, | Performed by: INTERNAL MEDICINE

## 2023-05-31 PROCEDURE — 1126F PR PAIN SEVERITY QUANTIFIED, NO PAIN PRESENT: ICD-10-PCS | Mod: CPTII,S$GLB,, | Performed by: INTERNAL MEDICINE

## 2023-05-31 PROCEDURE — 1101F PT FALLS ASSESS-DOCD LE1/YR: CPT | Mod: CPTII,S$GLB,, | Performed by: INTERNAL MEDICINE

## 2023-05-31 PROCEDURE — 3079F DIAST BP 80-89 MM HG: CPT | Mod: CPTII,S$GLB,, | Performed by: INTERNAL MEDICINE

## 2023-05-31 PROCEDURE — 1101F PR PT FALLS ASSESS DOC 0-1 FALLS W/OUT INJ PAST YR: ICD-10-PCS | Mod: CPTII,S$GLB,, | Performed by: INTERNAL MEDICINE

## 2023-05-31 PROCEDURE — 3288F FALL RISK ASSESSMENT DOCD: CPT | Mod: CPTII,S$GLB,, | Performed by: INTERNAL MEDICINE

## 2023-05-31 PROCEDURE — 3075F SYST BP GE 130 - 139MM HG: CPT | Mod: CPTII,S$GLB,, | Performed by: INTERNAL MEDICINE

## 2023-05-31 PROCEDURE — 3008F BODY MASS INDEX DOCD: CPT | Mod: CPTII,S$GLB,, | Performed by: INTERNAL MEDICINE

## 2023-05-31 PROCEDURE — 1159F PR MEDICATION LIST DOCUMENTED IN MEDICAL RECORD: ICD-10-PCS | Mod: CPTII,S$GLB,, | Performed by: INTERNAL MEDICINE

## 2023-05-31 PROCEDURE — 3075F PR MOST RECENT SYSTOLIC BLOOD PRESS GE 130-139MM HG: ICD-10-PCS | Mod: CPTII,S$GLB,, | Performed by: INTERNAL MEDICINE

## 2023-05-31 PROCEDURE — 3008F PR BODY MASS INDEX (BMI) DOCUMENTED: ICD-10-PCS | Mod: CPTII,S$GLB,, | Performed by: INTERNAL MEDICINE

## 2023-05-31 PROCEDURE — 1126F AMNT PAIN NOTED NONE PRSNT: CPT | Mod: CPTII,S$GLB,, | Performed by: INTERNAL MEDICINE

## 2023-05-31 PROCEDURE — 99999 PR PBB SHADOW E&M-EST. PATIENT-LVL V: CPT | Mod: PBBFAC,,, | Performed by: INTERNAL MEDICINE

## 2023-05-31 NOTE — PROGRESS NOTES
"Subjective:       Patient ID: Mariel Becerril is a 67 y.o. female.    Chief Complaint: GIL, fatigue  Last visit with Dr. Woods 10/28/21:  64-year-old female patient known with mild GIL presenting for 1 year follow-up.    Underwent uneventful left knee surgery with [facility] with Dr. Connolly.   Underwent weight loss surgery with Dr. Rodríguez lost about 20 lb so far.  Recently received [place] 2 replacement CPAP machine.  Compliant with CPAP therapy.  Chappell Sleepiness Scale score 6.   Overnight oximetry on CPAP shows transient hypoxemia no need for O2.  Not needing albuterol.  Initially evaluated in 2018 for establishing care and treatment of GIL.         2/18/22  65yo female here for follow up of GIL on CPAP  Using AutoPAP 5-20, compliant with CPAP, benefits from use  Patient states improved symptoms with use of CPAP. Sleeping more soundly. Waking up feeling more refreshed. Less headaches with CPAP.  Reports improved daytime sleepiness although Chappell scoring high today. She mentions severe back pain contributing to her daytime fatigue, has decreased energy and some depressed mood, has not seen psychiatry. She says she is currently receiving back decompression therapy for her pain and feels hopeful that this will help with her pain and her fatigue/low energy  She definitely wants to keep using CPAP though and feels it is beneficial  When asked about her fatigue while driving she says when she feels tried while driving she pulls over into a parking lot and will take a 20 minute nap  Denies cataplexy, leg weakness, "sleep attacks"      5/18/22  65yo female here for follow up of GIL on CPAP  100% compliance and benefits from use  Sleeps more soundly  Still has daytime fatigue  In bed at 11pm, wakes at 7am, no trouble falling asleep  Sometimes seeing things that are not there when waking up    05/31/2023  Followup  MSLT 06/2022 revewied  Download reveiwed  Usage > 4 hrs was 73.3%  AHI 1.5  Bed time 12 " MN  Wake time 6 am    Retired             No sleep onset REMs present. This study does not suggest narcolepsy.   Total number of naps attempted: 5 . Total number of naps with sleep attained:   Pathologic sleepiness was evidenced by short mean sleep latency.   Night #1: Sleep latency 05:54 min   Night #2: Sleep latency 02:13 min   Night #3: Sleep latency 09:25 min   Night #4: Sleep latency 02:28 min   Night #5: Sleep latency 04:56 min   Pathologic Sleepiness (G47.10)   Idiopathic hypersomnia (G47.11)     Immunization History   Administered Date(s) Administered    COVID-19, MRNA, LN-S, PF (MODERNA FULL 0.5 ML DOSE) 02/04/2021, 03/04/2021, 01/11/2022    Influenza (FLUAD) - Quadrivalent - Adjuvanted - PF *Preferred* (65+) 10/18/2022    Influenza - Quadrivalent - PF *Preferred* (6 months and older) 10/01/2014, 11/15/2018, 10/30/2019, 11/09/2020    Pneumococcal Polysaccharide - 23 Valent 02/22/2022    Tdap 12/17/2020    Zoster Recombinant 10/18/2022      Tobacco Use: Low Risk     Smoking Tobacco Use: Never    Smokeless Tobacco Use: Never    Passive Exposure: Not on file      Past Medical History:   Diagnosis Date    COPD (chronic obstructive pulmonary disease)     NO HOME O2    Digestive disorder     Frequent headaches     Hypertension     Osteoarthritis 04/02/2019    Bilateral knees, ankles and feet    Severe obesity (BMI >= 40)     Sleep apnea     CPAP      Current Outpatient Medications on File Prior to Visit   Medication Sig Dispense Refill    acetaminophen (TYLENOL) 325 MG tablet Take 2 tablets (650 mg total) by mouth every 8 (eight) hours as needed. 60 tablet 2    aspirin (ECOTRIN) 81 MG EC tablet Take 1 tablet (81 mg total) by mouth once daily. 90 tablet 3    benzonatate (TESSALON) 100 MG capsule Take 1 capsule by mouth three times daily as needed (Patient taking differently: as needed.) 30 capsule 0    diclofenac sodium (VOLTAREN) 1 % Gel APPLY 2 GRAMS TOPICALLY THREE TIMES DAILY AS NEEDED 200 g 0     doxycycline (VIBRAMYCIN) 100 MG Cap Take 1 capsule (100 mg total) by mouth every 12 (twelve) hours. for 10 days 20 capsule 0    ergocalciferol, vitamin D2, (VITAMIN D ORAL) Take 2,000 Units by mouth once daily.      furosemide (LASIX) 40 MG tablet Take 1 tablet (40 mg total) by mouth daily as needed (edema, swelling due to fluid). 90 tablet 3    gabapentin (NEURONTIN) 300 MG capsule Take 2 capsules (600 mg total) by mouth every evening. 60 capsule 5    levocetirizine (XYZAL) 5 MG tablet TAKE 1 TABLET BY MOUTH ONCE DAILY IN THE EVENING 90 tablet 0    linaCLOtide (LINZESS) 72 mcg Cap capsule Take 1 capsule (72 mcg total) by mouth before breakfast. 30 capsule 2    meclizine (ANTIVERT) 25 mg tablet Take 1 tablet (25 mg total) by mouth 3 (three) times daily as needed for Dizziness. 30 tablet 0    meloxicam (MOBIC) 15 MG tablet Take 1 tablet (15 mg total) by mouth once daily. 90 tablet 3    methocarbamoL (ROBAXIN) 750 MG Tab Take 1 tablet (750 mg total) by mouth 3 (three) times daily as needed. 90 tablet 3    metoprolol succinate (TOPROL-XL) 50 MG 24 hr tablet Take 1 tablet (50 mg total) by mouth once daily. 90 tablet 0    montelukast (SINGULAIR) 10 mg tablet Take 1 tablet (10 mg total) by mouth every evening. Allergies 30 tablet 2    nystatin (MYCOSTATIN) cream APPLY  CREAM TOPICALLY TO AFFECTED AREA TWICE DAILY 30 g 0    nystatin (MYCOSTATIN) powder Apply topically 2 (two) times daily. 120 g 1    omeprazole (PRILOSEC) 40 MG capsule Take 1 capsule by mouth once daily 90 capsule 3    ondansetron (ZOFRAN) 8 MG tablet Take 1 tablet (8 mg total) by mouth every 8 (eight) hours as needed for Nausea. 20 tablet 0    potassium chloride SA (K-DUR,KLOR-CON) 20 MEQ tablet Take 1 tablet (20 mEq total) by mouth daily as needed (if taking lasix). 60 tablet 2    topiramate (TOPAMAX) 100 MG tablet Take 1 tablet (100 mg total) by mouth 2 (two) times daily. 180 tablet 1    albuterol (PROAIR HFA) 90 mcg/actuation inhaler Inhale 2 puffs into  "the lungs every 6 (six) hours as needed for Wheezing. Rescue 18 g 0    imipramine (TOFRANIL) 10 MG Tab Take 1 tablet (10 mg total) by mouth every evening. 90 tablet 1    nitroGLYCERIN (NITROSTAT) 0.4 MG SL tablet Place 1 tablet (0.4 mg total) under the tongue every 5 (five) minutes as needed for Chest pain. 30 tablet 0     Current Facility-Administered Medications on File Prior to Visit   Medication Dose Route Frequency Provider Last Rate Last Admin    [COMPLETED] dexAMETHasone injection 4 mg  4 mg Intramuscular Once Marcus P. Akunne, DPM   4 mg at 05/30/23 1616    [COMPLETED] triamcinolone acetonide injection 40 mg  40 mg Tendon Sheath Injection Once Marcus P. Akunne, DPM   40 mg at 05/30/23 1610         Review of Systems   All other systems reviewed and are negative.       Objective:       Vitals:    05/31/23 0838   BP: 130/84   Pulse: 76   Resp: 14   SpO2: 98%   Weight: 106.4 kg (234 lb 9.1 oz)   Height: 5' 3" (1.6 m)       Physical Exam  Vitals and nursing note reviewed.   Constitutional:       Appearance: She is well-developed.   HENT:      Head: Normocephalic and atraumatic.      Nose: Nose normal.      Mouth/Throat:      Pharynx: No oropharyngeal exudate.   Eyes:      General: No scleral icterus.     Conjunctiva/sclera: Conjunctivae normal.      Pupils: Pupils are equal, round, and reactive to light.   Neck:      Thyroid: No thyromegaly.      Vascular: No JVD.      Trachea: No tracheal deviation.   Cardiovascular:      Rate and Rhythm: Normal rate and regular rhythm.      Heart sounds: Normal heart sounds, S1 normal and S2 normal. No murmur heard.  Pulmonary:      Effort: Pulmonary effort is normal. No tachypnea, accessory muscle usage or respiratory distress.      Breath sounds: Normal breath sounds. No stridor.   Abdominal:      General: Bowel sounds are normal. There is no distension.      Palpations: Abdomen is soft. There is no hepatomegaly, splenomegaly or mass.      Tenderness: There is no abdominal " tenderness. There is no guarding or rebound.   Musculoskeletal:         General: No tenderness. Normal range of motion.      Cervical back: Normal range of motion and neck supple.   Lymphadenopathy:      Upper Body:      Right upper body: No supraclavicular adenopathy.      Left upper body: No supraclavicular adenopathy.   Skin:     General: Skin is warm and dry.      Findings: No rash.      Nails: There is no clubbing.   Neurological:      Mental Status: She is alert and oriented to person, place, and time.      Coordination: Coordination normal.      Gait: Gait normal.      Deep Tendon Reflexes: Reflexes are normal and symmetric.           Personal Diagnostic Review    X-ray Knee Ortho Left  Narrative: EXAMINATION:  XR KNEE ORTHO LEFT    CLINICAL HISTORY:  Pain in left knee    TECHNIQUE:  AP standing of both knees, Merchant views of both knees as well as a lateral view of the left knee were performed.    COMPARISON:  Prior radiographs    FINDINGS:  Left knee arthroplasty unchanged.  No joint effusion.    Right knee demonstrates tricompartmental degenerative findings, most prevalent within the patellofemoral compartment.  No acute abnormality.  Impression: As above    Electronically signed by: Jared Zamora MD  Date:    05/24/2023  Time:    10:19    Compliance Summary  Compliance Information 4/30/2023 - 5/29/2023  Compliance Summary  4/30/2023 - 5/29/2023 (30 days)  Days with Device Usage 28 days  Days without Device Usage 2 days  Percent Days with Device Usage 93.3%  Cumulative Usage 5 days 10 hrs. 22 mins. 17 secs.  Maximum Usage (1 Day) 6 hrs. 53 mins. 24 secs.  Average Usage (All Days) 4 hrs. 20 mins. 44 secs.  Average Usage (Days Used) 4 hrs. 39 mins. 22 secs.  Minimum Usage (1 Day) 1 hrs. 17 mins. 20 secs.  Percent of Days with Usage >= 4 Hours 73.3%  Percent of Days with Usage < 4 Hours 26.7%  Date Range  Total Blower Time 5 days 12 hrs. 44 mins. 17 secs.  Average AHI 1.5  Auto-CPAP Summary  Auto-CPAP  Mean Pressure 6.8 cmH2O  Auto-CPAP Peak Average Pressure 12.6 cmH2O  Device Pressure <= 90% of Time 9.5 cmH2O  Average Time in Large Leak Per Day 2 secs.        Assessment/Plan:       Problem List Items Addressed This Visit       GIL on CPAP - Primary     [place] 13  APAP 5-20  Usage > 4 hrs was 73.3%  AHI 1.5  Using and benefits  New supplies         Relevant Orders    CPAP/BIPAP SUPPLIES    Morbid obesity with BMI of 40.0-44.9, adult     Weight loss and exercise to improve overall health.            Sleepiness resolved  Adherence and benefits from PAP  Weight loss       Follow up in about 1 year (around 5/31/2024), or weight loss, CPAP supplies.    This note was prepared using voice recognition system and is likely to have sound alike errors that may have been overlooked even after proof reading.  Please call me with any questions    Discussed diagnosis, its evaluation, treatment and usual course. All questions answered.    Thank you for the courtesy of participating in the care of this patient    Shaun Bhatti MD      Personal Diagnostic Review  []  CXR    []  ECHO    []  ONSAT    []  6MWD    []  LABS    []  CHEST CT    []  PET CT    []  Biopsy results

## 2023-06-01 ENCOUNTER — PATIENT MESSAGE (OUTPATIENT)
Dept: PAIN MEDICINE | Facility: CLINIC | Age: 68
End: 2023-06-01
Payer: MEDICARE

## 2023-06-01 ENCOUNTER — TELEPHONE (OUTPATIENT)
Dept: PAIN MEDICINE | Facility: CLINIC | Age: 68
End: 2023-06-01
Payer: MEDICARE

## 2023-06-01 ENCOUNTER — PATIENT MESSAGE (OUTPATIENT)
Dept: PULMONOLOGY | Facility: CLINIC | Age: 68
End: 2023-06-01
Payer: MEDICARE

## 2023-06-05 ENCOUNTER — OFFICE VISIT (OUTPATIENT)
Dept: OTOLARYNGOLOGY | Facility: CLINIC | Age: 68
End: 2023-06-05
Payer: MEDICARE

## 2023-06-05 VITALS — HEIGHT: 63 IN | BODY MASS INDEX: 41.56 KG/M2 | WEIGHT: 234.56 LBS | TEMPERATURE: 98 F

## 2023-06-05 DIAGNOSIS — J01.90 ACUTE SINUSITIS, RECURRENCE NOT SPECIFIED, UNSPECIFIED LOCATION: ICD-10-CM

## 2023-06-05 DIAGNOSIS — R04.0 FREQUENT NOSEBLEEDS: ICD-10-CM

## 2023-06-05 DIAGNOSIS — R04.0 RECURRENT EPISTAXIS: Primary | ICD-10-CM

## 2023-06-05 PROCEDURE — 1101F PT FALLS ASSESS-DOCD LE1/YR: CPT | Mod: CPTII,S$GLB,, | Performed by: STUDENT IN AN ORGANIZED HEALTH CARE EDUCATION/TRAINING PROGRAM

## 2023-06-05 PROCEDURE — 99213 OFFICE O/P EST LOW 20 MIN: CPT | Mod: 25,S$GLB,, | Performed by: STUDENT IN AN ORGANIZED HEALTH CARE EDUCATION/TRAINING PROGRAM

## 2023-06-05 PROCEDURE — 31238 NSL/SINS NDSC SRG NSL HEMRRG: CPT | Mod: 50,S$GLB,, | Performed by: STUDENT IN AN ORGANIZED HEALTH CARE EDUCATION/TRAINING PROGRAM

## 2023-06-05 PROCEDURE — 1101F PR PT FALLS ASSESS DOC 0-1 FALLS W/OUT INJ PAST YR: ICD-10-PCS | Mod: CPTII,S$GLB,, | Performed by: STUDENT IN AN ORGANIZED HEALTH CARE EDUCATION/TRAINING PROGRAM

## 2023-06-05 PROCEDURE — 3288F FALL RISK ASSESSMENT DOCD: CPT | Mod: CPTII,S$GLB,, | Performed by: STUDENT IN AN ORGANIZED HEALTH CARE EDUCATION/TRAINING PROGRAM

## 2023-06-05 PROCEDURE — 3008F PR BODY MASS INDEX (BMI) DOCUMENTED: ICD-10-PCS | Mod: CPTII,S$GLB,, | Performed by: STUDENT IN AN ORGANIZED HEALTH CARE EDUCATION/TRAINING PROGRAM

## 2023-06-05 PROCEDURE — 3288F PR FALLS RISK ASSESSMENT DOCUMENTED: ICD-10-PCS | Mod: CPTII,S$GLB,, | Performed by: STUDENT IN AN ORGANIZED HEALTH CARE EDUCATION/TRAINING PROGRAM

## 2023-06-05 PROCEDURE — 99999 PR PBB SHADOW E&M-EST. PATIENT-LVL IV: CPT | Mod: PBBFAC,,, | Performed by: STUDENT IN AN ORGANIZED HEALTH CARE EDUCATION/TRAINING PROGRAM

## 2023-06-05 PROCEDURE — 99999 PR PBB SHADOW E&M-EST. PATIENT-LVL IV: ICD-10-PCS | Mod: PBBFAC,,, | Performed by: STUDENT IN AN ORGANIZED HEALTH CARE EDUCATION/TRAINING PROGRAM

## 2023-06-05 PROCEDURE — 1159F PR MEDICATION LIST DOCUMENTED IN MEDICAL RECORD: ICD-10-PCS | Mod: CPTII,S$GLB,, | Performed by: STUDENT IN AN ORGANIZED HEALTH CARE EDUCATION/TRAINING PROGRAM

## 2023-06-05 PROCEDURE — 1159F MED LIST DOCD IN RCRD: CPT | Mod: CPTII,S$GLB,, | Performed by: STUDENT IN AN ORGANIZED HEALTH CARE EDUCATION/TRAINING PROGRAM

## 2023-06-05 PROCEDURE — 99213 PR OFFICE/OUTPT VISIT, EST, LEVL III, 20-29 MIN: ICD-10-PCS | Mod: 25,S$GLB,, | Performed by: STUDENT IN AN ORGANIZED HEALTH CARE EDUCATION/TRAINING PROGRAM

## 2023-06-05 PROCEDURE — 31238 PR NASAL/SINUS ENDOSCOPY,W/CONTROL NASAL HEM: ICD-10-PCS | Mod: 50,S$GLB,, | Performed by: STUDENT IN AN ORGANIZED HEALTH CARE EDUCATION/TRAINING PROGRAM

## 2023-06-05 PROCEDURE — 3008F BODY MASS INDEX DOCD: CPT | Mod: CPTII,S$GLB,, | Performed by: STUDENT IN AN ORGANIZED HEALTH CARE EDUCATION/TRAINING PROGRAM

## 2023-06-05 NOTE — PROGRESS NOTES
Chief complaint:    Chief Complaint   Patient presents with    Sinusitis    Epistaxis           Referring Provider:  Estrellita Weller, [place]randa  [address] [place] [address]      History of present illness:     Ms. Becerril is a 67 y.o. w/GIL on CPAP presenting for evaluation of sinus issues.       Current symptoms include:  No - Purulent anterior nasal discharge  No - Purulent or discolored posterior nasal discharge  Yes - Nasal congestion or obstruction  No - Facial Congestion or fullness  No - Hyposmia or anosmia  No - Fever    Additional symptoms include nose bleeding. Happens about 1-2 times a month. Last episode was 5/15. Episodes can happen from either nostril, but almost always the right. Usually last a few minutes. Stops with tissue in the nose.    States she does have HTN. Not taking her BP medication regularly. Headaches often proceed nose bleeding.    Has stopped nasal sprays in the past to help resolve the bleeding.     Most recently completed a course of antibiotics for a sinus infection.    Never had sinus surgery.      History      Past Medical History:   Past Medical History:   Diagnosis Date    COPD (chronic obstructive pulmonary disease)     NO HOME O2    Digestive disorder     Frequent headaches     Hypertension     Osteoarthritis 04/02/2019    Bilateral knees, ankles and feet    Severe obesity (BMI >= 40)     Sleep apnea     CPAP         Past Surgical History:  Past Surgical History:   Procedure Laterality Date    BLADDER SUSPENSION      CATARACT EXTRACTION, BILATERAL      COLONOSCOPY N/A 12/3/2018    Procedure: COLONOSCOPY;  Surgeon: Mari Rader MD;  Location: [facility];  Service: Endoscopy;  Laterality: N/A;    ESOPHAGOGASTRODUODENOSCOPY N/A 12/31/2020    Procedure: ESOPHAGOGASTRODUODENOSCOPY (EGD);  Surgeon: Anthony Rodríguez MD;  Location: [facility];  Service: General;  Laterality: N/A;    HYSTERECTOMY      partial 2000    INJECTION OF ANESTHETIC AGENT INTO SACROILIAC JOINT Bilateral  11/30/2020    Procedure: Bilateral SIJ + Bilateral Piriformis + Bilateral GT Bursa Injection;  Surgeon: Thanh Diaz MD;  Location: [facility];  Service: Pain Management;  Laterality: Bilateral;    INJECTION OF JOINT Bilateral 11/30/2020    Procedure: Bilateral SIJ + Bilateral Piriformis + Bilateral GT Bursa Injection;  Surgeon: Thanh Diaz MD;  Location: [facility];  Service: Pain Management;  Laterality: Bilateral;    INJECTION OF PIRIFORMIS MUSCLE Bilateral 11/30/2020    Procedure: Bilateral SIJ + Bilateral Piriformis + Bilateral GT Bursa Injection;  Surgeon: Thanh Diaz MD;  Location: [facility];  Service: Pain Management;  Laterality: Bilateral;    ROBOT-ASSISTED LAPAROSCOPIC SLEEVE GASTRECTOMY USING DA EZEQUIEL XI N/A 3/2/2021    Procedure: XI ROBOTIC SLEEVE GASTRECTOMY;  Surgeon: Anthony Rodríguez MD;  Location: [facility];  Service: General;  Laterality: N/A;    SELECTIVE INJECTION OF ANESTHETIC AGENT AROUND LUMBAR SPINAL NERVE ROOT BY TRANSFORAMINAL APPROACH Bilateral 1/4/2021    Procedure: Bilateral L5/S1 TF GISELA;  Surgeon: Thanh Diaz MD;  Location: [facility];  Service: Pain Management;  Laterality: Bilateral;    SELECTIVE INJECTION OF ANESTHETIC AGENT AROUND LUMBAR SPINAL NERVE ROOT BY TRANSFORAMINAL APPROACH Bilateral 3/23/2021    Procedure: Bilateral L5/S1 TF GISELA;  Surgeon: Thanh Diaz MD;  Location: [facility];  Service: Pain Management;  Laterality: Bilateral;    SELECTIVE INJECTION OF ANESTHETIC AGENT AROUND LUMBAR SPINAL NERVE ROOT BY TRANSFORAMINAL APPROACH Bilateral 10/5/2021    Procedure: Bilateral L5/S1 TF GISELA;  Surgeon: Thanh Diaz MD;  [facility];  Service: Pain Management;  Laterality: Bilateral;    tonsilectomy      TOTAL KNEE ARTHROPLASTY Left 3/4/2020    Procedure: ARTHROPLASTY, KNEE, TOTAL;  Surgeon: Moy Connolly MD;  Location: [facility] OR;  Service: Orthopedics;  Laterality: Left;         Medications: Medication list reviewed. She  " has a current medication list which includes the following prescription(s): acetaminophen, albuterol, aspirin, benzonatate, diclofenac sodium, ergocalciferol (vitamin d2), furosemide, gabapentin, imipramine, levocetirizine, linaclotide, meclizine, meloxicam, methocarbamol, metoprolol succinate, montelukast, nitroglycerin, nystatin, nystatin, omeprazole, ondansetron, potassium chloride sa, and topiramate.     Allergies:   Review of patient's allergies indicates:   Allergen Reactions    Penicillins Rash         Family history: family history includes Heart attack in her father.         Social History          Alcohol use:  reports no history of alcohol use.            Tobacco:  reports that she has never smoked. She has never used smokeless tobacco.         Physical Examination      Vitals: Temperature 98.1 °F (36.7 °C), height 5' 3" (1.6 m), weight 106.4 kg (234 lb 9.1 oz).      General: Well developed, well nourished, well hydrated.     Voice: no dysphonia, no dysarthria      Head/Face: Normocephalic, atraumatic. No scars or lesions. Facial musculature equal.     Eyes: No scleral icterus or conjunctival hemorrhage. EOMI. PERRLA.     Ears:     Right ear: No gross deformity. EAC is clear of debris and erythema. TM are intact with a pneumatized middle ear. No signs of retraction, fluid or infection.      Left ear: No gross deformity. EAC is clear of debris and erythema. TM are intact with a pneumatized middle ear. No signs of retraction, fluid or infection.      Nose: No gross deformity or lesions. No purulent discharge. No significant NSD.     Mouth/Oropharynx: Lips without any lesions. No mucosal lesions within the oropharynx. No tonsillar exudate or lesions. Pharyngeal walls symmetrical. Uvula midline. Tongue midline without lesions.     Neurologic: Moving all extremities without gross abnormality.CN II-XII grossly intact. House-Brackmann 1/6. No signs of nystagmus.          Data reviewed      Review of records:  "     I reviewed records from the referring provider's office visits describing the history, workup, and/or treatment of this problem thus far.     Imaging:      I have independently reviewed the following imaging with the findings noted below:     MRI 2021  Clear sinuses and mastoids    Rigid Nasal Endoscopy with cauterization for epistaxis    Indication: Mariel Becerril is a 67 y.o. female has recurrent epistaxis which requires of endoscopic visualization for control of bleeding. Indications, risks, and benefits were discussed with the patient and consent was verbalized.    Technique: The nose was sprayed with oxymetazoline and 4% lidocaine. With the patient in the upright position, a 2.7mm 0-degree endscope was inserted into the patient's right and left nare.  Where visible, nasal secretions and mucosal crusting were removed with a suction. Endoscopic control of epistaxis was performed for the right side(s). Endoscopically the sinonasal structures were evaluated.  The concerning area(s) for bleeding were addressed with silver nitrate applied topically to the area(s) and care was taken not to over-treat the mucosa to avoid a septal perforation. At the end of the procedure there was no further bleeding from the nose and sinuses.  The patient tolerated the procedure well with no complications.    Findings:   The area(s) causing the epistaxis (and cauterized today) were found to be arising from prominent blood vessels located at a collection of ectatic vessels on the right mid septum. The septum also has a large spur to the right. No evidence of sinusitis.      Assessment/Plan:    1. Recurrent epistaxis    2. Acute sinusitis, recurrence not specified, unspecified location    3. Frequent nosebleeds      We discussed preventive measures such as the application of moisturizing gel to the nose (ie. AYR nasal gel (nasal saline gel) 2-3 times daily at least, but you can use it as needed. We also discussed the use saline  sprays as needed during the day. A humidifier is also helpful to keep the nose from drying out during sleep.     We discussed other issues which can cause recurrence of nosebleeds, such as NSAIDs, aggressive nose blowing, wiping, or picking.     For an active nose bleed spray Afrin (oxymetazoline) to the side of the nose that is bleeding, then pinch the nose closed firmly for 10-15 straight minutes.    If there is further bleeding she should return to the clinic for re-evaluation.              Morgan Pop MD  [facility]   [facility]  [id number] [place] [address].  SHANIQUA Graham [id number]  P: [phone]  F: [phone]

## 2023-06-05 NOTE — PATIENT INSTRUCTIONS
Nose bleeding    We discussed preventive measures such as the application of moisturizing gel to the nose (ie. AYR nasal gel (nasal saline gel) 2-3 times daily at least, but you can use it as needed. We also discussed the use saline sprays as needed during the day. A humidifier is also helpful to keep the nose from drying out during sleep.     We discussed other issues which can cause recurrence of nosebleeds, such as NSAIDs, aggressive nose blowing, wiping, or picking.     For an active nose bleed spray Afrin (oxymetazoline) to the side of the nose that is bleeding, then pinch the nose closed firmly for 10-15 straight minutes.    If there is further bleeding she should return to the clinic for re-evaluation.

## 2023-06-20 ENCOUNTER — OFFICE VISIT (OUTPATIENT)
Dept: GASTROENTEROLOGY | Facility: CLINIC | Age: 68
End: 2023-06-20
Payer: MEDICARE

## 2023-06-20 VITALS — WEIGHT: 235 LBS | HEIGHT: 63 IN | BODY MASS INDEX: 41.64 KG/M2

## 2023-06-20 DIAGNOSIS — K59.00 CONSTIPATION, UNSPECIFIED CONSTIPATION TYPE: Primary | ICD-10-CM

## 2023-06-20 DIAGNOSIS — R19.6 HALITOSIS: ICD-10-CM

## 2023-06-20 PROCEDURE — 1126F AMNT PAIN NOTED NONE PRSNT: CPT | Mod: CPTII,95,, | Performed by: PHYSICIAN ASSISTANT

## 2023-06-20 PROCEDURE — 3288F FALL RISK ASSESSMENT DOCD: CPT | Mod: CPTII,95,, | Performed by: PHYSICIAN ASSISTANT

## 2023-06-20 PROCEDURE — 3008F PR BODY MASS INDEX (BMI) DOCUMENTED: ICD-10-PCS | Mod: CPTII,95,, | Performed by: PHYSICIAN ASSISTANT

## 2023-06-20 PROCEDURE — 99213 OFFICE O/P EST LOW 20 MIN: CPT | Mod: 95,,, | Performed by: PHYSICIAN ASSISTANT

## 2023-06-20 PROCEDURE — 1101F PT FALLS ASSESS-DOCD LE1/YR: CPT | Mod: CPTII,95,, | Performed by: PHYSICIAN ASSISTANT

## 2023-06-20 PROCEDURE — 99213 PR OFFICE/OUTPT VISIT, EST, LEVL III, 20-29 MIN: ICD-10-PCS | Mod: 95,,, | Performed by: PHYSICIAN ASSISTANT

## 2023-06-20 PROCEDURE — 3288F PR FALLS RISK ASSESSMENT DOCUMENTED: ICD-10-PCS | Mod: CPTII,95,, | Performed by: PHYSICIAN ASSISTANT

## 2023-06-20 PROCEDURE — 3008F BODY MASS INDEX DOCD: CPT | Mod: CPTII,95,, | Performed by: PHYSICIAN ASSISTANT

## 2023-06-20 PROCEDURE — 1126F PR PAIN SEVERITY QUANTIFIED, NO PAIN PRESENT: ICD-10-PCS | Mod: CPTII,95,, | Performed by: PHYSICIAN ASSISTANT

## 2023-06-20 PROCEDURE — 1101F PR PT FALLS ASSESS DOC 0-1 FALLS W/OUT INJ PAST YR: ICD-10-PCS | Mod: CPTII,95,, | Performed by: PHYSICIAN ASSISTANT

## 2023-06-20 NOTE — PROGRESS NOTES
Subjective:      Patient ID: Mariel Becerril is a 67 y.o. female.    Chief Complaint: Constipation (Follow up)  The patient location is: [place]  The chief complaint leading to consultation is: follow up constipation    Visit type: audiovisual    Face to Face time with patient: 10 mins  15 minutes of total time spent on the encounter, which includes face to face time and non-face to face time preparing to see the patient (eg, review of tests), Obtaining and/or reviewing separately obtained history, Documenting clinical information in the electronic or other health record, Independently interpreting results (not separately reported) and communicating results to the patient/family/caregiver, or Care coordination (not separately reported).     Each patient to whom he or she provides medical services by telemedicine is:  (1) informed of the relationship between the physician and patient and the respective role of any other health care provider with respect to management of the patient; and (2) notified that he or she may decline to receive medical services by telemedicine and may withdraw from such care at any time.    Notes:     HPI:  Patient reports today via telemedicine for follow up of the above. Last seen for constipation complaints and halitosis. Was started on low dose Linzess for constipation. She reports this has improved her bowels significantly, but is still having mild constipation. Scared to take 145mcg, as she is concerned it may cause diarrhea. Denies blood in the stool. Also taking Prilosec daily for halitosis and bitter taste. Has noticed some improvement with this as well. She is most concerned with her constipation.    Review of Systems   Constitutional:  Negative for activity change, appetite change, chills, diaphoresis, fatigue, fever and unexpected weight change.   Gastrointestinal:  Positive for constipation. Negative for abdominal distention, abdominal pain, anal bleeding, blood in stool,  diarrhea, nausea, rectal pain and vomiting.     Medical History: Reviewed    Social History: Reviewed    Allergies: Reviewed    Objective:     Physical Exam  Constitutional:       General: She is not in acute distress.     Appearance: Normal appearance. She is not ill-appearing, toxic-appearing or diaphoretic.   HENT:      Head: Normocephalic and atraumatic.   Neurological:      Mental Status: She is alert.       Assessment:     1. Constipation, unspecified constipation type    2. Halitosis        Plan:     -constipation much improved with Linzess 72mcg but still having mild complaints. Recommend adding Miralax daily to her current regimen. If this is not helpful, can try 72mcg in the AM and 72mcg in the PM.  -Continue on Prilosec as previously recommended. If continues with halitosis and bitter taste, consider dental consult.    Mariel was seen today for constipation.    Diagnoses and all orders for this visit:    Constipation, unspecified constipation type    Halitosis        No follow-ups on file.    Thank you for the opportunity to participate in the care of this patient.   Mellisa Guillen PA-C.

## 2023-06-20 NOTE — PATIENT INSTRUCTIONS
-constipation much improved with Linzess 72mcg but still having mild complaints. Recommend adding Miralax daily to her current regimen. If this is not helpful, can try 72mcg in the AM and 72mcg in the PM.  -Continue on Prilosec as previously recommended. If continues with halitosis and bitter taste, consider dental consult.

## 2023-06-27 DIAGNOSIS — R42 VERTIGO: ICD-10-CM

## 2023-06-27 RX ORDER — MECLIZINE HYDROCHLORIDE 25 MG/1
25 TABLET ORAL 3 TIMES DAILY PRN
Qty: 30 TABLET | Refills: 0 | Status: SHIPPED | OUTPATIENT
Start: 2023-06-27

## 2023-07-05 ENCOUNTER — OFFICE VISIT (OUTPATIENT)
Dept: PAIN MEDICINE | Facility: CLINIC | Age: 68
End: 2023-07-05
Payer: MEDICARE

## 2023-07-05 VITALS
HEIGHT: 63 IN | WEIGHT: 234.5 LBS | SYSTOLIC BLOOD PRESSURE: 115 MMHG | BODY MASS INDEX: 41.55 KG/M2 | DIASTOLIC BLOOD PRESSURE: 69 MMHG | HEART RATE: 70 BPM

## 2023-07-05 DIAGNOSIS — M79.18 PIRIFORMIS MUSCLE PAIN: ICD-10-CM

## 2023-07-05 DIAGNOSIS — M54.16 BILATERAL LUMBAR RADICULOPATHY: ICD-10-CM

## 2023-07-05 DIAGNOSIS — Z86.69 HISTORY OF MIGRAINE HEADACHES: ICD-10-CM

## 2023-07-05 PROCEDURE — 3074F SYST BP LT 130 MM HG: CPT | Mod: CPTII,S$GLB,, | Performed by: PHYSICIAN ASSISTANT

## 2023-07-05 PROCEDURE — 1159F MED LIST DOCD IN RCRD: CPT | Mod: CPTII,S$GLB,, | Performed by: PHYSICIAN ASSISTANT

## 2023-07-05 PROCEDURE — 99214 PR OFFICE/OUTPT VISIT, EST, LEVL IV, 30-39 MIN: ICD-10-PCS | Mod: S$GLB,,, | Performed by: PHYSICIAN ASSISTANT

## 2023-07-05 PROCEDURE — 3008F PR BODY MASS INDEX (BMI) DOCUMENTED: ICD-10-PCS | Mod: CPTII,S$GLB,, | Performed by: PHYSICIAN ASSISTANT

## 2023-07-05 PROCEDURE — 1101F PR PT FALLS ASSESS DOC 0-1 FALLS W/OUT INJ PAST YR: ICD-10-PCS | Mod: CPTII,S$GLB,, | Performed by: PHYSICIAN ASSISTANT

## 2023-07-05 PROCEDURE — 1160F PR REVIEW ALL MEDS BY PRESCRIBER/CLIN PHARMACIST DOCUMENTED: ICD-10-PCS | Mod: CPTII,S$GLB,, | Performed by: PHYSICIAN ASSISTANT

## 2023-07-05 PROCEDURE — 3078F DIAST BP <80 MM HG: CPT | Mod: CPTII,S$GLB,, | Performed by: PHYSICIAN ASSISTANT

## 2023-07-05 PROCEDURE — 3008F BODY MASS INDEX DOCD: CPT | Mod: CPTII,S$GLB,, | Performed by: PHYSICIAN ASSISTANT

## 2023-07-05 PROCEDURE — 3078F PR MOST RECENT DIASTOLIC BLOOD PRESSURE < 80 MM HG: ICD-10-PCS | Mod: CPTII,S$GLB,, | Performed by: PHYSICIAN ASSISTANT

## 2023-07-05 PROCEDURE — 1101F PT FALLS ASSESS-DOCD LE1/YR: CPT | Mod: CPTII,S$GLB,, | Performed by: PHYSICIAN ASSISTANT

## 2023-07-05 PROCEDURE — 99999 PR PBB SHADOW E&M-EST. PATIENT-LVL III: CPT | Mod: PBBFAC,,, | Performed by: PHYSICIAN ASSISTANT

## 2023-07-05 PROCEDURE — 1160F RVW MEDS BY RX/DR IN RCRD: CPT | Mod: CPTII,S$GLB,, | Performed by: PHYSICIAN ASSISTANT

## 2023-07-05 PROCEDURE — 1125F PR PAIN SEVERITY QUANTIFIED, PAIN PRESENT: ICD-10-PCS | Mod: CPTII,S$GLB,, | Performed by: PHYSICIAN ASSISTANT

## 2023-07-05 PROCEDURE — 1159F PR MEDICATION LIST DOCUMENTED IN MEDICAL RECORD: ICD-10-PCS | Mod: CPTII,S$GLB,, | Performed by: PHYSICIAN ASSISTANT

## 2023-07-05 PROCEDURE — 3288F PR FALLS RISK ASSESSMENT DOCUMENTED: ICD-10-PCS | Mod: CPTII,S$GLB,, | Performed by: PHYSICIAN ASSISTANT

## 2023-07-05 PROCEDURE — 1125F AMNT PAIN NOTED PAIN PRSNT: CPT | Mod: CPTII,S$GLB,, | Performed by: PHYSICIAN ASSISTANT

## 2023-07-05 PROCEDURE — 3288F FALL RISK ASSESSMENT DOCD: CPT | Mod: CPTII,S$GLB,, | Performed by: PHYSICIAN ASSISTANT

## 2023-07-05 PROCEDURE — 3074F PR MOST RECENT SYSTOLIC BLOOD PRESSURE < 130 MM HG: ICD-10-PCS | Mod: CPTII,S$GLB,, | Performed by: PHYSICIAN ASSISTANT

## 2023-07-05 PROCEDURE — 99214 OFFICE O/P EST MOD 30 MIN: CPT | Mod: S$GLB,,, | Performed by: PHYSICIAN ASSISTANT

## 2023-07-05 PROCEDURE — 99999 PR PBB SHADOW E&M-EST. PATIENT-LVL III: ICD-10-PCS | Mod: PBBFAC,,, | Performed by: PHYSICIAN ASSISTANT

## 2023-07-05 RX ORDER — METHOCARBAMOL 750 MG/1
750 TABLET, FILM COATED ORAL 3 TIMES DAILY PRN
Qty: 90 TABLET | Refills: 3 | Status: SHIPPED | OUTPATIENT
Start: 2023-07-05

## 2023-07-05 RX ORDER — TOPIRAMATE 100 MG/1
100 TABLET, FILM COATED ORAL 2 TIMES DAILY
Qty: 180 TABLET | Refills: 1 | Status: SHIPPED | OUTPATIENT
Start: 2023-07-05 | End: 2023-08-30 | Stop reason: SDUPTHER

## 2023-07-05 RX ORDER — GABAPENTIN 300 MG/1
600 CAPSULE ORAL NIGHTLY
Qty: 60 CAPSULE | Refills: 5 | Status: SHIPPED | OUTPATIENT
Start: 2023-07-05

## 2023-07-05 RX ORDER — MELOXICAM 15 MG/1
15 TABLET ORAL DAILY
Qty: 90 TABLET | Refills: 3 | Status: SHIPPED | OUTPATIENT
Start: 2023-07-05
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
## 2023-07-06 ENCOUNTER — HOSPITAL ENCOUNTER (OUTPATIENT)
Dept: RADIOLOGY | Facility: HOSPITAL | Age: 68
Discharge: HOME | End: 2023-07-06
Attending: FAMILY MEDICINE
Payer: MEDICARE

## 2023-07-06 PROCEDURE — 77067 MAMMO DIGITAL SCREENING BILAT WITH TOMO: ICD-10-PCS | Mod: 26,,, | Performed by: RADIOLOGY

## 2023-07-06 PROCEDURE — 77067 SCR MAMMO BI INCL CAD: CPT | Mod: 26,,, | Performed by: RADIOLOGY

## 2023-07-06 PROCEDURE — 77063 BREAST TOMOSYNTHESIS BI: CPT | Mod: 26,,, | Performed by: RADIOLOGY

## 2023-07-06 PROCEDURE — 77067 SCR MAMMO BI INCL CAD: CPT | Mod: TC

## 2023-07-06 PROCEDURE — 77063 MAMMO DIGITAL SCREENING BILAT WITH TOMO: ICD-10-PCS | Mod: 26,,, | Performed by: RADIOLOGY

## 2023-07-07 ENCOUNTER — PATIENT MESSAGE (OUTPATIENT)
Dept: INFECTIOUS DISEASES | Facility: CLINIC | Age: 68
End: 2023-07-07
Payer: MEDICARE

## 2023-07-18 RX ORDER — OMEPRAZOLE 40 MG/1
CAPSULE, DELAYED RELEASE ORAL
Qty: 90 CAPSULE | Refills: 0 | Status: SHIPPED | OUTPATIENT
Start: 2023-07-18 | End: 2024-03-15

## 2023-07-20 ENCOUNTER — OFFICE VISIT (OUTPATIENT)
Dept: ORTHOPEDICS | Facility: CLINIC | Age: 68
End: 2023-07-20
Payer: MEDICARE

## 2023-07-20 ENCOUNTER — TELEPHONE (OUTPATIENT)
Dept: ORTHOPEDICS | Facility: CLINIC | Age: 68
End: 2023-07-20
Payer: MEDICARE

## 2023-07-20 VITALS — WEIGHT: 234.56 LBS | BODY MASS INDEX: 41.56 KG/M2 | HEIGHT: 63 IN

## 2023-07-20 DIAGNOSIS — M54.17 LUMBOSACRAL RADICULOPATHY AT L5: ICD-10-CM

## 2023-07-20 DIAGNOSIS — Z96.652 HISTORY OF TOTAL LEFT KNEE REPLACEMENT: Primary | ICD-10-CM

## 2023-07-20 DIAGNOSIS — M17.11 ARTHRITIS OF KNEE, RIGHT: ICD-10-CM

## 2023-07-20 DIAGNOSIS — M47.817 ARTHROPATHY OF LUMBOSACRAL FACET JOINT: ICD-10-CM

## 2023-07-20 DIAGNOSIS — G56.03 CARPAL TUNNEL SYNDROME, BILATERAL: ICD-10-CM

## 2023-07-20 DIAGNOSIS — M48.062 LUMBAR STENOSIS WITH NEUROGENIC CLAUDICATION: ICD-10-CM

## 2023-07-20 DIAGNOSIS — M54.17 LUMBOSACRAL RADICULOPATHY AT S1: ICD-10-CM

## 2023-07-20 DIAGNOSIS — Q76.49 CONGENITAL CERVICAL SPINE STENOSIS: ICD-10-CM

## 2023-07-20 PROCEDURE — 3288F FALL RISK ASSESSMENT DOCD: CPT | Mod: CPTII,S$GLB,, | Performed by: ORTHOPAEDIC SURGERY

## 2023-07-20 PROCEDURE — 1101F PT FALLS ASSESS-DOCD LE1/YR: CPT | Mod: CPTII,S$GLB,, | Performed by: ORTHOPAEDIC SURGERY

## 2023-07-20 PROCEDURE — 3288F PR FALLS RISK ASSESSMENT DOCUMENTED: ICD-10-PCS | Mod: CPTII,S$GLB,, | Performed by: ORTHOPAEDIC SURGERY

## 2023-07-20 PROCEDURE — 3008F BODY MASS INDEX DOCD: CPT | Mod: CPTII,S$GLB,, | Performed by: ORTHOPAEDIC SURGERY

## 2023-07-20 PROCEDURE — 1159F MED LIST DOCD IN RCRD: CPT | Mod: CPTII,S$GLB,, | Performed by: ORTHOPAEDIC SURGERY

## 2023-07-20 PROCEDURE — 1125F AMNT PAIN NOTED PAIN PRSNT: CPT | Mod: CPTII,S$GLB,, | Performed by: ORTHOPAEDIC SURGERY

## 2023-07-20 PROCEDURE — 1125F PR PAIN SEVERITY QUANTIFIED, PAIN PRESENT: ICD-10-PCS | Mod: CPTII,S$GLB,, | Performed by: ORTHOPAEDIC SURGERY

## 2023-07-20 PROCEDURE — 3008F PR BODY MASS INDEX (BMI) DOCUMENTED: ICD-10-PCS | Mod: CPTII,S$GLB,, | Performed by: ORTHOPAEDIC SURGERY

## 2023-07-20 PROCEDURE — 99999 PR PBB SHADOW E&M-EST. PATIENT-LVL IV: CPT | Mod: PBBFAC,,, | Performed by: ORTHOPAEDIC SURGERY

## 2023-07-20 PROCEDURE — 1101F PR PT FALLS ASSESS DOC 0-1 FALLS W/OUT INJ PAST YR: ICD-10-PCS | Mod: CPTII,S$GLB,, | Performed by: ORTHOPAEDIC SURGERY

## 2023-07-20 PROCEDURE — 99214 OFFICE O/P EST MOD 30 MIN: CPT | Mod: S$GLB,,, | Performed by: ORTHOPAEDIC SURGERY

## 2023-07-20 PROCEDURE — 99999 PR PBB SHADOW E&M-EST. PATIENT-LVL IV: ICD-10-PCS | Mod: PBBFAC,,, | Performed by: ORTHOPAEDIC SURGERY

## 2023-07-20 PROCEDURE — 99214 PR OFFICE/OUTPT VISIT, EST, LEVL IV, 30-39 MIN: ICD-10-PCS | Mod: S$GLB,,, | Performed by: ORTHOPAEDIC SURGERY

## 2023-07-20 PROCEDURE — 1159F PR MEDICATION LIST DOCUMENTED IN MEDICAL RECORD: ICD-10-PCS | Mod: CPTII,S$GLB,, | Performed by: ORTHOPAEDIC SURGERY
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
## 2023-07-20 NOTE — PATIENT INSTRUCTIONS
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      I believe you probably should undergo injections around L4-5 and possibility needing genicular nerve block around the left knee if this continues to be a problem  You did not have any surgeries on her back you only had physical therapy    Attempt on aspiration was negative no fluid taken out from the knee on the left  Will do CBC sed rate CRP and bone scan to confirm that there is no infection or loosening    The pain you experiencing going down to her ankle is all related to a pinched nerve in the back in the future we can always do genicular nerve block around the knee to see if that helps but you really need to see the pain management people about her back maybe repeat epidural block a little bit higher than where they gave it last time      Will start physical therapy aquatic PT to the lumbar and lower extremity [facility] in [facility]

## 2023-07-25 ENCOUNTER — HOSPITAL ENCOUNTER (OUTPATIENT)
Dept: RADIOLOGY | Facility: HOSPITAL | Age: 68
Discharge: HOME | End: 2023-07-25
Attending: ORTHOPAEDIC SURGERY
Payer: MEDICARE

## 2023-07-25 DIAGNOSIS — Z96.652 HISTORY OF TOTAL LEFT KNEE REPLACEMENT: ICD-10-CM

## 2023-07-25 PROCEDURE — A9503 TC99M MEDRONATE: HCPCS

## 2023-07-25 PROCEDURE — 78315 NM BONE SCAN 3 PHASE KNEE: ICD-10-PCS | Mod: 26,,, | Performed by: RADIOLOGY

## 2023-07-25 PROCEDURE — 78315 BONE IMAGING 3 PHASE: CPT | Mod: 26,,, | Performed by: RADIOLOGY

## 2023-07-25 PROCEDURE — 78315 BONE IMAGING 3 PHASE: CPT | Mod: TC

## 2023-07-27 ENCOUNTER — TELEPHONE (OUTPATIENT)
Dept: PAIN MEDICINE | Facility: CLINIC | Age: 68
End: 2023-07-27
Payer: MEDICARE

## 2023-07-27 ENCOUNTER — TELEPHONE (OUTPATIENT)
Dept: ORTHOPEDICS | Facility: CLINIC | Age: 68
End: 2023-07-27
Payer: MEDICARE

## 2023-07-27 NOTE — TELEPHONE ENCOUNTER
Called patient to let her know we will call her to schedule her a follow up when we received the reports from the bone scan,

## 2023-07-27 NOTE — TELEPHONE ENCOUNTER
----- Message from Yanelis Polanco sent at 7/27/2023 10:32 AM CDT -----  Contact: Mariel  Type:  Test Results    Who Called: Mariel  Name of Test (Lab/Mammo/Etc): NM THREE PHASE BONE IMAGING   Date of Test: 7/25/2023  Ordering Provider: Dr. FLORENTINO  Where the test was performed: [facility]  Would the patient rather a call back or a response via [facility]? Call or [facility]  Best Call Back Number: [phone]   Additional Information:  Please call patient to assist or leave message in [facility].

## 2023-07-27 NOTE — TELEPHONE ENCOUNTER
----- Message from Yanelis Polanco sent at 7/27/2023 10:31 AM CDT -----  Contact: Mariel  Type:  Test Results    Who Called: Mariel  Name of Test (Lab/Mammo/Etc): NM THREE PHASE BONE IMAGING   Date of Test: 7/25/2023  Ordering Provider: Dr. FLORENTINO  Where the test was performed: [facility]  Would the patient rather a call back or a response via [facility]? Call or [facility]  Best Call Back Number: [phone]   Additional Information:  Please call patient to assist or leave message in [facility].

## 2023-07-27 NOTE — TELEPHONE ENCOUNTER
Reach out to pt in regards of her message. Pt wanted someone to review her Bone scan that was done on 7/20/23. The results are in. Requested by Moy Connolly MD.

## 2023-08-07 ENCOUNTER — OFFICE VISIT (OUTPATIENT)
Dept: ORTHOPEDICS | Facility: CLINIC | Age: 68
End: 2023-08-07
Payer: MEDICARE

## 2023-08-07 ENCOUNTER — LAB VISIT (OUTPATIENT)
Dept: LAB | Facility: HOSPITAL | Age: 68
End: 2023-08-07
Attending: ORTHOPAEDIC SURGERY
Payer: MEDICARE

## 2023-08-07 VITALS — BODY MASS INDEX: 41.56 KG/M2 | HEIGHT: 63 IN | WEIGHT: 234.56 LBS

## 2023-08-07 DIAGNOSIS — M54.17 LUMBOSACRAL RADICULOPATHY AT L5: ICD-10-CM

## 2023-08-07 DIAGNOSIS — Z96.652 HISTORY OF TOTAL LEFT KNEE REPLACEMENT: Primary | ICD-10-CM

## 2023-08-07 DIAGNOSIS — G56.03 CARPAL TUNNEL SYNDROME, BILATERAL: ICD-10-CM

## 2023-08-07 DIAGNOSIS — Z96.652 HISTORY OF TOTAL LEFT KNEE REPLACEMENT: ICD-10-CM

## 2023-08-07 DIAGNOSIS — M17.11 ARTHRITIS OF KNEE, RIGHT: ICD-10-CM

## 2023-08-07 DIAGNOSIS — M47.817 ARTHROPATHY OF LUMBOSACRAL FACET JOINT: ICD-10-CM

## 2023-08-07 DIAGNOSIS — M54.17 LUMBOSACRAL RADICULOPATHY AT S1: ICD-10-CM

## 2023-08-07 LAB
CRP SERPL-MCNC: 1.1 MG/L (ref 0–8.2)
ERYTHROCYTE [SEDIMENTATION RATE] IN BLOOD BY WESTERGREN METHOD: 3 MM/HR (ref 0–20)

## 2023-08-07 PROCEDURE — 36415 COLL VENOUS BLD VENIPUNCTURE: CPT | Performed by: ORTHOPAEDIC SURGERY

## 2023-08-07 PROCEDURE — 99999 PR PBB SHADOW E&M-EST. PATIENT-LVL IV: CPT | Mod: PBBFAC,,, | Performed by: ORTHOPAEDIC SURGERY

## 2023-08-07 PROCEDURE — 99213 PR OFFICE/OUTPT VISIT, EST, LEVL III, 20-29 MIN: ICD-10-PCS | Mod: S$GLB,,, | Performed by: ORTHOPAEDIC SURGERY

## 2023-08-07 PROCEDURE — 85651 RBC SED RATE NONAUTOMATED: CPT | Performed by: ORTHOPAEDIC SURGERY

## 2023-08-07 PROCEDURE — 3288F FALL RISK ASSESSMENT DOCD: CPT | Mod: CPTII,S$GLB,, | Performed by: ORTHOPAEDIC SURGERY

## 2023-08-07 PROCEDURE — 1159F PR MEDICATION LIST DOCUMENTED IN MEDICAL RECORD: ICD-10-PCS | Mod: CPTII,S$GLB,, | Performed by: ORTHOPAEDIC SURGERY

## 2023-08-07 PROCEDURE — 1125F PR PAIN SEVERITY QUANTIFIED, PAIN PRESENT: ICD-10-PCS | Mod: CPTII,S$GLB,, | Performed by: ORTHOPAEDIC SURGERY

## 2023-08-07 PROCEDURE — 86140 C-REACTIVE PROTEIN: CPT | Performed by: ORTHOPAEDIC SURGERY

## 2023-08-07 PROCEDURE — 99999 PR PBB SHADOW E&M-EST. PATIENT-LVL IV: ICD-10-PCS | Mod: PBBFAC,,, | Performed by: ORTHOPAEDIC SURGERY

## 2023-08-07 PROCEDURE — 3008F PR BODY MASS INDEX (BMI) DOCUMENTED: ICD-10-PCS | Mod: CPTII,S$GLB,, | Performed by: ORTHOPAEDIC SURGERY

## 2023-08-07 PROCEDURE — 1101F PR PT FALLS ASSESS DOC 0-1 FALLS W/OUT INJ PAST YR: ICD-10-PCS | Mod: CPTII,S$GLB,, | Performed by: ORTHOPAEDIC SURGERY

## 2023-08-07 PROCEDURE — 1160F RVW MEDS BY RX/DR IN RCRD: CPT | Mod: CPTII,S$GLB,, | Performed by: ORTHOPAEDIC SURGERY

## 2023-08-07 PROCEDURE — 1159F MED LIST DOCD IN RCRD: CPT | Mod: CPTII,S$GLB,, | Performed by: ORTHOPAEDIC SURGERY

## 2023-08-07 PROCEDURE — 1101F PT FALLS ASSESS-DOCD LE1/YR: CPT | Mod: CPTII,S$GLB,, | Performed by: ORTHOPAEDIC SURGERY

## 2023-08-07 PROCEDURE — 3288F PR FALLS RISK ASSESSMENT DOCUMENTED: ICD-10-PCS | Mod: CPTII,S$GLB,, | Performed by: ORTHOPAEDIC SURGERY

## 2023-08-07 PROCEDURE — 1160F PR REVIEW ALL MEDS BY PRESCRIBER/CLIN PHARMACIST DOCUMENTED: ICD-10-PCS | Mod: CPTII,S$GLB,, | Performed by: ORTHOPAEDIC SURGERY

## 2023-08-07 PROCEDURE — 1125F AMNT PAIN NOTED PAIN PRSNT: CPT | Mod: CPTII,S$GLB,, | Performed by: ORTHOPAEDIC SURGERY

## 2023-08-07 PROCEDURE — 99213 OFFICE O/P EST LOW 20 MIN: CPT | Mod: S$GLB,,, | Performed by: ORTHOPAEDIC SURGERY

## 2023-08-07 PROCEDURE — 3008F BODY MASS INDEX DOCD: CPT | Mod: CPTII,S$GLB,, | Performed by: ORTHOPAEDIC SURGERY

## 2023-08-07 NOTE — PATIENT INSTRUCTIONS
Your bone scan did not show evidence of loosening or infection by the report   It showed that you have arthritis in the right knee not the left total knee   We need to obtain CRP and sed rate which was not done to confirm that you not having an infection   I do not want to give you antibiotics for no reason   We did do the aspiration in 2020 and the cultures and all of it was negative  I want you to go to have an genicular nerve block to see if that helps/it is blocking the nerve around the need to see if it minimizes the pain.  If they test showed that you are a little bit better from the test does of the genicular nerve block then they can burn the nerves and that will give you relief 6 months to a year  Gabapentin 300 mg at night is okay but when you take 2 of them it gives her memory and fogginess the next day.  Stick with the 300 mg once at night   Continue with the meloxicam  Continue with the topicals for now    You take furosemide/Lasix you need to take potassium with that   You been having some palpitation please check with her cardiologist

## 2023-08-07 NOTE — PROGRESS NOTES
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 with mild hypertrophic ligamentum flavum.     L4-5:     Mild disc degeneration with disc narrowing, desiccation and disc bulge.  Moderate to severe facet arthritis with grade 1 spondylolisthesis of approximately 4 mm.  Mild central with moderate foraminal stenosis.  Spinal canal stenosis measuring up to 8 mm.     L5-S1:    Mild disc degeneration with generalized disc bulge.  Moderate left and mild right facet arthritis.  Mild lateral recess stenosis with mild bilateral foraminal stenosis.  Mild spinal stenosis     Impression:     L4-5 disc degeneration with facet arthrosis and grade 1 spondylolisthesis with mild central and moderate bilateral foraminal stenosis.  Mild spinal stenosis     Mild L5-S1 disc degeneration with facet arthritis with mild lateral recess and bilateral foraminal stenosis.  Mild spinal stenosis     T12-L1 disc bulge with hypointense signal extending inferiorly and superiorly from the disc margin with considerations including old calcified disc extrusion versus calcification of the posterior longitudinal ligament.     Stable findings        Electronically signed by: Jose Alberto Pabon  Date:                                            10/25/2021  Time:                                           17:46           Exam Ended: 10/25/21 16:51 Last Resulted:  10/25/21 17:46      Order Details    View Encounter    Lab and Collection Details    Routing    Result History     View All Conversations on this Encounter           Result Care Coordination    Patient Communication  Append Comments  Seen Back to Top    Moderate to severe lumbar stenosis at L4-5. This level also has a slippage called a spondylolisthesis. Slippage appears stable when you move, but the combined issues will likely require a fusion to address. Let me know if you need an appt with me on the [facility]. [facility] should be able to see your images in their system if you choose to transfer care.  ...   Written by Neda Beal PA-C on 10/27/2021  1:35 PM CDT View Full Comments  Seen by patient Mariel Becerril on 11/29/2021 11:48 AM          MRI Lumbar Spine Without Contrast: Patient Communication  Append Comments  Seen    Moderate to severe lumbar stenosis at L4-5. This level also has a slippage called a spondylolisthesis. Slippage appears stable when you move, but the combined issues will likely require a fusion to address. Let me know if you need an appt with me on the [facility]. [facility] should be able to see your images in their system if you choose to transfer care.      Neda   Written by Neda Beal PA-C on 10/27/2021  1:35 PM CDT  Seen by patient Mariel Becerril on 11/29/2021 11:48 AM    External Result Report    External Result Report     Narrative & Impression    EXAMINATION:  MRI LUMBAR SPINE WITHOUT CONTRAST     CLINICAL HISTORY:  Hemiplegia, unspecified affecting unspecified sideLow back pain, symptoms persist with > 6wks conservative treatment;Spinal stenosis, lumbar;Spondylolisthesis;     TECHNIQUE:  Standard multiplanar noncontrast MRI sequences of the lumbar spine.     Date of exam: 10/25/2021     COMPARISON:  Comparison is made to the prior examination 07/21/2020     FINDINGS:  FINDINGS:     The distal cord and conus appear normal.     The  lumbar vertebra reveal grade 1 L4-5 spondylolisthesis.  Small L3 vertebral body hemangioma is present.     No acute or chronic compression fracture.     T12-L1: There is unchanged broad-based disc bulge with hypointense T1 and T2 signal material extending both superiorly and inferiorly from the disc margin.  Possible old calcified disc extrusion versus calcification of the posterior longitudinal ligament.     L1-2:     Mild disc bulge.     L2-3:     Mild disc bulge with mild hypertrophic ligamentum flavum.     L3-4:     Mild disc bulge with mild hypertrophic ligamentum flavum.     L4-5:     Mild disc degeneration with disc narrowing, desiccation and disc bulge.  Moderate to severe facet arthritis with grade 1 spondylolisthesis of approximately 4 mm.  Mild central with moderate foraminal stenosis.  Spinal canal stenosis measuring up to 8 mm.     L5-S1:    Mild disc degeneration with generalized disc bulge.  Moderate left and mild right facet arthritis.  Mild lateral recess stenosis with mild bilateral foraminal stenosis.  Mild spinal stenosis     Impression:     L4-5 disc degeneration with facet arthrosis and grade 1 spondylolisthesis with mild central and moderate bilateral foraminal stenosis.  Mild spinal stenosis     Mild L5-S1 disc degeneration with facet arthritis with mild lateral recess and bilateral foraminal stenosis.  Mild spinal stenosis     T12-L1 disc bulge with hypointense signal extending inferiorly and superiorly from the disc margin with considerations including old calcified disc extrusion versus calcification of the posterior longitudinal ligament.     Stable findings        Electronically signed by: Jose Alberto Pabon  Date:                                            10/25/2021  Time:                                           17:46    Encounter    View Encounter             Signed by    Signed Time Phone Pager   Jose Alberto Pabon MD 10/25/2021 17:46 [phone]      Reviewed By    Neda Beal  MANISH on 10/27/2021 13:33           Exam Details    Performed Procedure Technologist Supporting Staff Performing Physician   MRI Lumbar Spine Without Contrast Nino Sanchez, RT Gee Jernigan, RT          Appointment Date/Status Modality Department    10/25/2021     Completed [facility] MRI1 [facility] MRI           Begin Exam End Exam Begin Exam Questionnaires End Exam Questionnaires   10/25/2021  4:12 PM 10/25/2021  4:51 PM MRI TECH NAVIGATOR QUESTIONS IMAGING END ALL              Reason for Exam  Priority: STAT  Low back pain, symptoms persist with > 6wks conservative treatment; Spinal stenosis, lumbar; Spondylolisthesis   Dx: Hemiparesis, unspecified hemiparesis etiology, unspecified laterality [G81.90 (ICD-10-CM)]; Spondylolisthesis at L4-L5 level [M43.16 (ICD-10-CM)]; Chronic left-sided low back pain with left-sided sciatica [M54.42, G89.29 (ICD-10-CM)]; Dorsalgia, unspecified [M54.9 (ICD-10-CM)]; Spinal stenosis, lumbosacral region [M48.07 (ICD-10-CM)]; Spondylolisthesis of lumbar region [M43.16 (ICD-10-CM)]         Order Report    Order Details      Assessment:     Encounter Diagnoses   Name Primary?    History of total left knee replacement Yes    Arthritis of knee, right     Lumbosacral radiculopathy at S1     Lumbosacral radiculopathy at L5     Carpal tunnel syndrome, bilateral     Arthropathy of lumbosacral facet joint         Plan:   History of total left knee replacement    Arthritis of knee, right    Lumbosacral radiculopathy at S1    Lumbosacral radiculopathy at L5    Carpal tunnel syndrome, bilateral    Arthropathy of lumbosacral facet joint         Patient Instructions   Your bone scan did not show evidence of loosening or infection by the report   It showed that you have arthritis in the right knee not the left total knee   We need to obtain CRP and sed rate which was not done to confirm that you not having an infection   I do not want to give you antibiotics for no reason   We did do the aspiration in  2020 and the cultures and all of it was negative  I want you to go to have an genicular nerve block to see if that helps/it is blocking the nerve around the need to see if it minimizes the pain.  If they test showed that you are a little bit better from the test does of the genicular nerve block then they can burn the nerves and that will give you relief 6 months to a year  Gabapentin 300 mg at night is okay but when you take 2 of them it gives her memory and fogginess the next day.  Stick with the 300 mg once at night   Continue with the meloxicam  Continue with the topicals for now    You take furosemide/Lasix you need to take potassium with that   You been having some palpitation please check with her cardiologist           Patient Instructions   Your bone scan did not show evidence of loosening or infection by the report   It showed that you have arthritis in the right knee not the left total knee   We need to obtain CRP and sed rate which was not done to confirm that you not having an infection   I do not want to give you antibiotics for no reason   We did do the aspiration in 2020 and the cultures and all of it was negative  I want you to go to have an genicular nerve block to see if that helps/it is blocking the nerve around the need to see if it minimizes the pain.  If they test showed that you are a little bit better from the test does of the genicular nerve block then they can burn the nerves and that will give you relief 6 months to a year  Gabapentin 300 mg at night is okay but when you take 2 of them it gives her memory and fogginess the next day.  Stick with the 300 mg once at night   Continue with the meloxicam  Continue with the topicals for now    You take furosemide/Lasix you need to take potassium with that   You been having some palpitation please check with her cardiologist          Previous discussion  Discussed with the patient who had EMG-NCV performed 2 years ago with  with  bilateral carpal tunnel syndrome and did not do her surgery.  Her EMG-NCV to the legs was positive for right L5-S1 and left L5-S1 radiculopathy  You did go see Dr. Blanton what you had decompression on your back and that helped her leave a lot of her pain the legs (patient stated she did not have surgery she described therapy as decompression of the spine).  You do have some burning and numbness into the big toe as well into the hands.  You are taking gabapentin on as needed basis.  My recommendation to stay on gabapentin work herself up as given in the instructions to help it work  In the future you can have carpal tunnel injected and I did discuss briefly carpal tunnel release which could be performed in the future if needed.  However I did tell her we can not wait until you start dropping things from her hand which means you need to have surgery right away.  Surgery is approximately 5-10 minutes done under local with sedation.  You release the ligament over the nerve and the nerve has to heal itself.  There is slight risk of nerve damage vascular damage infection blood fat clot    Disclaimer: This note was prepared using a voice recognition system and is likely to have sound alike errors within the text.

## 2023-08-08 ENCOUNTER — TELEPHONE (OUTPATIENT)
Dept: ORTHOPEDICS | Facility: CLINIC | Age: 68
End: 2023-08-08
Payer: MEDICARE

## 2023-08-08 NOTE — TELEPHONE ENCOUNTER
----- Message from Deanna Milton sent at 8/8/2023  2:04 PM CDT -----  Contact: [phone]  Pt is calling requesting status of Physical therapy orders that was suppose to be faxed over , pt states [facility] physical therapy haven't received anything ([id number]) . Please call back at [phone] . Thanksdj

## 2023-08-08 NOTE — TELEPHONE ENCOUNTER
Called patient to let her know I sent the PT order yesterday and I resent it today. Patient was thankful for the call.

## 2023-08-14 DIAGNOSIS — I10 ESSENTIAL HYPERTENSION: ICD-10-CM

## 2023-08-16 RX ORDER — FUROSEMIDE 40 MG/1
TABLET ORAL
Qty: 90 TABLET | Refills: 0 | Status: SHIPPED | OUTPATIENT
Start: 2023-08-16 | End: 2023-11-16 | Stop reason: SDUPTHER

## 2023-08-23 ENCOUNTER — APPOINTMENT (OUTPATIENT)
Dept: RADIOLOGY | Facility: HOSPITAL | Age: 68
End: 2023-08-23
Attending: NURSE PRACTITIONER
Payer: MEDICARE

## 2023-08-23 ENCOUNTER — OFFICE VISIT (OUTPATIENT)
Dept: INTERNAL MEDICINE | Facility: CLINIC | Age: 68
End: 2023-08-23
Payer: MEDICARE

## 2023-08-23 VITALS
RESPIRATION RATE: 18 BRPM | HEIGHT: 63 IN | HEART RATE: 72 BPM | BODY MASS INDEX: 42.15 KG/M2 | TEMPERATURE: 99 F | DIASTOLIC BLOOD PRESSURE: 70 MMHG | OXYGEN SATURATION: 99 % | SYSTOLIC BLOOD PRESSURE: 96 MMHG | WEIGHT: 237.88 LBS

## 2023-08-23 DIAGNOSIS — M25.561 PAIN IN BOTH KNEES, UNSPECIFIED CHRONICITY: ICD-10-CM
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
## 2023-08-23 DIAGNOSIS — M25.562 PAIN IN BOTH KNEES, UNSPECIFIED CHRONICITY: ICD-10-CM

## 2023-08-23 DIAGNOSIS — Z23 NEED FOR PNEUMOCOCCAL 20-VALENT CONJUGATE VACCINATION: ICD-10-CM

## 2023-08-23 PROCEDURE — 99999 PR PBB SHADOW E&M-EST. PATIENT-LVL V: ICD-10-PCS | Mod: PBBFAC,,, | Performed by: NURSE PRACTITIONER

## 2023-08-23 PROCEDURE — 3078F PR MOST RECENT DIASTOLIC BLOOD PRESSURE < 80 MM HG: ICD-10-PCS | Mod: CPTII,S$GLB,, | Performed by: NURSE PRACTITIONER

## 2023-08-23 PROCEDURE — 99999 PR PBB SHADOW E&M-EST. PATIENT-LVL V: CPT | Mod: PBBFAC,,, | Performed by: NURSE PRACTITIONER

## 2023-08-23 PROCEDURE — 3074F SYST BP LT 130 MM HG: CPT | Mod: CPTII,S$GLB,, | Performed by: NURSE PRACTITIONER

## 2023-08-23 PROCEDURE — 73562 XR KNEE ORTHO BILAT: ICD-10-PCS | Mod: 26,50,, | Performed by: RADIOLOGY

## 2023-08-23 PROCEDURE — 1159F MED LIST DOCD IN RCRD: CPT | Mod: CPTII,S$GLB,, | Performed by: NURSE PRACTITIONER

## 2023-08-23 PROCEDURE — 1160F PR REVIEW ALL MEDS BY PRESCRIBER/CLIN PHARMACIST DOCUMENTED: ICD-10-PCS | Mod: CPTII,S$GLB,, | Performed by: NURSE PRACTITIONER

## 2023-08-23 PROCEDURE — 99213 OFFICE O/P EST LOW 20 MIN: CPT | Mod: S$GLB,,, | Performed by: NURSE PRACTITIONER

## 2023-08-23 PROCEDURE — 90677 PCV20 VACCINE IM: CPT | Mod: S$GLB,,, | Performed by: NURSE PRACTITIONER

## 2023-08-23 PROCEDURE — 3074F PR MOST RECENT SYSTOLIC BLOOD PRESSURE < 130 MM HG: ICD-10-PCS | Mod: CPTII,S$GLB,, | Performed by: NURSE PRACTITIONER

## 2023-08-23 PROCEDURE — 99213 PR OFFICE/OUTPT VISIT, EST, LEVL III, 20-29 MIN: ICD-10-PCS | Mod: S$GLB,,, | Performed by: NURSE PRACTITIONER

## 2023-08-23 PROCEDURE — 3008F PR BODY MASS INDEX (BMI) DOCUMENTED: ICD-10-PCS | Mod: CPTII,S$GLB,, | Performed by: NURSE PRACTITIONER

## 2023-08-23 PROCEDURE — 3008F BODY MASS INDEX DOCD: CPT | Mod: CPTII,S$GLB,, | Performed by: NURSE PRACTITIONER

## 2023-08-23 PROCEDURE — 1160F RVW MEDS BY RX/DR IN RCRD: CPT | Mod: CPTII,S$GLB,, | Performed by: NURSE PRACTITIONER

## 2023-08-23 PROCEDURE — 90677 PNEUMOCOCCAL CONJUGATE VACCINE 20-VALENT: ICD-10-PCS | Mod: S$GLB,,, | Performed by: NURSE PRACTITIONER

## 2023-08-23 PROCEDURE — 1125F PR PAIN SEVERITY QUANTIFIED, PAIN PRESENT: ICD-10-PCS | Mod: CPTII,S$GLB,, | Performed by: NURSE PRACTITIONER

## 2023-08-23 PROCEDURE — G0009 PNEUMOCOCCAL CONJUGATE VACCINE 20-VALENT: ICD-10-PCS | Mod: S$GLB,,, | Performed by: NURSE PRACTITIONER

## 2023-08-23 PROCEDURE — 73562 X-RAY EXAM OF KNEE 3: CPT | Mod: 26,50,, | Performed by: RADIOLOGY

## 2023-08-23 PROCEDURE — 73562 X-RAY EXAM OF KNEE 3: CPT | Mod: TC,50,PO

## 2023-08-23 PROCEDURE — 3078F DIAST BP <80 MM HG: CPT | Mod: CPTII,S$GLB,, | Performed by: NURSE PRACTITIONER

## 2023-08-23 PROCEDURE — 1159F PR MEDICATION LIST DOCUMENTED IN MEDICAL RECORD: ICD-10-PCS | Mod: CPTII,S$GLB,, | Performed by: NURSE PRACTITIONER

## 2023-08-23 PROCEDURE — 1125F AMNT PAIN NOTED PAIN PRSNT: CPT | Mod: CPTII,S$GLB,, | Performed by: NURSE PRACTITIONER

## 2023-08-23 PROCEDURE — G0009 ADMIN PNEUMOCOCCAL VACCINE: HCPCS | Mod: S$GLB,,, | Performed by: NURSE PRACTITIONER

## 2023-08-23 NOTE — PROGRESS NOTES
Subjective     Patient ID: Mariel Becerril is a 67 y.o. female.    Chief Complaint: Fall (08/20/23 aching Lt knee bruise)    Patient presents for evaluation after a fall.  Reports falling down on Sunday.  Fell on both knees.  Reports one of their horses were loose and coming under her carport.  She was trying to return back inside.  She reports tripping and falling with grandchildren.  Taking Tylenol and other prescribed medications.     Not sure if she hit her head.  Neck had been sore.      Fall  Associated symptoms include headaches. Pertinent negatives include no hematuria or vomiting.     Review of Systems   Constitutional:  Positive for activity change and unexpected weight change.   HENT:  Negative for hearing loss, rhinorrhea and trouble swallowing.    Eyes:  Negative for discharge and visual disturbance.   Respiratory:  Negative for chest tightness and wheezing.    Cardiovascular:  Negative for chest pain and palpitations.   Gastrointestinal:  Positive for constipation. Negative for blood in stool, diarrhea and vomiting.   Endocrine: Negative for polydipsia and polyuria.   Genitourinary:  Negative for difficulty urinating, dysuria, hematuria and menstrual problem.   Musculoskeletal:  Positive for arthralgias, joint swelling and neck pain.   Neurological:  Positive for weakness and headaches.   Psychiatric/Behavioral:  Negative for confusion and dysphoric mood.           Objective     Physical Exam  Vitals reviewed.   Constitutional:       Appearance: Normal appearance.   Cardiovascular:      Rate and Rhythm: Normal rate and regular rhythm.   Pulmonary:      Effort: Pulmonary effort is normal.      Breath sounds: Normal breath sounds.   Musculoskeletal:         General: Signs of injury present. No swelling.      Right knee: No swelling. Decreased range of motion. Tenderness present.      Left knee: No swelling. Decreased range of motion. Tenderness present.   Skin:     General: Skin is warm.       Coloration: Skin is not jaundiced.   Neurological:      General: No focal deficit present.      Mental Status: She is alert and oriented to person, place, and time.   Psychiatric:         Mood and Affect: Mood normal.         Behavior: Behavior is cooperative.            Assessment and Plan     1. Fall, initial encounter  -     X-ray Knee Ortho Bilateral; Future; Expected date: 08/23/2023    2. Pain in both knees, unspecified chronicity  -     X-ray Knee Ortho Bilateral; Future; Expected date: 08/23/2023    3. Need for pneumococcal 20-valent conjugate vaccination  -     (In Office Administered) Pneumococcal Conjugate Vaccine (20 Valent) (IM)      X-ray today     Needs pneum 20    3 month-annual        Follow up in about 3 months (around 11/23/2023).

## 2023-08-30 ENCOUNTER — OFFICE VISIT (OUTPATIENT)
Dept: PAIN MEDICINE | Facility: CLINIC | Age: 68
End: 2023-08-30
Payer: MEDICARE

## 2023-08-30 ENCOUNTER — TELEPHONE (OUTPATIENT)
Dept: PHYSICAL MEDICINE AND REHAB | Facility: CLINIC | Age: 68
End: 2023-08-30
Payer: MEDICARE

## 2023-08-30 VITALS
HEIGHT: 63 IN | SYSTOLIC BLOOD PRESSURE: 103 MMHG | HEART RATE: 75 BPM | DIASTOLIC BLOOD PRESSURE: 71 MMHG | WEIGHT: 238.44 LBS | BODY MASS INDEX: 42.25 KG/M2

## 2023-08-30 DIAGNOSIS — M54.16 BILATERAL LUMBAR RADICULOPATHY: ICD-10-CM

## 2023-08-30 DIAGNOSIS — M25.562 POSTERIOR LEFT KNEE PAIN: ICD-10-CM

## 2023-08-30 DIAGNOSIS — R20.2 NUMBNESS AND TINGLING OF BOTH FEET: ICD-10-CM

## 2023-08-30 DIAGNOSIS — R20.0 NUMBNESS AND TINGLING OF BOTH FEET: ICD-10-CM

## 2023-08-30 DIAGNOSIS — Z86.69 HISTORY OF MIGRAINE HEADACHES: ICD-10-CM

## 2023-08-30 DIAGNOSIS — Z96.652 STATUS POST TOTAL KNEE REPLACEMENT USING CEMENT, LEFT: Primary | ICD-10-CM

## 2023-08-30 DIAGNOSIS — M79.675 PAIN IN LEFT TOE(S): ICD-10-CM

## 2023-08-30 PROCEDURE — 3074F SYST BP LT 130 MM HG: CPT | Mod: CPTII,S$GLB,, | Performed by: PHYSICIAN ASSISTANT

## 2023-08-30 PROCEDURE — 3288F PR FALLS RISK ASSESSMENT DOCUMENTED: ICD-10-PCS | Mod: CPTII,S$GLB,, | Performed by: PHYSICIAN ASSISTANT

## 2023-08-30 PROCEDURE — 1160F RVW MEDS BY RX/DR IN RCRD: CPT | Mod: CPTII,S$GLB,, | Performed by: PHYSICIAN ASSISTANT

## 2023-08-30 PROCEDURE — 3008F BODY MASS INDEX DOCD: CPT | Mod: CPTII,S$GLB,, | Performed by: PHYSICIAN ASSISTANT

## 2023-08-30 PROCEDURE — 1159F PR MEDICATION LIST DOCUMENTED IN MEDICAL RECORD: ICD-10-PCS | Mod: CPTII,S$GLB,, | Performed by: PHYSICIAN ASSISTANT

## 2023-08-30 PROCEDURE — 3078F PR MOST RECENT DIASTOLIC BLOOD PRESSURE < 80 MM HG: ICD-10-PCS | Mod: CPTII,S$GLB,, | Performed by: PHYSICIAN ASSISTANT

## 2023-08-30 PROCEDURE — 1159F MED LIST DOCD IN RCRD: CPT | Mod: CPTII,S$GLB,, | Performed by: PHYSICIAN ASSISTANT

## 2023-08-30 PROCEDURE — 3074F PR MOST RECENT SYSTOLIC BLOOD PRESSURE < 130 MM HG: ICD-10-PCS | Mod: CPTII,S$GLB,, | Performed by: PHYSICIAN ASSISTANT

## 2023-08-30 PROCEDURE — 1125F PR PAIN SEVERITY QUANTIFIED, PAIN PRESENT: ICD-10-PCS | Mod: CPTII,S$GLB,, | Performed by: PHYSICIAN ASSISTANT

## 2023-08-30 PROCEDURE — 3288F FALL RISK ASSESSMENT DOCD: CPT | Mod: CPTII,S$GLB,, | Performed by: PHYSICIAN ASSISTANT

## 2023-08-30 PROCEDURE — 1160F PR REVIEW ALL MEDS BY PRESCRIBER/CLIN PHARMACIST DOCUMENTED: ICD-10-PCS | Mod: CPTII,S$GLB,, | Performed by: PHYSICIAN ASSISTANT

## 2023-08-30 PROCEDURE — 1125F AMNT PAIN NOTED PAIN PRSNT: CPT | Mod: CPTII,S$GLB,, | Performed by: PHYSICIAN ASSISTANT

## 2023-08-30 PROCEDURE — 1101F PT FALLS ASSESS-DOCD LE1/YR: CPT | Mod: CPTII,S$GLB,, | Performed by: PHYSICIAN ASSISTANT

## 2023-08-30 PROCEDURE — 99214 OFFICE O/P EST MOD 30 MIN: CPT | Mod: S$GLB,,, | Performed by: PHYSICIAN ASSISTANT

## 2023-08-30 PROCEDURE — 99999 PR PBB SHADOW E&M-EST. PATIENT-LVL IV: CPT | Mod: PBBFAC,,, | Performed by: PHYSICIAN ASSISTANT

## 2023-08-30 PROCEDURE — 3008F PR BODY MASS INDEX (BMI) DOCUMENTED: ICD-10-PCS | Mod: CPTII,S$GLB,, | Performed by: PHYSICIAN ASSISTANT

## 2023-08-30 PROCEDURE — 1101F PR PT FALLS ASSESS DOC 0-1 FALLS W/OUT INJ PAST YR: ICD-10-PCS | Mod: CPTII,S$GLB,, | Performed by: PHYSICIAN ASSISTANT

## 2023-08-30 PROCEDURE — 99999 PR PBB SHADOW E&M-EST. PATIENT-LVL IV: ICD-10-PCS | Mod: PBBFAC,,, | Performed by: PHYSICIAN ASSISTANT

## 2023-08-30 PROCEDURE — 3078F DIAST BP <80 MM HG: CPT | Mod: CPTII,S$GLB,, | Performed by: PHYSICIAN ASSISTANT

## 2023-08-30 PROCEDURE — 99214 PR OFFICE/OUTPT VISIT, EST, LEVL IV, 30-39 MIN: ICD-10-PCS | Mod: S$GLB,,, | Performed by: PHYSICIAN ASSISTANT

## 2023-08-30 RX ORDER — DICLOFENAC SODIUM 10 MG/G
GEL TOPICAL
Qty: 200 G | Refills: 2 | Status: SHIPPED | OUTPATIENT
Start: 2023-08-30 | End: 2023-10-12 | Stop reason: SDUPTHER

## 2023-08-30 RX ORDER — TOPIRAMATE 100 MG/1
100 TABLET, FILM COATED ORAL 2 TIMES DAILY
Qty: 180 TABLET | Refills: 2 | Status: SHIPPED | OUTPATIENT
Start: 2023-08-30

## 2023-08-30 RX ORDER — MUPIROCIN 20 MG/G
OINTMENT TOPICAL 3 TIMES DAILY
Qty: 30 G | Refills: 0 | Status: SHIPPED | OUTPATIENT
Start: 2023-08-30
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
## 2023-09-06 ENCOUNTER — HOSPITAL ENCOUNTER (OUTPATIENT)
Dept: CARDIOLOGY | Facility: HOSPITAL | Age: 68
Discharge: HOME | End: 2023-09-06
Attending: PHYSICIAN ASSISTANT
Payer: MEDICARE

## 2023-09-06 VITALS — BODY MASS INDEX: 41.99 KG/M2 | HEIGHT: 63 IN | WEIGHT: 237 LBS

## 2023-09-06 DIAGNOSIS — M79.675 PAIN IN LEFT TOE(S): ICD-10-CM

## 2023-09-06 DIAGNOSIS — R20.0 NUMBNESS AND TINGLING OF BOTH FEET: ICD-10-CM

## 2023-09-06 DIAGNOSIS — R20.2 NUMBNESS AND TINGLING OF BOTH FEET: ICD-10-CM

## 2023-09-06 PROCEDURE — 93970 EXTREMITY STUDY: CPT

## 2023-09-06 PROCEDURE — 93970 CV US DOPPLER VENOUS LEGS BILATERAL (CUPID ONLY): ICD-10-PCS | Mod: 26,,, | Performed by: INTERNAL MEDICINE

## 2023-09-06 PROCEDURE — 93970 EXTREMITY STUDY: CPT | Mod: 26,,, | Performed by: INTERNAL MEDICINE

## 2023-09-08 NOTE — PRE-PROCEDURE INSTRUCTIONS
Spoke with patient regarding procedure scheduled on 9.14     Arrival time 0845     Has patient been sick with fever or on antibiotics within the last 7 days? No     Does the patient have any open wounds, sores or rashes? No     Does the patient have any recent fractures? no     Has patient received a vaccination within the last 7 days? No     Received the COVID vaccination? yes     Has the patient stopped all medications as directed? na     Does patient have a pacemaker, defibrillator, or implantable stimulator? no     Does the patient have a ride to and from procedure and someone reliable to remain with patient? son     Is the patient diabetic? no     Does the patient have sleep apnea? Or use O2 at home? valentino on cpap     Is the patient receiving sedation? yes     Is the patient instructed to remain NPO beginning at midnight the night before their procedure? yes     Procedure location confirmed with patient? Yes     Covid- Denies signs/symptoms. Instructed to notify PAT/MD if any changes.

## 2023-09-14 ENCOUNTER — HOSPITAL ENCOUNTER (OUTPATIENT)
Facility: HOSPITAL | Age: 68
Discharge: HOME | End: 2023-09-14
Attending: PHYSICAL MEDICINE & REHABILITATION | Admitting: PHYSICAL MEDICINE & REHABILITATION
Payer: MEDICARE

## 2023-09-14 VITALS
SYSTOLIC BLOOD PRESSURE: 116 MMHG | RESPIRATION RATE: 16 BRPM | BODY MASS INDEX: 42.2 KG/M2 | HEART RATE: 64 BPM | HEIGHT: 63 IN | DIASTOLIC BLOOD PRESSURE: 70 MMHG | WEIGHT: 238.19 LBS | OXYGEN SATURATION: 100 % | TEMPERATURE: 98 F

## 2023-09-14 DIAGNOSIS — G89.29 KNEE PAIN, CHRONIC: ICD-10-CM

## 2023-09-14 DIAGNOSIS — G89.29 CHRONIC PAIN OF LEFT KNEE: Primary | ICD-10-CM

## 2023-09-14 DIAGNOSIS — M25.562 CHRONIC PAIN OF LEFT KNEE: Primary | ICD-10-CM

## 2023-09-14 DIAGNOSIS — M25.569 KNEE PAIN, CHRONIC: ICD-10-CM

## 2023-09-14 PROCEDURE — 64454 NJX AA&/STRD GNCLR NRV BRNCH: CPT | Mod: LT | Performed by: PHYSICAL MEDICINE & REHABILITATION

## 2023-09-14 PROCEDURE — 64454 PR NERVE BLOCK INJ, ANES/STEROID, GENICULAR NERVE, W/IMG: ICD-10-PCS | Mod: LT,,, | Performed by: PHYSICAL MEDICINE & REHABILITATION

## 2023-09-14 PROCEDURE — 64454 NJX AA&/STRD GNCLR NRV BRNCH: CPT | Mod: LT,,, | Performed by: PHYSICAL MEDICINE & REHABILITATION

## 2023-09-14 PROCEDURE — 63600175 PHARM REV CODE 636 W HCPCS: Performed by: PHYSICAL MEDICINE & REHABILITATION

## 2023-09-14 RX ORDER — BUPIVACAINE HYDROCHLORIDE 5 MG/ML
INJECTION, SOLUTION EPIDURAL; INTRACAUDAL
Status: DISCONTINUED | OUTPATIENT
Start: 2023-09-14 | End: 2023-09-14 | Stop reason: HOSPADM

## 2023-09-14 RX ORDER — ONDANSETRON 2 MG/ML
4 INJECTION INTRAMUSCULAR; INTRAVENOUS ONCE AS NEEDED
Status: ACTIVE | OUTPATIENT
Start: 2023-09-14 | End: 2035-02-10

## 2023-09-14 RX ORDER — FENTANYL CITRATE 50 UG/ML
INJECTION, SOLUTION INTRAMUSCULAR; INTRAVENOUS
Status: DISCONTINUED | OUTPATIENT
Start: 2023-09-14 | End: 2023-09-14 | Stop reason: HOSPADM

## 2023-09-14 RX ORDER — MIDAZOLAM HYDROCHLORIDE 1 MG/ML
INJECTION, SOLUTION INTRAMUSCULAR; INTRAVENOUS
Status: DISCONTINUED | OUTPATIENT
Start: 2023-09-14 | End: 2023-09-14 | Stop reason: HOSPADM

## 2023-09-14 RX ORDER — METHYLPREDNISOLONE ACETATE 40 MG/ML
INJECTION, SUSPENSION INTRA-ARTICULAR; INTRALESIONAL; INTRAMUSCULAR; SOFT TISSUE
Status: DISCONTINUED | OUTPATIENT
Start: 2023-09-14 | End: 2023-09-14 | Stop reason: HOSPADM

## 2023-09-14 NOTE — DISCHARGE SUMMARY
Discharge Note  Short Stay      SUMMARY     Admit Date: 9/14/2023    Attending Physician: Thanh Diaz MD        Discharge Physician: Thanh Diaz MD        Discharge Date: 9/14/2023 11:11 AM    Procedure(s) (LRB):  LEFT Genicular nerve block RN IV Sedation (Left)    Final Diagnosis: Status post total knee replacement using cement, left [Z96.652]  Posterior left knee pain [M25.562]    Disposition: Home or self care    Patient Instructions:   Current Discharge Medication List        CONTINUE these medications which have NOT CHANGED    Details   acetaminophen (TYLENOL) 325 MG tablet Take 2 tablets (650 mg total) by mouth every 8 (eight) hours as needed.  Qty: 60 tablet, Refills: 2      aspirin (ECOTRIN) 81 MG EC tablet Take 1 tablet (81 mg total) by mouth once daily.  Qty: 90 tablet, Refills: 3      benzonatate (TESSALON) 100 MG capsule Take 1 capsule by mouth three times daily as needed  Qty: 30 capsule, Refills: 0      diclofenac sodium (VOLTAREN) 1 % Gel APPLY 2 GRAMS TOPICALLY THREE TIMES DAILY AS NEEDED  Qty: 200 g, Refills: 2    Associated Diagnoses: Status post total knee replacement using cement, left; Posterior left knee pain      ergocalciferol, vitamin D2, (VITAMIN D ORAL) Take 2,000 Units by mouth once daily.      furosemide (LASIX) 40 MG tablet TAKE 1 TABLET BY MOUTH ONCE DAILY AS NEEDED FOR  EDEMA,  SWELLING  DUE  TO  FLUID  Qty: 90 tablet, Refills: 0    Associated Diagnoses: Essential hypertension      gabapentin (NEURONTIN) 300 MG capsule Take 2 capsules (600 mg total) by mouth every evening.  Qty: 60 capsule, Refills: 5    Associated Diagnoses: Bilateral lumbar radiculopathy; Piriformis muscle pain      linaCLOtide (LINZESS) 72 mcg Cap capsule Take 1 capsule (72 mcg total) by mouth before breakfast.  Qty: 30 capsule, Refills: 2    Associated Diagnoses: Constipation, unspecified constipation type      meloxicam (MOBIC) 15 MG tablet Take 1 tablet (15 mg total) by mouth once daily.  Qty: 90  Pt discussed with Dr. Paulino Mom and family  She is high risk surgical candidate based on age and frailty   TTE and cath reviewed  Favor PCI of LAD as palliation for her and symx relief tablet, Refills: 3    Associated Diagnoses: Bilateral lumbar radiculopathy      methocarbamoL (ROBAXIN) 750 MG Tab Take 1 tablet (750 mg total) by mouth 3 (three) times daily as needed.  Qty: 90 tablet, Refills: 3    Associated Diagnoses: Bilateral lumbar radiculopathy; Piriformis muscle pain      metoprolol succinate (TOPROL-XL) 50 MG 24 hr tablet Take 1 tablet (50 mg total) by mouth once daily.  Qty: 90 tablet, Refills: 0    Comments: .      omeprazole (PRILOSEC) 40 MG capsule Take 1 capsule by mouth once daily  Qty: 90 capsule, Refills: 0      ondansetron (ZOFRAN) 8 MG tablet Take 1 tablet (8 mg total) by mouth every 8 (eight) hours as needed for Nausea.  Qty: 20 tablet, Refills: 0      potassium chloride SA (K-DUR,KLOR-CON) 20 MEQ tablet Take 1 tablet (20 mEq total) by mouth daily as needed (if taking lasix).  Qty: 60 tablet, Refills: 2      topiramate (TOPAMAX) 100 MG tablet Take 1 tablet (100 mg total) by mouth 2 (two) times daily.  Qty: 180 tablet, Refills: 2    Associated Diagnoses: Bilateral lumbar radiculopathy; History of migraine headaches      albuterol (PROAIR HFA) 90 mcg/actuation inhaler Inhale 2 puffs into the lungs every 6 (six) hours as needed for Wheezing. Rescue  Qty: 18 g, Refills: 0    Associated Diagnoses: Bronchitis      imipramine (TOFRANIL) 10 MG Tab Take 1 tablet (10 mg total) by mouth every evening.  Qty: 90 tablet, Refills: 1      levocetirizine (XYZAL) 5 MG tablet TAKE 1 TABLET BY MOUTH ONCE DAILY IN THE EVENING  Qty: 90 tablet, Refills: 0    Associated Diagnoses: Seasonal allergies      meclizine (ANTIVERT) 25 mg tablet Take 1 tablet (25 mg total) by mouth 3 (three) times daily as needed for Dizziness.  Qty: 30 tablet, Refills: 0    Associated Diagnoses: Vertigo      montelukast (SINGULAIR) 10 mg tablet Take 1 tablet (10 mg total) by mouth every evening. Allergies  Qty: 30 tablet, Refills: 2    Associated Diagnoses: Acute sinusitis, recurrence not specified, unspecified location       mupirocin (BACTROBAN) 2 % ointment Apply topically 3 (three) times daily.  Qty: 30 g, Refills: 0      nitroGLYCERIN (NITROSTAT) 0.4 MG SL tablet Place 1 tablet (0.4 mg total) under the tongue every 5 (five) minutes as needed for Chest pain.  Qty: 30 tablet, Refills: 0      nystatin (MYCOSTATIN) cream APPLY  CREAM TOPICALLY TO AFFECTED AREA TWICE DAILY  Qty: 30 g, Refills: 0    Associated Diagnoses: Tear of skin of buttock, initial encounter; Yeast dermatitis                 Discharge Diagnosis: Status post total knee replacement using cement, left [Z96.652]  Posterior left knee pain [M25.562]  Condition on Discharge: Stable with no complications to procedure   Diet on Discharge: Same as before.  Activity: as per instruction sheet.  Discharge to: Home with a responsible adult.  Follow up: 2-4 weeks       Please call the office at [phone] if you experience any weakness or loss of sensation, fever > 101.5, pain uncontrolled with oral medications, persistent nausea/vomiting/or diarrhea, redness or drainage from the incisions, or any other worrisome concerns. If physician on call was not reached or could not communicate with our office for any reason please go to the nearest emergency department

## 2023-09-14 NOTE — DISCHARGE INSTRUCTIONS

## 2023-09-14 NOTE — PLAN OF CARE
Pt and granddaughter verbalized understanding of discharge instructions. Bandaids x 3 to left knee c/d/i. Patient voiced no complaints, with no further questions at this time. Patient stood at side of bed, walked steps with no new motor deficits.  VSS on RA. Recovery care complete.

## 2023-09-14 NOTE — H&P
HPI  Patient presenting for Procedure(s) (LRB):  LEFT Genicular nerve block RN IV Sedation (Left)       No health changes since previous encounter    Past Medical History:   Diagnosis Date    COPD (chronic obstructive pulmonary disease)     NO HOME O2    Digestive disorder     Frequent headaches     Hypertension     Osteoarthritis 04/02/2019    Bilateral knees, ankles and feet    Severe obesity (BMI >= 40)     Sleep apnea     CPAP     Past Surgical History:   Procedure Laterality Date    BLADDER SUSPENSION      CATARACT EXTRACTION, BILATERAL      COLONOSCOPY N/A 12/3/2018    Procedure: COLONOSCOPY;  Surgeon: Mari Rader MD;  Location: [facility];  Service: Endoscopy;  Laterality: N/A;    ESOPHAGOGASTRODUODENOSCOPY N/A 12/31/2020    Procedure: ESOPHAGOGASTRODUODENOSCOPY (EGD);  Surgeon: Anthony Rodríguez MD;  Location: [facility];  Service: General;  Laterality: N/A;    HYSTERECTOMY      partial 2000    INJECTION OF ANESTHETIC AGENT INTO SACROILIAC JOINT Bilateral 11/30/2020    Procedure: Bilateral SIJ + Bilateral Piriformis + Bilateral GT Bursa Injection;  Surgeon: Thanh Diaz MD;  Location: [facility];  Service: Pain Management;  Laterality: Bilateral;    INJECTION OF JOINT Bilateral 11/30/2020    Procedure: Bilateral SIJ + Bilateral Piriformis + Bilateral GT Bursa Injection;  Surgeon: Thanh Diaz MD;  Location: [facility];  Service: Pain Management;  Laterality: Bilateral;    INJECTION OF PIRIFORMIS MUSCLE Bilateral 11/30/2020    Procedure: Bilateral SIJ + Bilateral Piriformis + Bilateral GT Bursa Injection;  Surgeon: Thanh Diaz MD;  Location: [facility];  Service: Pain Management;  Laterality: Bilateral;    ROBOT-ASSISTED LAPAROSCOPIC SLEEVE GASTRECTOMY USING DA EZEQUIEL XI N/A 3/2/2021    Procedure: XI ROBOTIC SLEEVE GASTRECTOMY;  Surgeon: Anthony Rodríguez MD;  Location: [facility];  Service: General;  Laterality: N/A;    SELECTIVE INJECTION OF ANESTHETIC AGENT AROUND LUMBAR SPINAL NERVE ROOT BY  TRANSFORAMINAL APPROACH Bilateral 1/4/2021    Procedure: Bilateral L5/S1 TF GISELA;  Surgeon: Thanh Diaz MD;  Location: [facility];  Service: Pain Management;  Laterality: Bilateral;    SELECTIVE INJECTION OF ANESTHETIC AGENT AROUND LUMBAR SPINAL NERVE ROOT BY TRANSFORAMINAL APPROACH Bilateral 3/23/2021    Procedure: Bilateral L5/S1 TF GISELA;  Surgeon: Thanh Diaz MD;  Location: [facility];  Service: Pain Management;  Laterality: Bilateral;    SELECTIVE INJECTION OF ANESTHETIC AGENT AROUND LUMBAR SPINAL NERVE ROOT BY TRANSFORAMINAL APPROACH Bilateral 10/5/2021    Procedure: Bilateral L5/S1 TF GISELA;  Surgeon: Thanh Diaz MD;  Location: [facility];  Service: Pain Management;  Laterality: Bilateral;    tonsilectomy      TOTAL KNEE ARTHROPLASTY Left 3/4/2020    Procedure: ARTHROPLASTY, KNEE, TOTAL;  Surgeon: Moy Connolly MD;  Location: [facility] OR;  Service: Orthopedics;  Laterality: Left;     Review of patient's allergies indicates:   Allergen Reactions    Penicillins Rash        No current facility-administered medications on file prior to encounter.     Current Outpatient Medications on File Prior to Encounter   Medication Sig Dispense Refill    acetaminophen (TYLENOL) 325 MG tablet Take 2 tablets (650 mg total) by mouth every 8 (eight) hours as needed. 60 tablet 2    aspirin (ECOTRIN) 81 MG EC tablet Take 1 tablet (81 mg total) by mouth once daily. 90 tablet 3    benzonatate (TESSALON) 100 MG capsule Take 1 capsule by mouth three times daily as needed (Patient taking differently: as needed.) 30 capsule 0    diclofenac sodium (VOLTAREN) 1 % Gel APPLY 2 GRAMS TOPICALLY THREE TIMES DAILY AS NEEDED 200 g 2    ergocalciferol, vitamin D2, (VITAMIN D ORAL) Take 2,000 Units by mouth once daily.      furosemide (LASIX) 40 MG tablet TAKE 1 TABLET BY MOUTH ONCE DAILY AS NEEDED FOR  EDEMA,  SWELLING  DUE  TO  FLUID 90 tablet 0    gabapentin (NEURONTIN) 300 MG capsule Take 2 capsules (600 mg total) by  mouth every evening. 60 capsule 5    linaCLOtide (LINZESS) 72 mcg Cap capsule Take 1 capsule (72 mcg total) by mouth before breakfast. 30 capsule 2    meloxicam (MOBIC) 15 MG tablet Take 1 tablet (15 mg total) by mouth once daily. 90 tablet 3    methocarbamoL (ROBAXIN) 750 MG Tab Take 1 tablet (750 mg total) by mouth 3 (three) times daily as needed. 90 tablet 3    metoprolol succinate (TOPROL-XL) 50 MG 24 hr tablet Take 1 tablet (50 mg total) by mouth once daily. 90 tablet 0    omeprazole (PRILOSEC) 40 MG capsule Take 1 capsule by mouth once daily 90 capsule 0    ondansetron (ZOFRAN) 8 MG tablet Take 1 tablet (8 mg total) by mouth every 8 (eight) hours as needed for Nausea. 20 tablet 0    potassium chloride SA (K-DUR,KLOR-CON) 20 MEQ tablet Take 1 tablet (20 mEq total) by mouth daily as needed (if taking lasix). 60 tablet 2    topiramate (TOPAMAX) 100 MG tablet Take 1 tablet (100 mg total) by mouth 2 (two) times daily. 180 tablet 2    albuterol (PROAIR HFA) 90 mcg/actuation inhaler Inhale 2 puffs into the lungs every 6 (six) hours as needed for Wheezing. Rescue 18 g 0    imipramine (TOFRANIL) 10 MG Tab Take 1 tablet (10 mg total) by mouth every evening. 90 tablet 1    levocetirizine (XYZAL) 5 MG tablet TAKE 1 TABLET BY MOUTH ONCE DAILY IN THE EVENING 90 tablet 0    meclizine (ANTIVERT) 25 mg tablet Take 1 tablet (25 mg total) by mouth 3 (three) times daily as needed for Dizziness. 30 tablet 0    montelukast (SINGULAIR) 10 mg tablet Take 1 tablet (10 mg total) by mouth every evening. Allergies 30 tablet 2    mupirocin (BACTROBAN) 2 % ointment Apply topically 3 (three) times daily. 30 g 0    nitroGLYCERIN (NITROSTAT) 0.4 MG SL tablet Place 1 tablet (0.4 mg total) under the tongue every 5 (five) minutes as needed for Chest pain. 30 tablet 0    nystatin (MYCOSTATIN) cream APPLY  CREAM TOPICALLY TO AFFECTED AREA TWICE DAILY 30 g 0        PMHx, PSHx, Allergies, Medications reviewed in epic    ROS negative except pain  "complaints in HPI    OBJECTIVE:    /67 (BP Location: Right arm, Patient Position: Sitting)   Pulse 72   Temp 97.7 °F (36.5 °C) (Temporal)   Resp 20   Ht 5' 3" (1.6 m)   Wt 108 kg (238 lb 3.3 oz)   SpO2 99%   Breastfeeding No   BMI 42.20 kg/m²     PHYSICAL EXAMINATION:    GENERAL: Well appearing, in no acute distress, alert and oriented x3.  PSYCH:  Mood and affect appropriate.  SKIN: Skin color, texture, turgor normal, no rashes or lesions which will impact the procedure.  CV: RRR with palpation of the radial artery.  PULM: No evidence of respiratory difficulty, symmetric chest rise. Clear to auscultation.  NEURO: Cranial nerves grossly intact.    Plan:    Proceed with procedure as planned Procedure(s) (LRB):  LEFT Genicular nerve block RN IV Sedation (Left)    Thanh Diaz MD  09/14/2023            "

## 2023-09-14 NOTE — OP NOTE
Procedure Note:    Left  Geniculate nerve block under fluoroscopy                               Surgeon: Thanh Diaz MD    Assistant: None    SEDATION: Conscious sedation provided by M.D    The patient was monitored with continuous pulse oximetry, EKG, and intermittent blood pressure monitors, immediately prior to administration of sedation.  The patient was hemodynamically stable throughout the entire process was responsive to voice, and breathing spontaneously.  Supplemental O2 was provided at 2L/min via nasal cannula.  Patient was comfortable for the duration of the procedure.     There was a total of 2mg IV Midazolam and 75mcg Fentanyl titrated for the procedure    Total sedation time was >10minutes and <20minutes      Pre-Op Diagnosis:    Status post total knee replacement using cement, left [Z96.652]  Posterior left knee pain [M25.562]    Post-Op Diagnosis:   Status post total knee replacement using cement, left [Z96.652]  Posterior left knee pain [M25.562]    EBL: None    Complications: None    Specimens: None    Description of procedure:    After written consent was obtained, patient placed in supine position.  The area over the medial and lateral aspect of the superior epi-condyle of the femur and the medial tibial metaphysis were prepped with chlorhexidine.  The area was draped in the usual sterile fashion.  Approximately 8 mL total 1% lidocaine was infiltrated into the skin overlying the 3 predetermined entry points. A 22 gauge spinal needle was then advanced under fluoroscopy in the AP and lateral views into the positions of the geniculate nerves at these levels. After negative aspiration and no paresthesias there was injection of 3-3.5mL of a mixture with 40mg Depo-Medrol and 9mL 0.25% bupivacaine into each of these 3 areas for a total volume of 10 mL. Needle was withdrawn and a sterile band-aid applied to the skin.    Patient tolerated the procedure well, and was reporting improvement of pain  symptoms after the injection.  She was discharged from the clinic in stable condition.

## 2023-09-19 ENCOUNTER — TELEPHONE (OUTPATIENT)
Dept: INTERNAL MEDICINE | Facility: CLINIC | Age: 68
End: 2023-09-19
Payer: MEDICARE

## 2023-09-19 ENCOUNTER — OFFICE VISIT (OUTPATIENT)
Dept: OPHTHALMOLOGY | Facility: CLINIC | Age: 68
End: 2023-09-19
Payer: MEDICARE

## 2023-09-19 ENCOUNTER — PATIENT MESSAGE (OUTPATIENT)
Dept: OPHTHALMOLOGY | Facility: CLINIC | Age: 68
End: 2023-09-19

## 2023-09-19 ENCOUNTER — PATIENT MESSAGE (OUTPATIENT)
Dept: INTERNAL MEDICINE | Facility: CLINIC | Age: 68
End: 2023-09-19
Payer: MEDICARE

## 2023-09-19 ENCOUNTER — OFFICE VISIT (OUTPATIENT)
Dept: PHYSICAL MEDICINE AND REHAB | Facility: CLINIC | Age: 68
End: 2023-09-19
Payer: MEDICARE

## 2023-09-19 ENCOUNTER — TELEPHONE (OUTPATIENT)
Dept: OPHTHALMOLOGY | Facility: CLINIC | Age: 68
End: 2023-09-19

## 2023-09-19 VITALS
BODY MASS INDEX: 42.17 KG/M2 | WEIGHT: 238 LBS | SYSTOLIC BLOOD PRESSURE: 124 MMHG | RESPIRATION RATE: 13 BRPM | DIASTOLIC BLOOD PRESSURE: 83 MMHG | HEART RATE: 64 BPM | HEIGHT: 63 IN

## 2023-09-19 DIAGNOSIS — H52.4 PRESBYOPIA: ICD-10-CM

## 2023-09-19 DIAGNOSIS — Z96.1 PSEUDOPHAKIA OF BOTH EYES: Primary | ICD-10-CM

## 2023-09-19 DIAGNOSIS — H25.043 POSTERIOR SUBCAPSULAR AGE-RELATED CATARACT OF BOTH EYES: ICD-10-CM

## 2023-09-19 DIAGNOSIS — M54.16 LUMBAR RADICULOPATHY: ICD-10-CM

## 2023-09-19 PROCEDURE — 99999 PR PBB SHADOW E&M-EST. PATIENT-LVL III: CPT | Mod: PBBFAC,,, | Performed by: PHYSICAL MEDICINE & REHABILITATION

## 2023-09-19 PROCEDURE — 99999 PR PBB SHADOW E&M-EST. PATIENT-LVL III: CPT | Mod: PBBFAC,,, | Performed by: OPTOMETRIST

## 2023-09-19 PROCEDURE — 1159F MED LIST DOCD IN RCRD: CPT | Mod: CPTII,S$GLB,, | Performed by: OPTOMETRIST

## 2023-09-19 PROCEDURE — 99999 PR PBB SHADOW E&M-EST. PATIENT-LVL III: ICD-10-PCS | Mod: PBBFAC,,, | Performed by: OPTOMETRIST

## 2023-09-19 PROCEDURE — 92015 PR REFRACTION: ICD-10-PCS | Mod: S$GLB,,, | Performed by: OPTOMETRIST

## 2023-09-19 PROCEDURE — 95908 PR NERVE CONDUCTION STUDY; 3-4 STUDIES: ICD-10-PCS | Mod: S$GLB,,, | Performed by: PHYSICAL MEDICINE & REHABILITATION

## 2023-09-19 PROCEDURE — 92014 COMPRE OPH EXAM EST PT 1/>: CPT | Mod: S$GLB,,, | Performed by: OPTOMETRIST

## 2023-09-19 PROCEDURE — 92014 PR EYE EXAM, EST PATIENT,COMPREHESV: ICD-10-PCS | Mod: S$GLB,,, | Performed by: OPTOMETRIST

## 2023-09-19 PROCEDURE — 1159F PR MEDICATION LIST DOCUMENTED IN MEDICAL RECORD: ICD-10-PCS | Mod: CPTII,S$GLB,, | Performed by: OPTOMETRIST

## 2023-09-19 PROCEDURE — 95885 PR MUSC TST DONE W/NERV TST LIM: ICD-10-PCS | Mod: S$GLB,,, | Performed by: PHYSICAL MEDICINE & REHABILITATION

## 2023-09-19 PROCEDURE — 1160F PR REVIEW ALL MEDS BY PRESCRIBER/CLIN PHARMACIST DOCUMENTED: ICD-10-PCS | Mod: CPTII,S$GLB,, | Performed by: OPTOMETRIST

## 2023-09-19 PROCEDURE — 99499 UNLISTED E&M SERVICE: CPT | Mod: S$GLB,,, | Performed by: PHYSICAL MEDICINE & REHABILITATION

## 2023-09-19 PROCEDURE — 95885 MUSC TST DONE W/NERV TST LIM: CPT | Mod: S$GLB,,, | Performed by: PHYSICAL MEDICINE & REHABILITATION

## 2023-09-19 PROCEDURE — 1160F RVW MEDS BY RX/DR IN RCRD: CPT | Mod: CPTII,S$GLB,, | Performed by: OPTOMETRIST

## 2023-09-19 PROCEDURE — 92015 DETERMINE REFRACTIVE STATE: CPT | Mod: S$GLB,,, | Performed by: OPTOMETRIST

## 2023-09-19 PROCEDURE — 99999 PR PBB SHADOW E&M-EST. PATIENT-LVL III: ICD-10-PCS | Mod: PBBFAC,,, | Performed by: PHYSICAL MEDICINE & REHABILITATION

## 2023-09-19 PROCEDURE — 95908 NRV CNDJ TST 3-4 STUDIES: CPT | Mod: S$GLB,,, | Performed by: PHYSICAL MEDICINE & REHABILITATION

## 2023-09-19 PROCEDURE — 99499 NO LOS: ICD-10-PCS | Mod: S$GLB,,, | Performed by: PHYSICAL MEDICINE & REHABILITATION

## 2023-09-19 NOTE — PROGRESS NOTES
HPI     Annual Exam            Comments:   Vision changes since last eye exam?: yes  Any eye pain today: no  Other ocular symptoms: no  Interested in contact lens fitting today? yes           Last edited by Keiko Iverson MA on 9/19/2023 12:52 PM.            Assessment /Plan     For exam results, see Encounter Report.    Pseudophakia of both eyes  Posterior subcapsular age-related cataract of both eyes  Stable OU  Visually significant PCO OS, mild OD  Consult [facility]    Presbyopia  Hold spec and cls Rx      RTC next available with [facility] for YAG eval OS or PRN  Discussed above and all questions were answered.

## 2023-09-19 NOTE — PROGRESS NOTES
[facility]   [address]  [place] [address]  Phone: [phone]        Full Name: Mariel Becerril YOB: 1955  Patient ID: [id number]      Visit Date: 9/19/2023 11:49  Age: 67 Years 10 Months Old  Examining Physician: Kerry Faustin M.D.  Referring Physician: ELFEGO Lockwood  Reason for Referral: lower ext        Chief Complaint   Patient presents with    Back Pain     Into legs       HPI: This is a 67 y.o.  female being seen in clinic today for re-evaluation of chronic low back and knee pain with limited ROM and persistent numbness into her left toes. She was last seen for EMG in 2020-dx with lumbar radic.   With increased activity her symptoms can worsen.  Rest, topical agents, and meds provide some relief.  She had a recent spinal injection  with IPM  and traction /decompression as well.    History obtained from patient    Past family, medical, social, and surgical history reviewed in chart    Review of Systems:     General- denies lethargy, weight change, fever, chills  Head/neck- denies swallowing difficulties  ENT- denies hearing changes  Cardiovascular-denies chest pain  Pulmonary- +/-shortness of breath  GI- denies constipation or bowel incontinence  - denies bladder incontinence  Skin- denies wounds or rashes  Musculoskeletal- +/-weakness, +pain  Neurologic- +numbness and tingling  Psychiatric- denies depressive or psychotic features, denies anxiety  Lymphatic-denies swelling  Endocrine- denies hypoglycemic symptoms/DM history  All other pertinent systems negative     Physical Examination:  General: Well developed, well nourished female, NAD  HEENT:NCAT EOMI bilaterally   Pulmonary:Normal respirations    Spinal Examination: CERVICAL  Active ROM is within normal limits.  Inspection: No deformity of spinal alignment.    Spinal Examination: LUMBAR or THORACIC  Active ROM is limited at endranges  Inspection: No deformity of spinal alignment.    Slr neg  bilaterally    Bilateral Upper and Lower Extremities:  Pulses are 2+ at radial bilaterally.  Shoulder/Elbow/Wrist/Hand ROM   Hip/Knee/Ankle ROM wnl, surgical scar at knee  Bilateral Extremities show normal capillary refill.  No signs of cyanosis, rubor, edema, skin changes, or dysvascular changes of appendages.  Nails appear intact.    Neurological Exam:  Cranial Nerves:  II-XII grossly intact    Manual Muscle Testing: (Motor 5=normal)  4/5 strength bilateral lower extremities    No focal atrophy is noted of either lower extremity.    Bilateral Reflexes:  No clonus at knee or ankle.    Sensation: tested to light touch  - intact in legs  Gait: antalgic favoring left leg    Entire procedure explained to patient prior to proceeding.  Verbal consent obtained    SNC      Nerve / Sites Rec. Site Onset Lat Peak Lat Amp Segments Distance Velocity     ms ms µV  mm m/s   L Sural - Ankle (Calf)      Calf Ankle 3.0 3.6 9.9 Calf - Ankle 140 46   L Superficial peroneal - Ankle      Lat leg Ankle 2.0 2.7 6.9 Lat leg - Ankle 140 69       MNC      Nerve / Sites Muscle Latency Amplitude Duration Rel Amp Segments Distance Lat Diff Velocity     ms mV ms %  mm ms m/s   L Peroneal - EDB      Ankle EDB 3.9 2.8 5.8 100 Ankle - EDB 80        Fibular head EDB 10.3 1.9 7.1 68 Fibular head - Ankle 270 6.5 42      Pop fossa EDB 11.8 2.3 5.6 124 Pop fossa - Fibular head 60 1.5 41   L Tibial - AH      Ankle AH 5.4 5.1 3.6 100 Ankle - Ankle 80        Pop fossa AH     Pop fossa - Ankle 400         EMG         EMG Summary Table     Spontaneous MUAP Recruitment   Muscle IA Fib PSW Fasc Other Dur. Dur Amp Dur Polys Pattern Effort   L. Rectus femoris N None None None . _NFT_ _NFT_ N N N N Max   L. Extensor digitorum brevis N None None None . _NFT_ _NFT_ N N 1+ Reduced Sub Max   L. Abductor hallucis Incr 1+ None None . _NFT_ _NFT_ N Sl Incr 1+ sl red Sub Max   R. Extensor digitorum brevis N None None None . _NFT_ _NFT_ N Sl Incr 1+ Reduced Max   R.  Abductor hallucis N None None None . _NFT_ _NFT_ N N 1+ sl red Max                                   INTERPRETATION  -Left sural sensory nerve  conduction study showed normal peak latency and amplitude  -Left peroneal motor nerve conduction study showed normal latency, amplitude, and conduction velocity  -Left tibial motor nerve conduction study showed normal latency, amplitude, and conduction velocity  -Needle EMG examination performed to above mentioned muscles     IMPRESSION  1. ABNORMAL Study  2. There is electrodiagnostic evidence of an ACUTE on CHRONIC radiculopathy of the LEFT S1 nerve root and a CHRONIC radiculopathy of the BILATERAL L5 and and RIGHT S1 nerve roots    PLAN  1. Follow up with referring provider: Carmela Carrasco  2. Handouts on lumbar radic, provided  3. This study is good for one year. If symptoms worsen or do not improve, please re-consult.    Kerry Faustin M.D.  Physical Medicine and Rehab

## 2023-09-19 NOTE — TELEPHONE ENCOUNTER
Pt states she saw Kerry Faustin today, recommends pt continue spine decompression at [facility], pt is to start therapy soon as well. Pt inquiring if she should do spine decompression and therapy at the same time. Advised pt message would be sent to provider.    ----- Message from Alvina Carlson sent at 9/19/2023  3:29 PM CDT -----  Regarding: speak to nurse  Mrs ovalle calling wanted to speak to nurse about therapy and also spine center decompression , she can be re back at [phone] or [phone]

## 2023-10-09 ENCOUNTER — TELEPHONE (OUTPATIENT)
Dept: ORTHOPEDICS | Facility: CLINIC | Age: 68
End: 2023-10-09
Payer: MEDICARE

## 2023-10-09 DIAGNOSIS — M25.562 POSTERIOR LEFT KNEE PAIN: ICD-10-CM

## 2023-10-09 DIAGNOSIS — M17.11 ARTHRITIS OF KNEE, RIGHT: ICD-10-CM

## 2023-10-09 DIAGNOSIS — Z96.652 HISTORY OF TOTAL LEFT KNEE REPLACEMENT: Primary | ICD-10-CM

## 2023-10-09 DIAGNOSIS — Z96.652 STATUS POST TOTAL KNEE REPLACEMENT USING CEMENT, LEFT: ICD-10-CM

## 2023-10-09 RX ORDER — DICLOFENAC SODIUM 10 MG/G
GEL TOPICAL
Qty: 200 G | Refills: 2 | OUTPATIENT
Start: 2023-10-09

## 2023-10-09 NOTE — TELEPHONE ENCOUNTER
----- Message from Elva Thakur sent at 10/9/2023 11:56 AM CDT -----  Contact: Mariel Borden is calling in regards to getting a refill on gel and cream for knee and back. Pt stated being in pain in left knee and lower back. Pt stated that several messages was sent and fax sent to Dr. Enriquez but haven't gotten a reply back. Pt stated being out and is completely out. Please call back at [phone]     [facility]  [phone]                               Thanks  KT

## 2023-10-09 NOTE — TELEPHONE ENCOUNTER
Good Morning/Afternoon,     Pt is requesting for a refill of: Diclofenac sodium 1% Gel  Last filed: 8/30/23  Last encounter: 8/30/23  Up coming apt: 12/6/23   Pharmacy: [id number]  Is this something you can do?      Frances Mustafa  Medical Assistant

## 2023-10-09 NOTE — TELEPHONE ENCOUNTER
----- Message from Ivonne Ignacio sent at 10/9/2023 12:12 PM CDT -----  Contact: Mariel  .Patient is calling to speak with the nurse regarding order . Reports needing another referral sent to [facility] . Please give patient a call back at .[phone]

## 2023-10-11 ENCOUNTER — TELEPHONE (OUTPATIENT)
Dept: PAIN MEDICINE | Facility: CLINIC | Age: 68
End: 2023-10-11
Payer: MEDICARE

## 2023-10-11 ENCOUNTER — TELEPHONE (OUTPATIENT)
Dept: ORTHOPEDICS | Facility: CLINIC | Age: 68
End: 2023-10-11
Payer: MEDICARE

## 2023-10-11 ENCOUNTER — TELEPHONE (OUTPATIENT)
Dept: INTERNAL MEDICINE | Facility: CLINIC | Age: 68
End: 2023-10-11
Payer: MEDICARE

## 2023-10-11 NOTE — TELEPHONE ENCOUNTER
----- Message from Soila Hurtado sent at 10/11/2023  2:56 PM CDT -----  Name of Who is Calling:Patient           What is the request in detail:Patient is completely out of medication and is requesting refills. Pharmacy has sent request over several times and patient has also made several attempts to contact provider.           Can the clinic reply by [facility]:no           What Number to Call Back if not in [facility]: [phone]

## 2023-10-11 NOTE — TELEPHONE ENCOUNTER
Returned the patient's phone call in regards to their message. Patient states that they need a refill on their cream from [facility]. Patient states that [facility] has been sending a fax to get a refill. Informed the patient that we have not received anything from them. Asked the patient to make sure that they have the correct fax number. Patient states that they will give [facility] a phone call back to give them the correct fax number. Verbalized understanding.            ----- Message from Soila Hurtado sent at 10/11/2023  3:00 PM CDT -----  Name of Who is Calling:Patient           What is the request in detail:Patient is completely out of medication and is requesting refills. Pharmacy has sent request over several times and patient has also made several attempts to contact provider.           Can the clinic reply by [facility]:no           What Number to Call Back if not in [facility]: [phone]

## 2023-10-11 NOTE — TELEPHONE ENCOUNTER
Attempted to return call, left vm.     ----- Message from Soila Hurtado sent at 10/11/2023  3:01 PM CDT -----  Name of Who is Calling:Patient           What is the request in detail:Patient is completely out of medication and is requesting refills. Pharmacy has sent request over several times and patient has also made several attempts to contact provider.           Can the clinic reply by [facility]:no           What Number to Call Back if not in [facility]: [phone]

## 2023-10-12 DIAGNOSIS — Z96.652 STATUS POST TOTAL KNEE REPLACEMENT USING CEMENT, LEFT: ICD-10-CM

## 2023-10-12 DIAGNOSIS — M25.562 POSTERIOR LEFT KNEE PAIN: ICD-10-CM

## 2023-10-12 RX ORDER — DICLOFENAC SODIUM 10 MG/G
GEL TOPICAL
Qty: 200 G | Refills: 2 | Status: SHIPPED | OUTPATIENT
Start: 2023-10-12

## 2023-10-12 NOTE — TELEPHONE ENCOUNTER
----- Message from Amanda Nelson sent at 10/12/2023  9:59 AM CDT -----  Contact: [facility]  .Type:  RX Refill Request    Who Called: [facility]   Refill or New Rx: refill   RX Name and Strength: diclofenac sodium (VOLTAREN) 1 % Gel  How is the patient currently taking it? (ex. 1XDay):as prescribed   Is this a 30 day or 90 day RX: 30  Preferred Pharmacy with phone number: .  [facility]   Fax# [phone]  Phone # [phone]    Local or Mail Order:    Ordering Provider: Dr. Enriquez  Would the patient rather a call back or a response via [facility]?  Call   Best Call Back Number: .[phone]             Thanks

## 2023-10-12 NOTE — TELEPHONE ENCOUNTER
Pt is requesting for a refill of: diclofenac sodium (VOLTAREN) 1 % Gel  Last filed:8/30/23  Last encounter: 8/30/23  Last proc: 9/14/23  Up coming apt: 12/06/23  Pharmacy:[facility] [address] [place] [address] [id number]  Is this something you can do?

## 2023-10-23 ENCOUNTER — OFFICE VISIT (OUTPATIENT)
Dept: OPHTHALMOLOGY | Facility: CLINIC | Age: 68
End: 2023-10-23
Payer: MEDICARE

## 2023-10-23 DIAGNOSIS — H26.492 PCO (POSTERIOR CAPSULAR OPACIFICATION), LEFT: Primary | ICD-10-CM

## 2023-10-23 DIAGNOSIS — Z96.1 PSEUDOPHAKIA OF BOTH EYES: ICD-10-CM

## 2023-10-23 DIAGNOSIS — H04.129 DRY EYE: ICD-10-CM

## 2023-10-23 PROCEDURE — 99999 PR PBB SHADOW E&M-EST. PATIENT-LVL III: CPT | Mod: PBBFAC,,, | Performed by: OPHTHALMOLOGY

## 2023-10-23 PROCEDURE — 1160F PR REVIEW ALL MEDS BY PRESCRIBER/CLIN PHARMACIST DOCUMENTED: ICD-10-PCS | Mod: CPTII,S$GLB,, | Performed by: OPHTHALMOLOGY

## 2023-10-23 PROCEDURE — 66821 PR DISCISSION,2ND CATARACT,LASER: ICD-10-PCS | Mod: LT,S$GLB,, | Performed by: OPHTHALMOLOGY

## 2023-10-23 PROCEDURE — 1159F PR MEDICATION LIST DOCUMENTED IN MEDICAL RECORD: ICD-10-PCS | Mod: CPTII,S$GLB,, | Performed by: OPHTHALMOLOGY

## 2023-10-23 PROCEDURE — 66821 AFTER CATARACT LASER SURGERY: CPT | Mod: LT,S$GLB,, | Performed by: OPHTHALMOLOGY

## 2023-10-23 PROCEDURE — 92004 COMPRE OPH EXAM NEW PT 1/>: CPT | Mod: 25,S$GLB,, | Performed by: OPHTHALMOLOGY

## 2023-10-23 PROCEDURE — 99999 PR PBB SHADOW E&M-EST. PATIENT-LVL III: ICD-10-PCS | Mod: PBBFAC,,, | Performed by: OPHTHALMOLOGY

## 2023-10-23 PROCEDURE — 1160F RVW MEDS BY RX/DR IN RCRD: CPT | Mod: CPTII,S$GLB,, | Performed by: OPHTHALMOLOGY

## 2023-10-23 PROCEDURE — 92004 PR EYE EXAM, NEW PATIENT,COMPREHESV: ICD-10-PCS | Mod: 25,S$GLB,, | Performed by: OPHTHALMOLOGY

## 2023-10-23 PROCEDURE — 1159F MED LIST DOCD IN RCRD: CPT | Mod: CPTII,S$GLB,, | Performed by: OPHTHALMOLOGY

## 2023-10-23 RX ORDER — PREDNISOLONE ACETATE 10 MG/ML
1 SUSPENSION/ DROPS OPHTHALMIC 4 TIMES DAILY
Qty: 5 ML | Refills: 1 | Status: SHIPPED | OUTPATIENT
Start: 2023-10-23 | End: 2023-10-30

## 2023-10-23 NOTE — PROGRESS NOTES
SUBJECTIVE  Mariel Becerril is 67 y.o. female  Uncorrected distance visual acuity was 20/20 -2 in the right eye and 20/25 -1 in the left eye. Corrected distance visual acuity was not recorded in the right eye and 20/40 -1 in the left eye.   Chief Complaint   Patient presents with    Blurred Vision     Pt here for blurry vision eval per DNL. Pt says she has trouble seeing up close, but sees very well at a distance. No pain or discomfort.           HPI     Blurred Vision     Additional comments: Pt here for blurry vision eval per DNL. Pt says she   has trouble seeing up close, but sees very well at a distance. No pain or   discomfort.            Comments    1. PCIOL OD + 17.0 SN60WF 02/19/20  PCIOL OS +17.0 SN60WF 1/22/20  2. SCTL wearer/wears OTC readers on top    Patient last wore SCTL on 01/05/20            Last edited by Jose Cohn MA on 10/23/2023 10:22 AM.         Assessment /Plan :  1. PCO (posterior capsular opacification), left Yag Capsulotomy Procedure:    67 y.o. year old patient experiencing a symptomatic decrease in vision OS with inability to perform activities of daily living including reading.    SLE: Posterior intraocular lens implant with capsular fibrosis     Risks, benefits and alternatives of Yag Laser Capsulotomy discussed. Including risks of retinal detachment (1-3%), macular edema, dislocated implant, pain, inflammation elevated intraocular pressure and vision loss. Consent signed.    Medications given:  Apraclonidine gtt  Tetracaine gtt    Laser energy settings:  93.1  Total mJ   93.1  bursts    Post Laser IOP 9 mmHg    IMPRESSION:  Yag Capsulotomy OS well tolerated    PLAN:  1. Prednisolone 1% QID over 1 week  2. Postoperative precautions discussed  3. RTC 3-4 weeks or prn     2. Pseudophakia of both eyes  -- Condition stable, no therapeutic change required. Monitoring routinely.     3. Dry eye  -- Condition stable, no therapeutic change required. Monitoring routinely.

## 2023-10-27 ENCOUNTER — TELEPHONE (OUTPATIENT)
Dept: INTERNAL MEDICINE | Facility: CLINIC | Age: 68
End: 2023-10-27
Payer: MEDICARE

## 2023-10-27 NOTE — TELEPHONE ENCOUNTER
I called the pt and she will drop off the copy of the forms for work that was completed last month for comparison. Juan Mi//kah

## 2023-10-30 ENCOUNTER — TELEPHONE (OUTPATIENT)
Dept: OPHTHALMOLOGY | Facility: CLINIC | Age: 68
End: 2023-10-30
Payer: MEDICARE

## 2023-10-30 NOTE — TELEPHONE ENCOUNTER
Returned patients call she is going to come in now to see Dr Perez      ----- Message from Tonie Santiago sent at 10/30/2023 12:55 PM CDT -----  Contact: [phone]  Pt had a laser procedure on 10/23/23 and wants to know how long she will be seeing black dots. She would like a call to discuss. Thanks

## 2023-10-31 ENCOUNTER — TELEPHONE (OUTPATIENT)
Dept: INTERNAL MEDICINE | Facility: CLINIC | Age: 68
End: 2023-10-31
Payer: MEDICARE

## 2023-10-31 NOTE — TELEPHONE ENCOUNTER
I called and assisted the pt with scheduling a virtual visit tomorrow to discuss work form per Estrellita PHIPPS . [email or web address]//[email or web address]

## 2023-11-01 ENCOUNTER — OFFICE VISIT (OUTPATIENT)
Dept: OPHTHALMOLOGY | Facility: CLINIC | Age: 68
End: 2023-11-01
Payer: MEDICARE

## 2023-11-01 DIAGNOSIS — Z96.1 PSEUDOPHAKIA OF BOTH EYES: ICD-10-CM

## 2023-11-01 DIAGNOSIS — H43.392 VITREOUS FLOATERS OF LEFT EYE: ICD-10-CM

## 2023-11-01 DIAGNOSIS — H52.4 PRESBYOPIA: ICD-10-CM

## 2023-11-01 DIAGNOSIS — H26.492 PCO (POSTERIOR CAPSULAR OPACIFICATION), LEFT: Primary | ICD-10-CM

## 2023-11-01 PROCEDURE — 1159F PR MEDICATION LIST DOCUMENTED IN MEDICAL RECORD: ICD-10-PCS | Mod: CPTII,S$GLB,, | Performed by: OPTOMETRIST

## 2023-11-01 PROCEDURE — 99024 PR POST-OP FOLLOW-UP VISIT: ICD-10-PCS | Mod: S$GLB,,, | Performed by: OPTOMETRIST

## 2023-11-01 PROCEDURE — 1160F RVW MEDS BY RX/DR IN RCRD: CPT | Mod: CPTII,S$GLB,, | Performed by: OPTOMETRIST

## 2023-11-01 PROCEDURE — 1160F PR REVIEW ALL MEDS BY PRESCRIBER/CLIN PHARMACIST DOCUMENTED: ICD-10-PCS | Mod: CPTII,S$GLB,, | Performed by: OPTOMETRIST

## 2023-11-01 PROCEDURE — 99999 PR PBB SHADOW E&M-EST. PATIENT-LVL III: ICD-10-PCS | Mod: PBBFAC,,, | Performed by: OPTOMETRIST

## 2023-11-01 PROCEDURE — 99024 POSTOP FOLLOW-UP VISIT: CPT | Mod: S$GLB,,, | Performed by: OPTOMETRIST

## 2023-11-01 PROCEDURE — 99999 PR PBB SHADOW E&M-EST. PATIENT-LVL III: CPT | Mod: PBBFAC,,, | Performed by: OPTOMETRIST

## 2023-11-01 PROCEDURE — 1159F MED LIST DOCD IN RCRD: CPT | Mod: CPTII,S$GLB,, | Performed by: OPTOMETRIST

## 2023-11-01 NOTE — PROGRESS NOTES
HPI     Contact Lens Fit            Comments: Pt states [facility] is really good, can't see close up.  Pt says started seeing floaters in OS after SX          Comments    1. PCIOL OD + 17.0 SN60WF 02/19/20  PCIOL OS +17.0 SN60WF 1/22/20  2. SCTL wearer/wears OTC readers on top    Patient last wore SCTL on 01/05/20            Last edited by Radha Loza on 11/1/2023  8:53 AM.            Assessment /Plan     For exam results, see Encounter Report.    PCO (posterior capsular opacification), left  Pseudophakia of both eyes  S/p yag cap OS  Doing well  Normal IOP and no iritis    Vitreous floaters, left  Optos done today  No tears, holes or RD  The nature of posterior vitreous detachment/floaters was discussed with the patient.  Signs and symptoms of retinal detachment were discussed with patient.   Return to clinic as soon as possible (same day) if you notice any new floaters, flashes of light, curtain/veil over your vision from any direction, or any change in vision.    Presbyopia      Contact Lens Prescription (11/1/2023)          Brand Base Curve Diameter Sphere    Right No lens 8.4      Left Air Optix Night and Day 8.4 13.8 +2.50      Expiration Date: 11/1/2024    Replacement: Monthly    Wearing Schedule: Daily Wear          RTC 1 yr for dilated eye exam or PRN if any problems.   Discussed above and answered questions.

## 2023-11-02 ENCOUNTER — OFFICE VISIT (OUTPATIENT)
Dept: INTERNAL MEDICINE | Facility: CLINIC | Age: 68
End: 2023-11-02
Payer: MEDICARE

## 2023-11-02 ENCOUNTER — TELEPHONE (OUTPATIENT)
Dept: INTERNAL MEDICINE | Facility: CLINIC | Age: 68
End: 2023-11-02
Payer: MEDICARE

## 2023-11-02 ENCOUNTER — PATIENT MESSAGE (OUTPATIENT)
Dept: OPTOMETRY | Facility: CLINIC | Age: 68
End: 2023-11-02
Payer: MEDICARE

## 2023-11-02 DIAGNOSIS — M54.16 LUMBAR RADICULOPATHY: Primary | ICD-10-CM

## 2023-11-02 DIAGNOSIS — M46.1 SACROILIITIS: ICD-10-CM

## 2023-11-02 DIAGNOSIS — M25.561 CHRONIC PAIN OF BOTH KNEES: ICD-10-CM

## 2023-11-02 DIAGNOSIS — G89.29 CHRONIC PAIN OF BOTH KNEES: ICD-10-CM

## 2023-11-02 DIAGNOSIS — M25.562 CHRONIC PAIN OF BOTH KNEES: ICD-10-CM

## 2023-11-02 PROCEDURE — 1159F PR MEDICATION LIST DOCUMENTED IN MEDICAL RECORD: ICD-10-PCS | Mod: CPTII,95,, | Performed by: NURSE PRACTITIONER

## 2023-11-02 PROCEDURE — 99212 PR OFFICE/OUTPT VISIT, EST, LEVL II, 10-19 MIN: ICD-10-PCS | Mod: 95,,, | Performed by: NURSE PRACTITIONER

## 2023-11-02 PROCEDURE — 1160F RVW MEDS BY RX/DR IN RCRD: CPT | Mod: CPTII,95,, | Performed by: NURSE PRACTITIONER

## 2023-11-02 PROCEDURE — 1159F MED LIST DOCD IN RCRD: CPT | Mod: CPTII,95,, | Performed by: NURSE PRACTITIONER

## 2023-11-02 PROCEDURE — 99212 OFFICE O/P EST SF 10 MIN: CPT | Mod: 95,,, | Performed by: NURSE PRACTITIONER

## 2023-11-02 PROCEDURE — 1160F PR REVIEW ALL MEDS BY PRESCRIBER/CLIN PHARMACIST DOCUMENTED: ICD-10-PCS | Mod: CPTII,95,, | Performed by: NURSE PRACTITIONER

## 2023-11-02 NOTE — PROGRESS NOTES
Subjective     Patient ID: Mariel Becerril is a 67 y.o. female.    Chief Complaint: No chief complaint on file.    The patient location is: [place]  The chief complaint leading to consultation is: forms completed     Visit type: audiovisual    Face to Face time with patient: 10 minutes of total time spent on the encounter, which includes face to face time and non-face to face time preparing to see the patient (eg, review of tests), Obtaining and/or reviewing separately obtained history, Documenting clinical information in the electronic or other health record, Independently interpreting results (not separately reported) and communicating results to the patient/family/caregiver, or Care coordination (not separately reported).         Each patient to whom he or she provides medical services by telemedicine is:  (1) informed of the relationship between the physician and patient and the respective role of any other health care provider with respect to management of the patient; and (2) notified that he or she may decline to receive medical services by telemedicine and may withdraw from such care at any time.    Notes:      Patient presents for follow up regarding regarding disability insurance claims form completed.   Reports forms are completed every 6 months.  Needed for accommodations for therapy regarding chronic knee pain and lumbar radiculopathy.      Back Pain  This is a recurrent problem. The problem occurs daily. The pain is present in the lumbar spine. The quality of the pain is described as aching. The pain radiates to the left knee. The pain is moderate. The pain is The same all the time. Stiffness is present In the morning. Associated symptoms include bowel incontinence, headaches, leg pain, numbness, perianal numbness, tingling and weakness. Pertinent negatives include no abdominal pain, bladder incontinence, chest pain, dysuria, fever, paresis, paresthesias, pelvic pain or weight loss. The treatment  provided moderate relief.     Review of Systems   Constitutional:  Negative for fever and weight loss.   Cardiovascular:  Negative for chest pain.   Gastrointestinal:  Positive for bowel incontinence. Negative for abdominal pain.   Genitourinary:  Negative for bladder incontinence, dysuria, hematuria and pelvic pain.   Musculoskeletal:  Positive for back pain and leg pain.   Neurological:  Positive for tingling, weakness, numbness and headaches. Negative for paresthesias.          Objective     Physical Exam  Pulmonary:      Effort: Pulmonary effort is normal.   Neurological:      Mental Status: She is alert.   Psychiatric:         Mood and Affect: Mood normal.            Assessment and Plan     1. Lumbar radiculopathy  Comments:  Forms completed.  Continue therapy and treatment plan per specialists.   Overview:  acute on chronic at left L5 and S1, chronic at right L5 and S1      2. Chronic pain of both knees  Comments:  Forms completed.  Continue therapy and treatment plan per specialists.     3. Sacroiliitis  Comments:  Forms completed.  Continue therapy and treatment plan per specialists.              No follow-ups on file.    Answers submitted by the patient for this visit:  Back Pain Questionnaire (Submitted on 11/2/2023)  Chief Complaint: Back pain  genital pain: No

## 2023-11-02 NOTE — TELEPHONE ENCOUNTER
I called the pt and informed her that the FMLA forms has bee faxed to [employer] at [phone]. She verbalized understanding and she replied thanks she will pick the copy up today. //kah

## 2023-11-03 DIAGNOSIS — R00.2 PALPITATIONS: Primary | ICD-10-CM

## 2023-11-08 ENCOUNTER — PATIENT MESSAGE (OUTPATIENT)
Dept: OPTOMETRY | Facility: CLINIC | Age: 68
End: 2023-11-08
Payer: MEDICARE

## 2023-11-16 ENCOUNTER — OFFICE VISIT (OUTPATIENT)
Dept: CARDIOLOGY | Facility: CLINIC | Age: 68
End: 2023-11-16
Payer: MEDICARE

## 2023-11-16 ENCOUNTER — HOSPITAL ENCOUNTER (OUTPATIENT)
Dept: CARDIOLOGY | Facility: HOSPITAL | Age: 68
Discharge: HOME | End: 2023-11-16
Attending: INTERNAL MEDICINE
Payer: MEDICARE

## 2023-11-16 VITALS
SYSTOLIC BLOOD PRESSURE: 136 MMHG | OXYGEN SATURATION: 99 % | WEIGHT: 244.69 LBS | HEIGHT: 63 IN | DIASTOLIC BLOOD PRESSURE: 86 MMHG | BODY MASS INDEX: 43.36 KG/M2 | HEART RATE: 64 BPM

## 2023-11-16 DIAGNOSIS — E66.01 MORBID OBESITY WITH BMI OF 40.0-44.9, ADULT: ICD-10-CM

## 2023-11-16 DIAGNOSIS — R06.09 DOE (DYSPNEA ON EXERTION): ICD-10-CM

## 2023-11-16 DIAGNOSIS — I49.3 SYMPTOMATIC PVCS: ICD-10-CM

## 2023-11-16 DIAGNOSIS — Z98.84 HX OF GASTRIC BYPASS: ICD-10-CM

## 2023-11-16 DIAGNOSIS — R00.2 PALPITATIONS: Primary | ICD-10-CM

## 2023-11-16 DIAGNOSIS — R60.0 LOCALIZED EDEMA: ICD-10-CM

## 2023-11-16 DIAGNOSIS — G47.33 OSA ON CPAP: ICD-10-CM

## 2023-11-16 DIAGNOSIS — R00.2 PALPITATIONS: ICD-10-CM

## 2023-11-16 DIAGNOSIS — I10 ESSENTIAL HYPERTENSION: ICD-10-CM

## 2023-11-16 PROCEDURE — 3008F PR BODY MASS INDEX (BMI) DOCUMENTED: ICD-10-PCS | Mod: CPTII,S$GLB,, | Performed by: INTERNAL MEDICINE

## 2023-11-16 PROCEDURE — 1160F RVW MEDS BY RX/DR IN RCRD: CPT | Mod: CPTII,S$GLB,, | Performed by: INTERNAL MEDICINE

## 2023-11-16 PROCEDURE — 3079F PR MOST RECENT DIASTOLIC BLOOD PRESSURE 80-89 MM HG: ICD-10-PCS | Mod: CPTII,S$GLB,, | Performed by: INTERNAL MEDICINE

## 2023-11-16 PROCEDURE — 3075F SYST BP GE 130 - 139MM HG: CPT | Mod: CPTII,S$GLB,, | Performed by: INTERNAL MEDICINE

## 2023-11-16 PROCEDURE — 1101F PR PT FALLS ASSESS DOC 0-1 FALLS W/OUT INJ PAST YR: ICD-10-PCS | Mod: CPTII,S$GLB,, | Performed by: INTERNAL MEDICINE

## 2023-11-16 PROCEDURE — 3075F PR MOST RECENT SYSTOLIC BLOOD PRESS GE 130-139MM HG: ICD-10-PCS | Mod: CPTII,S$GLB,, | Performed by: INTERNAL MEDICINE

## 2023-11-16 PROCEDURE — 99999 PR PBB SHADOW E&M-EST. PATIENT-LVL IV: ICD-10-PCS | Mod: PBBFAC,,, | Performed by: INTERNAL MEDICINE

## 2023-11-16 PROCEDURE — 3288F FALL RISK ASSESSMENT DOCD: CPT | Mod: CPTII,S$GLB,, | Performed by: INTERNAL MEDICINE

## 2023-11-16 PROCEDURE — 93010 EKG 12-LEAD: ICD-10-PCS | Mod: ,,, | Performed by: INTERNAL MEDICINE

## 2023-11-16 PROCEDURE — 93010 ELECTROCARDIOGRAM REPORT: CPT | Mod: ,,, | Performed by: INTERNAL MEDICINE

## 2023-11-16 PROCEDURE — 1101F PT FALLS ASSESS-DOCD LE1/YR: CPT | Mod: CPTII,S$GLB,, | Performed by: INTERNAL MEDICINE

## 2023-11-16 PROCEDURE — 99214 PR OFFICE/OUTPT VISIT, EST, LEVL IV, 30-39 MIN: ICD-10-PCS | Mod: S$GLB,,, | Performed by: INTERNAL MEDICINE

## 2023-11-16 PROCEDURE — 99999 PR PBB SHADOW E&M-EST. PATIENT-LVL IV: CPT | Mod: PBBFAC,,, | Performed by: INTERNAL MEDICINE

## 2023-11-16 PROCEDURE — 99214 OFFICE O/P EST MOD 30 MIN: CPT | Mod: S$GLB,,, | Performed by: INTERNAL MEDICINE

## 2023-11-16 PROCEDURE — 1160F PR REVIEW ALL MEDS BY PRESCRIBER/CLIN PHARMACIST DOCUMENTED: ICD-10-PCS | Mod: CPTII,S$GLB,, | Performed by: INTERNAL MEDICINE

## 2023-11-16 PROCEDURE — 3008F BODY MASS INDEX DOCD: CPT | Mod: CPTII,S$GLB,, | Performed by: INTERNAL MEDICINE

## 2023-11-16 PROCEDURE — 1159F PR MEDICATION LIST DOCUMENTED IN MEDICAL RECORD: ICD-10-PCS | Mod: CPTII,S$GLB,, | Performed by: INTERNAL MEDICINE

## 2023-11-16 PROCEDURE — 93005 ELECTROCARDIOGRAM TRACING: CPT

## 2023-11-16 PROCEDURE — 3288F PR FALLS RISK ASSESSMENT DOCUMENTED: ICD-10-PCS | Mod: CPTII,S$GLB,, | Performed by: INTERNAL MEDICINE

## 2023-11-16 PROCEDURE — 1125F PR PAIN SEVERITY QUANTIFIED, PAIN PRESENT: ICD-10-PCS | Mod: CPTII,S$GLB,, | Performed by: INTERNAL MEDICINE

## 2023-11-16 PROCEDURE — 1125F AMNT PAIN NOTED PAIN PRSNT: CPT | Mod: CPTII,S$GLB,, | Performed by: INTERNAL MEDICINE

## 2023-11-16 PROCEDURE — 3079F DIAST BP 80-89 MM HG: CPT | Mod: CPTII,S$GLB,, | Performed by: INTERNAL MEDICINE

## 2023-11-16 PROCEDURE — 1159F MED LIST DOCD IN RCRD: CPT | Mod: CPTII,S$GLB,, | Performed by: INTERNAL MEDICINE

## 2023-11-16 RX ORDER — FUROSEMIDE 40 MG/1
40 TABLET ORAL DAILY PRN
Qty: 90 TABLET | Refills: 1 | Status: SHIPPED | OUTPATIENT
Start: 2023-11-16

## 2023-11-16 RX ORDER — POTASSIUM CHLORIDE 20 MEQ/1
20 TABLET, EXTENDED RELEASE ORAL DAILY PRN
Qty: 90 TABLET | Refills: 1 | Status: SHIPPED | OUTPATIENT
Start: 2023-11-16

## 2023-11-16 RX ORDER — NITROGLYCERIN 0.4 MG/1
0.4 TABLET SUBLINGUAL EVERY 5 MIN PRN
Qty: 30 TABLET | Refills: 0 | Status: SHIPPED | OUTPATIENT
Start: 2023-11-16 | End: 2024-11-15

## 2023-11-16 RX ORDER — METOPROLOL SUCCINATE 50 MG/1
50 TABLET, EXTENDED RELEASE ORAL DAILY
Qty: 90 TABLET | Refills: 1 | Status: SHIPPED | OUTPATIENT
Start: 2023-11-16 | End: 2023-12-15 | Stop reason: SDUPTHER

## 2023-11-16 NOTE — PROGRESS NOTES
Subjective:   Patient ID:  Mariel Becerril is a 68 y.o. female who presents for cardiac consult of No chief complaint on file.      The patient came in today for cardiac consult of No chief complaint on file.    Mariel Becerril is a 68 y.o. female with current medical conditions HTN, PVCs, obesity s/p gastric sleeve, GERD, OA, anemia, GIL on CPAP presents for follow CV evaluation.      18  Pt has been having SOB, AREVALO. Is always in pain, is exhausted on pain meds. She feels like her heart if affected and is tired after walking a few steps.  Pt also feels chest pressure. Pt feels chest pressure daily, just tries to rest and goes away. Chest pain is substernal without radiation. 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 Uses CPAP every night, but last eval was over 5 years.   Feels palpitations, rarely, was on digoxin then.    ECG - NSR, poor RWP  23  BP and Hr well controlled. BMI 42 - 238 lbs.   ECG - NSR, LAE      23  Bp and HR stable. BMI 43 - 244 lbs.  LE u/s 2023 neg for DVT.     She has more back pain, had a fall, a horse was left unattended - and horse started running towards her and she tripped and fell.   She had neg Xrays after.   ECG - NSR    Patient is compliant with medications.    Family history includes Arthritis in her mother; Depression in her sister; Heart disease in her father -  at age 81; Hypertension in her mother and sister.    Conclusion 2023         There is no evidence of a right lower extremity DVT.    There is no evidence of a left lower extremity DVT.    The right superficial femoral middle vein is normal.    The contralateral (left) common femoral vein is patent and does not have a thrombus.    The left superficial femoral middle vein is normal.    The contralateral (right) common femoral vein is patent and does not have a thrombus.    Results for orders placed during the hospital encounter of 22    Echo    Interpretation Summary  · The left ventricle is  normal in size with mild concentric hypertrophy and normal systolic function.  · Mild left atrial enlargement.  · The estimated ejection fraction is 65%.  · Indeterminate left ventricular diastolic function.  · Normal right ventricular size with normal right ventricular systolic function.  · Mild mitral regurgitation.  · Mild tricuspid regurgitation.  · Intermediate central venous pressure (8 mmHg).  · The estimated PA systolic pressure is 29 mmHg.      Results for orders placed during the hospital encounter of 05/18/22    Nuclear Stress - Cardiology Interpreted    Interpretation Summary    Normal myocardial perfusion scan. There is no evidence of myocardial ischemia or infarction.    The gated perfusion images showed an ejection fraction of > 70% at rest. The gated perfusion images showed an ejection fraction of > 70% post stress.    The EKG portion of this study is negative for ischemia.    The patient reported no chest pain during the stress test.    There were no arrhythmias during stress.      HOLTER 4/2021  Sinus rhythm with heart rates varying between 48 and 124 bpm with an average of 77 bpm.  There were very rare PVCs totalling 12 and averaging 0.13 per hour.  There were very rare PACs totalling 24 and averaging 0.25 per hour.      Past Medical History:   Diagnosis Date    COPD (chronic obstructive pulmonary disease)     NO HOME O2    Digestive disorder     Frequent headaches     Hypertension     Osteoarthritis 04/02/2019    Bilateral knees, ankles and feet    Severe obesity (BMI >= 40)     Sleep apnea     CPAP       Past Surgical History:   Procedure Laterality Date    BLADDER SUSPENSION      CATARACT EXTRACTION, BILATERAL      COLONOSCOPY N/A 12/3/2018    Procedure: COLONOSCOPY;  Surgeon: Mari Rader MD;  Location: [facility];  Service: Endoscopy;  Laterality: N/A;    ESOPHAGOGASTRODUODENOSCOPY N/A 12/31/2020    Procedure: ESOPHAGOGASTRODUODENOSCOPY (EGD);  Surgeon: Anthony Rodríguez MD;  Location: [facility];   Service: General;  Laterality: N/A;    HYSTERECTOMY      partial 2000    INJECTION OF ANESTHETIC AGENT AROUND NERVE Left 9/14/2023    Procedure: LEFT Genicular nerve block RN IV Sedation;  Surgeon: Thanh Diaz MD;  Location: [facility];  Service: Pain Management;  Laterality: Left;    INJECTION OF ANESTHETIC AGENT INTO SACROILIAC JOINT Bilateral 11/30/2020    Procedure: Bilateral SIJ + Bilateral Piriformis + Bilateral GT Bursa Injection;  Surgeon: Thanh Diaz MD;  Location: [facility];  Service: Pain Management;  Laterality: Bilateral;    INJECTION OF JOINT Bilateral 11/30/2020    Procedure: Bilateral SIJ + Bilateral Piriformis + Bilateral GT Bursa Injection;  Surgeon: Thanh Diaz MD;  Location: [facility];  Service: Pain Management;  Laterality: Bilateral;    INJECTION OF PIRIFORMIS MUSCLE Bilateral 11/30/2020    Procedure: Bilateral SIJ + Bilateral Piriformis + Bilateral GT Bursa Injection;  Surgeon: Thanh Diaz MD;  Location: [facility];  Service: Pain Management;  Laterality: Bilateral;    ROBOT-ASSISTED LAPAROSCOPIC SLEEVE GASTRECTOMY USING DA EZEQUIEL XI N/A 3/2/2021    Procedure: XI ROBOTIC SLEEVE GASTRECTOMY;  Surgeon: Anthony Rodríguez MD;  [facility];  Service: General;  Laterality: N/A;    SELECTIVE INJECTION OF ANESTHETIC AGENT AROUND LUMBAR SPINAL NERVE ROOT BY TRANSFORAMINAL APPROACH Bilateral 1/4/2021    Procedure: Bilateral L5/S1 TF GISELA;  Surgeon: Thanh Diaz MD;  Location: [facility];  Service: Pain Management;  Laterality: Bilateral;    SELECTIVE INJECTION OF ANESTHETIC AGENT AROUND LUMBAR SPINAL NERVE ROOT BY TRANSFORAMINAL APPROACH Bilateral 3/23/2021    Procedure: Bilateral L5/S1 TF GISELA;  Surgeon: Thanh Diaz MD;  Location: [facility];  Service: Pain Management;  Laterality: Bilateral;    SELECTIVE INJECTION OF ANESTHETIC AGENT AROUND LUMBAR SPINAL NERVE ROOT BY TRANSFORAMINAL APPROACH Bilateral 10/5/2021    Procedure: Bilateral L5/S1 TF  GISELA;  Surgeon: Thanh Diaz MD;  Location: [facility];  Service: Pain Management;  Laterality: Bilateral;    tonsilectomy      TOTAL KNEE ARTHROPLASTY Left 3/4/2020    Procedure: ARTHROPLASTY, KNEE, TOTAL;  Surgeon: Moy Connolly MD;  Location: [facility];  Service: Orthopedics;  Laterality: Left;       Social History     Tobacco Use    Smoking status: Never     Passive exposure: Never    Smokeless tobacco: Never   Substance Use Topics    Alcohol use: Never    Drug use: No       Family History   Problem Relation Age of Onset    Heart attack Father        Patient's Medications   New Prescriptions    No medications on file   Previous Medications    ACETAMINOPHEN (TYLENOL) 325 MG TABLET    Take 2 tablets (650 mg total) by mouth every 8 (eight) hours as needed.    ALBUTEROL (PROAIR HFA) 90 MCG/ACTUATION INHALER    Inhale 2 puffs into the lungs every 6 (six) hours as needed for Wheezing. Rescue    ASPIRIN (ECOTRIN) 81 MG EC TABLET    Take 1 tablet (81 mg total) by mouth once daily.    BENZONATATE (TESSALON) 100 MG CAPSULE    Take 1 capsule by mouth three times daily as needed    DICLOFENAC SODIUM (VOLTAREN) 1 % GEL    APPLY 2 GRAMS TOPICALLY THREE TIMES DAILY AS NEEDED    ERGOCALCIFEROL, VITAMIN D2, (VITAMIN D ORAL)    Take 2,000 Units by mouth once daily.    FUROSEMIDE (LASIX) 40 MG TABLET    TAKE 1 TABLET BY MOUTH ONCE DAILY AS NEEDED FOR  EDEMA,  SWELLING  DUE  TO  FLUID    GABAPENTIN (NEURONTIN) 300 MG CAPSULE    Take 2 capsules (600 mg total) by mouth every evening.    IMIPRAMINE (TOFRANIL) 10 MG TAB    Take 1 tablet (10 mg total) by mouth every evening.    LEVOCETIRIZINE (XYZAL) 5 MG TABLET    TAKE 1 TABLET BY MOUTH ONCE DAILY IN THE EVENING    LINACLOTIDE (LINZESS) 72 MCG CAP CAPSULE    Take 1 capsule (72 mcg total) by mouth before breakfast.    MECLIZINE (ANTIVERT) 25 MG TABLET    Take 1 tablet (25 mg total) by mouth 3 (three) times daily as needed for Dizziness.    MELOXICAM (MOBIC) 15 MG TABLET     Take 1 tablet (15 mg total) by mouth once daily.    METHOCARBAMOL (ROBAXIN) 750 MG TAB    Take 1 tablet (750 mg total) by mouth 3 (three) times daily as needed.    METOPROLOL SUCCINATE (TOPROL-XL) 50 MG 24 HR TABLET    Take 1 tablet (50 mg total) by mouth once daily.    MONTELUKAST (SINGULAIR) 10 MG TABLET    Take 1 tablet (10 mg total) by mouth every evening. Allergies    MUPIROCIN (BACTROBAN) 2 % OINTMENT    Apply topically 3 (three) times daily.    NITROGLYCERIN (NITROSTAT) 0.4 MG SL TABLET    Place 1 tablet (0.4 mg total) under the tongue every 5 (five) minutes as needed for Chest pain.    NYSTATIN (MYCOSTATIN) CREAM    APPLY  CREAM TOPICALLY TO AFFECTED AREA TWICE DAILY    OMEPRAZOLE (PRILOSEC) 40 MG CAPSULE    Take 1 capsule by mouth once daily    ONDANSETRON (ZOFRAN) 8 MG TABLET    Take 1 tablet (8 mg total) by mouth every 8 (eight) hours as needed for Nausea.    POTASSIUM CHLORIDE SA (K-DUR,KLOR-CON) 20 MEQ TABLET    Take 1 tablet (20 mEq total) by mouth daily as needed (if taking lasix).    TOPIRAMATE (TOPAMAX) 100 MG TABLET    Take 1 tablet (100 mg total) by mouth 2 (two) times daily.   Modified Medications    No medications on file   Discontinued Medications    No medications on file       Review of Systems   Constitutional: Negative.    HENT: Negative.     Eyes: Negative.    Respiratory:  Positive for shortness of breath (improved).    Cardiovascular:  Positive for palpitations. Negative for chest pain and leg swelling.   Gastrointestinal:  Positive for heartburn.   Genitourinary: Negative.    Musculoskeletal:  Positive for joint pain.   Skin: Negative.    Neurological:  Positive for dizziness.   Endo/Heme/Allergies: Negative.    Psychiatric/Behavioral: Negative.     All 12 systems otherwise negative.      Wt Readings from Last 3 Encounters:   11/16/23 111 kg (244 lb 11.4 oz)   09/19/23 108 kg (238 lb)   09/14/23 108 kg (238 lb 3.3 oz)     Temp Readings from Last 3 Encounters:   09/14/23 97.6 °F (36.4  "°C) (Temporal)   08/23/23 98.6 °F (37 °C) (Oral)   06/05/23 98.1 °F (36.7 °C)     BP Readings from Last 3 Encounters:   11/16/23 136/86   09/19/23 124/83   09/14/23 116/70     Pulse Readings from Last 3 Encounters:   11/16/23 64   09/19/23 64   09/14/23 64       /86 (BP Location: Right arm, Patient Position: Sitting, BP Method: Large (Manual))   Pulse 64   Ht 5' 3" (1.6 m)   Wt 111 kg (244 lb 11.4 oz)   SpO2 99%   BMI 43.35 kg/m²     Objective:   Physical Exam  Vitals and nursing note reviewed.   Constitutional:       General: She is not in acute distress.     Appearance: She is well-developed. She is not diaphoretic.   HENT:      Head: Normocephalic and atraumatic.      Nose: Nose normal.   Eyes:      General: No scleral icterus.     Conjunctiva/sclera: Conjunctivae normal.   Neck:      Thyroid: No thyromegaly.      Vascular: No JVD.   Cardiovascular:      Rate and Rhythm: Normal rate and regular rhythm.      Heart sounds: S1 normal and S2 normal. Murmur heard.      No friction rub. No gallop. No S3 or S4 sounds.   Pulmonary:      Effort: Pulmonary effort is normal. No respiratory distress.      Breath sounds: Normal breath sounds. No stridor. No wheezing or rales.   Chest:      Chest wall: No tenderness.   Abdominal:      General: Bowel sounds are normal. There is no distension.      Palpations: Abdomen is soft. There is no mass.      Tenderness: There is no abdominal tenderness. There is no rebound.   Genitourinary:     Comments: Deferred  Musculoskeletal:         General: No tenderness or deformity. Normal range of motion.      Cervical back: Normal range of motion and neck supple.   Lymphadenopathy:      Cervical: No cervical adenopathy.   Skin:     General: Skin is warm and dry.      Coloration: Skin is not pale.      Findings: No erythema or rash.   Neurological:      Mental Status: She is alert and oriented to person, place, and time.      Motor: No abnormal muscle tone.      Coordination: " Coordination normal.   Psychiatric:         Behavior: Behavior normal.         Thought Content: Thought content normal.         Judgment: Judgment normal.         Lab Results   Component Value Date     11/09/2022    K 4.1 11/09/2022     (H) 11/09/2022    CO2 23 11/09/2022    BUN 19 11/09/2022    CREATININE 0.8 11/09/2022    GLU 87 11/09/2022    HGBA1C 5.4 11/09/2022    MG 2.1 03/11/2021    AST 35 11/09/2022    ALT 34 11/09/2022    ALBUMIN 3.6 11/09/2022    PROT 6.7 11/09/2022    BILITOT 0.2 11/09/2022    WBC 5.31 11/09/2022    HGB 12.2 11/09/2022    HCT 40.2 11/09/2022    MCV 99 (H) 11/09/2022     11/09/2022    INR 1.0 10/27/2021    TSH 1.194 11/09/2022    CHOL 124 09/14/2018    HDL 48 09/14/2018    LDLCALC 64.0 09/14/2018    TRIG 60 09/14/2018    BNP <10 08/20/2018     Assessment:      1. Palpitations    2. GIL on CPAP    3. Symptomatic PVCs    4. AREVALO (dyspnea on exertion)    5. Morbid obesity with BMI of 40.0-44.9, adult    6. Hx of gastric bypass    7. Localized edema            Plan:   1. Chest pressure/AREVALO - resolved   - Nuclear stress neg 5/2022.   - ECHO with normal bi V function, mild MR, mild TR, PASP 29 mmHg.   - cont NTG    2. AREVALO with edema sec to CVI  - improved with lasix 40 mg  - rec compression stockings  - neg for DVT 9/2023    3. HTN   - cont meds  - stoped Norvasc  - low salt diet    4. Obesity s/p gastric sleeve 2/2021 with gerd; BMI 42 - 238 lbs -->  BMI 43 - 244 lbs.  - rec weight loss with diet/exercise; cont PPI  - f/u GI as well - proceed for EGD if needed     5. GIL, poor sleep   - cont CPAP  - appreciate pulm recs    6. Palpitations sec to PVCS  - cont BB  - Holter 4/2021 rare PVCs, PACs, AVG HR 77    7. Anemia  - f/u with heme/onc    8. Leg/back, knee pain  - cont PT/OT  - f/u pain and ortho       Thank you for allowing me to participate in this patient's care. Please do not hesitate to contact me with any questions or concerns. Consult note has been forwarded to the  referral physician.     Juan Alberto Luis MD, [facility]  Cardiovascular Disease  [facility], [place]  [phone] (P)

## 2023-11-28 ENCOUNTER — OFFICE VISIT (OUTPATIENT)
Dept: PODIATRY | Facility: CLINIC | Age: 68
End: 2023-11-28
Payer: MEDICARE

## 2023-11-28 ENCOUNTER — HOSPITAL ENCOUNTER (OUTPATIENT)
Dept: RADIOLOGY | Facility: HOSPITAL | Age: 68
Discharge: HOME | End: 2023-11-28
Attending: PODIATRIST
Payer: MEDICARE

## 2023-11-28 VITALS — BODY MASS INDEX: 43.23 KG/M2 | WEIGHT: 244 LBS | HEIGHT: 63 IN

## 2023-11-28 DIAGNOSIS — M79.675 PAIN OF TOES OF BOTH FEET: ICD-10-CM

## 2023-11-28 DIAGNOSIS — S91.311A LACERATION OF RIGHT FOOT, INITIAL ENCOUNTER: Primary | ICD-10-CM

## 2023-11-28 DIAGNOSIS — G81.94 HEMIPLEGIA AFFECTING LEFT NONDOMINANT SIDE, UNSPECIFIED ETIOLOGY, UNSPECIFIED HEMIPLEGIA TYPE: ICD-10-CM

## 2023-11-28 DIAGNOSIS — M79.674 PAIN OF TOES OF BOTH FEET: ICD-10-CM

## 2023-11-28 DIAGNOSIS — M79.671 PAIN IN RIGHT FOOT: ICD-10-CM

## 2023-11-28 DIAGNOSIS — S91.311A LACERATION OF RIGHT FOOT, INITIAL ENCOUNTER: ICD-10-CM

## 2023-11-28 DIAGNOSIS — E66.01 MORBID OBESITY WITH BMI OF 40.0-44.9, ADULT: ICD-10-CM

## 2023-11-28 DIAGNOSIS — B35.1 ONYCHOMYCOSIS: ICD-10-CM

## 2023-11-28 PROCEDURE — 3288F FALL RISK ASSESSMENT DOCD: CPT | Mod: CPTII,S$GLB,, | Performed by: PODIATRIST

## 2023-11-28 PROCEDURE — 11721 DEBRIDE NAIL 6 OR MORE: CPT | Mod: S$GLB,,, | Performed by: PODIATRIST

## 2023-11-28 PROCEDURE — 99214 PR OFFICE/OUTPT VISIT, EST, LEVL IV, 30-39 MIN: ICD-10-PCS | Mod: 25,S$GLB,, | Performed by: PODIATRIST

## 2023-11-28 PROCEDURE — 3288F PR FALLS RISK ASSESSMENT DOCUMENTED: ICD-10-PCS | Mod: CPTII,S$GLB,, | Performed by: PODIATRIST

## 2023-11-28 PROCEDURE — 1125F PR PAIN SEVERITY QUANTIFIED, PAIN PRESENT: ICD-10-PCS | Mod: CPTII,S$GLB,, | Performed by: PODIATRIST

## 2023-11-28 PROCEDURE — 99999 PR PBB SHADOW E&M-EST. PATIENT-LVL IV: ICD-10-PCS | Mod: PBBFAC,,, | Performed by: PODIATRIST

## 2023-11-28 PROCEDURE — 73630 X-RAY EXAM OF FOOT: CPT | Mod: TC,RT

## 2023-11-28 PROCEDURE — 99999 PR PBB SHADOW E&M-EST. PATIENT-LVL IV: CPT | Mod: PBBFAC,,, | Performed by: PODIATRIST

## 2023-11-28 PROCEDURE — 11721 PR DEBRIDEMENT OF NAILS, 6 OR MORE: ICD-10-PCS | Mod: S$GLB,,, | Performed by: PODIATRIST

## 2023-11-28 PROCEDURE — 1159F MED LIST DOCD IN RCRD: CPT | Mod: CPTII,S$GLB,, | Performed by: PODIATRIST

## 2023-11-28 PROCEDURE — 1101F PR PT FALLS ASSESS DOC 0-1 FALLS W/OUT INJ PAST YR: ICD-10-PCS | Mod: CPTII,S$GLB,, | Performed by: PODIATRIST

## 2023-11-28 PROCEDURE — 1159F PR MEDICATION LIST DOCUMENTED IN MEDICAL RECORD: ICD-10-PCS | Mod: CPTII,S$GLB,, | Performed by: PODIATRIST

## 2023-11-28 PROCEDURE — 1160F RVW MEDS BY RX/DR IN RCRD: CPT | Mod: CPTII,S$GLB,, | Performed by: PODIATRIST

## 2023-11-28 PROCEDURE — 73630 X-RAY EXAM OF FOOT: CPT | Mod: 26,RT,, | Performed by: RADIOLOGY

## 2023-11-28 PROCEDURE — 3008F BODY MASS INDEX DOCD: CPT | Mod: CPTII,S$GLB,, | Performed by: PODIATRIST

## 2023-11-28 PROCEDURE — 99214 OFFICE O/P EST MOD 30 MIN: CPT | Mod: 25,S$GLB,, | Performed by: PODIATRIST

## 2023-11-28 PROCEDURE — 1160F PR REVIEW ALL MEDS BY PRESCRIBER/CLIN PHARMACIST DOCUMENTED: ICD-10-PCS | Mod: CPTII,S$GLB,, | Performed by: PODIATRIST

## 2023-11-28 PROCEDURE — 3008F PR BODY MASS INDEX (BMI) DOCUMENTED: ICD-10-PCS | Mod: CPTII,S$GLB,, | Performed by: PODIATRIST

## 2023-11-28 PROCEDURE — 1125F AMNT PAIN NOTED PAIN PRSNT: CPT | Mod: CPTII,S$GLB,, | Performed by: PODIATRIST

## 2023-11-28 PROCEDURE — 73630 XR FOOT COMPLETE 3 VIEW RIGHT: ICD-10-PCS | Mod: 26,RT,, | Performed by: RADIOLOGY

## 2023-11-28 PROCEDURE — 1101F PT FALLS ASSESS-DOCD LE1/YR: CPT | Mod: CPTII,S$GLB,, | Performed by: PODIATRIST

## 2023-11-28 RX ORDER — GENTAMICIN SULFATE 1 MG/G
OINTMENT TOPICAL 3 TIMES DAILY
Qty: 30 G | Refills: 1 | Status: SHIPPED | OUTPATIENT
Start: 2023-11-28

## 2023-11-28 RX ORDER — CLINDAMYCIN HYDROCHLORIDE 300 MG/1
300 CAPSULE ORAL EVERY 8 HOURS
Qty: 21 CAPSULE | Refills: 0 | Status: SHIPPED | OUTPATIENT
Start: 2023-11-28 | End: 2023-12-05

## 2023-11-28 NOTE — PROGRESS NOTES
Subjective:       Patient ID: Mariel Becerril is a 68 y.o. female.    Chief Complaint: Foot Pain (Foot pain, pt was last seen by Estrellita Weller NP at 11/2/2023, nondiabetic, rate pain 8, pt is wearing boots )      HPI: Mariel Becerril presents to the clinic today, for evaluation and treatment concerning an laceration/ulceration/wound/bulla(e) to the plantar RLE midfoot. Patient's Primary Care Provider is Estrellita Weller NP. The PMHx. does include history of CVA w/ residual deficits and morbid obesity. The wound(s) have/has been present for several days. Most recent local wound care w/ dry dressings. States moderate pains. States no apparent infection.       Hemoglobin A1C   Date Value Ref Range Status   11/09/2022 5.4 4.0 - 5.6 % Final     Comment:     ADA Screening Guidelines:  5.7-6.4%  Consistent with prediabetes  >or=6.5%  Consistent with diabetes    High levels of fetal hemoglobin interfere with the HbA1C  assay. Heterozygous hemoglobin variants (HbS, HgC, etc)do  not significantly interfere with this assay.   However, presence of multiple variants may affect accuracy.     06/21/2021 5.2 4.0 - 5.6 % Final     Comment:     ADA Screening Guidelines:  5.7-6.4%  Consistent with prediabetes  >or=6.5%  Consistent with diabetes    High levels of fetal hemoglobin interfere with the HbA1C  assay. Heterozygous hemoglobin variants (HbS, HgC, etc)do  not significantly interfere with this assay.   However, presence of multiple variants may affect accuracy.     12/11/2020 5.4 4.0 - 5.6 % Final     Comment:     ADA Screening Guidelines:  5.7-6.4%  Consistent with prediabetes  >or=6.5%  Consistent with diabetes  High levels of fetal hemoglobin interfere with the HbA1C  assay. Heterozygous hemoglobin variants (HbS, HgC, etc)do  not significantly interfere with this assay.   However, presence of multiple variants may affect accuracy.         Review of patient's allergies indicates:   Allergen Reactions     Penicillins Rash       Past Medical History:   Diagnosis Date    COPD (chronic obstructive pulmonary disease)     NO HOME O2    Digestive disorder     Frequent headaches     Hypertension     Osteoarthritis 04/02/2019    Bilateral knees, ankles and feet    Severe obesity (BMI >= 40)     Sleep apnea     CPAP       Family History   Problem Relation Age of Onset    Heart attack Father        Social History     Socioeconomic History    Marital status:    Tobacco Use    Smoking status: Never     Passive exposure: Never    Smokeless tobacco: Never   Substance and Sexual Activity    Alcohol use: Never    Drug use: No    Sexual activity: Never     Partners: Male     Birth control/protection: See Surgical Hx       Past Surgical History:   Procedure Laterality Date    BLADDER SUSPENSION      CATARACT EXTRACTION, BILATERAL      COLONOSCOPY N/A 12/3/2018    Procedure: COLONOSCOPY;  Surgeon: Mari Rader MD;  Location: [facility];  Service: Endoscopy;  Laterality: N/A;    ESOPHAGOGASTRODUODENOSCOPY N/A 12/31/2020    Procedure: ESOPHAGOGASTRODUODENOSCOPY (EGD);  Surgeon: Anthony Rodríguez MD;  Location: [facility];  Service: General;  Laterality: N/A;    HYSTERECTOMY      partial 2000    INJECTION OF ANESTHETIC AGENT AROUND NERVE Left 9/14/2023    Procedure: LEFT Genicular nerve block RN IV Sedation;  Surgeon: Thanh Diaz MD;  Location: [facility];  Service: Pain Management;  Laterality: Left;    INJECTION OF ANESTHETIC AGENT INTO SACROILIAC JOINT Bilateral 11/30/2020    Procedure: Bilateral SIJ + Bilateral Piriformis + Bilateral GT Bursa Injection;  Surgeon: Thanh Diaz MD;  Location: [facility];  Service: Pain Management;  Laterality: Bilateral;    INJECTION OF JOINT Bilateral 11/30/2020    Procedure: Bilateral SIJ + Bilateral Piriformis + Bilateral GT Bursa Injection;  Surgeon: Thanh Diaz MD;  Location: [facility];  Service: Pain Management;  Laterality: Bilateral;    INJECTION OF PIRIFORMIS MUSCLE  Bilateral 11/30/2020    Procedure: Bilateral SIJ + Bilateral Piriformis + Bilateral GT Bursa Injection;  Surgeon: Thanh Diaz MD;  Location: [facility];  Service: Pain Management;  Laterality: Bilateral;    ROBOT-ASSISTED LAPAROSCOPIC SLEEVE GASTRECTOMY USING DA EZEQUIEL XI N/A 3/2/2021    Procedure: XI ROBOTIC SLEEVE GASTRECTOMY;  Surgeon: Anthony Rodríguez MD;  Location: [facility] OR;  Service: General;  Laterality: N/A;    SELECTIVE INJECTION OF ANESTHETIC AGENT AROUND LUMBAR SPINAL NERVE ROOT BY TRANSFORAMINAL APPROACH Bilateral 1/4/2021    Procedure: Bilateral L5/S1 TF GISELA;  Surgeon: Thanh Diaz MD;  Location: [facility];  Service: Pain Management;  Laterality: Bilateral;    SELECTIVE INJECTION OF ANESTHETIC AGENT AROUND LUMBAR SPINAL NERVE ROOT BY TRANSFORAMINAL APPROACH Bilateral 3/23/2021    Procedure: Bilateral L5/S1 TF GISELA;  Surgeon: Thanh Diaz MD;  Location: [facility];  Service: Pain Management;  Laterality: Bilateral;    SELECTIVE INJECTION OF ANESTHETIC AGENT AROUND LUMBAR SPINAL NERVE ROOT BY TRANSFORAMINAL APPROACH Bilateral 10/5/2021    Procedure: Bilateral L5/S1 TF GISELA;  Surgeon: Thanh Diaz MD;  Location: [facility];  Service: Pain Management;  Laterality: Bilateral;    tonsilectomy      TOTAL KNEE ARTHROPLASTY Left 3/4/2020    Procedure: ARTHROPLASTY, KNEE, TOTAL;  Surgeon: Moy Connolly MD;  Location: [facility] OR;  Service: Orthopedics;  Laterality: Left;       Review of Systems   Constitutional:  Negative for chills, fatigue and fever.   HENT:  Negative for hearing loss.    Eyes:  Negative for photophobia and visual disturbance.   Respiratory:  Negative for cough, chest tightness, shortness of breath and wheezing.    Cardiovascular:  Negative for chest pain and palpitations.   Gastrointestinal:  Negative for constipation, diarrhea, nausea and vomiting.   Endocrine: Negative for cold intolerance and heat intolerance.   Genitourinary:  Negative for flank pain.  "  Musculoskeletal:  Negative for neck pain and neck stiffness.   Skin:  Positive for wound.   Neurological:  Negative for light-headedness and headaches.   Psychiatric/Behavioral:  Negative for sleep disturbance.           Objective:   Ht 5' 3" (1.6 m)   Wt 110.7 kg (244 lb)   BMI 43.22 kg/m²     Physical Exam  LOWER EXTREMITY PHYSICAL EXAMINATION    DERMATOLOGY: On the left foot, nails 1, 2, 3, 4, and 5 are suggestive of onychomycotic changes. On the right foot, nails 1, 2, 3, 4, and 5 are suggestive of onychomycotic changes. These nail plates are thickened, are dystrophic, chaulky in appearance and malodorous with substantial subungual debris. These nail plates are yellow to brown in appearance. The remaining nail plates are elongated and do not have suggestive clinical features of onychomycosis.  Lacerations noted, plantar aspect of the right midfoot.  No apparent foreign is noted noted.  No erythema or calor is noted.  Moderate pain to palpation.    VASCULAR: Pulses are palpable to the B/L lower extremity. The right dorsalis pedis pulse is 2/4 and the posterior tibial pulse is 2/4. The left dorsalis pedis pulse is 2/4 and the posterior tibial pulse is 2/4. Hair growth is noted on the dorsal foot and digits. Proximal to distal, warm to warm. Capillary refill time is WNL at less than 3s.  Mild edema is noted bilateral.    ORTHOPEDIC: Manual Muscle Testing is 5/5 in all planes on the right, without pains, with and without resistance.  Pain to palpation of the area pathology, right plantar midfoot.  Gait is mildly antalgic.      Assessment:     1. Laceration of right foot, initial encounter    2. Pain in right foot    3. Onychomycosis    4. Pain of toes of both feet    5. Hemiplegia affecting left nondominant side, unspecified etiology, unspecified hemiplegia type    6. Morbid obesity with BMI of 40.0-44.9, adult        Plan:     Pain in right foot  Laceration of right foot, initial encounter  -     X-Ray Foot " Complete Right; Future; Expected date: 11/28/2023  -     clindamycin (CLEOCIN) 300 MG capsule; Take 1 capsule (300 mg total) by mouth every 8 (eight) hours. for 7 days  Dispense: 21 capsule; Refill: 0  -     gentamicin (GARAMYCIN) 0.1 % ointment; Apply topically 3 (three) times daily.  Dispense: 30 g; Refill: 1    Thorough discussion is had with the patient today, concerning the diagnosis, its etiology, and the treatment algorithm at present.     XRAYS are reviewed in detail with the patient. All questions and concerns regarding findings and its/their implications are outlined and discussed.    WBAT with normal shoe gear.    Onychomycosis  Pain of toes of both feet  Onychomycotic nail plates, as outlined above, are sharply debrided with double action nail nipper, and/or with the assistance of a mechanical rotary mari for reduction of pains. Nails are reduced in terms of length, width and girth with removal of subungual debris to facilitate pain free weight bearing and ambulation. Elongated nails as outlined in the objective portion of this note, were trimmed to appropriate length for alleviation/reduction of pains as well. Follow up in approx. 4-6 months.    Hemiplegia affecting left nondominant side, unspecified etiology, unspecified hemiplegia type  Patient advised to follow up with Primary Care Physician for management of comorbid states.    Morbid obesity with BMI of 40.0-44.9, adult  Patient is counseled and reminded concerning the importance of good nutrition and healthy eating habits, which may include blood sugar control, to prevent and/or help podiatric foot and ankle complications.          Future Appointments   Date Time Provider Department Center   11/29/2023  7:00 AM Estrellita Weller NP [facility]   11/29/2023  9:20 AM [facility]   12/6/2023 10:00 AM Thanh Diaz MD [facility]    5/15/2024  8:20 AM Juan Alberto Luis MD [facility] [place]

## 2023-11-29 ENCOUNTER — OFFICE VISIT (OUTPATIENT)
Dept: INTERNAL MEDICINE | Facility: CLINIC | Age: 68
End: 2023-11-29
Payer: MEDICARE

## 2023-11-29 ENCOUNTER — OFFICE VISIT (OUTPATIENT)
Dept: OPHTHALMOLOGY | Facility: CLINIC | Age: 68
End: 2023-11-29
Payer: MEDICARE

## 2023-11-29 ENCOUNTER — LAB VISIT (OUTPATIENT)
Dept: LAB | Facility: HOSPITAL | Age: 68
End: 2023-11-29
Attending: NURSE PRACTITIONER
Payer: MEDICARE

## 2023-11-29 VITALS
WEIGHT: 242.31 LBS | HEIGHT: 63 IN | TEMPERATURE: 98 F | HEART RATE: 69 BPM | DIASTOLIC BLOOD PRESSURE: 74 MMHG | SYSTOLIC BLOOD PRESSURE: 134 MMHG | RESPIRATION RATE: 18 BRPM | BODY MASS INDEX: 42.93 KG/M2 | OXYGEN SATURATION: 99 %

## 2023-11-29 DIAGNOSIS — I10 ESSENTIAL HYPERTENSION: ICD-10-CM

## 2023-11-29 DIAGNOSIS — Z12.11 COLON CANCER SCREENING: ICD-10-CM

## 2023-11-29 DIAGNOSIS — Z00.00 ROUTINE MEDICAL EXAM: Primary | ICD-10-CM

## 2023-11-29 DIAGNOSIS — H52.4 PRESBYOPIA: Primary | ICD-10-CM

## 2023-11-29 DIAGNOSIS — E66.01 MORBID OBESITY WITH BMI OF 40.0-44.9, ADULT: ICD-10-CM

## 2023-11-29 DIAGNOSIS — M54.16 LUMBAR RADICULOPATHY: ICD-10-CM

## 2023-11-29 DIAGNOSIS — Z78.0 POSTMENOPAUSAL: ICD-10-CM

## 2023-11-29 LAB
ALBUMIN SERPL BCP-MCNC: 3.5 G/DL (ref 3.5–5.2)
ALP SERPL-CCNC: 80 U/L (ref 55–135)
ALT SERPL W/O P-5'-P-CCNC: 14 U/L (ref 10–44)
ANION GAP SERPL CALC-SCNC: 5 MMOL/L (ref 8–16)
AST SERPL-CCNC: 22 U/L (ref 10–40)
BASOPHILS # BLD AUTO: 0.04 K/UL (ref 0–0.2)
BASOPHILS NFR BLD: 0.7 % (ref 0–1.9)
BILIRUB SERPL-MCNC: 0.4 MG/DL (ref 0.1–1)
BUN SERPL-MCNC: 13 MG/DL (ref 8–23)
CALCIUM SERPL-MCNC: 9.1 MG/DL (ref 8.7–10.5)
CHLORIDE SERPL-SCNC: 110 MMOL/L (ref 95–110)
CHOLEST SERPL-MCNC: 155 MG/DL (ref 120–199)
CHOLEST/HDLC SERPL: 2.6 {RATIO} (ref 2–5)
CO2 SERPL-SCNC: 25 MMOL/L (ref 23–29)
CREAT SERPL-MCNC: 0.8 MG/DL (ref 0.5–1.4)
DIFFERENTIAL METHOD: ABNORMAL
EOSINOPHIL # BLD AUTO: 0.2 K/UL (ref 0–0.5)
EOSINOPHIL NFR BLD: 2.9 % (ref 0–8)
ERYTHROCYTE [DISTWIDTH] IN BLOOD BY AUTOMATED COUNT: 14 % (ref 11.5–14.5)
EST. GFR  (NO RACE VARIABLE): >60 ML/MIN/1.73 M^2
GLUCOSE SERPL-MCNC: 97 MG/DL (ref 70–110)
HCT VFR BLD AUTO: 40.1 % (ref 37–48.5)
HDLC SERPL-MCNC: 59 MG/DL (ref 40–75)
HDLC SERPL: 38.1 % (ref 20–50)
HGB BLD-MCNC: 12.3 G/DL (ref 12–16)
IMM GRANULOCYTES # BLD AUTO: 0.01 K/UL (ref 0–0.04)
IMM GRANULOCYTES NFR BLD AUTO: 0.2 % (ref 0–0.5)
LDLC SERPL CALC-MCNC: 81.4 MG/DL (ref 63–159)
LYMPHOCYTES # BLD AUTO: 2 K/UL (ref 1–4.8)
LYMPHOCYTES NFR BLD: 36.2 % (ref 18–48)
MCH RBC QN AUTO: 29.2 PG (ref 27–31)
MCHC RBC AUTO-ENTMCNC: 30.7 G/DL (ref 32–36)
MCV RBC AUTO: 95 FL (ref 82–98)
MONOCYTES # BLD AUTO: 0.4 K/UL (ref 0.3–1)
MONOCYTES NFR BLD: 7.4 % (ref 4–15)
NEUTROPHILS # BLD AUTO: 2.9 K/UL (ref 1.8–7.7)
NEUTROPHILS NFR BLD: 52.6 % (ref 38–73)
NONHDLC SERPL-MCNC: 96 MG/DL
NRBC BLD-RTO: 0 /100 WBC
PLATELET # BLD AUTO: 223 K/UL (ref 150–450)
PMV BLD AUTO: 10.1 FL (ref 9.2–12.9)
POTASSIUM SERPL-SCNC: 4.3 MMOL/L (ref 3.5–5.1)
PROT SERPL-MCNC: 6.6 G/DL (ref 6–8.4)
RBC # BLD AUTO: 4.21 M/UL (ref 4–5.4)
SODIUM SERPL-SCNC: 140 MMOL/L (ref 136–145)
TRIGL SERPL-MCNC: 73 MG/DL (ref 30–150)
TSH SERPL DL<=0.005 MIU/L-ACNC: 1.31 UIU/ML (ref 0.4–4)
WBC # BLD AUTO: 5.55 K/UL (ref 3.9–12.7)

## 2023-11-29 PROCEDURE — 1160F RVW MEDS BY RX/DR IN RCRD: CPT | Mod: CPTII,S$GLB,, | Performed by: NURSE PRACTITIONER

## 2023-11-29 PROCEDURE — 99999 PR PBB SHADOW E&M-EST. PATIENT-LVL V: ICD-10-PCS | Mod: PBBFAC,,, | Performed by: NURSE PRACTITIONER

## 2023-11-29 PROCEDURE — 99999 PR PBB SHADOW E&M-EST. PATIENT-LVL III: CPT | Mod: PBBFAC,,, | Performed by: OPTOMETRIST

## 2023-11-29 PROCEDURE — 3075F SYST BP GE 130 - 139MM HG: CPT | Mod: CPTII,S$GLB,, | Performed by: NURSE PRACTITIONER

## 2023-11-29 PROCEDURE — 99999 PR PBB SHADOW E&M-EST. PATIENT-LVL V: CPT | Mod: PBBFAC,,, | Performed by: NURSE PRACTITIONER

## 2023-11-29 PROCEDURE — 99499 NO LOS: ICD-10-PCS | Mod: S$GLB,,, | Performed by: OPTOMETRIST

## 2023-11-29 PROCEDURE — 92310 PR CONTACT LENS FITTING (NO CHANGE): ICD-10-PCS | Mod: CSM,S$GLB,, | Performed by: OPTOMETRIST

## 2023-11-29 PROCEDURE — 3078F DIAST BP <80 MM HG: CPT | Mod: CPTII,S$GLB,, | Performed by: NURSE PRACTITIONER

## 2023-11-29 PROCEDURE — 1125F AMNT PAIN NOTED PAIN PRSNT: CPT | Mod: CPTII,S$GLB,, | Performed by: NURSE PRACTITIONER

## 2023-11-29 PROCEDURE — 1125F PR PAIN SEVERITY QUANTIFIED, PAIN PRESENT: ICD-10-PCS | Mod: CPTII,S$GLB,, | Performed by: NURSE PRACTITIONER

## 2023-11-29 PROCEDURE — 80061 LIPID PANEL: CPT | Performed by: NURSE PRACTITIONER

## 2023-11-29 PROCEDURE — 3078F PR MOST RECENT DIASTOLIC BLOOD PRESSURE < 80 MM HG: ICD-10-PCS | Mod: CPTII,S$GLB,, | Performed by: NURSE PRACTITIONER

## 2023-11-29 PROCEDURE — 85025 COMPLETE CBC W/AUTO DIFF WBC: CPT | Performed by: NURSE PRACTITIONER

## 2023-11-29 PROCEDURE — 3075F PR MOST RECENT SYSTOLIC BLOOD PRESS GE 130-139MM HG: ICD-10-PCS | Mod: CPTII,S$GLB,, | Performed by: NURSE PRACTITIONER

## 2023-11-29 PROCEDURE — 36415 COLL VENOUS BLD VENIPUNCTURE: CPT | Performed by: NURSE PRACTITIONER

## 2023-11-29 PROCEDURE — 1159F MED LIST DOCD IN RCRD: CPT | Mod: CPTII,S$GLB,, | Performed by: NURSE PRACTITIONER

## 2023-11-29 PROCEDURE — 99499 UNLISTED E&M SERVICE: CPT | Mod: S$GLB,,, | Performed by: OPTOMETRIST

## 2023-11-29 PROCEDURE — 1159F PR MEDICATION LIST DOCUMENTED IN MEDICAL RECORD: ICD-10-PCS | Mod: CPTII,S$GLB,, | Performed by: NURSE PRACTITIONER

## 2023-11-29 PROCEDURE — 92310 CONTACT LENS FITTING OU: CPT | Mod: CSM,S$GLB,, | Performed by: OPTOMETRIST

## 2023-11-29 PROCEDURE — 99999 PR PBB SHADOW E&M-EST. PATIENT-LVL III: ICD-10-PCS | Mod: PBBFAC,,, | Performed by: OPTOMETRIST

## 2023-11-29 PROCEDURE — 3008F PR BODY MASS INDEX (BMI) DOCUMENTED: ICD-10-PCS | Mod: CPTII,S$GLB,, | Performed by: NURSE PRACTITIONER

## 2023-11-29 PROCEDURE — 1160F PR REVIEW ALL MEDS BY PRESCRIBER/CLIN PHARMACIST DOCUMENTED: ICD-10-PCS | Mod: CPTII,S$GLB,, | Performed by: NURSE PRACTITIONER

## 2023-11-29 PROCEDURE — 99213 PR OFFICE/OUTPT VISIT, EST, LEVL III, 20-29 MIN: ICD-10-PCS | Mod: S$GLB,,, | Performed by: NURSE PRACTITIONER

## 2023-11-29 PROCEDURE — 80053 COMPREHEN METABOLIC PANEL: CPT | Performed by: NURSE PRACTITIONER

## 2023-11-29 PROCEDURE — 3008F BODY MASS INDEX DOCD: CPT | Mod: CPTII,S$GLB,, | Performed by: NURSE PRACTITIONER

## 2023-11-29 PROCEDURE — 99213 OFFICE O/P EST LOW 20 MIN: CPT | Mod: S$GLB,,, | Performed by: NURSE PRACTITIONER

## 2023-11-29 PROCEDURE — 84443 ASSAY THYROID STIM HORMONE: CPT | Performed by: NURSE PRACTITIONER

## 2023-11-29 NOTE — PROGRESS NOTES
Subjective     Patient ID: Mariel Becerril is a 68 y.o. female.    Chief Complaint: No chief complaint on file.    Patient presents for a follow up.   Recently injured her right foot/laceration.  Reports possibly stepping on something.  Had podiatry appointment on yesterday.  X-ray negative.  Started on clindamycin and gentamicin ointment.      Concerned about aquatherapy due to lacerations.       Continues to have lower back and knee pain.   Took a week off due to increase stiffness.        Review of Systems   Constitutional:  Negative for chills, fatigue and fever.   Respiratory:  Negative for cough and shortness of breath.    Gastrointestinal:  Negative for abdominal pain and change in bowel habit.   Musculoskeletal:  Positive for arthralgias, back pain and myalgias. Negative for gait problem.   Psychiatric/Behavioral:  Negative for agitation and confusion.           Objective     Physical Exam  Vitals reviewed.   Constitutional:       Appearance: She is obese.   HENT:      Head: Normocephalic.      Right Ear: Tympanic membrane normal.      Left Ear: Tympanic membrane normal.      Mouth/Throat:      Pharynx: No posterior oropharyngeal erythema.   Cardiovascular:      Rate and Rhythm: Normal rate and regular rhythm.   Pulmonary:      Effort: Pulmonary effort is normal.      Breath sounds: Normal breath sounds.   Abdominal:      General: Bowel sounds are normal. There is no distension.      Tenderness: There is no abdominal tenderness.   Skin:     General: Skin is warm.   Neurological:      General: No focal deficit present.      Mental Status: She is alert.   Psychiatric:         Mood and Affect: Mood normal.         Behavior: Behavior is cooperative.            Assessment and Plan     1. Routine medical exam    2. Essential hypertension  -     LIPID PANEL; Future; Expected date: 11/29/2023  -     CBC Auto Differential; Future; Expected date: 11/29/2023  -     Comprehensive Metabolic Panel; Future; Expected  date: 11/29/2023  -     Urinalysis; Future; Expected date: 11/29/2023  -     TSH; Future; Expected date: 11/29/2023    3. Lumbar radiculopathy  Overview:  acute on chronic at left L5 and S1, chronic at right L5 and S1      4. Morbid obesity with BMI of 40.0-44.9, adult  -     Comprehensive Metabolic Panel; Future; Expected date: 11/29/2023    5. Postmenopausal  -     DXA Bone Density Axial Skeleton 1 or more sites; Future; Expected date: 11/29/2023    6. Colon cancer screening  -     Ambulatory referral/consult to Endo Procedure ; Future; Expected date: 11/30/2023      Schedule labs this morning at [facility] this morning. Has appointment     Schedule DEXA--early 2024    6 month follow up         Follow up in about 6 months (around 5/29/2024).

## 2023-11-29 NOTE — PROGRESS NOTES
HPI     Post-op Evaluation            Comments: 1. PCIOL OD + 17.0 SN60WF 02/19/20  PCIOL OS +17.0 SN60WF 1/22/20  2. SCTL wearer/wears OTC readers on top    Patient last wore SCTL on 01/05/20  Pt states she can not wear the contact in the OD. Very uncomfortable   wearing it. Has to wear readers  Pt states CL feels like its coming out and can not see with it.  Pt would like a sample CL before she has to order the new RX          Comments                      Last edited by Radha Loza on 11/29/2023  9:32 AM.            Assessment /Plan     For exam results, see Encounter Report.    Presbyopia      Contact Lens Prescription (11/29/2023)          Brand Base Curve Diameter Sphere    Right No lens  14.2     Left Total30 8.4 14.2 +2.50      Expiration Date: 11/29/2024    Replacement: Monthly    Wearing Schedule: Daily Wear          Dispensed trial contact lenses today. Patient is to wear lenses for 1 week.   Ok to order supply if no problems. RTC PRN if any problems arise.    Otherwise, RTC 1 yr for dilated eye exam.  Discussed above and answered questions.

## 2023-12-04 ENCOUNTER — PATIENT MESSAGE (OUTPATIENT)
Dept: OPTOMETRY | Facility: CLINIC | Age: 68
End: 2023-12-04
Payer: MEDICARE

## 2023-12-04 ENCOUNTER — HOSPITAL ENCOUNTER (OUTPATIENT)
Dept: PREADMISSION TESTING | Facility: HOSPITAL | Age: 68
Discharge: HOME | End: 2023-12-04
Attending: INTERNAL MEDICINE
Payer: MEDICARE

## 2023-12-04 DIAGNOSIS — Z12.11 COLON CANCER SCREENING: Primary | ICD-10-CM

## 2023-12-04 RX ORDER — SODIUM, POTASSIUM,MAG SULFATES 17.5-3.13G
1 SOLUTION, RECONSTITUTED, ORAL ORAL DAILY
Qty: 1 KIT | Refills: 0 | Status: SHIPPED | OUTPATIENT
Start: 2023-12-04 | End: 2023-12-06

## 2023-12-06 ENCOUNTER — OFFICE VISIT (OUTPATIENT)
Dept: PAIN MEDICINE | Facility: CLINIC | Age: 68
End: 2023-12-06
Payer: MEDICARE

## 2023-12-06 VITALS
DIASTOLIC BLOOD PRESSURE: 81 MMHG | RESPIRATION RATE: 17 BRPM | HEART RATE: 80 BPM | HEIGHT: 63 IN | SYSTOLIC BLOOD PRESSURE: 126 MMHG | BODY MASS INDEX: 42.97 KG/M2 | WEIGHT: 242.5 LBS

## 2023-12-06 DIAGNOSIS — M54.16 BILATERAL LUMBAR RADICULOPATHY: Primary | ICD-10-CM

## 2023-12-06 PROCEDURE — 3079F PR MOST RECENT DIASTOLIC BLOOD PRESSURE 80-89 MM HG: ICD-10-PCS | Mod: CPTII,S$GLB,, | Performed by: PHYSICAL MEDICINE & REHABILITATION

## 2023-12-06 PROCEDURE — 3079F DIAST BP 80-89 MM HG: CPT | Mod: CPTII,S$GLB,, | Performed by: PHYSICAL MEDICINE & REHABILITATION

## 2023-12-06 PROCEDURE — 3074F SYST BP LT 130 MM HG: CPT | Mod: CPTII,S$GLB,, | Performed by: PHYSICAL MEDICINE & REHABILITATION

## 2023-12-06 PROCEDURE — 1159F PR MEDICATION LIST DOCUMENTED IN MEDICAL RECORD: ICD-10-PCS | Mod: CPTII,S$GLB,, | Performed by: PHYSICAL MEDICINE & REHABILITATION

## 2023-12-06 PROCEDURE — 99999 PR PBB SHADOW E&M-EST. PATIENT-LVL V: ICD-10-PCS | Mod: PBBFAC,,, | Performed by: PHYSICAL MEDICINE & REHABILITATION

## 2023-12-06 PROCEDURE — 1125F AMNT PAIN NOTED PAIN PRSNT: CPT | Mod: CPTII,S$GLB,, | Performed by: PHYSICAL MEDICINE & REHABILITATION

## 2023-12-06 PROCEDURE — 99214 OFFICE O/P EST MOD 30 MIN: CPT | Mod: S$GLB,,, | Performed by: PHYSICAL MEDICINE & REHABILITATION

## 2023-12-06 PROCEDURE — 1101F PR PT FALLS ASSESS DOC 0-1 FALLS W/OUT INJ PAST YR: ICD-10-PCS | Mod: CPTII,S$GLB,, | Performed by: PHYSICAL MEDICINE & REHABILITATION

## 2023-12-06 PROCEDURE — 3008F PR BODY MASS INDEX (BMI) DOCUMENTED: ICD-10-PCS | Mod: CPTII,S$GLB,, | Performed by: PHYSICAL MEDICINE & REHABILITATION

## 2023-12-06 PROCEDURE — 99214 PR OFFICE/OUTPT VISIT, EST, LEVL IV, 30-39 MIN: ICD-10-PCS | Mod: S$GLB,,, | Performed by: PHYSICAL MEDICINE & REHABILITATION

## 2023-12-06 PROCEDURE — 1159F MED LIST DOCD IN RCRD: CPT | Mod: CPTII,S$GLB,, | Performed by: PHYSICAL MEDICINE & REHABILITATION

## 2023-12-06 PROCEDURE — 3288F PR FALLS RISK ASSESSMENT DOCUMENTED: ICD-10-PCS | Mod: CPTII,S$GLB,, | Performed by: PHYSICAL MEDICINE & REHABILITATION

## 2023-12-06 PROCEDURE — 99999 PR PBB SHADOW E&M-EST. PATIENT-LVL V: CPT | Mod: PBBFAC,,, | Performed by: PHYSICAL MEDICINE & REHABILITATION

## 2023-12-06 PROCEDURE — 1101F PT FALLS ASSESS-DOCD LE1/YR: CPT | Mod: CPTII,S$GLB,, | Performed by: PHYSICAL MEDICINE & REHABILITATION

## 2023-12-06 PROCEDURE — 1125F PR PAIN SEVERITY QUANTIFIED, PAIN PRESENT: ICD-10-PCS | Mod: CPTII,S$GLB,, | Performed by: PHYSICAL MEDICINE & REHABILITATION

## 2023-12-06 PROCEDURE — 3074F PR MOST RECENT SYSTOLIC BLOOD PRESSURE < 130 MM HG: ICD-10-PCS | Mod: CPTII,S$GLB,, | Performed by: PHYSICAL MEDICINE & REHABILITATION

## 2023-12-06 PROCEDURE — 3008F BODY MASS INDEX DOCD: CPT | Mod: CPTII,S$GLB,, | Performed by: PHYSICAL MEDICINE & REHABILITATION

## 2023-12-06 PROCEDURE — 3288F FALL RISK ASSESSMENT DOCD: CPT | Mod: CPTII,S$GLB,, | Performed by: PHYSICAL MEDICINE & REHABILITATION
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
## 2023-12-06 NOTE — Clinical Note
Pain Provider: Emily Patient Name: Mariel Becerril MRN: [id number] Case: LUMBAR TFESI Level: L5/S1 Laterality: bilateral Anticoagulation: ASA (Primary ppx) Length to hold:5 days Rx Provider: PCP  Prefers to be scheduled for 01/23/2024 and follow-up 4-6 weeks post procedure with anyone

## 2023-12-11 ENCOUNTER — TELEPHONE (OUTPATIENT)
Dept: SURGERY | Facility: CLINIC | Age: 68
End: 2023-12-11
Payer: MEDICARE

## 2023-12-11 NOTE — TELEPHONE ENCOUNTER
Spoke with patient regarding colonoscopy scheduled for tomorrow 12/12 with Dr. Schmidt. Patient ensures that her procedure was scheduled for today. Patient would like to cancel procedure and contact PCP for mailing kit. Procedure canceled. Patient verbalized understanding.     ----- Message from Evelin Degroot sent at 12/11/2023  8:08 AM CST -----  Regarding: Pt called to cancel/reschedule her 12 noon procedure appt today and pt would like a call back this morning  Appointment Access Request:    Pt called to cancel/reschedule her 12 noon procedure appt today and pt would like a call back this morning    Pt can be reached at [phone]

## 2023-12-12 ENCOUNTER — TELEPHONE (OUTPATIENT)
Dept: PAIN MEDICINE | Facility: CLINIC | Age: 68
End: 2023-12-12
Payer: MEDICARE

## 2023-12-12 NOTE — TELEPHONE ENCOUNTER
----- Message from Estrellita Weller NP sent at 12/7/2023  8:23 AM CST -----  Good morning Valerio,     Yes, ok.      Thank you.    Estrellita   ----- Message -----  From: Valerio Richard MA  Sent: 12/6/2023   1:14 PM CST  To: DESIRE Porras NP,    Mariel Becerril was seen in office with complaints of severe low back pain. Dr. Diaz would like to perform lumbar epidural, and Mariel Becerril would like to proceed. Dr. Diaz is requesting for clearance to hold ASA 5 days prior to procedure. Patient Mariel Becerril can resume medication following the procedure.     Thank You,  Vijaya MAHAN  [facility] Surgery Scheduler

## 2023-12-12 NOTE — TELEPHONE ENCOUNTER
Cardiac Clearance  Pt cleared to be off ASA for 5 days for Lumbar GISELA with Dr Diaz. Pt stated she would call back to schedule was she is ready to proceed.  XANDER Marshall

## 2023-12-15 ENCOUNTER — PATIENT MESSAGE (OUTPATIENT)
Dept: OPTOMETRY | Facility: CLINIC | Age: 68
End: 2023-12-15
Payer: MEDICARE

## 2023-12-15 RX ORDER — METOPROLOL SUCCINATE 50 MG/1
50 TABLET, EXTENDED RELEASE ORAL DAILY
Qty: 90 TABLET | Refills: 1 | Status: SHIPPED | OUTPATIENT
Start: 2023-12-15

## 2023-12-26 ENCOUNTER — TELEPHONE (OUTPATIENT)
Dept: OPHTHALMOLOGY | Facility: CLINIC | Age: 68
End: 2023-12-26
Payer: MEDICARE

## 2023-12-26 NOTE — TELEPHONE ENCOUNTER
----- Message from Soila Hurtado sent at 12/26/2023  2:48 PM CST -----  Name of Who is Calling:pt           What is the request in detail:pt would like to confirm on if the prescription is for daily wear or extended wear.           Can the clinic reply by [facility]:no           [facility] [phone]

## 2023-12-27 ENCOUNTER — TELEPHONE (OUTPATIENT)
Dept: OPHTHALMOLOGY | Facility: CLINIC | Age: 68
End: 2023-12-27
Payer: MEDICARE

## 2024-01-03 ENCOUNTER — OFFICE VISIT (OUTPATIENT)
Dept: SURGERY | Facility: CLINIC | Age: 69
End: 2024-01-03
Payer: MEDICARE

## 2024-01-03 VITALS
DIASTOLIC BLOOD PRESSURE: 88 MMHG | SYSTOLIC BLOOD PRESSURE: 148 MMHG | BODY MASS INDEX: 43.86 KG/M2 | HEIGHT: 63 IN | TEMPERATURE: 98 F | WEIGHT: 247.56 LBS | HEART RATE: 72 BPM

## 2024-01-03 DIAGNOSIS — E66.01 MORBID OBESITY WITH BMI OF 40.0-44.9, ADULT: Primary | ICD-10-CM

## 2024-01-03 PROCEDURE — 3079F DIAST BP 80-89 MM HG: CPT | Mod: CPTII,S$GLB,, | Performed by: SURGERY

## 2024-01-03 PROCEDURE — 99215 OFFICE O/P EST HI 40 MIN: CPT | Mod: S$GLB,,, | Performed by: SURGERY

## 2024-01-03 PROCEDURE — 1126F AMNT PAIN NOTED NONE PRSNT: CPT | Mod: CPTII,S$GLB,, | Performed by: SURGERY

## 2024-01-03 PROCEDURE — 3077F SYST BP >= 140 MM HG: CPT | Mod: CPTII,S$GLB,, | Performed by: SURGERY

## 2024-01-03 PROCEDURE — 1159F MED LIST DOCD IN RCRD: CPT | Mod: CPTII,S$GLB,, | Performed by: SURGERY

## 2024-01-03 PROCEDURE — 3008F BODY MASS INDEX DOCD: CPT | Mod: CPTII,S$GLB,, | Performed by: SURGERY

## 2024-01-03 PROCEDURE — 99999 PR PBB SHADOW E&M-EST. PATIENT-LVL IV: CPT | Mod: PBBFAC,,, | Performed by: SURGERY

## 2024-01-03 PROCEDURE — 1160F RVW MEDS BY RX/DR IN RCRD: CPT | Mod: CPTII,S$GLB,, | Performed by: SURGERY

## 2024-01-03 NOTE — PROGRESS NOTES
BARIATRIC NEW PATIENT EVALUATION    CHIEF COMPLAINT:     Morbid obesity with a BMI of  45.45 and inability to lose weight.    HISTORY OF PRESENT ILLNESS:  Mariel Becerril is a 68 y.o.-year-old female s/p robotic gastric sleeve 3/2/21 presents for follow-up.  She presents for annual follow-up.  In the interim she has not been successful with any further weight loss and continuing to see her weight slightly increased.  She is not tracking calories and last saw dietitian in 2022    presents for preop sleeve gastrectomy.  She has undergone evaluation with dietitian and psych with no contraindications to surgery. She has also completed EGD with no significant abnormalities noted. She would like to move forward with surgery.    presents to re-evaluate for sleeve gastrectomy.  She initially elected not to pursue it as she was not ready to take it on but now feels she is in a better position to move forward with weight loss surgery.  In the interim she has undergone knee surgery which allows her to be more mobile but still has some back pain.  She has lost some weight but feels like she is regaining it slowly.    Initially presenting for  morbid obesity with a BMI of  45.45 and inability to lose weight. The patient has tried  Diet and previously exercise well however due to pain in her left knee she is no longer able to exercise as her mobility is limited.  She was previously able to lose about 40 lb dropping from 297 down to current weight of 259.    Height 5 ft 3 in  Weight 246->261 ->248-> 235 ->242  --> 240 ->247  BMI 43.8 -> 46 ->43-> 41 ->42--> 42 ->43      CO-MORBIDITIES:  hypertension, obstructive sleep apnea and osteoarthritis    PAST MEDICAL HISTORY:  Past Medical History:   Diagnosis Date    COPD (chronic obstructive pulmonary disease)     NO HOME O2    Digestive disorder     Frequent headaches     Hypertension     Osteoarthritis 04/02/2019    Bilateral knees, ankles and feet    Severe obesity (BMI >= 40)      Sleep apnea     CPAP        PAST SURGICAL HISTORY:  Past Surgical History:   Procedure Laterality Date    BLADDER SUSPENSION      CATARACT EXTRACTION, BILATERAL      COLONOSCOPY N/A 12/3/2018    Procedure: COLONOSCOPY;  Surgeon: Mari Rader MD;  Location: [facility] ENDO;  Service: Endoscopy;  Laterality: N/A;    ESOPHAGOGASTRODUODENOSCOPY N/A 12/31/2020    Procedure: ESOPHAGOGASTRODUODENOSCOPY (EGD);  Surgeon: Anthony Rodríguez MD;  Location: [facility] ENDO;  Service: General;  Laterality: N/A;    HYSTERECTOMY      partial 2000    INJECTION OF ANESTHETIC AGENT AROUND NERVE Left 9/14/2023    Procedure: LEFT Genicular nerve block RN IV Sedation;  Surgeon: Thanh Diaz MD;  Location: [facility] PAIN MGT;  Service: Pain Management;  Laterality: Left;    INJECTION OF ANESTHETIC AGENT INTO SACROILIAC JOINT Bilateral 11/30/2020    Procedure: Bilateral SIJ + Bilateral Piriformis + Bilateral GT Bursa Injection;  Surgeon: Thanh Diaz MD;  Location: [facility] PAIN MGT;  Service: Pain Management;  Laterality: Bilateral;    INJECTION OF JOINT Bilateral 11/30/2020    Procedure: Bilateral SIJ + Bilateral Piriformis + Bilateral GT Bursa Injection;  Surgeon: Thanh Diaz MD;  Location: [facility] PAIN MGT;  Service: Pain Management;  Laterality: Bilateral;    INJECTION OF PIRIFORMIS MUSCLE Bilateral 11/30/2020    Procedure: Bilateral SIJ + Bilateral Piriformis + Bilateral GT Bursa Injection;  Surgeon: Thanh Diaz MD;  Location: [facility] PAIN MGT;  Service: Pain Management;  Laterality: Bilateral;    ROBOT-ASSISTED LAPAROSCOPIC SLEEVE GASTRECTOMY USING DA EZEQUIEL XI N/A 3/2/2021    Procedure: XI ROBOTIC SLEEVE GASTRECTOMY;  Surgeon: Anthony Rodríguez MD;  Location: [facility] OR;  Service: General;  Laterality: N/A;    SELECTIVE INJECTION OF ANESTHETIC AGENT AROUND LUMBAR SPINAL NERVE ROOT BY TRANSFORAMINAL APPROACH Bilateral 1/4/2021    Procedure: Bilateral L5/S1 TF GISELA;  Surgeon: Thanh Diaz MD;  Location: [facility] PAIN MGT;  Service: Pain  Management;  Laterality: Bilateral;    SELECTIVE INJECTION OF ANESTHETIC AGENT AROUND LUMBAR SPINAL NERVE ROOT BY TRANSFORAMINAL APPROACH Bilateral 3/23/2021    Procedure: Bilateral L5/S1 TF GISELA;  Surgeon: Thanh Diaz MD;  Location: [facility] PAIN MGT;  Service: Pain Management;  Laterality: Bilateral;    SELECTIVE INJECTION OF ANESTHETIC AGENT AROUND LUMBAR SPINAL NERVE ROOT BY TRANSFORAMINAL APPROACH Bilateral 10/5/2021    Procedure: Bilateral L5/S1 TF GISELA;  Surgeon: Thanh Diaz MD;  Location: [facility] PAIN MGT;  Service: Pain Management;  Laterality: Bilateral;    tonsilectomy      TOTAL KNEE ARTHROPLASTY Left 3/4/2020    Procedure: ARTHROPLASTY, KNEE, TOTAL;  Surgeon: Moy Connolly MD;  Location: [facility] OR;  Service: Orthopedics;  Laterality: Left;       FAMILY HISTORY:  Family History   Problem Relation Age of Onset    Heart attack Father         SOCIAL HISTORY:   reports that she has never smoked. She has never been exposed to tobacco smoke. She has never used smokeless tobacco. She reports that she does not drink alcohol and does not use drugs.     MEDICATIONS:  Current Outpatient Medications on File Prior to Visit   Medication Sig Dispense Refill    acetaminophen (TYLENOL) 325 MG tablet Take 2 tablets (650 mg total) by mouth every 8 (eight) hours as needed. 60 tablet 2    aspirin (ECOTRIN) 81 MG EC tablet Take 1 tablet (81 mg total) by mouth once daily. 90 tablet 3    diclofenac sodium (VOLTAREN) 1 % Gel APPLY 2 GRAMS TOPICALLY THREE TIMES DAILY AS NEEDED 200 g 2    ergocalciferol, vitamin D2, (VITAMIN D ORAL) Take 2,000 Units by mouth once daily.      furosemide (LASIX) 40 MG tablet Take 1 tablet (40 mg total) by mouth daily as needed (edema, swelling). Do not take if BP is less than 110/70 90 tablet 1    gabapentin (NEURONTIN) 300 MG capsule Take 2 capsules (600 mg total) by mouth every evening. 60 capsule 5    gentamicin (GARAMYCIN) 0.1 % ointment Apply topically 3 (three) times daily. 30 g 1     levocetirizine (XYZAL) 5 MG tablet TAKE 1 TABLET BY MOUTH ONCE DAILY IN THE EVENING 90 tablet 0    linaCLOtide (LINZESS) 72 mcg Cap capsule Take 1 capsule (72 mcg total) by mouth before breakfast. 30 capsule 2    meclizine (ANTIVERT) 25 mg tablet Take 1 tablet (25 mg total) by mouth 3 (three) times daily as needed for Dizziness. 30 tablet 0    meloxicam (MOBIC) 15 MG tablet Take 1 tablet (15 mg total) by mouth once daily. 90 tablet 3    methocarbamoL (ROBAXIN) 750 MG Tab Take 1 tablet (750 mg total) by mouth 3 (three) times daily as needed. 90 tablet 3    metoprolol succinate (TOPROL-XL) 50 MG 24 hr tablet Take 1 tablet (50 mg total) by mouth once daily. 90 tablet 1    montelukast (SINGULAIR) 10 mg tablet Take 1 tablet (10 mg total) by mouth every evening. Allergies 30 tablet 2    mupirocin (BACTROBAN) 2 % ointment Apply topically 3 (three) times daily. 30 g 0    nitroGLYCERIN (NITROSTAT) 0.4 MG SL tablet Place 1 tablet (0.4 mg total) under the tongue every 5 (five) minutes as needed for Chest pain. 30 tablet 0    nystatin (MYCOSTATIN) cream APPLY  CREAM TOPICALLY TO AFFECTED AREA TWICE DAILY 30 g 0    omeprazole (PRILOSEC) 40 MG capsule Take 1 capsule by mouth once daily 90 capsule 0    ondansetron (ZOFRAN) 8 MG tablet Take 1 tablet (8 mg total) by mouth every 8 (eight) hours as needed for Nausea. 20 tablet 0    potassium chloride SA (K-DUR,KLOR-CON) 20 MEQ tablet Take 1 tablet (20 mEq total) by mouth daily as needed (if taking lasix). 90 tablet 1    topiramate (TOPAMAX) 100 MG tablet Take 1 tablet (100 mg total) by mouth 2 (two) times daily. 180 tablet 2    albuterol (PROAIR HFA) 90 mcg/actuation inhaler Inhale 2 puffs into the lungs every 6 (six) hours as needed for Wheezing. Rescue 18 g 0    imipramine (TOFRANIL) 10 MG Tab Take 1 tablet (10 mg total) by mouth every evening. 90 tablet 1     Current Facility-Administered Medications on File Prior to Visit   Medication Dose Route Frequency Provider Last Rate Last  Admin    ondansetron injection 4 mg  4 mg Intravenous Once PRN Thanh Diaz MD         Medications have been reviewed.    ALLERGIES:  Review of patient's allergies indicates:   Allergen Reactions    Penicillins Rash     Allergies have been reviewed.    ROS:  Review of Systems   Constitutional:  Negative for chills, fatigue, fever and unexpected weight change.   Respiratory:  Negative for cough, shortness of breath, wheezing and stridor.    Cardiovascular:  Negative for chest pain, palpitations and leg swelling.   Gastrointestinal:  Negative for abdominal distention, abdominal pain, constipation, diarrhea, nausea and vomiting.   Genitourinary:  Negative for difficulty urinating, dysuria, frequency, hematuria and urgency.   Skin:  Negative for color change, pallor, rash and wound.   Hematological:  Does not bruise/bleed easily.       PE:  Physical Exam  Vitals reviewed.   Constitutional:       General: She is not in acute distress.     Appearance: She is well-developed. She is obese.   HENT:      Head: Normocephalic and atraumatic.      Right Ear: External ear normal.      Left Ear: External ear normal.   Eyes:      Conjunctiva/sclera: Conjunctivae normal.   Cardiovascular:      Rate and Rhythm: Normal rate.   Pulmonary:      Effort: Pulmonary effort is normal. No respiratory distress.   Abdominal:      General: There is no distension.      Palpations: Abdomen is soft.      Tenderness: There is no abdominal tenderness.      Comments: Incisions well-healed   Musculoskeletal:      Cervical back: Neck supple.   Skin:     General: Skin is warm and dry.   Neurological:      Mental Status: She is alert and oriented to person, place, and time.   Psychiatric:         Behavior: Behavior normal.       EGD:  Impression:           - Z-line regular, 37 cm from the incisors.                         - Gastritis. Biopsied.                         - Multiple gastric polyps. Resected and retrieved.                         -  Normal second portion of the duodenum.     Final Pathologic Diagnosis GREATER CURVATURE OF STOMACH, SLEEVE GASTRECTOMY:   - Benign portion of stomach with fundic gland polyps and proton pump   inhibitor effect.   - No Helicobacter pylori identified on H&E or immunohistochemical stain.   - No dysplasia or malignancy.         DIAGNOSIS:  1.  Morbid obesity with a BMI of  45.45 and inability to lose weight.  2. Co-morbidities: hypertension, obstructive sleep apnea and osteoarthritis    S/p robotic sleeve gastrectomy    PLAN:  Reinforced importance of calorie deficit and exercise in continued weight loss  Bariatric diet reinforced/dietician   Referral lifestyle health and wellness Dr. Kothari to discuss medical strategies for weight loss  Encouraged exercise    45 minutes of total time spent on the encounter, which includes face to face time and non-face to face time preparing to see the patient (eg, review of tests), Obtaining and/or reviewing separately obtained history, Documenting clinical information in the electronic or other health record, Independently interpreting results (not separately reported) and communicating results to the patient/family/caregiver, or Care coordination (not separately reported).

## 2024-01-17 ENCOUNTER — APPOINTMENT (OUTPATIENT)
Dept: RADIOLOGY | Facility: HOSPITAL | Age: 69
End: 2024-01-17
Attending: NURSE PRACTITIONER
Payer: MEDICARE

## 2024-01-17 DIAGNOSIS — Z78.0 POSTMENOPAUSAL: ICD-10-CM

## 2024-01-17 PROCEDURE — 77080 DXA BONE DENSITY AXIAL: CPT | Mod: 26,,, | Performed by: RADIOLOGY

## 2024-01-17 PROCEDURE — 77080 DXA BONE DENSITY AXIAL: CPT | Mod: TC

## 2024-02-19 ENCOUNTER — OFFICE VISIT (OUTPATIENT)
Dept: INTERNAL MEDICINE | Facility: CLINIC | Age: 69
End: 2024-02-19
Payer: MEDICARE

## 2024-02-19 ENCOUNTER — LAB VISIT (OUTPATIENT)
Dept: LAB | Facility: HOSPITAL | Age: 69
End: 2024-02-19
Attending: NURSE PRACTITIONER
Payer: MEDICARE

## 2024-02-19 VITALS
TEMPERATURE: 98 F | HEART RATE: 69 BPM | RESPIRATION RATE: 15 BRPM | BODY MASS INDEX: 44.4 KG/M2 | DIASTOLIC BLOOD PRESSURE: 86 MMHG | SYSTOLIC BLOOD PRESSURE: 122 MMHG | WEIGHT: 250.69 LBS | OXYGEN SATURATION: 100 %

## 2024-02-19 DIAGNOSIS — G89.29 CHRONIC PAIN OF LEFT KNEE: ICD-10-CM

## 2024-02-19 DIAGNOSIS — R42 DIZZINESS: Primary | ICD-10-CM

## 2024-02-19 DIAGNOSIS — M25.562 CHRONIC PAIN OF LEFT KNEE: ICD-10-CM

## 2024-02-19 DIAGNOSIS — R42 DIZZINESS: ICD-10-CM

## 2024-02-19 LAB
ALBUMIN SERPL BCP-MCNC: 3.4 G/DL (ref 3.5–5.2)
ALP SERPL-CCNC: 88 U/L (ref 55–135)
ALT SERPL W/O P-5'-P-CCNC: 19 U/L (ref 10–44)
ANION GAP SERPL CALC-SCNC: 6 MMOL/L (ref 8–16)
AST SERPL-CCNC: 23 U/L (ref 10–40)
BASOPHILS # BLD AUTO: 0.04 K/UL (ref 0–0.2)
BASOPHILS NFR BLD: 0.6 % (ref 0–1.9)
BILIRUB SERPL-MCNC: 0.3 MG/DL (ref 0.1–1)
BUN SERPL-MCNC: 19 MG/DL (ref 8–23)
CALCIUM SERPL-MCNC: 9.2 MG/DL (ref 8.7–10.5)
CHLORIDE SERPL-SCNC: 111 MMOL/L (ref 95–110)
CO2 SERPL-SCNC: 25 MMOL/L (ref 23–29)
CREAT SERPL-MCNC: 0.7 MG/DL (ref 0.5–1.4)
CTP QC/QA: YES
DIFFERENTIAL METHOD BLD: ABNORMAL
EOSINOPHIL # BLD AUTO: 0.1 K/UL (ref 0–0.5)
EOSINOPHIL NFR BLD: 2.3 % (ref 0–8)
ERYTHROCYTE [DISTWIDTH] IN BLOOD BY AUTOMATED COUNT: 13.7 % (ref 11.5–14.5)
EST. GFR  (NO RACE VARIABLE): >60 ML/MIN/1.73 M^2
GLUCOSE SERPL-MCNC: 94 MG/DL (ref 70–110)
HCT VFR BLD AUTO: 41.4 % (ref 37–48.5)
HGB BLD-MCNC: 12.7 G/DL (ref 12–16)
IMM GRANULOCYTES # BLD AUTO: 0.01 K/UL (ref 0–0.04)
IMM GRANULOCYTES NFR BLD AUTO: 0.2 % (ref 0–0.5)
LYMPHOCYTES # BLD AUTO: 2.2 K/UL (ref 1–4.8)
LYMPHOCYTES NFR BLD: 35.2 % (ref 18–48)
MCH RBC QN AUTO: 30 PG (ref 27–31)
MCHC RBC AUTO-ENTMCNC: 30.7 G/DL (ref 32–36)
MCV RBC AUTO: 98 FL (ref 82–98)
MONOCYTES # BLD AUTO: 0.4 K/UL (ref 0.3–1)
MONOCYTES NFR BLD: 6.6 % (ref 4–15)
NEUTROPHILS # BLD AUTO: 3.4 K/UL (ref 1.8–7.7)
NEUTROPHILS NFR BLD: 55.1 % (ref 38–73)
NRBC BLD-RTO: 0 /100 WBC
PLATELET # BLD AUTO: 240 K/UL (ref 150–450)
PMV BLD AUTO: 10.2 FL (ref 9.2–12.9)
POTASSIUM SERPL-SCNC: 3.6 MMOL/L (ref 3.5–5.1)
PROT SERPL-MCNC: 6.4 G/DL (ref 6–8.4)
RBC # BLD AUTO: 4.23 M/UL (ref 4–5.4)
SARS-COV-2 RDRP RESP QL NAA+PROBE: NEGATIVE
SODIUM SERPL-SCNC: 142 MMOL/L (ref 136–145)
WBC # BLD AUTO: 6.19 K/UL (ref 3.9–12.7)

## 2024-02-19 PROCEDURE — 99999 PR PBB SHADOW E&M-EST. PATIENT-LVL V: CPT | Mod: PBBFAC,,, | Performed by: NURSE PRACTITIONER

## 2024-02-19 PROCEDURE — 3074F SYST BP LT 130 MM HG: CPT | Mod: CPTII,S$GLB,, | Performed by: NURSE PRACTITIONER

## 2024-02-19 PROCEDURE — 1101F PT FALLS ASSESS-DOCD LE1/YR: CPT | Mod: CPTII,S$GLB,, | Performed by: NURSE PRACTITIONER

## 2024-02-19 PROCEDURE — 1125F AMNT PAIN NOTED PAIN PRSNT: CPT | Mod: CPTII,S$GLB,, | Performed by: NURSE PRACTITIONER

## 2024-02-19 PROCEDURE — 1159F MED LIST DOCD IN RCRD: CPT | Mod: CPTII,S$GLB,, | Performed by: NURSE PRACTITIONER

## 2024-02-19 PROCEDURE — 1160F RVW MEDS BY RX/DR IN RCRD: CPT | Mod: CPTII,S$GLB,, | Performed by: NURSE PRACTITIONER

## 2024-02-19 PROCEDURE — 80053 COMPREHEN METABOLIC PANEL: CPT | Performed by: NURSE PRACTITIONER

## 2024-02-19 PROCEDURE — 36415 COLL VENOUS BLD VENIPUNCTURE: CPT | Mod: PN | Performed by: NURSE PRACTITIONER

## 2024-02-19 PROCEDURE — 3008F BODY MASS INDEX DOCD: CPT | Mod: CPTII,S$GLB,, | Performed by: NURSE PRACTITIONER

## 2024-02-19 PROCEDURE — 99213 OFFICE O/P EST LOW 20 MIN: CPT | Mod: S$GLB,,, | Performed by: NURSE PRACTITIONER

## 2024-02-19 PROCEDURE — 85025 COMPLETE CBC W/AUTO DIFF WBC: CPT | Performed by: NURSE PRACTITIONER

## 2024-02-19 PROCEDURE — 87635 SARS-COV-2 COVID-19 AMP PRB: CPT | Mod: QW,S$GLB,, | Performed by: NURSE PRACTITIONER

## 2024-02-19 PROCEDURE — 3079F DIAST BP 80-89 MM HG: CPT | Mod: CPTII,S$GLB,, | Performed by: NURSE PRACTITIONER

## 2024-02-19 PROCEDURE — 3288F FALL RISK ASSESSMENT DOCD: CPT | Mod: CPTII,S$GLB,, | Performed by: NURSE PRACTITIONER

## 2024-02-19 NOTE — PROGRESS NOTES
Subjective     Patient ID: Mariel Becerril is a 68 y.o. female.    Chief Complaint: Dizziness and Knee Pain    Patient presents with dizziness,chills, and fatigue.  Started about 1-2 weeks ago.  Started about left knee pain.  Denies any falls and injury.  History of OA and TKR-left.      Dizziness:    Associated symptoms: hearing loss, headaches and palpitations.no vomiting, no weakness and no chest pain.  Knee Pain       Review of Systems   Constitutional:  Positive for activity change and unexpected weight change.   HENT:  Positive for hearing loss and trouble swallowing. Negative for rhinorrhea.    Eyes:  Positive for discharge. Negative for visual disturbance.   Respiratory:  Negative for chest tightness and wheezing.    Cardiovascular:  Positive for palpitations. Negative for chest pain.   Gastrointestinal:  Positive for constipation. Negative for blood in stool, diarrhea and vomiting.   Endocrine: Negative for polydipsia and polyuria.   Genitourinary:  Negative for difficulty urinating, dysuria, hematuria and menstrual problem.   Musculoskeletal:  Positive for arthralgias and neck pain.   Neurological:  Positive for dizziness and headaches. Negative for weakness.   Psychiatric/Behavioral:  Negative for confusion and dysphoric mood.           Objective     Physical Exam  Vitals reviewed.   Constitutional:       Appearance: She is obese.   HENT:      Head: Normocephalic.      Right Ear: Tympanic membrane normal.      Left Ear: Tympanic membrane normal.      Nose: No rhinorrhea.   Cardiovascular:      Rate and Rhythm: Normal rate and regular rhythm.   Pulmonary:      Effort: Pulmonary effort is normal.      Breath sounds: Normal breath sounds.   Skin:     General: Skin is warm.   Neurological:      General: No focal deficit present.      Mental Status: She is alert.   Psychiatric:         Mood and Affect: Mood normal.         Behavior: Behavior is cooperative.            Assessment and Plan     1.  Dizziness  -     POCT COVID-19 Rapid Screening  -     CBC Auto Differential; Future; Expected date: 02/19/2024  -     Comprehensive Metabolic Panel; Future; Expected date: 02/19/2024  -     Urinalysis; Future; Expected date: 02/19/2024      Safety precautions.      Labs today.      Hydrate and rest.          No follow-ups on file.

## 2024-02-21 ENCOUNTER — PATIENT MESSAGE (OUTPATIENT)
Dept: INTERNAL MEDICINE | Facility: CLINIC | Age: 69
End: 2024-02-21
Payer: MEDICARE

## 2024-02-26 ENCOUNTER — OFFICE VISIT (OUTPATIENT)
Dept: INTERNAL MEDICINE | Facility: CLINIC | Age: 69
End: 2024-02-26
Payer: MEDICARE

## 2024-02-26 ENCOUNTER — OFFICE VISIT (OUTPATIENT)
Dept: OTOLARYNGOLOGY | Facility: CLINIC | Age: 69
End: 2024-02-26
Payer: MEDICARE

## 2024-02-26 VITALS
RESPIRATION RATE: 18 BRPM | BODY MASS INDEX: 44.05 KG/M2 | SYSTOLIC BLOOD PRESSURE: 118 MMHG | DIASTOLIC BLOOD PRESSURE: 74 MMHG | HEART RATE: 60 BPM | TEMPERATURE: 97 F | WEIGHT: 248.69 LBS | OXYGEN SATURATION: 98 %

## 2024-02-26 DIAGNOSIS — N39.0 URINARY TRACT INFECTION WITHOUT HEMATURIA, SITE UNSPECIFIED: ICD-10-CM

## 2024-02-26 DIAGNOSIS — R42 DIZZINESS: ICD-10-CM

## 2024-02-26 DIAGNOSIS — R42 DIZZINESS: Primary | ICD-10-CM

## 2024-02-26 DIAGNOSIS — M17.12 PRIMARY OSTEOARTHRITIS OF LEFT KNEE: ICD-10-CM

## 2024-02-26 DIAGNOSIS — H61.21 IMPACTED CERUMEN OF RIGHT EAR: ICD-10-CM

## 2024-02-26 DIAGNOSIS — R26.89 IMBALANCE: Primary | ICD-10-CM

## 2024-02-26 PROCEDURE — 3008F BODY MASS INDEX DOCD: CPT | Mod: CPTII,S$GLB,, | Performed by: NURSE PRACTITIONER

## 2024-02-26 PROCEDURE — 3288F FALL RISK ASSESSMENT DOCD: CPT | Mod: CPTII,S$GLB,, | Performed by: NURSE PRACTITIONER

## 2024-02-26 PROCEDURE — 1159F MED LIST DOCD IN RCRD: CPT | Mod: CPTII,S$GLB,, | Performed by: STUDENT IN AN ORGANIZED HEALTH CARE EDUCATION/TRAINING PROGRAM

## 2024-02-26 PROCEDURE — 1101F PT FALLS ASSESS-DOCD LE1/YR: CPT | Mod: CPTII,S$GLB,, | Performed by: STUDENT IN AN ORGANIZED HEALTH CARE EDUCATION/TRAINING PROGRAM

## 2024-02-26 PROCEDURE — 99214 OFFICE O/P EST MOD 30 MIN: CPT | Mod: 25,S$GLB,, | Performed by: STUDENT IN AN ORGANIZED HEALTH CARE EDUCATION/TRAINING PROGRAM

## 2024-02-26 PROCEDURE — 99999 PR PBB SHADOW E&M-EST. PATIENT-LVL III: CPT | Mod: PBBFAC,,, | Performed by: STUDENT IN AN ORGANIZED HEALTH CARE EDUCATION/TRAINING PROGRAM

## 2024-02-26 PROCEDURE — 69210 REMOVE IMPACTED EAR WAX UNI: CPT | Mod: S$GLB,,, | Performed by: STUDENT IN AN ORGANIZED HEALTH CARE EDUCATION/TRAINING PROGRAM

## 2024-02-26 PROCEDURE — 3288F FALL RISK ASSESSMENT DOCD: CPT | Mod: CPTII,S$GLB,, | Performed by: STUDENT IN AN ORGANIZED HEALTH CARE EDUCATION/TRAINING PROGRAM

## 2024-02-26 PROCEDURE — 99999 PR PBB SHADOW E&M-EST. PATIENT-LVL V: CPT | Mod: PBBFAC,,, | Performed by: NURSE PRACTITIONER

## 2024-02-26 PROCEDURE — 1159F MED LIST DOCD IN RCRD: CPT | Mod: CPTII,S$GLB,, | Performed by: NURSE PRACTITIONER

## 2024-02-26 PROCEDURE — 1101F PT FALLS ASSESS-DOCD LE1/YR: CPT | Mod: CPTII,S$GLB,, | Performed by: NURSE PRACTITIONER

## 2024-02-26 PROCEDURE — 99213 OFFICE O/P EST LOW 20 MIN: CPT | Mod: S$GLB,,, | Performed by: NURSE PRACTITIONER

## 2024-02-26 PROCEDURE — 1160F RVW MEDS BY RX/DR IN RCRD: CPT | Mod: CPTII,S$GLB,, | Performed by: NURSE PRACTITIONER

## 2024-02-26 PROCEDURE — 3074F SYST BP LT 130 MM HG: CPT | Mod: CPTII,S$GLB,, | Performed by: NURSE PRACTITIONER

## 2024-02-26 PROCEDURE — 1125F AMNT PAIN NOTED PAIN PRSNT: CPT | Mod: CPTII,S$GLB,, | Performed by: NURSE PRACTITIONER

## 2024-02-26 PROCEDURE — 3078F DIAST BP <80 MM HG: CPT | Mod: CPTII,S$GLB,, | Performed by: NURSE PRACTITIONER

## 2024-02-26 RX ORDER — NITROFURANTOIN 25; 75 MG/1; MG/1
100 CAPSULE ORAL 2 TIMES DAILY
Qty: 14 CAPSULE | Refills: 0 | Status: SHIPPED | OUTPATIENT
Start: 2024-02-26 | End: 2024-03-04

## 2024-02-26 NOTE — PROGRESS NOTES
Subjective     Patient ID: Mariel Becerril is a 68 y.o. female.    Chief Complaint: Follow-up    Patient presents for dizziness follow up.   Reviewed labs with patient.  No significant abnormalities noted.  Increase WBC noted to urine.  Reports some change in urination.  Will treat.      Continues to have knee pain (left)--getting worse.   Seeing Dr. Germain and Dr. Diaz.   Discussed getting spinal injection.  Held off during that time.  She thinking that she's ready to move forward.  She will reach out to Dr. Rose's staff.      Continues to have dizziness.   More with positions or moving fast.  Taking meclizine PRN but causes drowsiness.     Follow-up  Associated symptoms include arthralgias. Pertinent negatives include no chest pain, chills, coughing, fatigue or fever.     Review of Systems   Constitutional:  Negative for chills, fatigue and fever.   Respiratory:  Negative for cough and shortness of breath.    Cardiovascular:  Negative for chest pain and leg swelling.   Musculoskeletal:  Positive for arthralgias and gait problem.   Neurological:  Positive for dizziness.   Psychiatric/Behavioral:  Negative for agitation and confusion.           Objective     Physical Exam  Vitals reviewed.   Constitutional:       Appearance: She is obese.   Cardiovascular:      Rate and Rhythm: Normal rate and regular rhythm.   Pulmonary:      Effort: Pulmonary effort is normal.      Breath sounds: Normal breath sounds.   Skin:     General: Skin is warm.   Neurological:      General: No focal deficit present.      Mental Status: She is alert and oriented to person, place, and time.   Psychiatric:         Mood and Affect: Mood normal.            Assessment and Plan     1. Dizziness  -     Ambulatory referral/consult to ENT; Future; Expected date: 03/04/2024    2. Primary osteoarthritis of left knee  Overview:  Formatting of this note might be different from the original.  Added automatically from request for surgery  [id number]      3. Urinary tract infection without hematuria, site unspecified  -     nitrofurantoin, macrocrystal-monohydrate, (MACROBID) 100 MG capsule; Take 1 capsule (100 mg total) by mouth 2 (two) times daily. for 7 days  Dispense: 14 capsule; Refill: 0    Follow up with pain provider Dr. Diaz staff.      Start Macrobid.  Continue to increase water intake.      ENT referral.          No follow-ups on file.

## 2024-02-26 NOTE — PROGRESS NOTES
Chief complaint:   Chief Complaint   Patient presents with    Dizziness     Pt states that her dizziness started a couple of weeks ago         History of Present Illness:     Ms. Becerril is a 68 y.o. female presenting for evaluation of tinnitus.     Patient presents with tinnitus. Onset of symptoms was abrupt starting 1 month ago ago with unchanged course since that time. Started 1-2 days after gettting hit in the head with a large pallet at a hardware store. Did not have LOC. had headaches briefly but that resolved. No other neuro issues. Patient describes the tinnitus as constant located in the bilateral ear. The quality is described as medium range pitch. The pattern is nonpulsatile with an intensity that is loud. Patient describes her level of annoyance as moderately annoying, always aware. Associated symptoms include no hearing loss, pain, dizziness, drainage or recurrent otitis. Previous treatments include none.    The patient denies significant eustachian tube risk factors: ear pressure, ear pain, sensation of clogging, ear symptoms with a cold or sinusitis, popping or crackling sensation, ringing in the ear, and muffled hearing.     The patient also denies pain deep within the ear, tenderness around the ear canal or pre-auricular area, or headaches.       Update 2/26/24    Dizziness/Vertigo  Onset:  2-3 weeks ago   Total number of episodes:  frequent  Duration of individual episodes:minutes  Severity: moderate  Exacerbating factors: standing up and turning head  Prior Medications: nothing  Relieving factors:  remaining motionless  Associated signs and symptoms:  none  Quality: feel lightheaded, like she might pass out, denies room spinning, feels foggy  Prior history of similar events: No      History        Past Medical History:   Past Medical History:   Diagnosis Date    COPD (chronic obstructive pulmonary disease)     NO HOME O2    Digestive disorder     Frequent headaches     Hypertension     Osteoarthritis  04/02/2019    Bilateral knees, ankles and feet    Severe obesity (BMI >= 40)     Sleep apnea     CPAP    .          Past Surgical History:  Past Surgical History:   Procedure Laterality Date    BLADDER SUSPENSION      CATARACT EXTRACTION, BILATERAL      COLONOSCOPY N/A 12/3/2018    Procedure: COLONOSCOPY;  Surgeon: Mari Rader MD;  Location: [facility] ENDO;  Service: Endoscopy;  Laterality: N/A;    ESOPHAGOGASTRODUODENOSCOPY N/A 12/31/2020    Procedure: ESOPHAGOGASTRODUODENOSCOPY (EGD);  Surgeon: Anthony Rodríguez MD;  Location: [facility] ENDO;  Service: General;  Laterality: N/A;    HYSTERECTOMY      partial 2000    INJECTION OF ANESTHETIC AGENT AROUND NERVE Left 9/14/2023    Procedure: LEFT Genicular nerve block RN IV Sedation;  Surgeon: Thanh Diaz MD;  Location: [facility] PAIN MGT;  Service: Pain Management;  Laterality: Left;    INJECTION OF ANESTHETIC AGENT INTO SACROILIAC JOINT Bilateral 11/30/2020    Procedure: Bilateral SIJ + Bilateral Piriformis + Bilateral GT Bursa Injection;  Surgeon: Thanh Diaz MD;  Location: [facility] PAIN MGT;  Service: Pain Management;  Laterality: Bilateral;    INJECTION OF JOINT Bilateral 11/30/2020    Procedure: Bilateral SIJ + Bilateral Piriformis + Bilateral GT Bursa Injection;  Surgeon: Thanh Diaz MD;  Location: [facility] PAIN MGT;  Service: Pain Management;  Laterality: Bilateral;    INJECTION OF PIRIFORMIS MUSCLE Bilateral 11/30/2020    Procedure: Bilateral SIJ + Bilateral Piriformis + Bilateral GT Bursa Injection;  Surgeon: Thanh Diaz MD;  Location: [facility] PAIN MGT;  Service: Pain Management;  Laterality: Bilateral;    ROBOT-ASSISTED LAPAROSCOPIC SLEEVE GASTRECTOMY USING DA EZEQUIEL XI N/A 3/2/2021    Procedure: XI ROBOTIC SLEEVE GASTRECTOMY;  Surgeon: Anthony Rodríguez MD;  Location: [facility] OR;  Service: General;  Laterality: N/A;    SELECTIVE INJECTION OF ANESTHETIC AGENT AROUND LUMBAR SPINAL NERVE ROOT BY TRANSFORAMINAL APPROACH Bilateral 1/4/2021    Procedure: Bilateral L5/S1 TF  GISELA;  Surgeon: Thanh Diaz MD;  Location: [facility] PAIN MGT;  Service: Pain Management;  Laterality: Bilateral;    SELECTIVE INJECTION OF ANESTHETIC AGENT AROUND LUMBAR SPINAL NERVE ROOT BY TRANSFORAMINAL APPROACH Bilateral 3/23/2021    Procedure: Bilateral L5/S1 TF GISELA;  Surgeon: Thanh Diaz MD;  Location: [facility] PAIN MGT;  Service: Pain Management;  Laterality: Bilateral;    SELECTIVE INJECTION OF ANESTHETIC AGENT AROUND LUMBAR SPINAL NERVE ROOT BY TRANSFORAMINAL APPROACH Bilateral 10/5/2021    Procedure: Bilateral L5/S1 TF GISELA;  Surgeon: Thanh Diaz MD;  Location: [facility] PAIN MGT;  Service: Pain Management;  Laterality: Bilateral;    tonsilectomy      TOTAL KNEE ARTHROPLASTY Left 3/4/2020    Procedure: ARTHROPLASTY, KNEE, TOTAL;  Surgeon: Moy Connolly MD;  Location: [facility] OR;  Service: Orthopedics;  Laterality: Left;   .         Medications: Medication list was reviewed. She  has a current medication list which includes the following prescription(s): acetaminophen, aspirin, diclofenac sodium, ergocalciferol (vitamin d2), furosemide, gabapentin, gentamicin, levocetirizine, linaclotide, meclizine, meloxicam, methocarbamol, metoprolol succinate, montelukast, mupirocin, nitrofurantoin (macrocrystal-monohydrate), nitroglycerin, nystatin, omeprazole, ondansetron, potassium chloride sa, topiramate, albuterol, and imipramine, and the following Facility-Administered Medications: ondansetron.         Allergies:   Review of patient's allergies indicates:   Allergen Reactions    Penicillins Rash            Family history: family history includes Heart attack in her father.         Social History          Alcohol use:  reports no history of alcohol use.            Tobacco:  reports that she has never smoked. She has never been exposed to tobacco smoke. She has never used smokeless tobacco.         Please see the patient's intake form for full details of past medical history, past surgical history, family  history, social history and review of systems. ?This information was reviewed by me and noted.      Physical Examination     General: Well developed, well nourished, well hydrated. Verbal with a strong voice and not dysphonic.     Head/Face: Normocephalic, atraumatic. No scars or lesions. Facial musculature equal.     Eyes: No scleral icterus or conjunctival hemorrhage. EOMI. PERRLA.     Ears: after disimpaction    Right ear: No gross deformity. EAC is clear of debris and erythema. The TM is intact with a pneumatized middle ear. No signs of retraction, fluid or infection.      Left ear: No gross deformity. EAC is clear of debris and erythema. The TM is intact with a pneumatized middle ear. No signs of retraction, fluid or infection.       Neurologic: Moving all extremities without gross abnormality.CN II-XII grossly intact. House-Brackmann 1/6. No signs of nystagmus.      DHP negative AU    Psych: Alert and oriented to person, place, and time with an appropriate mood and affect.       Audiogram     Audiogram, 02/26/2024 was independently reviewed       Imaging    I have independently reviewed the following imaging with the findings noted below:      MRI brain 10/2021  Present 7th and 8th nerves  No masses or lesions of internal auditory canal or cerebropontile angle     Labs    A1C 5.2    Procedure: ear microscopy with removal of cerumen    Description: The patient was in agreement with the examination and debridement of the ears. Removal of the cerumen required use of an operating microscope and multiple micro-instruments.     With the patient in the supine position, we used the operating microscope to examine both ears with the appropriate sized ear speculum.  A variety of sterile, micro-instruments were utilized to remove the cerumen atraumatically.  I performed the procedure which required a significant amount of time and effort. The tympanic membrane was then well visualized.  The patient tolerated the  procedure well and there were no complications.    Findings:   Right ear had moderate wax, the EAC was normal, and the tympanic membrane was intact with no evidence of middle ear fluid.         Assessment/Plan      1. Imbalance    2. Dizziness    3. Impacted cerumen of right ear        Symptoms most consistent with orthostatic HTN, there may also be some secondarry central component  Recommend optimizing hydration, BP  Consider PT if not improved              Morgan Pop MD  [facility]   [facility] - [address]  [place] [address]  P: [phone]  F: [phone]

## 2024-02-28 DIAGNOSIS — J01.90 ACUTE SINUSITIS, RECURRENCE NOT SPECIFIED, UNSPECIFIED LOCATION: ICD-10-CM

## 2024-02-28 RX ORDER — ASPIRIN 81 MG/1
81 TABLET ORAL DAILY
Qty: 90 TABLET | Refills: 3 | Status: SHIPPED | OUTPATIENT
Start: 2024-02-28 | End: 2025-02-27

## 2024-02-28 RX ORDER — MONTELUKAST SODIUM 10 MG/1
10 TABLET ORAL NIGHTLY
Qty: 30 TABLET | Refills: 11 | Status: SHIPPED | OUTPATIENT
Start: 2024-02-28

## 2024-03-15 RX ORDER — OMEPRAZOLE 40 MG/1
CAPSULE, DELAYED RELEASE ORAL
Qty: 90 CAPSULE | Refills: 3 | Status: SHIPPED | OUTPATIENT
Start: 2024-03-15

## 2024-03-15 NOTE — TELEPHONE ENCOUNTER
Refill Routing Note   Medication(s) are not appropriate for processing by [facility] for the following reason(s):        Responsible provider unclear    [facility] action(s):  Defer               Appointments  past 12m or future 3m with PCP    Date Provider   Last Visit   Visit date not found Susana Archuleta MD   Next Visit   Visit date not found Susana Archuleta MD   ED visits in past 90 days: 0        Note composed:10:39 AM 03/15/2024

## 2024-03-18 ENCOUNTER — TELEPHONE (OUTPATIENT)
Dept: PAIN MEDICINE | Facility: CLINIC | Age: 69
End: 2024-03-18
Payer: MEDICARE

## 2024-03-18 ENCOUNTER — PATIENT MESSAGE (OUTPATIENT)
Dept: PAIN MEDICINE | Facility: CLINIC | Age: 69
End: 2024-03-18
Payer: MEDICARE

## 2024-03-18 NOTE — TELEPHONE ENCOUNTER
----- Message from Estrellita Weller NP sent at 3/13/2024  2:44 PM CDT -----  Hayde Lilly,    This is ok.    Estrellita   ----- Message -----  From: Valerio Richard MA  Sent: 2/29/2024  12:41 PM CDT  To: DESIRE Porras NP,    Mariel Becerril was seen in office with complaints of severe low back pain. Dr. Diaz would like to perform lumbar epidural, and Mariel Becerril would like to proceed. Dr. Diaz is requesting for clearance to hold ASA 5 days prior to procedure. Patient Mariel Becerril can resume medication following the procedure.     Thank You,  Vijaya MAHAN  [facility] Surgery Scheduler

## 2024-03-21 NOTE — PRE-PROCEDURE INSTRUCTIONS
Spoke with patient regarding procedure scheduled on 3.28    Arrival time 0900     Has patient been sick with fever or on antibiotics within the last 7 days? No     Does the patient have any open wounds, sores or rashes? No     Does the patient have any recent fractures? no     Has patient received a vaccination within the last 7 days? No     Received the COVID vaccination? yes     Has the patient stopped all medications as directed? HOLD ASA 5 DAYS PRIOR TO PROCEDURE. CARDIAC CLEARANCE OBTAINED FROM DR GARCIA ON 3.13     Does patient have a pacemaker, defibrillator, or implantable stimulator? No     Does the patient have a ride to and from procedure and someone reliable to remain with patient?  DAUGHTER     Is the patient diabetic? no     Does the patient have sleep apnea? Or use O2 at home? [facility] ON CPAP     Is the patient receiving sedation? yes     Is the patient instructed to remain NPO beginning at midnight the night before their procedure? yes     Procedure location confirmed with patient? Yes     Covid- Denies signs/symptoms. Instructed to notify PAT/MD if any changes.

## 2024-03-26 ENCOUNTER — PATIENT MESSAGE (OUTPATIENT)
Dept: INTERNAL MEDICINE | Facility: CLINIC | Age: 69
End: 2024-03-26
Payer: MEDICARE

## 2024-03-27 ENCOUNTER — TELEPHONE (OUTPATIENT)
Dept: PAIN MEDICINE | Facility: CLINIC | Age: 69
End: 2024-03-27
Payer: MEDICARE

## 2024-03-27 NOTE — TELEPHONE ENCOUNTER
----- Message from Annie Zaragoza sent at 3/27/2024  4:48 PM CDT -----  Contact: Marile Floyd is needing a call back to confirm her procedure time tomorrow. Please give her a call at [phone]

## 2024-03-28 ENCOUNTER — HOSPITAL ENCOUNTER (OUTPATIENT)
Facility: HOSPITAL | Age: 69
Discharge: HOME | End: 2024-03-28
Attending: PHYSICAL MEDICINE & REHABILITATION | Admitting: PHYSICAL MEDICINE & REHABILITATION
Payer: MEDICARE

## 2024-03-28 VITALS
TEMPERATURE: 98 F | SYSTOLIC BLOOD PRESSURE: 123 MMHG | OXYGEN SATURATION: 100 % | DIASTOLIC BLOOD PRESSURE: 77 MMHG | HEIGHT: 63 IN | RESPIRATION RATE: 14 BRPM | BODY MASS INDEX: 44.24 KG/M2 | WEIGHT: 249.69 LBS | HEART RATE: 58 BPM

## 2024-03-28 DIAGNOSIS — M54.16 LUMBAR RADICULOPATHY: Primary | ICD-10-CM

## 2024-03-28 PROCEDURE — 64483 NJX AA&/STRD TFRM EPI L/S 1: CPT | Mod: 50,,, | Performed by: PHYSICAL MEDICINE & REHABILITATION

## 2024-03-28 PROCEDURE — 25500020 PHARM REV CODE 255: Performed by: PHYSICAL MEDICINE & REHABILITATION

## 2024-03-28 PROCEDURE — 63600175 PHARM REV CODE 636 W HCPCS: Performed by: PHYSICAL MEDICINE & REHABILITATION

## 2024-03-28 PROCEDURE — 64483 NJX AA&/STRD TFRM EPI L/S 1: CPT | Mod: 50 | Performed by: PHYSICAL MEDICINE & REHABILITATION

## 2024-03-28 RX ORDER — FENTANYL CITRATE 50 UG/ML
INJECTION, SOLUTION INTRAMUSCULAR; INTRAVENOUS
Status: DISCONTINUED | OUTPATIENT
Start: 2024-03-28 | End: 2024-03-28 | Stop reason: HOSPADM

## 2024-03-28 RX ORDER — BUPIVACAINE HYDROCHLORIDE 2.5 MG/ML
INJECTION, SOLUTION EPIDURAL; INFILTRATION; INTRACAUDAL
Status: DISCONTINUED | OUTPATIENT
Start: 2024-03-28 | End: 2024-03-28 | Stop reason: HOSPADM

## 2024-03-28 RX ORDER — DEXAMETHASONE SODIUM PHOSPHATE 10 MG/ML
INJECTION INTRAMUSCULAR; INTRAVENOUS
Status: DISCONTINUED | OUTPATIENT
Start: 2024-03-28 | End: 2024-03-28 | Stop reason: HOSPADM

## 2024-03-28 RX ORDER — ONDANSETRON HYDROCHLORIDE 2 MG/ML
4 INJECTION, SOLUTION INTRAVENOUS ONCE AS NEEDED
Status: ACTIVE | OUTPATIENT
Start: 2024-03-28 | End: 2035-08-25

## 2024-03-28 RX ORDER — SODIUM BICARBONATE 1 MEQ/ML
SYRINGE (ML) INTRAVENOUS
Status: DISCONTINUED | OUTPATIENT
Start: 2024-03-28 | End: 2024-03-28 | Stop reason: HOSPADM

## 2024-03-28 RX ORDER — MIDAZOLAM HYDROCHLORIDE 1 MG/ML
INJECTION, SOLUTION INTRAMUSCULAR; INTRAVENOUS
Status: DISCONTINUED | OUTPATIENT
Start: 2024-03-28 | End: 2024-03-28 | Stop reason: HOSPADM

## 2024-03-28 NOTE — PLAN OF CARE
No distress noted  
Pt. Prepped for procedure  
orthopedic surgeon for it - you will call and let me know who to refer to    Will look into the low carb diet for weight loss and to lower your sugars    Will see you back in Jan to see how you are doing with the knee pains and the    Will fumigate just in case for the insect bites - and use lots of moisturisers

## 2024-03-28 NOTE — DISCHARGE INSTRUCTIONS

## 2024-03-28 NOTE — DISCHARGE SUMMARY
Discharge Note  Short Stay      SUMMARY     Admit Date: 3/28/2024    Attending Physician: Thanh Diaz MD        Discharge Physician: Thanh Diaz MD        Discharge Date: 3/28/2024 10:44 AM    Procedure(s) (LRB):  Bilateral L5/S1 TF GISELA (Bilateral)    Final Diagnosis: Bilateral lumbar radiculopathy [M54.16]    Disposition: Home or self care    Patient Instructions:   Current Discharge Medication List        CONTINUE these medications which have NOT CHANGED    Details   acetaminophen (TYLENOL) 325 MG tablet Take 2 tablets (650 mg total) by mouth every 8 (eight) hours as needed.  Qty: 60 tablet, Refills: 2      diclofenac sodium (VOLTAREN) 1 % Gel APPLY 2 GRAMS TOPICALLY THREE TIMES DAILY AS NEEDED  Qty: 200 g, Refills: 2    Associated Diagnoses: Status post total knee replacement using cement, left; Posterior left knee pain      ergocalciferol, vitamin D2, (VITAMIN D ORAL) Take 2,000 Units by mouth once daily.      furosemide (LASIX) 40 MG tablet Take 1 tablet (40 mg total) by mouth daily as needed (edema, swelling). Do not take if BP is less than 110/70  Qty: 90 tablet, Refills: 1    Associated Diagnoses: Essential hypertension      gabapentin (NEURONTIN) 300 MG capsule Take 2 capsules (600 mg total) by mouth every evening.  Qty: 60 capsule, Refills: 5    Associated Diagnoses: Bilateral lumbar radiculopathy; Piriformis muscle pain      levocetirizine (XYZAL) 5 MG tablet TAKE 1 TABLET BY MOUTH ONCE DAILY IN THE EVENING  Qty: 90 tablet, Refills: 0    Associated Diagnoses: Seasonal allergies      linaCLOtide (LINZESS) 72 mcg Cap capsule Take 1 capsule (72 mcg total) by mouth before breakfast.  Qty: 30 capsule, Refills: 2    Associated Diagnoses: Constipation, unspecified constipation type      meclizine (ANTIVERT) 25 mg tablet Take 1 tablet (25 mg total) by mouth 3 (three) times daily as needed for Dizziness.  Qty: 30 tablet, Refills: 0    Associated Diagnoses: Vertigo      meloxicam (MOBIC) 15 MG tablet  Take 1 tablet (15 mg total) by mouth once daily.  Qty: 90 tablet, Refills: 3    Associated Diagnoses: Bilateral lumbar radiculopathy      methocarbamoL (ROBAXIN) 750 MG Tab Take 1 tablet (750 mg total) by mouth 3 (three) times daily as needed.  Qty: 90 tablet, Refills: 3    Associated Diagnoses: Bilateral lumbar radiculopathy; Piriformis muscle pain      metoprolol succinate (TOPROL-XL) 50 MG 24 hr tablet Take 1 tablet (50 mg total) by mouth once daily.  Qty: 90 tablet, Refills: 1    Comments: .      montelukast (SINGULAIR) 10 mg tablet Take 1 tablet (10 mg total) by mouth every evening. Allergies  Qty: 30 tablet, Refills: 11    Associated Diagnoses: Acute sinusitis, recurrence not specified, unspecified location      omeprazole (PRILOSEC) 40 MG capsule Take 1 capsule by mouth once daily  Qty: 90 capsule, Refills: 3      ondansetron (ZOFRAN) 8 MG tablet Take 1 tablet (8 mg total) by mouth every 8 (eight) hours as needed for Nausea.  Qty: 20 tablet, Refills: 0      potassium chloride SA (K-DUR,KLOR-CON) 20 MEQ tablet Take 1 tablet (20 mEq total) by mouth daily as needed (if taking lasix).  Qty: 90 tablet, Refills: 1      topiramate (TOPAMAX) 100 MG tablet Take 1 tablet (100 mg total) by mouth 2 (two) times daily.  Qty: 180 tablet, Refills: 2    Associated Diagnoses: Bilateral lumbar radiculopathy; History of migraine headaches      albuterol (PROAIR HFA) 90 mcg/actuation inhaler Inhale 2 puffs into the lungs every 6 (six) hours as needed for Wheezing. Rescue  Qty: 18 g, Refills: 0    Associated Diagnoses: Bronchitis      aspirin (ECOTRIN) 81 MG EC tablet Take 1 tablet (81 mg total) by mouth once daily.  Qty: 90 tablet, Refills: 3      gentamicin (GARAMYCIN) 0.1 % ointment Apply topically 3 (three) times daily.  Qty: 30 g, Refills: 1    Associated Diagnoses: Laceration of right foot, initial encounter      imipramine (TOFRANIL) 10 MG Tab Take 1 tablet (10 mg total) by mouth every evening.  Qty: 90 tablet, Refills: 1       mupirocin (BACTROBAN) 2 % ointment Apply topically 3 (three) times daily.  Qty: 30 g, Refills: 0      nitroGLYCERIN (NITROSTAT) 0.4 MG SL tablet Place 1 tablet (0.4 mg total) under the tongue every 5 (five) minutes as needed for Chest pain.  Qty: 30 tablet, Refills: 0      nystatin (MYCOSTATIN) cream APPLY  CREAM TOPICALLY TO AFFECTED AREA TWICE DAILY  Qty: 30 g, Refills: 0    Associated Diagnoses: Tear of skin of buttock, initial encounter; Yeast dermatitis                 Discharge Diagnosis: Bilateral lumbar radiculopathy [M54.16]  Condition on Discharge: Stable with no complications to procedure   Diet on Discharge: Same as before.  Activity: as per instruction sheet.  Discharge to: Home with a responsible adult.  Follow up: 2-4 weeks       Please call the office at [phone] if you experience any weakness or loss of sensation, fever > 101.5, pain uncontrolled with oral medications, persistent nausea/vomiting/or diarrhea, redness or drainage from the incisions, or any other worrisome concerns. If physician on call was not reached or could not communicate with our office for any reason please go to the nearest emergency department

## 2024-03-28 NOTE — H&P
HPI  Patient presenting for Procedure(s) (LRB):  Bilateral L5/S1 TF GISELA (Bilateral)     No health changes since previous encounter    Past Medical History:   Diagnosis Date    COPD (chronic obstructive pulmonary disease)     NO HOME O2    Digestive disorder     Frequent headaches     Hypertension     Osteoarthritis 04/02/2019    Bilateral knees, ankles and feet    Severe obesity (BMI >= 40)     Sleep apnea     CPAP     Past Surgical History:   Procedure Laterality Date    BLADDER SUSPENSION      CATARACT EXTRACTION, BILATERAL      COLONOSCOPY N/A 12/3/2018    Procedure: COLONOSCOPY;  Surgeon: Mari Rader MD;  Location: [facility] ENDO;  Service: Endoscopy;  Laterality: N/A;    ESOPHAGOGASTRODUODENOSCOPY N/A 12/31/2020    Procedure: ESOPHAGOGASTRODUODENOSCOPY (EGD);  Surgeon: Anthony Rodríguez MD;  Location: [facility];  Service: General;  Laterality: N/A;    HYSTERECTOMY      partial 2000    INJECTION OF ANESTHETIC AGENT AROUND NERVE Left 9/14/2023    Procedure: LEFT Genicular nerve block RN IV Sedation;  Surgeon: Thanh Diaz MD;  Location: [facility] PAIN MGT;  Service: Pain Management;  Laterality: Left;    INJECTION OF ANESTHETIC AGENT INTO SACROILIAC JOINT Bilateral 11/30/2020    Procedure: Bilateral SIJ + Bilateral Piriformis + Bilateral GT Bursa Injection;  Surgeon: Thanh Diaz MD;  Location: [facility] PAIN MGT;  Service: Pain Management;  Laterality: Bilateral;    INJECTION OF JOINT Bilateral 11/30/2020    Procedure: Bilateral SIJ + Bilateral Piriformis + Bilateral GT Bursa Injection;  Surgeon: Thanh Diaz MD;  Location: [facility] PAIN MGT;  Service: Pain Management;  Laterality: Bilateral;    INJECTION OF PIRIFORMIS MUSCLE Bilateral 11/30/2020    Procedure: Bilateral SIJ + Bilateral Piriformis + Bilateral GT Bursa Injection;  Surgeon: Thanh Diaz MD;  Location: [facility] PAIN MGT;  Service: Pain Management;  Laterality: Bilateral;    ROBOT-ASSISTED LAPAROSCOPIC SLEEVE GASTRECTOMY USING DA EZEQUIEL XI N/A 3/2/2021     Procedure: XI ROBOTIC SLEEVE GASTRECTOMY;  Surgeon: Anthony Rodríguez MD;  Location: [facility] OR;  Service: General;  Laterality: N/A;    SELECTIVE INJECTION OF ANESTHETIC AGENT AROUND LUMBAR SPINAL NERVE ROOT BY TRANSFORAMINAL APPROACH Bilateral 1/4/2021    Procedure: Bilateral L5/S1 TF GISELA;  Surgeon: Thanh Diaz MD;  Location: [facility] PAIN MGT;  Service: Pain Management;  Laterality: Bilateral;    SELECTIVE INJECTION OF ANESTHETIC AGENT AROUND LUMBAR SPINAL NERVE ROOT BY TRANSFORAMINAL APPROACH Bilateral 3/23/2021    Procedure: Bilateral L5/S1 TF GISELA;  Surgeon: Thanh Diaz MD;  Location: [facility] PAIN MGT;  Service: Pain Management;  Laterality: Bilateral;    SELECTIVE INJECTION OF ANESTHETIC AGENT AROUND LUMBAR SPINAL NERVE ROOT BY TRANSFORAMINAL APPROACH Bilateral 10/5/2021    Procedure: Bilateral L5/S1 TF GISELA;  Surgeon: Thanh Diza MD;  Location: [facility] PAIN MGT;  Service: Pain Management;  Laterality: Bilateral;    tonsilectomy      TOTAL KNEE ARTHROPLASTY Left 3/4/2020    Procedure: ARTHROPLASTY, KNEE, TOTAL;  Surgeon: Moy Connolly MD;  Location: [facility] OR;  Service: Orthopedics;  Laterality: Left;     Review of patient's allergies indicates:   Allergen Reactions    Penicillins Rash        Current Facility-Administered Medications on File Prior to Encounter   Medication Dose Route Frequency Provider Last Rate Last Admin    ondansetron injection 4 mg  4 mg Intravenous Once PRN Thanh Diaz MD         Current Outpatient Medications on File Prior to Encounter   Medication Sig Dispense Refill    acetaminophen (TYLENOL) 325 MG tablet Take 2 tablets (650 mg total) by mouth every 8 (eight) hours as needed. 60 tablet 2    diclofenac sodium (VOLTAREN) 1 % Gel APPLY 2 GRAMS TOPICALLY THREE TIMES DAILY AS NEEDED 200 g 2    ergocalciferol, vitamin D2, (VITAMIN D ORAL) Take 2,000 Units by mouth once daily.      furosemide (LASIX) 40 MG tablet Take 1 tablet (40 mg total) by mouth daily as needed (edema,  swelling). Do not take if BP is less than 110/70 90 tablet 1    gabapentin (NEURONTIN) 300 MG capsule Take 2 capsules (600 mg total) by mouth every evening. 60 capsule 5    levocetirizine (XYZAL) 5 MG tablet TAKE 1 TABLET BY MOUTH ONCE DAILY IN THE EVENING 90 tablet 0    linaCLOtide (LINZESS) 72 mcg Cap capsule Take 1 capsule (72 mcg total) by mouth before breakfast. 30 capsule 2    meclizine (ANTIVERT) 25 mg tablet Take 1 tablet (25 mg total) by mouth 3 (three) times daily as needed for Dizziness. 30 tablet 0    meloxicam (MOBIC) 15 MG tablet Take 1 tablet (15 mg total) by mouth once daily. 90 tablet 3    methocarbamoL (ROBAXIN) 750 MG Tab Take 1 tablet (750 mg total) by mouth 3 (three) times daily as needed. 90 tablet 3    ondansetron (ZOFRAN) 8 MG tablet Take 1 tablet (8 mg total) by mouth every 8 (eight) hours as needed for Nausea. 20 tablet 0    potassium chloride SA (K-DUR,KLOR-CON) 20 MEQ tablet Take 1 tablet (20 mEq total) by mouth daily as needed (if taking lasix). 90 tablet 1    topiramate (TOPAMAX) 100 MG tablet Take 1 tablet (100 mg total) by mouth 2 (two) times daily. 180 tablet 2    albuterol (PROAIR HFA) 90 mcg/actuation inhaler Inhale 2 puffs into the lungs every 6 (six) hours as needed for Wheezing. Rescue 18 g 0    gentamicin (GARAMYCIN) 0.1 % ointment Apply topically 3 (three) times daily. 30 g 1    imipramine (TOFRANIL) 10 MG Tab Take 1 tablet (10 mg total) by mouth every evening. 90 tablet 1    mupirocin (BACTROBAN) 2 % ointment Apply topically 3 (three) times daily. 30 g 0    nitroGLYCERIN (NITROSTAT) 0.4 MG SL tablet Place 1 tablet (0.4 mg total) under the tongue every 5 (five) minutes as needed for Chest pain. 30 tablet 0    nystatin (MYCOSTATIN) cream APPLY  CREAM TOPICALLY TO AFFECTED AREA TWICE DAILY 30 g 0        PMHx, PSHx, Allergies, Medications reviewed in epic    ROS negative except pain complaints in HPI    OBJECTIVE:    BP (!) 168/85 (BP Location: Right arm, Patient Position:  "Sitting)   Pulse 68   Temp 98.2 °F (36.8 °C) (Temporal)   Resp 18   Ht 5' 3" (1.6 m)   Wt 113.3 kg (249 lb 10.7 oz)   SpO2 99%   Breastfeeding No   BMI 44.23 kg/m²     PHYSICAL EXAMINATION:    GENERAL: Well appearing, in no acute distress, alert and oriented x3.  PSYCH:  Mood and affect appropriate.  SKIN: Skin color, texture, turgor normal, no rashes or lesions which will impact the procedure.  CV: RRR with palpation of the radial artery.  PULM: No evidence of respiratory difficulty, symmetric chest rise. Clear to auscultation.  NEURO: Cranial nerves grossly intact.    Plan:    Proceed with procedure as planned Procedure(s) (LRB):  Bilateral L5/S1 TF GISELA (Bilateral)    Thanh Diaz MD  03/28/2024            "

## 2024-03-28 NOTE — OP NOTE
INFORMED CONSENT: The procedure, risks, benefits and options were discussed with patient. There are no contraindications to the procedure. The patient expressed understanding and agreed to proceed. The personnel performing the procedure was discussed.    03/28/2024    Surgeon: Thanh Diaz MD    Assistants: None    Sedation: Conscious sedation provided by M.D    The patient was monitored with continuous pulse oximetry, EKG, and intermittent blood pressure monitors, immediately prior to administration of sedation.  The patient was hemodynamically stable throughout the entire process was responsive to voice, and breathing spontaneously.  Supplemental O2 was provided at 2L/min via nasal cannula.  Patient was comfortable for the duration of the procedure.     There was a total of 2mg IV Midazolam and 50mcg Fentanyl titrated for the procedure    Total sedation time was >10minutes and <20minutes      PROCEDURE:  Bilateral  L5/S1  1) Left  L5/S1 TRANSFORAMINAL EPIDURAL STEROID INJECTION  2) Right  L5/S1 TRANSFORAMINAL EPIDURAL STEROID INJECTION      Pre Procedure diagnosis:  Bilateral L5/S1 Bilateral lumbar radiculopathy [M54.16]    Post-Procedure diagnosis:   same    Complications: None    Specimens: None      DESCRIPTION OF PROCEDURE: The patient was brought to the procedure room. IV access was obtained prior to the procedure. The patient was positioned prone on the fluoroscopy table. Continuous hemodynamic monitoring was initiated including blood pressure, EKG, and pulse oximetry. . The skin was prepped with chlorhexidine and draped in a sterile fashion. Skin anesthesia was achieved using a total of 10mL of lidocaine, 5mL over each respective injection site.     The  L5/S1 transforaminal spaces were identified with fluoroscopy in the  AP, oblique, and lateral views.  A 22 gauge spinal quinke needle was then advanced into the area of the trans foraminal spaces bilaterally with confirmation of proper needle position  using AP, oblique, and lateral fluoroscopic views. Once the needle tip was in the area of the transforaminal space, and there was no blood, CSF or paraesthesias,  1.5 mL of Omnipaque 300mg/ml was injected on each side for a total of 3mL.  Fluoroscopic imaging in the AP and lateral views revealed a clear outline of the spinal nerve with proximal spread of agent through the neural foramen into the epidural space. A total combination of 1 mL of Bupivicaine 0.25% and 40 mg depo medrol was injected on each side for a total of 4mL of injected medications with displacement of the contrast dye confirming that the medication went into the area of the transforaminal spaces bilaterally. A sterile dressing was applied.   Patient tolerated the procedure well.    Patient was taken back to the recovery room for further observation.     The patient was discharged to home in stable condition

## 2024-04-03 ENCOUNTER — PATIENT MESSAGE (OUTPATIENT)
Dept: PULMONOLOGY | Facility: CLINIC | Age: 69
End: 2024-04-03
Payer: MEDICARE

## 2024-04-04 ENCOUNTER — TELEPHONE (OUTPATIENT)
Dept: INTERNAL MEDICINE | Facility: CLINIC | Age: 69
End: 2024-04-04
Payer: MEDICARE

## 2024-04-04 NOTE — TELEPHONE ENCOUNTER
I called and left a vm for the pt stating that Estrellita Weller staff has been trying to reach her since 03/28/204 regarding a letter she placed in her chart to sent to [facility] and have not been successful . If the letter is still needed please contact and give a fax number to send it to. //[email or web address]

## 2024-04-06 NOTE — TELEPHONE ENCOUNTER
----- Message from Jaclyn Becerril MD sent at 11/20/2018  7:50 AM CST -----  B12 normal  Borderline anemia/low hemoglobin.   Will follow up after EMG evaluation   CONST: Well appearing in NAD  EYES: EOMI, Sclera and conjunctiva clear.   CARD: Normal S1 S2; Normal rate and rhythm  RESP: Equal BS B/L, No wheezes, rhonchi or rales. No distress  MS: Normal ROM in all extremities. No midline spinal tenderness. TTP dorsum left foot  SKIN: Warm, dry, no acute rashes. Good turgor  NEURO: A&Ox3, No focal deficits. Strength 5/5 with no sensory deficits

## 2024-04-24 NOTE — PROGRESS NOTES
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 [place]      Disclaimer:  This note was prepared using voice recognition system and is likely to have sound alike errors that may have been overlooked even after proof reading.  Please call me with any questions

## 2024-04-26 ENCOUNTER — NURSE TRIAGE (OUTPATIENT)
Dept: ADMINISTRATIVE | Facility: CLINIC | Age: 69
End: 2024-04-26
Payer: MEDICARE

## 2024-04-26 NOTE — TELEPHONE ENCOUNTER
"Reason for Disposition   Bleeding on white of the eye    Additional Information   Negative: SEVERE eye pain   Negative: [1] Eyelids are very swollen (shut or almost) AND [2] fever   Negative: [1] Eyelid (outer) is very red (or tender to touch) AND [2] fever   Negative: [1] Foreign body sensation ("feels like something is in there") AND [2] irrigation didn't help   Negative: Vomiting   Negative: [1] Recent eye surgery AND [2] increasing eye pain   Negative: Patient sounds very sick or weak to the triager   Negative: MODERATE eye pain or discomfort (e.g., interferes with normal activities or awakens from sleep; more than mild)   Negative: New or worsening blurred vision   Negative: Looking at light causes MODERATE to SEVERE eye pain (i.e., photophobia)   Negative: Cloudy spot or sore seen on the cornea (clear part of the eye)   Negative: Eyelid is very swollen (shut or almost)   Negative: Eyelid is red and painful (or tender to touch)   Negative: Eye pain present > 24 hours   Negative: [1] Bleeding on white of the eye AND [2] taking Coumadin (warfarin) or other strong blood thinner, or known bleeding disorder (e.g., thrombocytopenia)    Protocols used: Eye - Red Without Pus-A-AH  Pt called re blood shot in corner of L eye. sx started this am. Pt states eye started to clear up but now has gotten redder. no eye discharge. pt denies pain. Pt states she had been using L eye for contact for reading but pt states she took contact out. no change in vision. Afeb. Rec to see dr within 3 days. Pt agrees. Urgent office message sent to clinic to call pt Monday. First available appt with preferred provider 5/7. Offered and accepted [facility].   "

## 2024-04-27 ENCOUNTER — OFFICE VISIT (OUTPATIENT)
Dept: URGENT CARE | Facility: CLINIC | Age: 69
End: 2024-04-27
Payer: MEDICARE

## 2024-04-27 VITALS
WEIGHT: 240.75 LBS | HEIGHT: 64 IN | BODY MASS INDEX: 41.1 KG/M2 | DIASTOLIC BLOOD PRESSURE: 63 MMHG | RESPIRATION RATE: 16 BRPM | TEMPERATURE: 98 F | SYSTOLIC BLOOD PRESSURE: 108 MMHG | HEART RATE: 90 BPM | OXYGEN SATURATION: 99 %

## 2024-04-27 DIAGNOSIS — H11.32 SUBCONJUNCTIVAL HEMORRHAGE OF LEFT EYE: Primary | ICD-10-CM

## 2024-04-27 PROCEDURE — 99203 OFFICE O/P NEW LOW 30 MIN: CPT | Mod: S$GLB,,, | Performed by: NURSE PRACTITIONER

## 2024-04-27 NOTE — PATIENT INSTRUCTIONS
Rest  Maintain hydration/increase fluids  Avoid straining  Apply cool compresses to left eye as needed  Typical course and duration of illness discussed  Signs and symptoms of worsening discussed  Follow up as needed/with worsening

## 2024-04-27 NOTE — PROGRESS NOTES
"Subjective:      Patient ID: Mariel Becerril is a 68 y.o. female.    Vitals:  height is 5' 3.94" (1.624 m) and weight is 109.2 kg (240 lb 11.9 oz). Her temperature is 97.9 °F (36.6 °C). Her blood pressure is 108/63 and her pulse is 90. Her respiration is 16 and oxygen saturation is 99%.     Chief Complaint: Eye Problem    68 year old female presents for evaluation of left eye redness x 2 days. Reports waking up with symptoms Thursday morning. States that she bumped her head (left frontal) into the bathroom cabinet door while bending over and reaching for something Wednesday night but denies additional accidents/trauma/injury. Uses contact lens in left eye for reading, discontinued use with onset of redness. Now using reading glasses. Did not apply medications/drops for relief. Baby aspirin use daily, no anti-coagulant use. Reports history of constipation/straining with defecation, managed with stool softening. Denies coughing/sneezing/vomiting. Concerned about infection/pink eye.         Eye Problem   The left eye is affected. This is a new problem. The current episode started yesterday. The problem occurs constantly. The problem has been unchanged. There was no injury mechanism. The pain is at a severity of 0/10. The patient is experiencing no pain. There is No known exposure to pink eye. She Wears contacts. Associated symptoms include eye redness (left eye). Pertinent negatives include no blurred vision, eye discharge, double vision, fever, foreign body sensation, itching, nausea, photophobia, recent URI or vomiting. She has tried nothing for the symptoms. The treatment provided no relief.       Constitution: Negative. Negative for fever.   HENT: Negative.     Neck: neck negative.   Cardiovascular: Negative.    Eyes:  Positive for eye redness (left eye). Negative for eye trauma, foreign body in eye, eye discharge, eye itching, eye pain, photophobia, vision loss, double vision, blurred vision and eyelid " swelling.   Respiratory: Negative.     Gastrointestinal:  Positive for constipation. Negative for nausea, vomiting, diarrhea, bright red blood in stool, dark colored stools, rectal bleeding and rectal pain.   Endocrine: negative.   Genitourinary: Negative.    Musculoskeletal: Negative.    Skin: Negative.    Allergic/Immunologic: Negative.    Neurological: Negative.    Hematologic/Lymphatic: Negative.    Psychiatric/Behavioral: Negative.        Objective:     Physical Exam   Constitutional: She is oriented to person, place, and time. She is cooperative.  Non-toxic appearance. She does not appear ill. No distress. obesityawake  HENT:   Head: Normocephalic and atraumatic. Head is without raccoon's eyes, without Gillespie's sign, without abrasion, without contusion, without laceration, without right periorbital erythema and without left periorbital erythema.   Ears:   Right Ear: Hearing normal.   Left Ear: Hearing normal.   Eyes: Lids are normal. Pupils are equal, round, and reactive to light. No visual field deficit is present. Right eye exhibits no chemosis, no discharge, no exudate and no hordeolum. No foreign body present in the right eye. Left eye exhibits no chemosis, no discharge, no exudate and no hordeolum. No foreign body present in the left eye. Right conjunctiva is not injected. Right conjunctiva has no hemorrhage. Left conjunctiva is not injected. Left conjunctiva has a hemorrhage (lateral canthus). No scleral icterus. Right eye exhibits normal extraocular motion and no nystagmus. Left eye exhibits normal extraocular motion and no nystagmus. Right pupil is round, reactive and not sluggish. Left pupil is round, reactive and not sluggish. Pupils are equal.   Slit lamp exam:       The right eye shows no foreign body and no photophobia.        The left eye shows no foreign body and no photophobia. Extraocular movement intact periorbital hyperpigmentation  Neck: Neck supple.   Cardiovascular: Normal rate.    Pulmonary/Chest: Effort normal.   Abdominal: Normal appearance.   Musculoskeletal: Normal range of motion.         General: Normal range of motion.   Neurological: no focal deficit. She is alert and oriented to person, place, and time.   Skin: Skin is warm, dry and not diaphoretic.   Psychiatric: Her behavior is normal. Mood, judgment and thought content normal.   Nursing note and vitals reviewed.    Vision Screening    Right eye Left eye Both eyes   Without correction 20/20 20/20 20/15   With correction            Assessment:     1. Subconjunctival hemorrhage of left eye        Plan:       Subconjunctival hemorrhage of left eye        Patient presents with symptoms and examination that are consistent with subconjunctival hemorrhage of left eye. Normal vision exam/no deficits. Exam negative for conjunctivitis. Plan is to manage symptoms, prevent worsening, and follow up as needed/with worsening. Discussed with patient who verbalized understanding.      Patient Instructions   Rest  Maintain hydration/increase fluids  Avoid straining  Apply cool compresses to left eye as needed  Typical course and duration of illness discussed  Signs and symptoms of worsening discussed  Follow up as needed/with worsening

## 2024-04-29 ENCOUNTER — TELEPHONE (OUTPATIENT)
Dept: BARIATRICS | Facility: CLINIC | Age: 69
End: 2024-04-29
Payer: MEDICARE

## 2024-04-29 ENCOUNTER — PATIENT MESSAGE (OUTPATIENT)
Dept: PRIMARY CARE CLINIC | Facility: CLINIC | Age: 69
End: 2024-04-29
Payer: MEDICARE

## 2024-04-29 NOTE — TELEPHONE ENCOUNTER
Attempt to schedule pt for medical consult, no answer left voicemail with detailed message  to call back

## 2024-04-29 NOTE — TELEPHONE ENCOUNTER
----- Message from Mandi Mayo sent at 4/29/2024  1:14 PM CDT -----  Regarding: Appointment  Contact: [phone]  Calling to schedule appointment due to Morbid obesity with BMI of 40.0-44.9, adult referral . Please contact patient as soon as possible.

## 2024-05-03 ENCOUNTER — OFFICE VISIT (OUTPATIENT)
Dept: PAIN MEDICINE | Facility: CLINIC | Age: 69
End: 2024-05-03
Attending: PHYSICAL MEDICINE & REHABILITATION
Payer: MEDICARE

## 2024-05-03 VITALS
HEART RATE: 87 BPM | WEIGHT: 241.38 LBS | DIASTOLIC BLOOD PRESSURE: 68 MMHG | HEIGHT: 63 IN | SYSTOLIC BLOOD PRESSURE: 100 MMHG | BODY MASS INDEX: 42.77 KG/M2

## 2024-05-03 DIAGNOSIS — M25.562 CHRONIC PAIN OF LEFT KNEE: Primary | ICD-10-CM

## 2024-05-03 DIAGNOSIS — M54.16 BILATERAL LUMBAR RADICULOPATHY: ICD-10-CM

## 2024-05-03 DIAGNOSIS — G89.29 CHRONIC PAIN OF LEFT KNEE: Primary | ICD-10-CM

## 2024-05-03 DIAGNOSIS — Z96.652 STATUS POST TOTAL KNEE REPLACEMENT USING CEMENT, LEFT: ICD-10-CM

## 2024-05-03 PROCEDURE — 3008F BODY MASS INDEX DOCD: CPT | Mod: CPTII,S$GLB,, | Performed by: NURSE PRACTITIONER

## 2024-05-03 PROCEDURE — 99999 PR PBB SHADOW E&M-EST. PATIENT-LVL IV: CPT | Mod: PBBFAC,,, | Performed by: NURSE PRACTITIONER

## 2024-05-03 PROCEDURE — 1101F PT FALLS ASSESS-DOCD LE1/YR: CPT | Mod: CPTII,S$GLB,, | Performed by: NURSE PRACTITIONER

## 2024-05-03 PROCEDURE — 3074F SYST BP LT 130 MM HG: CPT | Mod: CPTII,S$GLB,, | Performed by: NURSE PRACTITIONER

## 2024-05-03 PROCEDURE — 1125F AMNT PAIN NOTED PAIN PRSNT: CPT | Mod: CPTII,S$GLB,, | Performed by: NURSE PRACTITIONER

## 2024-05-03 PROCEDURE — 3078F DIAST BP <80 MM HG: CPT | Mod: CPTII,S$GLB,, | Performed by: NURSE PRACTITIONER

## 2024-05-03 PROCEDURE — 3288F FALL RISK ASSESSMENT DOCD: CPT | Mod: CPTII,S$GLB,, | Performed by: NURSE PRACTITIONER

## 2024-05-03 PROCEDURE — 1159F MED LIST DOCD IN RCRD: CPT | Mod: CPTII,S$GLB,, | Performed by: NURSE PRACTITIONER

## 2024-05-03 PROCEDURE — 99214 OFFICE O/P EST MOD 30 MIN: CPT | Mod: S$GLB,,, | Performed by: NURSE PRACTITIONER

## 2024-05-08 NOTE — PRE-PROCEDURE INSTRUCTIONS
Spoke with patient regarding procedure scheduled on 5.21     Arrival time 0645     Has patient been sick with fever or on antibiotics within the last 7 days? No     Does the patient have any open wounds, sores or rashes? No     Does the patient have any recent fractures? no     Has patient received a vaccination within the last 7 days? No     Received the COVID vaccination? yes     Has the patient stopped all medications as directed? na     Does patient have a pacemaker, defibrillator, or implantable stimulator? No     Does the patient have a ride to and from procedure and someone reliable to remain with patient?  daughter     Is the patient diabetic? no     Does the patient have sleep apnea? Or use O2 at home? valentino on cpap     Is the patient receiving sedation? yes     Is the patient instructed to remain NPO beginning at midnight the night before their procedure? yes     Procedure location confirmed with patient? Yes     Covid- Denies signs/symptoms. Instructed to notify PAT/MD if any changes.

## 2024-05-09 DIAGNOSIS — R00.2 PALPITATIONS: Primary | ICD-10-CM

## 2024-05-15 ENCOUNTER — OFFICE VISIT (OUTPATIENT)
Dept: CARDIOLOGY | Facility: CLINIC | Age: 69
End: 2024-05-15
Payer: MEDICARE

## 2024-05-15 ENCOUNTER — HOSPITAL ENCOUNTER (OUTPATIENT)
Dept: CARDIOLOGY | Facility: HOSPITAL | Age: 69
Discharge: HOME | End: 2024-05-15
Attending: INTERNAL MEDICINE
Payer: MEDICARE

## 2024-05-15 VITALS
HEART RATE: 103 BPM | SYSTOLIC BLOOD PRESSURE: 94 MMHG | DIASTOLIC BLOOD PRESSURE: 62 MMHG | HEIGHT: 63 IN | BODY MASS INDEX: 41.99 KG/M2 | WEIGHT: 237 LBS | OXYGEN SATURATION: 99 %

## 2024-05-15 DIAGNOSIS — R07.89 CHEST PRESSURE: ICD-10-CM

## 2024-05-15 DIAGNOSIS — R20.2 NUMBNESS AND TINGLING OF BOTH FEET: ICD-10-CM

## 2024-05-15 DIAGNOSIS — G47.33 OSA ON CPAP: ICD-10-CM

## 2024-05-15 DIAGNOSIS — E66.01 MORBID OBESITY WITH BMI OF 40.0-44.9, ADULT: ICD-10-CM

## 2024-05-15 DIAGNOSIS — Z98.84 HX OF GASTRIC BYPASS: ICD-10-CM

## 2024-05-15 DIAGNOSIS — I49.3 SYMPTOMATIC PVCS: ICD-10-CM

## 2024-05-15 DIAGNOSIS — R20.0 NUMBNESS AND TINGLING OF BOTH FEET: ICD-10-CM

## 2024-05-15 DIAGNOSIS — K21.9 GASTROESOPHAGEAL REFLUX DISEASE, UNSPECIFIED WHETHER ESOPHAGITIS PRESENT: ICD-10-CM

## 2024-05-15 DIAGNOSIS — R00.2 PALPITATIONS: ICD-10-CM

## 2024-05-15 DIAGNOSIS — R60.0 LOCALIZED EDEMA: ICD-10-CM

## 2024-05-15 DIAGNOSIS — M17.0 PRIMARY OSTEOARTHRITIS OF BOTH KNEES: ICD-10-CM

## 2024-05-15 DIAGNOSIS — I10 ESSENTIAL HYPERTENSION: ICD-10-CM

## 2024-05-15 DIAGNOSIS — R06.09 DOE (DYSPNEA ON EXERTION): Primary | ICD-10-CM

## 2024-05-15 LAB
OHS QRS DURATION: 82 MS
OHS QTC CALCULATION: 458 MS

## 2024-05-15 PROCEDURE — 1160F RVW MEDS BY RX/DR IN RCRD: CPT | Mod: CPTII,S$GLB,, | Performed by: INTERNAL MEDICINE

## 2024-05-15 PROCEDURE — 3074F SYST BP LT 130 MM HG: CPT | Mod: CPTII,S$GLB,, | Performed by: INTERNAL MEDICINE

## 2024-05-15 PROCEDURE — 1101F PT FALLS ASSESS-DOCD LE1/YR: CPT | Mod: CPTII,S$GLB,, | Performed by: INTERNAL MEDICINE

## 2024-05-15 PROCEDURE — 93010 ELECTROCARDIOGRAM REPORT: CPT | Mod: ,,, | Performed by: INTERNAL MEDICINE

## 2024-05-15 PROCEDURE — 1125F AMNT PAIN NOTED PAIN PRSNT: CPT | Mod: CPTII,S$GLB,, | Performed by: INTERNAL MEDICINE

## 2024-05-15 PROCEDURE — 93005 ELECTROCARDIOGRAM TRACING: CPT

## 2024-05-15 PROCEDURE — 99214 OFFICE O/P EST MOD 30 MIN: CPT | Mod: S$GLB,,, | Performed by: INTERNAL MEDICINE

## 2024-05-15 PROCEDURE — G2211 COMPLEX E/M VISIT ADD ON: HCPCS | Mod: S$GLB,,, | Performed by: INTERNAL MEDICINE

## 2024-05-15 PROCEDURE — 3008F BODY MASS INDEX DOCD: CPT | Mod: CPTII,S$GLB,, | Performed by: INTERNAL MEDICINE

## 2024-05-15 PROCEDURE — 3288F FALL RISK ASSESSMENT DOCD: CPT | Mod: CPTII,S$GLB,, | Performed by: INTERNAL MEDICINE

## 2024-05-15 PROCEDURE — 1159F MED LIST DOCD IN RCRD: CPT | Mod: CPTII,S$GLB,, | Performed by: INTERNAL MEDICINE

## 2024-05-15 PROCEDURE — 3078F DIAST BP <80 MM HG: CPT | Mod: CPTII,S$GLB,, | Performed by: INTERNAL MEDICINE

## 2024-05-15 PROCEDURE — 99999 PR PBB SHADOW E&M-EST. PATIENT-LVL IV: CPT | Mod: PBBFAC,,, | Performed by: INTERNAL MEDICINE

## 2024-05-15 RX ORDER — METOPROLOL SUCCINATE 25 MG/1
25 TABLET, EXTENDED RELEASE ORAL DAILY
Qty: 90 TABLET | Refills: 1 | Status: SHIPPED | OUTPATIENT
Start: 2024-05-15

## 2024-05-15 NOTE — PROGRESS NOTES
Subjective:   Patient ID:  Mariel Becerril is a 68 y.o. female who presents for cardiac consult of No chief complaint on file.      The patient came in today for cardiac consult of No chief complaint on file.    Mariel Becerril is a 68 y.o. female with current medical conditions HTN, PVCs, obesity s/p gastric sleeve, GERD, OA, anemia, GIL on CPAP presents for follow CV evaluation.      23  Bp and HR stable. BMI 43 - 244 lbs.  LE u/s 2023 neg for DVT.     She has more back pain, had a fall, a horse was left unattended - and horse started running towards her and she tripped and fell.   She had neg Xrays after.   ECG - NSR    5/15/24  BP low today - 94/62. . BMI 41 - 236 lbs   Has more knee pain has upcoming injection.   She feels more fatigue lately.     ECG - sinus tach, V rate 102, LAE, non    Family history includes Arthritis in her mother; Depression in her sister; Heart disease in her father -  at age 81; Hypertension in her mother and sister.    Conclusion 2023         There is no evidence of a right lower extremity DVT.    There is no evidence of a left lower extremity DVT.    The right superficial femoral middle vein is normal.    The contralateral (left) common femoral vein is patent and does not have a thrombus.    The left superficial femoral middle vein is normal.    The contralateral (right) common femoral vein is patent and does not have a thrombus.    Results for orders placed during the hospital encounter of 22    Echo    Interpretation Summary  · The left ventricle is normal in size with mild concentric hypertrophy and normal systolic function.  · Mild left atrial enlargement.  · The estimated ejection fraction is 65%.  · Indeterminate left ventricular diastolic function.  · Normal right ventricular size with normal right ventricular systolic function.  · Mild mitral regurgitation.  · Mild tricuspid regurgitation.  · Intermediate central venous pressure (8 mmHg).  ·  The estimated PA systolic pressure is 29 mmHg.      Results for orders placed during the hospital encounter of 05/18/22    Nuclear Stress - Cardiology Interpreted    Interpretation Summary    Normal myocardial perfusion scan. There is no evidence of myocardial ischemia or infarction.    The gated perfusion images showed an ejection fraction of > 70% at rest. The gated perfusion images showed an ejection fraction of > 70% post stress.    The EKG portion of this study is negative for ischemia.    The patient reported no chest pain during the stress test.    There were no arrhythmias during stress.      HOLTER 4/2021  Sinus rhythm with heart rates varying between 48 and 124 bpm with an average of 77 bpm.  There were very rare PVCs totalling 12 and averaging 0.13 per hour.  There were very rare PACs totalling 24 and averaging 0.25 per hour.      Past Medical History:   Diagnosis Date    COPD (chronic obstructive pulmonary disease)     NO HOME O2    Digestive disorder     Frequent headaches     Hypertension     Osteoarthritis 04/02/2019    Bilateral knees, ankles and feet    Severe obesity (BMI >= 40)     Sleep apnea     CPAP       Past Surgical History:   Procedure Laterality Date    BLADDER SUSPENSION      CATARACT EXTRACTION, BILATERAL      COLONOSCOPY N/A 12/3/2018    Procedure: COLONOSCOPY;  Surgeon: Mari Rader MD;  Location: [facility];  Service: Endoscopy;  Laterality: N/A;    ESOPHAGOGASTRODUODENOSCOPY N/A 12/31/2020    Procedure: ESOPHAGOGASTRODUODENOSCOPY (EGD);  Surgeon: Anthony Rodríguez MD;  Location: [facility] ENDO;  Service: General;  Laterality: N/A;    HYSTERECTOMY      partial 2000    INJECTION OF ANESTHETIC AGENT AROUND NERVE Left 9/14/2023    Procedure: LEFT Genicular nerve block RN IV Sedation;  Surgeon: Thanh Diaz MD;  Location: [facility] PAIN MGT;  Service: Pain Management;  Laterality: Left;    INJECTION OF ANESTHETIC AGENT INTO SACROILIAC JOINT Bilateral 11/30/2020    Procedure: Bilateral SIJ +  Bilateral Piriformis + Bilateral GT Bursa Injection;  Surgeon: Thanh Diaz MD;  Location: [facility] PAIN MGT;  Service: Pain Management;  Laterality: Bilateral;    INJECTION OF JOINT Bilateral 11/30/2020    Procedure: Bilateral SIJ + Bilateral Piriformis + Bilateral GT Bursa Injection;  Surgeon: Thanh Diaz MD;  Location: [facility] PAIN [facility]GT;  Service: Pain Management;  Laterality: Bilateral;    INJECTION OF PIRIFORMIS MUSCLE Bilateral 11/30/2020    Procedure: Bilateral SIJ + Bilateral Piriformis + Bilateral GT Bursa Injection;  Surgeon: Thanh Diaz MD;  Location: [facility]IN [facility]GT;  Service: Pain Management;  Laterality: Bilateral;    ROBOT-ASSISTED LAPAROSCOPIC SLEEVE GASTRECTOMY USING DA EZEQUIEL XI N/A 3/2/2021    Procedure: XI ROBOTIC SLEEVE GASTRECTOMY;  Surgeon: Anthnoy Rodríguez MD;  Location: [facility];  Service: General;  Laterality: N/A;    SELECTIVE INJECTION OF ANESTHETIC AGENT AROUND LUMBAR SPINAL NERVE ROOT BY TRANSFORAMINAL APPROACH Bilateral 1/4/2021    Procedure: Bilateral L5/S1 TF GISELA;  Surgeon: Thanh Diaz MD;  Location: [facility] PAIN MGT;  Service: Pain Management;  Laterality: Bilateral;    SELECTIVE INJECTION OF ANESTHETIC AGENT AROUND LUMBAR SPINAL NERVE ROOT BY TRANSFORAMINAL APPROACH Bilateral 3/23/2021    Procedure: Bilateral L5/S1 TF GISELA;  Surgeon: Thahn Diaz MD;  Location: [facility];  Service: Pain Management;  Laterality: Bilateral;    SELECTIVE INJECTION OF ANESTHETIC AGENT AROUND LUMBAR SPINAL NERVE ROOT BY TRANSFORAMINAL APPROACH Bilateral 10/5/2021    Procedure: Bilateral L5/S1 TF GISELA;  Surgeon: Thanh Diaz MD;  Location: [facility] PAIN [facility]GT;  Service: Pain Management;  Laterality: Bilateral;    SELECTIVE INJECTION OF ANESTHETIC AGENT AROUND LUMBAR SPINAL NERVE ROOT BY TRANSFORAMINAL APPROACH Bilateral 3/28/2024    Procedure: Bilateral L5/S1 TF GISELA;  Surgeon: Thanh Diaz MD;  Location: [facility];  Service: Pain Management;  Laterality: Bilateral;    tonsilectomy       TOTAL KNEE ARTHROPLASTY Left 3/4/2020    Procedure: ARTHROPLASTY, KNEE, TOTAL;  Surgeon: Moy Connolly MD;  Location: [facility];  Service: Orthopedics;  Laterality: Left;       Social History     Tobacco Use    Smoking status: Never     Passive exposure: Never    Smokeless tobacco: Never   Substance Use Topics    Alcohol use: Never    Drug use: No       Family History   Problem Relation Name Age of Onset    Heart attack Father         Patient's Medications   New Prescriptions    No medications on file   Previous Medications    ACETAMINOPHEN (TYLENOL) 325 MG TABLET    Take 2 tablets (650 mg total) by mouth every 8 (eight) hours as needed.    ALBUTEROL (PROAIR HFA) 90 MCG/ACTUATION INHALER    Inhale 2 puffs into the lungs every 6 (six) hours as needed for Wheezing. Rescue    ASPIRIN (ECOTRIN) 81 MG EC TABLET    Take 1 tablet (81 mg total) by mouth once daily.    DICLOFENAC SODIUM (VOLTAREN) 1 % GEL    APPLY 2 GRAMS TOPICALLY THREE TIMES DAILY AS NEEDED    ERGOCALCIFEROL, VITAMIN D2, (VITAMIN D ORAL)    Take 2,000 Units by mouth once daily.    FUROSEMIDE (LASIX) 40 MG TABLET    Take 1 tablet (40 mg total) by mouth daily as needed (edema, swelling). Do not take if BP is less than 110/70    GABAPENTIN (NEURONTIN) 300 MG CAPSULE    Take 2 capsules (600 mg total) by mouth every evening.    GENTAMICIN (GARAMYCIN) 0.1 % OINTMENT    Apply topically 3 (three) times daily.    IMIPRAMINE (TOFRANIL) 10 MG TAB    Take 1 tablet (10 mg total) by mouth every evening.    LEVOCETIRIZINE (XYZAL) 5 MG TABLET    TAKE 1 TABLET BY MOUTH ONCE DAILY IN THE EVENING    LINACLOTIDE (LINZESS) 72 MCG CAP CAPSULE    Take 1 capsule (72 mcg total) by mouth before breakfast.    MECLIZINE (ANTIVERT) 25 MG TABLET    Take 1 tablet (25 mg total) by mouth 3 (three) times daily as needed for Dizziness.    MELOXICAM (MOBIC) 15 MG TABLET    Take 1 tablet (15 mg total) by mouth once daily.    METHOCARBAMOL (ROBAXIN) 750 MG TAB    Take 1 tablet (750 mg  total) by mouth 3 (three) times daily as needed.    METOPROLOL SUCCINATE (TOPROL-XL) 50 MG 24 HR TABLET    Take 1 tablet (50 mg total) by mouth once daily.    MONTELUKAST (SINGULAIR) 10 MG TABLET    Take 1 tablet (10 mg total) by mouth every evening. Allergies    MUPIROCIN (BACTROBAN) 2 % OINTMENT    Apply topically 3 (three) times daily.    NITROGLYCERIN (NITROSTAT) 0.4 MG SL TABLET    Place 1 tablet (0.4 mg total) under the tongue every 5 (five) minutes as needed for Chest pain.    NYSTATIN (MYCOSTATIN) CREAM    APPLY  CREAM TOPICALLY TO AFFECTED AREA TWICE DAILY    OMEPRAZOLE (PRILOSEC) 40 MG CAPSULE    Take 1 capsule by mouth once daily    ONDANSETRON (ZOFRAN) 8 MG TABLET    Take 1 tablet (8 mg total) by mouth every 8 (eight) hours as needed for Nausea.    POTASSIUM CHLORIDE SA (K-DUR,KLOR-CON) 20 MEQ TABLET    Take 1 tablet (20 mEq total) by mouth daily as needed (if taking lasix).    TOPIRAMATE (TOPAMAX) 100 MG TABLET    Take 1 tablet (100 mg total) by mouth 2 (two) times daily.   Modified Medications    No medications on file   Discontinued Medications    No medications on file       Review of Systems   Constitutional: Negative.    HENT: Negative.     Eyes: Negative.    Respiratory:  Positive for shortness of breath (improved).    Cardiovascular:  Positive for palpitations. Negative for chest pain and leg swelling.   Gastrointestinal:  Positive for heartburn.   Genitourinary: Negative.    Musculoskeletal:  Positive for joint pain.   Skin: Negative.    Neurological:  Positive for dizziness.   Endo/Heme/Allergies: Negative.    Psychiatric/Behavioral: Negative.     All 12 systems otherwise negative.      Wt Readings from Last 3 Encounters:   05/15/24 107.5 kg (236 lb 15.9 oz)   05/03/24 109.5 kg (241 lb 6.5 oz)   04/27/24 109.2 kg (240 lb 11.9 oz)     Temp Readings from Last 3 Encounters:   04/27/24 97.9 °F (36.6 °C)   03/28/24 98.2 °F (36.8 °C) (Temporal)   02/26/24 97.4 °F (36.3 °C) (Tympanic)     BP Readings  "from Last 3 Encounters:   05/15/24 94/62   05/03/24 100/68   04/27/24 108/63     Pulse Readings from Last 3 Encounters:   05/15/24 103   05/03/24 87   04/27/24 90       BP 94/62 (BP Location: Right arm, Patient Position: Sitting)   Pulse 103   Ht 5' 3" (1.6 m)   Wt 107.5 kg (236 lb 15.9 oz)   SpO2 99%   BMI 41.98 kg/m²     Objective:   Physical Exam  Vitals and nursing note reviewed.   Constitutional:       General: She is not in acute distress.     Appearance: She is well-developed. She is not diaphoretic.   HENT:      Head: Normocephalic and atraumatic.      Nose: Nose normal.   Eyes:      General: No scleral icterus.     Conjunctiva/sclera: Conjunctivae normal.   Neck:      Thyroid: No thyromegaly.      Vascular: No JVD.   Cardiovascular:      Rate and Rhythm: Normal rate and regular rhythm.      Heart sounds: S1 normal and S2 normal. Murmur heard.      No friction rub. No gallop. No S3 or S4 sounds.   Pulmonary:      Effort: Pulmonary effort is normal. No respiratory distress.      Breath sounds: Normal breath sounds. No stridor. No wheezing or rales.   Chest:      Chest wall: No tenderness.   Abdominal:      General: Bowel sounds are normal. There is no distension.      Palpations: Abdomen is soft. There is no mass.      Tenderness: There is no abdominal tenderness. There is no rebound.   Genitourinary:     Comments: Deferred  Musculoskeletal:         General: No tenderness or deformity. Normal range of motion.      Cervical back: Normal range of motion and neck supple.   Lymphadenopathy:      Cervical: No cervical adenopathy.   Skin:     General: Skin is warm and dry.      Coloration: Skin is not pale.      Findings: No erythema or rash.   Neurological:      Mental Status: She is alert and oriented to person, place, and time.      Motor: No abnormal muscle tone.      Coordination: Coordination normal.   Psychiatric:         Behavior: Behavior normal.         Thought Content: Thought content normal.        "  Judgment: Judgment normal.         Lab Results   Component Value Date     02/19/2024    K 3.6 02/19/2024     (H) 02/19/2024    CO2 25 02/19/2024    BUN 19 02/19/2024    CREATININE 0.7 02/19/2024    GLU 94 02/19/2024    HGBA1C 5.4 11/09/2022    MG 2.1 03/11/2021    AST 23 02/19/2024    ALT 19 02/19/2024    ALBUMIN 3.4 (L) 02/19/2024    PROT 6.4 02/19/2024    BILITOT 0.3 02/19/2024    WBC 6.19 02/19/2024    HGB 12.7 02/19/2024    HCT 41.4 02/19/2024    MCV 98 02/19/2024     02/19/2024    INR 1.0 10/27/2021    TSH 1.313 11/29/2023    CHOL 155 11/29/2023    HDL 59 11/29/2023    LDLCALC 81.4 11/29/2023    TRIG 73 11/29/2023    BNP <10 08/20/2018     Assessment:      1. AREVALO (dyspnea on exertion)    2. GIL on CPAP    3. Symptomatic PVCs    4. Morbid obesity with BMI of 40.0-44.9, adult    5. Palpitations    6. Hx of gastric bypass    7. Localized edema    8. Essential hypertension    9. Numbness and tingling of both feet    10. Gastroesophageal reflux disease, unspecified whether esophagitis present    11. Chest pressure    12. Primary osteoarthritis of both knees              Plan:   1. Chest pressure/AREVALO   - prior Nuclear stress neg 5/2022.   - ECHO with normal bi V function, mild MR, mild TR, PASP 29 mmHg.   - cont NTG    2. AREVALO with edema sec to CVI  - improved with lasix 40 mg  - rec compression stockings  - neg for DVT 9/2023    3. HTN  - BP Low   - titrate meds - dec to Toprol 25 mg QHS  - stoped Norvasc  - low salt diet    4. Obesity s/p gastric sleeve 2/2021 with gerd; BMI 42 - 238 lbs -->  BMI 43 - 244 lbs. BMI 41 - 236 lbs   - rec weight loss with diet/exercise; cont PPI  - f/u GI as well - proceed for EGD if needed     5. GIL, poor sleep   - cont CPAP  - appreciate pulm recs    6. Palpitations sec to PVCS  - cont BB  - Holter 4/2021 rare PVCs, PACs, AVG HR 77    7. Anemia  - f/u with heme/onc    8. Leg/back, knee pain  - cont PT/OT  - f/u pain and ortho       Visit today included increased  complexity associated with the care of the episodic problem Chest pain addressed and managing the longitudinal care of the patient due to the serious and/or complex managed problem(s) .      Thank you for allowing me to participate in this patient's care. Please do not hesitate to contact me with any questions or concerns. Consult note has been forwarded to the referral physician.     Juan Alberto Luis MD, [facility]  Cardiovascular Disease  [facility], [place]  [phone] (P)

## 2024-05-16 ENCOUNTER — OFFICE VISIT (OUTPATIENT)
Dept: ORTHOPEDICS | Facility: CLINIC | Age: 69
End: 2024-05-16
Payer: MEDICARE

## 2024-05-16 ENCOUNTER — PATIENT OUTREACH (OUTPATIENT)
Dept: ADMINISTRATIVE | Facility: HOSPITAL | Age: 69
End: 2024-05-16
Payer: MEDICARE

## 2024-05-16 ENCOUNTER — LAB VISIT (OUTPATIENT)
Dept: LAB | Facility: HOSPITAL | Age: 69
End: 2024-05-16
Attending: ORTHOPAEDIC SURGERY
Payer: MEDICARE

## 2024-05-16 VITALS — BODY MASS INDEX: 41.99 KG/M2 | WEIGHT: 237 LBS | HEIGHT: 63 IN

## 2024-05-16 DIAGNOSIS — M25.561 PAIN IN BOTH KNEES, UNSPECIFIED CHRONICITY: Primary | ICD-10-CM

## 2024-05-16 DIAGNOSIS — M17.11 ARTHRITIS OF KNEE, RIGHT: ICD-10-CM

## 2024-05-16 DIAGNOSIS — M48.062 LUMBAR STENOSIS WITH NEUROGENIC CLAUDICATION: ICD-10-CM

## 2024-05-16 DIAGNOSIS — M25.562 PAIN IN BOTH KNEES, UNSPECIFIED CHRONICITY: Primary | ICD-10-CM

## 2024-05-16 DIAGNOSIS — M54.17 LUMBOSACRAL RADICULOPATHY AT L5: ICD-10-CM

## 2024-05-16 DIAGNOSIS — M54.17 LUMBOSACRAL RADICULOPATHY AT S1: ICD-10-CM

## 2024-05-16 DIAGNOSIS — Q76.49 CONGENITAL CERVICAL SPINE STENOSIS: ICD-10-CM

## 2024-05-16 DIAGNOSIS — Z96.652 HISTORY OF TOTAL LEFT KNEE REPLACEMENT: ICD-10-CM

## 2024-05-16 DIAGNOSIS — Z96.652 HISTORY OF TOTAL LEFT KNEE REPLACEMENT: Primary | ICD-10-CM

## 2024-05-16 DIAGNOSIS — M47.817 ARTHROPATHY OF LUMBOSACRAL FACET JOINT: ICD-10-CM

## 2024-05-16 DIAGNOSIS — Z12.11 SPECIAL SCREENING FOR MALIGNANT NEOPLASMS, COLON: Primary | ICD-10-CM

## 2024-05-16 LAB
BASOPHILS # BLD AUTO: 0.04 K/UL (ref 0–0.2)
BASOPHILS NFR BLD: 0.7 % (ref 0–1.9)
CRP SERPL-MCNC: 0.5 MG/L (ref 0–8.2)
DIFFERENTIAL METHOD BLD: ABNORMAL
EOSINOPHIL # BLD AUTO: 0.1 K/UL (ref 0–0.5)
EOSINOPHIL NFR BLD: 1.7 % (ref 0–8)
ERYTHROCYTE [DISTWIDTH] IN BLOOD BY AUTOMATED COUNT: 14 % (ref 11.5–14.5)
ERYTHROCYTE [SEDIMENTATION RATE] IN BLOOD BY WESTERGREN METHOD: 2 MM/HR (ref 0–20)
HCT VFR BLD AUTO: 40.4 % (ref 37–48.5)
HGB BLD-MCNC: 12.3 G/DL (ref 12–16)
IMM GRANULOCYTES # BLD AUTO: 0.01 K/UL (ref 0–0.04)
IMM GRANULOCYTES NFR BLD AUTO: 0.2 % (ref 0–0.5)
LYMPHOCYTES # BLD AUTO: 2.1 K/UL (ref 1–4.8)
LYMPHOCYTES NFR BLD: 35.4 % (ref 18–48)
MCH RBC QN AUTO: 29.9 PG (ref 27–31)
MCHC RBC AUTO-ENTMCNC: 30.4 G/DL (ref 32–36)
MCV RBC AUTO: 98 FL (ref 82–98)
MONOCYTES # BLD AUTO: 0.4 K/UL (ref 0.3–1)
MONOCYTES NFR BLD: 6.2 % (ref 4–15)
NEUTROPHILS # BLD AUTO: 3.4 K/UL (ref 1.8–7.7)
NEUTROPHILS NFR BLD: 55.8 % (ref 38–73)
NRBC BLD-RTO: 0 /100 WBC
PLATELET # BLD AUTO: 235 K/UL (ref 150–450)
PMV BLD AUTO: 9.8 FL (ref 9.2–12.9)
RBC # BLD AUTO: 4.11 M/UL (ref 4–5.4)
WBC # BLD AUTO: 5.99 K/UL (ref 3.9–12.7)

## 2024-05-16 PROCEDURE — 99214 OFFICE O/P EST MOD 30 MIN: CPT | Mod: S$GLB,,, | Performed by: ORTHOPAEDIC SURGERY

## 2024-05-16 PROCEDURE — 1159F MED LIST DOCD IN RCRD: CPT | Mod: CPTII,S$GLB,, | Performed by: ORTHOPAEDIC SURGERY

## 2024-05-16 PROCEDURE — 99999 PR PBB SHADOW E&M-EST. PATIENT-LVL IV: CPT | Mod: PBBFAC,,, | Performed by: ORTHOPAEDIC SURGERY

## 2024-05-16 PROCEDURE — 85651 RBC SED RATE NONAUTOMATED: CPT | Performed by: ORTHOPAEDIC SURGERY

## 2024-05-16 PROCEDURE — 3008F BODY MASS INDEX DOCD: CPT | Mod: CPTII,S$GLB,, | Performed by: ORTHOPAEDIC SURGERY

## 2024-05-16 PROCEDURE — 1101F PT FALLS ASSESS-DOCD LE1/YR: CPT | Mod: CPTII,S$GLB,, | Performed by: ORTHOPAEDIC SURGERY

## 2024-05-16 PROCEDURE — 1125F AMNT PAIN NOTED PAIN PRSNT: CPT | Mod: CPTII,S$GLB,, | Performed by: ORTHOPAEDIC SURGERY

## 2024-05-16 PROCEDURE — 3288F FALL RISK ASSESSMENT DOCD: CPT | Mod: CPTII,S$GLB,, | Performed by: ORTHOPAEDIC SURGERY

## 2024-05-16 PROCEDURE — 86140 C-REACTIVE PROTEIN: CPT | Performed by: ORTHOPAEDIC SURGERY

## 2024-05-16 PROCEDURE — 85025 COMPLETE CBC W/AUTO DIFF WBC: CPT | Performed by: ORTHOPAEDIC SURGERY

## 2024-05-16 PROCEDURE — 36415 COLL VENOUS BLD VENIPUNCTURE: CPT | Performed by: ORTHOPAEDIC SURGERY

## 2024-05-16 NOTE — PATIENT INSTRUCTIONS
You feel your knee is stiff  Previously last you got a genicular nerve block by pain management seems to help but it wore off  You are scheduled to have another 1 done in the left knee coming up soon  Last year bone scan did not show definite loosening of prosthesis and your x-ray did not show that  We did last year in August also sed rate and CRP to see if you have an infection or inflammation and that came back normal  Plan   Repeat the blood test today if it is elevated then we will aspirate her knee   Can go ahead and do your nerve block with Dr. Diaz  If your blood test is elevated I will call you back to aspirate her knee  Next time I see you within 3 months I need to obtain repeat x-ray to compared to the previous 1

## 2024-05-16 NOTE — PROGRESS NOTES
Population Health Chart Review & Patient Outreach Details      Additional [facility] Health Notes:    [facility] referral to endo  placed per COOPER in basket message 05/16/2024 stating that pt agrees to colonoscopy.           Updates Requested / Reviewed:      Updated Care Coordination Note, Care Everywhere, and Immunizations Reconciliation Completed or Queried: [facility] Topics Overdue:      [facility] Score: 1     Colon Cancer Screening    Shingles/Zoster Vaccine  RSV Vaccine                  Health Maintenance Topic(s) Outreach Outcomes & Actions Taken:    Colorectal Cancer Screening - Outreach Outcomes & Actions Taken  : Colonoscopy Case Request / Referral / Home Test Order Placed

## 2024-05-17 NOTE — PROGRESS NOTES
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 (activity can be seen up to 5 years after TKA). Less likely is mild loosening of the prosthesis although radiograph from 05/24/2023 demonstrates no evidence of abnormal periprosthetic lucency.     There is focal right patellar uptake on the 10 minute blood pool and 3 hour delay images suspicious for patellofemoral osteoarthritis   Bone scan did not reveal loosening of the total knee on the left.  There is evidence of arthritis of the right knee  X-ray 05/24/2023 present in the electronic records showing left TKA still in excellent alignment.  It is not oversized.  I do not see any poly wear or cystic changes.  The right knee with mild degenerative changes and very small marginal osteophytes  X-ray 04/18/2022 left TKA in excellent alignment no evidence of failure.  No poly wear.  Right knee with some mild degeneration and very small marginal osteophyte  X-ray 05/24/2021 left knee with TKA in excellent alignment no signs of infection.  Right knee joint space well maintained no fracture seen    MRI LS spine showing grade 1 spondylolisthesis of 4 mm of L4-5.  Moderate to severe facet arthropathy with mild central stenosis and moderate foraminal stenosis.  L5-S1 with facet arthropathy with bilateral foraminal stenosis.  At T12-L1 is calcified and extruded posteriorly discs  EMG-NCV reported  negative    X-ray 06/29/2020 left TKA excellent alignment no evidence of fracture  X-ray 06/29/2020 LS spine with severe degenerative disc disease L4-5 and L5-S1. spondylolisthesis of L4 on 5 grade 1  X-ray 05/22/2020 showing left TKA in excellent alignment.  No evidence of fracture  X-ray and electronic records showing TKA in excellent alignment no evidence of fracture left knee  EXAMINATION:  MRI CERVICAL SPINE W WO CONTRAST     CLINICAL HISTORY:  hemiparesis;  Hemiplegia, unspecified affecting unspecified side     TECHNIQUE:  MRI of the cervical spine with and without contrast     COMPARISON:  None     FINDINGS:  Vertebral body heights are preserved.  Spinal cord signal is unremarkable.  No evidence abnormal enhancement.  No significant neural foraminal narrowing     C2-C3: Minimal posterior disc osteophyte with the AP canal diameter of 9.8 mm suggestive of congenital spinal stenosis     C3-C4: Minimal posterior disc osteophyte with AP canal diameter of 9.6 mm.  No neural foraminal narrowing     C4-C5: mild posterior disc osteophyte with congenital spinal stenosis     C5-C6 effacement of the cyst thecal sac AP canal diameter measures up to 12 mm.  Mild ligamentum hypertrophy     C6-C7: Mild posterior disc osteophyte or ossification of the posterior lung to ligament.  No neural foraminal narrowing or spinal canal stenosis     Impression:     Minimal degenerative disc disease.  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  No neural foraminal narrowing     No evidence for abnormal enhancement        Electronically signed by:Jose Alberto Pabon  Date:                                            10/25/2021      EXAMINATION:  MRI LUMBAR SPINE WITHOUT CONTRAST     CLINICAL HISTORY:  Hemiplegia, unspecified affecting unspecified sideLow back pain, symptoms persist with > 6wks conservative treatment;Spinal stenosis, lumbar;Spondylolisthesis;     TECHNIQUE:  Standard multiplanar noncontrast MRI sequences of the lumbar  spine.     Date of exam: 10/25/2021     COMPARISON:  Comparison is made to the prior examination 07/21/2020     FINDINGS:  FINDINGS:     The distal cord and conus appear normal.     The lumbar vertebra reveal grade 1 L4-5 spondylolisthesis.  Small L3 vertebral body hemangioma is present.     No acute or chronic compression fracture.     T12-L1: There is unchanged broad-based disc bulge with hypointense T1 and T2 signal material extending both superiorly and inferiorly from the disc margin.  Possible old calcified disc extrusion versus calcification of the posterior longitudinal ligament.     L1-2:     Mild disc bulge.     L2-3:     Mild disc bulge with mild hypertrophic ligamentum flavum.     L3-4:     Mild disc bulge with mild hypertrophic ligamentum flavum.     L4-5:     Mild disc degeneration with disc narrowing, desiccation and disc bulge.  Moderate to severe facet arthritis with grade 1 spondylolisthesis of approximately 4 mm.  Mild central with moderate foraminal stenosis.  Spinal canal stenosis measuring up to 8 mm.     L5-S1:    Mild disc degeneration with generalized disc bulge.  Moderate left and mild right facet arthritis.  Mild lateral recess stenosis with mild bilateral foraminal stenosis.  Mild spinal stenosis     Impression:     L4-5 disc degeneration with facet arthrosis and grade 1 spondylolisthesis with mild central and moderate bilateral foraminal stenosis.  Mild spinal stenosis     Mild L5-S1 disc degeneration with facet arthritis with mild lateral recess and bilateral foraminal stenosis.  Mild spinal stenosis     T12-L1 disc bulge with hypointense signal extending inferiorly and superiorly from the disc margin with considerations including old calcified disc extrusion versus calcification of the posterior longitudinal ligament.     Stable findings        Electronically signed by: Jose Alberto Pabon  Date:                                            10/25/2021  Time:                                            17:46           Exam Ended: 10/25/21 16:51 Last Resulted: 10/25/21 17:46      Order Details    View Encounter    Lab and Collection Details    Routing    Result History     View All Conversations on this Encounter           Result Care Coordination    Patient Communication  Append Comments  Seen Back to Top    Moderate to severe lumbar stenosis at L4-5. This level also has a slippage called a spondylolisthesis. Slippage appears stable when you move, but the combined issues will likely require a fusion to address. Let me know if you need an appt with me on the [facility]. [facility] should be able to see your images in their system if you choose to transfer care.  ...   Written by Neda Beal PA-C on 10/27/2021  1:35 PM CDT View Full Comments  Seen by patient Mariel Jenn Becerril on 11/29/2021 11:48 AM          MRI Lumbar Spine Without Contrast: Patient Communication  Append Comments  Seen    Moderate to severe lumbar stenosis at L4-5. This level also has a slippage called a spondylolisthesis. Slippage appears stable when you move, but the combined issues will likely require a fusion to address. Let me know if you need an appt with me on the [facility]. [facility] should be able to see your images in their system if you choose to transfer care.      Neda   Written by Neda Beal PA-C on 10/27/2021  1:35 PM CDT  Seen by patient Mariel Jenn Becerril on 11/29/2021 11:48 AM    External Result Report    External Result Report     Reviewed By    Neda Beal PA-C on 10/27/2021 13:33     Reason for Exam  Priority: STAT  Low back pain, symptoms persist with > 6wks conservative treatment; Spinal stenosis, lumbar; Spondylolisthesis   Dx: Hemiparesis, unspecified hemiparesis etiology, unspecified laterality [G81.90 (ICD-10-CM)]; Spondylolisthesis at L4-L5 level [M43.16 (ICD-10-CM)]; Chronic left-sided low back pain with left-sided sciatica [M54.42, G89.29 (ICD-10-CM)];  Dorsalgia, unspecified [M54.9 (ICD-10-CM)]; Spinal stenosis, lumbosacral region [M48.07 (ICD-10-CM)]; Spondylolisthesis of lumbar region [M43.16 (ICD-10-CM)]         Order Report    Order Details      Assessment:     Encounter Diagnoses   Name Primary?    History of total left knee replacement Yes    Arthritis of knee, right     Lumbosacral radiculopathy at S1     Lumbosacral radiculopathy at L5     Lumbar stenosis with neurogenic claudication     Arthropathy of lumbosacral facet joint     Congenital cervical spine stenosis         Plan:   History of total left knee replacement    Arthritis of knee, right    Lumbosacral radiculopathy at S1    Lumbosacral radiculopathy at L5    Lumbar stenosis with neurogenic claudication    Arthropathy of lumbosacral facet joint    Congenital cervical spine stenosis         Patient Instructions   You feel your knee is stiff  Previously last you got a genicular nerve block by pain management seems to help but it wore off  You are scheduled to have another 1 done in the left knee coming up soon  Last year bone scan did not show definite loosening of prosthesis and your x-ray did not show that  We did last year in August also sed rate and CRP to see if you have an infection or inflammation and that came back normal  Plan   Repeat the blood test today if it is elevated then we will aspirate her knee   Can go ahead and do your nerve block with Dr. Diaz  If your blood test is elevated I will call you back to aspirate her knee  Next time I see you within 3 months I need to obtain repeat x-ray to compared to the previous 1     Patient Instructions   You feel your knee is stiff  Previously last you got a genicular nerve block by pain management seems to help but it wore off  You are scheduled to have another 1 done in the left knee coming up soon  Last year bone scan did not show definite loosening of prosthesis and your x-ray did not show that  We did last year in August also sed rate and  CRP to see if you have an infection or inflammation and that came back normal  Plan   Repeat the blood test today if it is elevated then we will aspirate her knee   Can go ahead and do your nerve block with Dr. Diaz  If your blood test is elevated I will call you back to aspirate her knee  Next time I see you within 3 months I need to obtain repeat x-ray to compared to the previous 1  Addendum   CBC, sed rate, CRP 5/16/24 within normal limit there is no reason to aspirate the knee or do any further workup    Previous discussion  Discussed with the patient who had EMG-NCV performed 2 years ago with  with bilateral carpal tunnel syndrome and did not do her surgery.  Her EMG-NCV to the legs was positive for right L5-S1 and left L5-S1 radiculopathy  You did go see Dr. Blanton what you had decompression on your back and that helped her leave a lot of her pain the legs (patient stated she did not have surgery she described therapy as decompression of the spine).  You do have some burning and numbness into the big toe as well into the hands.  You are taking gabapentin on as needed basis.  My recommendation to stay on gabapentin work herself up as given in the instructions to help it work  In the future you can have carpal tunnel injected and I did discuss briefly carpal tunnel release which could be performed in the future if needed.  However I did tell her we can not wait until you start dropping things from her hand which means you need to have surgery right away.  Surgery is approximately 5-10 minutes done under local with sedation.  You release the ligament over the nerve and the nerve has to heal itself.  There is slight risk of nerve damage vascular damage infection blood fat clot    Disclaimer: This note was prepared using a voice recognition system and is likely to have sound alike errors within the text.

## 2024-05-20 ENCOUNTER — TELEPHONE (OUTPATIENT)
Dept: ORTHOPEDICS | Facility: CLINIC | Age: 69
End: 2024-05-20
Payer: MEDICARE

## 2024-05-20 ENCOUNTER — TELEPHONE (OUTPATIENT)
Dept: INTERNAL MEDICINE | Facility: CLINIC | Age: 69
End: 2024-05-20
Payer: MEDICARE

## 2024-05-20 RX ORDER — ONDANSETRON HYDROCHLORIDE 2 MG/ML
4 INJECTION, SOLUTION INTRAVENOUS ONCE AS NEEDED
Status: CANCELLED | OUTPATIENT
Start: 2024-05-20 | End: 2035-10-17

## 2024-05-20 NOTE — TELEPHONE ENCOUNTER
----- Message from Salvatore Duarte sent at 5/20/2024  7:29 AM CDT -----  Contact: self   .Type:  Needs Medical Advice    Who Called: Pt   Symptoms (please be specific): nauseated/blister on hand/headaches   How long has patient had these symptoms: last few days   Pharmacy name and phone #:  .  [facility] [address] [place] [address] - [address]  [address] [facility] [address]  Phone: [phone] Fax: [phone]      Would the patient rather a call back or a response via [facility]? Call back   Best Call Back Number: .[phone] or  [phone]     Additional Information: Pt states she feels nauseated and is concerned about blister on hand . Pt wants to know if she should wait to take injectionb until she feels better. Pt wants to know if she can be seen today if possible

## 2024-05-20 NOTE — TELEPHONE ENCOUNTER
Called and spoke with patient informed her that her lab values are WNL per dr. Connolly / saroj hernandez pac

## 2024-05-20 NOTE — TELEPHONE ENCOUNTER
----- Message from Salvatore Duarte sent at 5/20/2024  7:25 AM CDT -----  Contact: self  [phone]  .Type:  Test Results    Who Called: Pt   Name of Test (Lab/Mammo/Etc): labs  Date of Test: 5/16/2024  Ordering Provider: Moy Connolly  Where the test was performed: [place]  Would the patient rather a call back or a response via [facility]?call back  Best Call Back Number: .[phone] (home)  or  [phone]     Additional Information:  pt is needing a call back to discuss lab results

## 2024-05-20 NOTE — TELEPHONE ENCOUNTER
I called the pt and she wanted to know if she should hold off on her injection tomorrow since she's feeling bad and after speaking to Estrellita PHIPPS she stated to inform the pt to hold off and reschedule if she's feeling bad as it may be something viral. I also canceled the pt's 6 mo f/u on 5/29/24 due to her being scheduled for 5/22/24 . She verbalized understanding of providers recommendations. She's using otc bactrim on the blisters and want to know if she should be using it. fyi//kah

## 2024-05-21 ENCOUNTER — TELEPHONE (OUTPATIENT)
Dept: INTERNAL MEDICINE | Facility: CLINIC | Age: 69
End: 2024-05-21
Payer: MEDICARE

## 2024-05-21 ENCOUNTER — TELEPHONE (OUTPATIENT)
Dept: PAIN MEDICINE | Facility: CLINIC | Age: 69
End: 2024-05-21
Payer: MEDICARE

## 2024-05-21 ENCOUNTER — HOSPITAL ENCOUNTER (OUTPATIENT)
Dept: PREADMISSION TESTING | Facility: HOSPITAL | Age: 69
Discharge: HOME | End: 2024-05-21
Attending: PHYSICAL MEDICINE & REHABILITATION | Admitting: PHYSICAL MEDICINE & REHABILITATION
Payer: MEDICARE

## 2024-05-21 DIAGNOSIS — Z12.11 SPECIAL SCREENING FOR MALIGNANT NEOPLASMS, COLON: Primary | ICD-10-CM

## 2024-05-21 RX ORDER — SODIUM, POTASSIUM,MAG SULFATES 17.5-3.13G
1 SOLUTION, RECONSTITUTED, ORAL ORAL DAILY
Qty: 1 KIT | Refills: 0 | Status: SHIPPED | OUTPATIENT
Start: 2024-05-21 | End: 2024-05-23

## 2024-05-21 NOTE — TELEPHONE ENCOUNTER
----- Message from Carolyn Luciano MA sent at 5/20/2024  4:35 PM CDT -----  Contact: liza    ----- Message -----  From: Aquilino Talavera  Sent: 5/20/2024   4:25 PM CDT  To: Emily Law Staff    Liza is calling regarding rescheduling her injection.  Please give her a call back at [phone]

## 2024-05-21 NOTE — TELEPHONE ENCOUNTER
----- Message from Diamone Speed sent at 5/21/2024  4:53 PM CDT -----  Regarding: self  Type: Patient Call Back       Who called: self        What is the request in detail: pt  needs a call anat k        Can the clinic reply by [facility]? Yes       Would the patient rather a call back or a response via [facility]? Call back       Best call back number: [phone]

## 2024-05-22 ENCOUNTER — OFFICE VISIT (OUTPATIENT)
Dept: INTERNAL MEDICINE | Facility: CLINIC | Age: 69
End: 2024-05-22
Payer: MEDICARE

## 2024-05-22 ENCOUNTER — PATIENT MESSAGE (OUTPATIENT)
Dept: PAIN MEDICINE | Facility: CLINIC | Age: 69
End: 2024-05-22
Payer: MEDICARE

## 2024-05-22 VITALS
SYSTOLIC BLOOD PRESSURE: 110 MMHG | HEIGHT: 63 IN | WEIGHT: 234.88 LBS | BODY MASS INDEX: 41.62 KG/M2 | DIASTOLIC BLOOD PRESSURE: 64 MMHG | OXYGEN SATURATION: 97 % | HEART RATE: 77 BPM | TEMPERATURE: 98 F

## 2024-05-22 DIAGNOSIS — M46.1 SACROILIITIS: ICD-10-CM

## 2024-05-22 DIAGNOSIS — M17.0 PRIMARY OSTEOARTHRITIS OF BOTH KNEES: ICD-10-CM

## 2024-05-22 DIAGNOSIS — I10 ESSENTIAL HYPERTENSION: ICD-10-CM

## 2024-05-22 DIAGNOSIS — Z00.00 ROUTINE MEDICAL EXAM: Primary | ICD-10-CM

## 2024-05-22 DIAGNOSIS — R22.31 LOCALIZED SWELLING OF FINGER OF RIGHT HAND: ICD-10-CM

## 2024-05-22 PROCEDURE — 3288F FALL RISK ASSESSMENT DOCD: CPT | Mod: CPTII,S$GLB,, | Performed by: NURSE PRACTITIONER

## 2024-05-22 PROCEDURE — 3008F BODY MASS INDEX DOCD: CPT | Mod: CPTII,S$GLB,, | Performed by: NURSE PRACTITIONER

## 2024-05-22 PROCEDURE — 99214 OFFICE O/P EST MOD 30 MIN: CPT | Mod: S$GLB,,, | Performed by: NURSE PRACTITIONER

## 2024-05-22 PROCEDURE — 1125F AMNT PAIN NOTED PAIN PRSNT: CPT | Mod: CPTII,S$GLB,, | Performed by: NURSE PRACTITIONER

## 2024-05-22 PROCEDURE — 81001 URINALYSIS AUTO W/SCOPE: CPT | Performed by: NURSE PRACTITIONER

## 2024-05-22 PROCEDURE — 1101F PT FALLS ASSESS-DOCD LE1/YR: CPT | Mod: CPTII,S$GLB,, | Performed by: NURSE PRACTITIONER

## 2024-05-22 PROCEDURE — 3078F DIAST BP <80 MM HG: CPT | Mod: CPTII,S$GLB,, | Performed by: NURSE PRACTITIONER

## 2024-05-22 PROCEDURE — 3074F SYST BP LT 130 MM HG: CPT | Mod: CPTII,S$GLB,, | Performed by: NURSE PRACTITIONER

## 2024-05-22 PROCEDURE — 99999 PR PBB SHADOW E&M-EST. PATIENT-LVL V: CPT | Mod: PBBFAC,,, | Performed by: NURSE PRACTITIONER

## 2024-05-22 NOTE — PROGRESS NOTES
Subjective     Patient ID: Mariel Becerril is a 68 y.o. female.    Chief Complaint: Follow-up    Patient presents routine exam.  Reports she's not feeling well.  Light-headed.  Blood pressure is a little low.  Mild change in vision.  Started about 1-2 weeks ago. Started semaglutide injection in March.  Weekly.      Reports blister/cyst to 4th ring finger.  Mild tenderness.       Morbid obesity         Date Noted: 12/31/2020    Past Medical History:  No date: COPD (chronic obstructive pulmonary disease)  No date: Digestive disorder  No date: Frequent headaches  No date: Hypertension  04/02/2019: Osteoarthritis  No date: Severe obesity (BMI >= 40)  No date: Sleep apnea    Follow-up  Associated symptoms include arthralgias and joint swelling. Pertinent negatives include no abdominal pain, chest pain, chills, coughing, fatigue, fever, headaches, neck pain, numbness, rash or sore throat.     Review of Systems   Constitutional:  Negative for activity change, appetite change, chills, fatigue and fever.   HENT:  Negative for ear discharge, ear pain, mouth sores, nosebleeds, sore throat and tinnitus.    Eyes:  Positive for visual disturbance (mild). Negative for discharge, redness and itching.   Respiratory:  Negative for apnea, cough and shortness of breath.    Cardiovascular:  Negative for chest pain and leg swelling.   Gastrointestinal:  Negative for abdominal distention, abdominal pain, blood in stool and constipation.   Endocrine: Negative for polydipsia, polyphagia and polyuria.   Genitourinary:  Negative for difficulty urinating, flank pain, frequency and hematuria.   Musculoskeletal:  Positive for arthralgias, back pain, gait problem and joint swelling. Negative for neck pain and neck stiffness.   Integumentary:  Negative for rash and wound.   Allergic/Immunologic: Negative for environmental allergies, food allergies and immunocompromised state.   Neurological:  Positive for light-headedness. Negative for  dizziness, seizures, numbness and headaches.   Psychiatric/Behavioral:  Negative for agitation, confusion, hallucinations, self-injury and suicidal ideas.           Objective     Physical Exam  Vitals reviewed.   Constitutional:       Appearance: She is well-developed.   HENT:      Head: Normocephalic.      Right Ear: Tympanic membrane normal.      Left Ear: Tympanic membrane normal.      Nose: Nose normal.   Cardiovascular:      Rate and Rhythm: Normal rate and regular rhythm.   Pulmonary:      Effort: Pulmonary effort is normal.      Breath sounds: Normal breath sounds.   Abdominal:      General: Bowel sounds are normal.      Palpations: Abdomen is soft.   Musculoskeletal:         General: Swelling and tenderness present.      Right knee: Decreased range of motion.      Left knee: Swelling present. Decreased range of motion.   Skin:     General: Skin is warm and dry.      Comments: Right finger ring finger swelling/cyst   Neurological:      Mental Status: She is alert and oriented to person, place, and time.   Psychiatric:         Mood and Affect: Mood normal.         Behavior: Behavior normal. Behavior is cooperative.            Assessment and Plan     1. Routine medical exam    2. Essential hypertension  -     Urinalysis; Future; Expected date: 05/22/2024    3. Primary osteoarthritis of both knees    4. Sacroiliitis    5. Localized swelling of finger of right hand  -     CT Hand Without Contrast Right; Future; Expected date: 05/22/2024        Schedule CT    Urine today     1 month follow up--lightheaded/blood pressure        Follow up in about 1 month (around 6/22/2024).

## 2024-05-23 ENCOUNTER — OFFICE VISIT (OUTPATIENT)
Dept: URGENT CARE | Facility: CLINIC | Age: 69
End: 2024-05-23
Payer: MEDICARE

## 2024-05-23 ENCOUNTER — PATIENT MESSAGE (OUTPATIENT)
Dept: INTERNAL MEDICINE | Facility: CLINIC | Age: 69
End: 2024-05-23
Payer: MEDICARE

## 2024-05-23 VITALS
HEART RATE: 87 BPM | HEIGHT: 63 IN | WEIGHT: 235 LBS | OXYGEN SATURATION: 99 % | DIASTOLIC BLOOD PRESSURE: 68 MMHG | BODY MASS INDEX: 41.64 KG/M2 | RESPIRATION RATE: 20 BRPM | TEMPERATURE: 98 F | SYSTOLIC BLOOD PRESSURE: 111 MMHG

## 2024-05-23 DIAGNOSIS — Z96.652 STATUS POST TOTAL KNEE REPLACEMENT USING CEMENT, LEFT: ICD-10-CM

## 2024-05-23 DIAGNOSIS — M54.16 LUMBAR RADICULOPATHY: Primary | ICD-10-CM

## 2024-05-23 DIAGNOSIS — G89.29 CHRONIC PAIN OF LEFT KNEE: ICD-10-CM

## 2024-05-23 DIAGNOSIS — M25.562 CHRONIC PAIN OF LEFT KNEE: ICD-10-CM

## 2024-05-23 DIAGNOSIS — M54.9 BACK PAIN, UNSPECIFIED BACK LOCATION, UNSPECIFIED BACK PAIN LATERALITY, UNSPECIFIED CHRONICITY: Primary | ICD-10-CM
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
## 2024-05-23 DIAGNOSIS — Z87.39 HISTORY OF BACK PAIN: ICD-10-CM

## 2024-05-23 LAB
BACTERIA #/AREA URNS AUTO: ABNORMAL /HPF
BILIRUB UR QL STRIP: NEGATIVE
CLARITY UR REFRACT.AUTO: CLEAR
COLOR UR AUTO: YELLOW
GLUCOSE UR QL STRIP: NEGATIVE
HGB UR QL STRIP: NEGATIVE
KETONES UR QL STRIP: NEGATIVE
LEUKOCYTE ESTERASE UR QL STRIP: ABNORMAL
MICROSCOPIC COMMENT: ABNORMAL
NITRITE UR QL STRIP: NEGATIVE
PH UR STRIP: 6 [PH] (ref 5–8)
PROT UR QL STRIP: NEGATIVE
RBC #/AREA URNS AUTO: 13 /HPF (ref 0–4)
SP GR UR STRIP: 1.02 (ref 1–1.03)
URN SPEC COLLECT METH UR: ABNORMAL
WBC #/AREA URNS AUTO: 11 /HPF (ref 0–5)

## 2024-05-23 PROCEDURE — 99214 OFFICE O/P EST MOD 30 MIN: CPT | Mod: S$GLB,,, | Performed by: NURSE PRACTITIONER
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
## 2024-05-29 ENCOUNTER — OFFICE VISIT (OUTPATIENT)
Dept: INTERNAL MEDICINE | Facility: CLINIC | Age: 69
End: 2024-05-29
Payer: MEDICARE

## 2024-05-29 VITALS
BODY MASS INDEX: 41.35 KG/M2 | RESPIRATION RATE: 18 BRPM | HEART RATE: 97 BPM | HEIGHT: 63 IN | DIASTOLIC BLOOD PRESSURE: 80 MMHG | WEIGHT: 233.38 LBS | OXYGEN SATURATION: 99 % | TEMPERATURE: 97 F | SYSTOLIC BLOOD PRESSURE: 116 MMHG

## 2024-05-29 DIAGNOSIS — M54.16 LUMBAR RADICULOPATHY: ICD-10-CM

## 2024-05-29 DIAGNOSIS — R31.9 URINARY TRACT INFECTION WITH HEMATURIA, SITE UNSPECIFIED: ICD-10-CM

## 2024-05-29 DIAGNOSIS — N39.0 URINARY TRACT INFECTION WITH HEMATURIA, SITE UNSPECIFIED: ICD-10-CM
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
## 2024-05-29 PROCEDURE — 99214 OFFICE O/P EST MOD 30 MIN: CPT | Mod: S$GLB,,, | Performed by: NURSE PRACTITIONER

## 2024-05-29 PROCEDURE — 99999 PR PBB SHADOW E&M-EST. PATIENT-LVL V: CPT | Mod: PBBFAC,,, | Performed by: NURSE PRACTITIONER

## 2024-05-29 PROCEDURE — 1101F PT FALLS ASSESS-DOCD LE1/YR: CPT | Mod: CPTII,S$GLB,, | Performed by: NURSE PRACTITIONER

## 2024-05-29 PROCEDURE — 3074F SYST BP LT 130 MM HG: CPT | Mod: CPTII,S$GLB,, | Performed by: NURSE PRACTITIONER

## 2024-05-29 PROCEDURE — 3288F FALL RISK ASSESSMENT DOCD: CPT | Mod: CPTII,S$GLB,, | Performed by: NURSE PRACTITIONER

## 2024-05-29 PROCEDURE — 1159F MED LIST DOCD IN RCRD: CPT | Mod: CPTII,S$GLB,, | Performed by: NURSE PRACTITIONER

## 2024-05-29 PROCEDURE — 1125F AMNT PAIN NOTED PAIN PRSNT: CPT | Mod: CPTII,S$GLB,, | Performed by: NURSE PRACTITIONER

## 2024-05-29 PROCEDURE — 1160F RVW MEDS BY RX/DR IN RCRD: CPT | Mod: CPTII,S$GLB,, | Performed by: NURSE PRACTITIONER

## 2024-05-29 PROCEDURE — 3008F BODY MASS INDEX DOCD: CPT | Mod: CPTII,S$GLB,, | Performed by: NURSE PRACTITIONER

## 2024-05-29 PROCEDURE — 3079F DIAST BP 80-89 MM HG: CPT | Mod: CPTII,S$GLB,, | Performed by: NURSE PRACTITIONER

## 2024-05-29 RX ORDER — NITROFURANTOIN 25; 75 MG/1; MG/1
100 CAPSULE ORAL 2 TIMES DAILY
Qty: 14 CAPSULE | Refills: 0 | Status: SHIPPED | OUTPATIENT
Start: 2024-05-29
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
## 2024-05-31 ENCOUNTER — TELEPHONE (OUTPATIENT)
Dept: PULMONOLOGY | Facility: CLINIC | Age: 69
End: 2024-05-31
Payer: MEDICARE

## 2024-05-31 NOTE — TELEPHONE ENCOUNTER
----- Message from Crista Milton sent at 5/30/2024  4:40 PM CDT -----  Contact: Mariel Borden is calling in regards to getting a Annual appt.please call back at .[phone]             Thanks  RINKU

## 2024-06-04 ENCOUNTER — OFFICE VISIT (OUTPATIENT)
Dept: PULMONOLOGY | Facility: CLINIC | Age: 69
End: 2024-06-04
Payer: MEDICARE

## 2024-06-04 VITALS
SYSTOLIC BLOOD PRESSURE: 124 MMHG | RESPIRATION RATE: 18 BRPM | BODY MASS INDEX: 40.46 KG/M2 | WEIGHT: 228.38 LBS | OXYGEN SATURATION: 94 % | DIASTOLIC BLOOD PRESSURE: 76 MMHG | HEIGHT: 63 IN | HEART RATE: 82 BPM

## 2024-06-04 DIAGNOSIS — I10 ESSENTIAL HYPERTENSION: ICD-10-CM

## 2024-06-04 DIAGNOSIS — G47.33 OSA ON CPAP: Primary | ICD-10-CM

## 2024-06-04 DIAGNOSIS — E66.01 MORBID OBESITY WITH BMI OF 40.0-44.9, ADULT: ICD-10-CM

## 2024-06-04 PROCEDURE — 1160F RVW MEDS BY RX/DR IN RCRD: CPT | Mod: CPTII,S$GLB,, | Performed by: INTERNAL MEDICINE

## 2024-06-04 PROCEDURE — 1101F PT FALLS ASSESS-DOCD LE1/YR: CPT | Mod: CPTII,S$GLB,, | Performed by: INTERNAL MEDICINE

## 2024-06-04 PROCEDURE — 1159F MED LIST DOCD IN RCRD: CPT | Mod: CPTII,S$GLB,, | Performed by: INTERNAL MEDICINE

## 2024-06-04 PROCEDURE — 99999 PR PBB SHADOW E&M-EST. PATIENT-LVL V: CPT | Mod: PBBFAC,,, | Performed by: INTERNAL MEDICINE

## 2024-06-04 PROCEDURE — 3008F BODY MASS INDEX DOCD: CPT | Mod: CPTII,S$GLB,, | Performed by: INTERNAL MEDICINE

## 2024-06-04 PROCEDURE — 3078F DIAST BP <80 MM HG: CPT | Mod: CPTII,S$GLB,, | Performed by: INTERNAL MEDICINE

## 2024-06-04 PROCEDURE — 3074F SYST BP LT 130 MM HG: CPT | Mod: CPTII,S$GLB,, | Performed by: INTERNAL MEDICINE

## 2024-06-04 PROCEDURE — 3288F FALL RISK ASSESSMENT DOCD: CPT | Mod: CPTII,S$GLB,, | Performed by: INTERNAL MEDICINE

## 2024-06-04 PROCEDURE — 99214 OFFICE O/P EST MOD 30 MIN: CPT | Mod: S$GLB,,, | Performed by: INTERNAL MEDICINE

## 2024-06-04 NOTE — PROGRESS NOTES
"Subjective:       Patient ID: Mariel Becerril is a 68 y.o. female.    Chief Complaint: GIL, fatigue  Last visit with Dr. Woods 10/28/21:  64-year-old female patient known with mild GIL presenting for 1 year follow-up.    Underwent uneventful left knee surgery with [facility] with Dr. Connolly.   Underwent weight loss surgery with Dr. Rodríguez lost about 20 lb so far.  Recently received Idiro Station 2 replacement CPAP machine.  Compliant with CPAP therapy.  Regan Sleepiness Scale score 6.   Overnight oximetry on CPAP shows transient hypoxemia no need for O2.  Not needing albuterol.  Initially evaluated in 2018 for establishing care and treatment of GIL.         2/18/22  67yo female here for follow up of GIL on CPAP  Using AutoPAP 5-20, compliant with CPAP, benefits from use  Patient states improved symptoms with use of CPAP. Sleeping more soundly. Waking up feeling more refreshed. Less headaches with CPAP.  Reports improved daytime sleepiness although Regan scoring high today. She mentions severe back pain contributing to her daytime fatigue, has decreased energy and some depressed mood, has not seen psychiatry. She says she is currently receiving back decompression therapy for her pain and feels hopeful that this will help with her pain and her fatigue/low energy  She definitely wants to keep using CPAP though and feels it is beneficial  When asked about her fatigue while driving she says when she feels tried while driving she pulls over into a parking lot and will take a 20 minute nap  Denies cataplexy, leg weakness, "sleep attacks"      5/18/22  67yo female here for follow up of GIL on CPAP  100% compliance and benefits from use  Sleeps more soundly  Still has daytime fatigue  In bed at 11pm, wakes at 7am, no trouble falling asleep  Sometimes seeing things that are not there when waking up    05/31/2023  Followup  MSLT 06/2022 revewied  Download reveiwed  Usage > 4 hrs was 73.3%  AHI 1.5  Bed time 12 " MN  Wake time 6 am    Retired             No sleep onset REMs present. This study does not suggest narcolepsy.   Total number of naps attempted: 5 . Total number of naps with sleep attained:   Pathologic sleepiness was evidenced by short mean sleep latency.   Night #1: Sleep latency 05:54 min   Night #2: Sleep latency 02:13 min   Night #3: Sleep latency 09:25 min   Night #4: Sleep latency 02:28 min   Night #5: Sleep latency 04:56 min   Pathologic Sleepiness (G47.10)   Idiopathic hypersomnia (G47.11)       06/04/2024  Followup  Lost weight from 265lb to 228 lbs  Initial study: Mild GIL AHI 12.8  Says CPAp helps  Feels better  No snoring  Feels worse without CPAP  Dream station 2  Using even with naps  Indication for Inspire discussed      Indications/  Contraindications  Inspire therapy is indicated for patients with the following characteristics:  18 years of age or older  Have moderate to severe GIL (AHI range from 15-65 with <25% central apneas)  Unable to use CPAP  Free of complete concentric collapse at the palate  MRI Guidelines for Inspire Therapy: Click Here              Immunization History   Administered Date(s) Administered    COVID-19, MRNA, LN-S, PF (MODERNA FULL 0.5 ML DOSE) 02/04/2021, 03/04/2021, 01/11/2022    Influenza (FLUAD) - Quadrivalent - Adjuvanted - PF *Preferred* (65+) 10/18/2022    Influenza - Quadrivalent - PF *Preferred* (6 months and older) 10/01/2014, 11/15/2018, 10/30/2019, 11/09/2020    Pneumococcal Conjugate - 20 Valent 08/23/2023    Pneumococcal Polysaccharide - 23 Valent 02/22/2022    Tdap 12/17/2020    Zoster Recombinant 10/18/2022      Tobacco Use: Low Risk  (6/4/2024)    Patient History     Smoking Tobacco Use: Never     Smokeless Tobacco Use: Never     Passive Exposure: Never      Past Medical History:   Diagnosis Date    COPD (chronic obstructive pulmonary disease)     NO HOME O2    Digestive disorder     Frequent headaches     Hypertension     Osteoarthritis 04/02/2019     Bilateral knees, ankles and feet    Severe obesity (BMI >= 40)     Sleep apnea     CPAP      Current Outpatient Medications on File Prior to Visit   Medication Sig Dispense Refill    acetaminophen (TYLENOL) 325 MG tablet Take 2 tablets (650 mg total) by mouth every 8 (eight) hours as needed. 60 tablet 2    aspirin (ECOTRIN) 81 MG EC tablet Take 1 tablet (81 mg total) by mouth once daily. 90 tablet 3    diclofenac sodium (VOLTAREN) 1 % Gel APPLY 2 GRAMS TOPICALLY THREE TIMES DAILY AS NEEDED 200 g 2    ergocalciferol, vitamin D2, (VITAMIN D ORAL) Take 2,000 Units by mouth once daily.      furosemide (LASIX) 40 MG tablet Take 1 tablet (40 mg total) by mouth daily as needed (edema, swelling). Do not take if BP is less than 110/70 90 tablet 1    gabapentin (NEURONTIN) 300 MG capsule Take 2 capsules (600 mg total) by mouth every evening. 60 capsule 5    gentamicin (GARAMYCIN) 0.1 % ointment Apply topically 3 (three) times daily. 30 g 1    levocetirizine (XYZAL) 5 MG tablet TAKE 1 TABLET BY MOUTH ONCE DAILY IN THE EVENING 90 tablet 0    linaCLOtide (LINZESS) 72 mcg Cap capsule Take 1 capsule (72 mcg total) by mouth before breakfast. 30 capsule 2    meclizine (ANTIVERT) 25 mg tablet Take 1 tablet (25 mg total) by mouth 3 (three) times daily as needed for Dizziness. 30 tablet 0    meloxicam (MOBIC) 15 MG tablet Take 1 tablet (15 mg total) by mouth once daily. 90 tablet 3    methocarbamoL (ROBAXIN) 750 MG Tab Take 1 tablet (750 mg total) by mouth 3 (three) times daily as needed. 90 tablet 3    metoprolol succinate (TOPROL-XL) 25 MG 24 hr tablet Take 1 tablet (25 mg total) by mouth once daily. 90 tablet 1    montelukast (SINGULAIR) 10 mg tablet Take 1 tablet (10 mg total) by mouth every evening. Allergies 30 tablet 11    mupirocin (BACTROBAN) 2 % ointment Apply topically 3 (three) times daily. 30 g 0    nitrofurantoin, macrocrystal-monohydrate, (MACROBID) 100 MG capsule Take 1 capsule (100 mg total) by mouth 2 (two) times  "daily. 14 capsule 0    nitroGLYCERIN (NITROSTAT) 0.4 MG SL tablet Place 1 tablet (0.4 mg total) under the tongue every 5 (five) minutes as needed for Chest pain. 30 tablet 0    nystatin (MYCOSTATIN) cream APPLY  CREAM TOPICALLY TO AFFECTED AREA TWICE DAILY 30 g 0    omeprazole (PRILOSEC) 40 MG capsule Take 1 capsule by mouth once daily 90 capsule 3    ondansetron (ZOFRAN) 8 MG tablet Take 1 tablet (8 mg total) by mouth every 8 (eight) hours as needed for Nausea. 20 tablet 0    potassium chloride SA (K-DUR,KLOR-CON) 20 MEQ tablet Take 1 tablet (20 mEq total) by mouth daily as needed (if taking lasix). 90 tablet 1    topiramate (TOPAMAX) 100 MG tablet Take 1 tablet (100 mg total) by mouth 2 (two) times daily. 180 tablet 2    albuterol (PROAIR HFA) 90 mcg/actuation inhaler Inhale 2 puffs into the lungs every 6 (six) hours as needed for Wheezing. Rescue 18 g 0    imipramine (TOFRANIL) 10 MG Tab Take 1 tablet (10 mg total) by mouth every evening. 90 tablet 1     Current Facility-Administered Medications on File Prior to Visit   Medication Dose Route Frequency Provider Last Rate Last Admin    ondansetron injection 4 mg  4 mg Intravenous Once PRN Thanh Diaz MD        ondansetron injection 4 mg  4 mg Intravenous Once PRN Thanh Diaz MD             Review of Systems   All other systems reviewed and are negative.         Objective:       Vitals:    06/04/24 1000   BP: 124/76   Pulse: 82   Resp: 18   SpO2: (!) 94%   Weight: 103.6 kg (228 lb 6.3 oz)   Height: 5' 3" (1.6 m)         Physical Exam  Vitals and nursing note reviewed.   Constitutional:       Appearance: She is well-developed.   HENT:      Head: Normocephalic and atraumatic.      Nose: Nose normal.      Mouth/Throat:      Pharynx: No oropharyngeal exudate.   Eyes:      General: No scleral icterus.     Conjunctiva/sclera: Conjunctivae normal.      Pupils: Pupils are equal, round, and reactive to light.   Neck:      Thyroid: No thyromegaly.      Vascular: " No JVD.      Trachea: No tracheal deviation.   Cardiovascular:      Rate and Rhythm: Normal rate and regular rhythm.      Heart sounds: Normal heart sounds, S1 normal and S2 normal. No murmur heard.  Pulmonary:      Effort: Pulmonary effort is normal. No tachypnea, accessory muscle usage or respiratory distress.      Breath sounds: Normal breath sounds. No stridor.   Abdominal:      General: Bowel sounds are normal. There is no distension.      Palpations: Abdomen is soft. There is no hepatomegaly, splenomegaly or mass.      Tenderness: There is no abdominal tenderness. There is no guarding or rebound.   Musculoskeletal:         General: No tenderness. Normal range of motion.      Cervical back: Normal range of motion and neck supple.   Lymphadenopathy:      Upper Body:      Right upper body: No supraclavicular adenopathy.      Left upper body: No supraclavicular adenopathy.   Skin:     General: Skin is warm and dry.      Findings: No rash.      Nails: There is no clubbing.   Neurological:      Mental Status: She is alert and oriented to person, place, and time.      Coordination: Coordination normal.      Gait: Gait normal.      Deep Tendon Reflexes: Reflexes are normal and symmetric.             Personal Diagnostic Review    FL Fluoro for Pain Management  See OP Notes for results.     IMPRESSION: See OP Notes for results.     This procedure was auto-finalized by: Virtual Radiologist    Compliance Summary  Care Team  [facility] [address] [id number] [phone]  Compliance Information 3/5/2024 - 6/2/2024  Compliance Summary  3/5/2024 - 6/2/2024 (90 days)  Days with Device Usage 82 days  Days without Device Usage 8 days  Percent Days with Device Usage 91.1%  Cumulative Usage 18 days 3 hrs. 45 mins. 58 secs.  Maximum Usage (1 Day) 8 hrs. 1 mins. 40 secs.  Average Usage (All Days) 4 hrs. 50 mins. 30 secs.  Average Usage (Days Used) 5 hrs. 18 mins. 51 secs.  Minimum  Usage (1 Day) 12 mins. 13 secs.  Percent of Days with Usage >= 4 Hours 77.8%  Percent of Days with Usage < 4 Hours 22.2%  Date Range  Total Blower Time 18 days 3 hrs. 47 mins. 16 secs.  Average AHI 2.1  Auto-CPAP Summary  Auto-CPAP Mean Pressure 7.3 cmH2O  Auto-CPAP Peak Average Pressure 12.1 cmH2O  Device Pressure <= 90% of Time 10.2 cmH2O  Average Time in Large Leak Per Day 20 secs.        Assessment/Plan:       Problem List Items Addressed This Visit       Essential hypertension    Morbid obesity with BMI of 40.0-44.9, adult    GIL on CPAP - Primary         6/4/2024    10:02 AM   EPWORTH SLEEPINESS SCALE   Sitting and reading 0   Watching TV 2   Sitting, inactive in a public place (e.g. a theatre or a meeting) 0   As a passenger in a car for an hour without a break 0   Lying down to rest in the afternoon when circumstances permit 2   Sitting and talking to someone 0   Sitting quietly after a lunch without alcohol 1   In a car, while stopped for a few minutes in traffic 0   Total score 5       Bed time: 10-11 pm  Wake time : 6 am    Data 03/05/2024 to 06/02/2024  Usage > 4 hrs was 77.8%    APAP 5-20    AHI 2.1             Relevant Orders    CPAP/BIPAP SUPPLIES         Adherence and benefits from PAP  Weight loss  Patient does not meet inspire criteria based on severity of sleep apnea.    Would need a repeat study to clarify whether she has moderate to severe sleep apnea she does not want to repeat this study at this time   She wants to concentrate on weight loss   New supplies ordered   She also was not aware that the implantation of inspire is a 2 step surgical procedure       Follow up in about 1 year (around 6/4/2025), or weight loss, CPAP supplies, let me know if wants inspire , would need repeat PSG.    This note was prepared using voice recognition system and is likely to have sound alike errors that may have been overlooked even after proof reading.  Please call me with any questions    Discussed diagnosis,  its evaluation, treatment and usual course. All questions answered.    Thank you for the courtesy of participating in the care of this patient    Shaun Bhatti MD      Personal Diagnostic Review  []  CXR    []  ECHO    []  ONSAT    []  6MWD    []  LABS    []  CHEST CT    []  PET CT    []  Biopsy results

## 2024-06-04 NOTE — ASSESSMENT & PLAN NOTE
6/4/2024    10:02 AM   EPWORTH SLEEPINESS SCALE   Sitting and reading 0   Watching TV 2   Sitting, inactive in a public place (e.g. a theatre or a meeting) 0   As a passenger in a car for an hour without a break 0   Lying down to rest in the afternoon when circumstances permit 2   Sitting and talking to someone 0   Sitting quietly after a lunch without alcohol 1   In a car, while stopped for a few minutes in traffic 0   Total score 5       Bed time: 10-11 pm  Wake time : 6 am    Data 03/05/2024 to 06/02/2024  Usage > 4 hrs was 77.8%    APAP 5-20    AHI 2.1

## 2024-06-04 NOTE — PATIENT INSTRUCTIONS
Indications/  Contraindications  Inspire therapy is indicated for patients with the following characteristics:  18 years of age or older  Have moderate to severe GIL (AHI range from 15-65 with <25% central apneas)  Unable to use CPAP  Free of complete concentric collapse at the palate  MRI Guidelines for Inspire Therapy: Click Here

## 2024-06-05 ENCOUNTER — PATIENT MESSAGE (OUTPATIENT)
Dept: PREADMISSION TESTING | Facility: HOSPITAL | Age: 69
End: 2024-06-05
Payer: MEDICARE

## 2024-06-05 ENCOUNTER — PATIENT MESSAGE (OUTPATIENT)
Dept: PAIN MEDICINE | Facility: HOSPITAL | Age: 69
End: 2024-06-05
Payer: MEDICARE

## 2024-06-07 ENCOUNTER — HOSPITAL ENCOUNTER (OUTPATIENT)
Dept: RADIOLOGY | Facility: HOSPITAL | Age: 69
Discharge: HOME | End: 2024-06-07
Attending: NURSE PRACTITIONER
Payer: MEDICARE

## 2024-06-07 DIAGNOSIS — R22.31 LOCALIZED SWELLING OF FINGER OF RIGHT HAND: ICD-10-CM

## 2024-06-07 PROCEDURE — 73200 CT UPPER EXTREMITY W/O DYE: CPT | Mod: TC,RT

## 2024-06-07 PROCEDURE — 73200 CT UPPER EXTREMITY W/O DYE: CPT | Mod: 26,RT,, | Performed by: RADIOLOGY

## 2024-06-10 NOTE — PRE-PROCEDURE INSTRUCTIONS
Spoke with patient regarding procedure scheduled on 6.11     Arrival time 1000     Has patient been sick with fever or on antibiotics within the last 7 days? No     Does the patient have any open wounds, sores or rashes? No     Does the patient have any recent fractures? no     Has patient received a vaccination within the last 7 days? No     Received the COVID vaccination? yes     Has the patient stopped all medications as directed? na      Does patient have a pacemaker, defibrillator, or implantable stimulator? No     Does the patient have a ride to and from procedure and someone reliable to remain with patient?  granddaughter     Is the patient diabetic? no     Does the patient have sleep apnea? Or use O2 at home? valentino on cpap      Is the patient receiving sedation? yes     Is the patient instructed to remain NPO beginning at midnight the night before their procedure? yes     Procedure location confirmed with patient? Yes     Covid- Denies signs/symptoms. Instructed to notify PAT/MD if any changes.

## 2024-06-11 ENCOUNTER — HOSPITAL ENCOUNTER (OUTPATIENT)
Facility: HOSPITAL | Age: 69
Discharge: HOME | End: 2024-06-11
Attending: PHYSICAL MEDICINE & REHABILITATION | Admitting: PHYSICAL MEDICINE & REHABILITATION
Payer: MEDICARE

## 2024-06-11 VITALS
RESPIRATION RATE: 16 BRPM | SYSTOLIC BLOOD PRESSURE: 123 MMHG | WEIGHT: 229.25 LBS | TEMPERATURE: 98 F | HEART RATE: 77 BPM | OXYGEN SATURATION: 100 % | HEIGHT: 63 IN | BODY MASS INDEX: 40.62 KG/M2 | DIASTOLIC BLOOD PRESSURE: 69 MMHG

## 2024-06-11 DIAGNOSIS — M17.9 KNEE OSTEOARTHRITIS: ICD-10-CM

## 2024-06-11 DIAGNOSIS — D17.9 LIPOMA, UNSPECIFIED SITE: Primary | ICD-10-CM

## 2024-06-11 DIAGNOSIS — M17.12 PRIMARY OSTEOARTHRITIS OF LEFT KNEE: Primary | ICD-10-CM

## 2024-06-11 PROCEDURE — 63600175 PHARM REV CODE 636 W HCPCS: Performed by: PHYSICAL MEDICINE & REHABILITATION

## 2024-06-11 PROCEDURE — 64454 NJX AA&/STRD GNCLR NRV BRNCH: CPT | Mod: LT | Performed by: PHYSICAL MEDICINE & REHABILITATION

## 2024-06-11 PROCEDURE — 64454 NJX AA&/STRD GNCLR NRV BRNCH: CPT | Mod: LT,,, | Performed by: PHYSICAL MEDICINE & REHABILITATION

## 2024-06-11 RX ORDER — FENTANYL CITRATE 50 UG/ML
INJECTION, SOLUTION INTRAMUSCULAR; INTRAVENOUS
Status: DISCONTINUED | OUTPATIENT
Start: 2024-06-11 | End: 2024-06-11 | Stop reason: HOSPADM

## 2024-06-11 RX ORDER — METHYLPREDNISOLONE ACETATE 40 MG/ML
INJECTION, SUSPENSION INTRA-ARTICULAR; INTRALESIONAL; INTRAMUSCULAR; SOFT TISSUE
Status: DISCONTINUED | OUTPATIENT
Start: 2024-06-11 | End: 2024-06-11 | Stop reason: HOSPADM

## 2024-06-11 RX ORDER — ONDANSETRON HYDROCHLORIDE 2 MG/ML
4 INJECTION, SOLUTION INTRAVENOUS ONCE AS NEEDED
Status: DISCONTINUED | OUTPATIENT
Start: 2024-06-11 | End: 2024-06-11 | Stop reason: HOSPADM

## 2024-06-11 RX ORDER — MIDAZOLAM HYDROCHLORIDE 1 MG/ML
INJECTION, SOLUTION INTRAMUSCULAR; INTRAVENOUS
Status: DISCONTINUED | OUTPATIENT
Start: 2024-06-11 | End: 2024-06-11 | Stop reason: HOSPADM

## 2024-06-11 RX ORDER — BUPIVACAINE HYDROCHLORIDE 5 MG/ML
INJECTION, SOLUTION EPIDURAL; INTRACAUDAL
Status: DISCONTINUED | OUTPATIENT
Start: 2024-06-11 | End: 2024-06-11 | Stop reason: HOSPADM

## 2024-06-11 NOTE — H&P
HPI  Patient presenting for Procedure(s) (LRB):  LEFT Genicular nerve block with RN IV sedation (Left)       No health changes since previous encounter    Past Medical History:   Diagnosis Date    COPD (chronic obstructive pulmonary disease)     NO HOME O2    Digestive disorder     Frequent headaches     Hypertension     Osteoarthritis 04/02/2019    Bilateral knees, ankles and feet    Severe obesity (BMI >= 40)     Sleep apnea     CPAP     Past Surgical History:   Procedure Laterality Date    BLADDER SUSPENSION      CATARACT EXTRACTION, BILATERAL      COLONOSCOPY N/A 12/3/2018    Procedure: COLONOSCOPY;  Surgeon: Mari Rader MD;  Location: [facility] ENDO;  Service: Endoscopy;  Laterality: N/A;    ESOPHAGOGASTRODUODENOSCOPY N/A 12/31/2020    Procedure: ESOPHAGOGASTRODUODENOSCOPY (EGD);  Surgeon: Anthony Rodríguez MD;  Location: [facility];  Service: General;  Laterality: N/A;    HYSTERECTOMY      partial 2000    INJECTION OF ANESTHETIC AGENT AROUND NERVE Left 9/14/2023    Procedure: LEFT Genicular nerve block RN IV Sedation;  Surgeon: Thanh Diaz MD;  Location: [facility] PAIN MGT;  Service: Pain Management;  Laterality: Left;    INJECTION OF ANESTHETIC AGENT AROUND NERVE Left 5/21/2024    Procedure: LEFT Genicular nerve block (therpeutic);  Surgeon: Thanh Diaz MD;  Location: [facility] PAIN [facility]GT;  Service: Pain Management;  Laterality: Left;    INJECTION OF ANESTHETIC AGENT INTO SACROILIAC JOINT Bilateral 11/30/2020    Procedure: Bilateral SIJ + Bilateral Piriformis + Bilateral GT Bursa Injection;  Surgeon: Thanh Diaz MD;  Location: [facility] PAIN [facility]GT;  Service: Pain Management;  Laterality: Bilateral;    INJECTION OF JOINT Bilateral 11/30/2020    Procedure: Bilateral SIJ + Bilateral Piriformis + Bilateral GT Bursa Injection;  Surgeon: Thanh Diaz MD;  Location: [facility]IN [facility]GT;  Service: Pain Management;  Laterality: Bilateral;    INJECTION OF PIRIFORMIS MUSCLE Bilateral 11/30/2020    Procedure: Bilateral SIJ +  Bilateral Piriformis + Bilateral GT Bursa Injection;  Surgeon: Thanh Diaz MD;  Location: [facility] PAIN [facility]GT;  Service: Pain Management;  Laterality: Bilateral;    ROBOT-ASSISTED LAPAROSCOPIC SLEEVE GASTRECTOMY USING DA EZEQUIEL XI N/A 3/2/2021    Procedure: XI ROBOTIC SLEEVE GASTRECTOMY;  Surgeon: Anthony Rodríguez MD;  Location: [facility] OR;  Service: General;  Laterality: N/A;    SELECTIVE INJECTION OF ANESTHETIC AGENT AROUND LUMBAR SPINAL NERVE ROOT BY TRANSFORAMINAL APPROACH Bilateral 1/4/2021    Procedure: Bilateral L5/S1 TF GISELA;  Surgeon: Thanh Diaz MD;  Location: [facility] PAIN [facility]GT;  Service: Pain Management;  Laterality: Bilateral;    SELECTIVE INJECTION OF ANESTHETIC AGENT AROUND LUMBAR SPINAL NERVE ROOT BY TRANSFORAMINAL APPROACH Bilateral 3/23/2021    Procedure: Bilateral L5/S1 TF GISELA;  Surgeon: Thanh Diaz MD;  Location: [facility] PAIN [facility]GT;  Service: Pain Management;  Laterality: Bilateral;    SELECTIVE INJECTION OF ANESTHETIC AGENT AROUND LUMBAR SPINAL NERVE ROOT BY TRANSFORAMINAL APPROACH Bilateral 10/5/2021    Procedure: Bilateral L5/S1 TF GISELA;  Surgeon: Thanh Diaz MD;  Location: [facility] PAIN [facility]GT;  Service: Pain Management;  Laterality: Bilateral;    SELECTIVE INJECTION OF ANESTHETIC AGENT AROUND LUMBAR SPINAL NERVE ROOT BY TRANSFORAMINAL APPROACH Bilateral 3/28/2024    Procedure: Bilateral L5/S1 TF GISELA;  Surgeon: Thanh Diaz MD;  Location: [facility] PAIN [facility]GT;  Service: Pain Management;  Laterality: Bilateral;    tonsilectomy      TOTAL KNEE ARTHROPLASTY Left 3/4/2020    Procedure: ARTHROPLASTY, KNEE, TOTAL;  Surgeon: Moy Connolly MD;  Location: [facility] OR;  Service: Orthopedics;  Laterality: Left;     Review of patient's allergies indicates:   Allergen Reactions    Penicillins Rash        Current Facility-Administered Medications on File Prior to Encounter   Medication Dose Route Frequency Provider Last Rate Last Admin    ondansetron injection 4 mg  4 mg Intravenous Once PRN Thanh Diaz,  MD        ondansetron injection 4 mg  4 mg Intravenous Once PRN Thanh Diaz MD         Current Outpatient Medications on File Prior to Encounter   Medication Sig Dispense Refill    aspirin (ECOTRIN) 81 MG EC tablet Take 1 tablet (81 mg total) by mouth once daily. 90 tablet 3    gabapentin (NEURONTIN) 300 MG capsule Take 2 capsules (600 mg total) by mouth every evening. 60 capsule 5    meloxicam (MOBIC) 15 MG tablet Take 1 tablet (15 mg total) by mouth once daily. 90 tablet 3    methocarbamoL (ROBAXIN) 750 MG Tab Take 1 tablet (750 mg total) by mouth 3 (three) times daily as needed. 90 tablet 3    topiramate (TOPAMAX) 100 MG tablet Take 1 tablet (100 mg total) by mouth 2 (two) times daily. 180 tablet 2    acetaminophen (TYLENOL) 325 MG tablet Take 2 tablets (650 mg total) by mouth every 8 (eight) hours as needed. 60 tablet 2    albuterol (PROAIR HFA) 90 mcg/actuation inhaler Inhale 2 puffs into the lungs every 6 (six) hours as needed for Wheezing. Rescue 18 g 0    diclofenac sodium (VOLTAREN) 1 % Gel APPLY 2 GRAMS TOPICALLY THREE TIMES DAILY AS NEEDED 200 g 2    ergocalciferol, vitamin D2, (VITAMIN D ORAL) Take 2,000 Units by mouth once daily.      furosemide (LASIX) 40 MG tablet Take 1 tablet (40 mg total) by mouth daily as needed (edema, swelling). Do not take if BP is less than 110/70 90 tablet 1    gentamicin (GARAMYCIN) 0.1 % ointment Apply topically 3 (three) times daily. 30 g 1    imipramine (TOFRANIL) 10 MG Tab Take 1 tablet (10 mg total) by mouth every evening. 90 tablet 1    levocetirizine (XYZAL) 5 MG tablet TAKE 1 TABLET BY MOUTH ONCE DAILY IN THE EVENING 90 tablet 0    linaCLOtide (LINZESS) 72 mcg Cap capsule Take 1 capsule (72 mcg total) by mouth before breakfast. 30 capsule 2    meclizine (ANTIVERT) 25 mg tablet Take 1 tablet (25 mg total) by mouth 3 (three) times daily as needed for Dizziness. 30 tablet 0    metoprolol succinate (TOPROL-XL) 25 MG 24 hr tablet Take 1 tablet (25 mg total) by  "mouth once daily. 90 tablet 1    montelukast (SINGULAIR) 10 mg tablet Take 1 tablet (10 mg total) by mouth every evening. Allergies 30 tablet 11    mupirocin (BACTROBAN) 2 % ointment Apply topically 3 (three) times daily. 30 g 0    nitroGLYCERIN (NITROSTAT) 0.4 MG SL tablet Place 1 tablet (0.4 mg total) under the tongue every 5 (five) minutes as needed for Chest pain. 30 tablet 0    nystatin (MYCOSTATIN) cream APPLY  CREAM TOPICALLY TO AFFECTED AREA TWICE DAILY 30 g 0    omeprazole (PRILOSEC) 40 MG capsule Take 1 capsule by mouth once daily 90 capsule 3    ondansetron (ZOFRAN) 8 MG tablet Take 1 tablet (8 mg total) by mouth every 8 (eight) hours as needed for Nausea. 20 tablet 0    potassium chloride SA (K-DUR,KLOR-CON) 20 MEQ tablet Take 1 tablet (20 mEq total) by mouth daily as needed (if taking lasix). 90 tablet 1        PMHx, PSHx, Allergies, Medications reviewed in epic    ROS negative except pain complaints in HPI    OBJECTIVE:    /72   Pulse 87   Temp 97.9 °F (36.6 °C) (Temporal)   Resp 16   Ht 5' 3" (1.6 m)   Wt 104 kg (229 lb 4.5 oz)   Breastfeeding No   BMI 40.61 kg/m²     PHYSICAL EXAMINATION:    GENERAL: Well appearing, in no acute distress, alert and oriented x3.  PSYCH:  Mood and affect appropriate.  SKIN: Skin color, texture, turgor normal, no rashes or lesions which will impact the procedure.  CV: RRR with palpation of the radial artery.  PULM: No evidence of respiratory difficulty, symmetric chest rise. Clear to auscultation.  NEURO: Cranial nerves grossly intact.    Plan:    Proceed with procedure as planned Procedure(s) (LRB):  LEFT Genicular nerve block with RN IV sedation (Left)    Thanh Diaz MD  06/11/2024            "

## 2024-06-11 NOTE — OP NOTE
Procedure Note:    Left  Geniculate nerve block under fluoroscopy                               Surgeon: Thanh Diaz MD    Assistant: None    SEDATION: Conscious sedation provided by M.D    The patient was monitored with continuous pulse oximetry, EKG, and intermittent blood pressure monitors, immediately prior to administration of sedation.  The patient was hemodynamically stable throughout the entire process was responsive to voice, and breathing spontaneously.  Supplemental O2 was provided at 2L/min via nasal cannula.  Patient was comfortable for the duration of the procedure.     There was a total of 2mg IV Midazolam and 50mcg Fentanyl titrated for the procedure    Total sedation time was >10minutes and <20minutes      Pre-Op Diagnosis:  Status post total knee replacement using cement, left [Z96.652]  Chronic pain of left knee [M25.562, G89.29]    Post-Op Diagnosis: Status post total knee replacement using cement, left [Z96.652]  Chronic pain of left knee [M25.562, G89.29]    EBL: None    Complications: None    Specimens: None    Description of procedure:    After written consent was obtained, patient placed in supine position.  The area over the medial and lateral aspect of the superior epi-condyle of the femur and the medial tibial metaphysis were prepped with chlorhexidine.  The area was draped in the usual sterile fashion.  Approximately 8 mL total 1% lidocaine was infiltrated into the skin overlying the 3 predetermined entry points. A 22 gauge spinal needle was then advanced under fluoroscopy in the AP and lateral views into the positions of the geniculate nerves at these levels. After negative aspiration and no paresthesias there was injection of 3-3.5 mL of a mixture with 40mg Depo-Medrol and 9 mL 0.25% bupivacaine into each of these 3 areas for a total volume of 10 mL. Needle was withdrawn and a sterile band-aid applied to the skin.    Patient tolerated the procedure well, and was reporting  improvement of pain symptoms after the injection.  She was discharged from the clinic in stable condition.

## 2024-06-11 NOTE — DISCHARGE INSTRUCTIONS

## 2024-06-11 NOTE — DISCHARGE SUMMARY
Discharge Note  Short Stay      SUMMARY     Admit Date: 6/11/2024    Attending Physician: Thanh Diaz MD        Discharge Physician: Thanh Diaz MD        Discharge Date: 6/11/2024 11:43 AM    Procedure(s) (LRB):  LEFT Genicular nerve block with RN IV sedation (Left)    Final Diagnosis: Status post total knee replacement using cement, left [Z96.652]  Chronic pain of left knee [M25.562, G89.29]    Disposition: Home or self care    Patient Instructions:   Current Discharge Medication List        CONTINUE these medications which have NOT CHANGED    Details   aspirin (ECOTRIN) 81 MG EC tablet Take 1 tablet (81 mg total) by mouth once daily.  Qty: 90 tablet, Refills: 3      gabapentin (NEURONTIN) 300 MG capsule Take 2 capsules (600 mg total) by mouth every evening.  Qty: 60 capsule, Refills: 5    Associated Diagnoses: Bilateral lumbar radiculopathy; Piriformis muscle pain      meloxicam (MOBIC) 15 MG tablet Take 1 tablet (15 mg total) by mouth once daily.  Qty: 90 tablet, Refills: 3    Associated Diagnoses: Bilateral lumbar radiculopathy      methocarbamoL (ROBAXIN) 750 MG Tab Take 1 tablet (750 mg total) by mouth 3 (three) times daily as needed.  Qty: 90 tablet, Refills: 3    Associated Diagnoses: Bilateral lumbar radiculopathy; Piriformis muscle pain      topiramate (TOPAMAX) 100 MG tablet Take 1 tablet (100 mg total) by mouth 2 (two) times daily.  Qty: 180 tablet, Refills: 2    Associated Diagnoses: Bilateral lumbar radiculopathy; History of migraine headaches      acetaminophen (TYLENOL) 325 MG tablet Take 2 tablets (650 mg total) by mouth every 8 (eight) hours as needed.  Qty: 60 tablet, Refills: 2      albuterol (PROAIR HFA) 90 mcg/actuation inhaler Inhale 2 puffs into the lungs every 6 (six) hours as needed for Wheezing. Rescue  Qty: 18 g, Refills: 0    Associated Diagnoses: Bronchitis      diclofenac sodium (VOLTAREN) 1 % Gel APPLY 2 GRAMS TOPICALLY THREE TIMES DAILY AS NEEDED  Qty: 200 g, Refills: 2     Associated Diagnoses: Status post total knee replacement using cement, left; Posterior left knee pain      ergocalciferol, vitamin D2, (VITAMIN D ORAL) Take 2,000 Units by mouth once daily.      furosemide (LASIX) 40 MG tablet Take 1 tablet (40 mg total) by mouth daily as needed (edema, swelling). Do not take if BP is less than 110/70  Qty: 90 tablet, Refills: 1    Associated Diagnoses: Essential hypertension      gentamicin (GARAMYCIN) 0.1 % ointment Apply topically 3 (three) times daily.  Qty: 30 g, Refills: 1    Associated Diagnoses: Laceration of right foot, initial encounter      imipramine (TOFRANIL) 10 MG Tab Take 1 tablet (10 mg total) by mouth every evening.  Qty: 90 tablet, Refills: 1      levocetirizine (XYZAL) 5 MG tablet TAKE 1 TABLET BY MOUTH ONCE DAILY IN THE EVENING  Qty: 90 tablet, Refills: 0    Associated Diagnoses: Seasonal allergies      linaCLOtide (LINZESS) 72 mcg Cap capsule Take 1 capsule (72 mcg total) by mouth before breakfast.  Qty: 30 capsule, Refills: 2    Associated Diagnoses: Constipation, unspecified constipation type      meclizine (ANTIVERT) 25 mg tablet Take 1 tablet (25 mg total) by mouth 3 (three) times daily as needed for Dizziness.  Qty: 30 tablet, Refills: 0    Associated Diagnoses: Vertigo      metoprolol succinate (TOPROL-XL) 25 MG 24 hr tablet Take 1 tablet (25 mg total) by mouth once daily.  Qty: 90 tablet, Refills: 1    Comments: .      montelukast (SINGULAIR) 10 mg tablet Take 1 tablet (10 mg total) by mouth every evening. Allergies  Qty: 30 tablet, Refills: 11    Associated Diagnoses: Acute sinusitis, recurrence not specified, unspecified location      mupirocin (BACTROBAN) 2 % ointment Apply topically 3 (three) times daily.  Qty: 30 g, Refills: 0      nitrofurantoin, macrocrystal-monohydrate, (MACROBID) 100 MG capsule Take 1 capsule (100 mg total) by mouth 2 (two) times daily.  Qty: 14 capsule, Refills: 0    Associated Diagnoses: Urinary tract infection with  hematuria, site unspecified      nitroGLYCERIN (NITROSTAT) 0.4 MG SL tablet Place 1 tablet (0.4 mg total) under the tongue every 5 (five) minutes as needed for Chest pain.  Qty: 30 tablet, Refills: 0      nystatin (MYCOSTATIN) cream APPLY  CREAM TOPICALLY TO AFFECTED AREA TWICE DAILY  Qty: 30 g, Refills: 0    Associated Diagnoses: Tear of skin of buttock, initial encounter; Yeast dermatitis      omeprazole (PRILOSEC) 40 MG capsule Take 1 capsule by mouth once daily  Qty: 90 capsule, Refills: 3      ondansetron (ZOFRAN) 8 MG tablet Take 1 tablet (8 mg total) by mouth every 8 (eight) hours as needed for Nausea.  Qty: 20 tablet, Refills: 0      potassium chloride SA (K-DUR,KLOR-CON) 20 MEQ tablet Take 1 tablet (20 mEq total) by mouth daily as needed (if taking lasix).  Qty: 90 tablet, Refills: 1           STOP taking these medications       polyethylene glycol (COLYTE) 240-22.72-6.72 -5.84 gram SolR Comments:   Reason for Stopping:                   Discharge Diagnosis: Status post total knee replacement using cement, left [Z96.652]  Chronic pain of left knee [M25.562, G89.29]  Condition on Discharge: Stable with no complications to procedure   Diet on Discharge: Same as before.  Activity: as per instruction sheet.  Discharge to: Home with a responsible adult.  Follow up: 2-4 weeks       Please call the office at [phone] if you experience any weakness or loss of sensation, fever > 101.5, pain uncontrolled with oral medications, persistent nausea/vomiting/or diarrhea, redness or drainage from the incisions, or any other worrisome concerns. If physician on call was not reached or could not communicate with our office for any reason please go to the nearest emergency department

## 2024-06-12 ENCOUNTER — ANESTHESIA EVENT (OUTPATIENT)
Dept: ENDOSCOPY | Facility: HOSPITAL | Age: 69
End: 2024-06-12
Payer: MEDICARE

## 2024-06-12 ENCOUNTER — HOSPITAL ENCOUNTER (OUTPATIENT)
Facility: HOSPITAL | Age: 69
Discharge: HOME | End: 2024-06-12
Attending: INTERNAL MEDICINE | Admitting: INTERNAL MEDICINE
Payer: MEDICARE

## 2024-06-12 ENCOUNTER — ANESTHESIA (OUTPATIENT)
Dept: ENDOSCOPY | Facility: HOSPITAL | Age: 69
End: 2024-06-12
Payer: MEDICARE

## 2024-06-12 VITALS
OXYGEN SATURATION: 98 % | HEART RATE: 92 BPM | RESPIRATION RATE: 18 BRPM | DIASTOLIC BLOOD PRESSURE: 80 MMHG | HEIGHT: 63 IN | SYSTOLIC BLOOD PRESSURE: 119 MMHG | BODY MASS INDEX: 40.57 KG/M2 | TEMPERATURE: 97 F | WEIGHT: 229 LBS

## 2024-06-12 DIAGNOSIS — K64.8 INTERNAL HEMORRHOIDS: ICD-10-CM

## 2024-06-12 DIAGNOSIS — M25.569 KNEE PAIN, CHRONIC: ICD-10-CM

## 2024-06-12 DIAGNOSIS — G89.29 KNEE PAIN, CHRONIC: ICD-10-CM

## 2024-06-12 DIAGNOSIS — M17.12 PRIMARY OSTEOARTHRITIS OF LEFT KNEE: Primary | ICD-10-CM

## 2024-06-12 DIAGNOSIS — Z86.010 HISTORY OF COLON POLYPS: ICD-10-CM

## 2024-06-12 PROBLEM — Z86.0100 HISTORY OF COLON POLYPS: Status: ACTIVE | Noted: 2024-06-12

## 2024-06-12 PROCEDURE — 63600175 PHARM REV CODE 636 W HCPCS: Performed by: NURSE ANESTHETIST, CERTIFIED REGISTERED

## 2024-06-12 PROCEDURE — 88305 TISSUE EXAM BY PATHOLOGIST: CPT | Performed by: PATHOLOGY

## 2024-06-12 PROCEDURE — 37000008 HC ANESTHESIA 1ST 15 MINUTES: Performed by: INTERNAL MEDICINE

## 2024-06-12 PROCEDURE — 37000009 HC ANESTHESIA EA ADD 15 MINS: Performed by: INTERNAL MEDICINE

## 2024-06-12 PROCEDURE — 27201012 HC FORCEPS, HOT/COLD, DISP: Performed by: INTERNAL MEDICINE

## 2024-06-12 PROCEDURE — 25000003 PHARM REV CODE 250: Performed by: NURSE ANESTHETIST, CERTIFIED REGISTERED

## 2024-06-12 PROCEDURE — 45380 COLONOSCOPY AND BIOPSY: CPT | Mod: PT | Performed by: INTERNAL MEDICINE

## 2024-06-12 PROCEDURE — 45380 COLONOSCOPY AND BIOPSY: CPT | Mod: PT,,, | Performed by: INTERNAL MEDICINE

## 2024-06-12 RX ORDER — SODIUM CHLORIDE, SODIUM LACTATE, POTASSIUM CHLORIDE, CALCIUM CHLORIDE 600; 310; 30; 20 MG/100ML; MG/100ML; MG/100ML; MG/100ML
INJECTION, SOLUTION INTRAVENOUS CONTINUOUS PRN
Status: DISCONTINUED | OUTPATIENT
Start: 2024-06-12 | End: 2024-06-12

## 2024-06-12 RX ORDER — PROPOFOL 10 MG/ML
VIAL (ML) INTRAVENOUS
Status: DISCONTINUED | OUTPATIENT
Start: 2024-06-12 | End: 2024-06-12

## 2024-06-12 RX ORDER — LIDOCAINE HYDROCHLORIDE 10 MG/ML
INJECTION, SOLUTION EPIDURAL; INFILTRATION; INTRACAUDAL; PERINEURAL
Status: DISCONTINUED | OUTPATIENT
Start: 2024-06-12 | End: 2024-06-12

## 2024-06-12 RX ADMIN — PROPOFOL 30 MG: 10 INJECTION, EMULSION INTRAVENOUS at 10:06

## 2024-06-12 RX ADMIN — SODIUM CHLORIDE, SODIUM LACTATE, POTASSIUM CHLORIDE, AND CALCIUM CHLORIDE: 600; 310; 30; 20 INJECTION, SOLUTION INTRAVENOUS at 10:06

## 2024-06-12 RX ADMIN — PROPOFOL 40 MG: 10 INJECTION, EMULSION INTRAVENOUS at 10:06

## 2024-06-12 RX ADMIN — PROPOFOL 30 MG: 10 INJECTION, EMULSION INTRAVENOUS at 11:06

## 2024-06-12 RX ADMIN — LIDOCAINE HYDROCHLORIDE 50 MG: 10 SOLUTION INTRAVENOUS at 10:06

## 2024-06-12 RX ADMIN — PROPOFOL 70 MG: 10 INJECTION, EMULSION INTRAVENOUS at 10:06

## 2024-06-12 NOTE — H&P
PRE PROCEDURE H&P    Patient Name: Mariel Becerril  MRN: [id number]  : 1955  Date of Procedure:  2024  Referring Physician: Estrellita Weller NP  Primary Physician: Estrellita Weller NP  Procedure Physician: Sarah Siegel MD       Planned Procedure: Colonoscopy  Diagnosis: previous adenomatous polyp      Chief Complaint: Same as above    HPI: Patient is an 68 y.o. female is here for the above. Last colonoscopy . Two cecal tubular adenomas. No Fhx of CRC, no blood thinners. No family at bedside.     Last colonoscopy: 2018  Family history: none  Anticoagulation: none    Past Medical History:   Past Medical History:   Diagnosis Date    COPD (chronic obstructive pulmonary disease)     NO HOME O2    Digestive disorder     Frequent headaches     Hypertension     Osteoarthritis 2019    Bilateral knees, ankles and feet    Severe obesity (BMI >= 40)     Sleep apnea     CPAP        Past Surgical History:  Past Surgical History:   Procedure Laterality Date    BLADDER SUSPENSION      CATARACT EXTRACTION, BILATERAL      COLONOSCOPY N/A 12/3/2018    Procedure: COLONOSCOPY;  Surgeon: Mari Rader MD;  Location: [facility];  Service: Endoscopy;  Laterality: N/A;    ESOPHAGOGASTRODUODENOSCOPY N/A 2020    Procedure: ESOPHAGOGASTRODUODENOSCOPY (EGD);  Surgeon: Anthony Rodríguez MD;  Location: [facility];  Service: General;  Laterality: N/A;    HYSTERECTOMY      partial 2000    INJECTION OF ANESTHETIC AGENT AROUND NERVE Left 2023    Procedure: LEFT Genicular nerve block RN IV Sedation;  Surgeon: Thanh Diaz MD;  Location: [facility];  Service: Pain Management;  Laterality: Left;    INJECTION OF ANESTHETIC AGENT AROUND NERVE Left 2024    Procedure: LEFT Genicular nerve block (therpeutic);  Surgeon: Thanh Diaz MD;  Location: [facility];  Service: Pain Management;  Laterality: Left;    INJECTION OF ANESTHETIC AGENT INTO SACROILIAC JOINT Bilateral 2020    Procedure:  Bilateral SIJ + Bilateral Piriformis + Bilateral GT Bursa Injection;  Surgeon: Thanh Diaz MD;  Location: [facility];  Service: Pain Management;  Laterality: Bilateral;    INJECTION OF JOINT Bilateral 11/30/2020    Procedure: Bilateral SIJ + Bilateral Piriformis + Bilateral GT Bursa Injection;  Surgeon: Thanh Diaz MD;  Location: [facility];  Service: Pain Management;  Laterality: Bilateral;    INJECTION OF PIRIFORMIS MUSCLE Bilateral 11/30/2020    Procedure: Bilateral SIJ + Bilateral Piriformis + Bilateral GT Bursa Injection;  Surgeon: Thanh Diaz MD;  Location: [facility];  Service: Pain Management;  Laterality: Bilateral;    ROBOT-ASSISTED LAPAROSCOPIC SLEEVE GASTRECTOMY USING DA EZEQUIEL XI N/A 3/2/2021    Procedure: XI ROBOTIC SLEEVE GASTRECTOMY;  Surgeon: Anthony Rodríguez MD;  Location: [facility] OR;  Service: General;  Laterality: N/A;    SELECTIVE INJECTION OF ANESTHETIC AGENT AROUND LUMBAR SPINAL NERVE ROOT BY TRANSFORAMINAL APPROACH Bilateral 1/4/2021    Procedure: Bilateral L5/S1 TF GISELA;  Surgeon: Thanh Diaz MD;  Location: [facility];  Service: Pain Management;  Laterality: Bilateral;    SELECTIVE INJECTION OF ANESTHETIC AGENT AROUND LUMBAR SPINAL NERVE ROOT BY TRANSFORAMINAL APPROACH Bilateral 3/23/2021    Procedure: Bilateral L5/S1 TF GISELA;  Surgeon: Thanh Diaz MD;  Location: [facility];  Service: Pain Management;  Laterality: Bilateral;    SELECTIVE INJECTION OF ANESTHETIC AGENT AROUND LUMBAR SPINAL NERVE ROOT BY TRANSFORAMINAL APPROACH Bilateral 10/5/2021    Procedure: Bilateral L5/S1 TF GISELA;  Surgeon: Thanh Diaz MD;  Location: [facility];  Service: Pain Management;  Laterality: Bilateral;    SELECTIVE INJECTION OF ANESTHETIC AGENT AROUND LUMBAR SPINAL NERVE ROOT BY TRANSFORAMINAL APPROACH Bilateral 3/28/2024    Procedure: Bilateral L5/S1 TF GISELA;  Surgeon: Thanh Diaz MD;  Location: [facility];  Service: Pain Management;  Laterality: Bilateral;     tonsilectomy      TOTAL KNEE ARTHROPLASTY Left 3/4/2020    Procedure: ARTHROPLASTY, KNEE, TOTAL;  Surgeon: Moy Connolly MD;  Location: [facility];  Service: Orthopedics;  Laterality: Left;        Home Medications:  Prior to Admission medications    Medication Sig Start Date End Date Taking? Authorizing Provider   acetaminophen (TYLENOL) 325 MG tablet Take 2 tablets (650 mg total) by mouth every 8 (eight) hours as needed. 3/6/20  Yes Moy Connolly MD   aspirin (ECOTRIN) 81 MG EC tablet Take 1 tablet (81 mg total) by mouth once daily. 2/28/24 2/27/25 Yes Juan Alberto Luis MD   diclofenac sodium (VOLTAREN) 1 % Gel APPLY 2 GRAMS TOPICALLY THREE TIMES DAILY AS NEEDED 10/12/23  Yes Thanh Diaz MD   ergocalciferol, vitamin D2, (VITAMIN D ORAL) Take 2,000 Units by mouth once daily.   Yes Provider, Historical   gabapentin (NEURONTIN) 300 MG capsule Take 2 capsules (600 mg total) by mouth every evening. 7/5/23  Yes Carmela Carrasco PA-C   gentamicin (GARAMYCIN) 0.1 % ointment Apply topically 3 (three) times daily. 11/28/23  Yes Marcus Narvaez DPM   linaCLOtide (LINZESS) 72 mcg Cap capsule Take 1 capsule (72 mcg total) by mouth before breakfast. 5/8/23  Yes Mellisa Guillen PA-C   meclizine (ANTIVERT) 25 mg tablet Take 1 tablet (25 mg total) by mouth 3 (three) times daily as needed for Dizziness. 6/27/23  Yes Estrellita Weller, NP   meloxicam (MOBIC) 15 MG tablet Take 1 tablet (15 mg total) by mouth once daily. 7/5/23  Yes Carmela Carrasco PA-C   methocarbamoL (ROBAXIN) 750 MG Tab Take 1 tablet (750 mg total) by mouth 3 (three) times daily as needed. 7/5/23  Yes Carmela Carrasco PA-C   metoprolol succinate (TOPROL-XL) 25 MG 24 hr tablet Take 1 tablet (25 mg total) by mouth once daily. 5/15/24  Yes Juan Alberto Luis MD   montelukast (SINGULAIR) 10 mg tablet Take 1 tablet (10 mg total) by mouth every evening. Allergies 2/28/24  Yes Estrellita Weller, NP   mupirocin (BACTROBAN) 2 % ointment  Apply topically 3 (three) times daily. 8/30/23  Yes Carmela Carrasco PA-C   nitrofurantoin, macrocrystal-monohydrate, (MACROBID) 100 MG capsule Take 1 capsule (100 mg total) by mouth 2 (two) times daily. 5/29/24  Yes Estrellita Weller, NP   nystatin (MYCOSTATIN) cream APPLY  CREAM TOPICALLY TO AFFECTED AREA TWICE DAILY 11/25/22  Yes Jaclyn Becerril MD   potassium chloride SA (K-DUR,KLOR-CON) 20 MEQ tablet Take 1 tablet (20 mEq total) by mouth daily as needed (if taking lasix). 11/16/23  Yes Juan Alberto Luis MD   topiramate (TOPAMAX) 100 MG tablet Take 1 tablet (100 mg total) by mouth 2 (two) times daily. 8/30/23  Yes Carmela Carrasco PA-C   albuterol (PROAIR HFA) 90 mcg/actuation inhaler Inhale 2 puffs into the lungs every 6 (six) hours as needed for Wheezing. Rescue 12/7/20 5/29/24  Jaclyn Becerril MD   furosemide (LASIX) 40 MG tablet Take 1 tablet (40 mg total) by mouth daily as needed (edema, swelling). Do not take if BP is less than 110/70 11/16/23   Juan Alberto Luis MD   imipramine (TOFRANIL) 10 MG Tab Take 1 tablet (10 mg total) by mouth every evening. 3/8/21 5/29/24  Jaclyn Becerril MD   levocetirizine (XYZAL) 5 MG tablet TAKE 1 TABLET BY MOUTH ONCE DAILY IN THE EVENING 3/21/22   Jaclyn Becerril MD   nitroGLYCERIN (NITROSTAT) 0.4 MG SL tablet Place 1 tablet (0.4 mg total) under the tongue every 5 (five) minutes as needed for Chest pain. 11/16/23 11/15/24  Juan Alberto Luis MD   omeprazole (PRILOSEC) 40 MG capsule Take 1 capsule by mouth once daily 3/15/24   Estrellita Weller, NP   ondansetron (ZOFRAN) 8 MG tablet Take 1 tablet (8 mg total) by mouth every 8 (eight) hours as needed for Nausea. 4/1/24   Anthony Rodríguez MD        Allergies:  Review of patient's allergies indicates:   Allergen Reactions    Penicillins Rash        Social History:   Social History     Socioeconomic History    Marital status:    Tobacco Use    Smoking status: Never     Passive exposure: Never    Smokeless tobacco:  "Never   Substance and Sexual Activity    Alcohol use: Never    Drug use: No    Sexual activity: Never     Partners: Male     Birth control/protection: See Surgical Hx     Social Determinants of Health     Financial Resource Strain: High Risk (2/16/2024)    Overall Financial Resource Strain (CARDIA)     Difficulty of Paying Living Expenses: Very hard   Food Insecurity: Patient Declined (2/16/2024)    Hunger Vital Sign     Worried About Running Out of Food in the Last Year: Patient declined     Ran Out of Food in the Last Year: Patient declined   Transportation Needs: Unknown (2/16/2024)    PRAPARE - Transportation     Lack of Transportation (Medical): No     Lack of Transportation (Non-Medical): Patient declined   Physical Activity: Unknown (2/16/2024)    Exercise Vital Sign     Days of Exercise per Week: 3 days   Stress: Stress Concern Present (2/16/2024)    [employer] - Occupational Stress Questionnaire     Feeling of Stress : To some extent   Housing Stability: High Risk (2/16/2024)    Housing Stability Vital Sign     Unable to Pay for Housing in the Last Year: Yes     Unstable Housing in the Last Year: No       Family History:  Family History   Problem Relation Name Age of Onset    Heart attack Father         ROS: No acute cardiac events, no acute respiratory complaints.     Physical Exam (all patients):    /73 (BP Location: Left arm, Patient Position: Sitting)   Pulse 87   Temp 97.2 °F (36.2 °C) (Temporal)   Resp 16   Ht 5' 3" (1.6 m)   Wt 103.9 kg (229 lb)   SpO2 100%   Breastfeeding No   BMI 40.57 kg/m²   Lungs: Clear to auscultation bilaterally, respirations unlabored  Heart: Regular rate and rhythm, S1 and S2 normal, no obvious murmurs  Abdomen:         Soft, non-tender, bowel sounds normal, no masses, no organomegaly    Lab Results   Component Value Date    WBC 5.99 05/16/2024    MCV 98 05/16/2024    RDW 14.0 05/16/2024     05/16/2024    INR 1.0 10/27/2021 "    GLU 94 02/19/2024    HGBA1C 5.4 11/09/2022    BUN 19 02/19/2024     02/19/2024    K 3.6 02/19/2024     (H) 02/19/2024        SEDATION PLAN: per anesthesia      History reviewed, vital signs satisfactory, cardiopulmonary status satisfactory, sedation options, risks and plans have been discussed with the patient  All their questions were answered and the patient agrees to the sedation procedures as planned and the patient is deemed an appropriate candidate for the sedation as planned.    The risks, benefits and alternatives of the procedure were discussed with the patient in detail. This discussion was had in the presence of endoscopy staff. The risks include, risks of adverse reaction to sedation requiring the use of reversal agents, bleeding requiring blood transfusion, perforation requiring surgical intervention and technical failure. Other risks include aspiration leading to respiratory distress and respiratory failure resulting in endotracheal intubation and mechanical ventilation including death. If anesthesia is being utilized for this procedure, it is up to the anesthesiologist to determine airway safety including elective endotracheal intubation. Questions were answered, they agree to proceed. There was no language barriers.      Procedure explained to patient, informed consent obtained and placed in chart.    Sarah Siegel  6/12/2024  10:41 AM

## 2024-06-12 NOTE — ANESTHESIA PREPROCEDURE EVALUATION
06/12/2024  Mariel Becerril is a 68 y.o., female.      Pre-op Assessment    I have reviewed the Patient Summary Reports.     I have reviewed the Nursing Notes. I have reviewed the NPO Status.   I have reviewed the Medications.     Review of Systems  Anesthesia Hx:  No problems with previous Anesthesia                Social:  Non-Smoker, No Alcohol Use       Hematology/Oncology:    Oncology Normal    -- Anemia:                                  EENT/Dental:  EENT/Dental Normal           Cardiovascular:     Hypertension, well controlled    Dysrhythmias                                    Pulmonary:   COPD   Shortness of breath  Sleep Apnea                Renal/:  Renal/ Normal                 Hepatic/GI:  Hepatic/GI Normal Bowel Prep.                Musculoskeletal:  Arthritis          Spine Disorders: lumbar Chronic Pain           Neurological:      Headaches           Peripheral Neuropathy                          Endocrine:  Endocrine Normal            Dermatological:  Skin Normal    Psych:  Psychiatric Normal                    Physical Exam  General: Well nourished, Cooperative, Alert and Oriented    Airway:  Mallampati: III   Mouth Opening: Small, but > 3cm  TM Distance: Normal  Tongue: Normal  Neck ROM: Normal ROM    Dental:  Intact, Partial Dentures        Anesthesia Plan  Type of Anesthesia, risks & benefits discussed:    Anesthesia Type: MAC  Intra-op Monitoring Plan: Standard ASA Monitors  Post Op Pain Control Plan: IV/PO Opioids PRN  Informed Consent: Informed consent signed with the Patient and all parties understand the risks and agree with anesthesia plan.  All questions answered.   ASA Score: 3  Day of Surgery Review of History & Physical: H&P Update referred to the surgeon/provider.    Ready For Surgery From Anesthesia Perspective.     .

## 2024-06-12 NOTE — ANESTHESIA POSTPROCEDURE EVALUATION
Anesthesia Post Evaluation    Patient: Mariel Becerril    Procedure(s) Performed: Procedure(s) (LRB):  COLONOSCOPY (N/A)    Final Anesthesia Type: MAC      Patient location during evaluation: GI PACU  Patient participation: Yes- Able to Participate  Level of consciousness: awake and alert and oriented  Post-procedure vital signs: reviewed and stable  Pain management: adequate  Airway patency: patent  IGL mitigation strategies: Multimodal analgesia  PONV status at discharge: No PONV  Anesthetic complications: no      Cardiovascular status: hemodynamically stable  Respiratory status: unassisted, spontaneous ventilation and room air  Hydration status: euvolemic  Follow-up not needed.              Vitals Value Taken Time   /80 06/12/24 1127   Temp 36.1 °C (97 °F) 06/12/24 1116   Pulse 92 06/12/24 1127   Resp 18 06/12/24 1127   SpO2 98 % 06/12/24 1127         No case tracking events are documented in the log.      Pain/Loren Score: Loren Score: 10 (6/12/2024 11:27 AM)

## 2024-06-12 NOTE — PLAN OF CARE
Dr Siegel came to bedside and discussed findings. NO N/V,  no abdominal pain, no GI bleeding, and vitals stable.  Pt discharged from unit.

## 2024-06-12 NOTE — TRANSFER OF CARE
"Anesthesia Transfer of Care Note    Patient: Mariel Becerril    Procedure(s) Performed: Procedure(s) (LRB):  COLONOSCOPY (N/A)    Patient location: GI    Anesthesia Type: MAC    Transport from OR: Transported from OR on room air with adequate spontaneous ventilation    Post pain: adequate analgesia    Post assessment: no apparent anesthetic complications and tolerated procedure well    Post vital signs: stable    Level of consciousness: responds to stimulation    Nausea/Vomiting: no nausea/vomiting    Complications: none    Transfer of care protocol was followed      Last vitals: Visit Vitals  /73 (BP Location: Left arm, Patient Position: Sitting)   Pulse 87   Temp 36.2 °C (97.2 °F) (Temporal)   Resp 16   Ht 5' 3" (1.6 m)   Wt 103.9 kg (229 lb)   SpO2 100%   Breastfeeding No   BMI 40.57 kg/m²     "

## 2024-06-13 LAB
FINAL PATHOLOGIC DIAGNOSIS: NORMAL
GROSS: NORMAL
Lab: NORMAL

## 2024-06-13 NOTE — PROGRESS NOTES
The polyps removed were precancerous also known as adenomas. They were completely removed. Guidelines recommend a repeat colonoscopy in 5 years. We will send you a reminder as the time nears.      Thanks for trusting us with your healthcare needs and using [facility].     Sincerely,    Sarah Siegel M.D.

## 2024-06-14 ENCOUNTER — PATIENT MESSAGE (OUTPATIENT)
Dept: GASTROENTEROLOGY | Facility: CLINIC | Age: 69
End: 2024-06-14
Payer: MEDICARE

## 2024-06-17 ENCOUNTER — OFFICE VISIT (OUTPATIENT)
Dept: INTERNAL MEDICINE | Facility: CLINIC | Age: 69
End: 2024-06-17
Payer: MEDICARE

## 2024-06-17 VITALS
BODY MASS INDEX: 40.66 KG/M2 | HEIGHT: 63 IN | HEART RATE: 84 BPM | DIASTOLIC BLOOD PRESSURE: 78 MMHG | SYSTOLIC BLOOD PRESSURE: 116 MMHG | TEMPERATURE: 97 F | OXYGEN SATURATION: 99 % | WEIGHT: 229.5 LBS

## 2024-06-17 DIAGNOSIS — E66.01 MORBID OBESITY WITH BMI OF 40.0-44.9, ADULT: ICD-10-CM

## 2024-06-17 DIAGNOSIS — Z12.31 BREAST CANCER SCREENING BY MAMMOGRAM: ICD-10-CM

## 2024-06-17 DIAGNOSIS — M54.16 LUMBAR RADICULOPATHY: ICD-10-CM

## 2024-06-17 DIAGNOSIS — I10 ESSENTIAL HYPERTENSION: ICD-10-CM

## 2024-06-17 DIAGNOSIS — M17.0 PRIMARY OSTEOARTHRITIS OF BOTH KNEES: ICD-10-CM

## 2024-06-17 DIAGNOSIS — Z09 FOLLOW-UP EXAM: Primary | ICD-10-CM

## 2024-06-17 DIAGNOSIS — G47.33 OSA ON CPAP: ICD-10-CM

## 2024-06-17 DIAGNOSIS — D17.21 LIPOMA OF RIGHT UPPER EXTREMITY: ICD-10-CM

## 2024-06-17 PROCEDURE — 1126F AMNT PAIN NOTED NONE PRSNT: CPT | Mod: CPTII,S$GLB,, | Performed by: NURSE PRACTITIONER

## 2024-06-17 PROCEDURE — 1159F MED LIST DOCD IN RCRD: CPT | Mod: CPTII,S$GLB,, | Performed by: NURSE PRACTITIONER

## 2024-06-17 PROCEDURE — 99214 OFFICE O/P EST MOD 30 MIN: CPT | Mod: S$GLB,,, | Performed by: NURSE PRACTITIONER

## 2024-06-17 PROCEDURE — 1160F RVW MEDS BY RX/DR IN RCRD: CPT | Mod: CPTII,S$GLB,, | Performed by: NURSE PRACTITIONER

## 2024-06-17 PROCEDURE — 99999 PR PBB SHADOW E&M-EST. PATIENT-LVL V: CPT | Mod: PBBFAC,,, | Performed by: NURSE PRACTITIONER

## 2024-06-17 PROCEDURE — 1101F PT FALLS ASSESS-DOCD LE1/YR: CPT | Mod: CPTII,S$GLB,, | Performed by: NURSE PRACTITIONER

## 2024-06-17 PROCEDURE — 3008F BODY MASS INDEX DOCD: CPT | Mod: CPTII,S$GLB,, | Performed by: NURSE PRACTITIONER

## 2024-06-17 PROCEDURE — 3078F DIAST BP <80 MM HG: CPT | Mod: CPTII,S$GLB,, | Performed by: NURSE PRACTITIONER

## 2024-06-17 PROCEDURE — 3074F SYST BP LT 130 MM HG: CPT | Mod: CPTII,S$GLB,, | Performed by: NURSE PRACTITIONER

## 2024-06-17 PROCEDURE — 3288F FALL RISK ASSESSMENT DOCD: CPT | Mod: CPTII,S$GLB,, | Performed by: NURSE PRACTITIONER

## 2024-06-17 RX ORDER — SEMAGLUTIDE 0.25 MG/.5ML
0.25 INJECTION, SOLUTION SUBCUTANEOUS WEEKLY
Qty: 3 ML | Refills: 5 | Status: SHIPPED | OUTPATIENT
Start: 2024-06-17

## 2024-06-17 NOTE — PROGRESS NOTES
Subjective     Patient ID: Mariel Becerril is a 68 y.o. female.    Chief Complaint: Follow-up    Patient presents for follow up.  Overall, doing ok.  Inquuiring about the Wegovy.  Currently taking the compound version.  Her appointment with [facility] was cancelled.  Offered [place] but rather not go that far.     She started the decompression therapy at [facility]: weekly therapy now.      Had injection to left knee 06/11/2024    Follow-up  Associated symptoms include arthralgias. Pertinent negatives include no abdominal pain, chest pain, chills, coughing, fatigue or fever.     Review of Systems   Constitutional:  Negative for chills, fatigue and fever.   Respiratory:  Negative for cough and shortness of breath.    Cardiovascular:  Negative for chest pain and leg swelling.   Gastrointestinal:  Negative for abdominal pain, constipation and diarrhea.   Musculoskeletal:  Positive for arthralgias, back pain and gait problem.   Integumentary:  Positive for wound.   Psychiatric/Behavioral:  Negative for agitation and confusion.           Objective     Physical Exam  Vitals reviewed.   Constitutional:       Appearance: She is obese.   Cardiovascular:      Rate and Rhythm: Normal rate and regular rhythm.   Pulmonary:      Effort: Pulmonary effort is normal.      Breath sounds: Normal breath sounds.   Musculoskeletal:         General: Swelling and tenderness present.   Skin:     General: Skin is warm.   Neurological:      General: No focal deficit present.      Mental Status: She is alert.   Psychiatric:         Mood and Affect: Mood normal.         Behavior: Behavior normal. Behavior is cooperative.            Assessment and Plan     1. Follow-up exam    2. Lipoma of right upper extremity  Overview:  Impression:     Benign 2 cm lipoma corresponding to the palpable area of concern.         3. Lumbar radiculopathy  Overview:  acute on chronic at left L5 and S1, chronic at right L5 and S1      4.  Primary osteoarthritis of both knees    5. Essential hypertension  -     semaglutide, weight loss, (WEGOVY) 0.25 mg/0.5 mL PnIj; Inject 0.25 mg into the skin once a week.  Dispense: 3 mL; Refill: 5    6. GIL on CPAP  -     semaglutide, weight loss, (WEGOVY) 0.25 mg/0.5 mL PnIj; Inject 0.25 mg into the skin once a week.  Dispense: 3 mL; Refill: 5    7. Morbid obesity with BMI of 40.0-44.9, adult  -     semaglutide, weight loss, (WEGOVY) 0.25 mg/0.5 mL PnIj; Inject 0.25 mg into the skin once a week.  Dispense: 3 mL; Refill: 5    8. Breast cancer screening by mammogram  -     Mammo Digital Screening [facility]/ [facility]; Future; Expected date: 07/07/2024      Check on her referral to derm.  Please schedule.     Schedule mammo after 07/07 [facility]     6 month follow up         Follow up in about 6 months (around 12/17/2024).

## 2024-06-19 ENCOUNTER — TELEPHONE (OUTPATIENT)
Dept: DERMATOLOGY | Facility: CLINIC | Age: 69
End: 2024-06-19
Payer: MEDICARE

## 2024-06-24 ENCOUNTER — TELEPHONE (OUTPATIENT)
Dept: INTERNAL MEDICINE | Facility: CLINIC | Age: 69
End: 2024-06-24
Payer: MEDICARE

## 2024-06-24 NOTE — TELEPHONE ENCOUNTER
I called the pt before lunch to inform that a PA will be completed for her wegovy and she will be notified once completed but, there was no answer nor a vm. //kah

## 2024-06-24 NOTE — TELEPHONE ENCOUNTER
----- Message from Ruth Mustafa sent at 6/24/2024 11:41 AM CDT -----  Contact: self  Pt is asking that the [facility] pharmacy be called in reference to her needing an PA on her medication semaglutide, weight loss, (WEGOVY) 0.25 mg/0.5 mL PnIj; states if PA cannot be done to please contact her at  [phone]  Also states pharmacy has been calling since 06/17 Thx CJ

## 2024-06-25 ENCOUNTER — TELEPHONE (OUTPATIENT)
Dept: INTERNAL MEDICINE | Facility: CLINIC | Age: 69
End: 2024-06-25
Payer: MEDICARE

## 2024-06-25 NOTE — TELEPHONE ENCOUNTER
I submitted two PA's today one [facility] which stated it was cancelled because they do not review them . I then submitted to [facility] with Dia and it was submitted the PA case ID IS [id number] and she requested additional chart notes be sent along with any medical [facility] Dx that would assist in getting the medication approved. Please advise of the specific chart note to send . I did not see anything in the previous chart note that would assist in approving the medication. Thanks //kah

## 2024-06-25 NOTE — PROGRESS NOTES
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 lidocaine, 2.5% prilocaine 2.5%compound cream for potential topical pain relief. Patent will be contacted for [facility] regarding cost and payment.          - Anticoagulation use: ASA - 1° prevention - Dr. Luis (Cardiology).      Rehabilitative:  Abstain from physical therapy at this time, but advised patient continue with activities as tolerated     Diagnostic:  Reviewed imaging available      Consult/ Referral:  None at this time, continue follow up with Dr. Connolly for left knee     Follow up:  6 months or sooner if needed     - This condition does not require this patient to take time off of work, and the primary goal of our Pain Management services is to improve the patient's functional capacity.      - I discussed the risks, benefits, and alternatives to potential treatment options. All questions and concerns were fully addressed today in clinic.        The above plan and management options were discussed at length with patient. Patient is in agreement with the above and verbalized understanding.  Visit today included increased complexity associated with the care of the episodic problem of chronic pain which was addressed and continue to manage the longitudinal care of the patient due to the serious  and/or complex managed problem(s) listed above.      Claudia Bey NP  Interventional Pain Medicine  [facility]  [facility] Valente      Disclaimer:  This note was prepared using voice recognition system and is likely to have sound alike errors that may have been overlooked even after proof reading.  Please call me with any questions

## 2024-06-27 ENCOUNTER — TELEPHONE (OUTPATIENT)
Dept: INTERNAL MEDICINE | Facility: CLINIC | Age: 69
End: 2024-06-27
Payer: MEDICARE

## 2024-06-27 NOTE — TELEPHONE ENCOUNTER
----- Message from Zac Castro sent at 6/26/2024  4:50 PM CDT -----  Contact: Mariel Floyd would like a call back at  [phone] regards to pharmacy requesting a PA on medication, semaglutide, weight loss, (WEGOVY) 0.25 mg/0.5 mL PnI. The request was put in on 6/17/24 and patient states she has been calling but hasn't gotten a response and the pharmacy doesn't have it. If unable to reach patient please send message through the [employer]  [facility] [address] [place] [address]  [address] [place] [id number]  Phone: [phone] Fax: [phone]    Thanks   Am

## 2024-06-27 NOTE — TELEPHONE ENCOUNTER
----- Message from Antonette Jaffe sent at 6/27/2024 11:57 AM CDT -----  Type:  RX Refill Request    Who Called: Pt   Refill or New Rx: Refill   RX Name and Strength: semaglutide, weight loss, (WEGOVY) 0.25 mg/0.5 mL Natalya  Preferred Pharmacy with phone number:  [facility] [place] [facility] [address] [place] [address] [address] [facility]  [address] [facility] [address]  Phone: [phone] Fax: [phone]  Local or Mail Order: Local   Ordering Provider: Nithin   Would the patient rather a call back or a response via [facility]? Call back   Best Call Back Number: [phone] or [phone]  Additional Information: Please be advised, pt states that she has been calling in for prescription since 06/17 and would like to know why it hasn't been filled yet, pharmacy is need prior authorization from provider, pt would like either a yes or no if it can't be done or not and would like a call back as soon as possible

## 2024-06-27 NOTE — TELEPHONE ENCOUNTER
I returned a call and informed of outcome from her PA and she requested that I send a message to Anthony Rodríguez to see if there is anything further he can do or if there are any other recommendations since she was informed that  is either no longer with [facility] or not accepting new pt's . I did and read it back to her before sending. She does not want to hear that wegovy is not covered under her insurances formulary. She was also informed her PCP Estrellita Weller NP will be out of office until 07/01/24 and that there is no guarantee that her form will be the first one the provider reviews when she returns but, it will be reiterated as there is multiple pieces of paperwork that she will need to address Fyi //kah

## 2024-07-03 ENCOUNTER — OFFICE VISIT (OUTPATIENT)
Dept: PAIN MEDICINE | Facility: CLINIC | Age: 69
End: 2024-07-03
Payer: MEDICARE

## 2024-07-03 VITALS
WEIGHT: 231.06 LBS | DIASTOLIC BLOOD PRESSURE: 82 MMHG | SYSTOLIC BLOOD PRESSURE: 125 MMHG | HEIGHT: 63 IN | HEART RATE: 91 BPM | BODY MASS INDEX: 40.94 KG/M2 | RESPIRATION RATE: 17 BRPM

## 2024-07-03 DIAGNOSIS — Z96.652 STATUS POST TOTAL KNEE REPLACEMENT USING CEMENT, LEFT: ICD-10-CM

## 2024-07-03 DIAGNOSIS — M54.16 LUMBAR RADICULOPATHY: ICD-10-CM

## 2024-07-03 DIAGNOSIS — M47.816 LUMBAR FACET ARTHROPATHY: ICD-10-CM

## 2024-07-03 DIAGNOSIS — M17.12 PRIMARY OSTEOARTHRITIS OF LEFT KNEE: Primary | ICD-10-CM

## 2024-07-03 PROCEDURE — 3288F FALL RISK ASSESSMENT DOCD: CPT | Mod: CPTII,S$GLB,, | Performed by: NURSE PRACTITIONER

## 2024-07-03 PROCEDURE — 3079F DIAST BP 80-89 MM HG: CPT | Mod: CPTII,S$GLB,, | Performed by: NURSE PRACTITIONER

## 2024-07-03 PROCEDURE — 99999 PR PBB SHADOW E&M-EST. PATIENT-LVL V: CPT | Mod: PBBFAC,,, | Performed by: NURSE PRACTITIONER

## 2024-07-03 PROCEDURE — 1125F AMNT PAIN NOTED PAIN PRSNT: CPT | Mod: CPTII,S$GLB,, | Performed by: NURSE PRACTITIONER

## 2024-07-03 PROCEDURE — 99214 OFFICE O/P EST MOD 30 MIN: CPT | Mod: S$GLB,,, | Performed by: NURSE PRACTITIONER

## 2024-07-03 PROCEDURE — G2211 COMPLEX E/M VISIT ADD ON: HCPCS | Mod: S$GLB,,, | Performed by: NURSE PRACTITIONER

## 2024-07-03 PROCEDURE — 1101F PT FALLS ASSESS-DOCD LE1/YR: CPT | Mod: CPTII,S$GLB,, | Performed by: NURSE PRACTITIONER

## 2024-07-03 PROCEDURE — 1159F MED LIST DOCD IN RCRD: CPT | Mod: CPTII,S$GLB,, | Performed by: NURSE PRACTITIONER

## 2024-07-03 PROCEDURE — 3074F SYST BP LT 130 MM HG: CPT | Mod: CPTII,S$GLB,, | Performed by: NURSE PRACTITIONER

## 2024-07-03 PROCEDURE — 3008F BODY MASS INDEX DOCD: CPT | Mod: CPTII,S$GLB,, | Performed by: NURSE PRACTITIONER

## 2024-08-05 ENCOUNTER — HOSPITAL ENCOUNTER (OUTPATIENT)
Dept: RADIOLOGY | Facility: HOSPITAL | Age: 69
Discharge: HOME | End: 2024-08-05
Attending: NURSE PRACTITIONER
Payer: MEDICARE

## 2024-08-05 VITALS — WEIGHT: 231.06 LBS | HEIGHT: 63 IN | BODY MASS INDEX: 40.94 KG/M2

## 2024-08-05 DIAGNOSIS — Z12.31 BREAST CANCER SCREENING BY MAMMOGRAM: ICD-10-CM

## 2024-08-05 PROCEDURE — 77067 SCR MAMMO BI INCL CAD: CPT | Mod: 26,,, | Performed by: RADIOLOGY

## 2024-08-05 PROCEDURE — 77063 BREAST TOMOSYNTHESIS BI: CPT | Mod: 26,,, | Performed by: RADIOLOGY

## 2024-08-05 PROCEDURE — 77063 BREAST TOMOSYNTHESIS BI: CPT | Mod: TC

## 2024-08-05 PROCEDURE — 77067 SCR MAMMO BI INCL CAD: CPT | Mod: TC

## 2024-08-09 DIAGNOSIS — M79.18 PIRIFORMIS MUSCLE PAIN: ICD-10-CM

## 2024-08-09 DIAGNOSIS — M54.16 BILATERAL LUMBAR RADICULOPATHY: ICD-10-CM

## 2024-08-09 RX ORDER — METHOCARBAMOL 750 MG/1
750 TABLET, FILM COATED ORAL 3 TIMES DAILY PRN
Qty: 90 TABLET | Refills: 3 | Status: SHIPPED | OUTPATIENT
Start: 2024-08-09

## 2024-08-13 ENCOUNTER — OFFICE VISIT (OUTPATIENT)
Dept: INTERNAL MEDICINE | Facility: CLINIC | Age: 69
End: 2024-08-13
Payer: MEDICARE

## 2024-08-13 VITALS
HEIGHT: 63 IN | HEART RATE: 80 BPM | WEIGHT: 221 LBS | DIASTOLIC BLOOD PRESSURE: 82 MMHG | BODY MASS INDEX: 39.16 KG/M2 | OXYGEN SATURATION: 99 % | SYSTOLIC BLOOD PRESSURE: 122 MMHG

## 2024-08-13 DIAGNOSIS — M47.26 OSTEOARTHRITIS OF SPINE WITH RADICULOPATHY, LUMBAR REGION: Primary | ICD-10-CM

## 2024-08-13 DIAGNOSIS — M17.0 PRIMARY OSTEOARTHRITIS OF BOTH KNEES: ICD-10-CM

## 2024-08-13 PROCEDURE — 99213 OFFICE O/P EST LOW 20 MIN: CPT | Mod: S$GLB,,, | Performed by: NURSE PRACTITIONER

## 2024-08-13 PROCEDURE — 1160F RVW MEDS BY RX/DR IN RCRD: CPT | Mod: CPTII,S$GLB,, | Performed by: NURSE PRACTITIONER

## 2024-08-13 PROCEDURE — 1125F AMNT PAIN NOTED PAIN PRSNT: CPT | Mod: CPTII,S$GLB,, | Performed by: NURSE PRACTITIONER

## 2024-08-13 PROCEDURE — 3008F BODY MASS INDEX DOCD: CPT | Mod: CPTII,S$GLB,, | Performed by: NURSE PRACTITIONER

## 2024-08-13 PROCEDURE — 3079F DIAST BP 80-89 MM HG: CPT | Mod: CPTII,S$GLB,, | Performed by: NURSE PRACTITIONER

## 2024-08-13 PROCEDURE — 3074F SYST BP LT 130 MM HG: CPT | Mod: CPTII,S$GLB,, | Performed by: NURSE PRACTITIONER

## 2024-08-13 PROCEDURE — 1159F MED LIST DOCD IN RCRD: CPT | Mod: CPTII,S$GLB,, | Performed by: NURSE PRACTITIONER

## 2024-08-13 PROCEDURE — 3288F FALL RISK ASSESSMENT DOCD: CPT | Mod: CPTII,S$GLB,, | Performed by: NURSE PRACTITIONER

## 2024-08-13 PROCEDURE — 99999 PR PBB SHADOW E&M-EST. PATIENT-LVL V: CPT | Mod: PBBFAC,,, | Performed by: NURSE PRACTITIONER

## 2024-08-13 PROCEDURE — 1101F PT FALLS ASSESS-DOCD LE1/YR: CPT | Mod: CPTII,S$GLB,, | Performed by: NURSE PRACTITIONER

## 2024-08-13 NOTE — PROGRESS NOTES
Subjective     Patient ID: Mariel Becerril is a 68 y.o. female.    Chief Complaint: Follow-up (PHYSICAL THERAPY ORDER)    Patient presents for a new referral for PT.  Wanting to start PT at [facility].  Her own facility is no longer taking her insurance and she wants somewhere little closer.  Overall doing well.    Follow-up  Associated symptoms include arthralgias. Pertinent negatives include no abdominal pain, chest pain, coughing, fatigue, fever, headaches, neck pain, numbness, rash or sore throat.     Review of Systems   Constitutional:  Negative for activity change, appetite change, fatigue and fever.   HENT:  Negative for ear discharge, ear pain, mouth sores, nosebleeds, sore throat and tinnitus.    Eyes:  Negative for discharge, redness and itching.   Respiratory:  Negative for apnea, cough and shortness of breath.    Cardiovascular:  Negative for chest pain and leg swelling.   Gastrointestinal:  Negative for abdominal distention, abdominal pain, blood in stool and constipation.   Endocrine: Negative for polydipsia, polyphagia and polyuria.   Genitourinary:  Negative for difficulty urinating, flank pain, frequency and hematuria.   Musculoskeletal:  Positive for arthralgias, back pain and leg pain. Negative for gait problem, neck pain and neck stiffness.   Integumentary:  Negative for rash and wound.   Allergic/Immunologic: Negative for environmental allergies, food allergies and immunocompromised state.   Neurological:  Negative for dizziness, seizures, numbness and headaches.   Psychiatric/Behavioral:  Negative for agitation, confusion, hallucinations, self-injury and suicidal ideas.           Objective     Physical Exam  Vitals reviewed.   Constitutional:       Appearance: Normal appearance.   Cardiovascular:      Rate and Rhythm: Normal rate and regular rhythm.   Pulmonary:      Effort: Pulmonary effort is normal.      Breath sounds: Normal breath sounds.   Skin:     Comments: Cyst to right 4th  finger     Neurological:      General: No focal deficit present.      Mental Status: She is alert.   Psychiatric:         Mood and Affect: Mood normal.         Behavior: Behavior is cooperative.            Assessment and Plan     1. Osteoarthritis of spine with radiculopathy, lumbar region  -     Ambulatory referral/consult to Physical/Occupational Therapy; Future; Expected date: 08/20/2024    2. Primary osteoarthritis of both knees  -     Ambulatory referral/consult to Physical/Occupational Therapy; Future; Expected date: 08/20/2024      New referral faxed tomorrow physical therapy caroline Segal.  Advised patient to look for a call from facility.         No follow-ups on file.

## 2024-08-19 ENCOUNTER — OFFICE VISIT (OUTPATIENT)
Dept: DERMATOLOGY | Facility: CLINIC | Age: 69
End: 2024-08-19
Payer: MEDICARE

## 2024-08-19 DIAGNOSIS — D17.9 LIPOMA, UNSPECIFIED SITE: ICD-10-CM

## 2024-08-19 DIAGNOSIS — R22.9 SUBCUTANEOUS NODULE: Primary | ICD-10-CM

## 2024-08-19 PROCEDURE — 1160F RVW MEDS BY RX/DR IN RCRD: CPT | Mod: CPTII,S$GLB,, | Performed by: PHYSICIAN ASSISTANT

## 2024-08-19 PROCEDURE — 99999 PR PBB SHADOW E&M-EST. PATIENT-LVL IV: CPT | Mod: PBBFAC,,, | Performed by: PHYSICIAN ASSISTANT

## 2024-08-19 PROCEDURE — 99203 OFFICE O/P NEW LOW 30 MIN: CPT | Mod: S$GLB,,, | Performed by: PHYSICIAN ASSISTANT

## 2024-08-19 PROCEDURE — 3288F FALL RISK ASSESSMENT DOCD: CPT | Mod: CPTII,S$GLB,, | Performed by: PHYSICIAN ASSISTANT

## 2024-08-19 PROCEDURE — 1159F MED LIST DOCD IN RCRD: CPT | Mod: CPTII,S$GLB,, | Performed by: PHYSICIAN ASSISTANT

## 2024-08-19 PROCEDURE — 1126F AMNT PAIN NOTED NONE PRSNT: CPT | Mod: CPTII,S$GLB,, | Performed by: PHYSICIAN ASSISTANT

## 2024-08-19 PROCEDURE — 1101F PT FALLS ASSESS-DOCD LE1/YR: CPT | Mod: CPTII,S$GLB,, | Performed by: PHYSICIAN ASSISTANT

## 2024-08-19 NOTE — PROGRESS NOTES
"  Subjective:      Patient ID:  Mariel Becerril is a 68 y.o. female who presents for   Chief Complaint   Patient presents with    Cyst     C/o cyst on right 4th digit      History of Present Illness: The patient presents with chief complaint of "cyst".  Location: right 4th digit  Duration: few months  Signs/Symptoms: lump beneath the skin. Denies rapid change in size or pain. States she does a lot of crafts and concerned about treatment options potentially interfering.  Denies trauma or injury to affected area. Denies associated tingling or numbness or loss of ROM of hand.     Prior treatments:  bactroban (no help)          Review of Systems   Constitutional:  Negative for fever and chills.   Gastrointestinal:  Negative for nausea and vomiting.   Skin:  Positive for activity-related sunscreen use. Negative for itching, rash, dry skin, sun sensitivity, daily sunscreen use, recent sunburn and dry lips.   Hematologic/Lymphatic: Does not bruise/bleed easily.       Objective:   Physical Exam   Skin:   Areas Examined (abnormalities noted in diagram):   Head / Face Inspection Performed  Neck Inspection Performed  RUE Inspected  LUE Inspection Performed  Nails and Digits Inspection Performed           Diagram Legend     Erythematous scaling macule/papule c/w actinic keratosis       Vascular papule c/w angioma      Pigmented verrucoid papule/plaque c/w seborrheic keratosis      Yellow umbilicated papule c/w sebaceous hyperplasia      Irregularly shaped tan macule c/w lentigo     1-2 mm smooth white papules consistent with Milia      Movable subcutaneous cyst with punctum c/w epidermal inclusion cyst      Subcutaneous movable cyst c/w pilar cyst      Firm pink to brown papule c/w dermatofibroma      Pedunculated fleshy papule(s) c/w skin tag(s)      Evenly pigmented macule c/w junctional nevus     Mildly variegated pigmented, slightly irregular-bordered macule c/w mildly atypical nevus      Flesh colored to evenly " pigmented papule c/w intradermal nevus       Pink pearly papule/plaque c/w basal cell carcinoma      Erythematous hyperkeratotic cursted plaque c/w SCC      Surgical scar with no sign of skin cancer recurrence      Open and closed comedones      Inflammatory papules and pustules      Verrucoid papule consistent consistent with wart     Erythematous eczematous patches and plaques     Dystrophic onycholytic nail with subungual debris c/w onychomycosis     Umbilicated papule    Erythematous-base heme-crusted tan verrucoid plaque consistent with inflamed seborrheic keratosis     Erythematous Silvery Scaling Plaque c/w Psoriasis     See annotation      Assessment / Plan:        Subcutaneous nodule  -     Ambulatory referral/consult to Hand Surgery; Future; Expected date: 08/26/2024  Ill defined subQ plaque. Will refer to hand surgery for further evaluation and tx recs.      Lipoma, unspecified site  -     Ambulatory referral/consult to Dermatology             No follow-ups on file.

## 2024-08-28 ENCOUNTER — OFFICE VISIT (OUTPATIENT)
Dept: CARDIOLOGY | Facility: CLINIC | Age: 69
End: 2024-08-28
Payer: MEDICARE

## 2024-08-28 VITALS
DIASTOLIC BLOOD PRESSURE: 74 MMHG | BODY MASS INDEX: 40.25 KG/M2 | SYSTOLIC BLOOD PRESSURE: 116 MMHG | OXYGEN SATURATION: 98 % | WEIGHT: 218.69 LBS | HEIGHT: 62 IN | HEART RATE: 105 BPM

## 2024-08-28 DIAGNOSIS — M17.0 PRIMARY OSTEOARTHRITIS OF BOTH KNEES: ICD-10-CM

## 2024-08-28 DIAGNOSIS — R00.2 PALPITATIONS: Primary | ICD-10-CM

## 2024-08-28 DIAGNOSIS — R20.2 NUMBNESS AND TINGLING OF BOTH FEET: ICD-10-CM

## 2024-08-28 DIAGNOSIS — E66.01 MORBID OBESITY WITH BMI OF 40.0-44.9, ADULT: ICD-10-CM

## 2024-08-28 DIAGNOSIS — K21.9 GASTROESOPHAGEAL REFLUX DISEASE, UNSPECIFIED WHETHER ESOPHAGITIS PRESENT: ICD-10-CM

## 2024-08-28 DIAGNOSIS — I10 ESSENTIAL HYPERTENSION: ICD-10-CM

## 2024-08-28 DIAGNOSIS — I49.3 SYMPTOMATIC PVCS: ICD-10-CM

## 2024-08-28 DIAGNOSIS — R60.0 LOCALIZED EDEMA: ICD-10-CM

## 2024-08-28 DIAGNOSIS — Z98.84 HX OF GASTRIC BYPASS: ICD-10-CM

## 2024-08-28 DIAGNOSIS — R07.89 CHEST PRESSURE: ICD-10-CM

## 2024-08-28 DIAGNOSIS — G47.33 OSA ON CPAP: ICD-10-CM

## 2024-08-28 DIAGNOSIS — R20.0 NUMBNESS AND TINGLING OF BOTH FEET: ICD-10-CM

## 2024-08-28 DIAGNOSIS — R06.09 DOE (DYSPNEA ON EXERTION): ICD-10-CM

## 2024-08-28 PROCEDURE — 1159F MED LIST DOCD IN RCRD: CPT | Mod: CPTII,S$GLB,, | Performed by: INTERNAL MEDICINE

## 2024-08-28 PROCEDURE — 1126F AMNT PAIN NOTED NONE PRSNT: CPT | Mod: CPTII,S$GLB,, | Performed by: INTERNAL MEDICINE

## 2024-08-28 PROCEDURE — 99214 OFFICE O/P EST MOD 30 MIN: CPT | Mod: S$GLB,,, | Performed by: INTERNAL MEDICINE

## 2024-08-28 PROCEDURE — 1101F PT FALLS ASSESS-DOCD LE1/YR: CPT | Mod: CPTII,S$GLB,, | Performed by: INTERNAL MEDICINE

## 2024-08-28 PROCEDURE — 3008F BODY MASS INDEX DOCD: CPT | Mod: CPTII,S$GLB,, | Performed by: INTERNAL MEDICINE

## 2024-08-28 PROCEDURE — 1160F RVW MEDS BY RX/DR IN RCRD: CPT | Mod: CPTII,S$GLB,, | Performed by: INTERNAL MEDICINE

## 2024-08-28 PROCEDURE — 99999 PR PBB SHADOW E&M-EST. PATIENT-LVL V: CPT | Mod: PBBFAC,,, | Performed by: INTERNAL MEDICINE

## 2024-08-28 PROCEDURE — 3078F DIAST BP <80 MM HG: CPT | Mod: CPTII,S$GLB,, | Performed by: INTERNAL MEDICINE

## 2024-08-28 PROCEDURE — G2211 COMPLEX E/M VISIT ADD ON: HCPCS | Mod: S$GLB,,, | Performed by: INTERNAL MEDICINE

## 2024-08-28 PROCEDURE — 3288F FALL RISK ASSESSMENT DOCD: CPT | Mod: CPTII,S$GLB,, | Performed by: INTERNAL MEDICINE

## 2024-08-28 PROCEDURE — 3074F SYST BP LT 130 MM HG: CPT | Mod: CPTII,S$GLB,, | Performed by: INTERNAL MEDICINE

## 2024-08-28 NOTE — PROGRESS NOTES
Subjective:   Patient ID:  Mariel Becerril is a 68 y.o. female who presents for cardiac consult of No chief complaint on file.      The patient came in today for cardiac consult of No chief complaint on file.    Mariel Becerril is a 68 y.o. female with current medical conditions HTN, PVCs, obesity s/p gastric sleeve, GERD, OA, anemia, GIL on CPAP presents for follow CV evaluation.      23  Bp and HR stable. BMI 43 - 244 lbs.  LE u/s 2023 neg for DVT.     She has more back pain, had a fall, a horse was left unattended - and horse started running towards her and she tripped and fell.   She had neg Xrays after.   ECG - NSR    5/15/24  BP low today - 94/62. . BMI 41 - 236 lbs   Has more knee pain has upcoming injection.   She feels more fatigue lately.     ECG - sinus tach, V rate 102, LAE, non st    24  BP and HR stable. BMI 40 - 218 lbs     Family history includes Arthritis in her mother; Depression in her sister; Heart disease in her father -  at age 81; Hypertension in her mother and sister.    Conclusion 2023         There is no evidence of a right lower extremity DVT.    There is no evidence of a left lower extremity DVT.    The right superficial femoral middle vein is normal.    The contralateral (left) common femoral vein is patent and does not have a thrombus.    The left superficial femoral middle vein is normal.    The contralateral (right) common femoral vein is patent and does not have a thrombus.    Results for orders placed during the hospital encounter of 22    Echo    Interpretation Summary  · The left ventricle is normal in size with mild concentric hypertrophy and normal systolic function.  · Mild left atrial enlargement.  · The estimated ejection fraction is 65%.  · Indeterminate left ventricular diastolic function.  · Normal right ventricular size with normal right ventricular systolic function.  · Mild mitral regurgitation.  · Mild tricuspid  regurgitation.  · Intermediate central venous pressure (8 mmHg).  · The estimated PA systolic pressure is 29 mmHg.      Results for orders placed during the hospital encounter of 05/18/22    Nuclear Stress - Cardiology Interpreted    Interpretation Summary    Normal myocardial perfusion scan. There is no evidence of myocardial ischemia or infarction.    The gated perfusion images showed an ejection fraction of > 70% at rest. The gated perfusion images showed an ejection fraction of > 70% post stress.    The EKG portion of this study is negative for ischemia.    The patient reported no chest pain during the stress test.    There were no arrhythmias during stress.      HOLTER 4/2021  Sinus rhythm with heart rates varying between 48 and 124 bpm with an average of 77 bpm.  There were very rare PVCs totalling 12 and averaging 0.13 per hour.  There were very rare PACs totalling 24 and averaging 0.25 per hour.      Past Medical History:   Diagnosis Date    COPD (chronic obstructive pulmonary disease)     NO HOME O2    Digestive disorder     Frequent headaches     Hypertension     Osteoarthritis 04/02/2019    Bilateral knees, ankles and feet    Severe obesity (BMI >= 40)     Sleep apnea     CPAP       Past Surgical History:   Procedure Laterality Date    BLADDER SUSPENSION      CATARACT EXTRACTION, BILATERAL      COLONOSCOPY N/A 12/3/2018    Procedure: COLONOSCOPY;  Surgeon: Mari Rader MD;  Location: [facility];  Service: Endoscopy;  Laterality: N/A;    COLONOSCOPY N/A 6/12/2024    Procedure: COLONOSCOPY;  Surgeon: Sarah Siegel MD;  Location: [facility];  Service: Endoscopy;  Laterality: N/A;    ESOPHAGOGASTRODUODENOSCOPY N/A 12/31/2020    Procedure: ESOPHAGOGASTRODUODENOSCOPY (EGD);  Surgeon: Anthony Rodríguez MD;  Location: [facility];  Service: General;  Laterality: N/A;    HYSTERECTOMY      partial 2000    INJECTION OF ANESTHETIC AGENT AROUND NERVE Left 9/14/2023    Procedure: LEFT Genicular nerve block RN IV  Sedation;  Surgeon: Thanh Diaz MD;  Location: [facility];  Service: Pain Management;  Laterality: Left;    INJECTION OF ANESTHETIC AGENT AROUND NERVE Left 5/21/2024    Procedure: LEFT Genicular nerve block (therpeutic);  Surgeon: Thanh Diaz MD;  Location: [facility];  Service: Pain Management;  Laterality: Left;    INJECTION OF ANESTHETIC AGENT AROUND NERVE Left 6/11/2024    Procedure: LEFT Genicular nerve block with RN IV sedation;  Surgeon: Thanh Diaz MD;  Location: [facility];  Service: Pain Management;  Laterality: Left;    INJECTION OF ANESTHETIC AGENT INTO SACROILIAC JOINT Bilateral 11/30/2020    Procedure: Bilateral SIJ + Bilateral Piriformis + Bilateral GT Bursa Injection;  Surgeon: Thanh Diaz MD;  Location: [facility];  Service: Pain Management;  Laterality: Bilateral;    INJECTION OF JOINT Bilateral 11/30/2020    Procedure: Bilateral SIJ + Bilateral Piriformis + Bilateral GT Bursa Injection;  Surgeon: Thanh Diaz MD;  Location: [facility];  Service: Pain Management;  Laterality: Bilateral;    INJECTION OF PIRIFORMIS MUSCLE Bilateral 11/30/2020    Procedure: Bilateral SIJ + Bilateral Piriformis + Bilateral GT Bursa Injection;  Surgeon: Thanh Diaz MD;  Location: [facility];  Service: Pain Management;  Laterality: Bilateral;    ROBOT-ASSISTED LAPAROSCOPIC SLEEVE GASTRECTOMY USING DA EZEQUIEL XI N/A 3/2/2021    Procedure: XI ROBOTIC SLEEVE GASTRECTOMY;  Surgeon: Anthony Rodríguez MD;  Location: [facility];  Service: General;  Laterality: N/A;    SELECTIVE INJECTION OF ANESTHETIC AGENT AROUND LUMBAR SPINAL NERVE ROOT BY TRANSFORAMINAL APPROACH Bilateral 1/4/2021    Procedure: Bilateral L5/S1 TF GISELA;  Surgeon: Thanh Diaz MD;  Location: [facility];  Service: Pain Management;  Laterality: Bilateral;    SELECTIVE INJECTION OF ANESTHETIC AGENT AROUND LUMBAR SPINAL NERVE ROOT BY TRANSFORAMINAL APPROACH Bilateral 3/23/2021    Procedure: Bilateral L5/S1 TF  GISELA;  Surgeon: Thanh Diaz MD;  Location: [facility];  Service: Pain Management;  Laterality: Bilateral;    SELECTIVE INJECTION OF ANESTHETIC AGENT AROUND LUMBAR SPINAL NERVE ROOT BY TRANSFORAMINAL APPROACH Bilateral 10/5/2021    Procedure: Bilateral L5/S1 TF GISELA;  Surgeon: Thanh Diaz MD;  Location: [facility];  Service: Pain Management;  Laterality: Bilateral;    SELECTIVE INJECTION OF ANESTHETIC AGENT AROUND LUMBAR SPINAL NERVE ROOT BY TRANSFORAMINAL APPROACH Bilateral 3/28/2024    Procedure: Bilateral L5/S1 TF GISELA;  Surgeon: Thanh Diaz MD;  [facility];  Service: Pain Management;  Laterality: Bilateral;    tonsilectomy      TOTAL KNEE ARTHROPLASTY Left 3/4/2020    Procedure: ARTHROPLASTY, KNEE, TOTAL;  Surgeon: Moy Connolly MD;  Location: [facility];  Service: Orthopedics;  Laterality: Left;       Social History     Tobacco Use    Smoking status: Never     Passive exposure: Never    Smokeless tobacco: Never   Substance Use Topics    Alcohol use: Never    Drug use: No       Family History   Problem Relation Name Age of Onset    Heart attack Father         Patient's Medications   New Prescriptions    No medications on file   Previous Medications    ACETAMINOPHEN (TYLENOL) 325 MG TABLET    Take 2 tablets (650 mg total) by mouth every 8 (eight) hours as needed.    ALBUTEROL (PROAIR HFA) 90 MCG/ACTUATION INHALER    Inhale 2 puffs into the lungs every 6 (six) hours as needed for Wheezing. Rescue    ASPIRIN (ECOTRIN) 81 MG EC TABLET    Take 1 tablet (81 mg total) by mouth once daily.    DICLOFENAC SODIUM (VOLTAREN) 1 % GEL    APPLY 2 GRAMS TOPICALLY THREE TIMES DAILY AS NEEDED    ERGOCALCIFEROL, VITAMIN D2, (VITAMIN D ORAL)    Take 2,000 Units by mouth once daily.    FUROSEMIDE (LASIX) 40 MG TABLET    Take 1 tablet (40 mg total) by mouth daily as needed (edema, swelling). Do not take if BP is less than 110/70    GABAPENTIN (NEURONTIN) 300 MG CAPSULE    Take 2 capsules (600 mg  total) by mouth every evening.    GENTAMICIN (GARAMYCIN) 0.1 % OINTMENT    Apply topically 3 (three) times daily.    IMIPRAMINE (TOFRANIL) 10 MG TAB    Take 1 tablet (10 mg total) by mouth every evening.    LEVOCETIRIZINE (XYZAL) 5 MG TABLET    TAKE 1 TABLET BY MOUTH ONCE DAILY IN THE EVENING    LINACLOTIDE (LINZESS) 72 MCG CAP CAPSULE    Take 1 capsule (72 mcg total) by mouth before breakfast.    MECLIZINE (ANTIVERT) 25 MG TABLET    Take 1 tablet (25 mg total) by mouth 3 (three) times daily as needed for Dizziness.    MELOXICAM (MOBIC) 15 MG TABLET    Take 1 tablet (15 mg total) by mouth once daily.    METHOCARBAMOL (ROBAXIN) 750 MG TAB    Take 1 tablet (750 mg total) by mouth 3 (three) times daily as needed.    METOPROLOL SUCCINATE (TOPROL-XL) 25 MG 24 HR TABLET    Take 1 tablet (25 mg total) by mouth once daily.    MONTELUKAST (SINGULAIR) 10 MG TABLET    Take 1 tablet (10 mg total) by mouth every evening. Allergies    MUPIROCIN (BACTROBAN) 2 % OINTMENT    Apply topically 3 (three) times daily.    NITROFURANTOIN, MACROCRYSTAL-MONOHYDRATE, (MACROBID) 100 MG CAPSULE    Take 1 capsule (100 mg total) by mouth 2 (two) times daily.    NITROGLYCERIN (NITROSTAT) 0.4 MG SL TABLET    Place 1 tablet (0.4 mg total) under the tongue every 5 (five) minutes as needed for Chest pain.    NYSTATIN (MYCOSTATIN) CREAM    APPLY  CREAM TOPICALLY TO AFFECTED AREA TWICE DAILY    OMEPRAZOLE (PRILOSEC) 40 MG CAPSULE    Take 1 capsule by mouth once daily    ONDANSETRON (ZOFRAN) 8 MG TABLET    Take 1 tablet (8 mg total) by mouth every 8 (eight) hours as needed for Nausea.    POTASSIUM CHLORIDE SA (K-DUR,KLOR-CON) 20 MEQ TABLET    Take 1 tablet (20 mEq total) by mouth daily as needed (if taking lasix).    SEMAGLUTIDE, WEIGHT LOSS, (WEGOVY) 0.25 MG/0.5 ML PNIJ    Inject 0.25 mg into the skin once a week.    TOPIRAMATE (TOPAMAX) 100 MG TABLET    Take 1 tablet (100 mg total) by mouth 2 (two) times daily.   Modified Medications    No medications  "on file   Discontinued Medications    No medications on file       Review of Systems   Constitutional: Negative.    HENT: Negative.     Eyes: Negative.    Respiratory:  Positive for shortness of breath (improved).    Cardiovascular:  Positive for palpitations. Negative for chest pain and leg swelling.   Gastrointestinal:  Positive for heartburn.   Genitourinary: Negative.    Musculoskeletal:  Positive for joint pain.   Skin: Negative.    Neurological:  Positive for dizziness.   Endo/Heme/Allergies: Negative.    Psychiatric/Behavioral: Negative.     All 12 systems otherwise negative.      Wt Readings from Last 3 Encounters:   08/28/24 99.2 kg (218 lb 11.1 oz)   08/13/24 100.3 kg (221 lb 0.2 oz)   08/05/24 104.8 kg (231 lb 0.7 oz)     Temp Readings from Last 3 Encounters:   06/17/24 97.3 °F (36.3 °C) (Tympanic)   06/12/24 97 °F (36.1 °C)   06/11/24 97.9 °F (36.6 °C)     BP Readings from Last 3 Encounters:   08/28/24 116/74   08/13/24 122/82   07/03/24 125/82     Pulse Readings from Last 3 Encounters:   08/28/24 105   08/13/24 80   07/03/24 91       /74 (BP Location: Left arm, Patient Position: Sitting, BP Method: Large (Manual))   Pulse 105   Ht 5' 2" (1.575 m)   Wt 99.2 kg (218 lb 11.1 oz)   SpO2 98%   BMI 40.00 kg/m²     Objective:   Physical Exam  Vitals and nursing note reviewed.   Constitutional:       General: She is not in acute distress.     Appearance: She is well-developed. She is not diaphoretic.   HENT:      Head: Normocephalic and atraumatic.      Nose: Nose normal.   Eyes:      General: No scleral icterus.     Conjunctiva/sclera: Conjunctivae normal.   Neck:      Thyroid: No thyromegaly.      Vascular: No JVD.   Cardiovascular:      Rate and Rhythm: Normal rate and regular rhythm.      Heart sounds: S1 normal and S2 normal. Murmur heard.      No friction rub. No gallop. No S3 or S4 sounds.   Pulmonary:      Effort: Pulmonary effort is normal. No respiratory distress.      Breath sounds: Normal " breath sounds. No stridor. No wheezing or rales.   Chest:      Chest wall: No tenderness.   Abdominal:      General: Bowel sounds are normal. There is no distension.      Palpations: Abdomen is soft. There is no mass.      Tenderness: There is no abdominal tenderness. There is no rebound.   Genitourinary:     Comments: Deferred  Musculoskeletal:         General: No tenderness or deformity. Normal range of motion.      Cervical back: Normal range of motion and neck supple.   Lymphadenopathy:      Cervical: No cervical adenopathy.   Skin:     General: Skin is warm and dry.      Coloration: Skin is not pale.      Findings: No erythema or rash.   Neurological:      Mental Status: She is alert and oriented to person, place, and time.      Motor: No abnormal muscle tone.      Coordination: Coordination normal.   Psychiatric:         Behavior: Behavior normal.         Thought Content: Thought content normal.         Judgment: Judgment normal.         Lab Results   Component Value Date     02/19/2024    K 3.6 02/19/2024     (H) 02/19/2024    CO2 25 02/19/2024    BUN 19 02/19/2024    CREATININE 0.7 02/19/2024    GLU 94 02/19/2024    HGBA1C 5.4 11/09/2022    MG 2.1 03/11/2021    AST 23 02/19/2024    ALT 19 02/19/2024    ALBUMIN 3.4 (L) 02/19/2024    PROT 6.4 02/19/2024    BILITOT 0.3 02/19/2024    WBC 5.99 05/16/2024    HGB 12.3 05/16/2024    HCT 40.4 05/16/2024    MCV 98 05/16/2024     05/16/2024    INR 1.0 10/27/2021    TSH 1.313 11/29/2023    CHOL 155 11/29/2023    HDL 59 11/29/2023    LDLCALC 81.4 11/29/2023    TRIG 73 11/29/2023    BNP <10 08/20/2018     Assessment:      1. Palpitations    2. AREVALO (dyspnea on exertion)    3. GIL on CPAP    4. Morbid obesity with BMI of 40.0-44.9, adult    5. Hx of gastric bypass    6. Symptomatic PVCs    7. Essential hypertension    8. Localized edema    9. Chest pressure    10. Numbness and tingling of both feet    11. Gastroesophageal reflux disease, unspecified  whether esophagitis present    12. Primary osteoarthritis of both knees            Plan:   1. Chest pressure/AREVALO   - prior Nuclear stress neg 5/2022.   - ECHO with normal bi V function, mild MR, mild TR, PASP 29 mmHg.   - cont NTG    2. AREVALO with edema sec to CVI  - improved with lasix 40 mg  - rec compression stockings  - neg for DVT 9/2023    3. HTN  - BP Low   - titrate meds - dec to Toprol 25 mg QHS  - stoped Norvasc  - low salt diet    4. Obesity s/p gastric sleeve 2/2021 with gerd; BMI 42 - 238 lbs -->  BMI 43 - 244 lbs. BMI 41 - 236 lbs  --> BMI 40 - 218 lbs   - rec weight loss with diet/exercise; cont PPI  - f/u GI as well - proceed for EGD if needed   - on Wegovy - but not approved with insurance? Maybe can start Zepbound      5. GIL, poor sleep   - cont CPAP  - appreciate pulm recs    6. Palpitations sec to PVCS  - cont BB  - Holter 4/2021 rare PVCs, PACs, AVG HR 77    7. Anemia  - f/u with heme/onc    8. Leg/back, knee pain  - cont PT/OT  - f/u pain and ortho       Visit today included increased complexity associated with the care of the episodic problem Chest pain addressed and managing the longitudinal care of the patient due to the serious and/or complex managed problem(s) .      Thank you for allowing me to participate in this patient's care. Please do not hesitate to contact me with any questions or concerns. Consult note has been forwarded to the referral physician.     Juan Alberto Luis MD, [facility]  Cardiovascular Disease  [facility], [place]  [phone] (P)

## 2024-09-05 ENCOUNTER — OFFICE VISIT (OUTPATIENT)
Dept: ORTHOPEDICS | Facility: CLINIC | Age: 69
End: 2024-09-05
Payer: MEDICARE

## 2024-09-05 ENCOUNTER — HOSPITAL ENCOUNTER (OUTPATIENT)
Dept: RADIOLOGY | Facility: HOSPITAL | Age: 69
Discharge: HOME | End: 2024-09-05
Attending: ORTHOPAEDIC SURGERY
Payer: MEDICARE

## 2024-09-05 VITALS — BODY MASS INDEX: 40.25 KG/M2 | WEIGHT: 218.69 LBS | HEIGHT: 62 IN

## 2024-09-05 DIAGNOSIS — M47.817 ARTHROPATHY OF LUMBOSACRAL FACET JOINT: ICD-10-CM

## 2024-09-05 DIAGNOSIS — M25.561 PAIN IN BOTH KNEES, UNSPECIFIED CHRONICITY: ICD-10-CM

## 2024-09-05 DIAGNOSIS — Z96.652 HISTORY OF TOTAL LEFT KNEE REPLACEMENT: Primary | ICD-10-CM

## 2024-09-05 DIAGNOSIS — M25.562 PAIN IN BOTH KNEES, UNSPECIFIED CHRONICITY: ICD-10-CM

## 2024-09-05 DIAGNOSIS — M48.062 LUMBAR STENOSIS WITH NEUROGENIC CLAUDICATION: ICD-10-CM

## 2024-09-05 DIAGNOSIS — Q76.49 CONGENITAL CERVICAL SPINE STENOSIS: ICD-10-CM

## 2024-09-05 DIAGNOSIS — M17.11 ARTHRITIS OF KNEE, RIGHT: ICD-10-CM

## 2024-09-05 DIAGNOSIS — M54.17 LUMBOSACRAL RADICULOPATHY AT L5: ICD-10-CM

## 2024-09-05 DIAGNOSIS — M54.17 LUMBOSACRAL RADICULOPATHY AT S1: ICD-10-CM

## 2024-09-05 PROCEDURE — 99999 PR PBB SHADOW E&M-EST. PATIENT-LVL IV: CPT | Mod: PBBFAC,,, | Performed by: ORTHOPAEDIC SURGERY

## 2024-09-05 PROCEDURE — 1125F AMNT PAIN NOTED PAIN PRSNT: CPT | Mod: CPTII,S$GLB,, | Performed by: ORTHOPAEDIC SURGERY

## 2024-09-05 PROCEDURE — 1101F PT FALLS ASSESS-DOCD LE1/YR: CPT | Mod: CPTII,S$GLB,, | Performed by: ORTHOPAEDIC SURGERY

## 2024-09-05 PROCEDURE — 99214 OFFICE O/P EST MOD 30 MIN: CPT | Mod: S$GLB,,, | Performed by: ORTHOPAEDIC SURGERY

## 2024-09-05 PROCEDURE — 73564 X-RAY EXAM KNEE 4 OR MORE: CPT | Mod: TC,50

## 2024-09-05 PROCEDURE — 1159F MED LIST DOCD IN RCRD: CPT | Mod: CPTII,S$GLB,, | Performed by: ORTHOPAEDIC SURGERY

## 2024-09-05 PROCEDURE — 3008F BODY MASS INDEX DOCD: CPT | Mod: CPTII,S$GLB,, | Performed by: ORTHOPAEDIC SURGERY

## 2024-09-05 PROCEDURE — 3288F FALL RISK ASSESSMENT DOCD: CPT | Mod: CPTII,S$GLB,, | Performed by: ORTHOPAEDIC SURGERY

## 2024-09-05 NOTE — PROGRESS NOTES
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 infection or bleeding.  It is not warm to my touch  Stable in extension and noticed slight instability in flexion..  the tenderness to palpation over the lateral aspect of the insertion of the hamstring resolved.  Some weakness of her quads at 5-/5, there is no defect in the quads or patella tendon.  No central swelling.  Calf is very soft.  Active full extension and full flexion without defect in the quadriceps or patella tendon  Ankle motion is intact  Calves are soft nontender  DP 1+  Skin is warm to touch no obvious lesions/dry    Relevant imaging results reviewed and interpreted by me, discussed with the patient and / or family    X-ray 09/05/2024 bilateral knee.  Left TKA in excellent alignment no evidence of failure no poly wear no cystic changes in the bone.  The right knee showing patellofemoral arthritic changes and marginal osteophyte consistent with arthritis       Mega Blake MD  [phone] 7/25/2023 Routine     Narrative & Impression  EXAMINATION:  NM BONE SCAN 3 PHASE KNEE     CLINICAL HISTORY:  Knee replacement, infection suspected;  Presence of left artificial knee joint     TECHNIQUE:  Following the IV administration of 23.8 mCi of  Tc-99m-MDP, immediate dynamic and early static images of the bilateral knees were acquired followed by anterior and posterior delayed  images.     COMPARISON:  05/24/2023     FINDINGS:  On the flow phase there is no abnormal uptake.     On the 5 minute and 10 minute blood pool images and on the 3 hour delayed images there is focal uptake predominantly in the proximal tibia surrounding the tibial prosthesis.  There is mild uptake to a lesser degree surrounding the femoral prosthesis.  Findings are nonspecific, raising question of normal uptake (activity can be seen up to 5 years after TKA).  Less likely is mild loosening of the prosthesis although radiograph from 05/24/2023 demonstrates no evidence of abnormal periprosthetic lucency.     There is focal right patellar uptake on the 10 minute blood pool and 3 hour delay images suspicious for patellofemoral osteoarthritis.     Impression:     Findings are nonspecific, raising question of normal uptake (activity can be seen up to 5 years after TKA). Less likely is mild loosening of the prosthesis although radiograph from 05/24/2023 demonstrates no evidence of abnormal periprosthetic lucency.     There is focal right patellar uptake on the 10 minute blood pool and 3 hour delay images suspicious for patellofemoral osteoarthritis   Bone scan did not reveal loosening of the total knee on the left.  There is evidence of arthritis of the right knee  X-ray 05/24/2023 present in the electronic records showing left TKA still in excellent alignment.  It is not oversized.  I do not see any poly wear or cystic changes.  The right knee with mild degenerative changes and very small marginal osteophytes  X-ray 04/18/2022 left TKA in excellent alignment no evidence of failure.  No poly wear.  Right knee with some mild degeneration and very small marginal osteophyte  X-ray 05/24/2021 left knee with TKA in excellent alignment no signs of infection.  Right knee joint space well maintained  no fracture seen    MRI LS spine showing grade 1 spondylolisthesis of 4 mm of L4-5.  Moderate to severe facet arthropathy with mild central stenosis and moderate foraminal stenosis.  L5-S1 with facet arthropathy with bilateral foraminal stenosis.  At T12-L1 is calcified and extruded posteriorly discs  EMG-NCV reported negative    X-ray 06/29/2020 left TKA excellent alignment no evidence of fracture  X-ray 06/29/2020 LS spine with severe degenerative disc disease L4-5 and L5-S1. spondylolisthesis of L4 on 5 grade 1  X-ray 05/22/2020 showing left TKA in excellent alignment.  No evidence of fracture  X-ray and electronic records showing TKA in excellent alignment no evidence of fracture left knee  EXAMINATION:  MRI CERVICAL SPINE W WO CONTRAST     CLINICAL HISTORY:  hemiparesis;  Hemiplegia, unspecified affecting unspecified side     TECHNIQUE:  MRI of the cervical spine with and without contrast     COMPARISON:  None     FINDINGS:  Vertebral body heights are preserved.  Spinal cord signal is unremarkable.  No evidence abnormal enhancement.  No significant neural foraminal narrowing     C2-C3: Minimal posterior disc osteophyte with the AP canal diameter of 9.8 mm suggestive of congenital spinal stenosis     C3-C4: Minimal posterior disc osteophyte with AP canal diameter of 9.6 mm.  No neural foraminal narrowing     C4-C5: mild posterior disc osteophyte with congenital spinal stenosis     C5-C6 effacement of the cyst thecal sac AP canal diameter measures up to 12 mm.  Mild ligamentum hypertrophy     C6-C7: Mild posterior disc osteophyte or ossification of the posterior lung to ligament.  No neural foraminal narrowing or spinal canal stenosis     Impression:     Minimal degenerative disc disease.  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  No neural foraminal narrowing     No evidence for abnormal enhancement        Electronically signed by:Jose Alberto Pabon  Date:                                             10/25/2021      EXAMINATION:  MRI LUMBAR SPINE WITHOUT CONTRAST     CLINICAL HISTORY:  Hemiplegia, unspecified affecting unspecified sideLow back pain, symptoms persist with > 6wks conservative treatment;Spinal stenosis, lumbar;Spondylolisthesis;     TECHNIQUE:  Standard multiplanar noncontrast MRI sequences of the lumbar spine.     Date of exam: 10/25/2021     COMPARISON:  Comparison is made to the prior examination 07/21/2020     FINDINGS:  FINDINGS:     The distal cord and conus appear normal.     The lumbar vertebra reveal grade 1 L4-5 spondylolisthesis.  Small L3 vertebral body hemangioma is present.     No acute or chronic compression fracture.     T12-L1: There is unchanged broad-based disc bulge with hypointense T1 and T2 signal material extending both superiorly and inferiorly from the disc margin.  Possible old calcified disc extrusion versus calcification of the posterior longitudinal ligament.     L1-2:     Mild disc bulge.     L2-3:     Mild disc bulge with mild hypertrophic ligamentum flavum.     L3-4:     Mild disc bulge with mild hypertrophic ligamentum flavum.     L4-5:     Mild disc degeneration with disc narrowing, desiccation and disc bulge.  Moderate to severe facet arthritis with grade 1 spondylolisthesis of approximately 4 mm.  Mild central with moderate foraminal stenosis.  Spinal canal stenosis measuring up to 8 mm.     L5-S1:    Mild disc degeneration with generalized disc bulge.  Moderate left and mild right facet arthritis.  Mild lateral recess stenosis with mild bilateral foraminal stenosis.  Mild spinal stenosis     Impression:     L4-5 disc degeneration with facet arthrosis and grade 1 spondylolisthesis with mild central and moderate bilateral foraminal stenosis.  Mild spinal stenosis     Mild L5-S1 disc degeneration with facet arthritis with mild lateral recess and bilateral foraminal stenosis.  Mild spinal stenosis     T12-L1 disc bulge with hypointense signal extending inferiorly  and superiorly from the disc margin with considerations including old calcified disc extrusion versus calcification of the posterior longitudinal ligament.     Stable findings        Electronically signed by: Jose Alberto Newimi  Date:                                            10/25/2021  Time:                                           17:46           Exam Ended: 10/25/21 16:51 Last Resulted: 10/25/21 17:46      Order Details    View Encounter    Lab and Collection Details    Routing    Result History     View All Conversations on this Encounter           Result Care Coordination    Patient Communication  Append Comments  Seen Back to Top    Moderate to severe lumbar stenosis at L4-5. This level also has a slippage called a spondylolisthesis. Slippage appears stable when you move, but the combined issues will likely require a fusion to address. Let me know if you need an appt with me on the [facility] should be able to see your images in their system if you choose to transfer care.  ...   Written by Neda Beal PA-C on 10/27/2021  1:35 PM CDT View Full Comments  Seen by patient Mariel Becerril on 11/29/2021 11:48 AM          MRI Lumbar Spine Without Contrast: Patient Communication  Append Comments  Seen    Moderate to severe lumbar stenosis at L4-5. This level also has a slippage called a spondylolisthesis. Slippage appears stable when you move, but the combined issues will likely require a fusion to address. Let me know if you need an appt with me on the [facility] should be able to see your images in their system if you choose to transfer care.      Neda   Written by Neda Beal PA-C on 10/27/2021  1:35 PM CDT  Seen by patient Mariel Becerril on 11/29/2021 11:48 AM    External Result Report    External Result Report     Reviewed By    Neda Beal PA-C on 10/27/2021 13:33     Reason for Exam  Priority: STAT  Low back pain, symptoms  persist with > 6wks conservative treatment; Spinal stenosis, lumbar; Spondylolisthesis   Dx: Hemiparesis, unspecified hemiparesis etiology, unspecified laterality [G81.90 (ICD-10-CM)]; Spondylolisthesis at L4-L5 level [M43.16 (ICD-10-CM)]; Chronic left-sided low back pain with left-sided sciatica [M54.42, G89.29 (ICD-10-CM)]; Dorsalgia, unspecified [M54.9 (ICD-10-CM)]; Spinal stenosis, lumbosacral region [M48.07 (ICD-10-CM)]; Spondylolisthesis of lumbar region [M43.16 (ICD-10-CM)]         Order Report    Order Details      Assessment:     Encounter Diagnoses   Name Primary?    History of total left knee replacement Yes    Arthritis of knee, right     Lumbosacral radiculopathy at L5     Lumbosacral radiculopathy at S1     Lumbar stenosis with neurogenic claudication     Arthropathy of lumbosacral facet joint     Congenital cervical spine stenosis         Plan:   History of total left knee replacement    Arthritis of knee, right    Lumbosacral radiculopathy at L5    Lumbosacral radiculopathy at S1    Lumbar stenosis with neurogenic claudication    Arthropathy of lumbosacral facet joint    Congenital cervical spine stenosis         Patient Instructions   You had genicular nerve block on the left with Dr. Diaz who injected with Novocain and steroid   In the future he may end up burning the nerve for you which might last you longer  We do not know how long that genicular nerve block you had will last you but it is working for you  Your x-ray show excellent alignment of the total knee nothing wrong with it at this time   You have daily morning stiffness which gets better with the days go by   There is no reason to undergo any surgical intervention on you knee at this time most knees ache here and there and they are not perfect they are like around 80%.  Some people feel noise in the knees and people with changes of the weather the feel it  You are about to start physical therapy at [facility] to work on you  knees and lower part of her   We did discuss briefly if needed and you can not live with what you have arthroscopic surgery looking inside the knee to see if there is any scar tissue or anything normal that could have happened but you are doing well now we there is no reason to undergo any surgery  Return at 1 year follow-up     Patient Instructions   You had genicular nerve block on the left with Dr. Diaz who injected with Novocain and steroid   In the future he may end up burning the nerve for you which might last you longer  We do not know how long that genicular nerve block you had will last you but it is working for you  Your x-ray show excellent alignment of the total knee nothing wrong with it at this time   You have daily morning stiffness which gets better with the days go by   There is no reason to undergo any surgical intervention on you knee at this time most knees ache here and there and they are not perfect they are like around 80%.  Some people feel noise in the knees and people with changes of the weather the feel it  You are about to start physical therapy at [facility] in [place] to work on you knees and lower part of her   We did discuss briefly if needed and you can not live with what you have arthroscopic surgery looking inside the knee to see if there is any scar tissue or anything normal that could have happened but you are doing well now we there is no reason to undergo any surgery  Return at 1 year follow-up  Addendum   CBC, sed rate, CRP 5/16/24 within normal limit there is no reason to aspirate the knee or do any further workup    Previous discussion  Discussed with the patient who had EMG-NCV performed 2 years ago with  with bilateral carpal tunnel syndrome and did not do her surgery.  Her EMG-NCV to the legs was positive for right L5-S1 and left L5-S1 radiculopathy  You did go see Dr. Blanton what you had decompression on your back and that helped her leave a lot of her pain  the legs (patient stated she did not have surgery she described therapy as decompression of the spine).  You do have some burning and numbness into the big toe as well into the hands.  You are taking gabapentin on as needed basis.  My recommendation to stay on gabapentin work herself up as given in the instructions to help it work  In the future you can have carpal tunnel injected and I did discuss briefly carpal tunnel release which could be performed in the future if needed.  However I did tell her we can not wait until you start dropping things from her hand which means you need to have surgery right away.  Surgery is approximately 5-10 minutes done under local with sedation.  You release the ligament over the nerve and the nerve has to heal itself.  There is slight risk of nerve damage vascular damage infection blood fat clot    Disclaimer: This note was prepared using a voice recognition system and is likely to have sound alike errors within the text.

## 2024-09-05 NOTE — PATIENT INSTRUCTIONS
You had genicular nerve block on the left with Dr. Diaz who injected with Novocain and steroid   In the future he may end up burning the nerve for you which might last you longer  We do not know how long that genicular nerve block you had will last you but it is working for you  Your x-ray show excellent alignment of the total knee nothing wrong with it at this time   You have daily morning stiffness which gets better with the days go by   There is no reason to undergo any surgical intervention on you knee at this time most knees ache here and there and they are not perfect they are like around 80%.  Some people feel noise in the knees and people with changes of the weather the feel it  You are about to start physical therapy at [facility] in [place] to work on you knees and lower part of her   We did discuss briefly if needed and you can not live with what you have arthroscopic surgery looking inside the knee to see if there is any scar tissue or anything normal that could have happened but you are doing well now we there is no reason to undergo any surgery  Return at 1 year follow-up

## 2024-09-12 DIAGNOSIS — Z86.69 HISTORY OF MIGRAINE HEADACHES: ICD-10-CM

## 2024-09-12 DIAGNOSIS — M54.16 BILATERAL LUMBAR RADICULOPATHY: ICD-10-CM

## 2024-09-12 RX ORDER — TOPIRAMATE 100 MG/1
100 TABLET, FILM COATED ORAL 2 TIMES DAILY
Qty: 180 TABLET | Refills: 2 | Status: SHIPPED | OUTPATIENT
Start: 2024-09-12

## 2024-09-12 NOTE — TELEPHONE ENCOUNTER
----- Message from Tiffanie Rhodes MA sent at 9/12/2024  4:03 PM CDT -----  Requesting an RX refill or new RX.    RX name and strength: Topiramate     Is this a refill or new RX: Refill    Is this a 30 day or 90 day RX: 90 day    Pharmacy name and phone:     [facility] [address] [place] [id number]  Phone: [phone] Fax: [phone]          Says she is completely out.

## 2024-09-25 ENCOUNTER — TELEPHONE (OUTPATIENT)
Dept: INTERNAL MEDICINE | Facility: CLINIC | Age: 69
End: 2024-09-25
Payer: MEDICARE

## 2024-09-25 NOTE — TELEPHONE ENCOUNTER
I called the pt and informed her paperwork was faxed to [employer] and the original copy will be at the registration desk. She verbalized understanding. //kah

## 2024-10-07 ENCOUNTER — OFFICE VISIT (OUTPATIENT)
Dept: SURGERY | Facility: CLINIC | Age: 69
End: 2024-10-07
Payer: MEDICARE

## 2024-10-07 VITALS
DIASTOLIC BLOOD PRESSURE: 87 MMHG | SYSTOLIC BLOOD PRESSURE: 127 MMHG | TEMPERATURE: 98 F | HEIGHT: 62 IN | WEIGHT: 211.44 LBS | BODY MASS INDEX: 38.91 KG/M2 | HEART RATE: 84 BPM

## 2024-10-07 DIAGNOSIS — E66.01 MORBID OBESITY WITH BMI OF 40.0-44.9, ADULT: Primary | ICD-10-CM

## 2024-10-07 PROCEDURE — 3008F BODY MASS INDEX DOCD: CPT | Mod: CPTII,S$GLB,, | Performed by: SURGERY

## 2024-10-07 PROCEDURE — 99214 OFFICE O/P EST MOD 30 MIN: CPT | Mod: S$GLB,,, | Performed by: SURGERY

## 2024-10-07 PROCEDURE — 3079F DIAST BP 80-89 MM HG: CPT | Mod: CPTII,S$GLB,, | Performed by: SURGERY

## 2024-10-07 PROCEDURE — 1159F MED LIST DOCD IN RCRD: CPT | Mod: CPTII,S$GLB,, | Performed by: SURGERY

## 2024-10-07 PROCEDURE — 99999 PR PBB SHADOW E&M-EST. PATIENT-LVL IV: CPT | Mod: PBBFAC,,, | Performed by: SURGERY

## 2024-10-07 PROCEDURE — 3074F SYST BP LT 130 MM HG: CPT | Mod: CPTII,S$GLB,, | Performed by: SURGERY

## 2024-10-07 PROCEDURE — 1126F AMNT PAIN NOTED NONE PRSNT: CPT | Mod: CPTII,S$GLB,, | Performed by: SURGERY

## 2024-10-07 NOTE — PROGRESS NOTES
BARIATRIC NEW PATIENT EVALUATION    CHIEF COMPLAINT:     Morbid obesity with a BMI of  45.45 and inability to lose weight.    HISTORY OF PRESENT ILLNESS:  Mariel Becerril is a 68 y.o.-year-old female s/p robotic gastric sleeve 3/2/21 presents for annual follow-up.  She has started taking Wegovy and attempting to keep her calorie per day around .  She has resumed walking for exercise.      She presents for annual follow-up.  In the interim she has not been successful with any further weight loss and continuing to see her weight slightly increased.  She is not tracking calories and last saw dietitian in 2022    presents for preop sleeve gastrectomy.  She has undergone evaluation with dietitian and psych with no contraindications to surgery. She has also completed EGD with no significant abnormalities noted. She would like to move forward with surgery.    presents to re-evaluate for sleeve gastrectomy.  She initially elected not to pursue it as she was not ready to take it on but now feels she is in a better position to move forward with weight loss surgery.  In the interim she has undergone knee surgery which allows her to be more mobile but still has some back pain.  She has lost some weight but feels like she is regaining it slowly.    Initially presenting for  morbid obesity with a BMI of  45.45 and inability to lose weight. The patient has tried  Diet and previously exercise well however due to pain in her left knee she is no longer able to exercise as her mobility is limited.  She was previously able to lose about 40 lb dropping from 297 down to current weight of 259.    Height 5 ft 3 in  Weight 246->261 ->248-> 235 ->242  --> 240 ->247 ->211  BMI 43.8 -> 46 ->43-> 41 ->42--> 42 ->43 ->38      CO-MORBIDITIES:  hypertension, obstructive sleep apnea and osteoarthritis    PAST MEDICAL HISTORY:  Past Medical History:   Diagnosis Date    COPD (chronic obstructive pulmonary disease)     NO HOME O2     Digestive disorder     Frequent headaches     Hypertension     Osteoarthritis 04/02/2019    Bilateral knees, ankles and feet    Severe obesity (BMI >= 40)     Sleep apnea     CPAP        PAST SURGICAL HISTORY:  Past Surgical History:   Procedure Laterality Date    BLADDER SUSPENSION      CATARACT EXTRACTION, BILATERAL      COLONOSCOPY N/A 12/3/2018    Procedure: COLONOSCOPY;  Surgeon: Mari Rader MD;  Location: [facility] ENDO;  Service: Endoscopy;  Laterality: N/A;    COLONOSCOPY N/A 6/12/2024    Procedure: COLONOSCOPY;  Surgeon: Sarah Siegel MD;  Location: [facility] ENDO;  Service: Endoscopy;  Laterality: N/A;    ESOPHAGOGASTRODUODENOSCOPY N/A 12/31/2020    Procedure: ESOPHAGOGASTRODUODENOSCOPY (EGD);  Surgeon: Anthony Rodríguez MD;  Location: [facility];  Service: General;  Laterality: N/A;    HYSTERECTOMY      partial 2000    INJECTION OF ANESTHETIC AGENT AROUND NERVE Left 9/14/2023    Procedure: LEFT Genicular nerve block RN IV Sedation;  Surgeon: Thanh Diaz MD;  Location: [facility] PAIN MGT;  Service: Pain Management;  Laterality: Left;    INJECTION OF ANESTHETIC AGENT AROUND NERVE Left 5/21/2024    Procedure: LEFT Genicular nerve block (therpeutic);  Surgeon: Thanh Diaz MD;  Location: [facility] PAIN MGT;  Service: Pain Management;  Laterality: Left;    INJECTION OF ANESTHETIC AGENT AROUND NERVE Left 6/11/2024    Procedure: LEFT Genicular nerve block with RN IV sedation;  Surgeon: Thanh Diaz MD;  Location: [facility] PAIN MGT;  Service: Pain Management;  Laterality: Left;    INJECTION OF ANESTHETIC AGENT INTO SACROILIAC JOINT Bilateral 11/30/2020    Procedure: Bilateral SIJ + Bilateral Piriformis + Bilateral GT Bursa Injection;  Surgeon: Thanh Diaz MD;  Location: [facility] PAIN MGT;  Service: Pain Management;  Laterality: Bilateral;    INJECTION OF JOINT Bilateral 11/30/2020    Procedure: Bilateral SIJ + Bilateral Piriformis + Bilateral GT Bursa Injection;  Surgeon: Thanh Diaz MD;  Location: [facility] PAIN  MGT;  Service: Pain Management;  Laterality: Bilateral;    INJECTION OF PIRIFORMIS MUSCLE Bilateral 11/30/2020    Procedure: Bilateral SIJ + Bilateral Piriformis + Bilateral GT Bursa Injection;  Surgeon: Thanh Diaz MD;  Location: [facility] PAIN MGT;  Service: Pain Management;  Laterality: Bilateral;    ROBOT-ASSISTED LAPAROSCOPIC SLEEVE GASTRECTOMY USING DA EZEQUIEL XI N/A 3/2/2021    Procedure: XI ROBOTIC SLEEVE GASTRECTOMY;  Surgeon: Anthony Rodríguez MD;  Location: [facility] OR;  Service: General;  Laterality: N/A;    SELECTIVE INJECTION OF ANESTHETIC AGENT AROUND LUMBAR SPINAL NERVE ROOT BY TRANSFORAMINAL APPROACH Bilateral 1/4/2021    Procedure: Bilateral L5/S1 TF GISELA;  Surgeon: Thanh Diaz MD;  Location: [facility] PAIN MGT;  Service: Pain Management;  Laterality: Bilateral;    SELECTIVE INJECTION OF ANESTHETIC AGENT AROUND LUMBAR SPINAL NERVE ROOT BY TRANSFORAMINAL APPROACH Bilateral 3/23/2021    Procedure: Bilateral L5/S1 TF GISELA;  Surgeon: Thanh Diaz MD;  Location: [facility] PAIN MGT;  Service: Pain Management;  Laterality: Bilateral;    SELECTIVE INJECTION OF ANESTHETIC AGENT AROUND LUMBAR SPINAL NERVE ROOT BY TRANSFORAMINAL APPROACH Bilateral 10/5/2021    Procedure: Bilateral L5/S1 TF GISELA;  Surgeon: Thanh Diaz MD;  Location: [facility] PAIN MGT;  Service: Pain Management;  Laterality: Bilateral;    SELECTIVE INJECTION OF ANESTHETIC AGENT AROUND LUMBAR SPINAL NERVE ROOT BY TRANSFORAMINAL APPROACH Bilateral 3/28/2024    Procedure: Bilateral L5/S1 TF GISELA;  Surgeon: Thanh Diaz MD;  Location: [facility] PAIN MGT;  Service: Pain Management;  Laterality: Bilateral;    tonsilectomy      TOTAL KNEE ARTHROPLASTY Left 3/4/2020    Procedure: ARTHROPLASTY, KNEE, TOTAL;  Surgeon: Moy Connolly MD;  Location: [facility] OR;  Service: Orthopedics;  Laterality: Left;       FAMILY HISTORY:  Family History   Problem Relation Name Age of Onset    Heart attack Father          SOCIAL HISTORY:   reports that she has never smoked.  She has never been exposed to tobacco smoke. She has never used smokeless tobacco. She reports that she does not drink alcohol and does not use drugs.     MEDICATIONS:  Current Outpatient Medications on File Prior to Visit   Medication Sig Dispense Refill    acetaminophen (TYLENOL) 325 MG tablet Take 2 tablets (650 mg total) by mouth every 8 (eight) hours as needed. 60 tablet 2    aspirin (ECOTRIN) 81 MG EC tablet Take 1 tablet (81 mg total) by mouth once daily. 90 tablet 3    diclofenac sodium (VOLTAREN) 1 % Gel APPLY 2 GRAMS TOPICALLY THREE TIMES DAILY AS NEEDED 200 g 2    ergocalciferol, vitamin D2, (VITAMIN D ORAL) Take 2,000 Units by mouth once daily.      furosemide (LASIX) 40 MG tablet Take 1 tablet (40 mg total) by mouth daily as needed (edema, swelling). Do not take if BP is less than 110/70 90 tablet 1    gabapentin (NEURONTIN) 300 MG capsule Take 2 capsules (600 mg total) by mouth every evening. 60 capsule 5    gentamicin (GARAMYCIN) 0.1 % ointment Apply topically 3 (three) times daily. 30 g 1    levocetirizine (XYZAL) 5 MG tablet TAKE 1 TABLET BY MOUTH ONCE DAILY IN THE EVENING 90 tablet 0    linaCLOtide (LINZESS) 72 mcg Cap capsule Take 1 capsule (72 mcg total) by mouth before breakfast. 30 capsule 2    meclizine (ANTIVERT) 25 mg tablet Take 1 tablet (25 mg total) by mouth 3 (three) times daily as needed for Dizziness. 30 tablet 0    meloxicam (MOBIC) 15 MG tablet Take 1 tablet (15 mg total) by mouth once daily. 90 tablet 3    methocarbamoL (ROBAXIN) 750 MG Tab Take 1 tablet (750 mg total) by mouth 3 (three) times daily as needed. 90 tablet 3    metoprolol succinate (TOPROL-XL) 25 MG 24 hr tablet Take 1 tablet (25 mg total) by mouth once daily. 90 tablet 1    montelukast (SINGULAIR) 10 mg tablet Take 1 tablet (10 mg total) by mouth every evening. Allergies 30 tablet 11    mupirocin (BACTROBAN) 2 % ointment Apply topically 3 (three) times daily. 30 g 0    nitrofurantoin, macrocrystal-monohydrate,  (MACROBID) 100 MG capsule Take 1 capsule (100 mg total) by mouth 2 (two) times daily. 14 capsule 0    nitroGLYCERIN (NITROSTAT) 0.4 MG SL tablet Place 1 tablet (0.4 mg total) under the tongue every 5 (five) minutes as needed for Chest pain. 30 tablet 0    nystatin (MYCOSTATIN) cream APPLY  CREAM TOPICALLY TO AFFECTED AREA TWICE DAILY 30 g 0    omeprazole (PRILOSEC) 40 MG capsule Take 1 capsule by mouth once daily 90 capsule 3    ondansetron (ZOFRAN) 8 MG tablet Take 1 tablet (8 mg total) by mouth every 8 (eight) hours as needed for Nausea. 20 tablet 0    potassium chloride SA (K-DUR,KLOR-CON) 20 MEQ tablet Take 1 tablet (20 mEq total) by mouth daily as needed (if taking lasix). 90 tablet 1    semaglutide, weight loss, (WEGOVY) 0.25 mg/0.5 mL PnIj Inject 0.25 mg into the skin once a week. 3 mL 5    topiramate (TOPAMAX) 100 MG tablet Take 1 tablet (100 mg total) by mouth 2 (two) times daily. 180 tablet 2    albuterol (PROAIR HFA) 90 mcg/actuation inhaler Inhale 2 puffs into the lungs every 6 (six) hours as needed for Wheezing. Rescue 18 g 0    imipramine (TOFRANIL) 10 MG Tab Take 1 tablet (10 mg total) by mouth every evening. 90 tablet 1     Current Facility-Administered Medications on File Prior to Visit   Medication Dose Route Frequency Provider Last Rate Last Admin    ondansetron injection 4 mg  4 mg Intravenous Once PRN Thanh Diaz MD        ondansetron injection 4 mg  4 mg Intravenous Once PRN Thanh Diaz MD         Medications have been reviewed.    ALLERGIES:  Review of patient's allergies indicates:   Allergen Reactions    Penicillins Rash     Allergies have been reviewed.    ROS:  Review of Systems   Constitutional:  Negative for chills, fatigue, fever and unexpected weight change.   Respiratory:  Negative for cough, shortness of breath, wheezing and stridor.    Cardiovascular:  Negative for chest pain, palpitations and leg swelling.   Gastrointestinal:  Negative for abdominal distention,  abdominal pain, constipation, diarrhea, nausea and vomiting.   Genitourinary:  Negative for difficulty urinating, dysuria, frequency, hematuria and urgency.   Skin:  Negative for color change, pallor, rash and wound.   Hematological:  Does not bruise/bleed easily.       PE:  Physical Exam  Vitals reviewed.   Constitutional:       General: She is not in acute distress.     Appearance: She is well-developed. She is obese.   HENT:      Head: Normocephalic and atraumatic.      Right Ear: External ear normal.      Left Ear: External ear normal.   Eyes:      Conjunctiva/sclera: Conjunctivae normal.   Cardiovascular:      Rate and Rhythm: Normal rate.   Pulmonary:      Effort: Pulmonary effort is normal. No respiratory distress.   Abdominal:      General: There is no distension.      Palpations: Abdomen is soft.      Tenderness: There is no abdominal tenderness.      Comments: Incisions well-healed   Musculoskeletal:      Cervical back: Neck supple.   Skin:     General: Skin is warm and dry.   Neurological:      Mental Status: She is alert and oriented to person, place, and time.   Psychiatric:         Behavior: Behavior normal.       EGD:  Impression:           - Z-line regular, 37 cm from the incisors.                         - Gastritis. Biopsied.                         - Multiple gastric polyps. Resected and retrieved.                         - Normal second portion of the duodenum.     Final Pathologic Diagnosis GREATER CURVATURE OF STOMACH, SLEEVE GASTRECTOMY:   - Benign portion of stomach with fundic gland polyps and proton pump   inhibitor effect.   - No Helicobacter pylori identified on H&E or immunohistochemical stain.   - No dysplasia or malignancy.         DIAGNOSIS:  1.  Morbid obesity with a BMI of  45.45 and inability to lose weight.  2. Co-morbidities: hypertension, obstructive sleep apnea and osteoarthritis    S/p robotic sleeve gastrectomy    PLAN:  She has been able to start losing weight again since  last visit with the assistance from medications to curb her appetite.  It does not appear she was met with bariatric dietitian. Discussed importance of establishing healthy lifestyle changes. Reinforced importance of calorie deficit and exercise in continued weight loss and calorie goals  Bariatric diet reinforced/dietician   Encouraged exercise    30 minutes of total time spent on the encounter, which includes face to face time and non-face to face time preparing to see the patient (eg, review of tests), Obtaining and/or reviewing separately obtained history, Documenting clinical information in the electronic or other health record, Independently interpreting results (not separately reported) and communicating results to the patient/family/caregiver, or Care coordination (not separately reported).

## 2024-10-08 ENCOUNTER — PATIENT MESSAGE (OUTPATIENT)
Dept: INTERNAL MEDICINE | Facility: CLINIC | Age: 69
End: 2024-10-08
Payer: MEDICARE

## 2024-10-08 ENCOUNTER — TELEPHONE (OUTPATIENT)
Dept: INTERNAL MEDICINE | Facility: CLINIC | Age: 69
End: 2024-10-08
Payer: MEDICARE

## 2024-10-11 ENCOUNTER — CLINICAL SUPPORT (OUTPATIENT)
Dept: INTERNAL MEDICINE | Facility: CLINIC | Age: 69
End: 2024-10-11
Payer: MEDICARE

## 2024-10-11 DIAGNOSIS — Z98.84 STATUS POST BARIATRIC SURGERY: Primary | ICD-10-CM

## 2024-10-11 DIAGNOSIS — E66.812 OBESITY, CLASS II, BMI 35-39.9: ICD-10-CM

## 2024-10-11 DIAGNOSIS — Z71.3 DIETARY COUNSELING: ICD-10-CM

## 2024-10-11 NOTE — PROGRESS NOTES
The patient location is: [place]  The chief complaint leading to consultation is: nutrition follow-up    Visit type: audiovisual    Face to Face time with patient: 15 minutes  25 minutes of total time spent on the encounter, which includes face to face time and non-face to face time preparing to see the patient (eg, review of tests), Obtaining and/or reviewing separately obtained history, Documenting clinical information in the electronic or other health record, Independently interpreting results (not separately reported) and communicating results to the patient/family/caregiver, or Care coordination (not separately reported).         Each patient to whom he or she provides medical services by telemedicine is:  (1) informed of the relationship between the physician and patient and the respective role of any other health care provider with respect to management of the patient; and (2) notified that he or she may decline to receive medical services by telemedicine and may withdraw from such care at any time.    Notes:   NUTRITION NOTE    Referring Physician: Dr. Rodríguez  Reason for MNT Referral: Follow-up 3 years s/p Gastric Sleeve 3-2-2021    PAST MEDICAL HISTORY:    Denies nausea, vomiting, and diarrhea.  Reports doing well.    Weight contiuned to stall after last nutrition appointment.   Started WeGovy injection in March 2023 and has been able to reduce weight further.   Reports seeing appetite suppesion now.     Intake: protein bar, protein shake, 1 main meal   Walking in morning + evening.   Physical therapy started back this month. For a weeks.     Taking vitamins: Vitamin B12, one a day multivitamin, Vitamin D3      Past Medical History:   Diagnosis Date    COPD (chronic obstructive pulmonary disease)     NO HOME O2    Digestive disorder     Frequent headaches     Hypertension     Osteoarthritis 04/02/2019    Bilateral knees, ankles and feet    Severe obesity (BMI >= 40)     Sleep apnea     CPAP       CLINICAL  DATA:  68 y.o. female.      Current Weight: 211 lbs   11-9-2022: 240 lbs              9-: 242 lbs              6-: 234.96 lbs              2 week post op, 3-: 248 lbs              Surgery weight: 255 lbs              Pre-op, 1-: 261  BMI: 38.67  Total Weight Loss: -44 lbs since surgery       LABS:  none    CURRENT DIET:  Bariatric Diet.  reviewed    EXERCISE:  Adequate light exercise.      VITAMINS / MINERALS:  Reviewed. Provided above    ASSESSMENT:  Doing well overall.  Weight loss.  Adequate calorie intake.  Adequate protein intake.  Adequate fluid intake.  Following diet appropriately.  Exercising.  Inadequate vitamins & minerals.    BARIATRIC DIET DISCUSSION:  Instructed and provided written materials on bariatric diet plan.  Reinforced post-op nutrition guidelines.    PLAN / RECOMMENDATIONS:  May begin to incorporate raw vegetables, lettuce, unsalted nuts, and light popcorn as tolerated.  May begin to swallow whole pills as tolerated.  Continue excellent diet plan.    Maintain protein intake.  Increase fiber intake.   Maintain fluid intake.  Continue exercise.  Continue appropriate vitamins & minerals.  Adjust vitamins & minerals by: include calcium citrate 500 mg twice daily, Fiber supplement      Return to clinic in 1 year or sooner if needed

## 2024-10-15 ENCOUNTER — OFFICE VISIT (OUTPATIENT)
Dept: INTERNAL MEDICINE | Facility: CLINIC | Age: 69
End: 2024-10-15
Payer: MEDICARE

## 2024-10-15 VITALS
OXYGEN SATURATION: 98 % | HEIGHT: 62 IN | HEART RATE: 104 BPM | RESPIRATION RATE: 18 BRPM | SYSTOLIC BLOOD PRESSURE: 114 MMHG | WEIGHT: 209.69 LBS | BODY MASS INDEX: 38.59 KG/M2 | DIASTOLIC BLOOD PRESSURE: 72 MMHG | TEMPERATURE: 97 F

## 2024-10-15 DIAGNOSIS — Z96.652 STATUS POST TOTAL KNEE REPLACEMENT USING CEMENT, LEFT: ICD-10-CM

## 2024-10-15 DIAGNOSIS — K59.00 CONSTIPATION, UNSPECIFIED CONSTIPATION TYPE: ICD-10-CM

## 2024-10-15 DIAGNOSIS — M25.562 POSTERIOR LEFT KNEE PAIN: ICD-10-CM

## 2024-10-15 DIAGNOSIS — K62.5 RECTAL BLEEDING: Primary | ICD-10-CM

## 2024-10-15 PROCEDURE — 99214 OFFICE O/P EST MOD 30 MIN: CPT | Mod: S$GLB,,, | Performed by: NURSE PRACTITIONER

## 2024-10-15 PROCEDURE — 99999 PR PBB SHADOW E&M-EST. PATIENT-LVL V: CPT | Mod: PBBFAC,,, | Performed by: NURSE PRACTITIONER

## 2024-10-15 PROCEDURE — 1125F AMNT PAIN NOTED PAIN PRSNT: CPT | Mod: CPTII,S$GLB,, | Performed by: NURSE PRACTITIONER

## 2024-10-15 PROCEDURE — 3078F DIAST BP <80 MM HG: CPT | Mod: CPTII,S$GLB,, | Performed by: NURSE PRACTITIONER

## 2024-10-15 PROCEDURE — 3008F BODY MASS INDEX DOCD: CPT | Mod: CPTII,S$GLB,, | Performed by: NURSE PRACTITIONER

## 2024-10-15 PROCEDURE — 1159F MED LIST DOCD IN RCRD: CPT | Mod: CPTII,S$GLB,, | Performed by: NURSE PRACTITIONER

## 2024-10-15 PROCEDURE — 3288F FALL RISK ASSESSMENT DOCD: CPT | Mod: CPTII,S$GLB,, | Performed by: NURSE PRACTITIONER

## 2024-10-15 PROCEDURE — 1101F PT FALLS ASSESS-DOCD LE1/YR: CPT | Mod: CPTII,S$GLB,, | Performed by: NURSE PRACTITIONER

## 2024-10-15 PROCEDURE — 3074F SYST BP LT 130 MM HG: CPT | Mod: CPTII,S$GLB,, | Performed by: NURSE PRACTITIONER

## 2024-10-15 PROCEDURE — 1160F RVW MEDS BY RX/DR IN RCRD: CPT | Mod: CPTII,S$GLB,, | Performed by: NURSE PRACTITIONER

## 2024-10-15 RX ORDER — DICLOFENAC SODIUM 10 MG/G
GEL TOPICAL
Qty: 200 G | Refills: 2 | Status: SHIPPED | OUTPATIENT
Start: 2024-10-15

## 2024-10-15 NOTE — PROGRESS NOTES
Subjective     Patient ID: Mariel Becerril is a 68 y.o. female.    Chief Complaint: Rectal Bleeding (Bright red only once)    Patient presents for follow up with rectal bleeding.  Reports one episode within the past week 10/11/2024.  Noticed blood after a shower.  Denies any new episodes.  Reports constipation and straining.  Reports several days in between bowel movement.  Taking a laxative and stool softener.  Taking semaglutide-weekly.   Reports she's starting to eat more fruits and vegetables.     Rectal Bleeding  Associated symptoms include arthralgias. Pertinent negatives include no abdominal pain, chills, coughing or fever.     Review of Systems   Constitutional:  Negative for chills and fever.   Respiratory:  Negative for cough and shortness of breath.    Gastrointestinal:  Positive for constipation (currently) and hematochezia. Negative for abdominal pain and diarrhea.   Musculoskeletal:  Positive for arthralgias and back pain.   Psychiatric/Behavioral:  Negative for agitation and confusion.           Objective     Physical Exam  Vitals reviewed.   Cardiovascular:      Rate and Rhythm: Normal rate and regular rhythm.   Pulmonary:      Effort: Pulmonary effort is normal.      Breath sounds: Normal breath sounds.   Abdominal:      General: Bowel sounds are normal. There is no distension.      Tenderness: There is no abdominal tenderness.   Skin:     General: Skin is warm.   Neurological:      General: No focal deficit present.      Mental Status: She is alert.   Psychiatric:         Mood and Affect: Mood normal.         Behavior: Behavior is cooperative.            Assessment and Plan     1. Rectal bleeding  Comments:  Only one episode---will monitor.    2. Constipation, unspecified constipation type  Comments:  Recommend to start Miralax daily for 7 days then 3 days out of the week.    3. Status post total knee replacement using cement, left  Comments:  Chronic knee pain. Voltaren refilled.  Orders:  -      diclofenac sodium (VOLTAREN) 1 % Gel; APPLY 2 GRAMS TOPICALLY THREE TIMES DAILY AS NEEDED  Dispense: 200 g; Refill: 2    4. Posterior left knee pain  Comments:  Chronic knee pain. Voltaren refilled.  Orders:  -     diclofenac sodium (VOLTAREN) 1 % Gel; APPLY 2 GRAMS TOPICALLY THREE TIMES DAILY AS NEEDED  Dispense: 200 g; Refill: 2               No follow-ups on file.

## 2024-10-31 ENCOUNTER — TELEPHONE (OUTPATIENT)
Dept: INTERNAL MEDICINE | Facility: CLINIC | Age: 69
End: 2024-10-31
Payer: MEDICARE

## 2024-11-05 ENCOUNTER — TELEPHONE (OUTPATIENT)
Dept: INTERNAL MEDICINE | Facility: CLINIC | Age: 69
End: 2024-11-05
Payer: MEDICARE

## 2024-11-05 NOTE — TELEPHONE ENCOUNTER
I left a vm stating that her paperwork has been faxed to [employer]. She can inform our office if she needs a copy . This is Estrellita Weller NP office. //[email or web address]

## 2024-11-27 ENCOUNTER — OFFICE VISIT (OUTPATIENT)
Dept: PODIATRY | Facility: CLINIC | Age: 69
End: 2024-11-27
Payer: MEDICARE

## 2024-11-27 DIAGNOSIS — Z87.821 HISTORY OF RETAINED FOREIGN BODY FULLY REMOVED: Primary | ICD-10-CM

## 2024-11-27 PROCEDURE — 99999 PR PBB SHADOW E&M-EST. PATIENT-LVL III: CPT | Mod: PBBFAC,,, | Performed by: PODIATRIST

## 2024-11-27 NOTE — PROGRESS NOTES
Subjective:       Patient ID: Mariel Becerril is a 69 y.o. female.    Chief Complaint: Foot Pain (Patient reports walking in slipper with needle inside. She states she thought it was just a rock and walked on it for 5 days. She denies seeing any blood/ drainage. Denies pain at present. )      HPI: Mariel Becerril presents to the clinic today for evaluation s/p stepping on a clean sewing needle/stick pin in her home around 2 weeks ago. States no current infection or laceration. States the sewing needle/stick pin was clean. States no pains to the area at this time. She does not know her Tdap Booster status/time frame. Patient's Primary Care Provider is Estrellita Weller NP.     Review of patient's allergies indicates:   Allergen Reactions    Penicillins Rash       Past Medical History:   Diagnosis Date    COPD (chronic obstructive pulmonary disease)     NO HOME O2    Digestive disorder     Frequent headaches     Hypertension     Osteoarthritis 04/02/2019    Bilateral knees, ankles and feet    Severe obesity (BMI >= 40)     Sleep apnea     CPAP       Family History   Problem Relation Name Age of Onset    Heart attack Father         Social History     Socioeconomic History    Marital status:    Tobacco Use    Smoking status: Never     Passive exposure: Never    Smokeless tobacco: Never   Substance and Sexual Activity    Alcohol use: Never    Drug use: No    Sexual activity: Never     Partners: Male     Birth control/protection: See Surgical Hx     Social Drivers of Health     Financial Resource Strain: High Risk (2/16/2024)    Overall Financial Resource Strain (CARDIA)     Difficulty of Paying Living Expenses: Very hard   Food Insecurity: Patient Declined (2/16/2024)    Hunger Vital Sign     Worried About Running Out of Food in the Last Year: Patient declined     Ran Out of Food in the Last Year: Patient declined   Transportation Needs: Unknown (2/16/2024)    PRAPARE - Transportation     Lack of  Transportation (Medical): No     Lack of Transportation (Non-Medical): Patient declined   Physical Activity: Unknown (2/16/2024)    Exercise Vital Sign     Days of Exercise per Week: 3 days   Stress: Stress Concern Present (2/16/2024)    [employer] - Occupational Stress Questionnaire     Feeling of Stress : To some extent   Housing Stability: High Risk (2/16/2024)    Housing Stability Vital Sign     Unable to Pay for Housing in the Last Year: Yes     Unstable Housing in the Last Year: No       Past Surgical History:   Procedure Laterality Date    BLADDER SUSPENSION      CATARACT EXTRACTION, BILATERAL      COLONOSCOPY N/A 12/3/2018    Procedure: COLONOSCOPY;  Surgeon: Mari Rader MD;  Location: [facility];  Service: Endoscopy;  Laterality: N/A;    COLONOSCOPY N/A 6/12/2024    Procedure: COLONOSCOPY;  Surgeon: Sarah Siegel MD;  Location: [facility];  Service: Endoscopy;  Laterality: N/A;    ESOPHAGOGASTRODUODENOSCOPY N/A 12/31/2020    Procedure: ESOPHAGOGASTRODUODENOSCOPY (EGD);  Surgeon: Anthony Rodríguez MD;  Location: [facility];  Service: General;  Laterality: N/A;    HYSTERECTOMY      partial 2000    INJECTION OF ANESTHETIC AGENT AROUND NERVE Left 9/14/2023    Procedure: LEFT Genicular nerve block RN IV Sedation;  Surgeon: Thanh Diaz MD;  Location: [facility];  Service: Pain Management;  Laterality: Left;    INJECTION OF ANESTHETIC AGENT AROUND NERVE Left 5/21/2024    Procedure: LEFT Genicular nerve block (therpeutic);  Surgeon: Thanh Diaz MD;  Location: [facility];  Service: Pain Management;  Laterality: Left;    INJECTION OF ANESTHETIC AGENT AROUND NERVE Left 6/11/2024    Procedure: LEFT Genicular nerve block with RN IV sedation;  Surgeon: Thanh Diaz MD;  Location: [facility];  Service: Pain Management;  Laterality: Left;    INJECTION OF ANESTHETIC AGENT INTO SACROILIAC JOINT Bilateral 11/30/2020    Procedure: Bilateral SIJ + Bilateral Piriformis +  Bilateral GT Bursa Injection;  Surgeon: Thanh Diaz MD;  Location: [facility];  Service: Pain Management;  Laterality: Bilateral;    INJECTION OF JOINT Bilateral 11/30/2020    Procedure: Bilateral SIJ + Bilateral Piriformis + Bilateral GT Bursa Injection;  Surgeon: Thanh Diaz MD;  Location: [facility];  Service: Pain Management;  Laterality: Bilateral;    INJECTION OF PIRIFORMIS MUSCLE Bilateral 11/30/2020    Procedure: Bilateral SIJ + Bilateral Piriformis + Bilateral GT Bursa Injection;  Surgeon: Thanh Diaz MD;  Location: [facility];  Service: Pain Management;  Laterality: Bilateral;    ROBOT-ASSISTED LAPAROSCOPIC SLEEVE GASTRECTOMY USING DA EZEQUIEL XI N/A 3/2/2021    Procedure: XI ROBOTIC SLEEVE GASTRECTOMY;  Surgeon: Anthony Rodríguez MD;  Location: [facility];  Service: General;  Laterality: N/A;    SELECTIVE INJECTION OF ANESTHETIC AGENT AROUND LUMBAR SPINAL NERVE ROOT BY TRANSFORAMINAL APPROACH Bilateral 1/4/2021    Procedure: Bilateral L5/S1 TF GISELA;  Surgeon: Thanh Diaz MD;  Location: [facility];  Service: Pain Management;  Laterality: Bilateral;    SELECTIVE INJECTION OF ANESTHETIC AGENT AROUND LUMBAR SPINAL NERVE ROOT BY TRANSFORAMINAL APPROACH Bilateral 3/23/2021    Procedure: Bilateral L5/S1 TF GISELA;  Surgeon: Thanh Diaz MD;  Location: [facility];  Service: Pain Management;  Laterality: Bilateral;    SELECTIVE INJECTION OF ANESTHETIC AGENT AROUND LUMBAR SPINAL NERVE ROOT BY TRANSFORAMINAL APPROACH Bilateral 10/5/2021    Procedure: Bilateral L5/S1 TF GISELA;  Surgeon: Thanh Diaz MD;  Location: [facility];  Service: Pain Management;  Laterality: Bilateral;    SELECTIVE INJECTION OF ANESTHETIC AGENT AROUND LUMBAR SPINAL NERVE ROOT BY TRANSFORAMINAL APPROACH Bilateral 3/28/2024    Procedure: Bilateral L5/S1 TF GISELA;  Surgeon: Thanh Diaz MD;  Location: [facility];  Service: Pain Management;  Laterality: Bilateral;    tonsilectomy      TOTAL KNEE  ARTHROPLASTY Left 3/4/2020    Procedure: ARTHROPLASTY, KNEE, TOTAL;  Surgeon: Moy Connolly MD;  Location: [facility];  Service: Orthopedics;  Laterality: Left;       Review of Systems   Constitutional:  Negative for chills, fatigue and fever.   HENT:  Negative for hearing loss.    Eyes:  Negative for photophobia and visual disturbance.   Respiratory:  Negative for cough, chest tightness, shortness of breath and wheezing.    Cardiovascular:  Negative for chest pain and palpitations.   Gastrointestinal:  Negative for constipation, diarrhea, nausea and vomiting.   Endocrine: Negative for cold intolerance and heat intolerance.   Genitourinary:  Negative for flank pain.   Musculoskeletal:  Negative for neck pain and neck stiffness.   Skin:  Negative for wound.   Neurological:  Negative for light-headedness and headaches.   Psychiatric/Behavioral:  Negative for sleep disturbance.          Objective:   There were no vitals taken for this visit.    Physical Exam  LOWER EXTREMITY PHYSICAL EXAMINATION    DERMATOLOGY: Skin is supple, dry and intact. No ulcerations or wounds are noted. No cellulitis is noted.    ORTHOPEDIC: MMT is 5/5 on the RLE as compared to the contra-lateral. There is no edema noted.    Assessment:     1. History of retained foreign body fully removed          Plan:     History of retained foreign body fully removed      Thorough discussion is had with the patient today, concerning the diagnosis, its etiology, and the treatment algorithm at present.     Does not know Tdap Booster Statues.    Stepped on a sewing need/stick pin which was clean and in her house.    There is no infection at present.    Incident was about 2.5 weeks ago.    Will  see PCP on 12/17/2024 and will ascertain her Tdap Booster status and update prn.            Future Appointments   Date Time Provider Department Center   12/17/2024  9:20 AM Estrellita Weller NP [facility]   1/8/2025 10:15 AM Thanh Diaz MD ON IN PN [facility]   1/28/2025  8:40 AM Juan Alberto Luis MD [facility]   3/4/2025  2:10 PM Antonio Carey OD [facility]   10/13/2025 10:20 AM Anthony Rodríguez MD [facility]   10/13/2025 11:00 AM Doreen Antony RD [facility]

## 2024-12-04 ENCOUNTER — TELEPHONE (OUTPATIENT)
Dept: PAIN MEDICINE | Facility: CLINIC | Age: 69
End: 2024-12-04
Payer: MEDICARE

## 2024-12-04 ENCOUNTER — OFFICE VISIT (OUTPATIENT)
Dept: PAIN MEDICINE | Facility: CLINIC | Age: 69
End: 2024-12-04
Payer: MEDICARE

## 2024-12-04 VITALS
WEIGHT: 203.5 LBS | BODY MASS INDEX: 37.45 KG/M2 | SYSTOLIC BLOOD PRESSURE: 115 MMHG | DIASTOLIC BLOOD PRESSURE: 80 MMHG | HEIGHT: 62 IN | HEART RATE: 84 BPM

## 2024-12-04 DIAGNOSIS — Z79.01 ANTICOAGULATED: Primary | ICD-10-CM

## 2024-12-04 DIAGNOSIS — M54.16 LUMBAR RADICULOPATHY: Primary | ICD-10-CM

## 2024-12-04 DIAGNOSIS — M25.562 CHRONIC PAIN OF LEFT KNEE: ICD-10-CM

## 2024-12-04 DIAGNOSIS — M79.18 PIRIFORMIS MUSCLE PAIN: ICD-10-CM

## 2024-12-04 DIAGNOSIS — M54.16 BILATERAL LUMBAR RADICULOPATHY: ICD-10-CM

## 2024-12-04 DIAGNOSIS — G89.29 CHRONIC PAIN OF LEFT KNEE: ICD-10-CM

## 2024-12-04 DIAGNOSIS — Z96.652 STATUS POST TOTAL KNEE REPLACEMENT USING CEMENT, LEFT: ICD-10-CM

## 2024-12-04 PROCEDURE — 1159F MED LIST DOCD IN RCRD: CPT | Mod: CPTII,S$GLB,, | Performed by: PHYSICAL MEDICINE & REHABILITATION

## 2024-12-04 PROCEDURE — 99999 PR PBB SHADOW E&M-EST. PATIENT-LVL IV: CPT | Mod: PBBFAC,,, | Performed by: PHYSICAL MEDICINE & REHABILITATION

## 2024-12-04 PROCEDURE — 3288F FALL RISK ASSESSMENT DOCD: CPT | Mod: CPTII,S$GLB,, | Performed by: PHYSICAL MEDICINE & REHABILITATION

## 2024-12-04 PROCEDURE — 3008F BODY MASS INDEX DOCD: CPT | Mod: CPTII,S$GLB,, | Performed by: PHYSICAL MEDICINE & REHABILITATION

## 2024-12-04 PROCEDURE — G2211 COMPLEX E/M VISIT ADD ON: HCPCS | Mod: S$GLB,,, | Performed by: PHYSICAL MEDICINE & REHABILITATION

## 2024-12-04 PROCEDURE — 1125F AMNT PAIN NOTED PAIN PRSNT: CPT | Mod: CPTII,S$GLB,, | Performed by: PHYSICAL MEDICINE & REHABILITATION

## 2024-12-04 PROCEDURE — 3074F SYST BP LT 130 MM HG: CPT | Mod: CPTII,S$GLB,, | Performed by: PHYSICAL MEDICINE & REHABILITATION

## 2024-12-04 PROCEDURE — 1101F PT FALLS ASSESS-DOCD LE1/YR: CPT | Mod: CPTII,S$GLB,, | Performed by: PHYSICAL MEDICINE & REHABILITATION

## 2024-12-04 PROCEDURE — 99214 OFFICE O/P EST MOD 30 MIN: CPT | Mod: S$GLB,,, | Performed by: PHYSICAL MEDICINE & REHABILITATION

## 2024-12-04 PROCEDURE — 3079F DIAST BP 80-89 MM HG: CPT | Mod: CPTII,S$GLB,, | Performed by: PHYSICAL MEDICINE & REHABILITATION

## 2024-12-04 RX ORDER — GABAPENTIN 300 MG/1
600 CAPSULE ORAL NIGHTLY
Qty: 60 CAPSULE | Refills: 5 | Status: SHIPPED | OUTPATIENT
Start: 2024-12-04

## 2024-12-04 RX ORDER — MELOXICAM 15 MG/1
15 TABLET ORAL DAILY
Qty: 90 TABLET | Refills: 3 | Status: SHIPPED | OUTPATIENT
Start: 2024-12-04

## 2024-12-04 NOTE — TELEPHONE ENCOUNTER
E consult sent to Dr Luis, waiting on response before scheduling injection.    .Sujata Mcpherson [facility]

## 2024-12-04 NOTE — PROGRESS NOTES
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 contact office.   We have previously discussed potential deleterious side effects of NSAIDs on the cardiovascular, gastrointestinal and renal systems. We have discussed judicious use of this medication.      -refill gabapentin at a dose of 600 mg nightly for low pain. We have reviewed potential side effects of this medication including daytime somnolence, weight gain and peripheral edema    -Continue Robaxin 750mg to take  1 tab TID PRN muscle spasms.  she understands this could cause drowsiness.      Refill provided for compound cream for L knee. 4% gabapentin, 1.5% diclofenac, 2.5% lidocaine, 2.5% prilocaine 2.5%compound cream for potential topical pain relief. Patent will be contacted for [facility] regarding cost and payment.              - Anticoagulation use: ASA - 1° prevention - Dr. Luis (Cardiology).  We will need to obtain clearance to hold for 5 days for lumbar GISELA     Rehabilitative:  Abstain from physical therapy at this time, but advised patient continue with activities as tolerated     Diagnostic:  Reviewed imaging available      Consult/ Referral:  None at this time, continue follow up with Dr. Connolly for left knee     Follow up:  4-6 weeks post procedure or as needed     - This condition does  not require this patient to take time off of work, and the primary goal of our Pain Management services is to improve the patient's functional capacity.      - I discussed the risks, benefits, and alternatives to potential treatment options. All questions and concerns were fully addressed today in clinic.        The above plan and management options were discussed at length with patient. Patient is in agreement with the above and verbalized understanding.      Visit today included increased complexity associated with the care of the episodic problem of chronic pain which was addressed and continue to manage the longitudinal care of the patient due to the serious and/or complex managed problem(s) listed above.      Thanh Diaz MD   Interventional Pain Medicine  [facility] [place]      Disclaimer:  This note was prepared using voice recognition system and is likely to have sound alike errors that may have been overlooked even after proof reading.  Please call me with any questions

## 2024-12-04 NOTE — Clinical Note
Pain Provider: Emily Patient Name: Mariel Becerril MRN: [id number] Case: LUMBAR INTERLAMINAR EPIDURAL STEROID INJECTION Level: L5/S1 Laterality: na Anticoagulation: ASA (Primary ppx) Length to hold:5 days Rx Provider:  Dr. Luis (eConsult)  Follow up 4-6 weeks post procedure with MONICA REID

## 2024-12-04 NOTE — TELEPHONE ENCOUNTER
----- Message from Thanh Diaz MD sent at 12/4/2024  1:31 PM CST -----  Pain Provider: Emily  Patient Name: Mariel Becerril  MRN: [id number]  Case: LUMBAR INTERLAMINAR EPIDURAL STEROID INJECTION  Level: L5/S1  Laterality: na  Anticoagulation: ASA (Primary ppx)  Length to hold:5 days  Rx Provider:  Dr. Luis (eConsult)    Follow up 4-6 weeks post procedure with MONICA REID

## 2024-12-05 ENCOUNTER — E-CONSULT (OUTPATIENT)
Dept: CARDIOLOGY | Facility: CLINIC | Age: 69
End: 2024-12-05
Payer: MEDICARE

## 2024-12-05 DIAGNOSIS — Z01.810 PREOP CARDIOVASCULAR EXAM: Primary | ICD-10-CM

## 2024-12-05 NOTE — CONSULTS
[facility] Cardiology  Response for E-Consult     Patient Name: Mariel Becerril  MRN: [id number]  Primary Care Provider: Estrellita Weller NP   Requesting Provider: Thanh Diaz MD  E-Consult to General Cardiology  Consult performed by: Juan Alberto Luis MD  Consult ordered by: Thanh Diaz MD  Reason for consult: preop          Mariel Becerril was seen in office with complaints of severe low back pain. Dr. Diaz would like to perform lumbar epidural, and Mariel Becerril would like to proceed. Dr. Diaz is requesting for clearance to hold ASA 5 days prior to procedure. Patient Mariel Becerril can resume medication following the procedure.       Mariel Becerril is a 68 y.o. female with current medical conditions HTN, PVCs, obesity s/p gastric sleeve, GERD, OA, anemia, GIL on CPAP will need GISELA.        Recommendation: Low CV Risk and OK to hold aspirin 5 days prior and resume post op.     Contingency that warrants a repeat eConsult or referral: active/worsening cardiac conditions    Total time of Consultation: 15 minutes    I did not speak to the requesting provider verbally about this.     *This eConsult is based on the clinical data available to me and is furnished without benefit of a physical examination. The eConsult will need to be interpreted in light of any clinical issues or changes in patient status not available to me at the time of filing this eConsults. Significant changes in patient condition or level of acuity should result in immediate formal consultation and reevaluation. Please alert me if you have further questions.    Thank you for this eConsult referral.     Juan Alberto Luis Md, MD  [facility] - [facility]iology

## 2024-12-17 ENCOUNTER — TELEPHONE (OUTPATIENT)
Dept: PAIN MEDICINE | Facility: CLINIC | Age: 69
End: 2024-12-17
Payer: MEDICARE

## 2024-12-17 ENCOUNTER — LAB VISIT (OUTPATIENT)
Dept: LAB | Facility: HOSPITAL | Age: 69
End: 2024-12-17
Attending: NURSE PRACTITIONER
Payer: MEDICARE

## 2024-12-17 ENCOUNTER — OFFICE VISIT (OUTPATIENT)
Dept: INTERNAL MEDICINE | Facility: CLINIC | Age: 69
End: 2024-12-17
Payer: MEDICARE

## 2024-12-17 VITALS
SYSTOLIC BLOOD PRESSURE: 120 MMHG | RESPIRATION RATE: 20 BRPM | OXYGEN SATURATION: 96 % | TEMPERATURE: 98 F | WEIGHT: 205.25 LBS | DIASTOLIC BLOOD PRESSURE: 72 MMHG | HEART RATE: 84 BPM | BODY MASS INDEX: 35.04 KG/M2 | HEIGHT: 64 IN

## 2024-12-17 DIAGNOSIS — I10 ESSENTIAL HYPERTENSION: ICD-10-CM

## 2024-12-17 DIAGNOSIS — Z00.00 ANNUAL PHYSICAL EXAM: Primary | ICD-10-CM

## 2024-12-17 DIAGNOSIS — Z91.81 HISTORY OF FALL: ICD-10-CM

## 2024-12-17 DIAGNOSIS — S49.92XA INJURY OF LEFT SHOULDER, INITIAL ENCOUNTER: ICD-10-CM

## 2024-12-17 DIAGNOSIS — M54.16 LUMBAR RADICULOPATHY: ICD-10-CM

## 2024-12-17 LAB
ALBUMIN SERPL BCP-MCNC: 3.4 G/DL (ref 3.5–5.2)
ALP SERPL-CCNC: 78 U/L (ref 40–150)
ALT SERPL W/O P-5'-P-CCNC: 16 U/L (ref 10–44)
ANION GAP SERPL CALC-SCNC: 8 MMOL/L (ref 8–16)
AST SERPL-CCNC: 21 U/L (ref 10–40)
BILIRUB SERPL-MCNC: 0.3 MG/DL (ref 0.1–1)
BUN SERPL-MCNC: 13 MG/DL (ref 8–23)
CALCIUM SERPL-MCNC: 9.1 MG/DL (ref 8.7–10.5)
CHLORIDE SERPL-SCNC: 112 MMOL/L (ref 95–110)
CHOLEST SERPL-MCNC: 136 MG/DL (ref 120–199)
CHOLEST/HDLC SERPL: 2.5 {RATIO} (ref 2–5)
CO2 SERPL-SCNC: 24 MMOL/L (ref 23–29)
CREAT SERPL-MCNC: 0.8 MG/DL (ref 0.5–1.4)
EST. GFR  (NO RACE VARIABLE): >60 ML/MIN/1.73 M^2
GLUCOSE SERPL-MCNC: 91 MG/DL (ref 70–110)
HDLC SERPL-MCNC: 54 MG/DL (ref 40–75)
HDLC SERPL: 39.7 % (ref 20–50)
LDLC SERPL CALC-MCNC: 68.2 MG/DL (ref 63–159)
NONHDLC SERPL-MCNC: 82 MG/DL
POTASSIUM SERPL-SCNC: 4.1 MMOL/L (ref 3.5–5.1)
PROT SERPL-MCNC: 6.4 G/DL (ref 6–8.4)
SODIUM SERPL-SCNC: 144 MMOL/L (ref 136–145)
TRIGL SERPL-MCNC: 69 MG/DL (ref 30–150)
TSH SERPL DL<=0.005 MIU/L-ACNC: 0.91 UIU/ML (ref 0.4–4)

## 2024-12-17 PROCEDURE — 3288F FALL RISK ASSESSMENT DOCD: CPT | Mod: CPTII,S$GLB,, | Performed by: NURSE PRACTITIONER

## 2024-12-17 PROCEDURE — 1125F AMNT PAIN NOTED PAIN PRSNT: CPT | Mod: CPTII,S$GLB,, | Performed by: NURSE PRACTITIONER

## 2024-12-17 PROCEDURE — 99999 PR PBB SHADOW E&M-EST. PATIENT-LVL V: CPT | Mod: PBBFAC,,, | Performed by: NURSE PRACTITIONER

## 2024-12-17 PROCEDURE — 80053 COMPREHEN METABOLIC PANEL: CPT | Performed by: NURSE PRACTITIONER

## 2024-12-17 PROCEDURE — 3074F SYST BP LT 130 MM HG: CPT | Mod: CPTII,S$GLB,, | Performed by: NURSE PRACTITIONER

## 2024-12-17 PROCEDURE — 80061 LIPID PANEL: CPT | Performed by: NURSE PRACTITIONER

## 2024-12-17 PROCEDURE — 3078F DIAST BP <80 MM HG: CPT | Mod: CPTII,S$GLB,, | Performed by: NURSE PRACTITIONER

## 2024-12-17 PROCEDURE — 1101F PT FALLS ASSESS-DOCD LE1/YR: CPT | Mod: CPTII,S$GLB,, | Performed by: NURSE PRACTITIONER

## 2024-12-17 PROCEDURE — 3008F BODY MASS INDEX DOCD: CPT | Mod: CPTII,S$GLB,, | Performed by: NURSE PRACTITIONER

## 2024-12-17 PROCEDURE — 36415 COLL VENOUS BLD VENIPUNCTURE: CPT | Mod: PN | Performed by: NURSE PRACTITIONER

## 2024-12-17 PROCEDURE — 99397 PER PM REEVAL EST PAT 65+ YR: CPT | Mod: S$GLB,,, | Performed by: NURSE PRACTITIONER

## 2024-12-17 PROCEDURE — 84443 ASSAY THYROID STIM HORMONE: CPT | Performed by: NURSE PRACTITIONER

## 2024-12-20 RX ORDER — METOPROLOL SUCCINATE 25 MG/1
25 TABLET, EXTENDED RELEASE ORAL DAILY
Qty: 90 TABLET | Refills: 1 | Status: SHIPPED | OUTPATIENT
Start: 2024-12-20

## 2024-12-23 ENCOUNTER — PATIENT MESSAGE (OUTPATIENT)
Dept: INTERNAL MEDICINE | Facility: CLINIC | Age: 69
End: 2024-12-23

## 2024-12-23 ENCOUNTER — OFFICE VISIT (OUTPATIENT)
Dept: INTERNAL MEDICINE | Facility: CLINIC | Age: 69
End: 2024-12-23
Payer: MEDICARE

## 2024-12-23 VITALS
WEIGHT: 208.31 LBS | TEMPERATURE: 98 F | BODY MASS INDEX: 35.56 KG/M2 | RESPIRATION RATE: 18 BRPM | DIASTOLIC BLOOD PRESSURE: 66 MMHG | HEART RATE: 107 BPM | OXYGEN SATURATION: 99 % | HEIGHT: 64 IN | SYSTOLIC BLOOD PRESSURE: 104 MMHG

## 2024-12-23 DIAGNOSIS — Z09 FOLLOW-UP EXAM: Primary | ICD-10-CM

## 2024-12-23 DIAGNOSIS — S49.92XA SHOULDER INJURY, LEFT, INITIAL ENCOUNTER: ICD-10-CM

## 2024-12-23 DIAGNOSIS — M25.512 ACUTE PAIN OF LEFT SHOULDER: ICD-10-CM

## 2024-12-23 PROCEDURE — 1160F RVW MEDS BY RX/DR IN RCRD: CPT | Mod: CPTII,S$GLB,, | Performed by: NURSE PRACTITIONER

## 2024-12-23 PROCEDURE — 3078F DIAST BP <80 MM HG: CPT | Mod: CPTII,S$GLB,, | Performed by: NURSE PRACTITIONER

## 2024-12-23 PROCEDURE — 1159F MED LIST DOCD IN RCRD: CPT | Mod: CPTII,S$GLB,, | Performed by: NURSE PRACTITIONER

## 2024-12-23 PROCEDURE — 3288F FALL RISK ASSESSMENT DOCD: CPT | Mod: CPTII,S$GLB,, | Performed by: NURSE PRACTITIONER

## 2024-12-23 PROCEDURE — 3008F BODY MASS INDEX DOCD: CPT | Mod: CPTII,S$GLB,, | Performed by: NURSE PRACTITIONER

## 2024-12-23 PROCEDURE — 1126F AMNT PAIN NOTED NONE PRSNT: CPT | Mod: CPTII,S$GLB,, | Performed by: NURSE PRACTITIONER

## 2024-12-23 PROCEDURE — 99213 OFFICE O/P EST LOW 20 MIN: CPT | Mod: S$GLB,,, | Performed by: NURSE PRACTITIONER

## 2024-12-23 PROCEDURE — 1101F PT FALLS ASSESS-DOCD LE1/YR: CPT | Mod: CPTII,S$GLB,, | Performed by: NURSE PRACTITIONER

## 2024-12-23 PROCEDURE — 3074F SYST BP LT 130 MM HG: CPT | Mod: CPTII,S$GLB,, | Performed by: NURSE PRACTITIONER

## 2024-12-23 PROCEDURE — 99999 PR PBB SHADOW E&M-EST. PATIENT-LVL V: CPT | Mod: PBBFAC,,, | Performed by: NURSE PRACTITIONER

## 2024-12-23 NOTE — PROGRESS NOTES
Patient ID: Mariel Becerril is a 69 y.o. female.    Chief Complaint: paperwork    History of Present Illness    CHIEF COMPLAINT:  Ms. Becerril presents today for completion of her short-term disability form due to her left shoulder injury.    HISTORY OF PRESENT ILLNESS:  She sustained a left shoulder injury from a fall on December 11th, 2024, which prompted an emergency room visit where X-ray was performed. She requires use of her arm for employment duties.      ROS:  Constitutional: negative diminished activity, negative appetite change, negative fatigue, negative fever  HENT: negative ear discharge, negative ear pain, negative mouth sores, negative nosebleeds, negative sore throat, negative tinnitus  Respiratory: negative apnea, negative cough, negative shortness of breath  Cardiovascular: negative chest pain, negative leg swelling  Gastrointestinal: negative abdominal distention, negative abdominal pain, negative blood in stool, negative constipation, negative diarrhea, negative nausea, negative vomiting  Genitourinary: negative difficulty urinating, negative flank pain, negative frequency, negative hematuria  Musculoskeletal: POSITIVE BACK PAIN, POSITIVE JOINT/RIGHT SHOULDER PAIN, negative abnormal gait, negative neck pain, negative neck stiffness, negative joint pain, negative muscle pain  Neurological: negative dizziness, negative seizures, negative numbness, negative tingling, negative headaches, negative balance issues  Psychiatric: negative agitation, negative confusion, negative hallucinations, negative sleep difficulty, negative suicidal ideation         Physical Exam    Vital Signs: Reviewed.  Constitutional: Well-appearing. Well-developed. No acute distress.  Cardiovascular: Normal rate   Pulmonary: Pulmonary effort is normal.   Neurological: Alert and oriented to person, place, and time.  Psychiatric: Mood is normal. Behavior is normal. Behavior is cooperative.         Assessment & Plan    S43.254I  Sprain of left rotator cuff capsule, initial encounter  Z02.71 Encounter for disability determination    LEFT SHOULDER SPRAIN:  - Reviewed patient's left shoulder injury from fall on December 11, 2024.    DISABILITY DETERMINATION:  - Assessed need for short-term disability coverage while shoulder heals.  - Determined patient requires time off work due to job requiring use of arm.  - Will complete short-term disability form based on injury and work requirements.  - Completed short-term disability form for patient.    FOLLOW-UP:  - Follow up as scheduled.              Follow up if symptoms worsen or fail to improve.    This note was generated with the assistance of ambient listening technology. Verbal consent was obtained by the patient and accompanying visitor(s) for the recording of patient appointment to facilitate this note. I attest to having reviewed and edited the generated note for accuracy, though some syntax or spelling errors may persist. Please contact the author of this note for any clarification.

## 2025-01-02 ENCOUNTER — HOSPITAL ENCOUNTER (OUTPATIENT)
Facility: HOSPITAL | Age: 70
Discharge: HOME | End: 2025-01-02
Attending: PHYSICAL MEDICINE & REHABILITATION | Admitting: PHYSICAL MEDICINE & REHABILITATION
Payer: MEDICARE

## 2025-01-02 VITALS
HEART RATE: 76 BPM | BODY MASS INDEX: 36.88 KG/M2 | SYSTOLIC BLOOD PRESSURE: 128 MMHG | HEIGHT: 63 IN | RESPIRATION RATE: 16 BRPM | DIASTOLIC BLOOD PRESSURE: 77 MMHG | OXYGEN SATURATION: 96 % | WEIGHT: 208.13 LBS | TEMPERATURE: 97 F

## 2025-01-02 DIAGNOSIS — M54.16 LUMBAR RADICULOPATHY: Primary | ICD-10-CM

## 2025-01-02 PROCEDURE — 63600175 PHARM REV CODE 636 W HCPCS: Performed by: PHYSICAL MEDICINE & REHABILITATION

## 2025-01-02 PROCEDURE — 62323 NJX INTERLAMINAR LMBR/SAC: CPT | Mod: ,,, | Performed by: PHYSICAL MEDICINE & REHABILITATION

## 2025-01-02 PROCEDURE — 62323 NJX INTERLAMINAR LMBR/SAC: CPT | Performed by: PHYSICAL MEDICINE & REHABILITATION

## 2025-01-02 PROCEDURE — 25500020 PHARM REV CODE 255: Performed by: PHYSICAL MEDICINE & REHABILITATION

## 2025-01-02 RX ORDER — MIDAZOLAM HYDROCHLORIDE 1 MG/ML
INJECTION, SOLUTION INTRAMUSCULAR; INTRAVENOUS
Status: DISCONTINUED | OUTPATIENT
Start: 2025-01-02 | End: 2025-01-02 | Stop reason: HOSPADM

## 2025-01-02 RX ORDER — FENTANYL CITRATE 50 UG/ML
INJECTION, SOLUTION INTRAMUSCULAR; INTRAVENOUS
Status: DISCONTINUED | OUTPATIENT
Start: 2025-01-02 | End: 2025-01-02 | Stop reason: HOSPADM

## 2025-01-02 RX ORDER — BUPIVACAINE HYDROCHLORIDE 2.5 MG/ML
INJECTION, SOLUTION EPIDURAL; INFILTRATION; INTRACAUDAL
Status: DISCONTINUED | OUTPATIENT
Start: 2025-01-02 | End: 2025-01-02 | Stop reason: HOSPADM

## 2025-01-02 RX ORDER — METHYLPREDNISOLONE ACETATE 80 MG/ML
INJECTION, SUSPENSION INTRA-ARTICULAR; INTRALESIONAL; INTRAMUSCULAR; SOFT TISSUE
Status: DISCONTINUED | OUTPATIENT
Start: 2025-01-02 | End: 2025-01-02 | Stop reason: HOSPADM

## 2025-01-02 RX ORDER — ONDANSETRON HYDROCHLORIDE 2 MG/ML
4 INJECTION, SOLUTION INTRAVENOUS ONCE AS NEEDED
Status: DISCONTINUED | OUTPATIENT
Start: 2025-01-02 | End: 2025-01-02 | Stop reason: HOSPADM

## 2025-01-02 NOTE — NURSING TRANSFER
Patient is stable, resting comfortably in bed, AAOx3, awaiting ride home; call light within reach, instructed to call staff for assistance. Bilateral nonslip socks on, bed locked in lowest position, room near unit station.

## 2025-01-02 NOTE — OP NOTE
Lumbar Interlaminar Epidural Steroid Injection under Fluoroscopic Guidance.   Time-out taken to identify patient and procedure prior to starting the procedure.     01/02/2025    PROCEDURE: Interlaminar epidural steroid injection under fluoroscopic guidance.     Pre-Op diagnosis: Lumbar radiculopathy [M54.16]    Post-Op diagnosis: Lumbar radiculopathy [M54.16]    PHYSICIAN: Thanh Diaz MD    ASSISTANTS: None     SEDATION: Conscious sedation provided by M.D    The patient was monitored with continuous pulse oximetry, EKG, and intermittent blood pressure monitors, immediately prior to administration of sedation.  The patient was hemodynamically stable throughout the entire process was responsive to voice, and breathing spontaneously.  Supplemental O2 was provided at 2L/min via nasal cannula.  Patient was comfortable for the duration of the procedure.     There was a total of 2mg IV Midazolam and 50mcg Fentanyl titrated for the procedure    Total sedation time was <10 minutes      ESTIMATED BLOOD LOSS: none.     COMPLICATIONS: none.     SPECIMENS: none    TECHNIQUE: With the patient laying in a prone position, the area was prepped and draped in the usual sterile fashion using ChloraPrep and a fenestrated drape. 1% lidocaine was given using a 27-gauge needle by raising a wheal and going down to the hub of the needle over the L5/S1 interlaminar space.  The interlaminar space was then approached with a 3.5 inch 18-gauge Touhy needle was introduced under fluoroscopic guidance in the AP and Lateral view. Once the Ligamentum flavum was encountered loss of resistance to saline was used to enter the epidural space. With positive loss of resistance and negative CSF or Blood, 3mL contrast dye Omnipaque (300mg/ml) was injected to confirm placement and there was no vascular runoff. Then 1ml 80mg/ml Depomedrol + 1mL 0.25% Bupivicaine + 8mL preservative free normal saline was injected slowly. Displacement of the radio opaque  contrast after injection of the medication confirmed that the medication went into the area of the epidural space.  The patient tolerated the procedure well.       The patient was monitored after the procedure.   They were given post-procedure and discharge instructions to follow at home.  The patient was discharged in a stable condition.

## 2025-01-02 NOTE — PROGRESS NOTES
Subjective     Patient ID: Mariel Becerril is a 69 y.o. female.    Chief Complaint: Follow-up (X 6 months)    Patient presents today for annual exam.    Medical history:  Insufficient sleep syndrome, carpal tunnel syndrome, depression, hypertension, greater trochanter bursitis of both hips, hemiplegia of the left nondominant side, colon polyps, lumbar radiculopathy, obesity, obstructive sleep apnea, fatigue, palpitations, osteoarthritis, sacroiliitis, symptomatic PVCs    Patient had injury while visiting her brother at [facility].  Reports hitting her left shoulder after being henry with the rotating door.  Patient was wheeled to the emergency room at [facility].  Workup was stable.  But continues to have some arm discomfort.    Follow-up  Associated symptoms include arthralgias. Pertinent negatives include no abdominal pain, chest pain, coughing, fatigue, fever, headaches, neck pain, numbness, rash or sore throat.     Review of Systems   Constitutional:  Negative for activity change, appetite change, fatigue and fever.   HENT:  Negative for ear discharge, ear pain, mouth sores, nosebleeds, sore throat and tinnitus.    Eyes:  Negative for discharge, redness and itching.   Respiratory:  Negative for apnea, cough and shortness of breath.    Cardiovascular:  Negative for chest pain and leg swelling.   Gastrointestinal:  Negative for abdominal distention, abdominal pain, blood in stool and constipation.   Endocrine: Negative for polydipsia, polyphagia and polyuria.   Genitourinary:  Negative for difficulty urinating, flank pain, frequency and hematuria.   Musculoskeletal:  Positive for arthralgias, back pain and gait problem. Negative for neck pain and neck stiffness.   Integumentary:  Negative for rash and wound.   Allergic/Immunologic: Negative for environmental allergies, food allergies and immunocompromised state.   Neurological:  Negative for dizziness, seizures, numbness  and headaches.   Psychiatric/Behavioral:  Negative for agitation, confusion, hallucinations, self-injury and suicidal ideas.           Objective     Physical Exam  Vitals reviewed.   Constitutional:       Appearance: Normal appearance. She is well-developed. She is obese.   HENT:      Head: Normocephalic.   Cardiovascular:      Rate and Rhythm: Normal rate and regular rhythm.   Pulmonary:      Effort: Pulmonary effort is normal.      Breath sounds: Normal breath sounds.   Musculoskeletal:         General: Normal range of motion.   Skin:     General: Skin is warm and dry.   Neurological:      General: No focal deficit present.      Mental Status: She is alert and oriented to person, place, and time.   Psychiatric:         Mood and Affect: Mood normal.         Behavior: Behavior normal. Behavior is cooperative.            Assessment and Plan     1. Annual physical exam    2. History of fall    3. Injury of left shoulder, initial encounter    4. Essential hypertension  -     TSH; Future; Expected date: 12/17/2024  -     LIPID PANEL; Future; Expected date: 12/17/2024  -     Comprehensive Metabolic Panel; Future; Expected date: 12/17/2024    5. Lumbar radiculopathy  Overview:  acute on chronic at left L5 and S1, chronic at right L5 and S1        Labs today     6 month-routine exam          No follow-ups on file.

## 2025-01-02 NOTE — PLAN OF CARE
Pt report given to Shelly MCKEON, pt moved to [facility] room 3 to wait for ride to get here.

## 2025-01-02 NOTE — DISCHARGE INSTRUCTIONS

## 2025-01-02 NOTE — PRE-PROCEDURE INSTRUCTIONS
Spoke with patient regarding procedure scheduled on 1.2     Arrival time 0930     Has patient been sick with fever or on antibiotics within the last 7 days? No     Does the patient have any open wounds, sores or rashes? No     Does the patient have any recent fractures? no     Has patient received a vaccination within the last 7 days? No     Received the COVID vaccination?      Has the patient stopped all medications as directed? asa 5 days     Does patient have a pacemaker, defibrillator, or implantable stimulator? No     Does the patient have a ride to and from procedure and someone reliable to remain with patient?  son     Is the patient diabetic? no     Does the patient have sleep apnea? Or use O2 at home? valentino cpap     Is the patient receiving sedation?      Is the patient instructed to remain NPO beginning at midnight the night before their procedure? yes     Procedure location confirmed with patient? Yes     Covid- Denies signs/symptoms. Instructed to notify PAT/MD if any changes.

## 2025-01-02 NOTE — PLAN OF CARE
"Pt ride left to "go get their monthly injection and will be right back". SonChamp [phone] did not answer phone when called. Pt moved to [facility] to wait for ride at this time.  "

## 2025-01-02 NOTE — DISCHARGE SUMMARY
Discharge Note  Short Stay      SUMMARY     Admit Date: 1/2/2025    Attending Physician: Thanh Diaz MD        Discharge Physician: Thanh Diaz MD        Discharge Date: 1/2/2025 9:49 AM    Procedure(s) (LRB):  L5-S1 ILESI (N/A)    Final Diagnosis: Lumbar radiculopathy [M54.16]    Disposition: Home or self care    Patient Instructions:   Current Discharge Medication List        CONTINUE these medications which have NOT CHANGED    Details   acetaminophen (TYLENOL) 325 MG tablet Take 2 tablets (650 mg total) by mouth every 8 (eight) hours as needed.  Qty: 60 tablet, Refills: 2      gabapentin (NEURONTIN) 300 MG capsule Take 2 capsules (600 mg total) by mouth every evening.  Qty: 60 capsule, Refills: 5    Associated Diagnoses: Bilateral lumbar radiculopathy; Piriformis muscle pain      levocetirizine (XYZAL) 5 MG tablet TAKE 1 TABLET BY MOUTH ONCE DAILY IN THE EVENING  Qty: 90 tablet, Refills: 0    Associated Diagnoses: Seasonal allergies      linaCLOtide (LINZESS) 72 mcg Cap capsule Take 1 capsule (72 mcg total) by mouth before breakfast.  Qty: 30 capsule, Refills: 2    Associated Diagnoses: Constipation, unspecified constipation type      metoprolol succinate (TOPROL-XL) 25 MG 24 hr tablet Take 1 tablet (25 mg total) by mouth once daily.  Qty: 90 tablet, Refills: 1    Comments: .      montelukast (SINGULAIR) 10 mg tablet Take 1 tablet (10 mg total) by mouth every evening. Allergies  Qty: 30 tablet, Refills: 11    Associated Diagnoses: Acute sinusitis, recurrence not specified, unspecified location      omeprazole (PRILOSEC) 40 MG capsule Take 1 capsule by mouth once daily  Qty: 90 capsule, Refills: 3      semaglutide, weight loss, (WEGOVY) 0.25 mg/0.5 mL PnIj Inject 0.25 mg into the skin once a week.  Qty: 3 mL, Refills: 5    Associated Diagnoses: Essential hypertension; GIL on CPAP; Morbid obesity with BMI of 40.0-44.9, adult      topiramate (TOPAMAX) 100 MG tablet Take 1 tablet (100 mg total) by mouth 2  (two) times daily.  Qty: 180 tablet, Refills: 2    Associated Diagnoses: Bilateral lumbar radiculopathy; History of migraine headaches      albuterol (PROAIR HFA) 90 mcg/actuation inhaler Inhale 2 puffs into the lungs every 6 (six) hours as needed for Wheezing. Rescue  Qty: 18 g, Refills: 0    Associated Diagnoses: Bronchitis      aspirin (ECOTRIN) 81 MG EC tablet Take 1 tablet (81 mg total) by mouth once daily.  Qty: 90 tablet, Refills: 3      diclofenac sodium (VOLTAREN) 1 % Gel APPLY 2 GRAMS TOPICALLY THREE TIMES DAILY AS NEEDED  Qty: 200 g, Refills: 2    Associated Diagnoses: Status post total knee replacement using cement, left; Posterior left knee pain      ergocalciferol, vitamin D2, (VITAMIN D ORAL) Take 2,000 Units by mouth once daily.      furosemide (LASIX) 40 MG tablet Take 1 tablet (40 mg total) by mouth daily as needed (edema, swelling). Do not take if BP is less than 110/70  Qty: 90 tablet, Refills: 1    Associated Diagnoses: Essential hypertension      gentamicin (GARAMYCIN) 0.1 % ointment Apply topically 3 (three) times daily.  Qty: 30 g, Refills: 1    Associated Diagnoses: Laceration of right foot, initial encounter      imipramine (TOFRANIL) 10 MG Tab Take 1 tablet (10 mg total) by mouth every evening.  Qty: 90 tablet, Refills: 1      meclizine (ANTIVERT) 25 mg tablet Take 1 tablet (25 mg total) by mouth 3 (three) times daily as needed for Dizziness.  Qty: 30 tablet, Refills: 0    Associated Diagnoses: Vertigo      meloxicam (MOBIC) 15 MG tablet Take 1 tablet (15 mg total) by mouth once daily.  Qty: 90 tablet, Refills: 3    Associated Diagnoses: Bilateral lumbar radiculopathy      methocarbamoL (ROBAXIN) 750 MG Tab Take 1 tablet (750 mg total) by mouth 3 (three) times daily as needed.  Qty: 90 tablet, Refills: 3    Associated Diagnoses: Bilateral lumbar radiculopathy; Piriformis muscle pain      mupirocin (BACTROBAN) 2 % ointment Apply topically 3 (three) times daily.  Qty: 30 g, Refills: 0       nitroGLYCERIN (NITROSTAT) 0.4 MG SL tablet Place 1 tablet (0.4 mg total) under the tongue every 5 (five) minutes as needed for Chest pain.  Qty: 30 tablet, Refills: 0      nystatin (MYCOSTATIN) cream APPLY  CREAM TOPICALLY TO AFFECTED AREA TWICE DAILY  Qty: 30 g, Refills: 0    Associated Diagnoses: Tear of skin of buttock, initial encounter; Yeast dermatitis      ondansetron (ZOFRAN) 8 MG tablet Take 1 tablet (8 mg total) by mouth every 8 (eight) hours as needed for Nausea.  Qty: 20 tablet, Refills: 0      potassium chloride SA (K-DUR,KLOR-CON) 20 MEQ tablet Take 1 tablet (20 mEq total) by mouth daily as needed (if taking lasix).  Qty: 90 tablet, Refills: 1                 Discharge Diagnosis: Lumbar radiculopathy [M54.16]  Condition on Discharge: Stable with no complications to procedure   Diet on Discharge: Same as before.  Activity: as per instruction sheet.  Discharge to: Home with a responsible adult.  Follow up: 2-4 weeks       Please call the office at [phone] if you experience any weakness or loss of sensation, fever > 101.5, pain uncontrolled with oral medications, persistent nausea/vomiting/or diarrhea, redness or drainage from the incisions, or any other worrisome concerns. If physician on call was not reached or could not communicate with our office for any reason please go to the nearest emergency department

## 2025-01-12 ENCOUNTER — PATIENT MESSAGE (OUTPATIENT)
Dept: INTERNAL MEDICINE | Facility: CLINIC | Age: 70
End: 2025-01-12
Payer: MEDICARE

## 2025-01-12 ENCOUNTER — PATIENT MESSAGE (OUTPATIENT)
Dept: PAIN MEDICINE | Facility: CLINIC | Age: 70
End: 2025-01-12
Payer: MEDICARE

## 2025-01-12 DIAGNOSIS — M79.18 PIRIFORMIS MUSCLE PAIN: ICD-10-CM

## 2025-01-12 DIAGNOSIS — M54.16 BILATERAL LUMBAR RADICULOPATHY: ICD-10-CM

## 2025-01-13 RX ORDER — METHOCARBAMOL 750 MG/1
750 TABLET, FILM COATED ORAL
Qty: 90 TABLET | Refills: 0 | Status: SHIPPED | OUTPATIENT
Start: 2025-01-13

## 2025-01-13 NOTE — TELEPHONE ENCOUNTER
Pt is requesting for a refill of: methocarbamoL (ROBAXIN) 750 MG Tab   Last filled:11/09/24  Last encounter: 12/04/24  Last proc: 1/02/25  Up coming apt: 2/03/25   Pharmacy:[facility] - [place]

## 2025-01-14 ENCOUNTER — OFFICE VISIT (OUTPATIENT)
Dept: INTERNAL MEDICINE | Facility: CLINIC | Age: 70
End: 2025-01-14
Payer: MEDICARE

## 2025-01-14 VITALS
WEIGHT: 204.25 LBS | HEIGHT: 63 IN | BODY MASS INDEX: 36.19 KG/M2 | TEMPERATURE: 96 F | OXYGEN SATURATION: 99 % | SYSTOLIC BLOOD PRESSURE: 106 MMHG | DIASTOLIC BLOOD PRESSURE: 68 MMHG | RESPIRATION RATE: 18 BRPM | HEART RATE: 96 BPM

## 2025-01-14 DIAGNOSIS — S49.92XA INJURY OF LEFT SHOULDER, INITIAL ENCOUNTER: ICD-10-CM

## 2025-01-14 DIAGNOSIS — Z09 FOLLOW-UP EXAM: Primary | ICD-10-CM

## 2025-01-14 DIAGNOSIS — M54.16 LUMBAR RADICULOPATHY: ICD-10-CM

## 2025-01-14 DIAGNOSIS — I10 ESSENTIAL HYPERTENSION: ICD-10-CM

## 2025-01-14 DIAGNOSIS — M19.012 PRIMARY OSTEOARTHRITIS OF LEFT SHOULDER: ICD-10-CM

## 2025-01-14 PROCEDURE — 1125F AMNT PAIN NOTED PAIN PRSNT: CPT | Mod: CPTII,S$GLB,, | Performed by: NURSE PRACTITIONER

## 2025-01-14 PROCEDURE — 3074F SYST BP LT 130 MM HG: CPT | Mod: CPTII,S$GLB,, | Performed by: NURSE PRACTITIONER

## 2025-01-14 PROCEDURE — 3008F BODY MASS INDEX DOCD: CPT | Mod: CPTII,S$GLB,, | Performed by: NURSE PRACTITIONER

## 2025-01-14 PROCEDURE — 99214 OFFICE O/P EST MOD 30 MIN: CPT | Mod: S$GLB,,, | Performed by: NURSE PRACTITIONER

## 2025-01-14 PROCEDURE — G2211 COMPLEX E/M VISIT ADD ON: HCPCS | Mod: S$GLB,,, | Performed by: NURSE PRACTITIONER

## 2025-01-14 PROCEDURE — 1160F RVW MEDS BY RX/DR IN RCRD: CPT | Mod: CPTII,S$GLB,, | Performed by: NURSE PRACTITIONER

## 2025-01-14 PROCEDURE — 1159F MED LIST DOCD IN RCRD: CPT | Mod: CPTII,S$GLB,, | Performed by: NURSE PRACTITIONER

## 2025-01-14 PROCEDURE — 99999 PR PBB SHADOW E&M-EST. PATIENT-LVL V: CPT | Mod: PBBFAC,,, | Performed by: NURSE PRACTITIONER

## 2025-01-14 PROCEDURE — 3078F DIAST BP <80 MM HG: CPT | Mod: CPTII,S$GLB,, | Performed by: NURSE PRACTITIONER

## 2025-01-14 PROCEDURE — 3288F FALL RISK ASSESSMENT DOCD: CPT | Mod: CPTII,S$GLB,, | Performed by: NURSE PRACTITIONER

## 2025-01-14 PROCEDURE — 1101F PT FALLS ASSESS-DOCD LE1/YR: CPT | Mod: CPTII,S$GLB,, | Performed by: NURSE PRACTITIONER

## 2025-01-14 NOTE — PROGRESS NOTES
Patient ID: Mariel Becerril is a 69 y.o. female.    Chief Complaint: Follow-up (Update paperwork ) and Shoulder Pain (Lt )    History of Present Illness    CHIEF COMPLAINT:  Ms. Becerril presents today for insurance paperwork completion.    LEFT SHOULDER INJURY:  She sustained a left shoulder injury on December 11, 2024, when a metal door slammed into her shoulder while visiting her brother at the hospital. ER evaluation showed no fractures but identified osteoarthritis. She currently takes Tylenol 650 mg twice daily and methylcarbamide 750 mg for pain management. The injury impacts her ability to complete work tasks requiring hand use.    LUMBAR SPINE:  She recently underwent an epidural steroid injection for lumbar radiculopathy, which she tolerated well without complications.      ROS:  Constitutional: negative diminished activity, negative appetite change, negative fatigue, negative fever  Respiratory: negative apnea, negative cough, negative shortness of breath  Cardiovascular: negative chest pain, negative leg swelling  Gastrointestinal: negative abdominal distention, negative abdominal pain, negative blood in stool, negative constipation, negative diarrhea, negative nausea, negative vomiting  Genitourinary: negative difficulty urinating, negative flank pain, negative frequency, negative hematuria  Musculoskeletal: negative back pain, negative abnormal gait, negative neck pain, negative neck stiffness, POSITIVE JOINT PAIN, negative muscle pain  Skin: negative rash, negative wound, negative abnormal moles  Allergic/Immunologic: negative seasonal allergies, negative food allergies  Neurological: negative dizziness, negative seizures, negative numbness, negative tingling, negative headaches, negative balance issues  Psychiatric: negative agitation, negative confusion, negative hallucinations, negative sleep difficulty, negative suicidal ideation         Physical Exam    Vital Signs: Reviewed.  Constitutional:  Well-appearing. Well-developed. No acute distress.  HENT: Normocephalic. Tympanic membranes normal bilaterally. Nares patent. Oropharynx clear. No erythema. No exudate.  Cardiovascular: Normal rate and regular rhythm. Normal heart sounds.  Pulmonary: Pulmonary effort is normal. Normal breath sounds.  Abdominal: Bowel sounds are normal. Abdomen is soft. No tenderness.  Musculoskeletal: No muscle tenderness. No joint tenderness. Normal range of motion.  Skin: Warm. Dry.  Neurological: No focal deficit is present. Alert and oriented to person, place, and time.  Psychiatric: Mood is normal. Behavior is normal. Behavior is cooperative.  MSK: Shoulder - Left: SHOULDER TENDERNESS (LEFT). SHOULDER TENDERNESS (LEFT). MILD RANGE OF MOTION DECREASE AND STIFFNESS IN LEFT SHOULDER.         Assessment & Plan        HEMIPLEGIA AFFECTING LEFT NONDOMINANT SIDE, UNSPECIFIED ETIOLOGY, UNSPECIFIED HEMIPLEGIA TYPE:  - Monitored and evaluated left shoulder injury from December 11, 2024.  - Noted tenderness, mild range of motion decrease, and stiffness.  - Prescribed Tylenol 650 mg twice daily for pain management.  - Assessed need for insurance forms completion due to patient's inability to perform work tasks using hands.  - Scheduled follow-up visit in 6 months.    CHRONIC OBSTRUCTIVE PULMONARY DISEASE:  - Prescribed Robaxin 750 mg for management.    LEFT SHOULDER INJURY:  - Emergency room findings indicated osteoarthritis with no fractures.    LUMBAR RADICULOPATHY:  - Evaluated patient's response to recent epidural steroid injection.  - Ms. Becerril is tolerating the injection well and doing fine.    ESSENTIAL HYPERTENSION  -Stable and controlled    FOLLOW UP:  - Contact the office if any concerns arise before the scheduled appointment.            No follow-ups on file.    This note was generated with the assistance of ambient listening technology. Verbal consent was obtained by the patient and accompanying visitor(s) for the recording of  patient appointment to facilitate this note. I attest to having reviewed and edited the generated note for accuracy, though some syntax or spelling errors may persist. Please contact the author of this note for any clarification.

## 2025-01-25 DIAGNOSIS — R00.2 PALPITATIONS: ICD-10-CM

## 2025-01-25 DIAGNOSIS — I10 ESSENTIAL HYPERTENSION: Primary | ICD-10-CM

## 2025-01-25 DIAGNOSIS — I49.3 SYMPTOMATIC PVCS: ICD-10-CM

## 2025-01-27 NOTE — PATIENT INSTRUCTIONS
Pt was a no-show for 1/27/25 appt with Dr. Lorenzana Unable to contact.   Stop taking cyclobenzaprine and prednisone  Resume meloxicam 15 mg once a day  Resume methocarbamol for spasms of the back  Obtain EMG-NCV-nerve test to the left leg  Return to clinic after the EMG-NCV  Needs x-ray LS spine next visit and left knee

## 2025-01-27 NOTE — PROGRESS NOTES
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 mg BID (which she originally takes for headaches) for radiculopathy.      - Continue Tylenol 650mg, which she takes 2 tablets BID PRN.     - refill Mobic 15mg QD PRN. Do not combine with other NSAIDs such as ibuprofen, aleve, advil. Take with food. If any GI upset, stop medication and contact office.   We have previously discussed potential deleterious side effects of NSAIDs on the cardiovascular, gastrointestinal and renal systems. We have discussed judicious use of this medication.      -refill gabapentin at a dose of 600 mg nightly for low pain. We have reviewed potential side effects of this medication including daytime somnolence, weight gain and peripheral edema    -Continue Robaxin 750mg to take  1 tab TID PRN muscle spasms.  she understands this could cause drowsiness.      Refill provided for compound cream for L knee. 4% gabapentin, 1.5% diclofenac, 2.5% lidocaine, 2.5% prilocaine 2.5%compound cream for potential topical pain relief. Patent will be contacted for [facility] regarding cost and payment.              - Anticoagulation use: ASA - 1° prevention - Dr. Luis (Cardiology).  We will need to obtain clearance to hold for 5 days for lumbar GISELA     Rehabilitative:  Continue with  activities as tolerated, continue physical therapy     Diagnostic:  Reviewed imaging available      Consult/ Referral:  None at this time, continue follow up with Dr. Connolly for left knee     Follow up:  4 weeks after procedure.     - This condition does not require this patient to take time off of work, and the primary goal of our Pain Management services is to improve the patient's functional capacity.      - I discussed the risks, benefits, and alternatives to potential treatment options. All questions and concerns were fully addressed today in clinic.        The above plan and management options were discussed at length with patient. Patient is in agreement with the above and verbalized understanding.      Visit today included increased complexity associated with the care of the episodic problem of chronic pain which was addressed and continue to manage the longitudinal care of the patient due to the serious and/or complex managed problem(s) listed above.      Claudia Bey NP   Interventional Pain Medicine  [place]  [place] [place]      Disclaimer:  This note was prepared using voice recognition system and is likely to have sound alike errors that may have been overlooked even after proof reading.  Please call me with any questions

## 2025-01-28 ENCOUNTER — HOSPITAL ENCOUNTER (OUTPATIENT)
Dept: CARDIOLOGY | Facility: HOSPITAL | Age: 70
Discharge: HOME | End: 2025-01-28
Attending: INTERNAL MEDICINE
Payer: MEDICARE

## 2025-01-28 ENCOUNTER — OFFICE VISIT (OUTPATIENT)
Dept: CARDIOLOGY | Facility: CLINIC | Age: 70
End: 2025-01-28
Payer: MEDICARE

## 2025-01-28 VITALS
HEART RATE: 84 BPM | DIASTOLIC BLOOD PRESSURE: 75 MMHG | HEIGHT: 63 IN | RESPIRATION RATE: 16 BRPM | BODY MASS INDEX: 35.99 KG/M2 | WEIGHT: 203.13 LBS | SYSTOLIC BLOOD PRESSURE: 109 MMHG | OXYGEN SATURATION: 100 %

## 2025-01-28 DIAGNOSIS — R00.2 PALPITATIONS: ICD-10-CM

## 2025-01-28 DIAGNOSIS — R06.09 DOE (DYSPNEA ON EXERTION): ICD-10-CM

## 2025-01-28 DIAGNOSIS — E66.01 MORBID OBESITY WITH BMI OF 40.0-44.9, ADULT: ICD-10-CM

## 2025-01-28 DIAGNOSIS — R60.0 LOCALIZED EDEMA: ICD-10-CM

## 2025-01-28 DIAGNOSIS — G47.33 OSA ON CPAP: ICD-10-CM

## 2025-01-28 DIAGNOSIS — R07.89 CHEST PRESSURE: Primary | ICD-10-CM

## 2025-01-28 DIAGNOSIS — M17.0 PRIMARY OSTEOARTHRITIS OF BOTH KNEES: ICD-10-CM

## 2025-01-28 DIAGNOSIS — I10 ESSENTIAL HYPERTENSION: ICD-10-CM

## 2025-01-28 DIAGNOSIS — I49.3 SYMPTOMATIC PVCS: ICD-10-CM

## 2025-01-28 DIAGNOSIS — R20.0 NUMBNESS AND TINGLING OF BOTH FEET: ICD-10-CM

## 2025-01-28 DIAGNOSIS — K21.9 GASTROESOPHAGEAL REFLUX DISEASE, UNSPECIFIED WHETHER ESOPHAGITIS PRESENT: ICD-10-CM

## 2025-01-28 DIAGNOSIS — R20.2 NUMBNESS AND TINGLING OF BOTH FEET: ICD-10-CM

## 2025-01-28 DIAGNOSIS — Z98.84 HX OF GASTRIC BYPASS: ICD-10-CM

## 2025-01-28 PROCEDURE — 3288F FALL RISK ASSESSMENT DOCD: CPT | Mod: CPTII,S$GLB,, | Performed by: INTERNAL MEDICINE

## 2025-01-28 PROCEDURE — 1159F MED LIST DOCD IN RCRD: CPT | Mod: CPTII,S$GLB,, | Performed by: INTERNAL MEDICINE

## 2025-01-28 PROCEDURE — 1101F PT FALLS ASSESS-DOCD LE1/YR: CPT | Mod: CPTII,S$GLB,, | Performed by: INTERNAL MEDICINE

## 2025-01-28 PROCEDURE — 1160F RVW MEDS BY RX/DR IN RCRD: CPT | Mod: CPTII,S$GLB,, | Performed by: INTERNAL MEDICINE

## 2025-01-28 PROCEDURE — 99999 PR PBB SHADOW E&M-EST. PATIENT-LVL V: CPT | Mod: PBBFAC,,, | Performed by: INTERNAL MEDICINE

## 2025-01-28 PROCEDURE — 93005 ELECTROCARDIOGRAM TRACING: CPT

## 2025-01-28 PROCEDURE — 93010 ELECTROCARDIOGRAM REPORT: CPT | Mod: ,,, | Performed by: INTERNAL MEDICINE

## 2025-01-28 PROCEDURE — 3008F BODY MASS INDEX DOCD: CPT | Mod: CPTII,S$GLB,, | Performed by: INTERNAL MEDICINE

## 2025-01-28 PROCEDURE — 3074F SYST BP LT 130 MM HG: CPT | Mod: CPTII,S$GLB,, | Performed by: INTERNAL MEDICINE

## 2025-01-28 PROCEDURE — 99215 OFFICE O/P EST HI 40 MIN: CPT | Mod: S$GLB,,, | Performed by: INTERNAL MEDICINE

## 2025-01-28 PROCEDURE — 3078F DIAST BP <80 MM HG: CPT | Mod: CPTII,S$GLB,, | Performed by: INTERNAL MEDICINE

## 2025-01-28 PROCEDURE — G2211 COMPLEX E/M VISIT ADD ON: HCPCS | Mod: S$GLB,,, | Performed by: INTERNAL MEDICINE

## 2025-01-28 RX ORDER — NITROGLYCERIN 0.4 MG/1
0.4 TABLET SUBLINGUAL EVERY 5 MIN PRN
Qty: 30 TABLET | Refills: 0 | Status: SHIPPED | OUTPATIENT
Start: 2025-01-28 | End: 2026-01-28

## 2025-01-28 NOTE — PROGRESS NOTES
Subjective:   Patient ID:  Mariel Becerril is a 69 y.o. female who presents for cardiac consult of No chief complaint on file.      The patient came in today for cardiac consult of No chief complaint on file.    Mariel Becerril is a 69 y.o. female with current medical conditions HTN, PVCs, obesity s/p gastric sleeve, GERD, OA, anemia, GIL on CPAP presents for follow CV evaluation.      23  Bp and HR stable. BMI 43 - 244 lbs.  LE u/s 2023 neg for DVT.     She has more back pain, had a fall, a horse was left unattended - and horse started running towards her and she tripped and fell.   She had neg Xrays after.   ECG - NSR    5/15/24  BP low today - 94/62. . BMI 41 - 236 lbs   Has more knee pain has upcoming injection.   She feels more fatigue lately.     ECG - sinus tach, V rate 102, LAE, non st    24  BP and HR stable. BMI 40 - 218 lbs     25  BP and HR stable. BMI 35 - 203 lbs   She has been taking Semaglutide , doing well with weight loss  Has intermittent CP/AREVALO.     ECG - NSR, LAE    Family history includes Arthritis in her mother; Depression in her sister; Heart disease in her father -  at age 81; Hypertension in her mother and sister.    Conclusion 2023         There is no evidence of a right lower extremity DVT.    There is no evidence of a left lower extremity DVT.    The right superficial femoral middle vein is normal.    The contralateral (left) common femoral vein is patent and does not have a thrombus.    The left superficial femoral middle vein is normal.    The contralateral (right) common femoral vein is patent and does not have a thrombus.    Results for orders placed during the hospital encounter of 22    Echo    Interpretation Summary  · The left ventricle is normal in size with mild concentric hypertrophy and normal systolic function.  · Mild left atrial enlargement.  · The estimated ejection fraction is 65%.  · Indeterminate left ventricular diastolic  function.  · Normal right ventricular size with normal right ventricular systolic function.  · Mild mitral regurgitation.  · Mild tricuspid regurgitation.  · Intermediate central venous pressure (8 mmHg).  · The estimated PA systolic pressure is 29 mmHg.      Results for orders placed during the hospital encounter of 05/18/22    Nuclear Stress - Cardiology Interpreted    Interpretation Summary    Normal myocardial perfusion scan. There is no evidence of myocardial ischemia or infarction.    The gated perfusion images showed an ejection fraction of > 70% at rest. The gated perfusion images showed an ejection fraction of > 70% post stress.    The EKG portion of this study is negative for ischemia.    The patient reported no chest pain during the stress test.    There were no arrhythmias during stress.      HOLTER 4/2021  Sinus rhythm with heart rates varying between 48 and 124 bpm with an average of 77 bpm.  There were very rare PVCs totalling 12 and averaging 0.13 per hour.  There were very rare PACs totalling 24 and averaging 0.25 per hour.      Past Medical History:   Diagnosis Date    COPD (chronic obstructive pulmonary disease)     NO HOME O2    Digestive disorder     Frequent headaches     Hypertension     Osteoarthritis 04/02/2019    Bilateral knees, ankles and feet    Severe obesity (BMI >= 40)     Sleep apnea     CPAP       Past Surgical History:   Procedure Laterality Date    BLADDER SUSPENSION      CATARACT EXTRACTION, BILATERAL      COLONOSCOPY N/A 12/3/2018    Procedure: COLONOSCOPY;  Surgeon: Mari Rader MD;  Location: [facility];  Service: Endoscopy;  Laterality: N/A;    COLONOSCOPY N/A 6/12/2024    Procedure: COLONOSCOPY;  Surgeon: Sarah Siegel MD;  Location: [facility];  Service: Endoscopy;  Laterality: N/A;    ESOPHAGOGASTRODUODENOSCOPY N/A 12/31/2020    Procedure: ESOPHAGOGASTRODUODENOSCOPY (EGD);  Surgeon: Anthony Rodríguez MD;  Location: [facility];  Service: General;  Laterality: N/A;     HYSTERECTOMY      partial 2000    INJECTION OF ANESTHETIC AGENT AROUND NERVE Left 9/14/2023    Procedure: LEFT Genicular nerve block RN IV Sedation;  Surgeon: Thanh Diaz MD;  Location: [facility];  Service: Pain Management;  Laterality: Left;    INJECTION OF ANESTHETIC AGENT AROUND NERVE Left 5/21/2024    Procedure: LEFT Genicular nerve block (therpeutic);  Surgeon: Thanh Diaz MD;  Location: [facility];  Service: Pain Management;  Laterality: Left;    INJECTION OF ANESTHETIC AGENT AROUND NERVE Left 6/11/2024    Procedure: LEFT Genicular nerve block with RN IV sedation;  Surgeon: Thanh Diaz MD;  Location: [facility];  Service: Pain Management;  Laterality: Left;    INJECTION OF ANESTHETIC AGENT INTO SACROILIAC JOINT Bilateral 11/30/2020    Procedure: Bilateral SIJ + Bilateral Piriformis + Bilateral GT Bursa Injection;  Surgeon: Thanh Diaz MD;  [facility];  Service: Pain Management;  Laterality: Bilateral;    INJECTION OF JOINT Bilateral 11/30/2020    Procedure: Bilateral SIJ + Bilateral Piriformis + Bilateral GT Bursa Injection;  Surgeon: Thanh Diaz MD;  Location: [facility];  Service: Pain Management;  Laterality: Bilateral;    INJECTION OF PIRIFORMIS MUSCLE Bilateral 11/30/2020    Procedure: Bilateral SIJ + Bilateral Piriformis + Bilateral GT Bursa Injection;  Surgeon: Thanh Diaz MD;  Location: [facility];  Service: Pain Management;  Laterality: Bilateral;    INJECTION OF STEROID N/A 1/2/2025    Procedure: L5-S1 ILESI;  Surgeon: Thanh Diaz MD;  Location: [facility];  Service: Pain Management;  Laterality: N/A;    ROBOT-ASSISTED LAPAROSCOPIC SLEEVE GASTRECTOMY USING DA EZEQUIEL XI N/A 3/2/2021    Procedure: XI ROBOTIC SLEEVE GASTRECTOMY;  Surgeon: Anthony Rodríguez MD;  Location: [facility];  Service: General;  Laterality: N/A;    SELECTIVE INJECTION OF ANESTHETIC AGENT AROUND LUMBAR SPINAL NERVE ROOT BY TRANSFORAMINAL APPROACH Bilateral  1/4/2021    Procedure: Bilateral L5/S1 TF GISELA;  Surgeon: Thanh Diaz MD;  Location: [facility];  Service: Pain Management;  Laterality: Bilateral;    SELECTIVE INJECTION OF ANESTHETIC AGENT AROUND LUMBAR SPINAL NERVE ROOT BY TRANSFORAMINAL APPROACH Bilateral 3/23/2021    Procedure: Bilateral L5/S1 TF GISELA;  Surgeon: Thanh Diaz MD;  Location: [facility];  Service: Pain Management;  Laterality: Bilateral;    SELECTIVE INJECTION OF ANESTHETIC AGENT AROUND LUMBAR SPINAL NERVE ROOT BY TRANSFORAMINAL APPROACH Bilateral 10/5/2021    Procedure: Bilateral L5/S1 TF GISELA;  Surgeon: Thanh Diaz MD;  Location: [facility];  Service: Pain Management;  Laterality: Bilateral;    SELECTIVE INJECTION OF ANESTHETIC AGENT AROUND LUMBAR SPINAL NERVE ROOT BY TRANSFORAMINAL APPROACH Bilateral 3/28/2024    Procedure: Bilateral L5/S1 TF GISELA;  Surgeon: Thanh Diaz MD;  Location: [facility];  Service: Pain Management;  Laterality: Bilateral;    tonsilectomy      TOTAL KNEE ARTHROPLASTY Left 3/4/2020    Procedure: ARTHROPLASTY, KNEE, TOTAL;  Surgeon: Moy Connolly MD;  Location: [facility];  Service: Orthopedics;  Laterality: Left;       Social History     Tobacco Use    Smoking status: Never     Passive exposure: Never    Smokeless tobacco: Never   Substance Use Topics    Alcohol use: Never    Drug use: No       Family History   Problem Relation Name Age of Onset    Heart attack Father         Patient's Medications   New Prescriptions    No medications on file   Previous Medications    ACETAMINOPHEN (TYLENOL) 325 MG TABLET    Take 2 tablets (650 mg total) by mouth every 8 (eight) hours as needed.    ALBUTEROL (PROAIR HFA) 90 MCG/ACTUATION INHALER    Inhale 2 puffs into the lungs every 6 (six) hours as needed for Wheezing. Rescue    ASPIRIN (ECOTRIN) 81 MG EC TABLET    Take 1 tablet (81 mg total) by mouth once daily.    DICLOFENAC SODIUM (VOLTAREN) 1 % GEL    APPLY 2 GRAMS TOPICALLY THREE TIMES DAILY AS  NEEDED    ERGOCALCIFEROL, VITAMIN D2, (VITAMIN D ORAL)    Take 2,000 Units by mouth once daily.    FUROSEMIDE (LASIX) 40 MG TABLET    Take 1 tablet (40 mg total) by mouth daily as needed (edema, swelling). Do not take if BP is less than 110/70    GABAPENTIN (NEURONTIN) 300 MG CAPSULE    Take 2 capsules (600 mg total) by mouth every evening.    GENTAMICIN (GARAMYCIN) 0.1 % OINTMENT    Apply topically 3 (three) times daily.    IMIPRAMINE (TOFRANIL) 10 MG TAB    Take 1 tablet (10 mg total) by mouth every evening.    LEVOCETIRIZINE (XYZAL) 5 MG TABLET    TAKE 1 TABLET BY MOUTH ONCE DAILY IN THE EVENING    LINACLOTIDE (LINZESS) 72 MCG CAP CAPSULE    Take 1 capsule (72 mcg total) by mouth before breakfast.    MECLIZINE (ANTIVERT) 25 MG TABLET    Take 1 tablet (25 mg total) by mouth 3 (three) times daily as needed for Dizziness.    MELOXICAM (MOBIC) 15 MG TABLET    Take 1 tablet (15 mg total) by mouth once daily.    METHOCARBAMOL (ROBAXIN) 750 MG TAB    Take 1 tablet by mouth three times daily as needed    METOPROLOL SUCCINATE (TOPROL-XL) 25 MG 24 HR TABLET    Take 1 tablet (25 mg total) by mouth once daily.    MONTELUKAST (SINGULAIR) 10 MG TABLET    Take 1 tablet (10 mg total) by mouth every evening. Allergies    MUPIROCIN (BACTROBAN) 2 % OINTMENT    Apply topically 3 (three) times daily.    NITROGLYCERIN (NITROSTAT) 0.4 MG SL TABLET    Place 1 tablet (0.4 mg total) under the tongue every 5 (five) minutes as needed for Chest pain.    NYSTATIN (MYCOSTATIN) CREAM    APPLY  CREAM TOPICALLY TO AFFECTED AREA TWICE DAILY    OMEPRAZOLE (PRILOSEC) 40 MG CAPSULE    Take 1 capsule by mouth once daily    ONDANSETRON (ZOFRAN) 8 MG TABLET    Take 1 tablet (8 mg total) by mouth every 8 (eight) hours as needed for Nausea.    POTASSIUM CHLORIDE SA (K-DUR,KLOR-CON) 20 MEQ TABLET    Take 1 tablet (20 mEq total) by mouth daily as needed (if taking lasix).    SEMAGLUTIDE, WEIGHT LOSS, (WEGOVY) 0.25 MG/0.5 ML PNIJ    Inject 0.25 mg into the  "skin once a week.    TOPIRAMATE (TOPAMAX) 100 MG TABLET    Take 1 tablet (100 mg total) by mouth 2 (two) times daily.   Modified Medications    No medications on file   Discontinued Medications    No medications on file       Review of Systems   Constitutional: Negative.    HENT: Negative.     Eyes: Negative.    Respiratory:  Positive for shortness of breath (improved).    Cardiovascular:  Positive for palpitations. Negative for chest pain and leg swelling.   Gastrointestinal:  Positive for heartburn.   Genitourinary: Negative.    Musculoskeletal:  Positive for joint pain.   Skin: Negative.    Neurological:  Positive for dizziness.   Endo/Heme/Allergies: Negative.    Psychiatric/Behavioral: Negative.     All 12 systems otherwise negative.      Wt Readings from Last 3 Encounters:   01/28/25 92.1 kg (203 lb 2.5 oz)   01/14/25 92.7 kg (204 lb 4.1 oz)   01/02/25 94.4 kg (208 lb 1.8 oz)     Temp Readings from Last 3 Encounters:   01/14/25 96.3 °F (35.7 °C) (Tympanic)   01/02/25 97.2 °F (36.2 °C) (Temporal)   12/23/24 97.9 °F (36.6 °C) (Tympanic)     BP Readings from Last 3 Encounters:   01/28/25 109/75   01/14/25 106/68   01/02/25 128/77     Pulse Readings from Last 3 Encounters:   01/28/25 84   01/14/25 96   01/02/25 76       /75   Pulse 84   Resp 16   Ht 5' 3" (1.6 m)   Wt 92.1 kg (203 lb 2.5 oz)   SpO2 100%   BMI 35.99 kg/m²     Objective:   Physical Exam  Vitals and nursing note reviewed.   Constitutional:       General: She is not in acute distress.     Appearance: She is well-developed. She is not diaphoretic.   HENT:      Head: Normocephalic and atraumatic.      Nose: Nose normal.   Eyes:      General: No scleral icterus.     Conjunctiva/sclera: Conjunctivae normal.   Neck:      Thyroid: No thyromegaly.      Vascular: No JVD.   Cardiovascular:      Rate and Rhythm: Normal rate and regular rhythm.      Heart sounds: S1 normal and S2 normal. Murmur heard.      No friction rub. No gallop. No S3 or S4 " sounds.   Pulmonary:      Effort: Pulmonary effort is normal. No respiratory distress.      Breath sounds: Normal breath sounds. No stridor. No wheezing or rales.   Chest:      Chest wall: No tenderness.   Abdominal:      General: Bowel sounds are normal. There is no distension.      Palpations: Abdomen is soft. There is no mass.      Tenderness: There is no abdominal tenderness. There is no rebound.   Genitourinary:     Comments: Deferred  Musculoskeletal:         General: No tenderness or deformity. Normal range of motion.      Cervical back: Normal range of motion and neck supple.   Lymphadenopathy:      Cervical: No cervical adenopathy.   Skin:     General: Skin is warm and dry.      Coloration: Skin is not pale.      Findings: No erythema or rash.   Neurological:      Mental Status: She is alert and oriented to person, place, and time.      Motor: No abnormal muscle tone.      Coordination: Coordination normal.   Psychiatric:         Behavior: Behavior normal.         Thought Content: Thought content normal.         Judgment: Judgment normal.         Lab Results   Component Value Date     12/17/2024    K 4.1 12/17/2024     (H) 12/17/2024    CO2 24 12/17/2024    BUN 13 12/17/2024    CREATININE 0.8 12/17/2024    GLU 91 12/17/2024    HGBA1C 5.4 11/09/2022    MG 2.1 03/11/2021    AST 21 12/17/2024    ALT 16 12/17/2024    ALBUMIN 3.4 (L) 12/17/2024    PROT 6.4 12/17/2024    BILITOT 0.3 12/17/2024    WBC 5.99 05/16/2024    HGB 12.3 05/16/2024    HCT 40.4 05/16/2024    MCV 98 05/16/2024     05/16/2024    INR 1.0 10/27/2021    TSH 0.913 12/17/2024    CHOL 136 12/17/2024    HDL 54 12/17/2024    LDLCALC 68.2 12/17/2024    TRIG 69 12/17/2024    BNP <10 08/20/2018     Assessment:      1. Chest pressure    2. Morbid obesity with BMI of 40.0-44.9, adult    3. AREVALO (dyspnea on exertion)    4. GIL on CPAP    5. Symptomatic PVCs    6. Palpitations    7. Hx of gastric bypass    8. Localized edema    9. Numbness  and tingling of both feet    10. Primary osteoarthritis of both knees    11. Gastroesophageal reflux disease, unspecified whether esophagitis present            Plan:   1. Chest pressure/AREVALO - intermittent   - prior Nuclear stress neg 5/2022.   - ECHO with normal bi V function, mild MR, mild TR, PASP 29 mmHg.   - cont NTG  - order pharm nuclear stress test, pt cannot walk on treadmill  - order     2. AREVALO with edema sec to CVI  - improved with lasix 40 mg  - rec compression stockings  - neg for DVT 9/2023    3. HTN  - BP Low   - titrate meds - dec to Toprol 25 mg QHS  - stoped Norvasc  - low salt diet    4. Obesity s/p gastric sleeve 2/2021 with gerd; BMI 42 - 238 lbs -->  BMI 43 - 244 lbs. BMI 41 - 236 lbs  --> BMI 40 - 218 lbs  BMI 35 - 203 lbs   - rec weight loss with diet/exercise; cont PPI  - f/u GI as well - proceed for EGD if needed   - on Wegovy - but not approved with insurance? Maybe can start Zepbound    - on Semaglutide     5. GIL, poor sleep   - cont CPAP  - appreciate pulm recs    6. Palpitations sec to PVCS  - cont BB  - Holter 4/2021 rare PVCs, PACs, AVG HR 77    7. Anemia  - f/u with heme/onc    8. Leg/back, knee pain  - cont PT/OT  - f/u pain and ortho       Visit today included increased complexity associated with the care of the episodic problem Chest pain addressed and managing the longitudinal care of the patient due to the serious and/or complex managed problem(s) .      Thank you for allowing me to participate in this patient's care. Please do not hesitate to contact me with any questions or concerns. Consult note has been forwarded to the referral physician.     Juan Alberto Luis MD, [facility]  Cardiovascular Disease  [facility], [place]  [phone] (P)

## 2025-01-29 LAB
OHS QRS DURATION: 82 MS
OHS QTC CALCULATION: 427 MS

## 2025-02-03 ENCOUNTER — OFFICE VISIT (OUTPATIENT)
Dept: PAIN MEDICINE | Facility: CLINIC | Age: 70
End: 2025-02-03
Payer: MEDICARE

## 2025-02-03 VITALS
HEIGHT: 63 IN | WEIGHT: 203.81 LBS | SYSTOLIC BLOOD PRESSURE: 112 MMHG | BODY MASS INDEX: 36.11 KG/M2 | RESPIRATION RATE: 17 BRPM | DIASTOLIC BLOOD PRESSURE: 79 MMHG | HEART RATE: 90 BPM

## 2025-02-03 DIAGNOSIS — G89.29 CHRONIC PAIN OF LEFT KNEE: Primary | ICD-10-CM

## 2025-02-03 DIAGNOSIS — M17.12 OSTEOARTHRITIS OF LEFT KNEE, UNSPECIFIED OSTEOARTHRITIS TYPE: ICD-10-CM

## 2025-02-03 DIAGNOSIS — M54.16 LUMBAR RADICULOPATHY: ICD-10-CM

## 2025-02-03 DIAGNOSIS — M25.562 CHRONIC PAIN OF LEFT KNEE: Primary | ICD-10-CM

## 2025-02-03 PROCEDURE — 3078F DIAST BP <80 MM HG: CPT | Mod: CPTII,S$GLB,, | Performed by: NURSE PRACTITIONER

## 2025-02-03 PROCEDURE — 1159F MED LIST DOCD IN RCRD: CPT | Mod: CPTII,S$GLB,, | Performed by: NURSE PRACTITIONER

## 2025-02-03 PROCEDURE — 3074F SYST BP LT 130 MM HG: CPT | Mod: CPTII,S$GLB,, | Performed by: NURSE PRACTITIONER

## 2025-02-03 PROCEDURE — 99999 PR PBB SHADOW E&M-EST. PATIENT-LVL IV: CPT | Mod: PBBFAC,,, | Performed by: NURSE PRACTITIONER

## 2025-02-03 PROCEDURE — G2211 COMPLEX E/M VISIT ADD ON: HCPCS | Mod: S$GLB,,, | Performed by: NURSE PRACTITIONER

## 2025-02-03 PROCEDURE — 99214 OFFICE O/P EST MOD 30 MIN: CPT | Mod: S$GLB,,, | Performed by: NURSE PRACTITIONER

## 2025-02-03 PROCEDURE — 3008F BODY MASS INDEX DOCD: CPT | Mod: CPTII,S$GLB,, | Performed by: NURSE PRACTITIONER

## 2025-02-03 PROCEDURE — 3288F FALL RISK ASSESSMENT DOCD: CPT | Mod: CPTII,S$GLB,, | Performed by: NURSE PRACTITIONER

## 2025-02-03 PROCEDURE — 1125F AMNT PAIN NOTED PAIN PRSNT: CPT | Mod: CPTII,S$GLB,, | Performed by: NURSE PRACTITIONER

## 2025-02-03 PROCEDURE — 1101F PT FALLS ASSESS-DOCD LE1/YR: CPT | Mod: CPTII,S$GLB,, | Performed by: NURSE PRACTITIONER

## 2025-02-06 NOTE — PRE-PROCEDURE INSTRUCTIONS
Spoke with patient regarding procedure scheduled on 2/18     Arrival time 1115     Has patient been sick with fever or on antibiotics within the last 7 days? No     Does the patient have any open wounds, sores or rashes? No     Does the patient have any recent fractures? no     Has patient received a vaccination within the last 7 days? No     Received the COVID vaccination?      Has the patient stopped all medications as directed? na     Does patient have a pacemaker, defibrillator, or implantable stimulator?     Does the patient have a ride to and from procedure and someone reliable to remain with patient? son     Is the patient diabetic? no     Does the patient have sleep apnea? Or use O2 at home? valentino cpap     Is the patient receiving sedation?       Is the patient instructed to remain NPO beginning at midnight the night before their procedure? yes     Procedure location confirmed with patient? Yes     Covid- Denies signs/symptoms. Instructed to notify PAT/MD if any changes.

## 2025-02-11 ENCOUNTER — TELEPHONE (OUTPATIENT)
Dept: PAIN MEDICINE | Facility: CLINIC | Age: 70
End: 2025-02-11
Payer: MEDICARE

## 2025-02-11 NOTE — TELEPHONE ENCOUNTER
----- Message from Soila sent at 2/11/2025 12:43 PM CST -----  Type:  Pharmacy Calling to Clarify an RX    Name of Caller:pt  Pharmacy Name:  [facility]  Prescription Name:diclofenao lidocaine prilocaine  What do they need to clarify?:medication formula  Best Call Back Number:[phone]  Additional Information: calling to verify change approval due to insurance not covering, request was faxed several times.

## 2025-02-11 NOTE — TELEPHONE ENCOUNTER
Called [facility] and informed them that I sent the fax over to them. They verbalized understanding.    Efrain TERRELL

## 2025-02-12 ENCOUNTER — OFFICE VISIT (OUTPATIENT)
Dept: OPHTHALMOLOGY | Facility: CLINIC | Age: 70
End: 2025-02-12
Payer: MEDICARE

## 2025-02-12 DIAGNOSIS — Z96.1 PSEUDOPHAKIA OF BOTH EYES: Primary | ICD-10-CM

## 2025-02-12 DIAGNOSIS — H52.4 PRESBYOPIA: ICD-10-CM

## 2025-02-12 DIAGNOSIS — H26.492 PCO (POSTERIOR CAPSULAR OPACIFICATION), LEFT: ICD-10-CM

## 2025-02-12 PROCEDURE — 99999 PR PBB SHADOW E&M-EST. PATIENT-LVL III: CPT | Mod: PBBFAC,,, | Performed by: OPTOMETRIST

## 2025-02-12 PROCEDURE — 1159F MED LIST DOCD IN RCRD: CPT | Mod: CPTII,S$GLB,, | Performed by: OPTOMETRIST

## 2025-02-12 PROCEDURE — 92014 COMPRE OPH EXAM EST PT 1/>: CPT | Mod: S$GLB,,, | Performed by: OPTOMETRIST

## 2025-02-12 PROCEDURE — 1126F AMNT PAIN NOTED NONE PRSNT: CPT | Mod: CPTII,S$GLB,, | Performed by: OPTOMETRIST

## 2025-02-12 PROCEDURE — 1160F RVW MEDS BY RX/DR IN RCRD: CPT | Mod: CPTII,S$GLB,, | Performed by: OPTOMETRIST

## 2025-02-12 PROCEDURE — 92015 DETERMINE REFRACTIVE STATE: CPT | Mod: S$GLB,,, | Performed by: OPTOMETRIST

## 2025-02-12 NOTE — PROGRESS NOTES
HPI     Annual Exam            Comments:  RTC 1 yr for dilated eye exam.    Vision changes since last eye exam?: No changes    Any eye pain today: No    Other ocular symptoms: No     Interested in contact lens fitting today? Yes           Comments    1. PCIOL OD + 17.0 SN60WF 02/19/20  PCIOL OS +17.0 SN60WF 1/22/20  2. SCTL wearer/wears OTC readers on top  3. YAG OS 10/23/23                    Last edited by Cosmo Taylor on 2/12/2025 10:47 AM.        Unaware that there was a cls in os; DNL removed.     Assessment /Plan     For exam results, see Encounter Report.    Pseudophakia of both eyes  PCO (posterior capsular opacification), left  Condition stable, no therapeutic change required.   Monitoring routinely.     Presbyopia  Contact Lens Prescription (2/12/2025)          Brand Base Curve Diameter Sphere    Right No lens  14.2     Left Total30 8.4 14.2 +2.50      Expiration Date: 2/12/2026    Replacement: Monthly    Wearing Schedule: Daily Wear              RTC 1 yr for dilated eye exam or PRN if any problems.   Discussed above and answered questions.

## 2025-02-13 DIAGNOSIS — M54.16 BILATERAL LUMBAR RADICULOPATHY: ICD-10-CM

## 2025-02-13 DIAGNOSIS — M79.18 PIRIFORMIS MUSCLE PAIN: ICD-10-CM

## 2025-02-13 DIAGNOSIS — J01.90 ACUTE SINUSITIS, RECURRENCE NOT SPECIFIED, UNSPECIFIED LOCATION: ICD-10-CM

## 2025-02-14 RX ORDER — MELOXICAM 15 MG/1
15 TABLET ORAL DAILY
Qty: 90 TABLET | Refills: 3 | Status: SHIPPED | OUTPATIENT
Start: 2025-02-14

## 2025-02-14 RX ORDER — MONTELUKAST SODIUM 10 MG/1
10 TABLET ORAL NIGHTLY
Qty: 30 TABLET | Refills: 11 | Status: SHIPPED | OUTPATIENT
Start: 2025-02-14

## 2025-02-14 RX ORDER — NITROGLYCERIN 0.4 MG/1
0.4 TABLET SUBLINGUAL EVERY 5 MIN PRN
Qty: 30 TABLET | Refills: 0 | Status: SHIPPED | OUTPATIENT
Start: 2025-02-14 | End: 2026-02-14

## 2025-02-14 RX ORDER — METOPROLOL SUCCINATE 25 MG/1
25 TABLET, EXTENDED RELEASE ORAL DAILY
Qty: 90 TABLET | Refills: 1 | Status: SHIPPED | OUTPATIENT
Start: 2025-02-14

## 2025-02-14 RX ORDER — METHOCARBAMOL 750 MG/1
TABLET, FILM COATED ORAL
Qty: 90 TABLET | Refills: 0 | Status: SHIPPED | OUTPATIENT
Start: 2025-02-14

## 2025-02-14 NOTE — TELEPHONE ENCOUNTER
Good Morning/Afternoon,     Pt is requesting for a refill of: MOBIC 15MG  Last filed:12/04/2024  Last encounter:02/03/2025  Up coming apt: 03/13/2025  Pharmacy: [facility] [address] [place] [address]         Pt is requesting for a refill of: ROBAXIN 750MG  Last filed:01/13/2025  Last encounter:02/03/2025  Up coming apt: 03/13/2025  Pharmacy:  [facility] [address] [place] [address]     Medical Assistant  Jess BOTELLO ([facility])

## 2025-02-20 ENCOUNTER — HOSPITAL ENCOUNTER (OUTPATIENT)
Facility: HOSPITAL | Age: 70
Discharge: HOME | End: 2025-02-20
Attending: PHYSICAL MEDICINE & REHABILITATION | Admitting: PHYSICAL MEDICINE & REHABILITATION
Payer: MEDICARE

## 2025-02-20 VITALS
HEIGHT: 63 IN | WEIGHT: 203.69 LBS | OXYGEN SATURATION: 99 % | DIASTOLIC BLOOD PRESSURE: 73 MMHG | BODY MASS INDEX: 36.09 KG/M2 | RESPIRATION RATE: 11 BRPM | HEART RATE: 92 BPM | SYSTOLIC BLOOD PRESSURE: 117 MMHG | TEMPERATURE: 97 F

## 2025-02-20 DIAGNOSIS — G89.29 CHRONIC PAIN OF LEFT KNEE: Primary | ICD-10-CM

## 2025-02-20 DIAGNOSIS — M25.562 CHRONIC PAIN OF LEFT KNEE: Primary | ICD-10-CM

## 2025-02-20 DIAGNOSIS — G89.29 KNEE PAIN, CHRONIC: ICD-10-CM

## 2025-02-20 DIAGNOSIS — M25.569 KNEE PAIN, CHRONIC: ICD-10-CM

## 2025-02-20 PROCEDURE — 63600175 PHARM REV CODE 636 W HCPCS: Performed by: PHYSICAL MEDICINE & REHABILITATION

## 2025-02-20 PROCEDURE — 64454 NJX AA&/STRD GNCLR NRV BRNCH: CPT | Mod: LT | Performed by: PHYSICAL MEDICINE & REHABILITATION

## 2025-02-20 PROCEDURE — 64454 NJX AA&/STRD GNCLR NRV BRNCH: CPT | Mod: LT,,, | Performed by: PHYSICAL MEDICINE & REHABILITATION

## 2025-02-20 RX ORDER — MIDAZOLAM HYDROCHLORIDE 1 MG/ML
INJECTION, SOLUTION INTRAMUSCULAR; INTRAVENOUS
Status: DISCONTINUED | OUTPATIENT
Start: 2025-02-20 | End: 2025-02-20 | Stop reason: HOSPADM

## 2025-02-20 RX ORDER — METHYLPREDNISOLONE ACETATE 40 MG/ML
INJECTION, SUSPENSION INTRA-ARTICULAR; INTRALESIONAL; INTRAMUSCULAR; SOFT TISSUE
Status: DISCONTINUED | OUTPATIENT
Start: 2025-02-20 | End: 2025-02-20 | Stop reason: HOSPADM

## 2025-02-20 RX ORDER — FENTANYL CITRATE 50 UG/ML
INJECTION, SOLUTION INTRAMUSCULAR; INTRAVENOUS
Status: DISCONTINUED | OUTPATIENT
Start: 2025-02-20 | End: 2025-02-20 | Stop reason: HOSPADM

## 2025-02-20 RX ORDER — ONDANSETRON HYDROCHLORIDE 2 MG/ML
4 INJECTION, SOLUTION INTRAVENOUS ONCE AS NEEDED
Status: DISCONTINUED | OUTPATIENT
Start: 2025-02-20 | End: 2025-02-20 | Stop reason: HOSPADM

## 2025-02-20 RX ORDER — BUPIVACAINE HYDROCHLORIDE 5 MG/ML
INJECTION, SOLUTION EPIDURAL; INTRACAUDAL
Status: DISCONTINUED | OUTPATIENT
Start: 2025-02-20 | End: 2025-02-20 | Stop reason: HOSPADM

## 2025-02-20 NOTE — DISCHARGE SUMMARY
Discharge Note  Short Stay      SUMMARY     Admit Date: 2/20/2025    Attending Physician: Thanh Diaz MD        Discharge Physician: Thanh Diaz MD        Discharge Date: 2/20/2025 9:00 AM    Procedure(s) (LRB):  left Genicular nerve block therapeutic (Left)    Final Diagnosis: Chronic pain of left knee [M25.562, G89.29]    Disposition: Home or self care    Patient Instructions:   Current Discharge Medication List        CONTINUE these medications which have NOT CHANGED    Details   aspirin (ECOTRIN) 81 MG EC tablet Take 1 tablet (81 mg total) by mouth once daily.  Qty: 90 tablet, Refills: 3      meloxicam (MOBIC) 15 MG tablet Take 1 tablet (15 mg total) by mouth once daily.  Qty: 90 tablet, Refills: 3    Associated Diagnoses: Bilateral lumbar radiculopathy      methocarbamoL (ROBAXIN) 750 MG Tab Take 1 tablet by mouth three times daily as needed  Qty: 90 tablet, Refills: 0    Associated Diagnoses: Bilateral lumbar radiculopathy; Piriformis muscle pain      metoprolol succinate (TOPROL-XL) 25 MG 24 hr tablet Take 1 tablet (25 mg total) by mouth once daily.  Qty: 90 tablet, Refills: 1    Comments: .      omeprazole (PRILOSEC) 40 MG capsule Take 1 capsule by mouth once daily  Qty: 90 capsule, Refills: 3      topiramate (TOPAMAX) 100 MG tablet Take 1 tablet (100 mg total) by mouth 2 (two) times daily.  Qty: 180 tablet, Refills: 2    Associated Diagnoses: Bilateral lumbar radiculopathy; History of migraine headaches      acetaminophen (TYLENOL) 325 MG tablet Take 2 tablets (650 mg total) by mouth every 8 (eight) hours as needed.  Qty: 60 tablet, Refills: 2      albuterol (PROAIR HFA) 90 mcg/actuation inhaler Inhale 2 puffs into the lungs every 6 (six) hours as needed for Wheezing. Rescue  Qty: 18 g, Refills: 0    Associated Diagnoses: Bronchitis      diclofenac sodium (VOLTAREN) 1 % Gel APPLY 2 GRAMS TOPICALLY THREE TIMES DAILY AS NEEDED  Qty: 200 g, Refills: 2    Associated Diagnoses: Status post total knee  replacement using cement, left; Posterior left knee pain      ergocalciferol, vitamin D2, (VITAMIN D ORAL) Take 2,000 Units by mouth once daily.      furosemide (LASIX) 40 MG tablet Take 1 tablet (40 mg total) by mouth daily as needed (edema, swelling). Do not take if BP is less than 110/70  Qty: 90 tablet, Refills: 1    Associated Diagnoses: Essential hypertension      gabapentin (NEURONTIN) 300 MG capsule Take 2 capsules (600 mg total) by mouth every evening.  Qty: 60 capsule, Refills: 5    Associated Diagnoses: Bilateral lumbar radiculopathy; Piriformis muscle pain      gentamicin (GARAMYCIN) 0.1 % ointment Apply topically 3 (three) times daily.  Qty: 30 g, Refills: 1    Associated Diagnoses: Laceration of right foot, initial encounter      imipramine (TOFRANIL) 10 MG Tab Take 1 tablet (10 mg total) by mouth every evening.  Qty: 90 tablet, Refills: 1      levocetirizine (XYZAL) 5 MG tablet TAKE 1 TABLET BY MOUTH ONCE DAILY IN THE EVENING  Qty: 90 tablet, Refills: 0    Associated Diagnoses: Seasonal allergies      linaCLOtide (LINZESS) 72 mcg Cap capsule Take 1 capsule (72 mcg total) by mouth before breakfast.  Qty: 30 capsule, Refills: 2    Associated Diagnoses: Constipation, unspecified constipation type      meclizine (ANTIVERT) 25 mg tablet Take 1 tablet (25 mg total) by mouth 3 (three) times daily as needed for Dizziness.  Qty: 30 tablet, Refills: 0    Associated Diagnoses: Vertigo      montelukast (SINGULAIR) 10 mg tablet Take 1 tablet (10 mg total) by mouth every evening. Allergies  Qty: 30 tablet, Refills: 11    Associated Diagnoses: Acute sinusitis, recurrence not specified, unspecified location      mupirocin (BACTROBAN) 2 % ointment Apply topically 3 (three) times daily.  Qty: 30 g, Refills: 0      nitroGLYCERIN (NITROSTAT) 0.4 MG SL tablet Place 1 tablet (0.4 mg total) under the tongue every 5 (five) minutes as needed for Chest pain.  Qty: 30 tablet, Refills: 0      nystatin (MYCOSTATIN) cream APPLY   CREAM TOPICALLY TO AFFECTED AREA TWICE DAILY  Qty: 30 g, Refills: 0    Associated Diagnoses: Tear of skin of buttock, initial encounter; Yeast dermatitis      ondansetron (ZOFRAN) 8 MG tablet Take 1 tablet (8 mg total) by mouth every 8 (eight) hours as needed for Nausea.  Qty: 20 tablet, Refills: 0      potassium chloride SA (K-DUR,KLOR-CON) 20 MEQ tablet Take 1 tablet (20 mEq total) by mouth daily as needed (if taking lasix).  Qty: 90 tablet, Refills: 1      semaglutide, weight loss, (WEGOVY) 0.25 mg/0.5 mL PnIj Inject 0.25 mg into the skin once a week.  Qty: 3 mL, Refills: 5    Associated Diagnoses: Essential hypertension; GIL on CPAP; Morbid obesity with BMI of 40.0-44.9, adult                 Discharge Diagnosis: Chronic pain of left knee [M25.562, G89.29]  Condition on Discharge: Stable with no complications to procedure   Diet on Discharge: Same as before.  Activity: as per instruction sheet.  Discharge to: Home with a responsible adult.  Follow up: 2-4 weeks       Please call the office at [phone] if you experience any weakness or loss of sensation, fever > 101.5, pain uncontrolled with oral medications, persistent nausea/vomiting/or diarrhea, redness or drainage from the incisions, or any other worrisome concerns. If physician on call was not reached or could not communicate with our office for any reason please go to the nearest emergency department

## 2025-02-20 NOTE — DISCHARGE INSTRUCTIONS

## 2025-02-20 NOTE — PLAN OF CARE
Pt discharged home, awake, alert, oriented x's 4, denies any pain, no apparent distress noted. PIV removed, catheter intact. All questions and concerns addressed and answered, pt verbalizes understanding of discharge process, pt meets discharge criteria and is being discharged to med-surg room 6 via wheelchair awaiting transportation arrival. Report given to MIKE Vela.

## 2025-02-20 NOTE — H&P
HPI  Patient presenting for Procedure(s) (LRB):  left Genicular nerve block therapeutic (Left)     No health changes since previous encounter    Past Medical History:   Diagnosis Date    COPD (chronic obstructive pulmonary disease)     NO HOME O2    Digestive disorder     Frequent headaches     Hypertension     Osteoarthritis 04/02/2019    Bilateral knees, ankles and feet    Severe obesity (BMI >= 40)     Sleep apnea     CPAP     Past Surgical History:   Procedure Laterality Date    BLADDER SUSPENSION      CATARACT EXTRACTION, BILATERAL      COLONOSCOPY N/A 12/3/2018    Procedure: COLONOSCOPY;  Surgeon: Mari Rader MD;  Location: [facility];  Service: Endoscopy;  Laterality: N/A;    COLONOSCOPY N/A 6/12/2024    Procedure: COLONOSCOPY;  Surgeon: Sarah Siegel MD;  Location: [facility];  Service: Endoscopy;  Laterality: N/A;    ESOPHAGOGASTRODUODENOSCOPY N/A 12/31/2020    Procedure: ESOPHAGOGASTRODUODENOSCOPY (EGD);  Surgeon: Anthony Rodríguez MD;  Location: [facility];  Service: General;  Laterality: N/A;    HYSTERECTOMY      partial 2000    INJECTION OF ANESTHETIC AGENT AROUND NERVE Left 9/14/2023    Procedure: LEFT Genicular nerve block RN IV Sedation;  Surgeon: Thanh Diaz MD;  Location: [facility];  Service: Pain Management;  Laterality: Left;    INJECTION OF ANESTHETIC AGENT AROUND NERVE Left 5/21/2024    Procedure: LEFT Genicular nerve block (therpeutic);  Surgeon: Thanh Diaz MD;  Location: [facility];  Service: Pain Management;  Laterality: Left;    INJECTION OF ANESTHETIC AGENT AROUND NERVE Left 6/11/2024    Procedure: LEFT Genicular nerve block with RN IV sedation;  Surgeon: Thanh Diaz MD;  Location: [facility];  Service: Pain Management;  Laterality: Left;    INJECTION OF ANESTHETIC AGENT INTO SACROILIAC JOINT Bilateral 11/30/2020    Procedure: Bilateral SIJ + Bilateral Piriformis + Bilateral GT Bursa Injection;  Surgeon: Thanh Diaz MD;  Location: [facility];   Service: Pain Management;  Laterality: Bilateral;    INJECTION OF JOINT Bilateral 11/30/2020    Procedure: Bilateral SIJ + Bilateral Piriformis + Bilateral GT Bursa Injection;  Surgeon: hTanh Diaz MD;  Location: [facility];  Service: Pain Management;  Laterality: Bilateral;    INJECTION OF PIRIFORMIS MUSCLE Bilateral 11/30/2020    Procedure: Bilateral SIJ + Bilateral Piriformis + Bilateral GT Bursa Injection;  Surgeon: Thanh Diaz MD;  Location: [facility];  Service: Pain Management;  Laterality: Bilateral;    INJECTION OF STEROID N/A 1/2/2025    Procedure: L5-S1 ILESI;  Surgeon: Thanh Diaz MD;  Location: [facility];  Service: Pain Management;  Laterality: N/A;    ROBOT-ASSISTED LAPAROSCOPIC SLEEVE GASTRECTOMY USING DA EZEQUIEL XI N/A 3/2/2021    Procedure: XI ROBOTIC SLEEVE GASTRECTOMY;  Surgeon: Anthony Rodríguez MD;  Location: [facility];  Service: General;  Laterality: N/A;    SELECTIVE INJECTION OF ANESTHETIC AGENT AROUND LUMBAR SPINAL NERVE ROOT BY TRANSFORAMINAL APPROACH Bilateral 1/4/2021    Procedure: Bilateral L5/S1 TF GISELA;  Surgeon: Thanh Diaz MD;  Location: [facility];  Service: Pain Management;  Laterality: Bilateral;    SELECTIVE INJECTION OF ANESTHETIC AGENT AROUND LUMBAR SPINAL NERVE ROOT BY TRANSFORAMINAL APPROACH Bilateral 3/23/2021    Procedure: Bilateral L5/S1 TF GISELA;  Surgeon: Thanh Diaz MD;  Location: [facility];  Service: Pain Management;  Laterality: Bilateral;    SELECTIVE INJECTION OF ANESTHETIC AGENT AROUND LUMBAR SPINAL NERVE ROOT BY TRANSFORAMINAL APPROACH Bilateral 10/5/2021    Procedure: Bilateral L5/S1 TF GISELA;  Surgeon: Thanh Diaz MD;  Location: [facility];  Service: Pain Management;  Laterality: Bilateral;    SELECTIVE INJECTION OF ANESTHETIC AGENT AROUND LUMBAR SPINAL NERVE ROOT BY TRANSFORAMINAL APPROACH Bilateral 3/28/2024    Procedure: Bilateral L5/S1 TF GISELA;  Surgeon: Thanh Diaz MD;  Location: [facility];  Service:  "Pain Management;  Laterality: Bilateral;    tonsilectomy      TOTAL KNEE ARTHROPLASTY Left 3/4/2020    Procedure: ARTHROPLASTY, KNEE, TOTAL;  Surgeon: Moy Connolly MD;  Location: [facility];  Service: Orthopedics;  Laterality: Left;     Review of patient's allergies indicates:   Allergen Reactions    Penicillins Rash        Medications Ordered Prior to Encounter[1]     PMHx, PSHx, Allergies, Medications reviewed in epic    ROS negative except pain complaints in HPI    OBJECTIVE:    /83   Pulse 87   Temp 97 °F (36.1 °C) (Temporal)   Resp 16   Ht 5' 3" (1.6 m)   Wt 92.4 kg (203 lb 10.9 oz)   SpO2 95%   Breastfeeding No   BMI 36.08 kg/m²     PHYSICAL EXAMINATION:    GENERAL: Well appearing, in no acute distress, alert and oriented x3.  PSYCH:  Mood and affect appropriate.  SKIN: Skin color, texture, turgor normal, no rashes or lesions which will impact the procedure.  CV: RRR with palpation of the radial artery.  PULM: No evidence of respiratory difficulty, symmetric chest rise. Clear to auscultation.  NEURO: Cranial nerves grossly intact.    Plan:    Proceed with procedure as planned Procedure(s) (LRB):  left Genicular nerve block therapeutic (Left)    Thanh Diaz MD  02/20/2025                 [1]   Current Facility-Administered Medications on File Prior to Encounter   Medication Dose Route Frequency Provider Last Rate Last Admin    ondansetron injection 4 mg  4 mg Intravenous Once PRN Thanh Diaz MD        ondansetron injection 4 mg  4 mg Intravenous Once PRN Thanh Diaz MD         Current Outpatient Medications on File Prior to Encounter   Medication Sig Dispense Refill    aspirin (ECOTRIN) 81 MG EC tablet Take 1 tablet (81 mg total) by mouth once daily. 90 tablet 3    omeprazole (PRILOSEC) 40 MG capsule Take 1 capsule by mouth once daily 90 capsule 3    topiramate (TOPAMAX) 100 MG tablet Take 1 tablet (100 mg total) by mouth 2 (two) times daily. 180 tablet 2    acetaminophen " (TYLENOL) 325 MG tablet Take 2 tablets (650 mg total) by mouth every 8 (eight) hours as needed. 60 tablet 2    albuterol (PROAIR HFA) 90 mcg/actuation inhaler Inhale 2 puffs into the lungs every 6 (six) hours as needed for Wheezing. Rescue 18 g 0    diclofenac sodium (VOLTAREN) 1 % Gel APPLY 2 GRAMS TOPICALLY THREE TIMES DAILY AS NEEDED 200 g 2    ergocalciferol, vitamin D2, (VITAMIN D ORAL) Take 2,000 Units by mouth once daily.      furosemide (LASIX) 40 MG tablet Take 1 tablet (40 mg total) by mouth daily as needed (edema, swelling). Do not take if BP is less than 110/70 90 tablet 1    gabapentin (NEURONTIN) 300 MG capsule Take 2 capsules (600 mg total) by mouth every evening. 60 capsule 5    gentamicin (GARAMYCIN) 0.1 % ointment Apply topically 3 (three) times daily. 30 g 1    imipramine (TOFRANIL) 10 MG Tab Take 1 tablet (10 mg total) by mouth every evening. 90 tablet 1    levocetirizine (XYZAL) 5 MG tablet TAKE 1 TABLET BY MOUTH ONCE DAILY IN THE EVENING 90 tablet 0    linaCLOtide (LINZESS) 72 mcg Cap capsule Take 1 capsule (72 mcg total) by mouth before breakfast. 30 capsule 2    meclizine (ANTIVERT) 25 mg tablet Take 1 tablet (25 mg total) by mouth 3 (three) times daily as needed for Dizziness. 30 tablet 0    mupirocin (BACTROBAN) 2 % ointment Apply topically 3 (three) times daily. 30 g 0    nystatin (MYCOSTATIN) cream APPLY  CREAM TOPICALLY TO AFFECTED AREA TWICE DAILY 30 g 0    ondansetron (ZOFRAN) 8 MG tablet Take 1 tablet (8 mg total) by mouth every 8 (eight) hours as needed for Nausea. 20 tablet 0    potassium chloride SA (K-DUR,KLOR-CON) 20 MEQ tablet Take 1 tablet (20 mEq total) by mouth daily as needed (if taking lasix). 90 tablet 1    semaglutide, weight loss, (WEGOVY) 0.25 mg/0.5 mL PnIj Inject 0.25 mg into the skin once a week. 3 mL 5
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
## 2025-03-13 ENCOUNTER — OFFICE VISIT (OUTPATIENT)
Dept: PAIN MEDICINE | Facility: CLINIC | Age: 70
End: 2025-03-13
Payer: MEDICARE

## 2025-03-13 VITALS
HEART RATE: 83 BPM | WEIGHT: 198.44 LBS | HEIGHT: 63 IN | DIASTOLIC BLOOD PRESSURE: 78 MMHG | SYSTOLIC BLOOD PRESSURE: 118 MMHG | BODY MASS INDEX: 35.16 KG/M2

## 2025-03-13 DIAGNOSIS — M79.18 PIRIFORMIS MUSCLE PAIN: ICD-10-CM

## 2025-03-13 DIAGNOSIS — Z96.652 STATUS POST TOTAL KNEE REPLACEMENT USING CEMENT, LEFT: ICD-10-CM

## 2025-03-13 DIAGNOSIS — M54.16 BILATERAL LUMBAR RADICULOPATHY: Primary | ICD-10-CM

## 2025-03-13 DIAGNOSIS — M17.12 OSTEOARTHRITIS OF LEFT KNEE, UNSPECIFIED OSTEOARTHRITIS TYPE: ICD-10-CM

## 2025-03-13 DIAGNOSIS — M47.816 LUMBAR FACET ARTHROPATHY: ICD-10-CM

## 2025-03-13 PROCEDURE — 99214 OFFICE O/P EST MOD 30 MIN: CPT | Mod: S$GLB,,, | Performed by: NURSE PRACTITIONER

## 2025-03-13 PROCEDURE — G2211 COMPLEX E/M VISIT ADD ON: HCPCS | Mod: S$GLB,,, | Performed by: NURSE PRACTITIONER

## 2025-03-13 PROCEDURE — 99999 PR PBB SHADOW E&M-EST. PATIENT-LVL IV: CPT | Mod: PBBFAC,,, | Performed by: NURSE PRACTITIONER

## 2025-03-13 PROCEDURE — 3078F DIAST BP <80 MM HG: CPT | Mod: CPTII,S$GLB,, | Performed by: NURSE PRACTITIONER

## 2025-03-13 PROCEDURE — 3074F SYST BP LT 130 MM HG: CPT | Mod: CPTII,S$GLB,, | Performed by: NURSE PRACTITIONER

## 2025-03-13 PROCEDURE — 3288F FALL RISK ASSESSMENT DOCD: CPT | Mod: CPTII,S$GLB,, | Performed by: NURSE PRACTITIONER

## 2025-03-13 PROCEDURE — 1126F AMNT PAIN NOTED NONE PRSNT: CPT | Mod: CPTII,S$GLB,, | Performed by: NURSE PRACTITIONER

## 2025-03-13 PROCEDURE — 3008F BODY MASS INDEX DOCD: CPT | Mod: CPTII,S$GLB,, | Performed by: NURSE PRACTITIONER

## 2025-03-13 PROCEDURE — 1159F MED LIST DOCD IN RCRD: CPT | Mod: CPTII,S$GLB,, | Performed by: NURSE PRACTITIONER

## 2025-03-13 PROCEDURE — 1101F PT FALLS ASSESS-DOCD LE1/YR: CPT | Mod: CPTII,S$GLB,, | Performed by: NURSE PRACTITIONER

## 2025-03-13 RX ORDER — METHOCARBAMOL 750 MG/1
TABLET, FILM COATED ORAL
Qty: 90 TABLET | Refills: 2 | Status: SHIPPED | OUTPATIENT
Start: 2025-03-13

## 2025-03-16 ENCOUNTER — PATIENT MESSAGE (OUTPATIENT)
Dept: OPTOMETRY | Facility: CLINIC | Age: 70
End: 2025-03-16
Payer: MEDICARE

## 2025-03-28 RX ORDER — OMEPRAZOLE 40 MG/1
40 CAPSULE, DELAYED RELEASE ORAL DAILY
Qty: 90 CAPSULE | Refills: 3 | Status: SHIPPED | OUTPATIENT
Start: 2025-03-28

## 2025-04-08 ENCOUNTER — RESULTS FOLLOW-UP (OUTPATIENT)
Dept: INTERNAL MEDICINE | Facility: CLINIC | Age: 70
End: 2025-04-08

## 2025-04-08 ENCOUNTER — OFFICE VISIT (OUTPATIENT)
Dept: INTERNAL MEDICINE | Facility: CLINIC | Age: 70
End: 2025-04-08
Payer: MEDICARE

## 2025-04-08 ENCOUNTER — LAB VISIT (OUTPATIENT)
Dept: LAB | Facility: HOSPITAL | Age: 70
End: 2025-04-08
Attending: NURSE PRACTITIONER
Payer: MEDICARE

## 2025-04-08 VITALS
TEMPERATURE: 97 F | HEIGHT: 63 IN | BODY MASS INDEX: 33.51 KG/M2 | HEART RATE: 111 BPM | WEIGHT: 189.13 LBS | DIASTOLIC BLOOD PRESSURE: 68 MMHG | SYSTOLIC BLOOD PRESSURE: 106 MMHG | OXYGEN SATURATION: 99 % | RESPIRATION RATE: 16 BRPM

## 2025-04-08 DIAGNOSIS — J06.9 VIRAL URI: Primary | ICD-10-CM

## 2025-04-08 DIAGNOSIS — E86.0 DEHYDRATION: ICD-10-CM

## 2025-04-08 DIAGNOSIS — I95.9 HYPOTENSION, UNSPECIFIED HYPOTENSION TYPE: ICD-10-CM

## 2025-04-08 LAB
ABSOLUTE EOSINOPHIL (OHS): 0.03 K/UL
ABSOLUTE MONOCYTE (OHS): 0.69 K/UL (ref 0.3–1)
ABSOLUTE NEUTROPHIL COUNT (OHS): 4.16 K/UL (ref 1.8–7.7)
ANION GAP (OHS): 7 MMOL/L (ref 8–16)
BASOPHILS # BLD AUTO: 0.02 K/UL
BASOPHILS NFR BLD AUTO: 0.3 %
BUN SERPL-MCNC: 17 MG/DL (ref 8–23)
CALCIUM SERPL-MCNC: 9 MG/DL (ref 8.7–10.5)
CHLORIDE SERPL-SCNC: 110 MMOL/L (ref 95–110)
CO2 SERPL-SCNC: 23 MMOL/L (ref 23–29)
CREAT SERPL-MCNC: 0.8 MG/DL (ref 0.5–1.4)
CTP QC/QA: YES
CTP QC/QA: YES
ERYTHROCYTE [DISTWIDTH] IN BLOOD BY AUTOMATED COUNT: 13.5 % (ref 11.5–14.5)
GFR SERPLBLD CREATININE-BSD FMLA CKD-EPI: >60 ML/MIN/1.73/M2
GLUCOSE SERPL-MCNC: 92 MG/DL (ref 70–110)
HCT VFR BLD AUTO: 40.4 % (ref 37–48.5)
HGB BLD-MCNC: 12.8 GM/DL (ref 12–16)
IMM GRANULOCYTES # BLD AUTO: 0.02 K/UL (ref 0–0.04)
IMM GRANULOCYTES NFR BLD AUTO: 0.3 % (ref 0–0.5)
LYMPHOCYTES # BLD AUTO: 1.57 K/UL (ref 1–4.8)
MCH RBC QN AUTO: 30.6 PG (ref 27–31)
MCHC RBC AUTO-ENTMCNC: 31.7 G/DL (ref 32–36)
MCV RBC AUTO: 97 FL (ref 82–98)
NUCLEATED RBC (/100WBC) (OHS): 0 /100 WBC
PLATELET # BLD AUTO: 228 K/UL (ref 150–450)
PMV BLD AUTO: 9.3 FL (ref 9.2–12.9)
POC MOLECULAR INFLUENZA A AGN: NEGATIVE
POC MOLECULAR INFLUENZA B AGN: NEGATIVE
POTASSIUM SERPL-SCNC: 3.6 MMOL/L (ref 3.5–5.1)
RBC # BLD AUTO: 4.18 M/UL (ref 4–5.4)
RELATIVE EOSINOPHIL (OHS): 0.5 %
RELATIVE LYMPHOCYTE (OHS): 24.2 % (ref 18–48)
RELATIVE MONOCYTE (OHS): 10.6 % (ref 4–15)
RELATIVE NEUTROPHIL (OHS): 64.1 % (ref 38–73)
SARS-COV-2 RDRP RESP QL NAA+PROBE: NEGATIVE
SODIUM SERPL-SCNC: 140 MMOL/L (ref 136–145)
WBC # BLD AUTO: 6.49 K/UL (ref 3.9–12.7)

## 2025-04-08 PROCEDURE — 36415 COLL VENOUS BLD VENIPUNCTURE: CPT | Mod: PN

## 2025-04-08 PROCEDURE — 87635 SARS-COV-2 COVID-19 AMP PRB: CPT | Mod: QW,S$GLB,, | Performed by: NURSE PRACTITIONER

## 2025-04-08 PROCEDURE — 3074F SYST BP LT 130 MM HG: CPT | Mod: CPTII,S$GLB,, | Performed by: NURSE PRACTITIONER

## 2025-04-08 PROCEDURE — 99999 PR PBB SHADOW E&M-EST. PATIENT-LVL IV: CPT | Mod: PBBFAC,,, | Performed by: NURSE PRACTITIONER

## 2025-04-08 PROCEDURE — 1101F PT FALLS ASSESS-DOCD LE1/YR: CPT | Mod: CPTII,S$GLB,, | Performed by: NURSE PRACTITIONER

## 2025-04-08 PROCEDURE — 3078F DIAST BP <80 MM HG: CPT | Mod: CPTII,S$GLB,, | Performed by: NURSE PRACTITIONER

## 2025-04-08 PROCEDURE — 1125F AMNT PAIN NOTED PAIN PRSNT: CPT | Mod: CPTII,S$GLB,, | Performed by: NURSE PRACTITIONER

## 2025-04-08 PROCEDURE — 99214 OFFICE O/P EST MOD 30 MIN: CPT | Mod: S$GLB,,, | Performed by: NURSE PRACTITIONER

## 2025-04-08 PROCEDURE — 1160F RVW MEDS BY RX/DR IN RCRD: CPT | Mod: CPTII,S$GLB,, | Performed by: NURSE PRACTITIONER

## 2025-04-08 PROCEDURE — 85025 COMPLETE CBC W/AUTO DIFF WBC: CPT

## 2025-04-08 PROCEDURE — 3008F BODY MASS INDEX DOCD: CPT | Mod: CPTII,S$GLB,, | Performed by: NURSE PRACTITIONER

## 2025-04-08 PROCEDURE — 80048 BASIC METABOLIC PNL TOTAL CA: CPT

## 2025-04-08 PROCEDURE — 1159F MED LIST DOCD IN RCRD: CPT | Mod: CPTII,S$GLB,, | Performed by: NURSE PRACTITIONER

## 2025-04-08 PROCEDURE — 87502 INFLUENZA DNA AMP PROBE: CPT | Mod: QW,S$GLB,, | Performed by: NURSE PRACTITIONER

## 2025-04-08 PROCEDURE — 3288F FALL RISK ASSESSMENT DOCD: CPT | Mod: CPTII,S$GLB,, | Performed by: NURSE PRACTITIONER

## 2025-04-08 RX ORDER — PROMETHAZINE HYDROCHLORIDE AND DEXTROMETHORPHAN HYDROBROMIDE 6.25; 15 MG/5ML; MG/5ML
5 SYRUP ORAL EVERY 8 HOURS PRN
Qty: 118 ML | Refills: 0 | Status: SHIPPED | OUTPATIENT
Start: 2025-04-08 | End: 2025-04-18

## 2025-04-08 RX ORDER — FLUTICASONE PROPIONATE 50 MCG
2 SPRAY, SUSPENSION (ML) NASAL DAILY
Qty: 9.9 ML | Refills: 0 | Status: SHIPPED | OUTPATIENT
Start: 2025-04-08 | End: 2025-04-22

## 2025-04-08 RX ORDER — NYSTATIN 100000 [USP'U]/G
POWDER TOPICAL 2 TIMES DAILY
Qty: 60 G | Refills: 2 | Status: SHIPPED | OUTPATIENT
Start: 2025-04-08

## 2025-04-08 NOTE — PROGRESS NOTES
Patient ID: Mariel Becerril is a 69 y.o. female.    Chief Complaint: Nasal Congestion (3 days), Headache, and Chills    History of Present Illness    CHIEF COMPLAINT:  Ms. Becerril presents today with nasal congestion, chills, headaches, coughing, body aches and fatigue.    FLU-LIKE SYMPTOMS:  She reports onset of symptoms three days ago, including diarrhea. She experienced dizziness yesterday which has resolved today.      ROS:  Constitutional: negative diminished activity, negative appetite change, POSITIVE FATIGUE, negative fever, POSITIVE CHILLS  HENT: negative ear discharge, negative ear pain, negative mouth sores, negative nosebleeds, negative sore throat, negative tinnitus, POSITIVE NASAL CONGESTION, POSITIVE HEAD PAIN  Respiratory: negative apnea, POSITIVE COUGH, negative shortness of breath  Cardiovascular: negative chest pain, negative leg swelling  Gastrointestinal: negative abdominal distention, negative abdominal pain, negative blood in stool, negative constipation, POSITIVE DIARRHEA, negative nausea, negative vomiting  Genitourinary: negative difficulty urinating, negative flank pain, negative frequency, negative hematuria  Musculoskeletal: negative back pain, negative abnormal gait, negative neck pain, negative neck stiffness, negative joint pain, negative muscle pain, POSITIVE BODY ACHES  Skin: negative rash, negative wound, negative abnormal moles  Neurological: negative dizziness, negative seizures, negative numbness, negative tingling, POSITIVE HEADACHES, negative balance issues  Psychiatric: negative agitation, negative confusion, negative hallucinations, negative sleep difficulty, negative suicidal ideation         Physical Exam    Vital Signs: Reviewed.  Constitutional: ILL APPEARING. Well-developed. No acute distress.  HENT: Normocephalic. Tympanic membranes normal bilaterally. Nares patent. Oropharynx clear. No erythema. No exudate. NASOMUCOSAL SWELLING.  Cardiovascular: Normal rate and  regular rhythm. Normal heart sounds.  Pulmonary: Pulmonary effort is normal. Normal breath sounds.  Musculoskeletal: No muscle tenderness. No joint tenderness. Normal range of motion.  Skin: Warm. Dry.  Neurological: No focal deficit is present. Alert and oriented to person, place, and time.  Psychiatric: Mood is normal. Behavior is normal. Behavior is cooperative.  Vitals: BP: ___. HYPOTENSIVE. BLOOD PRESSURE IMPROVED /68 AFTER ORAL REHYDRATION.         Assessment & Plan          ACUTE UPPER RESPIRATORY INFECTION:  - Diagnosed viral URI based on presenting symptoms and negative COVID/flu tests.  - Ms. Becerril reported nasal congestion, chills, headaches, body aches, and fatigue starting approximately 3 days ago.  - Advised patient to seek emergency care if symptoms worsen.    HYPOTENSION:  - Administered oral rehydration in clinic, improving blood pressure from 88/58 to 106/68.  - Ms. Becerril reported dizziness yesterday, with no occurrence today.  - Instructed patient to monitor blood pressure at home closely.    DIARRHEA:  - Ms. Becerril reported diarrhea.  - Advised on a bland diet and increased oral hydration, explaining the benefits for managing GI symptoms.    WEAKNESS AND FATIGUE:  - Evaluated patient's fatigue that started approximately 3 days ago.  - Advised increasing oral hydration throughout the day, educating on its importance.    FOLLOW-UP:  - Scheduled a follow-up visit in 3 days to reassess blood pressure and overall condition.              No follow-ups on file.    This note was generated with the assistance of ambient listening technology. Verbal consent was obtained by the patient and accompanying visitor(s) for the recording of patient appointment to facilitate this note. I attest to having reviewed and edited the generated note for accuracy, though some syntax or spelling errors may persist. Please contact the author of this note for any clarification.

## 2025-04-08 NOTE — PATIENT INSTRUCTIONS
MONITOR BLOOD PRESSURE AT HOME.  INCREASE HYDRATION BY MOUTH: WATER AND ELECTROLYTES.  BLAND DIET.      IF SYMPTOMS CHANGE, GO TO ER.

## 2025-04-11 ENCOUNTER — OFFICE VISIT (OUTPATIENT)
Dept: INTERNAL MEDICINE | Facility: CLINIC | Age: 70
End: 2025-04-11
Payer: MEDICARE

## 2025-04-11 VITALS
OXYGEN SATURATION: 99 % | RESPIRATION RATE: 18 BRPM | TEMPERATURE: 98 F | WEIGHT: 194.13 LBS | HEIGHT: 63 IN | SYSTOLIC BLOOD PRESSURE: 100 MMHG | DIASTOLIC BLOOD PRESSURE: 64 MMHG | BODY MASS INDEX: 34.4 KG/M2 | HEART RATE: 101 BPM

## 2025-04-11 DIAGNOSIS — J06.9 UPPER RESPIRATORY TRACT INFECTION, UNSPECIFIED TYPE: ICD-10-CM

## 2025-04-11 DIAGNOSIS — I95.9 HYPOTENSION, UNSPECIFIED HYPOTENSION TYPE: ICD-10-CM

## 2025-04-11 DIAGNOSIS — Z09 HOSPITAL DISCHARGE FOLLOW-UP: Primary | ICD-10-CM

## 2025-04-11 DIAGNOSIS — N39.0 URINARY TRACT INFECTION WITHOUT HEMATURIA, SITE UNSPECIFIED: ICD-10-CM

## 2025-04-11 DIAGNOSIS — R55 NEAR SYNCOPE: ICD-10-CM

## 2025-04-11 PROCEDURE — 99214 OFFICE O/P EST MOD 30 MIN: CPT | Mod: S$GLB,,, | Performed by: NURSE PRACTITIONER

## 2025-04-11 PROCEDURE — 3288F FALL RISK ASSESSMENT DOCD: CPT | Mod: CPTII,S$GLB,, | Performed by: NURSE PRACTITIONER

## 2025-04-11 PROCEDURE — 1160F RVW MEDS BY RX/DR IN RCRD: CPT | Mod: CPTII,S$GLB,, | Performed by: NURSE PRACTITIONER

## 2025-04-11 PROCEDURE — 3074F SYST BP LT 130 MM HG: CPT | Mod: CPTII,S$GLB,, | Performed by: NURSE PRACTITIONER

## 2025-04-11 PROCEDURE — 1126F AMNT PAIN NOTED NONE PRSNT: CPT | Mod: CPTII,S$GLB,, | Performed by: NURSE PRACTITIONER

## 2025-04-11 PROCEDURE — 3008F BODY MASS INDEX DOCD: CPT | Mod: CPTII,S$GLB,, | Performed by: NURSE PRACTITIONER

## 2025-04-11 PROCEDURE — 99999 PR PBB SHADOW E&M-EST. PATIENT-LVL III: CPT | Mod: PBBFAC,,, | Performed by: NURSE PRACTITIONER

## 2025-04-11 PROCEDURE — 1101F PT FALLS ASSESS-DOCD LE1/YR: CPT | Mod: CPTII,S$GLB,, | Performed by: NURSE PRACTITIONER

## 2025-04-11 PROCEDURE — 1159F MED LIST DOCD IN RCRD: CPT | Mod: CPTII,S$GLB,, | Performed by: NURSE PRACTITIONER

## 2025-04-11 PROCEDURE — 3078F DIAST BP <80 MM HG: CPT | Mod: CPTII,S$GLB,, | Performed by: NURSE PRACTITIONER

## 2025-04-11 RX ORDER — CEPHALEXIN 500 MG/1
500 CAPSULE ORAL 2 TIMES DAILY
COMMUNITY
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
## 2025-04-27 ENCOUNTER — PATIENT MESSAGE (OUTPATIENT)
Dept: CARDIOLOGY | Facility: HOSPITAL | Age: 70
End: 2025-04-27
Payer: MEDICARE

## 2025-05-07 ENCOUNTER — OFFICE VISIT (OUTPATIENT)
Dept: INTERNAL MEDICINE | Facility: CLINIC | Age: 70
End: 2025-05-07
Payer: MEDICARE

## 2025-05-07 ENCOUNTER — TELEPHONE (OUTPATIENT)
Dept: PAIN MEDICINE | Facility: CLINIC | Age: 70
End: 2025-05-07
Payer: MEDICARE

## 2025-05-07 VITALS
WEIGHT: 195.88 LBS | TEMPERATURE: 98 F | HEART RATE: 101 BPM | OXYGEN SATURATION: 99 % | SYSTOLIC BLOOD PRESSURE: 122 MMHG | RESPIRATION RATE: 18 BRPM | DIASTOLIC BLOOD PRESSURE: 80 MMHG | HEIGHT: 63 IN | BODY MASS INDEX: 34.71 KG/M2

## 2025-05-07 DIAGNOSIS — Z87.898 HISTORY OF SYNCOPE: ICD-10-CM

## 2025-05-07 DIAGNOSIS — Z09 FOLLOW-UP EXAM: Primary | ICD-10-CM

## 2025-05-07 DIAGNOSIS — Z87.440 HISTORY OF UTI: ICD-10-CM

## 2025-05-07 DIAGNOSIS — M17.0 PRIMARY OSTEOARTHRITIS OF BOTH KNEES: ICD-10-CM

## 2025-05-07 DIAGNOSIS — R04.0 EPISTAXIS: ICD-10-CM

## 2025-05-07 DIAGNOSIS — M54.16 LUMBAR RADICULOPATHY: ICD-10-CM

## 2025-05-07 DIAGNOSIS — M19.012 PRIMARY OSTEOARTHRITIS OF LEFT SHOULDER: ICD-10-CM

## 2025-05-07 PROCEDURE — G2211 COMPLEX E/M VISIT ADD ON: HCPCS | Mod: S$GLB,,, | Performed by: NURSE PRACTITIONER

## 2025-05-07 PROCEDURE — 1160F RVW MEDS BY RX/DR IN RCRD: CPT | Mod: CPTII,S$GLB,, | Performed by: NURSE PRACTITIONER

## 2025-05-07 PROCEDURE — 3008F BODY MASS INDEX DOCD: CPT | Mod: CPTII,S$GLB,, | Performed by: NURSE PRACTITIONER

## 2025-05-07 PROCEDURE — 1159F MED LIST DOCD IN RCRD: CPT | Mod: CPTII,S$GLB,, | Performed by: NURSE PRACTITIONER

## 2025-05-07 PROCEDURE — 1126F AMNT PAIN NOTED NONE PRSNT: CPT | Mod: CPTII,S$GLB,, | Performed by: NURSE PRACTITIONER

## 2025-05-07 PROCEDURE — 3079F DIAST BP 80-89 MM HG: CPT | Mod: CPTII,S$GLB,, | Performed by: NURSE PRACTITIONER

## 2025-05-07 PROCEDURE — 99214 OFFICE O/P EST MOD 30 MIN: CPT | Mod: S$GLB,,, | Performed by: NURSE PRACTITIONER

## 2025-05-07 PROCEDURE — 1101F PT FALLS ASSESS-DOCD LE1/YR: CPT | Mod: CPTII,S$GLB,, | Performed by: NURSE PRACTITIONER

## 2025-05-07 PROCEDURE — 99999 PR PBB SHADOW E&M-EST. PATIENT-LVL V: CPT | Mod: PBBFAC,,, | Performed by: NURSE PRACTITIONER

## 2025-05-07 PROCEDURE — 3074F SYST BP LT 130 MM HG: CPT | Mod: CPTII,S$GLB,, | Performed by: NURSE PRACTITIONER

## 2025-05-07 PROCEDURE — 3288F FALL RISK ASSESSMENT DOCD: CPT | Mod: CPTII,S$GLB,, | Performed by: NURSE PRACTITIONER

## 2025-05-07 RX ORDER — FLUTICASONE PROPIONATE 50 MCG
SPRAY, SUSPENSION (ML) NASAL
COMMUNITY
Start: 2025-04-08

## 2025-05-07 RX ORDER — MUPIROCIN 20 MG/G
OINTMENT TOPICAL 3 TIMES DAILY
Qty: 30 G | Refills: 1 | Status: SHIPPED | OUTPATIENT
Start: 2025-05-07

## 2025-05-07 NOTE — PROGRESS NOTES
Patient ID: Mariel Becerril is a 69 y.o. female.    Chief Complaint: Follow-up (dizziness)    History of Present Illness    CHIEF COMPLAINT:  Ms. Becerril presents today for follow up after a near syncopal episode    HISTORY OF PRESENT ILLNESS:  She experienced a near syncopal episode with dizziness on April 9th requiring evaluation at [facility] emergency room. During this visit, she was diagnosed with a urinary tract infection and has since completed the prescribed course of antibiotics.    ENT:  She reports a recent nosebleed that resolved after discontinuing Flonase. She has experienced similar episodes in the past where Flonase use resulted in nosebleeds that resolved upon medication discontinuation.    MUSCULOSKELETAL:  She presents for completion of insurance forms regarding left shoulder symptoms.      ROS:  Constitutional: negative diminished activity, negative appetite change, negative fatigue, negative fever  HENT: negative ear discharge, negative ear pain, negative mouth sores, POSITIVE NOSEBLEEDS, negative sore throat, negative tinnitus  Respiratory: negative apnea, negative cough, negative shortness of breath  Cardiovascular: negative chest pain, negative leg swelling  Gastrointestinal: negative abdominal distention, negative abdominal pain, negative blood in stool, negative constipation, negative diarrhea, negative nausea, negative vomiting  Genitourinary: negative difficulty urinating, negative flank pain, negative frequency, negative hematuria  Musculoskeletal: negative back pain, negative abnormal gait, negative neck pain, negative neck stiffness, POSITIVE joint pain-NECK, KNEE , BACK, negative muscle pain  Skin: negative rash, negative wound, negative abnormal moles  Allergic/Immunologic: negative seasonal allergies, negative food allergies  Neurological: POSITIVE DIZZINESS, negative seizures, negative numbness, negative tingling, negative headaches, negative balance issues  Psychiatric: negative  agitation, negative confusion, negative hallucinations, negative sleep difficulty, negative suicidal ideation         Physical Exam    Vital Signs: Reviewed.  Constitutional: Well-appearing. Well-developed. No acute distress.  Cardiovascular: Normal rate and regular rhythm. Normal heart sounds.  Pulmonary: Pulmonary effort is normal. Normal breath sounds.  Abdominal: Bowel sounds are normal. Abdomen is soft. No tenderness.  Musculoskeletal: No muscle tenderness. POSITIVE joint tenderness. Normal range of motion.  Skin: Warm. Dry.  Neurological: No focal deficit is present. Alert and oriented to person, place, and time.  Psychiatric: Mood is normal. Behavior is normal. Behavior is cooperative.  Vitals: BLOOD PRESSURE: 122/80.         Assessment & Plan    FOLLOW UP      NEAR SYNCOPE AND COLLAPSE:  - Evaluated near syncopal episode from April 9th, noting resolution of symptoms.  - Ms. Becerril is now doing well.  - Will consider request for completion of [employer] insurance forms related to this episode.    LUMBAR RADICULOPATHY  -intermittent symptoms   -continue pain management treatment plan     OA SHOULDER-LEFT   -intermittent symptoms   -Conservative measures    EPISTAXIS:  - Assessed recent nosebleed, attributing it to Flonase use based on history.  - The nosebleed resolved after discontinuation of Flonase.    ADVERSE EFFECT OF MEDICATION:  - Discontinued Flonase due to epistaxis.  - Recommend saline nasal spray for sinus and allergy symptoms.    HISTORY OF URINARY TRACT INFECTION:  - Reviewed completed antibiotic course for UTI diagnosed during emergency room visit on April 9th.    FOLLOW-UP:  - Leave [employer] forms for completion; office will call when forms are ready for pickup.  - Follow up as scheduled or sooner if needed.            No follow-ups on file.    This note was generated with the assistance of ambient listening technology. Verbal consent was obtained by the patient  and accompanying visitor(s) for the recording of patient appointment to facilitate this note. I attest to having reviewed and edited the generated note for accuracy, though some syntax or spelling errors may persist. Please contact the author of this note for any clarification.    Nose bleed recently.. stopped flonase--resolvd. Recommend saline spray     Was doing well.     Needing [employer]

## 2025-05-08 ENCOUNTER — HOSPITAL ENCOUNTER (OUTPATIENT)
Dept: RADIOLOGY | Facility: HOSPITAL | Age: 70
Discharge: HOME | End: 2025-05-08
Attending: INTERNAL MEDICINE
Payer: MEDICARE

## 2025-05-08 ENCOUNTER — HOSPITAL ENCOUNTER (OUTPATIENT)
Dept: CARDIOLOGY | Facility: HOSPITAL | Age: 70
Discharge: HOME | End: 2025-05-08
Attending: INTERNAL MEDICINE
Payer: MEDICARE

## 2025-05-08 VITALS
WEIGHT: 195 LBS | HEIGHT: 63 IN | SYSTOLIC BLOOD PRESSURE: 122 MMHG | BODY MASS INDEX: 34.55 KG/M2 | DIASTOLIC BLOOD PRESSURE: 80 MMHG | HEART RATE: 70 BPM

## 2025-05-08 DIAGNOSIS — R20.0 NUMBNESS AND TINGLING OF BOTH FEET: ICD-10-CM

## 2025-05-08 DIAGNOSIS — G47.33 OSA ON CPAP: ICD-10-CM

## 2025-05-08 DIAGNOSIS — R20.2 NUMBNESS AND TINGLING OF BOTH FEET: ICD-10-CM

## 2025-05-08 DIAGNOSIS — R07.89 CHEST PRESSURE: ICD-10-CM

## 2025-05-08 DIAGNOSIS — I49.3 SYMPTOMATIC PVCS: ICD-10-CM

## 2025-05-08 DIAGNOSIS — R60.0 LOCALIZED EDEMA: ICD-10-CM

## 2025-05-08 DIAGNOSIS — R06.09 DOE (DYSPNEA ON EXERTION): ICD-10-CM

## 2025-05-08 DIAGNOSIS — E66.01 MORBID OBESITY WITH BMI OF 40.0-44.9, ADULT: ICD-10-CM

## 2025-05-08 DIAGNOSIS — R00.2 PALPITATIONS: ICD-10-CM

## 2025-05-08 DIAGNOSIS — Z98.84 HX OF GASTRIC BYPASS: ICD-10-CM

## 2025-05-08 LAB
AORTIC ROOT ANNULUS: 2.9 CM
AORTIC SIZE INDEX: 1.5 CM/M2
ASCENDING AORTA: 2.8 CM
AV INDEX (PROSTH): 0.8
AV MEAN GRADIENT: 4 MMHG
AV PEAK GRADIENT: 8 MMHG
AV VALVE AREA BY VELOCITY RATIO: 2 CM²
AV VALVE AREA: 2.3 CM²
AV VELOCITY RATIO: 0.71
BSA FOR ECHO PROCEDURE: 1.98 M2
CV ECHO LV RWT: 0.63 CM
CV STRESS BASE HR: 69 BPM
DIASTOLIC BLOOD PRESSURE: 88 MMHG
DOP CALC AO PEAK VEL: 1.4 M/S
DOP CALC AO VTI: 27.9 CM
DOP CALC LVOT AREA: 2.8 CM2
DOP CALC LVOT DIAMETER: 1.9 CM
DOP CALC LVOT PEAK VEL: 1 M/S
DOP CALC LVOT STROKE VOLUME: 62.9 CM3
DOP CALC MV VTI: 31.9 CM
DOP CALC RVOT PEAK VEL: 0.72 M/S
DOP CALC RVOT VTI: 12.7 CM
DOP CALCLVOT PEAK VEL VTI: 22.2 CM
E WAVE DECELERATION TIME: 205 MSEC
E/A RATIO: 0.86
E/E' RATIO: 13 M/S
ECHO LV POSTERIOR WALL: 1.3 CM (ref 0.6–1.1)
FRACTIONAL SHORTENING: 36.6 % (ref 28–44)
INTERVENTRICULAR SEPTUM: 1.4 CM (ref 0.6–1.1)
IVC DIAMETER: 1.73 CM
IVRT: 69 MSEC
LA MAJOR: 4.5 CM
LA MINOR: 4.7 CM
LA WIDTH: 3.2 CM
LEFT ATRIUM SIZE: 3.3 CM
LEFT ATRIUM VOLUME INDEX: 22 ML/M2
LEFT ATRIUM VOLUME: 41 CM3
LEFT INTERNAL DIMENSION IN SYSTOLE: 2.6 CM (ref 2.1–4)
LEFT VENTRICLE DIASTOLIC VOLUME INDEX: 39.79 ML/M2
LEFT VENTRICLE DIASTOLIC VOLUME: 76 ML
LEFT VENTRICLE MASS INDEX: 107.3 G/M2
LEFT VENTRICLE SYSTOLIC VOLUME INDEX: 13.1 ML/M2
LEFT VENTRICLE SYSTOLIC VOLUME: 25 ML
LEFT VENTRICULAR INTERNAL DIMENSION IN DIASTOLE: 4.1 CM (ref 3.5–6)
LEFT VENTRICULAR MASS: 204.9 G
LV LATERAL E/E' RATIO: 12.7 M/S
LV SEPTAL E/E' RATIO: 12.7 M/S
LVED V (TEICH): 75.56 ML
LVES V (TEICH): 24.59 ML
LVOT MG: 2.93 MMHG
LVOT MV: 0.84 CM/S
MV MEAN GRADIENT: 2 MMHG
MV PEAK A VEL: 1.04 M/S
MV PEAK E VEL: 0.89 M/S
MV PEAK GRADIENT: 4 MMHG
MV STENOSIS PRESSURE HALF TIME: 59.45 MS
MV VALVE AREA BY CONTINUITY EQUATION: 1.97 CM2
MV VALVE AREA P 1/2 METHOD: 3.7 CM2
NUC REST EJECTION FRACTION: 79
NUC STRESS EJECTION FRACTION: 80 %
OHS CV CPX 85 PERCENT MAX PREDICTED HEART RATE MALE: 129
OHS CV CPX ESTIMATED METS: 1
OHS CV CPX MAX PREDICTED HEART RATE: 152
OHS CV CPX PATIENT IS FEMALE: 1
OHS CV CPX PATIENT IS MALE: 0
OHS CV CPX PEAK DIASTOLIC BLOOD PRESSURE: 80 MMHG
OHS CV CPX PEAK HEAR RATE: 118 BPM
OHS CV CPX PEAK RATE PRESSURE PRODUCT: NORMAL
OHS CV CPX PEAK SYSTOLIC BLOOD PRESSURE: 132 MMHG
OHS CV CPX PERCENT MAX PREDICTED HEART RATE ACHIEVED: 81
OHS CV CPX RATE PRESSURE PRODUCT PRESENTING: 8832
OHS CV INITIAL DOSE: 9.6 MCG/KG/MIN
OHS CV PEAK DOSE: 30 MCG/KG/MIN
PISA MRMAX VEL: 4.85 M/S
PISA TR MAX VEL: 2.5 M/S
PV MEAN GRADIENT: 1 MMHG
RA MAJOR: 3.77 CM
RA PRESSURE ESTIMATED: 3 MMHG
RA WIDTH: 2.62 CM
RV TB RVSP: 6 MMHG
SINUS: 3.04 CM
STJ: 2.8 CM
STRESS ECHO POST EXERCISE DUR SEC: 43 SECONDS
SYSTOLIC BLOOD PRESSURE: 128 MMHG
TDI LATERAL: 0.07 M/S
TDI SEPTAL: 0.07 M/S
TDI: 0.07 M/S
TR MAX PG: 25 MMHG
TR MEAN GRADIENT: 20 MMHG
TRICUSPID ANNULAR PLANE SYSTOLIC EXCURSION: 1.9 CM
TV REST PULMONARY ARTERY PRESSURE: 28 MMHG
Z-SCORE OF LEFT VENTRICULAR DIMENSION IN END DIASTOLE: -2.71
Z-SCORE OF LEFT VENTRICULAR DIMENSION IN END SYSTOLE: -1.91

## 2025-05-08 PROCEDURE — 63600175 PHARM REV CODE 636 W HCPCS: Performed by: INTERNAL MEDICINE

## 2025-05-08 PROCEDURE — 93306 TTE W/DOPPLER COMPLETE: CPT

## 2025-05-08 PROCEDURE — A9502 TC99M TETROFOSMIN: HCPCS | Performed by: INTERNAL MEDICINE

## 2025-05-08 PROCEDURE — 78452 HT MUSCLE IMAGE SPECT MULT: CPT

## 2025-05-08 PROCEDURE — 93017 CV STRESS TEST TRACING ONLY: CPT

## 2025-05-08 RX ORDER — REGADENOSON 0.08 MG/ML
0.4 INJECTION, SOLUTION INTRAVENOUS ONCE
Status: COMPLETED | OUTPATIENT
Start: 2025-05-08 | End: 2025-05-08

## 2025-05-08 RX ADMIN — TETROFOSMIN 9.6 MILLICURIE: 1.38 INJECTION, POWDER, LYOPHILIZED, FOR SOLUTION INTRAVENOUS at 08:05

## 2025-05-08 RX ADMIN — REGADENOSON 0.4 MG: 0.08 INJECTION, SOLUTION INTRAVENOUS at 09:05

## 2025-05-08 RX ADMIN — TETROFOSMIN 30 MILLICURIE: 1.38 INJECTION, POWDER, LYOPHILIZED, FOR SOLUTION INTRAVENOUS at 09:05

## 2025-05-09 ENCOUNTER — RESULTS FOLLOW-UP (OUTPATIENT)
Dept: CARDIOLOGY | Facility: CLINIC | Age: 70
End: 2025-05-09

## 2025-05-19 ENCOUNTER — PATIENT MESSAGE (OUTPATIENT)
Dept: INTERNAL MEDICINE | Facility: CLINIC | Age: 70
End: 2025-05-19
Payer: MEDICARE

## 2025-05-30 ENCOUNTER — PATIENT MESSAGE (OUTPATIENT)
Dept: INTERNAL MEDICINE | Facility: CLINIC | Age: 70
End: 2025-05-30
Payer: MEDICARE

## 2025-06-02 ENCOUNTER — TELEPHONE (OUTPATIENT)
Dept: NEUROLOGY | Facility: CLINIC | Age: 70
End: 2025-06-02
Payer: MEDICARE
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
## 2025-06-23 ENCOUNTER — OFFICE VISIT (OUTPATIENT)
Dept: INTERNAL MEDICINE | Facility: CLINIC | Age: 70
End: 2025-06-23
Payer: MEDICARE

## 2025-06-23 VITALS
OXYGEN SATURATION: 99 % | BODY MASS INDEX: 36.45 KG/M2 | SYSTOLIC BLOOD PRESSURE: 116 MMHG | DIASTOLIC BLOOD PRESSURE: 80 MMHG | HEART RATE: 80 BPM | TEMPERATURE: 97 F | RESPIRATION RATE: 18 BRPM | WEIGHT: 205.69 LBS | HEIGHT: 63 IN

## 2025-06-23 DIAGNOSIS — M47.26 OSTEOARTHRITIS OF SPINE WITH RADICULOPATHY, LUMBAR REGION: ICD-10-CM

## 2025-06-23 DIAGNOSIS — Z09 FOLLOW-UP EXAM: Primary | ICD-10-CM

## 2025-06-23 DIAGNOSIS — M17.0 PRIMARY OSTEOARTHRITIS OF BOTH KNEES: ICD-10-CM

## 2025-06-23 DIAGNOSIS — I10 ESSENTIAL HYPERTENSION: ICD-10-CM

## 2025-06-23 PROCEDURE — 3079F DIAST BP 80-89 MM HG: CPT | Mod: CPTII,S$GLB,, | Performed by: NURSE PRACTITIONER

## 2025-06-23 PROCEDURE — 3008F BODY MASS INDEX DOCD: CPT | Mod: CPTII,S$GLB,, | Performed by: NURSE PRACTITIONER

## 2025-06-23 PROCEDURE — 99214 OFFICE O/P EST MOD 30 MIN: CPT | Mod: S$GLB,,, | Performed by: NURSE PRACTITIONER

## 2025-06-23 PROCEDURE — 3074F SYST BP LT 130 MM HG: CPT | Mod: CPTII,S$GLB,, | Performed by: NURSE PRACTITIONER

## 2025-06-23 PROCEDURE — 1101F PT FALLS ASSESS-DOCD LE1/YR: CPT | Mod: CPTII,S$GLB,, | Performed by: NURSE PRACTITIONER

## 2025-06-23 PROCEDURE — 1160F RVW MEDS BY RX/DR IN RCRD: CPT | Mod: CPTII,S$GLB,, | Performed by: NURSE PRACTITIONER

## 2025-06-23 PROCEDURE — 3288F FALL RISK ASSESSMENT DOCD: CPT | Mod: CPTII,S$GLB,, | Performed by: NURSE PRACTITIONER

## 2025-06-23 PROCEDURE — 99999 PR PBB SHADOW E&M-EST. PATIENT-LVL V: CPT | Mod: PBBFAC,,, | Performed by: NURSE PRACTITIONER

## 2025-06-23 PROCEDURE — 1159F MED LIST DOCD IN RCRD: CPT | Mod: CPTII,S$GLB,, | Performed by: NURSE PRACTITIONER

## 2025-06-23 PROCEDURE — 1125F AMNT PAIN NOTED PAIN PRSNT: CPT | Mod: CPTII,S$GLB,, | Performed by: NURSE PRACTITIONER

## 2025-06-23 NOTE — PROGRESS NOTES
Subjective     Patient ID: Mariel Becerril is a 69 y.o. female.    Chief Complaint: Follow-up and Back Pain (lower)    Patient presents for follow up.      Neck form: follow up from.   Needing disability form follow up for the previous--usually get 6 months/only received 2 months.     Lower back, knee--left, and left shoulder pain.  Thinking she's needing another injection.  Has follow up appointment this week.   Not doing any therapy at this time.  The ice helps.  The morning are worse.      Taking gabapentin as needed.  Tylenol twice a day.   Also, the methocarbamol and meloxicam     Reports she's trying to routinely walk.      Denies dizziness episodes.  Reports hearing tinnitus.      Follow-up  Associated symptoms include arthralgias and myalgias. Pertinent negatives include no abdominal pain, chest pain, chills, coughing or fever.   Back Pain  Pertinent negatives include no abdominal pain, chest pain or fever.     Review of Systems   Constitutional:  Negative for chills and fever.   HENT:  Positive for tinnitus.    Respiratory:  Negative for cough and shortness of breath.    Cardiovascular:  Negative for chest pain and leg swelling.   Gastrointestinal:  Negative for abdominal pain.   Musculoskeletal:  Positive for arthralgias, back pain and myalgias.   Neurological:  Negative for dizziness.   Psychiatric/Behavioral:  Negative for agitation and confusion.           Objective     Physical Exam  Vitals reviewed.   Constitutional:       Appearance: Normal appearance.   Cardiovascular:      Rate and Rhythm: Normal rate and regular rhythm.   Pulmonary:      Effort: Pulmonary effort is normal.      Breath sounds: Normal breath sounds.   Musculoskeletal:      Left shoulder: Decreased range of motion.      Lumbar back: Tenderness present.      Right knee: Swelling present. Decreased range of motion.   Skin:     General: Skin is warm.   Neurological:      General: No focal deficit present.      Mental Status: She is  alert.   Psychiatric:         Behavior: Behavior is cooperative.            Assessment and Plan     1. Follow-up exam    2. Osteoarthritis of spine with radiculopathy, lumbar region  Comments:  Follow up with pain management.  Continue current regimen.    3. Essential hypertension  Comments:  Stable and controlled.    4. Primary osteoarthritis of both knees  Comments:  Gabapentin as needed.  Tylenol twice a day.   Also, the methocarbamol and meloxicam             Follow up in about 6 months (around 12/23/2025).

## 2025-06-25 ENCOUNTER — E-CONSULT (OUTPATIENT)
Dept: CARDIOLOGY | Facility: CLINIC | Age: 70
End: 2025-06-25
Payer: MEDICARE

## 2025-06-25 ENCOUNTER — OFFICE VISIT (OUTPATIENT)
Dept: PAIN MEDICINE | Facility: CLINIC | Age: 70
End: 2025-06-25
Payer: MEDICARE

## 2025-06-25 VITALS
HEIGHT: 63 IN | WEIGHT: 203.69 LBS | RESPIRATION RATE: 17 BRPM | HEART RATE: 62 BPM | SYSTOLIC BLOOD PRESSURE: 133 MMHG | BODY MASS INDEX: 36.09 KG/M2 | DIASTOLIC BLOOD PRESSURE: 71 MMHG

## 2025-06-25 DIAGNOSIS — M54.16 BILATERAL LUMBAR RADICULOPATHY: ICD-10-CM

## 2025-06-25 DIAGNOSIS — R20.2 PARESTHESIA OF FINGER: ICD-10-CM

## 2025-06-25 DIAGNOSIS — Z01.810 PREOP CARDIOVASCULAR EXAM: Primary | ICD-10-CM

## 2025-06-25 DIAGNOSIS — Z79.01 ANTICOAGULATED: Primary | ICD-10-CM

## 2025-06-25 PROCEDURE — 3078F DIAST BP <80 MM HG: CPT | Mod: CPTII,S$GLB,, | Performed by: NURSE PRACTITIONER

## 2025-06-25 PROCEDURE — 3075F SYST BP GE 130 - 139MM HG: CPT | Mod: CPTII,S$GLB,, | Performed by: NURSE PRACTITIONER

## 2025-06-25 PROCEDURE — 1101F PT FALLS ASSESS-DOCD LE1/YR: CPT | Mod: CPTII,S$GLB,, | Performed by: NURSE PRACTITIONER

## 2025-06-25 PROCEDURE — 1125F AMNT PAIN NOTED PAIN PRSNT: CPT | Mod: CPTII,S$GLB,, | Performed by: NURSE PRACTITIONER

## 2025-06-25 PROCEDURE — 1159F MED LIST DOCD IN RCRD: CPT | Mod: CPTII,S$GLB,, | Performed by: NURSE PRACTITIONER

## 2025-06-25 PROCEDURE — 99214 OFFICE O/P EST MOD 30 MIN: CPT | Mod: S$GLB,,, | Performed by: NURSE PRACTITIONER

## 2025-06-25 PROCEDURE — 3008F BODY MASS INDEX DOCD: CPT | Mod: CPTII,S$GLB,, | Performed by: NURSE PRACTITIONER

## 2025-06-25 PROCEDURE — 3288F FALL RISK ASSESSMENT DOCD: CPT | Mod: CPTII,S$GLB,, | Performed by: NURSE PRACTITIONER

## 2025-06-25 PROCEDURE — 99999 PR PBB SHADOW E&M-EST. PATIENT-LVL IV: CPT | Mod: PBBFAC,,, | Performed by: NURSE PRACTITIONER

## 2025-06-25 RX ORDER — TOPIRAMATE 50 MG/1
50 TABLET, FILM COATED ORAL 2 TIMES DAILY
Qty: 60 TABLET | Refills: 0 | Status: SHIPPED | OUTPATIENT
Start: 2025-06-25

## 2025-06-25 RX ORDER — GABAPENTIN 300 MG/1
600 CAPSULE ORAL NIGHTLY
Qty: 60 CAPSULE | Refills: 5 | Status: SHIPPED | OUTPATIENT
Start: 2025-06-25

## 2025-06-25 NOTE — CONSULTS
[facility] - Cardiology  Response for E-Consult     Patient Name: Mariel Becerril  MRN: [id number]  Primary Care Provider: Estrellita Weller NP   Requesting Provider: Claudia Bey NP  E-Consult to General Cardiology  Consult performed by: Da Naidu MD  Consult ordered by: Claudia Bey NP          E consult for preop clearance of lumbar GISELA  The chart reviewed.  PMH htn   05/25 MPI showed no ischemia    Plan  Elevated periop risk of CV events for non-high risk procedure.  Ok to proceed the scheduled procedure without further cardiac study.  OK to hold ASA 5 days before the procedure and resume postop once hemodynamically stable      Total time of Consultation: 10 minute    I did not speak to the requesting provider verbally about this.     *This eConsult is based on the clinical data available to me and is furnished without benefit of a physical examination. The eConsult will need to be interpreted in light of any clinical issues or changes in patient status not available to me at the time of filing this eConsults. Significant changes in patient condition or level of acuity should result in immediate formal consultation and reevaluation. Please alert me if you have further questions.    Thank you for this eConsult referral.     Da Naidu MD  [facility] Cardiology

## 2025-06-30 ENCOUNTER — OFFICE VISIT (OUTPATIENT)
Dept: NEUROLOGY | Facility: CLINIC | Age: 70
End: 2025-06-30
Payer: MEDICARE

## 2025-06-30 VITALS
DIASTOLIC BLOOD PRESSURE: 84 MMHG | HEIGHT: 63 IN | BODY MASS INDEX: 35.93 KG/M2 | RESPIRATION RATE: 18 BRPM | SYSTOLIC BLOOD PRESSURE: 135 MMHG | WEIGHT: 202.81 LBS | HEART RATE: 73 BPM

## 2025-06-30 DIAGNOSIS — Z86.0100 HISTORY OF COLON POLYPS: ICD-10-CM

## 2025-06-30 DIAGNOSIS — I10 ESSENTIAL HYPERTENSION: ICD-10-CM

## 2025-06-30 DIAGNOSIS — M47.26 OSTEOARTHRITIS OF SPINE WITH RADICULOPATHY, LUMBAR REGION: ICD-10-CM

## 2025-06-30 DIAGNOSIS — M19.072 PRIMARY OSTEOARTHRITIS OF BOTH FEET: ICD-10-CM

## 2025-06-30 DIAGNOSIS — M54.16 LUMBAR RADICULOPATHY: ICD-10-CM

## 2025-06-30 DIAGNOSIS — R06.09 DOE (DYSPNEA ON EXERTION): ICD-10-CM

## 2025-06-30 DIAGNOSIS — M46.1 SACROILIITIS: ICD-10-CM

## 2025-06-30 DIAGNOSIS — Z98.84 HX OF GASTRIC BYPASS: ICD-10-CM

## 2025-06-30 DIAGNOSIS — R45.89 DEPRESSED MOOD: ICD-10-CM

## 2025-06-30 DIAGNOSIS — I49.3 SYMPTOMATIC PVCS: ICD-10-CM

## 2025-06-30 DIAGNOSIS — G56.03 BILATERAL CARPAL TUNNEL SYNDROME: ICD-10-CM

## 2025-06-30 DIAGNOSIS — K02.9 ACTIVE DENTAL CARIES: ICD-10-CM

## 2025-06-30 DIAGNOSIS — D17.21 LIPOMA OF RIGHT UPPER EXTREMITY: ICD-10-CM

## 2025-06-30 DIAGNOSIS — M25.562 CHRONIC PAIN OF BOTH KNEES: ICD-10-CM

## 2025-06-30 DIAGNOSIS — M51.26 HNP (HERNIATED NUCLEUS PULPOSUS), LUMBAR: ICD-10-CM

## 2025-06-30 DIAGNOSIS — Z72.821 INADEQUATE SLEEP HYGIENE: ICD-10-CM

## 2025-06-30 DIAGNOSIS — M54.10 RADICULAR LOW BACK PAIN: ICD-10-CM

## 2025-06-30 DIAGNOSIS — M25.561 CHRONIC PAIN OF BOTH KNEES: ICD-10-CM

## 2025-06-30 DIAGNOSIS — F51.12 BEHAVIORALLY INDUCED INSUFFICIENT SLEEP SYNDROME: ICD-10-CM

## 2025-06-30 DIAGNOSIS — M18.10 OSTEOARTHRITIS OF THUMB, UNSPECIFIED LATERALITY: ICD-10-CM

## 2025-06-30 DIAGNOSIS — M70.62 GREATER TROCHANTERIC BURSITIS OF BOTH HIPS: ICD-10-CM

## 2025-06-30 DIAGNOSIS — K64.8 INTERNAL HEMORRHOIDS: ICD-10-CM

## 2025-06-30 DIAGNOSIS — M70.61 GREATER TROCHANTERIC BURSITIS OF BOTH HIPS: ICD-10-CM

## 2025-06-30 DIAGNOSIS — Z86.69 HISTORY OF MIGRAINE HEADACHES: ICD-10-CM

## 2025-06-30 DIAGNOSIS — M19.071 PRIMARY OSTEOARTHRITIS OF BOTH FEET: ICD-10-CM

## 2025-06-30 DIAGNOSIS — G47.10 HYPERSOMNIA, UNSPECIFIED: ICD-10-CM

## 2025-06-30 DIAGNOSIS — R53.83 OTHER FATIGUE: ICD-10-CM

## 2025-06-30 DIAGNOSIS — G89.29 CHRONIC PAIN OF BOTH KNEES: ICD-10-CM

## 2025-06-30 DIAGNOSIS — M21.162 GENU VARUM OF LEFT LOWER EXTREMITY: ICD-10-CM

## 2025-06-30 DIAGNOSIS — R42 DIZZINESS AND GIDDINESS: ICD-10-CM

## 2025-06-30 DIAGNOSIS — M17.0 PRIMARY OSTEOARTHRITIS OF BOTH KNEES: ICD-10-CM

## 2025-06-30 DIAGNOSIS — G47.61 PLMD (PERIODIC LIMB MOVEMENT DISORDER): ICD-10-CM

## 2025-06-30 DIAGNOSIS — R29.90 MULTIPLE NEUROLOGICAL SYMPTOMS: Primary | ICD-10-CM

## 2025-06-30 DIAGNOSIS — R00.2 PALPITATIONS: ICD-10-CM

## 2025-06-30 DIAGNOSIS — L68.0 HIRSUTISM: ICD-10-CM

## 2025-06-30 PROBLEM — G81.94 HEMIPLEGIA AFFECTING LEFT NONDOMINANT SIDE, UNSPECIFIED ETIOLOGY, UNSPECIFIED HEMIPLEGIA TYPE: Status: RESOLVED | Noted: 2022-08-22 | Resolved: 2025-06-30

## 2025-06-30 PROBLEM — M79.18 PIRIFORMIS MUSCLE PAIN: Status: RESOLVED | Noted: 2020-11-30 | Resolved: 2025-06-30

## 2025-06-30 PROBLEM — Z12.11 COLON CANCER SCREENING: Status: RESOLVED | Noted: 2018-12-03 | Resolved: 2025-06-30

## 2025-06-30 PROBLEM — M79.662 PAIN OF LEFT CALF: Status: RESOLVED | Noted: 2017-01-13 | Resolved: 2025-06-30

## 2025-06-30 PROBLEM — R07.89 CHEST PRESSURE: Status: RESOLVED | Noted: 2018-08-20 | Resolved: 2025-06-30
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
## 2025-06-30 PROBLEM — Z01.810 PREOP CARDIOVASCULAR EXAM: Status: RESOLVED | Noted: 2025-06-25 | Resolved: 2025-06-30

## 2025-06-30 PROBLEM — M17.12 PRIMARY OSTEOARTHRITIS OF LEFT KNEE: Status: RESOLVED | Noted: 2019-08-07 | Resolved: 2025-06-30

## 2025-06-30 PROCEDURE — 99999 PR PBB SHADOW E&M-EST. PATIENT-LVL V: CPT | Mod: PBBFAC,,, | Performed by: PSYCHIATRY & NEUROLOGY

## 2025-06-30 NOTE — PATIENT INSTRUCTIONS
Postural dizziness, or orthostatic hypotension, is often treated with lifestyle changes and medications. These can include increasing fluid and salt intake, wearing compression stockings, and gradually increasing activity levels.       Lifestyle Modifications:    Hydration: Drinking plenty of fluids is crucial  Diet: Increasing salt intake, under the guidance of a doctor, can help raise blood pressure.   Compression Stockings: These can improve blood flow and reduce dizziness when standing.   Gradual Activity: Increasing activity levels slowly can help the body adjust to changes in position.     Vestibular therapy can be helpful for the management of unsteadiness and balance-related issues

## 2025-06-30 NOTE — PROGRESS NOTES
"Subjective:       Patient ID: Mariel Becerril is a 69 y.o. female.    Chief Complaint: weakness in face and left arm  and sharp pain in left side of the head           HPI    The patient presented on 06- for evaluation.      The patient used to have frequent migraines and occipital neuralgia headaches that have subsides.    Her main concern is related to longstanding "dizziness" since 2017. Described the dizziness as lightheadedness with no spinning. The symptoms used to be postural but have become positional. Denied associated headaches, change in hearing, balance or vision, trouble swallowing or slurred speech.       Review of Systems   Constitutional:  Positive for fatigue. Negative for appetite change.   HENT:  Negative for hearing loss and tinnitus.    Eyes:  Negative for photophobia and visual disturbance.   Respiratory:  Positive for apnea. Negative for shortness of breath.    Cardiovascular:  Negative for chest pain and palpitations.   Gastrointestinal:  Negative for nausea and vomiting.   Endocrine: Negative for cold intolerance and heat intolerance.   Genitourinary:  Negative for difficulty urinating and urgency.   Musculoskeletal:  Positive for arthralgias. Negative for back pain, gait problem, joint swelling, myalgias, neck pain and neck stiffness.   Skin:  Negative for color change and rash.   Allergic/Immunologic: Negative for environmental allergies and immunocompromised state.   Neurological:  Positive for dizziness, light-headedness and headaches. Negative for tremors, seizures, syncope, facial asymmetry, speech difficulty, weakness and numbness.   Hematological:  Negative for adenopathy. Does not bruise/bleed easily.   Psychiatric/Behavioral:  Negative for agitation, behavioral problems, confusion, decreased concentration, dysphoric mood, hallucinations, self-injury, sleep disturbance and suicidal ideas. The patient is not hyperactive.              Current Medications[1]    Past Medical " History:   Diagnosis Date    COPD (chronic obstructive pulmonary disease)     NO HOME O2    Digestive disorder     Frequent headaches     Hypertension     Osteoarthritis 04/02/2019    Bilateral knees, ankles and feet    Severe obesity (BMI >= 40)     Sleep apnea     CPAP       Past Surgical History:   Procedure Laterality Date    BLADDER SUSPENSION      CATARACT EXTRACTION, BILATERAL      COLONOSCOPY N/A 12/3/2018    Procedure: COLONOSCOPY;  Surgeon: Mari Rader MD;  Location: [facility];  Service: Endoscopy;  Laterality: N/A;    COLONOSCOPY N/A 6/12/2024    Procedure: COLONOSCOPY;  Surgeon: Sarah Siegel MD;  Location: [facility];  Service: Endoscopy;  Laterality: N/A;    ESOPHAGOGASTRODUODENOSCOPY N/A 12/31/2020    Procedure: ESOPHAGOGASTRODUODENOSCOPY (EGD);  Surgeon: Anthony Rodríguez MD;  Location: [facility];  Service: General;  Laterality: N/A;    HYSTERECTOMY      partial 2000    INJECTION OF ANESTHETIC AGENT AROUND NERVE Left 9/14/2023    Procedure: LEFT Genicular nerve block RN IV Sedation;  Surgeon: Thanh Diaz MD;  Location: [facility];  Service: Pain Management;  Laterality: Left;    INJECTION OF ANESTHETIC AGENT AROUND NERVE Left 5/21/2024    Procedure: LEFT Genicular nerve block (therpeutic);  Surgeon: Thanh Diaz MD;  Location: [facility];  Service: Pain Management;  Laterality: Left;    INJECTION OF ANESTHETIC AGENT AROUND NERVE Left 6/11/2024    Procedure: LEFT Genicular nerve block with RN IV sedation;  Surgeon: Thanh Diaz MD;  Location: [facility];  Service: Pain Management;  Laterality: Left;    INJECTION OF ANESTHETIC AGENT AROUND NERVE Left 2/20/2025    Procedure: left Genicular nerve block therapeutic;  Surgeon: Thanh Diaz MD;  Location: [facility];  Service: Pain Management;  Laterality: Left;    INJECTION OF ANESTHETIC AGENT INTO SACROILIAC JOINT Bilateral 11/30/2020    Procedure: Bilateral SIJ + Bilateral Piriformis + Bilateral GT Bursa Injection;   Surgeon: Thanh Diaz MD;  Location: [facility];  Service: Pain Management;  Laterality: Bilateral;    INJECTION OF JOINT Bilateral 11/30/2020    Procedure: Bilateral SIJ + Bilateral Piriformis + Bilateral GT Bursa Injection;  Surgeon: Thanh Diaz MD;  Location: [facility];  Service: Pain Management;  Laterality: Bilateral;    INJECTION OF PIRIFORMIS MUSCLE Bilateral 11/30/2020    Procedure: Bilateral SIJ + Bilateral Piriformis + Bilateral GT Bursa Injection;  Surgeon: Thanh Diaz MD;  Location: [facility];  Service: Pain Management;  Laterality: Bilateral;    INJECTION OF STEROID N/A 1/2/2025    Procedure: L5-S1 ILESI;  Surgeon: Thanh Diaz MD;  Location: [facility];  Service: Pain Management;  Laterality: N/A;    ROBOT-ASSISTED LAPAROSCOPIC SLEEVE GASTRECTOMY USING DA EZEQUIEL XI N/A 3/2/2021    Procedure: XI ROBOTIC SLEEVE GASTRECTOMY;  Surgeon: Anthony Rodríguez MD;  Location: [facility];  Service: General;  Laterality: N/A;    SELECTIVE INJECTION OF ANESTHETIC AGENT AROUND LUMBAR SPINAL NERVE ROOT BY TRANSFORAMINAL APPROACH Bilateral 1/4/2021    Procedure: Bilateral L5/S1 TF GISELA;  Surgeon: Thanh Diaz MD;  Location: [facility];  Service: Pain Management;  Laterality: Bilateral;    SELECTIVE INJECTION OF ANESTHETIC AGENT AROUND LUMBAR SPINAL NERVE ROOT BY TRANSFORAMINAL APPROACH Bilateral 3/23/2021    Procedure: Bilateral L5/S1 TF GISELA;  Surgeon: Thanh Diaz MD;  [facility];  Service: Pain Management;  Laterality: Bilateral;    SELECTIVE INJECTION OF ANESTHETIC AGENT AROUND LUMBAR SPINAL NERVE ROOT BY TRANSFORAMINAL APPROACH Bilateral 10/5/2021    Procedure: Bilateral L5/S1 TF GISELA;  Surgeon: Thanh Diaz MD;  Location: [facility];  Service: Pain Management;  Laterality: Bilateral;    SELECTIVE INJECTION OF ANESTHETIC AGENT AROUND LUMBAR SPINAL NERVE ROOT BY TRANSFORAMINAL APPROACH Bilateral 3/28/2024    Procedure: Bilateral L5/S1 TF GISELA;  Surgeon:  Thanh Diaz MD;  Location: [facility];  Service: Pain Management;  Laterality: Bilateral;    tonsilectomy      TOTAL KNEE ARTHROPLASTY Left 3/4/2020    Procedure: ARTHROPLASTY, KNEE, TOTAL;  Surgeon: Moy Connolly MD;  Location: [facility];  Service: Orthopedics;  Laterality: Left;       Social History[2]          Past/Current Medical/Surgical History, Past/Current Social History, Past/Current Family History and Past/Current Medications were reviewed in detail.        Objective:           VITAL SIGNS WERE REVIEWED      GENERAL APPEARANCE:     The patient looks comfortable.    BMI 35.93     No signs of respiratory distress.    Normal breathing pattern.    No dysmorphic features    Normal eye contact.       GENERAL MEDICAL EXAM:    HEENT:  Head is atraumatic normocephalic.     FUNDUSCOPIC (OPHTHALMOSCOPIC) EXAMINATION showed no disc edema (papilledema).      NECK: No JVD. No visible lesions or goiters.     CHEST-CARDIOPULMONARY: No cyanosis. No tachypnea. Normal respiratory effort.    WHBNEJR-[id number]-SFRDGEWIMJ: No jaundice. No stomas or lesions. No visible hernias. No catheters.     SKIN, HAIR, NAILS: No pathognomonic skin rash.No neurofibromatosis. No visible lesions.No stigmata of autoimmune disease. No clubbing.    LIMBS: No varicose veins. No visible swelling.    MUSCULOSKELETAL: No visible deformities.No visible lesions.             Neurological Exam  Mental Status  Awake and alert. Oriented to person, place, time and situation. Recent and remote memory are intact. Speech is normal. Language is fluent with no aphasia. Attention and concentration are normal. Fund of knowledge is appropriate for level of education. Apraxia absent.    Cranial Nerves  CN II: Tested with correction. Visual acuity is normal. Visual fields full to confrontation. Right funduscopic exam: disc intact. Left funduscopic exam: disc intact.  CN III, IV, VI: Extraocular movements intact bilaterally. Normal lids and  orbits bilaterally. Pupils equal round and reactive to light bilaterally.  CN V: Facial sensation is normal.  CN VII: Full and symmetric facial movement.  CN VIII: Hearing is normal.  CN IX, X: Palate elevates symmetrically  CN XI: Shoulder shrug strength is normal.  CN XII: Tongue midline without atrophy or fasciculations.    Motor  Normal muscle bulk throughout. No fasciculations present. Normal muscle tone. No abnormal involuntary movements. No pronator drift.                                             Right                     Left  Neck flexion                           5                          5  Neck extension                      5                          5   Shoulder abduction               5                          5   Shoulder adduction               5                          5   Shoulder internal rotation      5                          5  Shoulder external rotation     5                          5  Elbow flexion                         5                          5  Elbow extension                    5                          5  Wrist flexion                           5                          5  Wrist extension                      5                          5  Supination                             5                          5  Pronation                               5                          5  Finger flexion                         5                          5  Finger extension                    5                          5  Finger abduction                    5                          5  Thumb flexion                        5                          5  Thumb extension                   5                          5  Thumb abduction                   5                          5  Hip flexion                              5                          5  Hip extension                         5                          5  Hip abduction                         5                          5  Hip  adduction                         5                          5  Knee flexion                           5                          5  Knee extension                      5                          5  Ankle inversion                      5                          5  Ankle eversion                       5                          5  Plantarflexion                         5                          5  Dorsiflexion                            5                          5  Toe flexion                             5                          5  Toe extension                        5                          5    Sensory  Light touch is normal in upper and lower extremities. Pinprick is normal in upper and lower extremities. Temperature is normal in upper and lower extremities. Vibration is normal in upper and lower extremities. Proprioception is normal in upper and lower extremities.  No right-sided hemispatial neglect. No left-sided hemispatial neglect. Right agraphesthesia absent. Left agraphesthesia absent. Right astereognosis absent. Left astereognosis absent.    Reflexes                                            Right                      Left  Brachioradialis                    2+                         2+  Biceps                                 2+                         2+  Triceps                                2+                         2+  Patellar                                2+                         2+  Achilles                                2+                         2+  Right Plantar: normal  Left Plantar: normal  Jaw jerk absent.  Right pathological reflexes: Ambrose's absent. Crossed adductor absent. Ankle clonus absent.  Left pathological reflexes: Ambrose's absent. Crossed adductor absent. Ankle clonus absent.    Coordination  Right: Finger-to-nose normal. Rapid alternating movement normal. Heel-to-shin normal.Left: Finger-to-nose normal. Rapid alternating movement normal. Heel-to-shin  normal.    Gait  Casual gait is normal including stance, stride, and arm swing. Able to rise from chair without using arms.      Lab Results   Component Value Date    WBC 6.49 04/08/2025    HGB 12.8 04/08/2025    HCT 40.4 04/08/2025    MCV 97 04/08/2025     04/08/2025       Sodium   Date Value Ref Range Status   04/08/2025 140 136 - 145 mmol/L Final   12/17/2024 144 136 - 145 mmol/L Final     Potassium   Date Value Ref Range Status   04/08/2025 3.6 3.5 - 5.1 mmol/L Final   12/17/2024 4.1 3.5 - 5.1 mmol/L Final     Chloride   Date Value Ref Range Status   04/08/2025 110 95 - 110 mmol/L Final   12/17/2024 112 (H) 95 - 110 mmol/L Final     CO2   Date Value Ref Range Status   04/08/2025 23 23 - 29 mmol/L Final   12/17/2024 24 23 - 29 mmol/L Final     Glucose   Date Value Ref Range Status   04/08/2025 92 70 - 110 mg/dL Final   12/17/2024 91 70 - 110 mg/dL Final     BUN   Date Value Ref Range Status   04/08/2025 17 8 - 23 mg/dL Final     Creatinine   Date Value Ref Range Status   04/08/2025 0.8 0.5 - 1.4 mg/dL Final     Calcium   Date Value Ref Range Status   04/08/2025 9.0 8.7 - 10.5 mg/dL Final   12/17/2024 9.1 8.7 - 10.5 mg/dL Final     Total Protein   Date Value Ref Range Status   12/17/2024 6.4 6.0 - 8.4 g/dL Final     Albumin   Date Value Ref Range Status   12/17/2024 3.4 (L) 3.5 - 5.2 g/dL Final     Total Bilirubin   Date Value Ref Range Status   12/17/2024 0.3 0.1 - 1.0 mg/dL Final     Comment:     For infants and newborns, interpretation of results should be based  on gestational age, weight and in agreement with clinical  observations.    Premature Infant recommended reference ranges:  Up to 24 hours.............<8.0 mg/dL  Up to 48 hours............<12.0 mg/dL  3-5 days..................<15.0 mg/dL  6-29 days.................<15.0 mg/dL       Alkaline Phosphatase   Date Value Ref Range Status   12/17/2024 78 40 - 150 U/L Final     AST   Date Value Ref Range Status   12/17/2024 21 10 - 40 U/L Final      ALT   Date Value Ref Range Status   12/17/2024 16 10 - 44 U/L Final     Anion Gap   Date Value Ref Range Status   04/08/2025 7 (L) 8 - 16 mmol/L Final     eGFR if    Date Value Ref Range Status   10/27/2021 >60.0 >60 mL/min/1.73 m^2 Final     eGFR if non    Date Value Ref Range Status   10/27/2021 59.7 (A) >60 mL/min/1.73 m^2 Final     Comment:     Calculation used to obtain the estimated glomerular filtration  rate (eGFR) is the CKD-EPI equation.          Lab Results   Component Value Date    YGTOGYMP60 >2000 (H) 11/09/2022       Lab Results   Component Value Date    TSH 0.913 12/17/2024    FREET4 0.78 11/09/2022     LABORATORY EVALUATION      0517-9051      CBC, CMP, HA1C, TFT, B Complex and Vit D Unremarkable.     RADIOLOGY EVALUATION       10-      Brain MRI WWO NL.    C/L-Spine MRI Multilevel DDD.      NEUROPHYSIOLOGY EVALUATION       PATHOLOGY EVALUATION        NEUROCOGNITIVE AND NEUROPSYCHOLOGY EVALUATION           Reviewed the neuroimaging independently       Assessment:           1. Multiple neurological symptoms    2. Dizziness and giddiness    3. Active dental caries    4. Behaviorally induced insufficient sleep syndrome    5. Bilateral carpal tunnel syndrome    6. Chronic pain of both knees    7. Depressed mood    8. AREVALO (dyspnea on exertion)    9. Genu varum of left lower extremity    10. Essential hypertension    11. Greater trochanteric bursitis of both hips    12. Hirsutism    13. History of colon polyps    14. History of migraine headaches    15. HNP (herniated nucleus pulposus), lumbar    16. Hx of gastric bypass    17. Hypersomnia, unspecified     18. Inadequate sleep hygiene    19. Internal hemorrhoids    20. Lipoma of right upper extremity    21. Lumbar radiculopathy    22. Osteoarthritis of spine with radiculopathy, lumbar region    23. Osteoarthritis of thumb, unspecified laterality    24. Other fatigue    25. Palpitations    26. PLMD (periodic limb  movement disorder)    27. Primary osteoarthritis of both feet    28. Primary osteoarthritis of both knees    29. Radicular low back pain    30. Symptomatic PVCs    31. Sacroiliitis          Plan:             HISTORY OF MIGRAINES, OCCIPITAL NEURALGIA         Monitor clinically.      DIZZINESS, MIXED, LIKELY ORTHOSTATIC       EVALUATION       CTA H/N.       MANAGEMENT        Refer to PT and VT.     Avoid standing for a long time    Slow transition from lying down to sitting to standing    Slow transition to stooping and bending over    Cross legs while standing    Increase fluid intake    Balanced salt intake     Adjust BP medications    Wearing thigh high stockings     PT to strengthen BLE muscles              MEDICAL/SURGICAL COMORBIDITIES     All relevant medical comorbidities noted and managed by primary care physician and medical care team.          HEALTHY LIFESTYLE AND PREVENTATIVE CARE    The patient to adhere to the age-appropriate health maintenance guidelines including screening tests and vaccinations. The patient to adhere to  healthy lifestyle, optimal weight, exercise, healthy diet, good sleep hygiene and avoiding drugs including smoking, alcohol and recreational drugs.          RTC PRN     Berta Ireland MD, FAAN    Attending Neurologist/Epileptologist         Diplomate, American Board of Psychiatry and Neurology    Diplomate, American Board of Clinical Neurophysiology     Fellow, American Academy of Neurology                 This is a patient with a serious and complex neurologic diagnosis whose overall, ongoing care is being managed and monitored by me and our Neurology clinic.   As such,  is the appropriate add-on code to accompany the other E/M billing for this visit.         I spent a total of 62 minutes on the day of the visit.  This includes face to face time and non-face to face time preparing to see the patient (eg, review of tests), obtaining and/or reviewing separately obtained history,  documenting clinical information in the electronic or other health record, independently interpreting results and communicating results to the patient/family/caregiver, or care coordinator.         [1]   Current Outpatient Medications:     acetaminophen (TYLENOL) 325 MG tablet, Take 2 tablets (650 mg total) by mouth every 8 (eight) hours as needed., Disp: 60 tablet, Rfl: 2    albuterol (PROAIR HFA) 90 mcg/actuation inhaler, Inhale 2 puffs into the lungs every 6 (six) hours as needed for Wheezing. Rescue, Disp: 18 g, Rfl: 0    diclofenac sodium (VOLTAREN) 1 % Gel, APPLY 2 GRAMS TOPICALLY THREE TIMES DAILY AS NEEDED, Disp: 200 g, Rfl: 2    ergocalciferol, vitamin D2, (VITAMIN D ORAL), Take 2,000 Units by mouth once daily., Disp: , Rfl:     fluticasone propionate (FLONASE) 50 mcg/actuation nasal spray, by Each Nostril route., Disp: , Rfl:     furosemide (LASIX) 40 MG tablet, Take 1 tablet (40 mg total) by mouth daily as needed (edema, swelling). Do not take if BP is less than 110/70, Disp: 90 tablet, Rfl: 1    gabapentin (NEURONTIN) 300 MG capsule, Take 2 capsules (600 mg total) by mouth every evening., Disp: 60 capsule, Rfl: 5    gentamicin (GARAMYCIN) 0.1 % ointment, Apply topically 3 (three) times daily., Disp: 30 g, Rfl: 1    imipramine (TOFRANIL) 10 MG Tab, Take 1 tablet (10 mg total) by mouth every evening., Disp: 90 tablet, Rfl: 1    meclizine (ANTIVERT) 25 mg tablet, Take 1 tablet (25 mg total) by mouth 3 (three) times daily as needed for Dizziness., Disp: 30 tablet, Rfl: 0    meloxicam (MOBIC) 15 MG tablet, Take 1 tablet (15 mg total) by mouth once daily., Disp: 90 tablet, Rfl: 3    methocarbamoL (ROBAXIN) 750 MG Tab, Take 1 tablet by mouth three times daily as needed, Disp: 90 tablet, Rfl: 2    metoprolol succinate (TOPROL-XL) 25 MG 24 hr tablet, Take 1 tablet (25 mg total) by mouth once daily., Disp: 90 tablet, Rfl: 1    montelukast (SINGULAIR) 10 mg tablet, Take 1 tablet (10 mg total) by mouth every  evening. Allergies, Disp: 30 tablet, Rfl: 11    mupirocin (BACTROBAN) 2 % ointment, Apply topically 3 (three) times daily., Disp: 30 g, Rfl: 1    nitroGLYCERIN (NITROSTAT) 0.4 MG SL tablet, Place 1 tablet (0.4 mg total) under the tongue every 5 (five) minutes as needed for Chest pain., Disp: 30 tablet, Rfl: 0    nystatin (MYCOSTATIN) cream, APPLY  CREAM TOPICALLY TO AFFECTED AREA TWICE DAILY, Disp: 30 g, Rfl: 0    nystatin (MYCOSTATIN) powder, Apply topically 2 (two) times daily., Disp: 60 g, Rfl: 2    omeprazole (PRILOSEC) 40 MG capsule, Take 1 capsule (40 mg total) by mouth once daily., Disp: 90 capsule, Rfl: 3    ondansetron (ZOFRAN) 8 MG tablet, Take 1 tablet (8 mg total) by mouth every 8 (eight) hours as needed for Nausea., Disp: 20 tablet, Rfl: 0    potassium chloride SA (K-DUR,KLOR-CON) 20 MEQ tablet, Take 1 tablet (20 mEq total) by mouth daily as needed (if taking lasix)., Disp: 90 tablet, Rfl: 1    topiramate (TOPAMAX) 50 MG tablet, Take 1 tablet (50 mg total) by mouth 2 (two) times daily., Disp: 60 tablet, Rfl: 0    aspirin (ECOTRIN) 81 MG EC tablet, Take 1 tablet (81 mg total) by mouth once daily., Disp: 90 tablet, Rfl: 3    levocetirizine (XYZAL) 5 MG tablet, TAKE 1 TABLET BY MOUTH ONCE DAILY IN THE EVENING (Patient not taking: Reported on 6/30/2025), Disp: 90 tablet, Rfl: 0    linaCLOtide (LINZESS) 72 mcg Cap capsule, Take 1 capsule (72 mcg total) by mouth before breakfast. (Patient not taking: Reported on 6/30/2025), Disp: 30 capsule, Rfl: 2  No current facility-administered medications for this visit.    Facility-Administered Medications Ordered in Other Visits:     ondansetron injection 4 mg, 4 mg, Intravenous, Once PRN, Thanh Diaz MD    ondansetron injection 4 mg, 4 mg, Intravenous, Once PRN, Thanh Diaz MD  [2]   Social History  Socioeconomic History    Marital status:    Tobacco Use    Smoking status: Never     Passive exposure: Never    Smokeless tobacco: Never   Substance  and Sexual Activity    Alcohol use: Never    Drug use: No    Sexual activity: Never     Partners: Male     Birth control/protection: See Surgical Hx     Social Drivers of Health     Financial Resource Strain: High Risk (2/16/2024)    Overall Financial Resource Strain (CARDIA)     Difficulty of Paying Living Expenses: Very hard   Food Insecurity: Patient Declined (2/16/2024)    Hunger Vital Sign     Worried About Running Out of Food in the Last Year: Patient declined     Ran Out of Food in the Last Year: Patient declined   Transportation Needs: Unknown (2/16/2024)    PRAPARE - Transportation     Lack of Transportation (Medical): No     Lack of Transportation (Non-Medical): Patient declined   Physical Activity: Unknown (2/16/2024)    Exercise Vital Sign     Days of Exercise per Week: 3 days   Stress: Stress Concern Present (2/16/2024)    [employer] [employer] - Occupational Stress Questionnaire     Feeling of Stress : To some extent   Housing Stability: High Risk (2/16/2024)    Housing Stability Vital Sign     Unable to Pay for Housing in the Last Year: Yes     Unstable Housing in the Last Year: No

## 2025-07-01 ENCOUNTER — PATIENT MESSAGE (OUTPATIENT)
Dept: INTERNAL MEDICINE | Facility: CLINIC | Age: 70
End: 2025-07-01
Payer: MEDICARE

## 2025-07-02 NOTE — PRE-PROCEDURE INSTRUCTIONS
Spoke with patient regarding procedure scheduled on 7.15     Arrival time 0715     Has patient been sick with fever or on antibiotics within the last 7 days? No     Does the patient have any open wounds, sores or rashes? No     Does the patient have any recent fractures? no     Has patient received a vaccination within the last 7 days? No     Received the COVID vaccination? yes     Has the patient stopped all medications as directed? hold asa 5 days prior to procedure. Cardiac clearance obtained from dr ron on 6.25     Does patient have a pacemaker, defibrillator, or implantable stimulator? No     Does the patient have a ride to and from procedure and someone reliable to remain with patient?  daughter     Is the patient diabetic? no      Does the patient have sleep apnea? Or use O2 at home? valentino     Is the patient receiving sedation? Yes      Is the patient instructed to remain NPO beginning at midnight the night before their procedure? yes     Procedure location confirmed with patient? Yes     Covid- Denies signs/symptoms. Instructed to notify PAT/MD if any changes.

## 2025-07-15 ENCOUNTER — HOSPITAL ENCOUNTER (OUTPATIENT)
Facility: HOSPITAL | Age: 70
Discharge: HOME | End: 2025-07-15
Attending: PHYSICAL MEDICINE & REHABILITATION | Admitting: PHYSICAL MEDICINE & REHABILITATION
Payer: MEDICARE

## 2025-07-15 VITALS
SYSTOLIC BLOOD PRESSURE: 127 MMHG | OXYGEN SATURATION: 97 % | TEMPERATURE: 97 F | WEIGHT: 203.81 LBS | BODY MASS INDEX: 36.11 KG/M2 | DIASTOLIC BLOOD PRESSURE: 66 MMHG | RESPIRATION RATE: 15 BRPM | HEART RATE: 57 BPM | HEIGHT: 63 IN

## 2025-07-15 DIAGNOSIS — G89.4 CHRONIC PAIN SYNDROME: ICD-10-CM

## 2025-07-15 DIAGNOSIS — M54.16 LUMBAR RADICULOPATHY: Primary | ICD-10-CM

## 2025-07-15 PROCEDURE — 63600175 PHARM REV CODE 636 W HCPCS: Mod: JZ,TB | Performed by: PHYSICAL MEDICINE & REHABILITATION

## 2025-07-15 PROCEDURE — 25500020 PHARM REV CODE 255: Performed by: PHYSICAL MEDICINE & REHABILITATION

## 2025-07-15 PROCEDURE — 62323 NJX INTERLAMINAR LMBR/SAC: CPT | Mod: ,,, | Performed by: PHYSICAL MEDICINE & REHABILITATION

## 2025-07-15 PROCEDURE — 62323 NJX INTERLAMINAR LMBR/SAC: CPT | Performed by: PHYSICAL MEDICINE & REHABILITATION

## 2025-07-15 RX ORDER — MIDAZOLAM HYDROCHLORIDE 1 MG/ML
INJECTION, SOLUTION INTRAMUSCULAR; INTRAVENOUS
Status: DISCONTINUED | OUTPATIENT
Start: 2025-07-15 | End: 2025-07-15 | Stop reason: HOSPADM

## 2025-07-15 RX ORDER — BUPIVACAINE HYDROCHLORIDE 2.5 MG/ML
INJECTION, SOLUTION EPIDURAL; INFILTRATION; INTRACAUDAL; PERINEURAL
Status: DISCONTINUED | OUTPATIENT
Start: 2025-07-15 | End: 2025-07-15 | Stop reason: HOSPADM

## 2025-07-15 RX ORDER — METHYLPREDNISOLONE ACETATE 80 MG/ML
INJECTION, SUSPENSION INTRA-ARTICULAR; INTRALESIONAL; INTRAMUSCULAR; SOFT TISSUE
Status: DISCONTINUED | OUTPATIENT
Start: 2025-07-15 | End: 2025-07-15 | Stop reason: HOSPADM

## 2025-07-15 RX ORDER — ONDANSETRON HYDROCHLORIDE 2 MG/ML
4 INJECTION, SOLUTION INTRAVENOUS ONCE AS NEEDED
Status: DISCONTINUED | OUTPATIENT
Start: 2025-07-15 | End: 2025-07-15 | Stop reason: HOSPADM

## 2025-07-15 RX ORDER — FENTANYL CITRATE 50 UG/ML
INJECTION, SOLUTION INTRAMUSCULAR; INTRAVENOUS
Status: DISCONTINUED | OUTPATIENT
Start: 2025-07-15 | End: 2025-07-15 | Stop reason: HOSPADM

## 2025-07-15 NOTE — DISCHARGE INSTRUCTIONS

## 2025-07-15 NOTE — DISCHARGE SUMMARY
Discharge Note  Short Stay      SUMMARY     Admit Date: 7/15/2025    Attending Physician: Thanh Diaz MD        Discharge Physician: Thanh Diaz MD        Discharge Date: 7/15/2025 8:35 AM    Procedure(s) (LRB):  L5/S1 IL GISELA (N/A)    Final Diagnosis: Bilateral lumbar radiculopathy [M54.16]    Disposition: Home or self care    Patient Instructions:   Current Discharge Medication List        CONTINUE these medications which have NOT CHANGED    Details   metoprolol succinate (TOPROL-XL) 25 MG 24 hr tablet Take 1 tablet (25 mg total) by mouth once daily.  Qty: 90 tablet, Refills: 1    Comments: .      acetaminophen (TYLENOL) 325 MG tablet Take 2 tablets (650 mg total) by mouth every 8 (eight) hours as needed.  Qty: 60 tablet, Refills: 2      albuterol (PROAIR HFA) 90 mcg/actuation inhaler Inhale 2 puffs into the lungs every 6 (six) hours as needed for Wheezing. Rescue  Qty: 18 g, Refills: 0    Associated Diagnoses: Bronchitis      aspirin (ECOTRIN) 81 MG EC tablet Take 1 tablet (81 mg total) by mouth once daily.  Qty: 90 tablet, Refills: 3      diclofenac sodium (VOLTAREN) 1 % Gel APPLY 2 GRAMS TOPICALLY THREE TIMES DAILY AS NEEDED  Qty: 200 g, Refills: 2    Associated Diagnoses: Status post total knee replacement using cement, left; Posterior left knee pain      ergocalciferol, vitamin D2, (VITAMIN D ORAL) Take 2,000 Units by mouth once daily.      fluticasone propionate (FLONASE) 50 mcg/actuation nasal spray by Each Nostril route.      furosemide (LASIX) 40 MG tablet Take 1 tablet (40 mg total) by mouth daily as needed (edema, swelling). Do not take if BP is less than 110/70  Qty: 90 tablet, Refills: 1    Associated Diagnoses: Essential hypertension      gabapentin (NEURONTIN) 300 MG capsule Take 2 capsules (600 mg total) by mouth every evening.  Qty: 60 capsule, Refills: 5      gentamicin (GARAMYCIN) 0.1 % ointment Apply topically 3 (three) times daily.  Qty: 30 g, Refills: 1    Associated Diagnoses:  Laceration of right foot, initial encounter      imipramine (TOFRANIL) 10 MG Tab Take 1 tablet (10 mg total) by mouth every evening.  Qty: 90 tablet, Refills: 1      levocetirizine (XYZAL) 5 MG tablet TAKE 1 TABLET BY MOUTH ONCE DAILY IN THE EVENING  Qty: 90 tablet, Refills: 0    Associated Diagnoses: Seasonal allergies      linaCLOtide (LINZESS) 72 mcg Cap capsule Take 1 capsule (72 mcg total) by mouth before breakfast.  Qty: 30 capsule, Refills: 2    Associated Diagnoses: Constipation, unspecified constipation type      meclizine (ANTIVERT) 25 mg tablet Take 1 tablet (25 mg total) by mouth 3 (three) times daily as needed for Dizziness.  Qty: 30 tablet, Refills: 0    Associated Diagnoses: Vertigo      meloxicam (MOBIC) 15 MG tablet Take 1 tablet (15 mg total) by mouth once daily.  Qty: 90 tablet, Refills: 3    Associated Diagnoses: Bilateral lumbar radiculopathy      methocarbamoL (ROBAXIN) 750 MG Tab Take 1 tablet by mouth three times daily as needed  Qty: 90 tablet, Refills: 2    Associated Diagnoses: Bilateral lumbar radiculopathy; Piriformis muscle pain      montelukast (SINGULAIR) 10 mg tablet Take 1 tablet (10 mg total) by mouth every evening. Allergies  Qty: 30 tablet, Refills: 11    Associated Diagnoses: Acute sinusitis, recurrence not specified, unspecified location      mupirocin (BACTROBAN) 2 % ointment Apply topically 3 (three) times daily.  Qty: 30 g, Refills: 1      nitroGLYCERIN (NITROSTAT) 0.4 MG SL tablet Place 1 tablet (0.4 mg total) under the tongue every 5 (five) minutes as needed for Chest pain.  Qty: 30 tablet, Refills: 0      nystatin (MYCOSTATIN) cream APPLY  CREAM TOPICALLY TO AFFECTED AREA TWICE DAILY  Qty: 30 g, Refills: 0    Associated Diagnoses: Tear of skin of buttock, initial encounter; Yeast dermatitis      nystatin (MYCOSTATIN) powder Apply topically 2 (two) times daily.  Qty: 60 g, Refills: 2      omeprazole (PRILOSEC) 40 MG capsule Take 1 capsule (40 mg total) by mouth once  daily.  Qty: 90 capsule, Refills: 3      ondansetron (ZOFRAN) 8 MG tablet Take 1 tablet (8 mg total) by mouth every 8 (eight) hours as needed for Nausea.  Qty: 20 tablet, Refills: 0      potassium chloride SA (K-DUR,KLOR-CON) 20 MEQ tablet Take 1 tablet (20 mEq total) by mouth daily as needed (if taking lasix).  Qty: 90 tablet, Refills: 1      topiramate (TOPAMAX) 50 MG tablet Take 1 tablet (50 mg total) by mouth 2 (two) times daily.  Qty: 60 tablet, Refills: 0                 Discharge Diagnosis: Bilateral lumbar radiculopathy [M54.16]  Condition on Discharge: Stable with no complications to procedure   Diet on Discharge: Same as before.  Activity: as per instruction sheet.  Discharge to: Home with a responsible adult.  Follow up: 2-4 weeks       Please call the office at [phone] if you experience any weakness or loss of sensation, fever > 101.5, pain uncontrolled with oral medications, persistent nausea/vomiting/or diarrhea, redness or drainage from the incisions, or any other worrisome concerns. If physician on call was not reached or could not communicate with our office for any reason please go to the nearest emergency department

## 2025-07-15 NOTE — OP NOTE
Lumbar Interlaminar Epidural Steroid Injection under Fluoroscopic Guidance.   Time-out taken to identify patient and procedure prior to starting the procedure.     07/15/2025    PROCEDURE: Interlaminar epidural steroid injection under fluoroscopic guidance.     Pre-Op diagnosis: Lumbar radiculopathy [M54.16]    Post-Op diagnosis: Lumbar radiculopathy [M54.16]    PHYSICIAN: Thanh Diaz MD    ASSISTANTS: None     SEDATION: Conscious sedation provided by M.D    The patient was monitored with continuous pulse oximetry, EKG, and intermittent blood pressure monitors, immediately prior to administration of sedation.  The patient was hemodynamically stable throughout the entire process was responsive to voice, and breathing spontaneously.  Supplemental O2 was provided at 2L/min via nasal cannula.  Patient was comfortable for the duration of the procedure.     There was a total of 2mg IV Midazolam and 50mcg Fentanyl titrated for the procedure    Total sedation time was <10 minutes      ESTIMATED BLOOD LOSS: none.     COMPLICATIONS: none.     SPECIMENS: none    TECHNIQUE: With the patient laying in a prone position, the area was prepped and draped in the usual sterile fashion using ChloraPrep and a fenestrated drape. 1% lidocaine was given using a 27-gauge needle by raising a wheal and going down to the hub of the needle over the L5/S1 interlaminar space.  The interlaminar space was then approached with a 3.5 inch 18-gauge Touhy needle was introduced under fluoroscopic guidance in the AP and Lateral view. Once the Ligamentum flavum was encountered loss of resistance to saline was used to enter the epidural space. With positive loss of resistance and negative CSF or Blood, 3mL contrast dye Omnipaque (300mg/ml) was injected to confirm placement and there was no vascular runoff. Then 1ml 80mg/ml Depomedrol + 1mL 0.25% Bupivicaine + 8mL preservative free normal saline was injected slowly. Displacement of the radio opaque  contrast after injection of the medication confirmed that the medication went into the area of the epidural space.  The patient tolerated the procedure well.       The patient was monitored after the procedure.   They were given post-procedure and discharge instructions to follow at home.  The patient was discharged in a stable condition.

## 2025-07-15 NOTE — PLAN OF CARE
Patient prepared for procedure. Patient had few sips of vanilla shake with meds at 0600. Dr. Diaz notified. Dr thomas to proceed. No new orders.

## 2025-07-15 NOTE — H&P
HPI  Patient presenting for Procedure(s) (LRB):  L5/S1 IL GISELA (N/A)       No health changes since previous encounter    Past Medical History:   Diagnosis Date    COPD (chronic obstructive pulmonary disease)     NO HOME O2    Digestive disorder     Frequent headaches     Hypertension     Osteoarthritis 04/02/2019    Bilateral knees, ankles and feet    Severe obesity (BMI >= 40)     Sleep apnea     CPAP     Past Surgical History:   Procedure Laterality Date    BLADDER SUSPENSION      CATARACT EXTRACTION, BILATERAL      COLONOSCOPY N/A 12/3/2018    Procedure: COLONOSCOPY;  Surgeon: Mari Rader MD;  Location: [facility] ENDO;  Service: Endoscopy;  Laterality: N/A;    COLONOSCOPY N/A 6/12/2024    Procedure: COLONOSCOPY;  Surgeon: Sarah Siegel MD;  Location: [facility];  Service: Endoscopy;  Laterality: N/A;    ESOPHAGOGASTRODUODENOSCOPY N/A 12/31/2020    Procedure: ESOPHAGOGASTRODUODENOSCOPY (EGD);  Surgeon: Anthony Rodríguez MD;  Location: [facility] ENDO;  Service: General;  Laterality: N/A;    HYSTERECTOMY      partial 2000    INJECTION OF ANESTHETIC AGENT AROUND NERVE Left 9/14/2023    Procedure: LEFT Genicular nerve block RN IV Sedation;  Surgeon: Thanh Diaz MD;  Location: [facility] PAIN MGT;  Service: Pain Management;  Laterality: Left;    INJECTION OF ANESTHETIC AGENT AROUND NERVE Left 5/21/2024    Procedure: LEFT Genicular nerve block (therpeutic);  Surgeon: Thanh Diaz MD;  Location: [facility] PAIN MGT;  Service: Pain Management;  Laterality: Left;    INJECTION OF ANESTHETIC AGENT AROUND NERVE Left 6/11/2024    Procedure: LEFT Genicular nerve block with RN IV sedation;  Surgeon: Thanh Diaz MD;  Location: [facility] PAIN MGT;  Service: Pain Management;  Laterality: Left;    INJECTION OF ANESTHETIC AGENT AROUND NERVE Left 2/20/2025    Procedure: left Genicular nerve block therapeutic;  Surgeon: Thanh Diaz MD;  Location: [facility] PAIN MGT;  Service: Pain Management;  Laterality: Left;    INJECTION OF ANESTHETIC  AGENT INTO SACROILIAC JOINT Bilateral 11/30/2020    Procedure: Bilateral SIJ + Bilateral Piriformis + Bilateral GT Bursa Injection;  Surgeon: Thanh Diaz MD;  Location: [facility] PAIN MGT;  Service: Pain Management;  Laterality: Bilateral;    INJECTION OF JOINT Bilateral 11/30/2020    Procedure: Bilateral SIJ + Bilateral Piriformis + Bilateral GT Bursa Injection;  Surgeon: Thanh Diaz MD;  Location: [facility] PAIN MGT;  Service: Pain Management;  Laterality: Bilateral;    INJECTION OF PIRIFORMIS MUSCLE Bilateral 11/30/2020    Procedure: Bilateral SIJ + Bilateral Piriformis + Bilateral GT Bursa Injection;  Surgeon: Thanh Diaz MD;  Location: [facility] PAIN MGT;  Service: Pain Management;  Laterality: Bilateral;    INJECTION OF STEROID N/A 1/2/2025    Procedure: L5-S1 ILESI;  Surgeon: Thanh Diaz MD;  Location: [facility] PAIN MGT;  Service: Pain Management;  Laterality: N/A;    ROBOT-ASSISTED LAPAROSCOPIC SLEEVE GASTRECTOMY USING DA EZEQUIEL XI N/A 3/2/2021    Procedure: XI ROBOTIC SLEEVE GASTRECTOMY;  Surgeon: Anthony Rodríguez MD;  Location: [facility];  Service: General;  Laterality: N/A;    SELECTIVE INJECTION OF ANESTHETIC AGENT AROUND LUMBAR SPINAL NERVE ROOT BY TRANSFORAMINAL APPROACH Bilateral 1/4/2021    Procedure: Bilateral L5/S1 TF GISELA;  Surgeon: Thanh Diaz MD;  Location: [facility] PAIN MGT;  Service: Pain Management;  Laterality: Bilateral;    SELECTIVE INJECTION OF ANESTHETIC AGENT AROUND LUMBAR SPINAL NERVE ROOT BY TRANSFORAMINAL APPROACH Bilateral 3/23/2021    Procedure: Bilateral L5/S1 TF GISELA;  Surgeon: Thanh Diaz MD;  Location: [facility] PAIN MGT;  Service: Pain Management;  Laterality: Bilateral;    SELECTIVE INJECTION OF ANESTHETIC AGENT AROUND LUMBAR SPINAL NERVE ROOT BY TRANSFORAMINAL APPROACH Bilateral 10/5/2021    Procedure: Bilateral L5/S1 TF GISELA;  Surgeon: Thanh Diaz MD;  Location: [facility] PAIN MGT;  Service: Pain Management;  Laterality: Bilateral;    SELECTIVE INJECTION OF ANESTHETIC  "AGENT AROUND LUMBAR SPINAL NERVE ROOT BY TRANSFORAMINAL APPROACH Bilateral 3/28/2024    Procedure: Bilateral L5/S1 TF GISELA;  Surgeon: Thanh Diaz MD;  Location: [facility] ERT;  Service: Pain Management;  Laterality: Bilateral;    tonsilectomy      TOTAL KNEE ARTHROPLASTY Left 3/4/2020    Procedure: ARTHROPLASTY, KNEE, TOTAL;  Surgeon: Moy Connolly MD;  Location: [facility] OR;  Service: Orthopedics;  Laterality: Left;     Review of patient's allergies indicates:   Allergen Reactions    Penicillins Rash        Medications Ordered Prior to Encounter[1]     PMHx, PSHx, Allergies, Medications reviewed in epic    ROS negative except pain complaints in HPI    OBJECTIVE:    BP (!) 152/83 (BP Location: Right arm, Patient Position: Sitting)   Pulse 68   Temp 96.8 °F (36 °C) (Temporal)   Resp 16   Ht 5' 3" (1.6 m)   Wt 92.4 kg (203 lb 13 oz)   SpO2 96%   Breastfeeding No   BMI 36.10 kg/m²     PHYSICAL EXAMINATION:    GENERAL: Well appearing, in no acute distress, alert and oriented x3.  PSYCH:  Mood and affect appropriate.  SKIN: Skin color, texture, turgor normal, no rashes or lesions which will impact the procedure.  CV: RRR with palpation of the radial artery.  PULM: No evidence of respiratory difficulty, symmetric chest rise. Clear to auscultation.  NEURO: Cranial nerves grossly intact.    Plan:    Proceed with procedure as planned Procedure(s) (LRB):  L5/S1 IL GISELA (N/A)    Thanh Diaz MD  07/15/2025                 [1]   Current Facility-Administered Medications on File Prior to Encounter   Medication Dose Route Frequency Provider Last Rate Last Admin    ondansetron injection 4 mg  4 mg Intravenous Once PRN Thanh Diaz MD        ondansetron injection 4 mg  4 mg Intravenous Once PRN Thanh Diaz MD         Current Outpatient Medications on File Prior to Encounter   Medication Sig Dispense Refill    metoprolol succinate (TOPROL-XL) 25 MG 24 hr tablet Take 1 tablet (25 mg total) by mouth " once daily. 90 tablet 1    acetaminophen (TYLENOL) 325 MG tablet Take 2 tablets (650 mg total) by mouth every 8 (eight) hours as needed. 60 tablet 2    albuterol (PROAIR HFA) 90 mcg/actuation inhaler Inhale 2 puffs into the lungs every 6 (six) hours as needed for Wheezing. Rescue 18 g 0    aspirin (ECOTRIN) 81 MG EC tablet Take 1 tablet (81 mg total) by mouth once daily. 90 tablet 3    diclofenac sodium (VOLTAREN) 1 % Gel APPLY 2 GRAMS TOPICALLY THREE TIMES DAILY AS NEEDED 200 g 2    ergocalciferol, vitamin D2, (VITAMIN D ORAL) Take 2,000 Units by mouth once daily.      fluticasone propionate (FLONASE) 50 mcg/actuation nasal spray by Each Nostril route.      furosemide (LASIX) 40 MG tablet Take 1 tablet (40 mg total) by mouth daily as needed (edema, swelling). Do not take if BP is less than 110/70 90 tablet 1    gabapentin (NEURONTIN) 300 MG capsule Take 2 capsules (600 mg total) by mouth every evening. 60 capsule 5    gentamicin (GARAMYCIN) 0.1 % ointment Apply topically 3 (three) times daily. 30 g 1    imipramine (TOFRANIL) 10 MG Tab Take 1 tablet (10 mg total) by mouth every evening. 90 tablet 1    levocetirizine (XYZAL) 5 MG tablet TAKE 1 TABLET BY MOUTH ONCE DAILY IN THE EVENING (Patient not taking: Reported on 6/30/2025) 90 tablet 0    linaCLOtide (LINZESS) 72 mcg Cap capsule Take 1 capsule (72 mcg total) by mouth before breakfast. (Patient not taking: Reported on 6/30/2025) 30 capsule 2    meclizine (ANTIVERT) 25 mg tablet Take 1 tablet (25 mg total) by mouth 3 (three) times daily as needed for Dizziness. 30 tablet 0    meloxicam (MOBIC) 15 MG tablet Take 1 tablet (15 mg total) by mouth once daily. 90 tablet 3    methocarbamoL (ROBAXIN) 750 MG Tab Take 1 tablet by mouth three times daily as needed 90 tablet 2    montelukast (SINGULAIR) 10 mg tablet Take 1 tablet (10 mg total) by mouth every evening. Allergies 30 tablet 11    mupirocin (BACTROBAN) 2 % ointment Apply topically 3 (three) times daily. 30 g 1     nitroGLYCERIN (NITROSTAT) 0.4 MG SL tablet Place 1 tablet (0.4 mg total) under the tongue every 5 (five) minutes as needed for Chest pain. 30 tablet 0    nystatin (MYCOSTATIN) cream APPLY  CREAM TOPICALLY TO AFFECTED AREA TWICE DAILY 30 g 0    nystatin (MYCOSTATIN) powder Apply topically 2 (two) times daily. 60 g 2    omeprazole (PRILOSEC) 40 MG capsule Take 1 capsule (40 mg total) by mouth once daily. 90 capsule 3    ondansetron (ZOFRAN) 8 MG tablet Take 1 tablet (8 mg total) by mouth every 8 (eight) hours as needed for Nausea. 20 tablet 0    potassium chloride SA (K-DUR,KLOR-CON) 20 MEQ tablet Take 1 tablet (20 mEq total) by mouth daily as needed (if taking lasix). 90 tablet 1    topiramate (TOPAMAX) 50 MG tablet Take 1 tablet (50 mg total) by mouth 2 (two) times daily. 60 tablet 0

## 2025-07-17 ENCOUNTER — HOSPITAL ENCOUNTER (OUTPATIENT)
Dept: RADIOLOGY | Facility: HOSPITAL | Age: 70
Discharge: HOME | End: 2025-07-17
Attending: PSYCHIATRY & NEUROLOGY
Payer: MEDICARE

## 2025-07-17 DIAGNOSIS — R42 DIZZINESS AND GIDDINESS: ICD-10-CM

## 2025-07-17 PROCEDURE — 70498 CT ANGIOGRAPHY NECK: CPT | Mod: 26,,, | Performed by: STUDENT IN AN ORGANIZED HEALTH CARE EDUCATION/TRAINING PROGRAM

## 2025-07-17 PROCEDURE — 70496 CT ANGIOGRAPHY HEAD: CPT | Mod: TC

## 2025-07-17 PROCEDURE — 70496 CT ANGIOGRAPHY HEAD: CPT | Mod: 26,,, | Performed by: STUDENT IN AN ORGANIZED HEALTH CARE EDUCATION/TRAINING PROGRAM

## 2025-07-17 PROCEDURE — 25500020 PHARM REV CODE 255: Performed by: PSYCHIATRY & NEUROLOGY

## 2025-07-17 RX ADMIN — IOHEXOL 100 ML: 350 INJECTION, SOLUTION INTRAVENOUS at 03:07

## 2025-07-22 ENCOUNTER — CLINICAL SUPPORT (OUTPATIENT)
Dept: REHABILITATION | Facility: HOSPITAL | Age: 70
End: 2025-07-22
Attending: PSYCHIATRY & NEUROLOGY
Payer: MEDICARE

## 2025-07-22 DIAGNOSIS — R26.89 BALANCE DISORDER: Primary | ICD-10-CM

## 2025-07-22 PROCEDURE — 97112 NEUROMUSCULAR REEDUCATION: CPT | Mod: PN

## 2025-07-22 PROCEDURE — 97161 PT EVAL LOW COMPLEX 20 MIN: CPT | Mod: PN

## 2025-07-22 PROCEDURE — 97530 THERAPEUTIC ACTIVITIES: CPT | Mod: PN

## 2025-07-22 NOTE — PROGRESS NOTES
"  Outpatient Rehab    Physical Therapy Evaluation    Patient Name: Mareil Becerril  MRN: [id number]  YOB: 1955  Encounter Date: 7/22/2025    Therapy Diagnosis:   Encounter Diagnosis   Name Primary?    Balance disorder Yes     Physician: Berta Ireland MD    Physician Orders: Eval and Treat  Medical Diagnosis: Dizziness and giddiness  Surgical Diagnosis: Not applicable for this Episode   Surgical Date: Not applicable for this Episode  Days Since Last Surgery: Not applicable for this Episode    Visit # / Visits Authorized:  1 / 1  Insurance Authorization Period: 6/30/2025 to 12/31/2025  Date of Evaluation: 7/22/2025  Plan of Care Certification: 7/22/2025 to 9/30/2025    Time In:   11:15  Time Out:  12:00  Total Time (in minutes):   45  Total Billable Time (in minutes):  45    Intake Outcome Measure for FOTO Survey    Therapist reviewed FOTO scores for Mariel Becerril on 7/22/2025.   FOTO report - see Media section or FOTO account episode details.     Intake Score (%): Not applicable for this Episode    Precautions:       Subjective       Her main concern is related to longstanding "dizziness" since 2017. Described the dizziness as lightheadedness with no spinning. The symptoms are positional, she report s/s present after bending head forward for extended period.     Past Medical History/Physical Systems Review:   Mariel Becerril  has a past medical history of COPD (chronic obstructive pulmonary disease), Digestive disorder, Frequent headaches, Hypertension, Osteoarthritis, Severe obesity (BMI >= 40), and Sleep apnea.    Mariel Becerril  has a past surgical history that includes Bladder suspension; tonsilectomy; Hysterectomy; Colonoscopy (N/A, 12/3/2018); Cataract extraction, bilateral; Total knee arthroplasty (Left, 3/4/2020); Injection of piriformis muscle (Bilateral, 11/30/2020); Injection of anesthetic agent into sacroiliac joint (Bilateral, 11/30/2020); Injection of joint " (Bilateral, 11/30/2020); Esophagogastroduodenoscopy (N/A, 12/31/2020); Selective injection of anesthetic agent around lumbar spinal nerve root by transforaminal approach (Bilateral, 1/4/2021); Robot-assisted laparoscopic sleeve gastrectomy using da Zena Xi (N/A, 3/2/2021); Selective injection of anesthetic agent around lumbar spinal nerve root by transforaminal approach (Bilateral, 3/23/2021); Selective injection of anesthetic agent around lumbar spinal nerve root by transforaminal approach (Bilateral, 10/5/2021); Injection of anesthetic agent around nerve (Left, 9/14/2023); Selective injection of anesthetic agent around lumbar spinal nerve root by transforaminal approach (Bilateral, 3/28/2024); Injection of anesthetic agent around nerve (Left, 5/21/2024); Colonoscopy (N/A, 6/12/2024); Injection of anesthetic agent around nerve (Left, 6/11/2024); Injection of steroid (N/A, 1/2/2025); and Injection of anesthetic agent around nerve (Left, 2/20/2025).    Mariel has a current medication list which includes the following prescription(s): acetaminophen, albuterol, aspirin, diclofenac sodium, ergocalciferol (vitamin d2), fluticasone propionate, furosemide, gabapentin, gentamicin, imipramine, levocetirizine, linaclotide, meclizine, meloxicam, methocarbamol, metoprolol succinate, montelukast, mupirocin, nitroglycerin, nystatin, nystatin, omeprazole, ondansetron, potassium chloride sa, and topiramate, and the following Facility-Administered Medications: ondansetron and ondansetron.    Review of patient's allergies indicates:   Allergen Reactions    Penicillins Rash        Objective          Vestibular Testing  Visual Fields: normal  Horizontal tracking: initially very jerky but pt able to improve accuracy and rhythm after approx 7 reps; end range nystagmus present   Vertical Tracking: vertical nystagmus   Diagonal tracking:  normal  Lateral Visual acuity: line 10  Dynamic Visual Acuity: 1 line loss  Vestibular Occular  Reflex   Vertical: NT   Horizontal: normal  Convergence: normal  Radha Halpike:  neg  Saccades: impaired, pt unable to complete despite VC and demo    Vestibular:     Head impulse:  no Nystagmus   Head shake:  no nystagmus   Vassar Hallpike  R neg, L neg   Roll test R neg, L neg    Balance testing:  Tandem stance:left foot forward 7.29 sec and right foot forward 01 sec     mCTSIB  LOB description   Condition 1:   flat surface, eyes open  20 sec    Condition 2:   flat surface, eyes closed 17.39    Condition 3:   foam surface, eyes open 14.55/20 sec Slight posterior lean into PT   Condition 4:   foam surface, eyes closed 11.39/20 sec            Treatment:   Mariel participated in neuromuscular re-education activities to improve: Balance, Coordination, Kinesthetic, Sense, and Proprioception for 12 minutes. The following activities were included:  Saccades, smooth pursuit, habituation in staggered stance, ankle strategy at wall  Pt participated in dynamic functional therapeutic activities to improve functional performance for 10  minutes, including:  Patient was couneled in detail about vestibulopathy, fall prevention and use of proprioception (walk aid, slower deliberate movements) and visual cues (caution when walking in the dark).        Time Entry(in minutes):       Assessment & Plan   Assessment  Mariel presents with a condition of Low complexity.   Presentation of Symptoms: Stable       Functional Limitations: Activity tolerance, Ambulating on uneven surfaces, Completing self-care activities, Decreased ambulation distance/endurance, Maintaining balance, Increased risk of fall, Gait limitations, Functional mobility, Transfers, Squatting  Impairments: Abnormal gait, Impaired balance, Activity intolerance, Pain with functional activity    Patient Goal for Therapy (PT): improve balance  Prognosis: Good    Plan  From a physical therapy perspective, the patient would benefit from: Skilled Rehab Services    Planned therapy  interventions include: Therapeutic exercise, Therapeutic activities, Neuromuscular re-education, Manual therapy, ADLs/IADLs, Canalith repositioning, and Cognitive functional training.            Visit Frequency: 1 times Per Week for 10 Weeks.       This plan was discussed with Patient.   Discussion participants: Agreed Upon Plan of Care             The patient's spiritual, cultural, and educational needs were considered, and the patient is agreeable to the plan of care and goals.           Goals:   Active       balance        Patient able to perform forward bend of head without LOB or c/o dizziness        Start:  07/22/25    Expected End:  09/30/25            Pt to be I with self management of condition and progression vestibular program for maintenance.        Start:  07/22/25    Expected End:  09/30/25            Patient able to ambulate in community safely without assistive device, on varied terrain with head movement, without LOB or c/o dizziness.         Start:  07/22/25    Expected End:  09/30/25            Pt to improve mCTSIB to 4/4 to reduce fall risk and improve functional balance        Start:  07/22/25    Expected End:  09/30/25                Ariela Quinn, PT

## 2025-07-29 DIAGNOSIS — R94.6 ABNORMAL THYROID SCAN: Primary | ICD-10-CM

## 2025-07-29 NOTE — PROGRESS NOTES
Called to schedule US, no answer. Left a message for call back with us and gave her radiology number to call them directly.  
no

## 2025-07-31 ENCOUNTER — TELEPHONE (OUTPATIENT)
Dept: OTOLARYNGOLOGY | Facility: CLINIC | Age: 70
End: 2025-07-31
Payer: MEDICARE

## 2025-07-31 ENCOUNTER — HOSPITAL ENCOUNTER (OUTPATIENT)
Dept: RADIOLOGY | Facility: HOSPITAL | Age: 70
Discharge: HOME | End: 2025-07-31
Attending: NURSE PRACTITIONER
Payer: MEDICARE

## 2025-07-31 DIAGNOSIS — R94.6 ABNORMAL THYROID SCAN: ICD-10-CM

## 2025-07-31 PROCEDURE — 76536 US EXAM OF HEAD AND NECK: CPT | Mod: TC

## 2025-07-31 PROCEDURE — 76536 US EXAM OF HEAD AND NECK: CPT | Mod: 26,,, | Performed by: RADIOLOGY

## 2025-07-31 NOTE — TELEPHONE ENCOUNTER
Spoke to pt and informed of a multinodular goiter and the thalia to be seen by ENT. Appt scheduled for Thursday 9:15. Voiced understanding.

## 2025-07-31 NOTE — TELEPHONE ENCOUNTER
----- Message from Esrtellita Weller NP sent at 7/31/2025  4:39 PM CDT -----  Sent message to schedule with her ENT soon.   ----- Message -----  From: Interface, Rad Results In  Sent: 7/31/2025  10:52 AM CDT  To: Estrellita Weller NP

## 2025-08-01 ENCOUNTER — CLINICAL SUPPORT (OUTPATIENT)
Dept: REHABILITATION | Facility: HOSPITAL | Age: 70
End: 2025-08-01
Payer: MEDICARE

## 2025-08-01 DIAGNOSIS — R26.89 BALANCE DISORDER: Primary | ICD-10-CM

## 2025-08-01 PROCEDURE — 97530 THERAPEUTIC ACTIVITIES: CPT | Mod: PN

## 2025-08-01 PROCEDURE — 97112 NEUROMUSCULAR REEDUCATION: CPT | Mod: PN

## 2025-08-01 NOTE — PROGRESS NOTES
Outpatient Rehab    Physical Therapy Visit    Patient Name: Mariel Becerril  MRN: [id number]  YOB: 1955  Encounter Date: 8/1/2025    Therapy Diagnosis:   Encounter Diagnosis   Name Primary?    Balance disorder Yes     Physician: Berta Ireland MD    Physician Orders: Eval and Treat  Medical Diagnosis: Dizziness and giddiness  Surgical Diagnosis: Not applicable for this Episode   Surgical Date: Not applicable for this Episode  Days Since Last Surgery: Not applicable for this Episode    Visit # / Visits Authorized:  1 / 20  Insurance Authorization Period: 7/18/2025 to 7/4/2026  Date of Evaluation: 7/22/2025  Plan of Care Certification: 7/22/2025 to 9/30/2025      PT/PTA:     Number of PTA visits since last PT visit:   Time In:   9:45  Time Out:  10:30  Total Time (in minutes):   45  Total Billable Time (in minutes):  45    FOTO:  Intake Score (%): 39  Survey Score 2 (%): Not applicable for this Episode  Survey Score 3 (%): Not applicable for this Episode    Precautions:         Subjective         Pt reports she was a little light headed and slightly dizzy this week but feel changing her diet may be helping     Objective            Treatment:   Mariel participated in neuromuscular re-education activities to improve: Balance, Coordination, Kinesthetic, Sense, and Proprioception for 38 minutes. The following activities were included:  Vor x1 standing h/v 30 sec 2x  Smooth pursuit NBOS and staggered increased difficulty and step out LOB noted with left LE in posterior px  Saccades walking and standing with spot it card game  Foam balance 5# DB press out EO/EC  Recip arm swing staggered stance EO/EC (fall out of left )  Hab: head rot EO EC and vertical nod EO/EC fall to left   Shuttle 5B 1min 2x DL     Pt participated in dynamic functional therapeutic activities to improve functional performance for 12  minutes, including:  STS with gaze stab 1x10  Amb with ispi head rot 3 step seq  Amb holding weight  with ipsi left head rot 3 step seq  Amb with gaze shift and head rot to seek target  Trunk and head rot with step back to seek target behind her 1x10 B      Time Entry(in minutes):       Assessment & Plan   Assessment:     Pt participated in fall prevention and vestibular rehab. Notable deficit with left SLS or staggered stance with increased fall risk. Decreased reaction time, use of step strategy and proprioception noted with dynamic balance and any multi tasking. HEP progressed to standing, pt verbalized understanding.    The patient will continue to benefit from skilled outpatient physical therapy in order to address the deficits listed in the problem list on the initial evaluation, provide patient and family education, and maximize the patients level of independence in the home and community environments.     The patient's spiritual, cultural, and educational needs were considered, and the patient is agreeable to the plan of care and goals.           Plan:      Goals:   Active       balance        Patient able to perform forward bend of head without LOB or c/o dizziness        Start:  07/22/25    Expected End:  09/30/25            Pt to be I with self management of condition and progression vestibular program for maintenance.        Start:  07/22/25    Expected End:  09/30/25            Patient able to ambulate in community safely without assistive device, on varied terrain with head movement, without LOB or c/o dizziness.         Start:  07/22/25    Expected End:  09/30/25            Pt to improve mCTSIB to 4/4 to reduce fall risk and improve functional balance        Start:  07/22/25    Expected End:  09/30/25                Ariela Quinn, PT

## 2025-08-07 ENCOUNTER — OFFICE VISIT (OUTPATIENT)
Dept: OTOLARYNGOLOGY | Facility: CLINIC | Age: 70
End: 2025-08-07
Payer: MEDICARE

## 2025-08-07 VITALS — WEIGHT: 174.63 LBS | BODY MASS INDEX: 30.93 KG/M2

## 2025-08-07 DIAGNOSIS — E04.2 MULTINODULAR THYROID: Primary | ICD-10-CM

## 2025-08-07 PROCEDURE — 3008F BODY MASS INDEX DOCD: CPT | Mod: CPTII,S$GLB,, | Performed by: STUDENT IN AN ORGANIZED HEALTH CARE EDUCATION/TRAINING PROGRAM

## 2025-08-07 PROCEDURE — 1126F AMNT PAIN NOTED NONE PRSNT: CPT | Mod: CPTII,S$GLB,, | Performed by: STUDENT IN AN ORGANIZED HEALTH CARE EDUCATION/TRAINING PROGRAM

## 2025-08-07 PROCEDURE — 99214 OFFICE O/P EST MOD 30 MIN: CPT | Mod: S$GLB,,, | Performed by: STUDENT IN AN ORGANIZED HEALTH CARE EDUCATION/TRAINING PROGRAM

## 2025-08-07 PROCEDURE — 99999 PR PBB SHADOW E&M-EST. PATIENT-LVL II: CPT | Mod: PBBFAC,,, | Performed by: STUDENT IN AN ORGANIZED HEALTH CARE EDUCATION/TRAINING PROGRAM

## 2025-08-07 PROCEDURE — 1101F PT FALLS ASSESS-DOCD LE1/YR: CPT | Mod: CPTII,S$GLB,, | Performed by: STUDENT IN AN ORGANIZED HEALTH CARE EDUCATION/TRAINING PROGRAM

## 2025-08-07 PROCEDURE — 3288F FALL RISK ASSESSMENT DOCD: CPT | Mod: CPTII,S$GLB,, | Performed by: STUDENT IN AN ORGANIZED HEALTH CARE EDUCATION/TRAINING PROGRAM

## 2025-08-07 NOTE — PROGRESS NOTES
Chief complaint:    Chief Complaint   Patient presents with    Thyroid Problem     Recent imaging         Referring Provider:  No referring provider defined for this encounter.    History of present illness:     Ms. Becerril is a 69 y.o.  presenting for evaluation of thyroid nodule/s.     She states that she has not noted a mass in her neck.       Denies pain.       She denies sore throat, dysphagia, otalgia, voice change, hemoptysis.      Voice will fatigue after talking a while.    No family/personal history of thyroid cancer.     No personal history of head and neck cancer/head and neck radiation.    No personal history of hypo- or hyperthyroidism.       History      Past Medical History:   Past Medical History:   Diagnosis Date    COPD (chronic obstructive pulmonary disease)     NO HOME O2    Digestive disorder     Frequent headaches     Hypertension     Osteoarthritis 04/02/2019    Bilateral knees, ankles and feet    Severe obesity (BMI >= 40)     Sleep apnea     CPAP    .          Past Surgical History:  Past Surgical History:   Procedure Laterality Date    BLADDER SUSPENSION      CATARACT EXTRACTION, BILATERAL      COLONOSCOPY N/A 12/3/2018    Procedure: COLONOSCOPY;  Surgeon: Mari Rader MD;  Location: [facility];  Service: Endoscopy;  Laterality: N/A;    COLONOSCOPY N/A 6/12/2024    Procedure: COLONOSCOPY;  Surgeon: Sarah Siegel MD;  Location: [facility];  Service: Endoscopy;  Laterality: N/A;    EPIDURAL STEROID INJECTION INTO LUMBAR SPINE N/A 7/15/2025    Procedure: L5/S1 IL GISELA;  Surgeon: Thanh Diaz MD;  Location: [facility]IN [facility]GT;  Service: Pain Management;  Laterality: N/A;    ESOPHAGOGASTRODUODENOSCOPY N/A 12/31/2020    Procedure: ESOPHAGOGASTRODUODENOSCOPY (EGD);  Surgeon: Anthony Rodríguez MD;  Location: [facility] ENDO;  Service: General;  Laterality: N/A;    HYSTERECTOMY      partial 2000    INJECTION OF ANESTHETIC AGENT AROUND NERVE Left 9/14/2023    Procedure: LEFT Genicular nerve block RN IV  Sedation;  Surgeon: Thanh Diaz MD;  Location: [facility]GT;  Service: Pain Management;  Laterality: Left;    INJECTION OF ANESTHETIC AGENT AROUND NERVE Left 5/21/2024    Procedure: LEFT Genicular nerve block (therpeutic);  Surgeon: Thanh Diaz MD;  [facility];  Service: Pain Management;  Laterality: Left;    INJECTION OF ANESTHETIC AGENT AROUND NERVE Left 6/11/2024    Procedure: LEFT Genicular nerve block with RN IV sedation;  Surgeon: Thanh Diaz MD;  Location: [facility];  Service: Pain Management;  Laterality: Left;    INJECTION OF ANESTHETIC AGENT AROUND NERVE Left 2/20/2025    Procedure: left Genicular nerve block therapeutic;  Surgeon: Thanh Diaz MD;  Location: [facility];  Service: Pain Management;  Laterality: Left;    INJECTION OF ANESTHETIC AGENT INTO SACROILIAC JOINT Bilateral 11/30/2020    Procedure: Bilateral SIJ + Bilateral Piriformis + Bilateral GT Bursa Injection;  Surgeon: Thanh Diaz MD;  Location: [facility];  Service: Pain Management;  Laterality: Bilateral;    INJECTION OF JOINT Bilateral 11/30/2020    Procedure: Bilateral SIJ + Bilateral Piriformis + Bilateral GT Bursa Injection;  Surgeon: Thanh Diaz MD;  Location: [facility];  Service: Pain Management;  Laterality: Bilateral;    INJECTION OF PIRIFORMIS MUSCLE Bilateral 11/30/2020    Procedure: Bilateral SIJ + Bilateral Piriformis + Bilateral GT Bursa Injection;  Surgeon: Thanh Diaz MD;  Location: [facility];  Service: Pain Management;  Laterality: Bilateral;    INJECTION OF STEROID N/A 1/2/2025    Procedure: L5-S1 ILESI;  Surgeon: Thanh Diaz MD;  Location: [facility];  Service: Pain Management;  Laterality: N/A;    ROBOT-ASSISTED LAPAROSCOPIC SLEEVE GASTRECTOMY USING DA EZEQUIEL XI N/A 3/2/2021    Procedure: XI ROBOTIC SLEEVE GASTRECTOMY;  Surgeon: Anthony Rodríguez MD;  Location: [facility] OR;  Service: General;  Laterality: N/A;    SELECTIVE INJECTION OF ANESTHETIC  AGENT AROUND LUMBAR SPINAL NERVE ROOT BY TRANSFORAMINAL APPROACH Bilateral 1/4/2021    Procedure: Bilateral L5/S1 TF GISELA;  Surgeon: Thanh Diaz MD;  Location: [facility];  Service: Pain Management;  Laterality: Bilateral;    SELECTIVE INJECTION OF ANESTHETIC AGENT AROUND LUMBAR SPINAL NERVE ROOT BY TRANSFORAMINAL APPROACH Bilateral 3/23/2021    Procedure: Bilateral L5/S1 TF GISELA;  Surgeon: Thanh Diaz MD;  Location: [facility];  Service: Pain Management;  Laterality: Bilateral;    SELECTIVE INJECTION OF ANESTHETIC AGENT AROUND LUMBAR SPINAL NERVE ROOT BY TRANSFORAMINAL APPROACH Bilateral 10/5/2021    Procedure: Bilateral L5/S1 TF GISELA;  Surgeon: Thanh Diaz MD;  Location: [facility];  Service: Pain Management;  Laterality: Bilateral;    SELECTIVE INJECTION OF ANESTHETIC AGENT AROUND LUMBAR SPINAL NERVE ROOT BY TRANSFORAMINAL APPROACH Bilateral 3/28/2024    Procedure: Bilateral L5/S1 TF GISELA;  Surgeon: Thnah Diaz MD;  Location: [facility];  Service: Pain Management;  Laterality: Bilateral;    tonsilectomy      TOTAL KNEE ARTHROPLASTY Left 3/4/2020    Procedure: ARTHROPLASTY, KNEE, TOTAL;  Surgeon: Moy Connolly MD;  Location: [facility] OR;  Service: Orthopedics;  Laterality: Left;            Medications:  She  has a current medication list which includes the following prescription(s): acetaminophen, diclofenac sodium, ergocalciferol (vitamin d2), fluticasone propionate, furosemide, gabapentin, gentamicin, levocetirizine, linaclotide, meclizine, meloxicam, methocarbamol, metoprolol succinate, montelukast, mupirocin, nitroglycerin, nystatin, nystatin, omeprazole, ondansetron, potassium chloride sa, topiramate, albuterol, aspirin, and imipramine, and the following Facility-Administered Medications: ondansetron and ondansetron.      Allergies:   Review of patient's allergies indicates:   Allergen Reactions    Penicillins Rash         Family history: family history includes Heart  attack in her father.             Social History          Alcohol use:  reports no history of alcohol use.            Tobacco:  reports that she has never smoked. She has never been exposed to tobacco smoke. She has never used smokeless tobacco.           Physical Examination     Wt 79.2 kg (174 lb 9.7 oz)   BMI 30.93 kg/m²      The head and neck examination included the following elements which were found to be within normal limits unless otherwise noted below: general appearance, mood and affect, quality of voice, inspection of head and face, cranial nerve examination, external ears, ear canals, and tympanic membranes, external nose, nasal mucosa, septum and turbinates, lips, oral cavity,oropharynx, larynx, pharyngeal walls and pyriform sinuses, cervical lymphatics,  neck and thyroid bed, pupils and extraocular motion, carotid pulses bilaterally, temporomandibular joint, respiratory assessment for effort, stridor, lung symmetry, and retractions, rashes or lesions on head or neck, and facial nerve function.     Salient findings:        normal voice     2cm mobile isthmus nodule, multinodular left thyroid     no enlarged cervical adenopathy      Data reviewed      Pathology      none    Laboratory    Lab Results   Component Value Date    TSH 0.913 12/17/2024        Lab Results   Component Value Date    CALCIUM 9.0 04/08/2025        Imaging    I have independently reviewed the following imaging with the findings noted below:      US thyroid  2.1 cm heterogeneous nodule with calcifications in the lower pole of the left lobe, trad 5  Additional 2.3 cm isthmus nodule, 2.6 cm left upper pole nodule, both tirad 3  1.4 cm left midpole nodule with central calcifaction  Largest right lobe nodule is 1.7 cm and spongifrom      Impression/Plan     1. Thyrotoxicosis with toxic mutinodular thyroid goiter           I have recommended FNA of tirad 5 left lobe nodule, and 2.6 cm left upper pole nodule.    Depending on results of  the FNA management may include observation, thyroid lobectomy with staged total thyroidectomy, or total thyroidectomy.     We discussed risks of thyroidectomy such as numbness, poor cosmetic outcome, injury or weakness of the recurrent laryngeal nerve with resultant hoarseness which may be permanent, poor function of the parathyroid glands with need for calcium supplementation which may be permanent, need for thyroid hormone replacement, discovery of cancer with need for further surgery or other treatments, among other risks.      Will follow up results. In the event of FLUS/AUS, we will schedule for follow       Morgan Pop MD  [facility]   [facility]  [id number] [place] [address].  SHANIQUA Graham [id number]  P: [phone]  F: [phone]

## 2025-08-11 PROBLEM — R29.90 MULTIPLE NEUROLOGICAL SYMPTOMS: Status: RESOLVED | Noted: 2025-06-30 | Resolved: 2025-08-11

## 2025-08-11 PROBLEM — R42 DIZZINESS AND GIDDINESS: Status: RESOLVED | Noted: 2025-06-30 | Resolved: 2025-08-11

## 2025-08-13 ENCOUNTER — CLINICAL SUPPORT (OUTPATIENT)
Dept: REHABILITATION | Facility: HOSPITAL | Age: 70
End: 2025-08-13
Payer: MEDICARE

## 2025-08-13 ENCOUNTER — OFFICE VISIT (OUTPATIENT)
Dept: PAIN MEDICINE | Facility: CLINIC | Age: 70
End: 2025-08-13
Payer: MEDICARE

## 2025-08-13 VITALS
BODY MASS INDEX: 35.58 KG/M2 | RESPIRATION RATE: 17 BRPM | HEIGHT: 63 IN | WEIGHT: 200.81 LBS | SYSTOLIC BLOOD PRESSURE: 113 MMHG | DIASTOLIC BLOOD PRESSURE: 71 MMHG | HEART RATE: 86 BPM

## 2025-08-13 DIAGNOSIS — R26.89 BALANCE DISORDER: Primary | ICD-10-CM

## 2025-08-13 DIAGNOSIS — M47.816 LUMBAR FACET ARTHROPATHY: ICD-10-CM

## 2025-08-13 DIAGNOSIS — M25.562 POSTERIOR LEFT KNEE PAIN: ICD-10-CM

## 2025-08-13 DIAGNOSIS — M17.0 PRIMARY OSTEOARTHRITIS OF BOTH KNEES: ICD-10-CM

## 2025-08-13 DIAGNOSIS — Z96.652 STATUS POST TOTAL KNEE REPLACEMENT USING CEMENT, LEFT: ICD-10-CM

## 2025-08-13 DIAGNOSIS — M54.16 LUMBAR RADICULOPATHY: Primary | ICD-10-CM

## 2025-08-13 DIAGNOSIS — G89.4 CHRONIC PAIN SYNDROME: ICD-10-CM

## 2025-08-13 PROCEDURE — 97530 THERAPEUTIC ACTIVITIES: CPT | Mod: PN

## 2025-08-13 PROCEDURE — G2211 COMPLEX E/M VISIT ADD ON: HCPCS | Mod: S$GLB,,, | Performed by: NURSE PRACTITIONER

## 2025-08-13 PROCEDURE — 99214 OFFICE O/P EST MOD 30 MIN: CPT | Mod: S$GLB,,, | Performed by: NURSE PRACTITIONER

## 2025-08-13 PROCEDURE — 3078F DIAST BP <80 MM HG: CPT | Mod: CPTII,S$GLB,, | Performed by: NURSE PRACTITIONER

## 2025-08-13 PROCEDURE — 1125F AMNT PAIN NOTED PAIN PRSNT: CPT | Mod: CPTII,S$GLB,, | Performed by: NURSE PRACTITIONER

## 2025-08-13 PROCEDURE — 97112 NEUROMUSCULAR REEDUCATION: CPT | Mod: PN

## 2025-08-13 PROCEDURE — 3008F BODY MASS INDEX DOCD: CPT | Mod: CPTII,S$GLB,, | Performed by: NURSE PRACTITIONER

## 2025-08-13 PROCEDURE — 1159F MED LIST DOCD IN RCRD: CPT | Mod: CPTII,S$GLB,, | Performed by: NURSE PRACTITIONER

## 2025-08-13 PROCEDURE — 3288F FALL RISK ASSESSMENT DOCD: CPT | Mod: CPTII,S$GLB,, | Performed by: NURSE PRACTITIONER

## 2025-08-13 PROCEDURE — 99999 PR PBB SHADOW E&M-EST. PATIENT-LVL IV: CPT | Mod: PBBFAC,,, | Performed by: NURSE PRACTITIONER

## 2025-08-13 PROCEDURE — 1101F PT FALLS ASSESS-DOCD LE1/YR: CPT | Mod: CPTII,S$GLB,, | Performed by: NURSE PRACTITIONER

## 2025-08-13 PROCEDURE — 3074F SYST BP LT 130 MM HG: CPT | Mod: CPTII,S$GLB,, | Performed by: NURSE PRACTITIONER

## 2025-08-13 RX ORDER — TOPIRAMATE 100 MG/1
100 TABLET, FILM COATED ORAL 2 TIMES DAILY
Qty: 60 TABLET | Refills: 5 | Status: SHIPPED | OUTPATIENT
Start: 2025-08-13 | End: 2026-08-13

## 2025-08-13 RX ORDER — LIDOCAINE AND PRILOCAINE 25; 25 MG/G; MG/G
CREAM TOPICAL 2 TIMES DAILY PRN
Qty: 30 G | Refills: 0 | Status: SHIPPED | OUTPATIENT
Start: 2025-08-13

## 2025-08-13 RX ORDER — DICLOFENAC SODIUM 10 MG/G
GEL TOPICAL
Qty: 200 G | Refills: 2 | Status: SHIPPED | OUTPATIENT
Start: 2025-08-13

## 2025-08-15 ENCOUNTER — RESULTS FOLLOW-UP (OUTPATIENT)
Dept: INTERNAL MEDICINE | Facility: CLINIC | Age: 70
End: 2025-08-15

## 2025-08-15 ENCOUNTER — APPOINTMENT (OUTPATIENT)
Dept: RADIOLOGY | Facility: HOSPITAL | Age: 70
End: 2025-08-15
Attending: NURSE PRACTITIONER
Payer: MEDICARE

## 2025-08-15 ENCOUNTER — OFFICE VISIT (OUTPATIENT)
Dept: INTERNAL MEDICINE | Facility: CLINIC | Age: 70
End: 2025-08-15
Payer: MEDICARE

## 2025-08-15 VITALS
WEIGHT: 198.88 LBS | OXYGEN SATURATION: 99 % | HEIGHT: 63 IN | BODY MASS INDEX: 35.24 KG/M2 | TEMPERATURE: 98 F | SYSTOLIC BLOOD PRESSURE: 110 MMHG | RESPIRATION RATE: 18 BRPM | DIASTOLIC BLOOD PRESSURE: 70 MMHG | HEART RATE: 80 BPM

## 2025-08-15 DIAGNOSIS — R10.32 LLQ PAIN: ICD-10-CM

## 2025-08-15 DIAGNOSIS — R31.9 HEMATURIA, UNSPECIFIED TYPE: Primary | ICD-10-CM

## 2025-08-15 DIAGNOSIS — R31.9 HEMATURIA, UNSPECIFIED TYPE: ICD-10-CM

## 2025-08-15 DIAGNOSIS — R35.0 URINARY FREQUENCY: ICD-10-CM

## 2025-08-15 DIAGNOSIS — N20.0 NEPHROLITHIASIS: ICD-10-CM

## 2025-08-15 LAB
BILIRUB SERPL-MCNC: NORMAL MG/DL
BLOOD URINE, POC: NORMAL
COLOR, POC UA: NORMAL
GLUCOSE UR QL STRIP: NORMAL
KETONES UR QL STRIP: NORMAL
LEUKOCYTE ESTERASE URINE, POC: NORMAL
NITRITE, POC UA: NORMAL
PH, POC UA: 6.5
PROTEIN, POC: 30
SPECIFIC GRAVITY, POC UA: 1.01
UROBILINOGEN, POC UA: 0.2

## 2025-08-15 PROCEDURE — 74019 RADEX ABDOMEN 2 VIEWS: CPT | Mod: TC,PN

## 2025-08-15 PROCEDURE — 74019 RADEX ABDOMEN 2 VIEWS: CPT | Mod: 26,,, | Performed by: RADIOLOGY

## 2025-08-15 PROCEDURE — 99999 PR PBB SHADOW E&M-EST. PATIENT-LVL IV: CPT | Mod: PBBFAC,,, | Performed by: NURSE PRACTITIONER

## 2025-08-18 DIAGNOSIS — M79.18 PIRIFORMIS MUSCLE PAIN: ICD-10-CM

## 2025-08-18 DIAGNOSIS — M54.16 BILATERAL LUMBAR RADICULOPATHY: ICD-10-CM

## 2025-08-18 RX ORDER — METHOCARBAMOL 750 MG/1
TABLET, FILM COATED ORAL
Qty: 90 TABLET | Refills: 2 | Status: SHIPPED | OUTPATIENT
Start: 2025-08-18

## 2025-08-19 ENCOUNTER — HOSPITAL ENCOUNTER (OUTPATIENT)
Dept: RADIOLOGY | Facility: HOSPITAL | Age: 70
Discharge: HOME | End: 2025-08-19
Attending: STUDENT IN AN ORGANIZED HEALTH CARE EDUCATION/TRAINING PROGRAM
Payer: MEDICARE

## 2025-08-19 DIAGNOSIS — E04.2 MULTINODULAR THYROID: ICD-10-CM

## 2025-08-19 PROCEDURE — 88173 CYTOPATH EVAL FNA REPORT: CPT | Mod: TC | Performed by: STUDENT IN AN ORGANIZED HEALTH CARE EDUCATION/TRAINING PROGRAM

## 2025-08-19 PROCEDURE — C1729 CATH, DRAINAGE: HCPCS

## 2025-08-21 ENCOUNTER — PATIENT MESSAGE (OUTPATIENT)
Dept: INTERNAL MEDICINE | Facility: CLINIC | Age: 70
End: 2025-08-21
Payer: MEDICARE

## 2025-08-21 ENCOUNTER — HOSPITAL ENCOUNTER (OUTPATIENT)
Dept: RADIOLOGY | Facility: HOSPITAL | Age: 70
Discharge: HOME | End: 2025-08-21
Attending: NURSE PRACTITIONER
Payer: MEDICARE

## 2025-08-21 DIAGNOSIS — R31.9 HEMATURIA, UNSPECIFIED TYPE: ICD-10-CM

## 2025-08-21 PROBLEM — N20.0 NEPHROLITHIASIS: Status: ACTIVE | Noted: 2025-08-21

## 2025-08-21 PROCEDURE — 76770 US EXAM ABDO BACK WALL COMP: CPT | Mod: TC

## 2025-08-21 PROCEDURE — 76770 US EXAM ABDO BACK WALL COMP: CPT | Mod: 26,,, | Performed by: RADIOLOGY

## 2025-08-21 RX ORDER — TAMSULOSIN HYDROCHLORIDE 0.4 MG/1
0.4 CAPSULE ORAL DAILY
Qty: 30 CAPSULE | Refills: 0 | Status: SHIPPED | OUTPATIENT
Start: 2025-08-21

## 2025-08-22 LAB
ESTROGEN SERPL-MCNC: NORMAL PG/ML
INSULIN SERPL-ACNC: NORMAL U[IU]/ML
LAB AP CLINICAL INFORMATION: NORMAL
LAB AP GROSS DESCRIPTION: NORMAL
LAB AP NON-GYN INTERPRETATION SPECIMEN 1: NORMAL
LAB AP NON-GYN INTERPRETATION SPECIMEN 2: NORMAL
LAB AP PERFORMING LOCATION(S): NORMAL
LAB AP REPORT FOOTNOTES: NORMAL

## (undated) DEVICE — COVER TIP CURVED SCISSORS XI

## (undated) DEVICE — APPLICATOR CHLORAPREP ORN 26ML

## (undated) DEVICE — CANNULA SEAL 12MM

## (undated) DEVICE — SEE MEDLINE ITEM 152622

## (undated) DEVICE — HOOD FLYTE PEELWY STERISHIELD

## (undated) DEVICE — SEE MEDLINE ITEM 157027

## (undated) DEVICE — SEE MEDLINE ITEM 152523

## (undated) DEVICE — NDL HYPODERMIC BLUNT 18G 1.5IN

## (undated) DEVICE — SUT STRATAFIX 2-0 30CM

## (undated) DEVICE — STAPLER SKIN PROXIMATE WIDE

## (undated) DEVICE — DRESSING AQUACEL AG 3.5X10IN

## (undated) DEVICE — COVER OVERHEAD SURG LT BLUE

## (undated) DEVICE — ELECTRODE BLADE E-Z CLEAN 4IN

## (undated) DEVICE — SYR 30CC LUER LOCK

## (undated) DEVICE — MANIFOLD 4 PORT

## (undated) DEVICE — TIP SUCTION YANKAUER

## (undated) DEVICE — NDL PNEUMO INSUFFLATI 120MM

## (undated) DEVICE — SOL 9P NACL IRR PIC IL

## (undated) DEVICE — SEE MEDLINE ITEM 157110

## (undated) DEVICE — GLOVE SURGICAL LATEX SZ 7

## (undated) DEVICE — KIT ANTIFOG

## (undated) DEVICE — POSITIONER HEAD DONUT 9IN FOAM

## (undated) DEVICE — BANDAGE ACE ELASTIC 6"

## (undated) DEVICE — NDL SAFETY 25G X 1.5 ECLIPSE

## (undated) DEVICE — RELOAD SUREFORM 60 3.5 BLU 6R

## (undated) DEVICE — STAPLER SUREFORM 60 SPU

## (undated) DEVICE — SEE MEDLINE ITEM 146231

## (undated) DEVICE — SUPPORT ULNA NERVE PROTECTOR

## (undated) DEVICE — SEE MEDLINE ITEM 152529

## (undated) DEVICE — SUT MONOCRYL 4.0 PS2 CP496G

## (undated) DEVICE — GOWN SURG 2XL DISP TIE BACK

## (undated) DEVICE — ELECTRODE REM PLYHSV RETURN 9

## (undated) DEVICE — BLADE SAG 18.0X1.27X100

## (undated) DEVICE — DRAPE PLASTIC U 60X72

## (undated) DEVICE — DRAPE INCISE IOBAN 2 23X33IN

## (undated) DEVICE — DRAPE ABDOMINAL TIBURON 14X11

## (undated) DEVICE — SOL NS 1000CC

## (undated) DEVICE — DEVICE CLOSURE DISP 14G

## (undated) DEVICE — GLOVE SURGICAL LATEX SZ 8

## (undated) DEVICE — DRAPE COLUMN DAVINCI XI

## (undated) DEVICE — SOL IRR NACL .9% 3000ML

## (undated) DEVICE — SEALER AQUAMANTYS 3.48MM

## (undated) DEVICE — UNDERGLOVES BIOGEL PI SIZE 7.5

## (undated) DEVICE — SYRINGE 0.9% NACL 10MIL PREFIL

## (undated) DEVICE — ALCOHOL 70% ISOP RUBBING 4OZ

## (undated) DEVICE — GLOVE BIOGEL PI MICRO SZ 7.5

## (undated) DEVICE — CONTAINER LAB W/84 OZ LID

## (undated) DEVICE — SEE MEDLINE ITEM 157131

## (undated) DEVICE — DRAPE STERI INSTRUMENT 1018

## (undated) DEVICE — ADHESIVE DERMABOND ADVANCED

## (undated) DEVICE — GLOVE BIOGEL PI ORTHO PRO SZ 8

## (undated) DEVICE — SEE MEDLINE ITEM 157216

## (undated) DEVICE — SUT VICRYL 1 OB 36 CTX

## (undated) DEVICE — UNDERGLOVES BIOGEL PI SZ 7 LF

## (undated) DEVICE — EVACUATOR KIT SMOKE PLUME AWAY

## (undated) DEVICE — SYR 10CC LUER LOCK

## (undated) DEVICE — CONTAINER SPECIMEN STRL 4OZ

## (undated) DEVICE — SPONGE LAP 18X18 PREWASHED

## (undated) DEVICE — RELOAD SUREFORM 60 4.3 GRN 6R

## (undated) DEVICE — TUBING HEATED INSUFFLATOR

## (undated) DEVICE — OPTITWIST VACUUM MIXING BOWL

## (undated) DEVICE — UNDERGLOVE BIOGEL PI SZ 6.5 LF

## (undated) DEVICE — Device

## (undated) DEVICE — SEAL UNIVERSAL 5MM-8MM XI

## (undated) DEVICE — PICO 7

## (undated) DEVICE — SYR 3CC LUER LOC

## (undated) DEVICE — SEE MEDLINE ITEM 152739

## (undated) DEVICE — KIT IRR SUCTION HND PIECE

## (undated) DEVICE — GLOVE SURGICAL LATEX SZ 6.5

## (undated) DEVICE — OBTURATOR BLADELESS 8MM XI CLR

## (undated) DEVICE — SEE MEDLINE ITEM 157117

## (undated) DEVICE — GAUZE SPONGE 4X4 12PLY

## (undated) DEVICE — CANNULA REDUCER 12-8MM

## (undated) DEVICE — SEE MEDLINE ITEM 146298

## (undated) DEVICE — SEE MEDLINE ITEM 157166

## (undated) DEVICE — SEALER VESSEL EXTEND

## (undated) DEVICE — PAD ABD 8X10 STERILE

## (undated) DEVICE — DRAPE ARM DAVINCI XI

## (undated) DEVICE — SKIN MARKER DEVON 160

## (undated) DEVICE — BLADE SURG CARBON STEEL #10

## (undated) DEVICE — SUT CTD VICRYL 0 UND BR SUT

## (undated) DEVICE — TOURNIQUET SB QC DP 34X4IN

## (undated) DEVICE — KIT WING PAD POSITIONING

## (undated) DEVICE — SEE MEDLINE ITEM 157125

## (undated) DEVICE — CONTAINER 32OZ PATHOLOGY

## (undated) DEVICE — GLOVE SURG BIOGEL LATEX SZ 7.5